# Patient Record
Sex: FEMALE | Race: BLACK OR AFRICAN AMERICAN | Employment: OTHER | URBAN - METROPOLITAN AREA
[De-identification: names, ages, dates, MRNs, and addresses within clinical notes are randomized per-mention and may not be internally consistent; named-entity substitution may affect disease eponyms.]

---

## 2017-01-18 ENCOUNTER — TELEPHONE (OUTPATIENT)
Dept: RHEUMATOLOGY | Age: 67
End: 2017-01-18

## 2017-01-18 ENCOUNTER — OFFICE VISIT (OUTPATIENT)
Dept: FAMILY MEDICINE CLINIC | Age: 67
End: 2017-01-18

## 2017-01-18 VITALS
HEIGHT: 64 IN | WEIGHT: 225 LBS | HEART RATE: 76 BPM | DIASTOLIC BLOOD PRESSURE: 86 MMHG | BODY MASS INDEX: 38.41 KG/M2 | OXYGEN SATURATION: 96 % | SYSTOLIC BLOOD PRESSURE: 134 MMHG | RESPIRATION RATE: 16 BRPM | TEMPERATURE: 97.6 F

## 2017-01-18 DIAGNOSIS — Z23 ENCOUNTER FOR IMMUNIZATION: ICD-10-CM

## 2017-01-18 DIAGNOSIS — I10 ESSENTIAL HYPERTENSION WITH GOAL BLOOD PRESSURE LESS THAN 130/80: ICD-10-CM

## 2017-01-18 DIAGNOSIS — E11.9 TYPE 2 DIABETES MELLITUS WITHOUT COMPLICATION, UNSPECIFIED LONG TERM INSULIN USE STATUS: Primary | ICD-10-CM

## 2017-01-18 DIAGNOSIS — M16.0 PRIMARY OSTEOARTHRITIS OF BOTH HIPS: ICD-10-CM

## 2017-01-18 DIAGNOSIS — J42 CHRONIC BRONCHITIS, UNSPECIFIED CHRONIC BRONCHITIS TYPE (HCC): ICD-10-CM

## 2017-01-18 RX ORDER — PREDNISONE 5 MG/1
5 TABLET ORAL DAILY
Qty: 135 TAB | Refills: 0 | Status: CANCELLED | OUTPATIENT
Start: 2017-01-18

## 2017-01-18 RX ORDER — LOSARTAN POTASSIUM AND HYDROCHLOROTHIAZIDE 12.5; 1 MG/1; MG/1
TABLET ORAL
Qty: 90 TAB | Refills: 3 | Status: SHIPPED | OUTPATIENT
Start: 2017-01-18 | End: 2017-11-09 | Stop reason: SDUPTHER

## 2017-01-18 RX ORDER — GLIPIZIDE 10 MG/1
TABLET ORAL
Qty: 180 TAB | Refills: 3 | Status: SHIPPED | OUTPATIENT
Start: 2017-01-18 | End: 2017-11-09 | Stop reason: ALTCHOICE

## 2017-01-18 RX ORDER — PREDNISONE 5 MG/1
TABLET ORAL
Qty: 30 TAB | Refills: 3 | Status: SHIPPED | OUTPATIENT
Start: 2017-01-18 | End: 2017-01-20 | Stop reason: SDUPTHER

## 2017-01-18 RX ORDER — DILTIAZEM HYDROCHLORIDE 420 MG/1
CAPSULE, EXTENDED RELEASE ORAL
Qty: 90 CAP | Refills: 3 | Status: SHIPPED | OUTPATIENT
Start: 2017-01-18 | End: 2017-08-17 | Stop reason: SDUPTHER

## 2017-01-18 RX ORDER — HYDROCODONE BITARTRATE AND ACETAMINOPHEN 10; 325 MG/1; MG/1
TABLET ORAL
Qty: 90 TAB | Refills: 0 | Status: SHIPPED | OUTPATIENT
Start: 2017-01-18 | End: 2017-02-22 | Stop reason: SDUPTHER

## 2017-01-18 NOTE — TELEPHONE ENCOUNTER
Patient stopped in to the office today and she is needing Prednisone 5mg to hold her over to her next appointment. She uses [address] [place] [address]. Please call her at [phone].

## 2017-01-18 NOTE — MR AVS SNAPSHOT
Visit Information Date & Time Provider Department Dept. Phone Encounter #  
 1/18/2017 11:20 AM Clay Cordova [facility] [phone] [id number] Follow-up Instructions Return in about 1 month (around 2/18/2017) for pain rx, welcome to medicare (2 slots) . Your Appointments 2/1/2017  4:00 PM  
ESTABLISHED PATIENT with Kourtney Quintana MD  
Arthritis and [address] [facility] [address] [place] [address] Road) Appt Note: fu 3 mo; r/s insurance confirmed [address] [facility] [address]  
[phone]  
  
   
 [address] [phone] Upcoming Health Maintenance Date Due  
 EYE EXAM RETINAL OR DILATED Q1 8/7/2015 FOBT Q 1 YEAR AGE 50-75 4/14/2016 GLAUCOMA SCREENING Q2Y 7/4/2016 Pneumococcal 65+ Low/Medium Risk (1 of 2 - PCV13) 7/4/2016 MEDICARE YEARLY EXAM 7/4/2016 HEMOGLOBIN A1C Q6M 3/21/2017 FOOT EXAM Q1 7/27/2017 MICROALBUMIN Q1 9/21/2017 LIPID PANEL Q1 9/21/2017 BREAST CANCER SCRN MAMMOGRAM 10/5/2018 DTaP/Tdap/Td series (2 - Td) 5/13/2023 Allergies as of 1/18/2017  Review Complete On: 1/18/2017 By: Salvador Jay LPN Severity Noted Reaction Type Reactions Bactrim [Sulfamethoxazole-trimethoprim]  04/02/2010    Nausea Only Biaxin [Clarithromycin]  04/02/2010    Nausea Only Demerol [Meperidine]  04/02/2010    Itching Erythromycin  04/02/2010    Nausea Only Pcn [Penicillins]  04/02/2010    Hives Percocet [Oxycodone-acetaminophen]  04/02/2010    Itching Tetracycline  04/02/2010    Nausea Only Voltaren [Diclofenac Sodium]  04/02/2010    Hives Current Immunizations  Reviewed on 9/30/2016 Name Date Influenza High Dose Vaccine PF 1/29/2016 Influenza Vaccine 12/5/2012 Influenza Vaccine PF 11/17/2014 PPD 1/25/1999 Pneumococcal Polysaccharide (PPSV-23) 5/13/2013 TD Vaccine 5/12/1999 Tdap 5/13/2013 Not reviewed this visit You Were Diagnosed With   
  
 Codes Comments Type 2 diabetes mellitus without complication, unspecified long term insulin use status (HCC)    -  Primary ICD-10-CM: E11.9 ICD-9-CM: 250.00 Essential hypertension with goal blood pressure less than 130/80     ICD-10-CM: I10 
ICD-9-CM: 401.9 Chronic bronchitis, unspecified chronic bronchitis type ([facility]     ICD-10-CM: K98 ICD-9-CM: 491.9 Primary osteoarthritis of both hips     ICD-10-CM: M16.0 ICD-9-CM: 715.15 Vitals BP Pulse Temp Resp Height(growth percentile) Weight(growth percentile) 134/86 76 97.6 °F (36.4 °C) (Oral) 16 5' 4\" (1.626 m) 225 lb (102.1 kg) SpO2 BMI OB Status Smoking Status 96% 38.62 kg/m2 Hysterectomy Current Every Day Smoker Vitals History BMI and BSA Data Body Mass Index Body Surface Area  
 38.62 kg/m 2 2.15 m 2 [facility] [address]  [address] [phone] Your Updated Medication List  
  
   
This list is accurate as of: 1/18/17 12:36 PM.  Always use your most recent med list.  
  
  
  
  
 albuterol 2.5 mg /3 mL (0.083 %) nebulizer solution Commonly known as:  PROVENTIL VENTOLIN  
3 mL by Nebulization route every six (6) hours as needed for Wheezing. aspirin 81 mg chewable tablet Take 81 mg by mouth daily. atorvastatin 20 mg tablet Commonly known as:  LIPITOR  
TAKE 1 TABLET NIGHTLY * Blood-Glucose Meter monitoring kit One touch meter * Blood-Glucose Meter monitoring kit Commonly known as:  FREESTYLE LITE METER Tid use due to uncontrolled dm and hyperglycemia. 250.02. 790.29 CENTRUM PO Take  by mouth daily. diltiazem 420 mg SR capsule Commonly known as:  [facility] TAKE 1 CAPSULE DAILY  
  
 glipiZIDE 10 mg tablet Commonly known as:  GLUCOTROL  
1/2-1 tab po bid with meals * glucose blood VI test strips strip Commonly known as:  ONETOUCH ULTRA TEST Test once daily * glucose blood VI test strips strip Commonly known as:  ASCENSIA AUTODISC VI, ONE TOUCH ULTRA TEST VI Tid use due to uncontrolled dm and hyperglycemia. 250 790.29  
  
 * glucose blood VI test strips strip Commonly known as:  FREESTYLE LITE STRIPS Tid use due to uncontrolled dm and hyperglycemia. 250. 790.29 HYDROcodone-acetaminophen  mg tablet Commonly known as:  NORCO  
1 tab po every 8 hours as needed for pain. Lancets Misc Commonly known as:  FREESTYLE LANCETS Tid use due to uncontrolled dm and hyperglycemia.  790.29  
  
 losartan-hydroCHLOROthiazide 100-12.5 mg per tablet Commonly known as:  HYZAAR  
TAKE 1 TABLET DAILY  
  
 methylPREDNISolone 4 mg tablet Commonly known as:  Arabella Ernst As directed. Indications: ANGIOEDEMA pneumococcal 13 jamie conj dip 0.5 mL Syrg injection Commonly known as:  PREVNAR-13  
0.5 mL by IntraMUSCular route once for 1 dose. predniSONE 5 mg tablet Commonly known as:  Mamadou Carmonaor Take 5 mg by mouth daily. sAXagliptin-metFORMIN 2.5-1,000 mg Tm24 Commonly known as:  KOMBIGLYZE XR Take 1 Tab by mouth two (2) times a day. Dc the metformin xr and ongliza separate rxs ZyrTEC 10 mg Cap Generic drug:  Cetirizine Take  by mouth. * Notice: This list has 5 medication(s) that are the same as other medications prescribed for you. Read the directions carefully, and ask your doctor or other care provider to review them with you. Prescriptions Printed Refills HYDROcodone-acetaminophen (NORCO)  mg tablet 0 Si tab po every 8 hours as needed for pain. Class: Print Prescriptions Sent to Pharmacy Refills  
 glipiZIDE (GLUCOTROL) 10 mg tablet 3 Si/2-1 tab po bid with meals  Class: Normal  
 Pharmacy: [facility] - [address] Ph #: [phone] sAXagliptin-metFORMIN (KOMBIGLYZE XR) 2.5-1,000 mg TM24 3 Sig: Take 1 Tab by mouth two (2) times a day. Dc the metformin xr and ongliza separate rxs Class: Normal  
 Pharmacy: [address] [place] [address], [address] [place] [address] Ph #: [phone] Route: Oral  
 losartan-hydroCHLOROthiazide (HYZAAR) 100-12.5 mg per tablet 3 Sig: TAKE 1 TABLET DAILY Class: Normal  
 Pharmacy: [address] [place] [address], [address] [place] [address] Ph #: [phone]  
 diltiazem [facility]) 420 mg SR capsule 3 Sig: TAKE 1 CAPSULE DAILY Class: Normal  
 Pharmacy: [address] [place] [address], [address] [place] [address] Ph #: [phone]  
 pneumococcal 13 jamie conj dip (PREVNAR-13) 0.5 mL syrg injection 0 Si.5 mL by IntraMUSCular route once for 1 dose. Class: Normal  
 Pharmacy: [address] [place], [address] [place] [address] Arnav Hidalgo Ph #: [phone] Route: IntraMUSCular We Performed the Following HEMOGLOBIN A1C WITH EAG [29130 CPT(R)] LIPID PANEL [26146 CPT(R)] METABOLIC PANEL, COMPREHENSIVE [15229 CPT(R)] Follow-up Instructions Return in about 1 month (around 2017) for pain rx, welcome to medicare (2 slots) . Introducing [facility]! Kassi Urrutia introduces [employer] patient portal. Now you can access parts of your medical record, email your doctor's office, and request medication refills online. 1. In your internet browser, go to [email or web address] 2. Click on the First Time User? Click Here link in the Sign In box. You will see the New Member Sign Up page. 3. Enter your Framebench Access Code exactly as it appears below. You will not need to use this code after youve completed the sign-up process. If you do not sign up before the expiration date, you must request a new code. · [employer] Access Code: [email or web address] Expires: 4/18/2017 12:36 PM 
 
4. Enter the last four digits of your Social Security Number (xxxx) and Date of Birth (mm/dd/yyyy) as indicated and click Submit. You will be taken to the next sign-up page. 5. Create a [employer] ID. This will be your [employer]a login ID and cannot be changed, so think of one that is secure and easy to remember. 6. Create a Bazinga password. You can change your password at any time. 7. Enter your Password Reset Question and Answer. This can be used at a later time if you forget your password. 8. Enter your e-mail address. You will receive e-mail notification when new information is available in [address] 9. Click Sign Up. You can now view and download portions of your medical record. 10. Click the Download Summary menu link to download a portable copy of your medical information. If you have questions, please visit the Frequently Asked Questions section of the [employer] website. Remember, [employer]azinga is NOT to be used for urgent needs. For medical emergencies, dial 911. Now available from your iPhone and Android! Please provide this summary of care documentation to your next provider. Your primary care clinician is listed as Britton Richards. If you have any questions after today's visit, please call [phone].

## 2017-01-18 NOTE — PROGRESS NOTES
HISTORY OF PRESENT ILLNESS  Serafin Abdi is a 73 y.o. female. HPI  Chief Complaint   Patient presents with    Diabetes     follow up     Serafin Abdi is a 73 y.o. female here for diabetic, routine follow up. (originially the appt was scheduled for welcome to medicare, however, due to her numerous needs today will reschedule this for one month). Fu on DM. A1c was over an 8 at her last  Check up however that was prior to her starting the glipizide back. She feels like things are under tighter control at this time. Reports she is monitoring both fbs and rbs regularly. bg in am as low as 69, at bedtime is around 120 in evenings. Will only use 1/2 of the glipizde if on lower side  Conts to take the kombigize in evening as well. 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 She is going through treatment at this time. Dr Esteban Duarte gave her tramadol tx with her surgery (vocal cord) but she didn't take those. Needs refills of her norco for her chronic joint pain (knees and hips) . Pain has been worse here lately due to weather so needing tid medication. The last injection she got made her ill. Can see dr Isabella Hodge on shots but she was told there were not any injection option for her. She has been tolerating her medication well and has not had any adverse effects. She denies sob and has continued to abstain from smoking!!!    Current Outpatient Prescriptions   Medication Sig Dispense Refill    glipiZIDE (GLUCOTROL) 10 mg tablet 1/2-1 tab po bid with meals 180 Tab 3    sAXagliptin-metFORMIN (KOMBIGLYZE XR) 2.5-1,000 mg TM24 Take 1 Tab by mouth two (2) times a day.  Dc the metformin xr and ongliza separate rxs 180 Tab 3    losartan-hydroCHLOROthiazide (HYZAAR) 100-12.5 mg per tablet TAKE 1 TABLET DAILY 90 Tab 3    diltiazem (TIAZAC) 420 mg SR capsule TAKE 1 CAPSULE DAILY 90 Cap 3    HYDROcodone-acetaminophen (NORCO)  mg tablet 1 tab po every 8 hours as needed for pain. 90 Tab 0    methylPREDNISolone (MEDROL DOSEPACK) 4 mg tablet As directed. Indications: ANGIOEDEMA 1 Dose Pack 0    atorvastatin (LIPITOR) 20 mg tablet TAKE 1 TABLET NIGHTLY 90 Tab 3    albuterol (PROVENTIL VENTOLIN) 2.5 mg /3 mL (0.083 %) nebulizer solution 3 mL by Nebulization route every six (6) hours as needed for Wheezing. 60 Each 2    Blood-Glucose Meter (FREESTYLE LITE METER) monitoring kit Tid use due to uncontrolled dm and hyperglycemia. 250.02. 790.29 1 Kit 0    glucose blood VI test strips (FREESTYLE LITE STRIPS) strip Tid use due to uncontrolled dm and hyperglycemia. 250.02. 790.29 1 Each 11    Lancets (FREESTYLE LANCETS) misc Tid use due to uncontrolled dm and hyperglycemia. 250.02. 790.29 1 Each 11    glucose blood VI test strips (ASCENSIA AUTODISC VI, ONE TOUCH ULTRA TEST VI) strip Tid use due to uncontrolled dm and hyperglycemia. 250.02. 790.29 100 Each 11    Blood-Glucose Meter monitoring kit One touch meter 1 Kit 1    glucose blood VI test strips (ONE TOUCH ULTRA TEST) strip Test once daily 1 Package 11    Cetirizine (ZYRTEC) 10 mg Cap Take  by mouth.  aspirin 81 mg chewable tablet Take 81 mg by mouth daily.  MULTIVITS W-FE,OTHER MIN (CENTRUM PO) Take  by mouth daily.       predniSONE (DELTASONE) 5 mg tablet 5 mg once daily or as directed 30 Tab 3     Allergies   Allergen Reactions    Bactrim [Sulfamethoxazole-Trimethoprim] Nausea Only    Biaxin [Clarithromycin] Nausea Only    Demerol [Meperidine] Itching    Erythromycin Nausea Only    Pcn [Penicillins] Hives    Percocet [Oxycodone-Acetaminophen] Itching    Tetracycline Nausea Only    Voltaren [Diclofenac Sodium] Hives     Past Medical History   Diagnosis Date    Abnormal pulmonary function test 9/28/2016    AR (allergic rhinitis)     Asthma     Atrial thrombus 1994     left    Chronic bronchitis ([facility] 9/28/2016    Chronic pain     DDD (degenerative disc disease), lumbar     Diabetes ([address]     DJD (degenerative joint disease) of hip      right    Empty sella ([address] 2000     by MRI    Former smoker 10/24/2016    Hoarseness of voice     Hypercholesterolemia     Hypertension     Lesion of vocal cord     PMR (polymyalgia rheumatica) ([facility] 5/18/2016    Smoker 12/5/2012     Past Surgical History   Procedure Laterality Date    Hx breast reduction      Hx hemorrhoidectomy      Hx knee replacement  11/03, 10/05     left then right    Hx hysterectomy       ovaries spared    Hx appendectomy  1970's    Hx tonsillectomy  age 29     tonsils    Endoscopy, colon, diagnostic  10/03     polypectomy    Endoscopy, colon, diagnostic  2/05     Dr. Breann Myers Endoscopy, colon, diagnostic  2010, reported     due 2015     Family History   Problem Relation Age of Onset    Arthritis-osteo Mother     Asthma Mother     Diabetes Mother     Hypertension Mother     Stroke Mother     Arthritis-osteo Father     Cancer Father     Elevated Lipids Father     Hypertension Father     Arthritis-osteo Sister     Asthma Sister     Diabetes Sister     Elevated Lipids Sister     Hypertension Sister     Heart Attack Sister     Hypertension Daughter     Hypertension Daughter     Hypertension Daughter     Anesth Problems Neg Hx      Social History   Substance Use Topics    Smoking status: Current Every Day Smoker     Packs/day: 0.50     Years: 40.00     Types: Cigarettes    Smokeless tobacco: Never Used    Alcohol use No       Review of Systems   Constitutional: Negative. Negative for chills, diaphoresis, fever, malaise/fatigue and weight loss. HENT: Negative. Negative for congestion, ear discharge, ear pain, hearing loss, nosebleeds, sore throat and tinnitus. Eyes: Negative. Negative for blurred vision, double vision, photophobia, pain, discharge and redness. Respiratory: Negative.   Negative for cough, hemoptysis, sputum production, shortness of breath, wheezing and stridor. Cardiovascular: Negative. Negative for chest pain, palpitations, orthopnea, claudication, leg swelling and PND. Gastrointestinal: Negative. Negative for abdominal pain, blood in stool, constipation, diarrhea, heartburn, melena, nausea and vomiting. Genitourinary: Negative. Negative for dysuria, flank pain, frequency, hematuria and urgency. Musculoskeletal: Positive for back pain and joint pain. Negative for falls, myalgias and neck pain. Skin: Negative. Negative for itching and rash. Neurological: Negative. Negative for dizziness, tingling, tremors, sensory change, speech change, focal weakness, seizures, loss of consciousness, weakness and headaches. Endo/Heme/Allergies: Negative. Negative for environmental allergies and polydipsia. Does not bruise/bleed easily. Psychiatric/Behavioral: Negative. Negative for depression, hallucinations, memory loss, substance abuse and suicidal ideas. The patient is not nervous/anxious and does not have insomnia. All other systems reviewed and are negative. Physical Exam   Constitutional: She is oriented to person, place, and time. She appears well-developed and well-nourished. No distress. Obese, weight stable. HENT:   Head: Normocephalic and atraumatic. Right Ear: External ear normal. Tympanic membrane is not bulging. No middle ear effusion. Left Ear: External ear normal. Tympanic membrane is not bulging. No middle ear effusion. Nose: Nose normal. Right sinus exhibits no maxillary sinus tenderness and no frontal sinus tenderness. Left sinus exhibits no maxillary sinus tenderness and no frontal sinus tenderness. Mouth/Throat: Oropharynx is clear and moist. No oropharyngeal exudate. Eyes: Conjunctivae and EOM are normal. Pupils are equal, round, and reactive to light. Right eye exhibits no discharge. Left eye exhibits no discharge. No scleral icterus.    Neck: Normal range of motion. Neck supple. No thyromegaly present. Cardiovascular: Normal rate, regular rhythm, normal heart sounds and intact distal pulses. Pulmonary/Chest: Effort normal and breath sounds normal. No stridor. No respiratory distress. She has no wheezes. She has no rales. She exhibits no tenderness. Abdominal: Bowel sounds are normal. She exhibits no distension and no mass. There is no tenderness. There is no rebound and no guarding. Musculoskeletal: She exhibits tenderness (she has chronic arthritic changes to yisel knees. she ambulates slow and steady. ). Lymphadenopathy:     She has no cervical adenopathy. Neurological: She is alert and oriented to person, place, and time. Skin: Skin is warm and dry. She is not diaphoretic. Psychiatric: She has a normal mood and affect. Her behavior is normal. Judgment and thought content normal.   Nursing note and vitals reviewed. ASSESSMENT and PLAN    ICD-10-CM ICD-9-CM    1. Type 2 diabetes mellitus without complication, unspecified long term insulin use status (HCC) E11.9 250.00 glipiZIDE (GLUCOTROL) 10 mg tablet      sAXagliptin-metFORMIN (KOMBIGLYZE XR) 2.5-1,000 mg KA58      METABOLIC PANEL, COMPREHENSIVE      LIPID PANEL      HEMOGLOBIN A1C WITH EAG   2. Essential hypertension with goal blood pressure less than 130/80 I10 401.9 losartan-hydroCHLOROthiazide (HYZAAR) 100-12.5 mg per tablet      diltiazem (TIAZAC) 420 mg SR capsule   3. Chronic bronchitis, unspecified chronic [facility]itis type ([facility] J42 491.9    4. Primary osteoarthritis of both hips M16.0 715.15 HYDROcodone-acetaminophen (NORCO)  mg tablet   5. Encounter for immunization Z23 V03.89 pneumococcal 13 jamie conj dip (PREVNAR-13) 0.5 mL syrg injection     Win Bertram was seen today for diabetes.     Diagnoses and all orders for this visit:    Type 2 diabetes mellitus without complication, unspecified long term insulin use status (HCC)  -     glipiZIDE (GLUCOTROL) 10 mg tablet; 1/2-1 tab po bid with meals  -     sAXagliptin-metFORMIN (KOMBIGLYZE XR) 2.5-1,000 mg TM24; Take 1 Tab by mouth two (2) times a day. Dc the metformin xr and ongliza separate rxs  -     METABOLIC PANEL, COMPREHENSIVE  -     LIPID PANEL  -     HEMOGLOBIN A1C WITH EAG  Improving control. Deviate plan per lab results     Essential hypertension with goal blood pressure less than 130/80  -     losartan-hydroCHLOROthiazide (HYZAAR) 100-12.5 mg per tablet; TAKE 1 TABLET DAILY  -     diltiazem (TIAZAC) 420 mg SR capsule; TAKE 1 CAPSULE DAILY  bp is stable. Chronic bronchitis, unspecified chronic bronchitis type ([address]  Stable, she is due to fu with pulm for routine check up. Continues to abstain from smoking. Primary osteoarthritis of both hips and Knees   - cont     HYDROcodone-acetaminophen (NORCO)  mg tablet; 1 tab po every 8 hours as needed for pain. Reviewed patient's prescription monitoring report at time of visit and there are no discrepancies. Updated controlled substance agreement was reviewed, signed and is on file. Will do urine screening at her 1 month fu visit. molly I will need to see her monthly on this rx. She has exhausted injection options due to severe adverse reaction with injection in the past. She does do her exercises at home. i do feel the benefits outweight risks for her continuing at this time as the medication helps improve her quality of life by increasing her ability to perform adls and be active member of society. Patient verbalizes understanding of plan of care. Encounter for immunization  -     pneumococcal 13 jamie conj dip (PREVNAR-13) 0.5 mL syrg injection; 0.5 mL by IntraMUSCular route once for 1 dose. Follow-up Disposition:  Return in about 1 month (around 2/18/2017) for pain rx, welcome to medicare (2 slots) . Updated controlled substance agreement was reviewed, signed and is on file.    Reviewed patient's prescription monitoring report at time of visit and there are no discrepancies.

## 2017-01-19 LAB
ALBUMIN SERPL-MCNC: 4.4 G/DL (ref 3.6–4.8)
ALBUMIN/GLOB SERPL: 1.5 {RATIO} (ref 1.1–2.5)
ALP SERPL-CCNC: 81 IU/L (ref 39–117)
ALT SERPL-CCNC: 15 IU/L (ref 0–32)
AST SERPL-CCNC: 12 IU/L (ref 0–40)
BILIRUB SERPL-MCNC: 0.3 MG/DL (ref 0–1.2)
BUN SERPL-MCNC: 19 MG/DL (ref 8–27)
BUN/CREAT SERPL: 24 (ref 11–26)
CALCIUM SERPL-MCNC: 9.7 MG/DL (ref 8.7–10.3)
CHLORIDE SERPL-SCNC: 97 MMOL/L (ref 96–106)
CHOLEST SERPL-MCNC: 240 MG/DL (ref 100–199)
CO2 SERPL-SCNC: 21 MMOL/L (ref 18–29)
CREAT SERPL-MCNC: 0.8 MG/DL (ref 0.57–1)
EST. AVERAGE GLUCOSE BLD GHB EST-MCNC: 189 MG/DL
GLOBULIN SER CALC-MCNC: 2.9 G/DL (ref 1.5–4.5)
GLUCOSE SERPL-MCNC: 155 MG/DL (ref 65–99)
HBA1C MFR BLD: 8.2 % (ref 4.8–5.6)
HDLC SERPL-MCNC: 46 MG/DL
INTERPRETATION, 910389: NORMAL
LDLC SERPL CALC-MCNC: 151 MG/DL (ref 0–99)
POTASSIUM SERPL-SCNC: 4.1 MMOL/L (ref 3.5–5.2)
PROT SERPL-MCNC: 7.3 G/DL (ref 6–8.5)
SODIUM SERPL-SCNC: 138 MMOL/L (ref 134–144)
TRIGL SERPL-MCNC: 215 MG/DL (ref 0–149)
VLDLC SERPL CALC-MCNC: 43 MG/DL (ref 5–40)

## 2017-01-20 RX ORDER — PREDNISONE 5 MG/1
TABLET ORAL
Qty: 30 TAB | Refills: 0 | Status: SHIPPED | OUTPATIENT
Start: 2017-01-20 | End: 2017-02-22 | Stop reason: DRUGHIGH

## 2017-01-20 NOTE — PROGRESS NOTES
Notify pt her a1c has not improved, still an 8.2. Have her continue what she is doing however as she seems to have good balance going on right now and continue to limit carbs/ sugar in diet. Try to get some light exercise daily. Will recheck in 3 months. Also cholesterol is up, has she been taking the atorvastatin?

## 2017-01-21 RX ORDER — PRAVASTATIN SODIUM 20 MG/1
20 TABLET ORAL
Qty: 30 TAB | Refills: 5 | Status: SHIPPED | OUTPATIENT
Start: 2017-01-21 | End: 2017-08-20 | Stop reason: DRUGHIGH

## 2017-01-21 NOTE — PROGRESS NOTES
Lets have her try changing to pravastatin to see if she can tolerate this better.  Sent to jose will discuss how she is doing at her fu

## 2017-01-21 NOTE — PROGRESS NOTES
Pt made aware of results/recommendations below. Pt states she hasn't been taking her cholesterol medication because it causes her legs to \"hurt\" however pt states seh will try to start back on it.

## 2017-02-08 ENCOUNTER — TELEPHONE (OUTPATIENT)
Dept: FAMILY MEDICINE CLINIC | Age: 67
End: 2017-02-08

## 2017-02-08 NOTE — TELEPHONE ENCOUNTER
Referral Request Telephone Call      Insurance Name:     Brian Marcelo ID:  [id number]   Specialist Name: Monisha Polanco   Type of Specialty:  Pulmonary    Address of Specialist:  [address] [place] [address]   Phone/Fax Number of Specialist: Phone # [phone]  Fax # [phone]   Diagnosis: R06.00   Appointment Date: 2/9/17 @ 1p   NPI    Tax ID

## 2017-02-10 ENCOUNTER — TELEPHONE (OUTPATIENT)
Dept: FAMILY MEDICINE CLINIC | Age: 67
End: 2017-02-10

## 2017-02-10 NOTE — TELEPHONE ENCOUNTER
Referral Request Telephone Call      Insurance Name:     [place] [facility] REPLACEMENT/ADVANTAGE - PPO    Insurance ID:  [id number]   Specialist Name: Dr. Maricarmen Sandhu   Type of Specialty:  Rheumatology    Address of Specialist:  [facility] [address]   Phone/Fax Number of Specialist: [phone]   [facility] [phone]   Diagnosis: 719.40   Appointment Date: 2-22-17 @  4:30   NPI    Tax ID

## 2017-02-22 ENCOUNTER — OFFICE VISIT (OUTPATIENT)
Dept: RHEUMATOLOGY | Age: 67
End: 2017-02-22

## 2017-02-22 ENCOUNTER — OFFICE VISIT (OUTPATIENT)
Dept: FAMILY MEDICINE CLINIC | Age: 67
End: 2017-02-22

## 2017-02-22 VITALS
DIASTOLIC BLOOD PRESSURE: 72 MMHG | SYSTOLIC BLOOD PRESSURE: 120 MMHG | TEMPERATURE: 98.3 F | HEART RATE: 86 BPM | RESPIRATION RATE: 16 BRPM | BODY MASS INDEX: 37.73 KG/M2 | HEIGHT: 64 IN | WEIGHT: 221 LBS | OXYGEN SATURATION: 99 %

## 2017-02-22 VITALS
BODY MASS INDEX: 37.69 KG/M2 | SYSTOLIC BLOOD PRESSURE: 158 MMHG | DIASTOLIC BLOOD PRESSURE: 72 MMHG | HEART RATE: 88 BPM | WEIGHT: 220.8 LBS | RESPIRATION RATE: 16 BRPM | TEMPERATURE: 97.6 F | OXYGEN SATURATION: 95 % | HEIGHT: 64 IN

## 2017-02-22 DIAGNOSIS — M35.3 PMR (POLYMYALGIA RHEUMATICA) (HCC): Primary | ICD-10-CM

## 2017-02-22 DIAGNOSIS — M16.9 OSTEOARTHRITIS OF HIP, UNSPECIFIED LATERALITY, UNSPECIFIED OSTEOARTHRITIS TYPE: ICD-10-CM

## 2017-02-22 DIAGNOSIS — M16.0 PRIMARY OSTEOARTHRITIS OF BOTH HIPS: ICD-10-CM

## 2017-02-22 DIAGNOSIS — Z00.00 ROUTINE GENERAL MEDICAL EXAMINATION AT A HEALTH CARE FACILITY: ICD-10-CM

## 2017-02-22 DIAGNOSIS — Z13.39 SCREENING FOR ALCOHOLISM: ICD-10-CM

## 2017-02-22 DIAGNOSIS — E11.42 TYPE 2 DIABETES MELLITUS WITH PERIPHERAL NEUROPATHY (HCC): ICD-10-CM

## 2017-02-22 DIAGNOSIS — Z13.31 SCREENING FOR DEPRESSION: ICD-10-CM

## 2017-02-22 DIAGNOSIS — Z00.00 WELCOME TO MEDICARE PREVENTIVE VISIT: Primary | ICD-10-CM

## 2017-02-22 DIAGNOSIS — Z79.52 LONG TERM (CURRENT) USE OF SYSTEMIC STEROIDS: ICD-10-CM

## 2017-02-22 DIAGNOSIS — M25.512 LEFT SHOULDER PAIN, UNSPECIFIED CHRONICITY: ICD-10-CM

## 2017-02-22 RX ORDER — HYDROCODONE BITARTRATE AND ACETAMINOPHEN 10; 325 MG/1; MG/1
TABLET ORAL
Qty: 90 TAB | Refills: 0 | Status: SHIPPED | OUTPATIENT
Start: 2017-02-22 | End: 2017-04-19 | Stop reason: SDUPTHER

## 2017-02-22 RX ORDER — PREDNISONE 1 MG/1
TABLET ORAL
Qty: 90 TAB | Refills: 1 | Status: SHIPPED | OUTPATIENT
Start: 2017-02-22 | End: 2017-07-05 | Stop reason: SDUPTHER

## 2017-02-22 RX ORDER — NORTRIPTYLINE HYDROCHLORIDE 10 MG/1
10 CAPSULE ORAL
Qty: 30 CAP | Refills: 1 | Status: SHIPPED | OUTPATIENT
Start: 2017-02-22 | End: 2017-05-08 | Stop reason: SDUPTHER

## 2017-02-22 RX ORDER — HYDROCODONE BITARTRATE AND ACETAMINOPHEN 10; 325 MG/1; MG/1
TABLET ORAL
Qty: 90 TAB | Refills: 0 | Status: SHIPPED | OUTPATIENT
Start: 2017-02-22 | End: 2017-02-22 | Stop reason: SDUPTHER

## 2017-02-22 NOTE — PROGRESS NOTES
HISTORY OF PRESENT ILLNESS  Annie Dominique is a 73 y.o. female. HPI  Patient presents for follow up of PMR. She notes intermittent pain in the left shoulder and left hip. She notes some significant pain perhaps every other day. She has joined the gym and is walking 2 1/2 miles each day. She is stretching daily. No joint swelling is noted. She has AM stiffness of 60-90 minutes. She has no tongue or jaw claudication or temporal headaches. She has not had recent sudden visual changes (has had a right eye visual floater since September: this has been evaluated). She stopped taking prednisone about 10 days ago (had taken 5 mg daily, with 2.5 mg daily over about 5 days before stopping). She has not noticed a significant change off prednisone. She will take hydrocodone/APAP 1-2 times daily with relief. She had surgery for a benign vocal cord polyp in October. She stopped smoking on October 7th. Current Outpatient Prescriptions   Medication Sig Dispense Refill    nortriptyline (PAMELOR) 10 mg capsule Take 1 Cap by mouth nightly. 30 Cap 1    HYDROcodone-acetaminophen (NORCO)  mg tablet 1 tab po every 8 hours as needed for pain. Fill 3/20/17 90 Tab 0    glipiZIDE (GLUCOTROL) 10 mg tablet 1/2-1 tab po bid with meals 180 Tab 3    sAXagliptin-metFORMIN (KOMBIGLYZE XR) 2.5-1,000 mg TM24 Take 1 Tab by mouth two (2) times a day. Dc the metformin xr and ongliza separate rxs 180 Tab 3    losartan-hydroCHLOROthiazide (HYZAAR) 100-12.5 mg per tablet TAKE 1 TABLET DAILY 90 Tab 3    diltiazem (TIAZAC) 420 mg SR capsule TAKE 1 CAPSULE DAILY 90 Cap 3    albuterol (PROVENTIL VENTOLIN) 2.5 mg /3 mL (0.083 %) nebulizer solution 3 mL by Nebulization route every six (6) hours as needed for Wheezing.  60 Each 2    Blood-Glucose Meter (FREESTYLE LITE METER) monitoring kit Tid use due to uncontrolled dm and hyperglycemia. 250.02. 790.29 1 Kit 0    glucose blood VI test strips (FREESTYLE LITE STRIPS) strip Tid use due to uncontrolled dm and hyperglycemia. 250.02. 790.29 1 Each 11    Lancets (FREESTYLE LANCETS) misc Tid use due to uncontrolled dm and hyperglycemia. 250.02. 790.29 1 Each 11    glucose blood VI test strips (ASCENSIA AUTODISC VI, ONE TOUCH ULTRA TEST VI) strip Tid use due to uncontrolled dm and hyperglycemia. 250.02. 790.29 100 Each 11    Blood-Glucose Meter monitoring kit One touch meter 1 Kit 1    glucose blood VI test strips (ONE TOUCH ULTRA TEST) strip Test once daily 1 Package 11    Cetirizine (ZYRTEC) 10 mg Cap Take  by mouth.  aspirin 81 mg chewable tablet Take 81 mg by mouth daily.  MULTIVITS W-FE,OTHER MIN (CENTRUM PO) Take  by mouth daily.  pravastatin (PRAVACHOL) 20 mg tablet Take 1 Tab by mouth nightly. In place of lipitor 30 Tab 5     Allergies   Allergen Reactions    Atorvastatin Myalgia    Bactrim [Sulfamethoxazole-Trimethoprim] Nausea Only    Biaxin [Clarithromycin] Nausea Only    Demerol [Meperidine] Itching    Erythromycin Nausea Only    Pcn [Penicillins] Hives    Percocet [Oxycodone-Acetaminophen] Itching    Tetracycline Nausea Only    Voltaren [Diclofenac Sodium] Hives       Review of Systems   Constitutional: Negative for fever and weight loss. Respiratory: Negative for cough. Cardiovascular: Negative for leg swelling. Gastrointestinal: Negative for abdominal pain and nausea. Physical Exam   Vitals reviewed. Constitutional: She is oriented to person, place, and time. She appears well-developed and well-nourished. No distress. O/P clear  Neck: Normal range of motion. Neck supple. Cardiovascular: Regular rhythm.     No edema  -temporal arteries non-tender with normal pulsations   Pulmonary/Chest: Effort normal and breath sounds normal. No respiratory distress. Abdominal: Soft. She exhibits no distension. There is no tenderness.    Musculoskeletal:   -left shoulder painful abduction to 110 degrees and flexion to 100 degrees  -each knee flexion 90 degrees. -Each knee s/p TKA. -no peripheral synovitis   Lymphadenopathy:     She has no cervical adenopathy. Neurological: She is alert and oriented to person, place, and time. She exhibits normal muscle tone. Skin: Skin is warm and dry. Psychiatric: She has a normal mood and affect. Judgment normal.     Lab Results   Component Value Date/Time    WBC 10.0 10/05/2016 08:59 AM    WBC 8.9 05/09/2012 12:23 PM    HGB 13.7 10/05/2016 08:59 AM    HCT 42.3 10/05/2016 08:59 AM    PLATELET 702 91/62/1149 08:59 AM    MCV 91.4 10/05/2016 08:59 AM     Lab Results   Component Value Date/Time    Sed rate (ESR) 24 06/07/2016 12:00 PM     Lab Results   Component Value Date/Time    Sodium 138 01/18/2017 12:55 PM    Potassium 4.1 01/18/2017 12:55 PM    Chloride 97 01/18/2017 12:55 PM    CO2 21 01/18/2017 12:55 PM    Anion gap 9 10/05/2016 08:59 AM    Glucose 155 01/18/2017 12:55 PM    BUN 19 01/18/2017 12:55 PM    Creatinine 0.80 01/18/2017 12:55 PM    BUN/Creatinine ratio 24 01/18/2017 12:55 PM    GFR est AA 89 01/18/2017 12:55 PM    GFR est non-AA 78 01/18/2017 12:55 PM    Calcium 9.7 01/18/2017 12:55 PM    Bilirubin, total 0.3 01/18/2017 12:55 PM    AST (SGOT) 12 01/18/2017 12:55 PM    Alk. phosphatase 81 01/18/2017 12:55 PM    Protein, total 7.3 01/18/2017 12:55 PM    Albumin 4.4 01/18/2017 12:55 PM    Globulin 3.9 10/16/2010 01:00 PM    A-G Ratio 1.5 01/18/2017 12:55 PM    ALT (SGPT) 15 01/18/2017 12:55 PM     MHAQ 0.25    ASSESSMENT and PLAN    ICD-10-CM ICD-9-CM    1. PMR (polymyalgia rheumatica) ([facility] [address]): She had taken about 5 mg daily since the last visit. Overall symptoms are improved. She lowered to 2.5 mg daily for 5 days and then stopped, about 10 days ago. M35.3 725 SED RATE (ESR)  -given length of prednisone therapy, I would taper a bit slower.  She is to take 3 mg daily, tapering by 1 mg every 2-4 weeks if tolerated (call if flare of symptoms)  -comfortable, safe, low impact exercise encouraged  -weight loss efforts encouraged      C REACTIVE PROTEIN, QT   2. Long term (current) use of systemic steroids: normal DXA in 2015. Has taken under up to 10 mg daily prednisone over past 1 1/2 years or so. She had remained at 5 mg daily dose for many months. Z79.52 V58.65 -monitor blood sugars with Ms. Caba Level  -slow, low dose prednisone taper as noted  -calcium 1200 mg daily total  -vitamin D3 9360-3326 units daily  -I would obtain a DXA in a few months   3. Left shoulder pain, unspecified chronicity: chronic and intermittent. Mildly limited ROM. M25.512 719.41 -PT defers PT at present in favor of home exercises  -I have taught appropriate exercises to assist in improving in range of motion (beginning with passive range of motion and progressing through active range of motion). We discussed proper lifting techniques to avoid exacerbating the shoulder pain.

## 2017-02-22 NOTE — PROGRESS NOTES
HISTORY OF PRESENT ILLNESS  Jaylin Hanson is a 73 y.o. female. HPI ROS  See below  Physical Exam  See below   This is a \"Welcome to [employer]"  Initial Preventive Physical Examination (IPPE) providing Personalized Prevention Plan Services (Performed in the first 12 months of enrollment)    I have reviewed the patient's medical history in detail and updated the computerized patient record. Pt presents to office today for a follow up on Chronic pain follow up. She uses chronic norco 10/325mg 1tab every 8 hours for her chronic yisel hip pain rt oa and back pain related to DDD. As previously documented this helps her perform her adls and reach quality of life goals. She has exhausted injection and PT options and has contraindications to high dose nsaids. also Pt states that her feet have been burning for about 3 weeks which may be related to higher than normal bg with her being on prednisone. Worse right foot but is yisel. .    Has appt today with specialist Dr. Reuben Little, her rheumatologist-to fu on her prednisone. States she weaned off 5 days ago due to not wanting to continue on this. bg this am was 127. Yesterday evening 157. She is doing the [employer]. 2 smoothies and a meal with a snack (2 apples and peanut butter).  2 eggs per day    Had bone density last year with her mammo and was told it was normal.   History     Past Medical History:   Diagnosis Date    Abnormal pulmonary function test 9/28/2016    AR (allergic rhinitis)     Asthma     Atrial thrombus 1994    left    Chronic bronchitis (HCC) 9/28/2016    Chronic pain     DDD (degenerative disc disease), lumbar     Diabetes ([address]     DJD (degenerative joint disease) of hip     right    Empty sella ([address] 2000    by MRI    Former smoker 10/24/2016    Hoarseness of voice     Hypercholesterolemia     Hypertension     Lesion of vocal cord     PMR (polymyalgia rheumatica) ([address] 5/18/2016    Smoker 12/5/2012      Past Surgical History: Procedure Laterality Date    ENDOSCOPY, COLON, DIAGNOSTIC  10/03    polypectomy    ENDOSCOPY, COLON, DIAGNOSTIC  2/05    Dr. Dora Ibanez, COLON, DIAGNOSTIC  2010, reported    due 2015    HX APPENDECTOMY  1970's    HX BREAST REDUCTION      HX COLONOSCOPY  2015    dr Eagle Garcia. 2 polyps removed -repeat in 2018     HX HEMORRHOIDECTOMY      HX HYSTERECTOMY      ovaries spared    HX KNEE REPLACEMENT  11/03, 10/05    left then right    HX TONSILLECTOMY  age 29    tonsils     Current Outpatient Prescriptions   Medication Sig Dispense Refill    nortriptyline (PAMELOR) 10 mg capsule Take 1 Cap by mouth nightly. 30 Cap 1    HYDROcodone-acetaminophen (NORCO)  mg tablet 1 tab po every 8 hours as needed for pain. Fill 3/20/17 90 Tab 0    pravastatin (PRAVACHOL) 20 mg tablet Take 1 Tab by mouth nightly. In place of lipitor 30 Tab 5    predniSONE (DELTASONE) 5 mg tablet 5 mg once daily or as directed 30 Tab 0    glipiZIDE (GLUCOTROL) 10 mg tablet 1/2-1 tab po bid with meals 180 Tab 3    sAXagliptin-metFORMIN (KOMBIGLYZE XR) 2.5-1,000 mg TM24 Take 1 Tab by mouth two (2) times a day. Dc the metformin xr and ongliza separate rxs 180 Tab 3    losartan-hydroCHLOROthiazide (HYZAAR) 100-12.5 mg per tablet TAKE 1 TABLET DAILY 90 Tab 3    diltiazem (TIAZAC) 420 mg SR capsule TAKE 1 CAPSULE DAILY 90 Cap 3    methylPREDNISolone (MEDROL DOSEPACK) 4 mg tablet As directed. Indications: ANGIOEDEMA 1 Dose Pack 0    albuterol (PROVENTIL VENTOLIN) 2.5 mg /3 mL (0.083 %) nebulizer solution 3 mL by Nebulization route every six (6) hours as needed for Wheezing.  60 Each 2    Blood-Glucose Meter (FREESTYLE LITE METER) monitoring kit Tid use due to uncontrolled dm and hyperglycemia. 250.02. 790.29 1 Kit 0    glucose blood VI test strips (FREESTYLE LITE STRIPS) strip Tid use due to uncontrolled dm and hyperglycemia. 250.02. 790.29 1 Each 11    Lancets (FREESTYLE LANCETS) misc Tid use due to uncontrolled dm and hyperglycemia. 250.02. 790.29 1 Each 11    glucose blood VI test strips (ASCENSIA AUTODISC VI, ONE TOUCH ULTRA TEST VI) strip Tid use due to uncontrolled dm and hyperglycemia. 250.02. 790.29 100 Each 11    Blood-Glucose Meter monitoring kit One touch meter 1 Kit 1    glucose blood VI test strips (ONE TOUCH ULTRA TEST) strip Test once daily 1 Package 11    Cetirizine (ZYRTEC) 10 mg Cap Take  by mouth.  aspirin 81 mg chewable tablet Take 81 mg by mouth daily.  MULTIVITS W-FE,OTHER MIN (CENTRUM PO) Take  by mouth daily.        Allergies   Allergen Reactions    Atorvastatin Myalgia    Bactrim [Sulfamethoxazole-Trimethoprim] Nausea Only    Biaxin [Clarithromycin] Nausea Only    Demerol [Meperidine] Itching    Erythromycin Nausea Only    Pcn [Penicillins] Hives    Percocet [Oxycodone-Acetaminophen] Itching    Tetracycline Nausea Only    Voltaren [Diclofenac Sodium] Hives     Family History   Problem Relation Age of Onset    Arthritis-osteo Mother     Asthma Mother     Diabetes Mother     Hypertension Mother     Stroke Mother     Arthritis-osteo Father     Cancer Father     Elevated Lipids Father     Hypertension Father     Arthritis-osteo Sister     Asthma Sister     Diabetes Sister     Elevated Lipids Sister     Hypertension Sister     Heart Attack Sister     Hypertension Daughter     Hypertension Daughter     Hypertension Daughter     Anesth Problems Neg Hx      Social History   Substance Use Topics    Smoking status: Current Every Day Smoker     Packs/day: 0.50     Years: 40.00     Types: Cigarettes    Smokeless tobacco: Never Used    Alcohol use No     Diet, Lifestyle: generally follows a low fat low cholesterol diet, alcohol intake none    Exercise level: moderately active    Depression Risk Screen     PHQ 2 / 9, over the last two weeks 2/22/2017   Little interest or pleasure in doing things Not at all   Feeling down, depressed or hopeless Not at all   Total Score PHQ 2 0 Alcohol Risk Screen   On any occasion during the past 3 months, have you had more than 3 drinks containing alcohol?  no    Do you average more than 7 drinks per week? No  PHQ 2 / 9, over the last two weeks 2/22/2017   Little interest or pleasure in doing things Not at all   Feeling down, depressed or hopeless Not at all   Total Score PHQ 2 0       Functional Ability and Level of Safety     Hearing Loss   none    Activities of Daily Living   Self-care     Fall Risk Screen     Fall Risk Assessment, last 12 mths 2/22/2017   Able to walk? Yes   Fall in past 12 months? No     Abuse Screen   None  Patient is not abused    Review of Systems   A comprehensive review of systems was negative except for that written in the HPI. Physical Examination     No exam data present     Visit Vitals    /72    Pulse 86    Temp 98.3 °F (36.8 °C) (Oral)    Resp 16    Ht 5' 4\" (1.626 m)    Wt 221 lb (100.2 kg)    SpO2 99%    BMI 37.93 kg/m2     General:  Alert, cooperative, no distress, appears stated age. Obese    Head:  Normocephalic, without obvious abnormality, atraumatic. Eyes:  Conjunctivae/corneas clear. PERRL, EOMs intact. Fundi benign. Ears:  Normal TMs and external ear canals both ears. Nose: Nares normal. Septum midline. Mucosa normal. No drainage or sinus tenderness. Throat: Lips, mucosa, and tongue normal. Teeth and gums normal.   Neck: Supple, symmetrical, trachea midline, no adenopathy, thyroid: no enlargement/tenderness/nodules, no carotid bruit and no JVD. Back:   Symmetric, no curvature. ROM normal. No CVA tenderness. Lungs:   Clear to auscultation bilaterally. Chest wall:  No tenderness or deformity. Heart:  Regular rate and rhythm, S1, S2 normal, no murmur, click, rub or gallop. Breast Exam:  Deferred to gyn    Abdomen:   Soft, non-tender. Bowel sounds normal. No masses,  No organomegaly.    Genitalia:  Deferred to gyn   Rectal:     Extremities: Extremities normal, atraumatic, no cyanosis or edema. Pulses: 2+ and symmetric all extremities. Skin: Skin color, texture, turgor normal. No rashes or lesions. Lymph nodes: Cervical, supraclavicular, and axillary nodes normal.   Neurologic: CNII-XII intact. Normal strength, sensation and reflexes throughout. Diabetic foot exam:     Left: Reflexes na     Vibratory sensation na    Proprioception normal   Sharp/dull discrimination normal    Filament test normal sensation with micro filament   Pulse DP: 2+ (normal)   Pulse PT: 2+ (normal)   Deformities: None  Right: Reflexes na   Vibratory sensation na   Proprioception normal   Sharp/dull discrimination normal   Filament test normal sensation with micro filament   Pulse DP: 2+ (normal)   Pulse PT: 2+ (normal)   Deformities: None      EKG Screening: pt refused ekg today, denies any change in s/s since her last ekg in sept. Patient Care Team:  Keshawn King NP as PCP - General (Nurse Practitioner)     End-of-life planning  Advanced Directive discussed and documented: YES    Assessment/Plan   Education and counseling provided:  Are appropriate based on today's review and evaluation    ICD-10-CM ICD-9-CM    1. Welcome to Medicare preventive visit Z00.00 V70.0 Dameon Alcala   2. Type 2 diabetes mellitus with peripheral neuropathy (HCC) E11.42 250.60      357.2    3. Osteoarthritis of hip, unspecified laterality, unspecified osteoarthritis type M16.9 715.95    4. Primary osteoarthritis of both hips M16.0 715.15 HYDROcodone-acetaminophen (NORCO)  mg tablet      DISCONTINUED: HYDROcodone-acetaminophen (NORCO)  mg tablet   5. Routine general medical examination at a health care facility Z00.00 V70.0    6. Screening for alcoholism Z13.89 V79.1    7. Screening for depression Z13.89 V79.0      Philomena Peck was seen today for hip pain.     Diagnoses and all orders for this visit:    Welcome to Medicare preventive visit  -     Depression Screen Annual  Schedule of Personalized Health Plan  (Provide Copy to Patient)  The best way to stay healthy is to live a healthy lifestyle. A healthy lifestyle includes regular exercise, eating a well-balanced diet, keeping a healthy weight and not smoking. Regular physical exams and screening tests are another important way to take care of yourself. Preventive exams provided by health care providers can find health problems early when treatment works best and can keep you from getting certain diseases or illnesses. Preventive services include exams, lab tests, screenings, shots, monitoring and information to help you take care of your own health. All people over 65 should have a pneumonia shot. Pneumonia shots are usually only needed once in a lifetime unless your doctor decides differently. All people over 65 should have a yearly flu shot. People over 65 are at medium to high risk for Hepatitis B. Three shots are needed for complete protection. In addition to your physical exam, some screening tests are recommended:    Bone mass measurement (dexa scan) is recommended every two years  Diabetes Mellitus screening is recommended every year. Glaucoma is an eye disease caused by high pressure in the eye. An eye exam is recommended every year. Cardiovascular screening tests that check your cholesterol and other blood fat (lipid) levels are recommended every five years. Colorectal Cancer screening tests help to find pre-cancerous polyps (growths in the colon) so they can be removed before they turn into cancer. Tests ordered for screening depend on your personal and family history risk factors.     Screening for Breast Cancer is recommended yearly with a mammogram.    Screening for Cervical Cancer is recommended every two years (annually for certain risk factors, such as previous history of STD or abnormal PAP in past 7 years), with a Pelvic Exam with PAP    Here is a list of your current Health Maintenance items with a due date:  Health Maintenance   Topic Date Due    EYE EXAM RETINAL OR DILATED Q1  08/07/2015-needs to schedule    FOBT Q 1 YEAR AGE 50-75  04/14/2016-UTD on colonoscopy    GLAUCOMA SCREENING Q2Y  07/04/2016-needs to schedule    Pneumococcal 65+ Low/Medium Risk (1 of 2 - PCV13) 07/04/2016    MEDICARE YEARLY EXAM  07/04/2016    HEMOGLOBIN A1C Q6M  07/18/2017    FOOT EXAM Q1  07/27/2017    MICROALBUMIN Q1  09/21/2017    LIPID PANEL Q1  01/18/2018    BREAST CANCER SCRN MAMMOGRAM  10/05/2018    DTaP/Tdap/Td series (2 - Td) 05/13/2023    Hepatitis C Screening  Addressed    OSTEOPOROSIS SCREENING (DEXA)  Completed    ZOSTER VACCINE AGE 60>  Addressed    INFLUENZA AGE 9 TO ADULT  Addressed       Type 2 diabetes mellitus with peripheral neuropathy ([facility] [address]  She is now off the prednisone and will see if this continues to help her sugars. She is doing the [employer]. 2 smoothies and a meal with a snack (2 apples and peanut butter). 2 eggs per day  -add     nortriptyline (PAMELOR) 10 mg capsule; Take 1 Cap by mouth nightly. Reviewed with patient and educated regarding proper use of medication, side effects, potential adverse effects and when to follow up. Call in 1-2 weeks if pain is not improving for dose titration. She may also be able to come off if her pain improves after stopping the prednisone and having better bg control. Chronic back pain  Primary osteoarthritis of both hips  -     Benefits outweigh risks for her to continue treatment as previously documented it is needed to perform ADLs and quality of life. Also she has exhausted injection and PT options. HYDROcodone-acetaminophen (NORCO)  mg tablet; 1 tab po every 8 hours as needed for pain.  -     HYDROcodone-acetaminophen (NORCO)  mg tablet; 1 tab po every 8 hours as needed for pain. Fill 3/20/17  Recheck in 2-3 months at next check up. Patient verbalizes understanding of plan of care.      Screening for alcoholism  neg  Screening for depression  neg  Other orders      Follow-up Disposition:  Return for 2-3 months recheck on dm and pain . Jose A Means

## 2017-02-22 NOTE — MR AVS SNAPSHOT
Visit Information Date & Time Provider Department Dept. Phone Encounter #  
 2/22/2017  4:30 PM Kourtney Quintana MD [facility] [id number] Follow-up Instructions Return in about 4 months (around 6/22/2017). Upcoming Health Maintenance Date Due  
 EYE EXAM RETINAL OR DILATED Q1 8/7/2015 FOBT Q 1 YEAR AGE 50-75 4/14/2016 GLAUCOMA SCREENING Q2Y 7/4/2016 Pneumococcal 65+ Low/Medium Risk (1 of 2 - PCV13) 7/4/2016 MEDICARE YEARLY EXAM 7/4/2016 HEMOGLOBIN A1C Q6M 7/18/2017 FOOT EXAM Q1 7/27/2017 MICROALBUMIN Q1 9/21/2017 LIPID PANEL Q1 1/18/2018 BREAST CANCER SCRN MAMMOGRAM 10/5/2018 DTaP/Tdap/Td series (2 - Td) 5/13/2023 Allergies as of 2/22/2017  Review Complete On: 2/22/2017 By: Kourtney Quintana MD  
  
 Severity Noted Reaction Type Reactions Atorvastatin  01/21/2017    Myalgia Bactrim [Sulfamethoxazole-trimethoprim]  04/02/2010    Nausea Only Biaxin [Clarithromycin]  04/02/2010    Nausea Only Demerol [Meperidine]  04/02/2010    Itching Erythromycin  04/02/2010    Nausea Only Pcn [Penicillins]  04/02/2010    Hives Percocet [Oxycodone-acetaminophen]  04/02/2010    Itching Tetracycline  04/02/2010    Nausea Only Voltaren [Diclofenac Sodium]  04/02/2010    Hives Current Immunizations  Reviewed on 2/22/2017 Name Date Influenza High Dose Vaccine PF 1/29/2016 Influenza Vaccine 12/5/2012 Influenza Vaccine PF 11/17/2014 PPD 1/25/1999 Pneumococcal Polysaccharide (PPSV-23) 5/13/2013 TD Vaccine 5/12/1999 Tdap 5/13/2013 Reviewed by Deno Barnett LPN on 5/53/0890 at  4:27 PM  
You Were Diagnosed With   
  
 Codes Comments PMR (polymyalgia rheumatica) (HCC)    -  Primary ICD-10-CM: M35.3 ICD-9-CM: 036 Long term (current) use of systemic steroids     ICD-10-CM: Z79.52 
ICD-9-CM: V58.65  Left shoulder pain, unspecified chronicity     ICD-10-CM: M25.512 
 ICD-9-CM: 719.41 Vitals BP  
  
  
  
  
  
 158/72 (BP 1 Location: Right arm, BP Patient Position: Sitting) Vitals History BMI and BSA Data Body Mass Index Body Surface Area  
 37.9 kg/m 2 2.13 m 2 Preferred Pharmacy Pharmacy Name Phone [facility] [address] Aleksandr Dean [phone] Your Updated Medication List  
  
   
This list is accurate as of: 2/22/17  4:52 PM.  Always use your most recent med list.  
  
  
  
  
 albuterol 2.5 mg /3 mL (0.083 %) nebulizer solution Commonly known as:  PROVENTIL VENTOLIN  
3 mL by Nebulization route every six (6) hours as needed for Wheezing. aspirin 81 mg chewable tablet Take 81 mg by mouth daily. * Blood-Glucose Meter monitoring kit One touch meter * Blood-Glucose Meter monitoring kit Commonly known as:  FREESTYLE LITE METER Tid use due to uncontrolled dm and hyperglycemia. 250.02. 790.29 CENTRUM PO Take  by mouth daily. diltiazem 420 mg SR capsule Commonly known as:  [facility] TAKE 1 CAPSULE DAILY  
  
 glipiZIDE 10 mg tablet Commonly known as:  GLUCOTROL  
1/2-1 tab po bid with meals * glucose blood VI test strips strip Commonly known as:  ONETOUCH ULTRA TEST Test once daily * glucose blood VI test strips strip Commonly known as:  ASCENSIA AUTODISC VI, ONE TOUCH ULTRA TEST VI Tid use due to uncontrolled dm and hyperglycemia. 250.02. 790.29  
  
 * glucose blood VI test strips strip Commonly known as:  FREESTYLE LITE STRIPS Tid use due to uncontrolled dm and hyperglycemia. 250.02. 790.29 HYDROcodone-acetaminophen  mg tablet Commonly known as:  NORCO  
1 tab po every 8 hours as needed for pain. Fill 3/20/17 Lancets Misc Commonly known as:  FREESTYLE LANCETS Tid use due to uncontrolled dm and hyperglycemia. 250.02. 790.29  
  
 losartan-hydroCHLOROthiazide 100-12.5 mg per tablet Commonly known as:  HYZAAR  
TAKE 1 TABLET DAILY  
  
 nortriptyline 10 mg capsule Commonly known as:  PAMELOR Take 1 Cap by mouth nightly. pravastatin 20 mg tablet Commonly known as:  PRAVACHOL Take 1 Tab by mouth nightly. In place of lipitor  
  
 predniSONE 1 mg tablet Commonly known as:  Too Dally Instead of 5 mg tablet. Take 3 mg (3 tabs) once daily. Lower by 1 mg every 2-4 weeks if tolerated sAXagliptin-metFORMIN 2.5-1,000 mg Tm24 Commonly known as:  KOMBIGLYZE XR Take 1 Tab by mouth two (2) times a day. Dc the metformin xr and ongliza separate rxs ZyrTEC 10 mg Cap Generic drug:  Cetirizine Take  by mouth. * Notice: This list has 5 medication(s) that are the same as other medications prescribed for you. Read the directions carefully, and ask your doctor or other care provider to review them with you. Prescriptions Sent to Pharmacy Refills  
 predniSONE (DELTASONE) 1 mg tablet 1 Sig: Instead of 5 mg tablet. Take 3 mg (3 tabs) once daily. Lower by 1 mg every 2-4 weeks if tolerated Class: Normal  
 Pharmacy: [facility] [address]  [address] Ph #: [phone] We Performed the Following C REACTIVE PROTEIN, QT [63102 CPT(R)] SED RATE (ESR) [id number] CPT(R)] Follow-up Instructions Return in about 4 months (around 6/22/2017). Patient Instructions Labs today Prednisone Instead of 5 mg tablet. Take 3 mg (3 tabs) once daily. Lower by 1 mg every 2-4 weeks if tolerated Comfortable shoulder exercises twice daily (start with one exercise at a time. Begin with wall walking exercises) Pendulum swing Note: If you have pain in your back, do not do this exercise. 1. Hold on to a table or the back of a chair with your good arm. Then bend forward a little and let your sore arm hang straight down.  This exercise does not use the arm muscles. Rather, use your legs and your hips to create movement that makes your arm swing freely. 2. Use the movement from your hips and legs to guide the slightly swinging arm back and forth like a pendulum (or elephant trunk). Then guide it in circles that start small (about the size of a dinner plate). Make the circles a bit larger each day, as your pain allows. 3. Do this exercise for 5 minutes, 5 to 7 times each day. 4. As you have less pain, try bending over a little farther to do this exercise. This will increase the amount of movement at your shoulder. Posterior stretching exercise 1. Hold the elbow of your injured arm with your other hand. 2. Use your hand to pull your injured arm gently up and across your body. You will feel a gentle stretch across the back of your injured shoulder. 3. Hold for at least 15 to 30 seconds. Then slowly lower your arm. 4. Repeat 2 to 4 times. Overhead stretch 1. Standing about an arm's length away, grasp onto a solid surface. You could use a countertop, a doorknob, or the back of a sturdy chair. 2. With your knees slightly bent, bend forward with your arms straight. Lower your upper body, and let your shoulders stretch. 3. As your shoulders are able to stretch farther, you may need to take a step or two backward. 4. Hold for at least 15 to 30 seconds. Then stand up and relax. If you had stepped back during your stretch, step forward so you can keep your hands on the solid surface. 5. Repeat 2 to 4 times. 1.  
Wall climbing (to the side) Note: Avoid any movement that is straight to your side, and be careful not to arch your back. Your arm should stay about 30 degrees to the front of your side. 1. Stand with your side to a wall so that your fingers can just touch it at an angle about 30 degrees toward the front of your body.  
2. Walk the fingers of your injured arm up the wall as high as pain permits. Try not to shrug your shoulder up toward your ear as you move your arm up. 3. Hold that position for a count of at least 15 to 20. 
4. Walk your fingers back down to the starting position. 5. Repeat at least 2 to 4 times. Try to reach higher each time. Wall climbing (to the front) Note: During this stretching exercise, be careful not to arch your back. 1. Face a wall, and stand so your fingers can just touch it. 2. Keeping your shoulder down, walk the fingers of your injured arm up the wall as high as pain permits. (Don't shrug your shoulder up toward your ear.) 3. Hold your arm in that position for at least 15 to 30 seconds. 4. Slowly walk your fingers back down to where you started. 5. Repeat at least 2 to 4 times. Try to reach higher each time. Shoulder blade squeeze 1. Stand with your arms at your sides, and squeeze your shoulder blades together. Do not raise your shoulders up as you squeeze. 2. Hold 6 seconds. 3. Repeat 8 to 12 times. Introducing [facility] & [facility]! [facility] introduces ITS KOOL patient portal. Now you can access parts of your medical record, email your doctor's office, and request medication refills online. 1. In your internet browser, go to [email or web address] 2. Click on the First Time User? Click Here link in the Sign In box. You will see the New Member Sign Up page. 3. Enter your ITS KOOL Access Code exactly as it appears below. You will not need to use this code after youve completed the sign-up process. If you do not sign up before the expiration date, you must request a new code. · ITS KOOL Access Code: [email or web address] Expires: 4/18/2017 12:36 PM 
 
4. Enter the last four digits of your Social Security Number (xxxx) and Date of Birth (mm/dd/yyyy) as indicated and click Submit. You will be taken to the next sign-up page. 5. Create a ITS KOOL ID.  This will be your ITS KOOL login ID and cannot be changed, so think of one that is secure and easy to remember. 6. Create a VideoGenie password. You can change your password at any time. 7. Enter your Password Reset Question and Answer. This can be used at a later time if you forget your password. 8. Enter your e-mail address. You will receive e-mail notification when new information is available in [address] 9. Click Sign Up. You can now view and download portions of your medical record. 10. Click the Download Summary menu link to download a portable copy of your medical information. If you have questions, please visit the Frequently Asked Questions section of the VideoGenie website. Remember, VideoGenie is NOT to be used for urgent needs. For medical emergencies, dial 911. Now available from your iPhone and Android! Please provide this summary of care documentation to your next provider. Your primary care clinician is listed as Gilda Whitten. If you have any questions after today's visit, please call [phone].

## 2017-02-22 NOTE — PROGRESS NOTES
Pt presents to office today for a follow up on Chronic pain follow up. Pt states that her feet have been burning for about 3 weeks. Chief Complaint   Patient presents with    Hip Pain     Follow up. 1. Have you been to the ER, urgent care clinic since your last visit? Hospitalized since your last visit? No    2. Have you seen or consulted any other health care providers outside of the [address] [place] [address] since your last visit? Include any pap smears or colon screening.  No

## 2017-02-22 NOTE — PATIENT INSTRUCTIONS
Labs today    Prednisone Instead of 5 mg tablet. Take 3 mg (3 tabs) once daily. Lower by 1 mg every 2-4 weeks if tolerated    Comfortable shoulder exercises twice daily (start with one exercise at a time. Begin with wall walking exercises)    Pendulum swing     Note: If you have pain in your back, do not do this exercise. 1. Hold on to a table or the back of a chair with your good arm. Then bend forward a little and let your sore arm hang straight down. This exercise does not use the arm muscles. Rather, use your legs and your hips to create movement that makes your arm swing freely. 2. Use the movement from your hips and legs to guide the slightly swinging arm back and forth like a pendulum (or elephant trunk). Then guide it in circles that start small (about the size of a dinner plate). Make the circles a bit larger each day, as your pain allows. 3. Do this exercise for 5 minutes, 5 to 7 times each day. 4. As you have less pain, try bending over a little farther to do this exercise. This will increase the amount of movement at your shoulder. Posterior stretching exercise     1. Hold the elbow of your injured arm with your other hand. 2. Use your hand to pull your injured arm gently up and across your body. You will feel a gentle stretch across the back of your injured shoulder. 3. Hold for at least 15 to 30 seconds. Then slowly lower your arm. 4. Repeat 2 to 4 times. Overhead stretch     1. Standing about an arm's length away, grasp onto a solid surface. You could use a countertop, a doorknob, or the back of a sturdy chair. 2. With your knees slightly bent, bend forward with your arms straight. Lower your upper body, and let your shoulders stretch. 3. As your shoulders are able to stretch farther, you may need to take a step or two backward. 4. Hold for at least 15 to 30 seconds. Then stand up and relax.  If you had stepped back during your stretch, step forward so you can keep your hands on the solid surface. 5. Repeat 2 to 4 times. 1.   Wall climbing (to the side)     Note: Avoid any movement that is straight to your side, and be careful not to arch your back. Your arm should stay about 30 degrees to the front of your side. 1. Stand with your side to a wall so that your fingers can just touch it at an angle about 30 degrees toward the front of your body. 2. Walk the fingers of your injured arm up the wall as high as pain permits. Try not to shrug your shoulder up toward your ear as you move your arm up. 3. Hold that position for a count of at least 15 to 20.  4. Walk your fingers back down to the starting position. 5. Repeat at least 2 to 4 times. Try to reach higher each time. Wall climbing (to the front)     Note: During this stretching exercise, be careful not to arch your back. 1. Face a wall, and stand so your fingers can just touch it. 2. Keeping your shoulder down, walk the fingers of your injured arm up the wall as high as pain permits. (Don't shrug your shoulder up toward your ear.)  3. Hold your arm in that position for at least 15 to 30 seconds. 4. Slowly walk your fingers back down to where you started. 5. Repeat at least 2 to 4 times. Try to reach higher each time. Shoulder blade squeeze     1. Stand with your arms at your sides, and squeeze your shoulder blades together. Do not raise your shoulders up as you squeeze. 2. Hold 6 seconds. 3. Repeat 8 to 12 times.

## 2017-02-22 NOTE — MR AVS SNAPSHOT
Visit Information Date & Time Provider Department Dept. Phone Encounter #  
 2/22/2017  2:40 PM Britton Richards, [address] the [facility] [address] [phone] [id number] Follow-up Instructions Return for 2-3 months recheck on dm and pain . Your Appointments 2/22/2017  4:30 PM  
ESTABLISHED PATIENT with Jr Bains MD  
Arthritis and [address] [facility]) Appt Note: fu with tinoco; . .. [address] [facility] [address]  
[phone]  
  
   
 [address] [facility] [id number] Upcoming Health Maintenance Date Due  
 EYE EXAM RETINAL OR DILATED Q1 8/7/2015 FOBT Q 1 YEAR AGE 50-75 4/14/2016 GLAUCOMA SCREENING Q2Y 7/4/2016 Pneumococcal 65+ Low/Medium Risk (1 of 2 - PCV13) 7/4/2016 MEDICARE YEARLY EXAM 7/4/2016 HEMOGLOBIN A1C Q6M 7/18/2017 FOOT EXAM Q1 7/27/2017 MICROALBUMIN Q1 9/21/2017 LIPID PANEL Q1 1/18/2018 BREAST CANCER SCRN MAMMOGRAM 10/5/2018 DTaP/Tdap/Td series (2 - Td) 5/13/2023 Allergies as of 2/22/2017  Review Complete On: 2/22/2017 By: Britton Richards NP Severity Noted Reaction Type Reactions Atorvastatin  01/21/2017    Myalgia Bactrim [Sulfamethoxazole-trimethoprim]  04/02/2010    Nausea Only Biaxin [Clarithromycin]  04/02/2010    Nausea Only Demerol [Meperidine]  04/02/2010    Itching Erythromycin  04/02/2010    Nausea Only Pcn [Penicillins]  04/02/2010    Hives Percocet [Oxycodone-acetaminophen]  04/02/2010    Itching Tetracycline  04/02/2010    Nausea Only Voltaren [Diclofenac Sodium]  04/02/2010    Hives Current Immunizations  Reviewed on 9/30/2016 Name Date Influenza High Dose Vaccine PF 1/29/2016 Influenza Vaccine 12/5/2012 Influenza Vaccine PF 11/17/2014 PPD 1/25/1999 Pneumococcal Polysaccharide (PPSV-23) 5/13/2013 TD Vaccine 5/12/1999 Tdap 5/13/2013 Not reviewed this visit You Were Diagnosed With   
 Codes Comments Welcome to Medicare preventive visit    -  Primary ICD-10-CM: Z00.00 ICD-9-CM: V70.0 Type 2 diabetes mellitus with peripheral neuropathy (HCC)     ICD-10-CM: E11.42 
ICD-9-CM: 250.60, 357.2 Osteoarthritis of hip, unspecified laterality, unspecified osteoarthritis type     ICD-10-CM: M16.9 ICD-9-CM: 715.95 Primary osteoarthritis of both hips     ICD-10-CM: M16.0 ICD-9-CM: 715.15 Routine general medical examination at a health care facility     ICD-10-CM: Z00.00 ICD-9-CM: V70.0 Screening for alcoholism     ICD-10-CM: Z13.89 ICD-9-CM: V79.1 Screening for depression     ICD-10-CM: Z13.89 ICD-9-CM: V79.0 Vitals BP  
  
  
  
  
  
 120/72 Vitals History BMI and BSA Data Body Mass Index Body Surface Area  
 37.93 kg/m 2 2.13 m 2 Preferred Pharmacy Pharmacy Name Phone [facility] [address] [phone] Your Updated Medication List  
  
   
This list is accurate as of: 2/22/17  3:29 PM.  Always use your most recent med list.  
  
  
  
  
 albuterol 2.5 mg /3 mL (0.083 %) nebulizer solution Commonly known as:  PROVENTIL VENTOLIN  
3 mL by Nebulization route every six (6) hours as needed for Wheezing. aspirin 81 mg chewable tablet Take 81 mg by mouth daily. * Blood-Glucose Meter monitoring kit One touch meter * Blood-Glucose Meter monitoring kit Commonly known as:  FREESTYLE LITE METER Tid use due to uncontrolled dm and hyperglycemia. 250.02. 790.29 CENTRUM PO Take  by mouth daily. diltiazem 420 mg SR capsule Commonly known as:  [facility] TAKE 1 CAPSULE DAILY  
  
 glipiZIDE 10 mg tablet Commonly known as:  GLUCOTROL  
1/2-1 tab po bid with meals * glucose blood VI test strips strip Commonly known as:  ONETOUCH ULTRA TEST Test once daily * glucose blood VI test strips strip Commonly known as:  ASCENSIA AUTODISC VI, ONE TOUCH ULTRA TEST VI Tid use due to uncontrolled dm and hyperglycemia. 250. 790.29  
  
 * glucose blood VI test strips strip Commonly known as:  FREESTYLE LITE STRIPS Tid use due to uncontrolled dm and hyperglycemia. 250. 790. HYDROcodone-acetaminophen  mg tablet Commonly known as:  NORCO  
1 tab po every 8 hours as needed for pain. Fill 3/20/17 Lancets Misc Commonly known as:  FREESTYLE LANCETS Tid use due to uncontrolled dm and hyperglycemia. 250. 790.  
  
 losartan-hydroCHLOROthiazide 100-12.5 mg per tablet Commonly known as:  HYZAAR  
TAKE 1 TABLET DAILY  
  
 methylPREDNISolone 4 mg tablet Commonly known as:  Ellen Allis As directed. Indications: ANGIOEDEMA  
  
 nortriptyline 10 mg capsule Commonly known as:  PAMELOR Take 1 Cap by mouth nightly. pravastatin 20 mg tablet Commonly known as:  PRAVACHOL Take 1 Tab by mouth nightly. In place of lipitor  
  
 predniSONE 5 mg tablet Commonly known as:  DELTASONE  
5 mg once daily or as directed sAXagliptin-metFORMIN 2.5-1,000 mg Tm24 Commonly known as:  KOMBIGLYZE XR Take 1 Tab by mouth two (2) times a day. Dc the metformin xr and ongliza separate rxs ZyrTEC 10 mg Cap Generic drug:  Cetirizine Take  by mouth. * Notice: This list has 5 medication(s) that are the same as other medications prescribed for you. Read the directions carefully, and ask your doctor or other care provider to review them with you. Prescriptions Printed Refills HYDROcodone-acetaminophen (NORCO)  mg tablet 0 Si tab po every 8 hours as needed for pain. Fill 3/20/17 Class: Print Prescriptions Sent to Pharmacy Refills  
 nortriptyline (PAMELOR) 10 mg capsule 1 Sig: Take 1 Cap by mouth nightly.   
 Class: Normal  
 Pharmacy: [facility] [address] [place] [address]  [address]  #: [phone] Route: Oral  
  
We Performed the Following Dameon Alcala [[id number] [facility]] Follow-up Instructions Return for 2-3 months recheck on dm and pain . Introducing [facility] & HEALTH SERVICES! [facility] introduces Bookmycab patient portal. Now you can access parts of your medical record, email your doctor's office, and request medication refills online. 1. In your internet browser, go to [email or web address] 2. Click on the First Time User? Click Here link in the Sign In box. You will see the New Member Sign Up page. 3. Enter your Bookmycab Access Code exactly as it appears below. You will not need to use this code after youve completed the sign-up process. If you do not sign up before the expiration date, you must request a new code. · Bookmycab Access Code: [email or web address] Expires: 4/18/2017 12:36 PM 
 
4. Enter the last four digits of your Social Security Number (xxxx) and Date of Birth (mm/dd/yyyy) as indicated and click Submit. You will be taken to the next sign-up page. 5. Create a Bookmycab ID. This will be your Bookmycab login ID and cannot be changed, so think of one that is secure and easy to remember. 6. Create a Bookmycab password. You can change your password at any time. 7. Enter your Password Reset Question and Answer. This can be used at a later time if you forget your password. 8. Enter your e-mail address. You will receive e-mail notification when new information is available in [address] 9. Click Sign Up. You can now view and download portions of your medical record. 10. Click the Download Summary menu link to download a portable copy of your medical information. If you have questions, please visit the Frequently Asked Questions section of the Bookmycab website. Remember, Bookmycab is NOT to be used for urgent needs. For medical emergencies, dial 911. Now available from your iPhone and Android! Please provide this summary of care documentation to your next provider. Your primary care clinician is listed as Xochitl Medic. If you have any questions after today's visit, please call [phone].

## 2017-02-23 LAB
CRP SERPL-MCNC: 24.7 MG/L (ref 0–4.9)
ERYTHROCYTE [SEDIMENTATION RATE] IN BLOOD BY WESTERGREN METHOD: 8 MM/HR (ref 0–40)

## 2017-02-27 NOTE — PROGRESS NOTES
Called and spoke with Patient. Informed patient of results/instructions. Patient voiced understanding and agreed to follow Dr. Gregg Braga instructions.

## 2017-03-07 NOTE — TELEPHONE ENCOUNTER
[facility]  [phone]    Ms. Lester Gonzalez is requesting a return call. She has questions about getting less expensive medications from her mail order pharmacy.

## 2017-03-08 NOTE — TELEPHONE ENCOUNTER
Outbound call to pt, she states her Kombiglyze 2.5-1000mg cost $600 and now requires a Prior Authorization. Pt states the pharmacist faxed a request to our office yesterday. Will check with Gene Keenan to find out if she received form.

## 2017-03-08 NOTE — TELEPHONE ENCOUNTER
----- Message from Bebo Roper sent at 3/8/2017  9:29 AM EST -----  Regarding: Dr Stiven Espinal  Pt is returning doctors or nurse call from yesterday, please call pt at [phone].

## 2017-03-09 NOTE — TELEPHONE ENCOUNTER
Spoke with Jatin Sen who informed patient have not met her deductible that's why she has a $600 dollar co pay. Once her deductible is met, there will be a co pay $281after co pay. Can request a tier exception so medication can be brought down to a tier 3 and then it will have a co pay of $131 for 3 months supply if it is approved.

## 2017-03-13 NOTE — TELEPHONE ENCOUNTER
Spoke with insurance carrier who provide the following information: Harpreet Montgomery a tier 3 with a  co pay of $131 for 90 days via mail order. Metformin 500 and 1000 mg are a tier 1, zero dollar co pay. Spoke with patient wants to know what is your recommendation.

## 2017-03-13 NOTE — TELEPHONE ENCOUNTER
Is there is different medication in the class that is preferred? Such as janumet?   Ok to do the override if this is what patient prefers

## 2017-04-19 DIAGNOSIS — M16.0 PRIMARY OSTEOARTHRITIS OF BOTH HIPS: ICD-10-CM

## 2017-04-19 NOTE — TELEPHONE ENCOUNTER
Contact # is [phone]    Patient is requesting a refill on medication:  Requested Prescriptions     Pending Prescriptions Disp Refills    HYDROcodone-acetaminophen (NORCO)  mg tablet 90 Tab 0     Si tab po every 8 hours as needed for pain.  Fill 3/20/17

## 2017-04-21 NOTE — TELEPHONE ENCOUNTER
Rabia Morrissey  [phone]    Patient states that she is going to be out of hydrocodone tomorrow. She put the request in on the 19th. She would like to know if it will be available for  today.

## 2017-04-24 RX ORDER — HYDROCODONE BITARTRATE AND ACETAMINOPHEN 10; 325 MG/1; MG/1
TABLET ORAL
Qty: 90 TAB | Refills: 0 | Status: SHIPPED | OUTPATIENT
Start: 2017-04-24 | End: 2017-05-08 | Stop reason: SDUPTHER

## 2017-05-08 ENCOUNTER — PATIENT OUTREACH (OUTPATIENT)
Dept: FAMILY MEDICINE CLINIC | Age: 67
End: 2017-05-08

## 2017-05-08 ENCOUNTER — OFFICE VISIT (OUTPATIENT)
Dept: FAMILY MEDICINE CLINIC | Age: 67
End: 2017-05-08

## 2017-05-08 VITALS
HEIGHT: 64 IN | RESPIRATION RATE: 16 BRPM | HEART RATE: 82 BPM | OXYGEN SATURATION: 97 % | TEMPERATURE: 97.7 F | WEIGHT: 230.2 LBS | SYSTOLIC BLOOD PRESSURE: 132 MMHG | DIASTOLIC BLOOD PRESSURE: 72 MMHG | BODY MASS INDEX: 39.3 KG/M2

## 2017-05-08 DIAGNOSIS — Z51.81 MEDICATION MONITORING ENCOUNTER: ICD-10-CM

## 2017-05-08 DIAGNOSIS — M16.0 PRIMARY OSTEOARTHRITIS OF BOTH HIPS: ICD-10-CM

## 2017-05-08 DIAGNOSIS — I10 ESSENTIAL HYPERTENSION: ICD-10-CM

## 2017-05-08 DIAGNOSIS — M16.9 OSTEOARTHRITIS OF HIP, UNSPECIFIED LATERALITY, UNSPECIFIED OSTEOARTHRITIS TYPE: ICD-10-CM

## 2017-05-08 DIAGNOSIS — G89.29 OTHER CHRONIC PAIN: ICD-10-CM

## 2017-05-08 DIAGNOSIS — J45.909 UNCOMPLICATED ASTHMA, UNSPECIFIED ASTHMA SEVERITY: ICD-10-CM

## 2017-05-08 DIAGNOSIS — E78.00 HYPERCHOLESTEROLEMIA: ICD-10-CM

## 2017-05-08 DIAGNOSIS — M35.3 PMR (POLYMYALGIA RHEUMATICA) (HCC): ICD-10-CM

## 2017-05-08 DIAGNOSIS — E11.9 TYPE 2 DIABETES MELLITUS WITHOUT COMPLICATION, UNSPECIFIED LONG TERM INSULIN USE STATUS: Primary | ICD-10-CM

## 2017-05-08 RX ORDER — ALBUTEROL SULFATE 90 UG/1
2 AEROSOL, METERED RESPIRATORY (INHALATION)
Qty: 1 INHALER | Refills: 1 | Status: ON HOLD | OUTPATIENT
Start: 2017-05-08 | End: 2020-10-10 | Stop reason: SDUPTHER

## 2017-05-08 RX ORDER — HYDROCODONE BITARTRATE AND ACETAMINOPHEN 10; 325 MG/1; MG/1
TABLET ORAL
Qty: 90 TAB | Refills: 0 | Status: SHIPPED | OUTPATIENT
Start: 2017-05-08 | End: 2017-05-08 | Stop reason: SDUPTHER

## 2017-05-08 RX ORDER — NORTRIPTYLINE HYDROCHLORIDE 10 MG/1
10 CAPSULE ORAL
Qty: 90 CAP | Refills: 3 | Status: SHIPPED | OUTPATIENT
Start: 2017-05-08 | End: 2018-06-27

## 2017-05-08 RX ORDER — METFORMIN HYDROCHLORIDE 500 MG/1
1000 TABLET, EXTENDED RELEASE ORAL
Qty: 180 TAB | Refills: 3 | Status: SHIPPED | OUTPATIENT
Start: 2017-05-08 | End: 2017-08-17 | Stop reason: SDUPTHER

## 2017-05-08 RX ORDER — NORTRIPTYLINE HYDROCHLORIDE 10 MG/1
10 CAPSULE ORAL
Qty: 30 CAP | Refills: 5 | Status: SHIPPED | OUTPATIENT
Start: 2017-05-08 | End: 2017-05-08 | Stop reason: SDUPTHER

## 2017-05-08 RX ORDER — HYDROCODONE BITARTRATE AND ACETAMINOPHEN 10; 325 MG/1; MG/1
TABLET ORAL
Qty: 90 TAB | Refills: 0 | Status: SHIPPED | OUTPATIENT
Start: 2017-05-08 | End: 2017-07-17 | Stop reason: SDUPTHER

## 2017-05-08 RX ORDER — ALBUTEROL SULFATE 0.83 MG/ML
2.5 SOLUTION RESPIRATORY (INHALATION)
Qty: 60 EACH | Refills: 2 | Status: ON HOLD | OUTPATIENT
Start: 2017-05-08 | End: 2020-10-10 | Stop reason: SDUPTHER

## 2017-05-08 NOTE — PROGRESS NOTES
Pt presents to office today for a follow up on Diabetes and back pain. Pt states that she has been taking [email or web address] because her insurance will not cover her [facility] Complaint   Patient presents with    Back Pain     Follow up.  Diabetes     Follow up     1. Have you been to the ER, urgent care clinic since your last visit? Hospitalized since your last visit? No    2. Have you seen or consulted any other health care providers outside of the [address] [place] [address] since your last visit? Include any pap smears or colon screening.  Yes to Dr. Wilfrido Roberto for a Sleep Study and will be getting a C-Pap machine this week

## 2017-05-08 NOTE — MR AVS SNAPSHOT
Visit Information Date & Time Provider Department Dept. Phone Encounter #  
 5/8/2017  9:40 AM Stacey Christianson NP [facility] [phone] [id number] Follow-up Instructions Return in about 2 months (around 7/8/2017) for recheck on diabetes and pain . Upcoming Health Maintenance Date Due  
 EYE EXAM RETINAL OR DILATED Q1 8/7/2015 FOBT Q 1 YEAR AGE 50-75 4/14/2016 GLAUCOMA SCREENING Q2Y 7/4/2016 Pneumococcal 65+ Low/Medium Risk (1 of 2 - PCV13) 7/4/2016 HEMOGLOBIN A1C Q6M 7/18/2017 FOOT EXAM Q1 7/27/2017 INFLUENZA AGE 9 TO ADULT 8/1/2017 MICROALBUMIN Q1 9/21/2017 LIPID PANEL Q1 1/18/2018 MEDICARE YEARLY EXAM 2/23/2018 BREAST CANCER SCRN MAMMOGRAM 10/5/2018 DTaP/Tdap/Td series (2 - Td) 5/13/2023 Allergies as of 5/8/2017  Review Complete On: 5/8/2017 By: Stacey Christianson NP Severity Noted Reaction Type Reactions Atorvastatin  01/21/2017    Myalgia Bactrim [Sulfamethoxazole-trimethoprim]  04/02/2010    Nausea Only Biaxin [Clarithromycin]  04/02/2010    Nausea Only Demerol [Meperidine]  04/02/2010    Itching Erythromycin  04/02/2010    Nausea Only Pcn [Penicillins]  04/02/2010    Hives Percocet [Oxycodone-acetaminophen]  04/02/2010    Itching Tetracycline  04/02/2010    Nausea Only Voltaren [Diclofenac Sodium]  04/02/2010    Hives Current Immunizations  Reviewed on 2/22/2017 Name Date Influenza High Dose Vaccine PF 1/29/2016 Influenza Vaccine 12/5/2012 Influenza Vaccine PF 11/17/2014 PPD 1/25/1999 Pneumococcal Polysaccharide (PPSV-23) 5/13/2013 TD Vaccine 5/12/1999 Tdap 5/13/2013 Not reviewed this visit You Were Diagnosed With   
  
 Codes Comments Type 2 diabetes mellitus without complication, unspecified long term insulin use status    -  Primary ICD-10-CM: E11.9 ICD-9-CM: 250.00 Essential hypertension     ICD-10-CM: I10 
ICD-9-CM: 401.9 Hypercholesterolemia     ICD-10-CM: E78.00 ICD-9-CM: 272.0 Osteoarthritis of hip, unspecified laterality, unspecified osteoarthritis type     ICD-10-CM: M16.9 ICD-9-CM: 715.95 Other chronic pain     ICD-10-CM: G89.29 ICD-9-CM: 338.29 back and hips Medication monitoring encounter     ICD-10-CM: Z51.81 
ICD-9-CM: V58.83 Primary osteoarthritis of both hips     ICD-10-CM: M16.0 ICD-9-CM: 715.15 Vitals BP Pulse Temp Resp Height(growth percentile) Weight(growth percentile) 132/72 82 97.7 °F (36.5 °C) (Oral) 16 5' 4\" (1.626 m) 230 lb 3.2 oz (104.4 kg) SpO2 BMI OB Status Smoking Status 97% 39.51 kg/m2 Hysterectomy Former Smoker Vitals History BMI and BSA Data Body Mass Index Body Surface Area  
 39.51 kg/m 2 2.17 m 2 Preferred Pharmacy Pharmacy Name Phone [place] [facility] [id number] [phone] Your Updated Medication List  
  
   
This list is accurate as of: 5/8/17 10:40 AM.  Always use your most recent med list.  
  
  
  
  
 * albuterol 2.5 mg /3 mL (0.083 %) nebulizer solution Commonly known as:  PROVENTIL VENTOLIN  
3 mL by Nebulization route every six (6) hours as needed for Wheezing. * albuterol 90 mcg/actuation inhaler Commonly known as:  PROVENTIL HFA, VENTOLIN HFA, PROAIR HFA Take 2 Puffs by inhalation every six (6) hours as needed for Wheezing. aspirin 81 mg chewable tablet Take 81 mg by mouth daily. * Blood-Glucose Meter monitoring kit One touch meter * Blood-Glucose Meter monitoring kit Commonly known as:  FREESTYLE LITE METER Tid use due to uncontrolled dm and hyperglycemia. 250.02. 790.29 CENTRUM PO Take  by mouth daily. diltiazem 420 mg SR capsule Commonly known as:  [facility] TAKE 1 CAPSULE DAILY  
  
 glipiZIDE 10 mg tablet Commonly known as:  GLUCOTROL  
1/2-1 tab po bid with meals * glucose blood VI test strips strip Commonly known as:  ONETOUCH ULTRA TEST Test once daily * glucose blood VI test strips strip Commonly known as:  ASCENSIA AUTODISC VI, ONE TOUCH ULTRA TEST VI Tid use due to uncontrolled dm and hyperglycemia. 250.. 790.29  
  
 * glucose blood VI test strips strip Commonly known as:  FREESTYLE LITE STRIPS Tid use due to uncontrolled dm and hyperglycemia. 250.. 790.29 HYDROcodone-acetaminophen  mg tablet Commonly known as:  NORCO  
1 tab po every 8 hours as needed for pain. 17 Lancets Misc Commonly known as:  FREESTYLE LANCETS Tid use due to uncontrolled dm and hyperglycemia. 250.. 790.29  
  
 losartan-hydroCHLOROthiazide 100-12.5 mg per tablet Commonly known as:  HYZAAR  
TAKE 1 TABLET DAILY  
  
 metFORMIN  mg tablet Commonly known as:  GLUCOPHAGE XR Take 2 Tabs by mouth daily (with dinner). nortriptyline 10 mg capsule Commonly known as:  PAMELOR Take 1 Cap by mouth nightly. pravastatin 20 mg tablet Commonly known as:  PRAVACHOL Take 1 Tab by mouth nightly. In place of lipitor  
  
 predniSONE 1 mg tablet Commonly known as:  Rolene Ply Instead of 5 mg tablet. Take 3 mg (3 tabs) once daily. Lower by 1 mg every 2-4 weeks if tolerated sAXagliptin-metFORMIN 2.5-1,000 mg Tm24 Commonly known as:  KOMBIGLYZE XR Take 1 Tab by mouth two (2) times a day. Dc the metformin xr and ongliza separate rxs ZyrTEC 10 mg Cap Generic drug:  Cetirizine Take  by mouth. * Notice: This list has 7 medication(s) that are the same as other medications prescribed for you. Read the directions carefully, and ask your doctor or other care provider to review them with you. Prescriptions Printed Refills HYDROcodone-acetaminophen (NORCO)  mg tablet 0 Si tab po every 8 hours as needed for pain. 17 Class: Print Prescriptions Sent to Pharmacy Refills albuterol (PROVENTIL VENTOLIN) 2.5 mg /3 mL (0.083 %) nebulizer solution 2 Sig: 3 mL by Nebulization route every six (6) hours as needed for Wheezing. Class: Normal  
 Pharmacy: [address] [place], [address]  [address] Ph #: [phone] Route: Nebulization  
 albuterol (PROVENTIL HFA, VENTOLIN HFA, PROAIR HFA) 90 mcg/actuation inhaler 1 Sig: Take 2 Puffs by inhalation every six (6) hours as needed for Wheezing. Class: Normal  
 Pharmacy: [address] [place], [address] [facility] [address] [place] [address]  [address] Ph #: [phone] Route: Inhalation  
 nortriptyline (PAMELOR) 10 mg capsule 5 Sig: Take 1 Cap by mouth nightly. Class: Normal  
 Pharmacy: [address] [place], [address]  [address] Ph #: [phone] Route: Oral  
 metFORMIN ER (GLUCOPHAGE XR) 500 mg tablet 3 Sig: Take 2 Tabs by mouth daily (with dinner). Class: Normal  
 Pharmacy: [address] [place] [address], [address] [facility] [address] Ph #: [phone] Route: Oral  
  
We Performed the Following [address] MONITORING [[id number] Custom] HEMOGLOBIN A1C WITH EAG [16966 CPT(R)] LIPID PANEL [59950 CPT(R)] METABOLIC PANEL, COMPREHENSIVE [90039 CPT(R)] MICROALBUMIN, UR, RAND W/ MICROALBUMIN/CREA RATIO [id number] CPT(R)] Follow-up Instructions Return in about 2 months (around 7/8/2017) for recheck on diabetes and pain . Introducing [facility]! Tanis Epley introduces [employer] patient portal. Now you can access parts of your medical record, email your doctor's office, and request medication refills online. 1. In your internet browser, go to [email or web address] 2. Click on the First Time User? Click Here link in the Sign In box. You will see the New Member Sign Up page. 3. Enter your MyChart Access Code exactly as it appears below.  You will not need to use this code after youve completed the sign-up process. If you do not sign up before the expiration date, you must request a new code. · [employer] Access Code: [email or web address] Expires: 8/6/2017 10:40 AM 
 
4. Enter the last four digits of your Social Security Number (xxxx) and Date of Birth (mm/dd/yyyy) as indicated and click Submit. You will be taken to the next sign-up page. 5. Create a [employer] ID. This will be your [employer] login ID and cannot be changed, so think of one that is secure and easy to remember. 6. Create a [employer] password. You can change your password at any time. 7. Enter your Password Reset Question and Answer. This can be used at a later time if you forget your password. 8. Enter your e-mail address. You will receive e-mail notification when new information is available in [address] 9. Click Sign Up. You can now view and download portions of your medical record. 10. Click the Download Summary menu link to download a portable copy of your medical information. If you have questions, please visit the Frequently Asked Questions section of the [employer] website. Remember, [employer] is NOT to be used for urgent needs. For medical emergencies, dial 911. Now available from your iPhone and Android! Please provide this summary of care documentation to your next provider. Your primary care clinician is listed as Tommy Valenzuela. If you have any questions after today's visit, please call [phone].

## 2017-05-08 NOTE — PROGRESS NOTES
NN Health Promotion and Risk Preventions Note For:  Carmencita Lai, 73 y.o., 1950    Patient referred to NN following PCP visit today for assistance with affording her Kombiglyze XR. Patient verified by , name and address. Patient into see NN stating, \"I have been taking this medication for about two years and because I am no longer with [facility], I am told I will have to pay $600 plus for the medication. This is what my PCP wants me to stay on because other medications did not work. \" NN was able to call [employer], spoke to representative that said patient do not qualify for the [employer] card due to having prescription coverage with [employer]. [employer] was called and NN spoke to representative in pharmacy benefits. NN was told that a Prior-Auth would need to be completed. NN completed an online Prior-Auth application, then patient remembered she had been on several other medications that did not stanley her to goal. Another application downloaded and completed, to be signed by provider and faxed to Lindsay Zuñiga for review. Call placed to [employer] Nigel for assistance with providing medication until patient's case lis reviewed. NN instructed patient on her numbers, and her goal HA1C. Patient verbalized understanding of all numbers, how to manage her diabetes, meal planning and skills needed to manage chronic condition. NN gave patient a diabetic log-book to document glucose numbers and instructed her to bring in at each visit to her PCP. Lorenso Frankel Goals Addressed      Identification of barriers to adherence to a plan of care such as inability to afford medications, lack of insurance, lack of transportation, etc. (pt-stated)                  17- Patient verbalized inability to afford medication ordered by her PCP since changing her Insurance. Patient feels medication she is presently is working for her.  PK       Initiate and reinforce education of the patient/family about management of disease and lifestyle changes. 5/8/17- NN educated patient on importance of following her plan of care, seeking assistance with any barriers when and if they occur. PK       To decrease or maintain patient's biometrics:  HgA1c ?7 mg/dL, /80 or less. LDL of less than 100 and/or less than 70 in a patient with CAD.                  5/8/17- Patient educated on goals for managering chronic condition, and when and how to contact her PCP for help. PK          Case management Plan:  NN will continue to attempt contacts with patient by telephone or during office visit within next 7-10 days. Will continue to follow as necessary . NN will reassess for case management needs prior to discharge from Health Promotion and Risk Prevention episode on  service on or about 6/08/17.

## 2017-05-08 NOTE — PROGRESS NOTES
HISTORY OF PRESENT ILLNESS  Narciso Gee is a 73 y.o. female. HPI  Chief Complaint   Patient presents with    Back Pain     Follow up.  Diabetes     Follow up     Narciso Gee is a 73 y.o. female   Pt presents to office today for a follow up on Diabetes and back pain. Pt states that she has been taking [place] because her insurance will not cover her Kombiglyze  Pt reports the kombiglize over $600 and ongliza is $2000. Her deductible for the drugs is ??? Even with deductible the kombiglize will still be $237. Her last A1c was 8.2 but had to get back on the prednisone from dr Wander Velasquez. Now is down to 1mg of the prednisone, will be on this for 2 weeks. Feels like her breathing is worse due to being outside with the pollen. Has been having runny eyes. Taking zyrtec. She does not qualify for pt assistance. The pamelor 10mg did help her foot pain, needs a refill    bg this am was elevated, at 200. Sometimes they are low in the 60s. Current Outpatient Prescriptions   Medication Sig Dispense Refill    albuterol (PROVENTIL VENTOLIN) 2.5 mg /3 mL (0.083 %) nebulizer solution 3 mL by Nebulization route every six (6) hours as needed for Wheezing. 60 Each 2    albuterol (PROVENTIL HFA, VENTOLIN HFA, PROAIR HFA) 90 mcg/actuation inhaler Take 2 Puffs by inhalation every six (6) hours as needed for Wheezing. 1 Inhaler 1    metFORMIN ER (GLUCOPHAGE XR) 500 mg tablet Take 2 Tabs by mouth daily (with dinner). 180 Tab 3    HYDROcodone-acetaminophen (NORCO)  mg tablet 1 tab po every 8 hours as needed for pain. 6/1/17 90 Tab 0    nortriptyline (PAMELOR) 10 mg capsule Take 1 Cap by mouth nightly. 90 Cap 3    predniSONE (DELTASONE) 1 mg tablet Instead of 5 mg tablet. Take 3 mg (3 tabs) once daily. Lower by 1 mg every 2-4 weeks if tolerated (Patient taking differently: 2 mg. Instead of 5 mg tablet. Take 3 mg (3 tabs) once daily.  Lower by 1 mg every 2-4 weeks if tolerated) 90 Tab 1    pravastatin (PRAVACHOL) 20 mg tablet Take 1 Tab by mouth nightly. In place of lipitor 30 Tab 5    glipiZIDE (GLUCOTROL) 10 mg tablet 1/2-1 tab po bid with meals 180 Tab 3    losartan-hydroCHLOROthiazide (HYZAAR) 100-12.5 mg per tablet TAKE 1 TABLET DAILY 90 Tab 3    diltiazem (TIAZAC) 420 mg SR capsule TAKE 1 CAPSULE DAILY 90 Cap 3    Blood-Glucose Meter (FREESTYLE LITE METER) monitoring kit Tid use due to uncontrolled dm and hyperglycemia. 250.02. 790.29 1 Kit 0    glucose blood VI test strips (FREESTYLE LITE STRIPS) strip Tid use due to uncontrolled dm and hyperglycemia. 250.02. 790.29 1 Each 11    Lancets (FREESTYLE LANCETS) misc Tid use due to uncontrolled dm and hyperglycemia. 250.02. 790.29 1 Each 11    glucose blood VI test strips (ASCENSIA AUTODISC VI, ONE TOUCH ULTRA TEST VI) strip Tid use due to uncontrolled dm and hyperglycemia. 250.02. 790.29 100 Each 11    Blood-Glucose Meter monitoring kit One touch meter 1 Kit 1    glucose blood VI test strips (ONE TOUCH ULTRA TEST) strip Test once daily 1 Package 11    Cetirizine (ZYRTEC) 10 mg Cap Take  by mouth.  aspirin 81 mg chewable tablet Take 81 mg by mouth daily.  MULTIVITS W-FE,OTHER MIN (CENTRUM PO) Take  by mouth daily.  sAXagliptin-metFORMIN (KOMBIGLYZE XR) 2.5-1,000 mg TM24 Take 1 Tab by mouth two (2) times a day.  Dc the metformin xr and ongliza separate rxs 180 Tab 3     Allergies   Allergen Reactions    Atorvastatin Myalgia    Bactrim [Sulfamethoxazole-Trimethoprim] Nausea Only    Biaxin [Clarithromycin] Nausea Only    Demerol [Meperidine] Itching    Erythromycin Nausea Only    Pcn [Penicillins] Hives    Percocet [Oxycodone-Acetaminophen] Itching    Tetracycline Nausea Only    Voltaren [Diclofenac Sodium] Hives     Past Medical History:   Diagnosis Date    Abnormal pulmonary function test 9/28/2016    AR (allergic rhinitis)     Asthma     Atrial thrombus 1994    left    Chronic bronchitis (HCC) 9/28/2016    Chronic pain  DDD (degenerative disc disease), lumbar     Diabetes ([address]     DJD (degenerative joint disease) of hip     right    Empty sella ([address] 2000    by MRI    Former smoker 10/24/2016    Hoarseness of voice     Hypercholesterolemia     Hypertension     Lesion of vocal cord     PM[facility]algia [facility]a) [facility] 5/18/2016    Smoker 12/5/2012     Past Surgical History:   Procedure Laterality Date    ENDOSCOPY, COLON, DIAGNOSTIC  10/03    polypectomy    ENDOSCOPY, COLON, DIAGNOSTIC  2/05    Dr. Josh Velazquez, DIAGNOSTIC  2010, reported    due 2015    HX APPENDECTOMY  1970's    HX BREAST REDUCTION      HX COLONOSCOPY  2015    dr Remington Taylor. 2 polyps removed -repeat in 2018     HX HEMORRHOIDECTOMY      HX HYSTERECTOMY      ovaries spared    HX KNEE REPLACEMENT  11/03, 10/05    left then right    HX TONSILLECTOMY  age 29    tonsils     Family History   Problem Relation Age of Onset    Arthritis-osteo Mother     Asthma Mother     Diabetes Mother     Hypertension Mother     Stroke Mother     Arthritis-osteo Father     Cancer Father     Elevated Lipids Father     Hypertension Father     Arthritis-osteo Sister     Asthma Sister     Diabetes Sister     Elevated Lipids Sister     Hypertension Sister     Heart Attack Sister     Hypertension Daughter     Hypertension Daughter     Hypertension Daughter     Anesth Problems Neg Hx      Social History   Substance Use Topics    Smoking status: Former Smoker     Packs/day: 0.50     Years: 40.00     Types: Cigarettes     Quit date: 10/7/2016    Smokeless tobacco: Never Used    Alcohol use No       Review of Systems   Constitutional: Negative. Negative for chills, diaphoresis, fever, malaise/fatigue and weight loss. HENT: Positive for congestion. Negative for ear discharge, ear pain, hearing loss, nosebleeds, sore throat and tinnitus. Eyes: Negative.   Negative for blurred vision, double vision, photophobia, pain, discharge and redness. Itchy eyes related to allergies    Respiratory: Negative. Negative for cough, hemoptysis, sputum production, shortness of breath, wheezing and stridor. Cardiovascular: Negative. Negative for chest pain, palpitations, orthopnea, claudication, leg swelling and PND. Gastrointestinal: Negative. Negative for abdominal pain, blood in stool, constipation, diarrhea, heartburn, melena, nausea and vomiting. Genitourinary: Negative. Negative for dysuria, flank pain, frequency, hematuria and urgency. Musculoskeletal: Positive for back pain and joint pain. Negative for falls, myalgias and neck pain. Skin: Negative. Negative for itching and rash. Neurological: Negative. Negative for dizziness, tingling, tremors, sensory change, speech change, focal weakness, seizures, loss of consciousness, weakness and headaches. Endo/Heme/Allergies: Negative. Negative for environmental allergies and polydipsia. Does not bruise/bleed easily. Psychiatric/Behavioral: Negative. Negative for depression, hallucinations, memory loss, substance abuse and suicidal ideas. The patient is not nervous/anxious and does not have insomnia. All other systems reviewed and are negative. Physical Exam   Constitutional: She is oriented to person, place, and time. She appears well-developed and well-nourished. No distress. HENT:   Head: Normocephalic and atraumatic. Right Ear: Hearing, external ear and ear canal normal. No drainage or swelling. Tympanic membrane is not injected and not bulging. No middle ear effusion. No decreased hearing is noted. Left Ear: External ear and ear canal normal. No drainage, swelling or tenderness. Tympanic membrane is not injected and not bulging. No middle ear effusion. No decreased hearing is noted. Nose: Nose normal. Right sinus exhibits no maxillary sinus tenderness and no frontal sinus tenderness.  Left sinus exhibits no maxillary sinus tenderness and no frontal sinus tenderness. Mouth/Throat: Uvula is midline and oropharynx is clear and moist. Mucous membranes are pale. No oral lesions. No uvula swelling. No oropharyngeal exudate, posterior oropharyngeal edema or posterior oropharyngeal erythema. Eyes: Conjunctivae and EOM are normal. Pupils are equal, round, and reactive to light. Right eye exhibits no discharge. Left eye exhibits no discharge. No scleral icterus. Neck: Normal range of motion. Neck supple. No thyromegaly present. Cardiovascular: Normal rate, regular rhythm, normal heart sounds and intact distal pulses. Pulmonary/Chest: Effort normal and breath sounds normal. No stridor. No respiratory distress. She has no wheezes. She has no rales. She exhibits no tenderness. Abdominal: Bowel sounds are normal. She exhibits no distension and no mass. There is no tenderness. There is no rebound and no guarding. Musculoskeletal: She exhibits tenderness (has chronic muscle tension in thoracic and lumbar spine. ). oa changes to yisel knees    Lymphadenopathy:     She has no cervical adenopathy. Neurological: She is alert and oriented to person, place, and time. Skin: Skin is warm and dry. No rash noted. She is not diaphoretic. Psychiatric: She has a normal mood and affect. Her behavior is normal. Judgment and thought content normal.   Nursing note and vitals reviewed. ASSESSMENT and PLAN    ICD-10-CM ICD-9-CM    1. Type 2 diabetes mellitus without complication, unspecified long term insulin use status E11.9 250.00 MICROALBUMIN, UR, RAND W/ MICROALBUMIN/CREA RATIO      METABOLIC PANEL, COMPREHENSIVE      LIPID PANEL      HEMOGLOBIN A1C WITH EAG      metFORMIN ER (GLUCOPHAGE XR) 500 mg tablet   2. Essential hypertension I10 401.9    3. Hypercholesterolemia E78.00 272.0 LIPID PANEL   4. Osteoarthritis of hip, unspecified laterality, unspecified osteoarthritis type M16.9 715.95    5. Other chronic pain G89.29 338.29     back and hips    6.  Medication monitoring encounter Z51.81 V58.83 COMPLIANCE DRUG SCREEN/PRESCRIPTION MONITORING   7. Primary osteoarthritis of both hips M16.0 715.15 HYDROcodone-acetaminophen (NORCO)  mg tablet      DISCONTINUED: HYDROcodone-acetaminophen (NORCO)  mg tablet   8. PMR (polymyalgia rheumatica) ([facility]) M35.3 725 nortriptyline (PAMELOR) 10 mg capsule   9. Uncomplicated asthma, unspecified asthma severity J45.909 493.90 albuterol (PROVENTIL VENTOLIN) 2.5 mg /3 mL (0.083 %) nebulizer solution      albuterol (PROVENTIL HFA, VENTOLIN HFA, PROAIR HFA) 90 mcg/actuation inhaler     Jj Zeenat was seen today for back pain and diabetes. Diagnoses and all orders for this visit:    Type 2 diabetes mellitus without complication, unspecified long term insulin use status  -     MICROALBUMIN, UR, RAND W/ MICROALBUMIN/CREA RATIO  -     METABOLIC PANEL, COMPREHENSIVE  -     LIPID PANEL  -     HEMOGLOBIN A1C WITH EAG  -     metFORMIN ER (GLUCOPHAGE XR) 500 mg tablet; Take 2 Tabs by mouth daily (with dinner). Will see if we can come up with a solution on the cost of her medications-ideally she is on the kombiglize 2.5-1000mg bid as well as she uses the glipizide 1/2-1 tab po bid as needed as well. For now she is going to get the metformin separately to use with the left over [facility] and the [facility] she has until she can afford the kombigize again. Deviate plan per lab results   (nurse navigator is going to see if we can figure out something with the cost of her meds)  Essential hypertension  bp stable. Hypercholesterolemia  -     LIPID PANEL  Deviate plan per lab results     Osteoarthritis of hip, unspecified laterality, unspecified osteoarthritis type  Other chronic pain  Comments:  back and hips   Primary osteoarthritis of both hips  -      Benefits continue to outweight risks of treatment in helping her get by with her ADL's and improving quality of life. She is taking it as prescribed and not having any adverse effects. 2 months rx given. HYDROcodone-acetaminophen (NORCO)  mg tablet; 1 tab po every 8 hours as needed for pain.  -     HYDROcodone-acetaminophen (NORCO)  mg tablet; 1 tab po every 8 hours as needed for pain. 6/1/17  Medication monitoring encounter  -     COMPLIANCE DRUG SCREEN/PRESCRIPTION MONITORING        PMR (polymyalgia rheumatica) ([facility])  -     nortriptyline (PAMELOR) 10 mg capsule; Take 1 Cap by mouth nightly. Helping the neuropathic pain in her feet, cont with prednisone taper with dr Darylene Page   Uncomplicated asthma, unspecified asthma severity  -     albuterol (PROVENTIL VENTOLIN) 2.5 mg /3 mL (0.083 %) nebulizer solution; 3 mL by Nebulization route every six (6) hours as needed for Wheezing.  -     albuterol (PROVENTIL HFA, VENTOLIN HFA, PROAIR HFA) 90 mcg/actuation inhaler; Take 2 Puffs by inhalation every six (6) hours as needed for Wheezing. Follow-up Disposition:  Return in about 2 months (around 7/8/2017) for recheck on diabetes and pain .

## 2017-05-09 LAB
ALBUMIN SERPL-MCNC: 4.3 G/DL (ref 3.6–4.8)
ALBUMIN/GLOB SERPL: 1.7 {RATIO} (ref 1.2–2.2)
ALP SERPL-CCNC: 113 IU/L (ref 39–117)
ALT SERPL-CCNC: 18 IU/L (ref 0–32)
AST SERPL-CCNC: 12 IU/L (ref 0–40)
BILIRUB SERPL-MCNC: 0.2 MG/DL (ref 0–1.2)
BUN SERPL-MCNC: 16 MG/DL (ref 8–27)
BUN/CREAT SERPL: 18 (ref 12–28)
CALCIUM SERPL-MCNC: 9.6 MG/DL (ref 8.7–10.3)
CHLORIDE SERPL-SCNC: 97 MMOL/L (ref 96–106)
CHOLEST SERPL-MCNC: 165 MG/DL (ref 100–199)
CO2 SERPL-SCNC: 24 MMOL/L (ref 18–29)
CREAT SERPL-MCNC: 0.87 MG/DL (ref 0.57–1)
EST. AVERAGE GLUCOSE BLD GHB EST-MCNC: 232 MG/DL
GLOBULIN SER CALC-MCNC: 2.6 G/DL (ref 1.5–4.5)
GLUCOSE SERPL-MCNC: 233 MG/DL (ref 65–99)
HBA1C MFR BLD: 9.7 % (ref 4.8–5.6)
HDLC SERPL-MCNC: 45 MG/DL
INTERPRETATION, 910389: NORMAL
LDLC SERPL CALC-MCNC: 71 MG/DL (ref 0–99)
POTASSIUM SERPL-SCNC: 3.8 MMOL/L (ref 3.5–5.2)
PROT SERPL-MCNC: 6.9 G/DL (ref 6–8.5)
SODIUM SERPL-SCNC: 141 MMOL/L (ref 134–144)
TRIGL SERPL-MCNC: 243 MG/DL (ref 0–149)
VLDLC SERPL CALC-MCNC: 49 MG/DL (ref 5–40)

## 2017-05-13 LAB
ALBUMIN/CREAT UR: 4.3 MG/G CREAT (ref 0–30)
CREAT UR-MCNC: 217.6 MG/DL
DRUGS UR: NORMAL
MICROALBUMIN UR-MCNC: 9.4 UG/ML

## 2017-05-18 NOTE — PROGRESS NOTES
Notify pt her a1c is up to 9.7-likely partially related to the prednisone she has been on. Were we able to figure out something for the cost of her kombiglize? (Dorian Lo was working on this).  If not I will need to start her on insulin so please let me know

## 2017-07-06 RX ORDER — PREDNISONE 1 MG/1
TABLET ORAL
Qty: 30 TAB | Refills: 0 | Status: SHIPPED | OUTPATIENT
Start: 2017-07-06 | End: 2017-07-31 | Stop reason: ALTCHOICE

## 2017-07-17 DIAGNOSIS — M16.0 PRIMARY OSTEOARTHRITIS OF BOTH HIPS: ICD-10-CM

## 2017-07-20 RX ORDER — HYDROCODONE BITARTRATE AND ACETAMINOPHEN 10; 325 MG/1; MG/1
TABLET ORAL
Qty: 90 TAB | Refills: 0 | Status: SHIPPED | OUTPATIENT
Start: 2017-07-20 | End: 2017-07-31 | Stop reason: SDUPTHER

## 2017-07-20 NOTE — TELEPHONE ENCOUNTER
Patient is calling, patient states that she is completely out of her medication and would like for the medication requested to be filled as quickly as possible.

## 2017-07-31 ENCOUNTER — OFFICE VISIT (OUTPATIENT)
Dept: RHEUMATOLOGY | Age: 67
End: 2017-07-31

## 2017-07-31 ENCOUNTER — OFFICE VISIT (OUTPATIENT)
Dept: FAMILY MEDICINE CLINIC | Age: 67
End: 2017-07-31

## 2017-07-31 VITALS
OXYGEN SATURATION: 97 % | BODY MASS INDEX: 37.22 KG/M2 | DIASTOLIC BLOOD PRESSURE: 78 MMHG | RESPIRATION RATE: 18 BRPM | HEIGHT: 64 IN | TEMPERATURE: 98.5 F | WEIGHT: 218 LBS | HEART RATE: 82 BPM | SYSTOLIC BLOOD PRESSURE: 136 MMHG

## 2017-07-31 VITALS
HEIGHT: 64 IN | DIASTOLIC BLOOD PRESSURE: 78 MMHG | WEIGHT: 220.4 LBS | RESPIRATION RATE: 18 BRPM | SYSTOLIC BLOOD PRESSURE: 162 MMHG | OXYGEN SATURATION: 98 % | HEART RATE: 81 BPM | BODY MASS INDEX: 37.63 KG/M2 | TEMPERATURE: 98.1 F

## 2017-07-31 DIAGNOSIS — E78.00 HYPERCHOLESTEROLEMIA: ICD-10-CM

## 2017-07-31 DIAGNOSIS — G89.29 OTHER CHRONIC PAIN: ICD-10-CM

## 2017-07-31 DIAGNOSIS — Z79.52 LONG TERM (CURRENT) USE OF SYSTEMIC STEROIDS: ICD-10-CM

## 2017-07-31 DIAGNOSIS — M25.512 CHRONIC LEFT SHOULDER PAIN: ICD-10-CM

## 2017-07-31 DIAGNOSIS — M35.3 PMR (POLYMYALGIA RHEUMATICA) (HCC): Primary | ICD-10-CM

## 2017-07-31 DIAGNOSIS — G89.29 CHRONIC LEFT SHOULDER PAIN: ICD-10-CM

## 2017-07-31 DIAGNOSIS — M16.0 PRIMARY OSTEOARTHRITIS OF BOTH HIPS: ICD-10-CM

## 2017-07-31 DIAGNOSIS — I10 ESSENTIAL HYPERTENSION: ICD-10-CM

## 2017-07-31 DIAGNOSIS — E11.9 TYPE 2 DIABETES MELLITUS WITHOUT COMPLICATION, UNSPECIFIED LONG TERM INSULIN USE STATUS: Primary | ICD-10-CM

## 2017-07-31 RX ORDER — HYDROCODONE BITARTRATE AND ACETAMINOPHEN 10; 325 MG/1; MG/1
TABLET ORAL
Qty: 90 TAB | Refills: 0 | Status: SHIPPED | OUTPATIENT
Start: 2017-07-31 | End: 2017-11-09 | Stop reason: SDUPTHER

## 2017-07-31 RX ORDER — HYDROCODONE BITARTRATE AND ACETAMINOPHEN 10; 325 MG/1; MG/1
TABLET ORAL
Qty: 90 TAB | Refills: 0 | Status: SHIPPED | OUTPATIENT
Start: 2017-07-31 | End: 2017-07-31 | Stop reason: SDUPTHER

## 2017-07-31 RX ORDER — PREDNISONE 1 MG/1
TABLET ORAL
Qty: 120 TAB | Refills: 1 | Status: SHIPPED | OUTPATIENT
Start: 2017-07-31 | End: 2017-12-12

## 2017-07-31 NOTE — PROGRESS NOTES
Chief Complaint   Patient presents with    Diabetes     follow up    Pain (Chronic)     follow up      1. Have you been to the ER, urgent care clinic since your last visit? Hospitalized since your last visit? No    2. Have you seen or consulted any other health care providers outside of the [address] [place] [address] since your last visit? Include any pap smears or colon screening.  No

## 2017-07-31 NOTE — PATIENT INSTRUCTIONS
Prednisone 4 mg daily (using 1 mg tabs). If improved, lower by 1 mg every 2 weeks until course completed. If increasing pain with lowering dose, return to lowest effective dose and call    Comfortable shoulder exercises twice daily. Wall walking exercises first.    Labs and xrays    Call if shoulder not improving    Pendulum swing     Note: If you have pain in your back, do not do this exercise. 1. Hold on to a table or the back of a chair with your good arm. Then bend forward a little and let your sore arm hang straight down. This exercise does not use the arm muscles. Rather, use your legs and your hips to create movement that makes your arm swing freely. 2. Use the movement from your hips and legs to guide the slightly swinging arm back and forth like a pendulum (or elephant trunk). Then guide it in circles that start small (about the size of a dinner plate). Make the circles a bit larger each day, as your pain allows. 3. Do this exercise for 5 minutes, 5 to 7 times each day. 4. As you have less pain, try bending over a little farther to do this exercise. This will increase the amount of movement at your shoulder. Posterior stretching exercise     1. Hold the elbow of your injured arm with your other hand. 2. Use your hand to pull your injured arm gently up and across your body. You will feel a gentle stretch across the back of your injured shoulder. 3. Hold for at least 15 to 30 seconds. Then slowly lower your arm. 4. Repeat 2 to 4 times. Overhead stretch     1. Standing about an arm's length away, grasp onto a solid surface. You could use a countertop, a doorknob, or the back of a sturdy chair. 2. With your knees slightly bent, bend forward with your arms straight. Lower your upper body, and let your shoulders stretch. 3. As your shoulders are able to stretch farther, you may need to take a step or two backward. 4. Hold for at least 15 to 30 seconds. Then stand up and relax.  If you had stepped back during your stretch, step forward so you can keep your hands on the solid surface. 5. Repeat 2 to 4 times. 1.   Wall climbing (to the side)     Note: Avoid any movement that is straight to your side, and be careful not to arch your back. Your arm should stay about 30 degrees to the front of your side. 1. Stand with your side to a wall so that your fingers can just touch it at an angle about 30 degrees toward the front of your body. 2. Walk the fingers of your injured arm up the wall as high as pain permits. Try not to shrug your shoulder up toward your ear as you move your arm up. 3. Hold that position for a count of at least 15 to 20.  4. Walk your fingers back down to the starting position. 5. Repeat at least 2 to 4 times. Try to reach higher each time. Wall climbing (to the front)     Note: During this stretching exercise, be careful not to arch your back. 1. Face a wall, and stand so your fingers can just touch it. 2. Keeping your shoulder down, walk the fingers of your injured arm up the wall as high as pain permits. (Don't shrug your shoulder up toward your ear.)  3. Hold your arm in that position for at least 15 to 30 seconds. 4. Slowly walk your fingers back down to where you started. 5. Repeat at least 2 to 4 times. Try to reach higher each time. Shoulder blade squeeze     1. Stand with your arms at your sides, and squeeze your shoulder blades together. Do not raise your shoulders up as you squeeze. 2. Hold 6 seconds. 3. Repeat 8 to 12 times.

## 2017-07-31 NOTE — MR AVS SNAPSHOT
Visit Information Date & Time Provider Department Dept. Phone Encounter #  
 7/31/2017 11:00 AM Bridgette Henry MD [address] [facility] [phone] Upcoming Health Maintenance Date Due  
 EYE EXAM RETINAL OR DILATED Q1 8/7/2015 GLAUCOMA SCREENING Q2Y 7/4/2016 Pneumococcal 65+ Low/Medium Risk (1 of 2 - PCV13) 7/4/2016 FOOT EXAM Q1 7/27/2017 INFLUENZA AGE 9 TO ADULT 8/1/2017 HEMOGLOBIN A1C Q6M 11/8/2017 MEDICARE YEARLY EXAM 2/23/2018 MICROALBUMIN Q1 5/8/2018 LIPID PANEL Q1 5/8/2018 BREAST CANCER SCRN MAMMOGRAM 10/5/2018 COLONOSCOPY 6/1/2020 DTaP/Tdap/Td series (2 - Td) 5/13/2023 Allergies as of 7/31/2017  Review Complete On: 7/31/2017 By: Bridgette Henry MD  
  
 Severity Noted Reaction Type Reactions Atorvastatin  01/21/2017    Myalgia Bactrim [Sulfamethoxazole-trimethoprim]  04/02/2010    Nausea Only Biaxin [Clarithromycin]  04/02/2010    Nausea Only Demerol [Meperidine]  04/02/2010    Itching Erythromycin  04/02/2010    Nausea Only Pcn [Penicillins]  04/02/2010    Hives Percocet [Oxycodone-acetaminophen]  04/02/2010    Itching Tetracycline  04/02/2010    Nausea Only Voltaren [Diclofenac Sodium]  04/02/2010    Hives Current Immunizations  Reviewed on 2/22/2017 Name Date Influenza High Dose Vaccine PF 1/29/2016 Influenza Vaccine 12/5/2012 Influenza Vaccine PF 11/17/2014 PPD 1/25/1999 Pneumococcal Polysaccharide (PPSV-23) 5/13/2013 TD Vaccine 5/12/1999 Tdap 5/13/2013 Not reviewed this visit You Were Diagnosed With   
  
 Codes Comments PMR (polymyalgia rheumatica) (HCC)    -  Primary ICD-10-CM: M35.3 ICD-9-CM: 253 Long term (current) use of systemic steroids     ICD-10-CM: Z79.52 
ICD-9-CM: V58.65 Chronic left shoulder pain     ICD-10-CM: M25.512, G89.29 ICD-9-CM: 719.41, 338.29 Vitals BP Pulse Temp Resp Height(growth percentile) Weight(growth percentile) 136/78 (BP 1 Location: Left arm, BP Patient Position: Sitting) 82 98.5 °F (36.9 °C) (Oral) 18 5' 4\" (1.626 m) 218 lb (98.9 kg) SpO2 BMI OB Status Smoking Status 97% 37.42 kg/m2 Hysterectomy Former Smoker Vitals History BMI and BSA Data Body Mass Index Body Surface Area  
 37.42 kg/m 2 2.11 m 2 Preferred Pharmacy Pharmacy Name Phone [address] [place], [address] [facility] Simran Davis [phone] Your Updated Medication List  
  
   
This list is accurate as of: 7/31/17 11:43 AM.  Always use your most recent med list.  
  
  
  
  
 * albuterol 2.5 mg /3 mL (0.083 %) nebulizer solution Commonly known as:  PROVENTIL VENTOLIN  
3 mL by Nebulization route every six (6) hours as needed for Wheezing. * albuterol 90 mcg/actuation inhaler Commonly known as:  PROVENTIL HFA, VENTOLIN HFA, PROAIR HFA Take 2 Puffs by inhalation every six (6) hours as needed for Wheezing. aspirin 81 mg chewable tablet Take 81 mg by mouth daily. * Blood-Glucose Meter monitoring kit One touch meter * Blood-Glucose Meter monitoring kit Commonly known as:  FREESTYLE LITE METER Tid use due to uncontrolled dm and hyperglycemia. 250.02. 790.29 CENTRUM PO Take  by mouth daily. diltiazem 420 mg SR capsule Commonly known as:  [facility] TAKE 1 CAPSULE DAILY  
  
 glipiZIDE 10 mg tablet Commonly known as:  GLUCOTROL  
1/2-1 tab po bid with meals * glucose blood VI test strips strip Commonly known as:  ONETOUCH ULTRA TEST Test once daily * glucose blood VI test strips strip Commonly known as:  ASCENSIA AUTODISC VI, ONE TOUCH ULTRA TEST VI Tid use due to uncontrolled dm and hyperglycemia. 250.02. 790.29  
  
 * glucose blood VI test strips strip Commonly known as:  FREESTYLE LITE STRIPS  
 Tid use due to uncontrolled dm and hyperglycemia. 250.02. 790.29 HYDROcodone-acetaminophen  mg tablet Commonly known as:  NORCO  
1 tab po every 8 hours as needed for pain. Fill 9/28/17 Lancets Misc Commonly known as:  FREESTYLE LANCETS Tid use due to uncontrolled dm and hyperglycemia. 250.02. 790.29  
  
 losartan-hydroCHLOROthiazide 100-12.5 mg per tablet Commonly known as:  HYZAAR  
TAKE 1 TABLET DAILY  
  
 metFORMIN  mg tablet Commonly known as:  GLUCOPHAGE XR Take 2 Tabs by mouth daily (with dinner). nortriptyline 10 mg capsule Commonly known as:  PAMELOR Take 1 Cap by mouth nightly. pravastatin 20 mg tablet Commonly known as:  PRAVACHOL Take 1 Tab by mouth nightly. In place of lipitor  
  
 predniSONE 1 mg tablet Commonly known as:  Valeria Loth Instead of 5 mg tab. Take 4 mg once daily. Lower by 1 mg every 2 weeks if tolerated. sAXagliptin-metFORMIN 2.5-1,000 mg Tm24 Commonly known as:  KOMBIGLYZE XR Take 1 Tab by mouth two (2) times a day. Dc the metformin xr and ongliza separate rxs. Has previously failed janumet ZyrTEC 10 mg Cap Generic drug:  Cetirizine Take  by mouth. * Notice: This list has 7 medication(s) that are the same as other medications prescribed for you. Read the directions carefully, and ask your doctor or other care provider to review them with you. Prescriptions Sent to Pharmacy Refills  
 predniSONE (DELTASONE) 1 mg tablet 1 Sig: Instead of 5 mg tab. Take 4 mg once daily. Lower by 1 mg every 2 weeks if tolerated. Class: Normal  
 Pharmacy: [address] [place] [address]  [phone] Ph #: [phone] We Performed the Following C REACTIVE PROTEIN, QT [98014 CPT(R)] SED RATE (ESR) [id number] CPT(R)] To-Do List   
 07/31/2017 Imaging:  XR SHOULDER LT AP/LAT MIN 2 V Patient Instructions Prednisone 4 mg daily (using 1 mg tabs). If improved, lower by 1 mg every 2 weeks until course completed. If increasing pain with lowering dose, return to lowest effective dose and call Comfortable shoulder exercises twice daily. Wall walking exercises first. 
 
Labs and xrays Call if shoulder not improving Pendulum swing Note: If you have pain in your back, do not do this exercise. 1. Hold on to a table or the back of a chair with your good arm. Then bend forward a little and let your sore arm hang straight down. This exercise does not use the arm muscles. Rather, use your legs and your hips to create movement that makes your arm swing freely. 2. Use the movement from your hips and legs to guide the slightly swinging arm back and forth like a pendulum (or elephant trunk). Then guide it in circles that start small (about the size of a dinner plate). Make the circles a bit larger each day, as your pain allows. 3. Do this exercise for 5 minutes, 5 to 7 times each day. 4. As you have less pain, try bending over a little farther to do this exercise. This will increase the amount of movement at your shoulder. Posterior stretching exercise 1. Hold the elbow of your injured arm with your other hand. 2. Use your hand to pull your injured arm gently up and across your body. You will feel a gentle stretch across the back of your injured shoulder. 3. Hold for at least 15 to 30 seconds. Then slowly lower your arm. 4. Repeat 2 to 4 times. Overhead stretch 1. Standing about an arm's length away, grasp onto a solid surface. You could use a countertop, a doorknob, or the back of a sturdy chair. 2. With your knees slightly bent, bend forward with your arms straight. Lower your upper body, and let your shoulders stretch. 3. As your shoulders are able to stretch farther, you may need to take a step or two backward. 4. Hold for at least 15 to 30 seconds. Then stand up and relax.  If you had stepped back during your stretch, step forward so you can keep your hands on the solid surface. 5. Repeat 2 to 4 times. 1.  
Wall climbing (to the side) Note: Avoid any movement that is straight to your side, and be careful not to arch your back. Your arm should stay about 30 degrees to the front of your side. 1. Stand with your side to a wall so that your fingers can just touch it at an angle about 30 degrees toward the front of your body. 2. Walk the fingers of your injured arm up the wall as high as pain permits. Try not to shrug your shoulder up toward your ear as you move your arm up. 3. Hold that position for a count of at least 15 to 20. 
4. Walk your fingers back down to the starting position. 5. Repeat at least 2 to 4 times. Try to reach higher each time. Wall climbing (to the front) Note: During this stretching exercise, be careful not to arch your back. 1. Face a wall, and stand so your fingers can just touch it. 2. Keeping your shoulder down, walk the fingers of your injured arm up the wall as high as pain permits. (Don't shrug your shoulder up toward your ear.) 3. Hold your arm in that position for at least 15 to 30 seconds. 4. Slowly walk your fingers back down to where you started. 5. Repeat at least 2 to 4 times. Try to reach higher each time. Shoulder blade squeeze 1. Stand with your arms at your sides, and squeeze your shoulder blades together. Do not raise your shoulders up as you squeeze. 2. Hold 6 seconds. 3. Repeat 8 to 12 times. Introducing [facility] & HEALTH SERVICES! [employer] introduces Instant [employer] patient portal. Now you can access parts of your medical record, email your doctor's office, and request medication refills online. 1. In your internet browser, go to [email or web address] 2. Click on the First Time User? Click Here link in the Sign In box. You will see the New Member Sign Up page. 3. Enter your  Access Code exactly as it appears below. You will not need to use this code after youve completed the sign-up process. If you do not sign up before the expiration date, you must request a new code. ·  Access Code: [email or web address] Expires: 8/6/2017 10:40 AM 
 
4. Enter the last four digits of your Social Security Number (xxxx) and Date of Birth (mm/dd/yyyy) as indicated and click Submit. You will be taken to the next sign-up page. 5. Create a  ID. This will be your  login ID and cannot be changed, so think of one that is secure and easy to remember. 6. Create a  password. You can change your password at any time. 7. Enter your Password Reset Question and Answer. This can be used at a later time if you forget your password. 8. Enter your e-mail address. You will receive e-mail notification when new information is available in [address] 9. Click Sign Up. You can now view and download portions of your medical record. 10. Click the Download Summary menu link to download a portable copy of your medical information. If you have questions, please visit the Frequently Asked Questions section of the  website. Remember,  is NOT to be used for urgent needs. For medical emergencies, dial 911. Now available from your iPhone and Android! Please provide this summary of care documentation to your next provider. Your primary care clinician is listed as Bree Gregory. If you have any questions after today's visit, please call [phone].

## 2017-07-31 NOTE — MR AVS SNAPSHOT
Visit Information Date & Time Provider Department Dept. Phone Encounter #  
 7/31/2017 10:20 AM Cynthia Avila, [address] [phone] [id number] Follow-up Instructions Return for 1-3 months pending labs . Routing History Your Appointments 7/31/2017 11:00 AM  
ESTABLISHED PATIENT with Ally Gerardo MD  
[address] Corey Macias ([facility]) Appt Note: having pain; . .... [address] [facility] [facility] [address]  
[phone]  
  
   
 [address] [facility] [address] [facility] [address] [phone] Upcoming Health Maintenance Date Due  
 EYE EXAM RETINAL OR DILATED Q1 8/7/2015 GLAUCOMA SCREENING Q2Y 7/4/2016 Pneumococcal 65+ Low/Medium Risk (1 of 2 - PCV13) 7/4/2016 FOOT EXAM Q1 7/27/2017 INFLUENZA AGE 9 TO ADULT 8/1/2017 HEMOGLOBIN A1C Q6M 11/8/2017 MEDICARE YEARLY EXAM 2/23/2018 MICROALBUMIN Q1 5/8/2018 LIPID PANEL Q1 5/8/2018 BREAST CANCER SCRN MAMMOGRAM 10/5/2018 COLONOSCOPY 6/1/2020 DTaP/Tdap/Td series (2 - Td) 5/13/2023 Allergies as of 7/31/2017  Review Complete On: 7/31/2017 By: Cynthia Avila NP Severity Noted Reaction Type Reactions Atorvastatin  01/21/2017    Myalgia Bactrim [Sulfamethoxazole-trimethoprim]  04/02/2010    Nausea Only Biaxin [Clarithromycin]  04/02/2010    Nausea Only Demerol [Meperidine]  04/02/2010    Itching Erythromycin  04/02/2010    Nausea Only Pcn [Penicillins]  04/02/2010    Hives Percocet [Oxycodone-acetaminophen]  04/02/2010    Itching Tetracycline  04/02/2010    Nausea Only Voltaren [Diclofenac Sodium]  04/02/2010    Hives Current Immunizations  Reviewed on 2/22/2017 Name Date Influenza High Dose Vaccine PF 1/29/2016 Influenza Vaccine 12/5/2012 Influenza Vaccine PF 11/17/2014 PPD 1/25/1999 Pneumococcal Polysaccharide (PPSV-23) 5/13/2013 TD Vaccine 5/12/1999 Tdap 5/13/2013 Not reviewed this visit You Were Diagnosed With   
  
 Codes Comments Type 2 diabetes mellitus without complication, unspecified long term insulin use status (HCC)    -  Primary ICD-10-CM: E11.9 ICD-9-CM: 250.00 Primary osteoarthritis of both hips     ICD-10-CM: M16.0 ICD-9-CM: 715.15 Essential hypertension     ICD-10-CM: I10 
ICD-9-CM: 401.9 Hypercholesterolemia     ICD-10-CM: E78.00 ICD-9-CM: 272.0 Vitals BP Pulse Temp Resp Height(growth percentile) Weight(growth percentile) 162/78 (BP 1 Location: Left arm, BP Patient Position: Sitting) 81 98.1 °F (36.7 °C) (Oral) 18 5' 4\" (1.626 m) 220 lb 6.4 oz (100 kg) SpO2 BMI OB Status Smoking Status 98% 37.83 kg/m2 Hysterectomy Former Smoker BMI and BSA Data Body Mass Index Body Surface Area  
 37.83 kg/m 2 2.12 m 2 Preferred Pharmacy Pharmacy Name Phone [facility] Erendira Roman [phone] Your Updated Medication List  
  
   
This list is accurate as of: 7/31/17 10:55 AM.  Always use your most recent med list.  
  
  
  
  
 * albuterol 2.5 mg /3 mL (0.083 %) nebulizer solution Commonly known as:  PROVENTIL VENTOLIN  
3 mL by Nebulization route every six (6) hours as needed for Wheezing. * albuterol 90 mcg/actuation inhaler Commonly known as:  PROVENTIL HFA, VENTOLIN HFA, PROAIR HFA Take 2 Puffs by inhalation every six (6) hours as needed for Wheezing. aspirin 81 mg chewable tablet Take 81 mg by mouth daily. * Blood-Glucose Meter monitoring kit One touch meter * Blood-Glucose Meter monitoring kit Commonly known as:  FREESTYLE LITE METER Tid use due to uncontrolled dm and hyperglycemia. 250.02. 790.29 CENTRUM PO Take  by mouth daily. diltiazem 420 mg SR capsule Commonly known as:  [facility] TAKE 1 CAPSULE DAILY  
  
 glipiZIDE 10 mg tablet Commonly known as:  Pb Natarajan 1/2-1 tab po bid with meals * glucose blood VI test strips strip Commonly known as:  ONETOUCH ULTRA TEST Test once daily * glucose blood VI test strips strip Commonly known as:  ASCENSIA AUTODISC VI, ONE TOUCH ULTRA TEST VI Tid use due to uncontrolled dm and hyperglycemia. 250.02. 790.29  
  
 * glucose blood VI test strips strip Commonly known as:  FREESTYLE LITE STRIPS Tid use due to uncontrolled dm and hyperglycemia. 250.02. 790.29 HYDROcodone-acetaminophen  mg tablet Commonly known as:  NORCO  
1 tab po every 8 hours as needed for pain. Fill 17 Lancets Misc Commonly known as:  FREESTYLE LANCETS Tid use due to uncontrolled dm and hyperglycemia. 250.. 790.29  
  
 losartan-hydroCHLOROthiazide 100-12.5 mg per tablet Commonly known as:  HYZAAR  
TAKE 1 TABLET DAILY  
  
 metFORMIN  mg tablet Commonly known as:  GLUCOPHAGE XR Take 2 Tabs by mouth daily (with dinner). nortriptyline 10 mg capsule Commonly known as:  PAMELOR Take 1 Cap by mouth nightly. pravastatin 20 mg tablet Commonly known as:  PRAVACHOL Take 1 Tab by mouth nightly. In place of lipitor sAXagliptin-metFORMIN 2.5-1,000 mg Tm24 Commonly known as:  KOMBIGLYZE XR Take 1 Tab by mouth two (2) times a day. Dc the metformin xr and ongliza separate rxs ZyrTEC 10 mg Cap Generic drug:  Cetirizine Take  by mouth. * Notice: This list has 7 medication(s) that are the same as other medications prescribed for you. Read the directions carefully, and ask your doctor or other care provider to review them with you. Prescriptions Printed Refills HYDROcodone-acetaminophen (NORCO)  mg tablet 0 Si tab po every 8 hours as needed for pain. Fill 17 Class: Print We Performed the Following HEMOGLOBIN A1C WITH EAG [32627 CPT(R)]  DIABETES FOOT EXAM [7 Custom] LIPID PANEL [78986 CPT(R)] METABOLIC PANEL, COMPREHENSIVE [46047 CPT(R)] Follow-up Instructions Return for 1-3 months pending labs . Introducing [facility] & HEALTH SERVICES! [facility] introduces [employer] patient portal. Now you can access parts of your medical record, email your doctor's office, and request medication refills online. 1. In your internet browser, go to [email or web address] 2. Click on the First Time User? Click Here link in the Sign In box. You will see the New Member Sign Up page. 3. Enter your [employer] Access Code exactly as it appears below. You will not need to use this code after youve completed the sign-up process. If you do not sign up before the expiration date, you must request a new code. · [employer] Access Code: [email or web address] Expires: 8/6/2017 10:40 AM 
 
4. Enter the last four digits of your Social Security Number (xxxx) and Date of Birth (mm/dd/yyyy) as indicated and click Submit. You will be taken to the next sign-up page. 5. Create a [employer] ID. This will be your [employer] login ID and cannot be changed, so think of one that is secure and easy to remember. 6. Create a [employer] password. You can change your password at any time. 7. Enter your Password Reset Question and Answer. This can be used at a later time if you forget your password. 8. Enter your e-mail address. You will receive e-mail notification when new information is available in [address] 9. Click Sign Up. You can now view and download portions of your medical record. 10. Click the Download Summary menu link to download a portable copy of your medical information. If you have questions, please visit the Frequently Asked Questions section of the [employer] website. Remember, [employer] is NOT to be used for urgent needs. For medical emergencies, dial 911. Now available from your iPhone and Android! Please provide this summary of care documentation to your next provider. Your primary care clinician is listed as Rosa Harkins. If you have any questions after today's visit, please call [phone].

## 2017-07-31 NOTE — LETTER
8/21/2017 3:34 PM 
 
Ms. Shauna Baker [address] [facility] [address] Dear Shauna Baker: Please find your most recent results below. Resulted Orders LIPID PANEL Result Value Ref Range Cholesterol, total 240 (H) 100 - 199 mg/dL Triglyceride 275 (H) 0 - 149 mg/dL HDL Cholesterol 47 >39 mg/dL VLDL, calculated 55 (H) 5 - 40 mg/dL LDL, calculated 138 (H) 0 - 99 mg/dL Narrative Performed at:  [address]  [id number] : Berkley Donnelly MD, Phone:  [phone] METABOLIC PANEL, COMPREHENSIVE Result Value Ref Range Glucose 190 (H) 65 - 99 mg/dL BUN 18 8 - 27 mg/dL Creatinine 0.78 0.57 - 1.00 mg/dL GFR est non-AA 79 >59 mL/min/1.73 GFR est AA 92 >59 mL/min/1.73  
 BUN/Creatinine ratio 23 12 - 28 Sodium 137 134 - 144 mmol/L Potassium 4.0 3.5 - 5.2 mmol/L Chloride 93 (L) 96 - 106 mmol/L  
 CO2 22 18 - 29 mmol/L Calcium 9.6 8.7 - 10.3 mg/dL Protein, total 7.6 6.0 - 8.5 g/dL Albumin 4.3 3.6 - 4.8 g/dL GLOBULIN, TOTAL 3.3 1.5 - 4.5 g/dL A-G Ratio 1.3 1.2 - 2.2 Bilirubin, total <0.2 0.0 - 1.2 mg/dL Alk. phosphatase 136 (H) 39 - 117 IU/L  
 AST (SGOT) 17 0 - 40 IU/L  
 ALT (SGPT) 18 0 - 32 IU/L Narrative Performed at:  [address]  [id number] : Berkley Donnelly MD, Phone:  [phone] HEMOGLOBIN A1C WITH EAG Result Value Ref Range Hemoglobin A1c 9.1 (H) 4.8 - 5.6 % Comment:  
            Pre-diabetes: 5.7 - 6.4 Diabetes: >6.4 Glycemic control for adults with diabetes: <7.0 Estimated average glucose 214 mg/dL Narrative Performed at:  [facility] [address] [place] [address]  [id number] : Berkley Donnelly MD, Phone:  [phone] CVD REPORT Result Value Ref Range INTERPRETATION Note Comment: Supplement report is available. Narrative Performed at:  [address] [place] [address]  [id number] : Rochelle Roberto PhD, Phone:  [phone] RECOMMENDATIONS: 
Your A1c is still 9.1. If her insurance wont cover the kombiglize I will need to change her med regimen. Also your  lipids are above goal on the current dose of pravastatin, increase to 40mg at hs. Med sent to pharm. Please call me if you have any questions: [phone] Sincerely, Lynda Magallanes NP

## 2017-07-31 NOTE — PROGRESS NOTES
HISTORY OF PRESENT ILLNESS  Alma Ramirez is a 78 y.o. female. HPI Patient presents for follow up of PMR. After last visit, she had restarted prednisone at a low dose (perhaps 3 mg daily). She had been off medication for about 3 weeks and noted a flare of pain in the left hip. She had taken a brief prednisone taper (5 mg daily with taper and off over the past week). She has had flares of left shoulder pain, worse over the past month (no relief with prednisone). No joint swelling is noted. She has AM stiffness of 60 minutes. She has no tongue or jaw claudication or temporal headaches. She has not had recent sudden visual changes. She will take hydrocodone/APAP 2-3 times daily with relief. Current Outpatient Prescriptions   Medication Sig Dispense Refill    HYDROcodone-acetaminophen (NORCO)  mg tablet 1 tab po every 8 hours as needed for pain. Fill 9/28/17 90 Tab 0    sAXagliptin-metFORMIN (KOMBIGLYZE XR) 2.5-1,000 mg TM24 Take 1 Tab by mouth two (2) times a day. Dc the metformin xr and ongliza separate rxs. Has previously failed janumet 60 Tab 3    albuterol (PROVENTIL VENTOLIN) 2.5 mg /3 mL (0.083 %) nebulizer solution 3 mL by Nebulization route every six (6) hours as needed for Wheezing. 60 Each 2    albuterol (PROVENTIL HFA, VENTOLIN HFA, PROAIR HFA) 90 mcg/actuation inhaler Take 2 Puffs by inhalation every six (6) hours as needed for Wheezing. 1 Inhaler 1    metFORMIN ER (GLUCOPHAGE XR) 500 mg tablet Take 2 Tabs by mouth daily (with dinner). 180 Tab 3    nortriptyline (PAMELOR) 10 mg capsule Take 1 Cap by mouth nightly. 90 Cap 3    pravastatin (PRAVACHOL) 20 mg tablet Take 1 Tab by mouth nightly.  In place of lipitor 30 Tab 5    glipiZIDE (GLUCOTROL) 10 mg tablet 1/2-1 tab po bid with meals 180 Tab 3    losartan-hydroCHLOROthiazide (HYZAAR) 100-12.5 mg per tablet TAKE 1 TABLET DAILY 90 Tab 3    diltiazem (TIAZAC) 420 mg SR capsule TAKE 1 CAPSULE DAILY 90 Cap 3    Blood-Glucose Meter (FREESTYLE LITE METER) monitoring kit Tid use due to uncontrolled dm and hyperglycemia. 250.02. 790.29 1 Kit 0    glucose blood VI test strips (FREESTYLE LITE STRIPS) strip Tid use due to uncontrolled dm and hyperglycemia. 250.02. 790.29 1 Each 11    Lancets (FREESTYLE LANCETS) misc Tid use due to uncontrolled dm and hyperglycemia. 250.02. 790.29 1 Each 11    glucose blood VI test strips (ASCENSIA AUTODISC VI, ONE TOUCH ULTRA TEST VI) strip Tid use due to uncontrolled dm and hyperglycemia. 250.02. 790.29 100 Each 11    Blood-Glucose Meter monitoring kit One touch meter 1 Kit 1    glucose blood VI test strips (ONE TOUCH ULTRA TEST) strip Test once daily 1 Package 11    Cetirizine (ZYRTEC) 10 mg Cap Take  by mouth.  aspirin 81 mg chewable tablet Take 81 mg by mouth daily.  MULTIVITS W-FE,OTHER MIN (CENTRUM PO) Take  by mouth daily. Allergies   Allergen Reactions    Atorvastatin Myalgia    Bactrim [Sulfamethoxazole-Trimethoprim] Nausea Only    Biaxin [Clarithromycin] Nausea Only    Demerol [Meperidine] Itching    Erythromycin Nausea Only    Pcn [Penicillins] Hives    Percocet [Oxycodone-Acetaminophen] Itching    Tetracycline Nausea Only    Voltaren [Diclofenac Sodium] Hives       Review of Systems   Constitutional: Negative for fever. Eyes: Negative for blurred vision. Cardiovascular: Positive for leg swelling. Gastrointestinal: Negative for abdominal pain. Musculoskeletal: Positive for falls. Skin: Negative for rash. Physical Exam   Vitals reviewed. Constitutional: She is oriented to person, place, and time. She appears well-developed and well-nourished. No distress. O/P clear  Neck: Mildly reduced lateral rotation in each direction. Neck supple. Cardiovascular: Regular rhythm.     +1 pedal edema  -temporal arteries non-tender with normal pulsations   Pulmonary/Chest: Effort normal and breath sounds normal. No respiratory distress. Abdominal: Soft.  She exhibits no distension. There is no tenderness. Musculoskeletal:   -left shoulder painful abduction to 100 degrees and flexion to 100 degrees  -each knee flexion 90 degrees. -Each knee s/p TKA. -no peripheral synovitis; tenderness left shoulder   Lymphadenopathy:     She has no cervical adenopathy. Neurological: She is alert and oriented to person, place, and time. She exhibits normal muscle tone. Skin: Skin is warm and dry. Psychiatric: She has a normal mood and affect. Judgment normal.     Lab Results   Component Value Date/Time    WBC 10.0 10/05/2016 08:59 AM    WBC 8.9 05/09/2012 12:23 PM    HGB 13.7 10/05/2016 08:59 AM    HCT 42.3 10/05/2016 08:59 AM    PLATELET 652 76/56/0465 08:59 AM    MCV 91.4 10/05/2016 08:59 AM     Lab Results   Component Value Date/Time    Sodium 141 05/08/2017 10:59 AM    Potassium 3.8 05/08/2017 10:59 AM    Chloride 97 05/08/2017 10:59 AM    CO2 24 05/08/2017 10:59 AM    Anion gap 9 10/05/2016 08:59 AM    Glucose 233 05/08/2017 10:59 AM    BUN 16 05/08/2017 10:59 AM    Creatinine 0.87 05/08/2017 10:59 AM    BUN/Creatinine ratio 18 05/08/2017 10:59 AM    GFR est AA 81 05/08/2017 10:59 AM    GFR est non-AA 70 05/08/2017 10:59 AM    Calcium 9.6 05/08/2017 10:59 AM    Bilirubin, total 0.2 05/08/2017 10:59 AM    AST (SGOT) 12 05/08/2017 10:59 AM    Alk. phosphatase 113 05/08/2017 10:59 AM    Protein, total 6.9 05/08/2017 10:59 AM    Albumin 4.3 05/08/2017 10:59 AM    Globulin 3.9 10/16/2010 01:00 PM    A-G Ratio 1.7 05/08/2017 10:59 AM    ALT (SGPT) 18 05/08/2017 10:59 AM     Lab Results   Component Value Date/Time    Hemoglobin A1c 9.7 05/08/2017 10:59 AM    Hemoglobin A1c (POC) 8.6 07/27/2016 11:32 AM     Lab Results   Component Value Date/Time    TSH 1.190 07/15/2015 04:34 PM     Lab Results   Component Value Date/Time    Sed rate (ESR) 8 02/22/2017 05:00 PM     CRP 2.47 mg/dL    MHAQ 0.500    ASSESSMENT and PLAN    ICD-10-CM ICD-9-CM    1.  PMR (polymyalgia rheumatica) ([facility] [address]): she had been on lower dose prednisone with taper (perhaps 3 mg daily and lower) since last visit with recent dosing of perhaps 5 mg daily with taper earlier in the month (exact dosing unclear). She is off therapy. Prednisone seems to at least help hip pain. Current shoulder pain may not be related. M35.3 725 SED RATE (ESR)  Try a low dose taper off prednisone completely if feasible over next couple of months: Instead of 5 mg tab. Take 4 mg once daily. Lower by 1 mg every 2 weeks if tolerated. Stay at lowest effective dose and call if flare  -comfortable, low impact exercise encouraged      C REACTIVE PROTEIN, QT   2. Long term (current) use of systemic steroids: normal DXA in 2015. Prednisone 10 mg daily and less over about 2 years (low dose with periods off prednisone recently as noted above) Z79.52 V58.65 -calcium 1200 mg daily total  -DXA by year's end  -vitamin D3 0453-7906 units daily  -monitor diabetes and treat per Ms. Luis Felipe Holder   3. Chronic left shoulder pain: intermittent, noted at last visit, with recent flare. Likely some degree of rotator cuff tendonopathy. M25.512 719.41 XR SHOULDER LT AP/LAT MIN 2 V  I have taught appropriate exercises to assist in improving in range of motion (beginning with passive range of motion and progressing through active range of motion).  We discussed proper lifting techniques to avoid exacerbating the shoulder pain.  -she is unable to go to PT at present  -she would like to consider a local injection if not improving with conservative care     G89.29 338.29

## 2017-07-31 NOTE — PROGRESS NOTES
HISTORY OF PRESENT ILLNESS  Kenney Hodges is a 78 y.o. female. HPI  Chief Complaint   Patient presents with    Diabetes     follow up    Pain (Chronic)     follow up      Kenney Hodges is a 78 y.o. female   Still having issues with her kombiglize never heard back from the authorization that was done in may. She is taking ongliza 5mg daily which she had left over from previously. Will still taking 1/2-1 tab of glipizide bid with meals pending her carb/sugar intake. She is also taking the metformin separate at this time, metformin 500xr 2 at dinner. fbs 142 this am, sometimes in 130s up to 170s. Not checking in evening or after meals. States she is due to see her foot md and plans to make this appt for toenail care. Has a mild ingrown toenail right great toe. Denies smoking, previously quit   She has now been off prednisone x 1 month. Left shoulder has been bothering her more, seeing dr Gillian Ragland after todays appt. Still having her hip pain and considering the hip replacement. She is helping her sister with the cancer right now so wants to hold off when things settle down with all of that. Current Outpatient Prescriptions   Medication Sig Dispense Refill    HYDROcodone-acetaminophen (NORCO)  mg tablet 1 tab po every 8 hours as needed for pain. Fill 9/28/17 90 Tab 0    sAXagliptin-metFORMIN (KOMBIGLYZE XR) 2.5-1,000 mg TM24 Take 1 Tab by mouth two (2) times a day. Dc the metformin xr and ongliza separate rxs. Has previously failed janumet 60 Tab 3    albuterol (PROVENTIL VENTOLIN) 2.5 mg /3 mL (0.083 %) nebulizer solution 3 mL by Nebulization route every six (6) hours as needed for Wheezing. 60 Each 2    albuterol (PROVENTIL HFA, VENTOLIN HFA, PROAIR HFA) 90 mcg/actuation inhaler Take 2 Puffs by inhalation every six (6) hours as needed for Wheezing. 1 Inhaler 1    metFORMIN ER (GLUCOPHAGE XR) 500 mg tablet Take 2 Tabs by mouth daily (with dinner).  180 Tab 3    nortriptyline (PAMELOR) 10 mg capsule Take 1 Cap by mouth nightly. 90 Cap 3    glipiZIDE (GLUCOTROL) 10 mg tablet 1/2-1 tab po bid with meals 180 Tab 3    losartan-hydroCHLOROthiazide (HYZAAR) 100-12.5 mg per tablet TAKE 1 TABLET DAILY 90 Tab 3    diltiazem (TIAZAC) 420 mg SR capsule TAKE 1 CAPSULE DAILY 90 Cap 3    Blood-Glucose Meter (FREESTYLE LITE METER) monitoring kit Tid use due to uncontrolled dm and hyperglycemia. 250.02. 790.29 1 Kit 0    glucose blood VI test strips (FREESTYLE LITE STRIPS) strip Tid use due to uncontrolled dm and hyperglycemia. 250.02. 790.29 1 Each 11    Lancets (FREESTYLE LANCETS) misc Tid use due to uncontrolled dm and hyperglycemia. 250.02. 790.29 1 Each 11    glucose blood VI test strips (ASCENSIA AUTODISC VI, ONE TOUCH ULTRA TEST VI) strip Tid use due to uncontrolled dm and hyperglycemia. 250.02. 790.29 100 Each 11    Blood-Glucose Meter monitoring kit One touch meter 1 Kit 1    glucose blood VI test strips (ONE TOUCH ULTRA TEST) strip Test once daily 1 Package 11    Cetirizine (ZYRTEC) 10 mg Cap Take  by mouth.  aspirin 81 mg chewable tablet Take 81 mg by mouth daily.  MULTIVITS W-FE,OTHER MIN (CENTRUM PO) Take  by mouth daily.  pravastatin (PRAVACHOL) 20 mg tablet Take 1 Tab by mouth nightly.  In place of lipitor 30 Tab 5     Allergies   Allergen Reactions    Atorvastatin Myalgia    Bactrim [Sulfamethoxazole-Trimethoprim] Nausea Only    Biaxin [Clarithromycin] Nausea Only    Demerol [Meperidine] Itching    Erythromycin Nausea Only    Pcn [Penicillins] Hives    Percocet [Oxycodone-Acetaminophen] Itching    Tetracycline Nausea Only    Voltaren [Diclofenac Sodium] Hives     Past Medical History:   Diagnosis Date    Abnormal pulmonary function test 9/28/2016    AR (allergic rhinitis)     Asthma     Atrial thrombus 1994    left    Chronic bronchitis (HCC) 9/28/2016    Chronic pain     DDD (degenerative disc disease), lumbar     Diabetes ([facility] [address]     DJD (degenerative joint disease) of hip     right    Empty sella ([facility] [address] 2000    by MRI    Former smoker 10/24/2016    Hoarseness of voice     Hypercholesterolemia     Hypertension     Lesion of vocal cord     PMR (polymyalgia rheumatica) ([facility] [address] 5/18/2016    Smoker 12/5/2012     Past Surgical History:   Procedure Laterality Date    ENDOSCOPY, COLON, DIAGNOSTIC  10/03    polypectomy    ENDOSCOPY, COLON, DIAGNOSTIC  2/05    Dr. Yonis Demarco, DIAGNOSTIC  2010, reported    due 2015    HX APPENDECTOMY  1970's    HX BREAST REDUCTION      HX COLONOSCOPY  2015    dr Gena Choudhury. 2 polyps removed -repeat in 2018     HX HEMORRHOIDECTOMY      HX HYSTERECTOMY      ovaries spared    HX KNEE REPLACEMENT  11/03, 10/05    left then right    HX TONSILLECTOMY  age 29    tonsils     Family History   Problem Relation Age of Onset    Arthritis-osteo Mother     Asthma Mother     Diabetes Mother     Hypertension Mother     Stroke Mother     Arthritis-osteo Father     Cancer Father     Elevated Lipids Father     Hypertension Father     Arthritis-osteo Sister     Asthma Sister     Diabetes Sister     Elevated Lipids Sister     Hypertension Sister     Heart Attack Sister     Hypertension Daughter     Hypertension Daughter     Hypertension Daughter     Anesth Problems Neg Hx      Social History   Substance Use Topics    Smoking status: Former Smoker     Packs/day: 0.50     Years: 40.00     Types: Cigarettes     Quit date: 10/7/2016    Smokeless tobacco: Never Used    Alcohol use No       Review of Systems   Constitutional: Negative. Negative for chills, diaphoresis, fever, malaise/fatigue and weight loss. HENT: Negative for congestion, ear discharge, ear pain, hearing loss, nosebleeds, sore throat and tinnitus. Eyes: Negative. Negative for blurred vision, double vision, photophobia, pain, discharge and redness. Itchy eyes related to allergies    Respiratory: Negative.   Negative for cough, hemoptysis, sputum production, shortness of breath, wheezing and stridor. Cardiovascular: Negative. Negative for chest pain, palpitations, orthopnea, claudication, leg swelling and PND. Gastrointestinal: Negative. Negative for abdominal pain, blood in stool, constipation, diarrhea, heartburn, melena, nausea and vomiting. Genitourinary: Negative. Negative for dysuria, flank pain, frequency, hematuria and urgency. Musculoskeletal: Positive for back pain and joint pain. Negative for falls, myalgias and neck pain. Left shoulder pain  Bilateral hip pain  Chronic back pain   Skin: Negative. Negative for itching and rash. Neurological: Negative. Negative for dizziness, tingling, tremors, sensory change, speech change, focal weakness, seizures, loss of consciousness, weakness and headaches. Endo/Heme/Allergies: Negative. Negative for environmental allergies and polydipsia. Does not bruise/bleed easily. Psychiatric/Behavioral: Negative. Negative for depression, hallucinations, memory loss, substance abuse and suicidal ideas. The patient is not nervous/anxious and does not have insomnia. All other systems reviewed and are negative. Physical Exam   Constitutional: She is oriented to person, place, and time. She appears well-developed and well-nourished. HENT:   Head: Normocephalic and atraumatic. Cardiovascular: Normal rate, regular rhythm, S1 normal, S2 normal, normal heart sounds and intact distal pulses. Exam reveals no gallop and no friction rub. No murmur heard. Pulmonary/Chest: Effort normal and breath sounds normal.   Musculoskeletal:   Bilateral hip ttp and pain with rom   Left shoulder ac ttp    Neurological: She is alert and oriented to person, place, and time. She has normal reflexes.    Skin:   Diabetic foot exam:     Left: Reflexes normal   Vibratory sensation na    Proprioception normal   Sharp/dull discrimination normal    Filament test normal sensation with micro filament   Pulse DP: 2+ (normal)   Pulse PT: 2+ (normal)   Deformities: None  Right: Reflexes normal   Vibratory sensation na   Proprioception normal   Sharp/dull discrimination normal   Filament test normal sensation with micro filament   Pulse DP: 2+ (normal)   Pulse PT: 2+ (normal)   Deformities: she has chronically thickened toenails bilaterally. Mild ingrown toenail right great toe, lateral.      Psychiatric: She has a normal mood and affect. Her behavior is normal. Judgment and thought content normal.   Nursing note and vitals reviewed. Results for orders placed or performed in visit on 05/08/17   COMPLIANCE DRUG SCREEN/PRESCRIPTION MONITORING   Result Value Ref Range    Summary FINAL    MICROALBUMIN, UR, RAND W/ MICROALBUMIN/CREA RATIO   Result Value Ref Range    Creatinine, urine 217.6 Not Estab. mg/dL    Microalbumin, urine 9.4 Not Estab. ug/mL    Microalb/Creat ratio (ug/mg creat.) 4.3 0.0 - 30.0 mg/g creat   METABOLIC PANEL, COMPREHENSIVE   Result Value Ref Range    Glucose 233 (H) 65 - 99 mg/dL    BUN 16 8 - 27 mg/dL    Creatinine 0.87 0.57 - 1.00 mg/dL    GFR est non-AA 70 >59 mL/min/1.73    GFR est AA 81 >59 mL/min/1.73    BUN/Creatinine ratio 18 12 - 28    Sodium 141 134 - 144 mmol/L    Potassium 3.8 3.5 - 5.2 mmol/L    Chloride 97 96 - 106 mmol/L    CO2 24 18 - 29 mmol/L    Calcium 9.6 8.7 - 10.3 mg/dL    Protein, total 6.9 6.0 - 8.5 g/dL    Albumin 4.3 3.6 - 4.8 g/dL    GLOBULIN, TOTAL 2.6 1.5 - 4.5 g/dL    A-G Ratio 1.7 1.2 - 2.2    Bilirubin, total 0.2 0.0 - 1.2 mg/dL    Alk.  phosphatase 113 39 - 117 IU/L    AST (SGOT) 12 0 - 40 IU/L    ALT (SGPT) 18 0 - 32 IU/L   LIPID PANEL   Result Value Ref Range    Cholesterol, total 165 100 - 199 mg/dL    Triglyceride 243 (H) 0 - 149 mg/dL    HDL Cholesterol 45 >39 mg/dL    VLDL, calculated 49 (H) 5 - 40 mg/dL    LDL, calculated 71 0 - 99 mg/dL   HEMOGLOBIN A1C WITH EAG   Result Value Ref Range    Hemoglobin A1c 9.7 (H) 4.8 - 5.6 %    Estimated average glucose 232 mg/dL   CVD REPORT   Result Value Ref Range    INTERPRETATION Note        ASSESSMENT and PLAN    ICD-10-CM ICD-9-CM    1. Type 2 diabetes mellitus without complication, unspecified long term insulin use status (HCC) E11.9 250.00  DIABETES FOOT EXAM      LIPID PANEL      METABOLIC PANEL, COMPREHENSIVE      HEMOGLOBIN A1C WITH EAG      sAXagliptin-metFORMIN (KOMBIGLYZE XR) 2.5-1,000 mg TM24   2. Primary osteoarthritis of both hips M16.0 715.15 HYDROcodone-acetaminophen (NORCO)  mg tablet      DISCONTINUED: HYDROcodone-acetaminophen (NORCO)  mg tablet      DISCONTINUED: HYDROcodone-acetaminophen (NORCO)  mg tablet   3. Essential hypertension I10 401.9    4. Hypercholesterolemia E78.00 272.0 LIPID PANEL   5. Other chronic pain G89.29 338.29     bilateral hip arthritis, chronic low back pain     Diagnoses and all orders for this visit:    1. Type 2 diabetes mellitus without complication, unspecified long term insulin use status ([facility])  -      DIABETES FOOT EXAM  -     LIPID PANEL  -     METABOLIC PANEL, COMPREHENSIVE  -     HEMOGLOBIN A1C WITH EAG  -  Never heard back from her insurance after the previous PA on kombiglize was sent, will reattempt to send med to pharm to see if re-do on PA is needed. If her insurance wont cover may need to start a daily basal insulin pending her labs. Patient verbalizes understanding of plan of care. sAXagliptin-metFORMIN (KOMBIGLYZE XR) 2.5-1,000 mg TM24; Take 1 Tab by mouth two (2) times a day. Dc the metformin xr and ongliza separate rxs. Has previously failed janumet  She is also still using the glipizide 1/2-1 tab bid with meals pending her carb intake. She has been on and off prednisone over the past few months which has likely influenced her A1c. Deviate plan per lab results     2. Primary osteoarthritis of both hips  .  Other chronic pain  Comments:  bilateral hip arthritis, chronic low back pain          Benefits continue to outweight risks of treatment in helping her get by with her ADL's and improving quality of life. She is taking it as prescribed and not having any adverse effects. Reviewed patient's prescription monitoring report at time of visit and there are no discrepancies. She has controlled substance agreement on file and has had urine drug screening in the past 3 months. -     HYDROcodone-acetaminophen (NORCO)  mg tablet; 1 tab po every 8 hours as needed for pain. Fill 9/28/17  3 month rx given   3. Essential hypertension  bp slightly up today but has been stable at home, reported 130/60-70s and her previous office visits reflect good control. pateint thinks it is up due to being stressed about about rushing to visit, she has another appt to get to after this visit. She will continue to monitor bp at home and notify pcp if elevated. Patient verbalizes understanding of plan of care. 4. Hypercholesterolemia  -     LIPID PANEL      Follow-up Disposition:  Return for 1-3 months pending labs .      med formulary [employer]

## 2017-08-01 LAB
ALBUMIN SERPL-MCNC: 4.3 G/DL (ref 3.6–4.8)
ALBUMIN/GLOB SERPL: 1.3 {RATIO} (ref 1.2–2.2)
ALP SERPL-CCNC: 136 IU/L (ref 39–117)
ALT SERPL-CCNC: 18 IU/L (ref 0–32)
AST SERPL-CCNC: 17 IU/L (ref 0–40)
BILIRUB SERPL-MCNC: <0.2 MG/DL (ref 0–1.2)
BUN SERPL-MCNC: 18 MG/DL (ref 8–27)
BUN/CREAT SERPL: 23 (ref 12–28)
CALCIUM SERPL-MCNC: 9.6 MG/DL (ref 8.7–10.3)
CHLORIDE SERPL-SCNC: 93 MMOL/L (ref 96–106)
CHOLEST SERPL-MCNC: 240 MG/DL (ref 100–199)
CO2 SERPL-SCNC: 22 MMOL/L (ref 18–29)
CREAT SERPL-MCNC: 0.78 MG/DL (ref 0.57–1)
CRP SERPL-MCNC: 38.8 MG/L (ref 0–4.9)
ERYTHROCYTE [SEDIMENTATION RATE] IN BLOOD BY WESTERGREN METHOD: 44 MM/HR (ref 0–40)
EST. AVERAGE GLUCOSE BLD GHB EST-MCNC: 214 MG/DL
GLOBULIN SER CALC-MCNC: 3.3 G/DL (ref 1.5–4.5)
GLUCOSE SERPL-MCNC: 190 MG/DL (ref 65–99)
HBA1C MFR BLD: 9.1 % (ref 4.8–5.6)
HDLC SERPL-MCNC: 47 MG/DL
INTERPRETATION, 910389: NORMAL
LDLC SERPL CALC-MCNC: 138 MG/DL (ref 0–99)
POTASSIUM SERPL-SCNC: 4 MMOL/L (ref 3.5–5.2)
PROT SERPL-MCNC: 7.6 G/DL (ref 6–8.5)
SODIUM SERPL-SCNC: 137 MMOL/L (ref 134–144)
TRIGL SERPL-MCNC: 275 MG/DL (ref 0–149)
VLDLC SERPL CALC-MCNC: 55 MG/DL (ref 5–40)

## 2017-08-02 NOTE — PROGRESS NOTES
Inflammation markers increased. Please see if she is feeling better on prednisone (if not, we will adjust dose and obtain ESR in 2-4 weeks).

## 2017-08-02 NOTE — PROGRESS NOTES
Spoke with pt using 2 identifiers informing her Dr. Mireles Exon inflammation markers increased. Pt states she is a little better but states it hurts more at night. Pt states she will find out from PCP if sugars are controlled and will call office back to scheduled OV for injection.     Williemae Gottron, RN

## 2017-08-17 DIAGNOSIS — E11.9 TYPE 2 DIABETES MELLITUS WITHOUT COMPLICATION, UNSPECIFIED LONG TERM INSULIN USE STATUS: ICD-10-CM

## 2017-08-17 DIAGNOSIS — I10 ESSENTIAL HYPERTENSION WITH GOAL BLOOD PRESSURE LESS THAN 130/80: ICD-10-CM

## 2017-08-17 RX ORDER — METFORMIN HYDROCHLORIDE 500 MG/1
1000 TABLET, EXTENDED RELEASE ORAL
Qty: 30 TAB | Refills: 0 | Status: SHIPPED | OUTPATIENT
Start: 2017-08-17 | End: 2017-11-09 | Stop reason: SDUPTHER

## 2017-08-17 RX ORDER — DILTIAZEM HYDROCHLORIDE 420 MG/1
CAPSULE, EXTENDED RELEASE ORAL
Qty: 15 CAP | Refills: 0 | Status: SHIPPED | OUTPATIENT
Start: 2017-08-17 | End: 2018-02-20 | Stop reason: SDUPTHER

## 2017-08-17 NOTE — TELEPHONE ENCOUNTER
Pharmacy on file verified    *patient states that she normally uses mail order but has not received her medication yet, patient states that she is completely out of her medication and would like to request a gap prescription be sent to the pharmacy on file.

## 2017-08-20 RX ORDER — PRAVASTATIN SODIUM 40 MG/1
40 TABLET ORAL
Qty: 90 TAB | Refills: 3 | Status: SHIPPED | OUTPATIENT
Start: 2017-08-20 | End: 2018-03-26

## 2017-08-21 NOTE — PROGRESS NOTES
Patient returned call to office, confirmed that she has order for the [facility], no prior authorization needed. Informed about prescription for Pravastatin 40 mg tablet sent to pharmacy, patient informed me that she has 20 mg currently will take two tablets at bedtime. Informed that when needed the Pravastatin 40 mg is at pharmacy. Patient verbalized understanding , letter sent.

## 2017-08-21 NOTE — PROGRESS NOTES
Patient was called, informed  to ask patient to give our office a call back in reference to lab results.

## 2017-08-21 NOTE — PROGRESS NOTES
Please call patient and find out if she was able to get the kombiglize? If authorization is needed please have [address] do this asap. Otherwise notify her the A1c is still 9.1. If her insurance wont cover the kombiglize I will need to change her med regimen. Also her lipids are above goal on the current dose of pravastatin, increase to 40mg at hs. Med sent to pharm.

## 2017-11-09 ENCOUNTER — OFFICE VISIT (OUTPATIENT)
Dept: FAMILY MEDICINE CLINIC | Age: 67
End: 2017-11-09

## 2017-11-09 VITALS
WEIGHT: 212.2 LBS | RESPIRATION RATE: 16 BRPM | OXYGEN SATURATION: 97 % | TEMPERATURE: 97.5 F | BODY MASS INDEX: 36.23 KG/M2 | SYSTOLIC BLOOD PRESSURE: 124 MMHG | HEART RATE: 84 BPM | DIASTOLIC BLOOD PRESSURE: 64 MMHG | HEIGHT: 64 IN

## 2017-11-09 DIAGNOSIS — E11.9 TYPE 2 DIABETES MELLITUS WITHOUT COMPLICATION, UNSPECIFIED LONG TERM INSULIN USE STATUS: Primary | ICD-10-CM

## 2017-11-09 DIAGNOSIS — G89.29 CHRONIC LEFT SHOULDER PAIN: ICD-10-CM

## 2017-11-09 DIAGNOSIS — I10 ESSENTIAL HYPERTENSION WITH GOAL BLOOD PRESSURE LESS THAN 130/80: ICD-10-CM

## 2017-11-09 DIAGNOSIS — M25.512 CHRONIC LEFT SHOULDER PAIN: ICD-10-CM

## 2017-11-09 DIAGNOSIS — Z51.81 MEDICATION MONITORING ENCOUNTER: ICD-10-CM

## 2017-11-09 DIAGNOSIS — B37.31 VAGINAL YEAST INFECTION: ICD-10-CM

## 2017-11-09 DIAGNOSIS — Z23 ENCOUNTER FOR IMMUNIZATION: ICD-10-CM

## 2017-11-09 DIAGNOSIS — M16.0 PRIMARY OSTEOARTHRITIS OF BOTH HIPS: ICD-10-CM

## 2017-11-09 LAB — GLUCOSE POC: 335 MG/DL

## 2017-11-09 RX ORDER — HYDROCODONE BITARTRATE AND ACETAMINOPHEN 10; 325 MG/1; MG/1
TABLET ORAL
Qty: 90 TAB | Refills: 0 | Status: SHIPPED | OUTPATIENT
Start: 2017-11-09 | End: 2017-12-14 | Stop reason: SDUPTHER

## 2017-11-09 RX ORDER — FLUCONAZOLE 150 MG/1
150 TABLET ORAL DAILY
Qty: 1 TAB | Refills: 1 | Status: SHIPPED | OUTPATIENT
Start: 2017-11-09 | End: 2017-11-10

## 2017-11-09 RX ORDER — LOSARTAN POTASSIUM AND HYDROCHLOROTHIAZIDE 12.5; 1 MG/1; MG/1
TABLET ORAL
Qty: 90 TAB | Refills: 3 | Status: SHIPPED | OUTPATIENT
Start: 2017-11-09 | End: 2017-11-09 | Stop reason: SDUPTHER

## 2017-11-09 RX ORDER — METFORMIN HYDROCHLORIDE 500 MG/1
2000 TABLET, EXTENDED RELEASE ORAL
Qty: 360 TAB | Refills: 5 | Status: SHIPPED | OUTPATIENT
Start: 2017-11-09 | End: 2019-02-21 | Stop reason: SDUPTHER

## 2017-11-09 RX ORDER — METFORMIN HYDROCHLORIDE 500 MG/1
2000 TABLET, EXTENDED RELEASE ORAL
Qty: 120 TAB | Refills: 5 | Status: SHIPPED | OUTPATIENT
Start: 2017-11-09 | End: 2017-11-09 | Stop reason: SDUPTHER

## 2017-11-09 RX ORDER — LOSARTAN POTASSIUM AND HYDROCHLOROTHIAZIDE 12.5; 1 MG/1; MG/1
TABLET ORAL
Qty: 90 TAB | Refills: 3 | Status: SHIPPED | OUTPATIENT
Start: 2017-11-09 | End: 2019-06-10 | Stop reason: SDUPTHER

## 2017-11-09 RX ORDER — GLIPIZIDE 10 MG/1
5 TABLET, FILM COATED, EXTENDED RELEASE ORAL DAILY
Qty: 90 TAB | Refills: 3 | Status: SHIPPED | OUTPATIENT
Start: 2017-11-09 | End: 2018-10-18 | Stop reason: SDUPTHER

## 2017-11-09 NOTE — MR AVS SNAPSHOT
Visit Information Date & Time Provider Department Dept. Phone Encounter #  
 11/9/2017  8:20 AM Sunita Hopkins  [facility] [phone] [id number] Follow-up Instructions Return for 2-3 months recheck on diabetes. Upcoming Health Maintenance Date Due  
 EYE EXAM RETINAL OR DILATED Q1 8/7/2015 GLAUCOMA SCREENING Q2Y 7/4/2016 Pneumococcal 65+ Low/Medium Risk (1 of 2 - PCV13) 7/4/2016 HEMOGLOBIN A1C Q6M 1/31/2018 MEDICARE YEARLY EXAM 2/23/2018 MICROALBUMIN Q1 5/8/2018 FOOT EXAM Q1 7/31/2018 LIPID PANEL Q1 7/31/2018 BREAST CANCER SCRN MAMMOGRAM 10/5/2018 COLONOSCOPY 6/1/2020 DTaP/Tdap/Td series (2 - Td) 5/13/2023 Allergies as of 11/9/2017  Review Complete On: 11/9/2017 By: Sunita Hopkins NP Severity Noted Reaction Type Reactions Atorvastatin  01/21/2017    Myalgia Bactrim [Sulfamethoxazole-trimethoprim]  04/02/2010    Nausea Only Biaxin [Clarithromycin]  04/02/2010    Nausea Only Demerol [Meperidine]  04/02/2010    Itching Erythromycin  04/02/2010    Nausea Only Pcn [Penicillins]  04/02/2010    Hives Percocet [Oxycodone-acetaminophen]  04/02/2010    Itching Tetracycline  04/02/2010    Nausea Only Voltaren [Diclofenac Sodium]  04/02/2010    Hives Current Immunizations  Reviewed on 11/9/2017 Name Date Influenza High Dose Vaccine PF 11/9/2017, 1/29/2016 Influenza Vaccine 12/5/2012 Influenza Vaccine PF 11/17/2014 PPD 1/25/1999 Pneumococcal Polysaccharide (PPSV-23) 5/13/2013 TD Vaccine 5/12/1999 Tdap 5/13/2013 Reviewed by Lauro Bowman LPN on 25/1/0126 at  9:25 AM  
You Were Diagnosed With   
  
 Codes Comments Type 2 diabetes mellitus without complication, unspecified long term insulin use status (HCC)    -  Primary ICD-10-CM: E11.9 ICD-9-CM: 250.00  Essential hypertension with goal blood pressure less than 130/80     ICD-10-CM: I10 
 ICD-9-CM: 401.9 Encounter for immunization     ICD-10-CM: P83 ICD-9-CM: V03.89 Primary osteoarthritis of both hips     ICD-10-CM: M16.0 ICD-9-CM: 715.15 Chronic left shoulder pain     ICD-10-CM: M25.512, G89.29 ICD-9-CM: 719.41, 338.29 Vitals BP Pulse Temp Resp Height(growth percentile) Weight(growth percentile) 124/64 84 97.5 °F (36.4 °C) (Oral) 16 5' 4\" (1.626 m) 212 lb 3.2 oz (96.3 kg) SpO2 BMI OB Status Smoking Status 97% 36.42 kg/m2 Hysterectomy Former Smoker Vitals History BMI and BSA Data Body Mass Index Body Surface Area  
 36.42 kg/m 2 2.09 m 2 Preferred Pharmacy Pharmacy Name Phone [facility] [address], [address] [place] [address] Arnav Hidalgo [phone] Your Updated Medication List  
  
   
This list is accurate as of: 11/9/17  9:39 AM.  Always use your most recent med list.  
  
  
  
  
 * albuterol 2.5 mg /3 mL (0.083 %) nebulizer solution Commonly known as:  PROVENTIL VENTOLIN  
3 mL by Nebulization route every six (6) hours as needed for Wheezing. * albuterol 90 mcg/actuation inhaler Commonly known as:  PROVENTIL HFA, VENTOLIN HFA, PROAIR HFA Take 2 Puffs by inhalation every six (6) hours as needed for Wheezing. aspirin 81 mg chewable tablet Take 81 mg by mouth daily. * Blood-Glucose Meter monitoring kit One touch meter * Blood-Glucose Meter monitoring kit Commonly known as:  FREESTYLE LITE METER Tid use due to uncontrolled dm and hyperglycemia. 250.02. 790.29 CENTRUM PO Take  by mouth daily. diltiazem 420 mg SR capsule Commonly known as:  Formerly [facility] TAKE 1 CAPSULE DAILY  
  
 fluconazole 150 mg tablet Commonly known as:  DIFLUCAN Take 1 Tab by mouth daily for 1 day. FDA advises cautious prescribing of oral fluconazole in pregnancy. glipiZIDE SR 10 mg CR tablet Commonly known as:  GLUCOTROL XL Take 1 Tab by mouth daily. * glucose blood VI test strips strip Commonly known as:  ONETOUCH ULTRA TEST Test once daily * glucose blood VI test strips strip Commonly known as:  ASCENSIA AUTODISC VI, ONE TOUCH ULTRA TEST VI Tid use due to uncontrolled dm and hyperglycemia. 250.02. 790.29  
  
 * glucose blood VI test strips strip Commonly known as:  FREESTYLE LITE STRIPS Tid use due to uncontrolled dm and hyperglycemia. 250.02. 790.29 HYDROcodone-acetaminophen  mg tablet Commonly known as:  NORCO  
1 tab po every 8 hours as needed for pain. Fill 11/17/17 Lancets Misc Commonly known as:  FREESTYLE LANCETS Tid use due to uncontrolled dm and hyperglycemia. 250.02. 790.29  
  
 losartan-hydroCHLOROthiazide 100-12.5 mg per tablet Commonly known as:  HYZAAR  
TAKE 1 TABLET DAILY  
  
 metFORMIN  mg tablet Commonly known as:  GLUCOPHAGE XR Take 4 Tabs by mouth daily (with dinner). Dose increase  
  
 nortriptyline 10 mg capsule Commonly known as:  PAMELOR Take 1 Cap by mouth nightly. pneumococcal 13 jamie conj dip 0.5 mL Syrg injection Commonly known as:  PREVNAR 13 (PF)  
0.5 mL by IntraMUSCular route PRIOR TO DISCHARGE for 1 dose. pravastatin 40 mg tablet Commonly known as:  PRAVACHOL Take 1 Tab by mouth nightly. predniSONE 1 mg tablet Commonly known as:  Maren Dionicio Instead of 5 mg tab. Take 4 mg once daily. Lower by 1 mg every 2 weeks if tolerated. varicella zoster vacine live 19,400 unit/0.65 mL Susr injection Commonly known as:  varicella-zoster vacine live 1 Vial by SubCUTAneous route once for 1 dose. ZyrTEC 10 mg Cap Generic drug:  Cetirizine Take  by mouth. * Notice: This list has 7 medication(s) that are the same as other medications prescribed for you. Read the directions carefully, and ask your doctor or other care provider to review them with you. Prescriptions Printed Refills HYDROcodone-acetaminophen (NORCO)  mg tablet 0 Si tab po every 8 hours as needed for pain. Fill 17 Class: Print Prescriptions Sent to Pharmacy Refills  
 glipiZIDE SR (GLUCOTROL XL) 10 mg CR tablet 3 Sig: Take 1 Tab by mouth daily. Class: Normal  
 Pharmacy: [address] [place], [address] [facility] [address] [place] [address]  [address] Ph #: [phone] Route: Oral  
 fluconazole (DIFLUCAN) 150 mg tablet 1 Sig: Take 1 Tab by mouth daily for 1 day. FDA advises cautious prescribing of oral fluconazole in pregnancy. Class: Normal  
 Pharmacy: [address] [place], [address]  [address] Ph #: [phone] Route: Oral  
 losartan-hydroCHLOROthiazide (HYZAAR) 100-12.5 mg per tablet 3 Sig: TAKE 1 TABLET DAILY Class: Normal  
 Pharmacy: [address] [place] [address], [address] [place] [address] Ph #: [phone]  
 metFORMIN ER (GLUCOPHAGE XR) 500 mg tablet 5 Sig: Take 4 Tabs by mouth daily (with dinner). Dose increase Class: Normal  
 Pharmacy: [address] [place] [address], [address] [place] [address] Ph #: [phone] Route: Oral  
 varicella zoster vacine live (VARICELLA-ZOSTER VACINE LIVE) 19,400 unit/0.65 mL susr injection 0 Si Vial by SubCUTAneous route once for 1 dose. Class: Normal  
 Pharmacy: [address] [place], [address]  [address] Ph #: [phone] Route: SubCUTAneous  
 pneumococcal 13 jamie conj dip (PREVNAR 13, PF,) 0.5 mL syrg injection 0 Si.5 mL by IntraMUSCular route PRIOR TO DISCHARGE for 1 dose. Class: Normal  
 Pharmacy: [address] [place], [address] [facility] [address] [place] [address]  [address] Ph #: [phone] Route: IntraMUSCular We Performed the Following AMB POC GLUCOSE BLOOD, BY GLUCOSE MONITORING DEVICE [46395 CPT(R)] HEMOGLOBIN A1C WITH EAG [68922 CPT(R)] INFLUENZA VIRUS VACCINE, HIGH DOSE SEASONAL, PRESERVATIVE FREE [46087 CPT(R)] LIPID PANEL [82304 CPT(R)] METABOLIC PANEL, COMPREHENSIVE [59481 CPT(R)] MICROALBUMIN, UR, RAND [id number] CPT(R)] REFERRAL TO ORTHOPEDICS [XUA341 Custom] Comments:  
 Left shoulder pain related to arthritis. May need mri Follow-up Instructions Return for 2-3 months recheck on diabetes. Referral Information Referral ID Referred By Referred To  
  
 [id number] Saw MONSALVE AT [facility]   
   [address] [address] [place], [address], [address] Visits Status Start Date End Date 1 New Request 11/9/17 11/9/18 If your referral has a status of pending review or denied, additional information will be sent to support the outcome of this decision. Patient Instructions Cut out juice Take glipizide at dinner Introducing [facility]! Usman Dixon introduces [employer] patient portal. Now you can access parts of your medical record, email your doctor's office, and request medication refills online. 1. In your internet browser, go to [email or web address]. [email or web address] Engage 2. Click on the First Time User? Click Here link in the Sign In box. You will see the New Member Sign Up page. 3. Enter your [employer] Access Code exactly as it appears below. You will not need to use this code after youve completed the sign-up process. If you do not sign up before the expiration date, you must request a new code. · [employer] Access Code: [email or web address] Expires: 2/7/2018  9:39 AM 
 
4. Enter the last four digits of your Social Security Number (xxxx) and Date of Birth (mm/dd/yyyy) as indicated and click Submit. You will be taken to the next sign-up page. 5. Create a [employer] ID. This will be your [employer] login ID and cannot be changed, so think of one that is secure and easy to remember. 6. Create a [employer] password. You can change your password at any time. 7. Enter your Password Reset Question and Answer. This can be used at a later time if you forget your password. 8. Enter your e-mail address. You will receive e-mail notification when new information is available in [address] 9. Click Sign Up. You can now view and download portions of your medical record. 10. Click the Download Summary menu link to download a portable copy of your medical information. If you have questions, please visit the Frequently Asked Questions section of the [employer] website. Remember, [employer] is NOT to be used for urgent needs. For medical emergencies, dial 911. Now available from your iPhone and Android! Please provide this summary of care documentation to your next provider. Your primary care clinician is listed as Dima Lewis. If you have any questions after today's visit, please call [phone].

## 2017-11-09 NOTE — PROGRESS NOTES
HISTORY OF PRESENT ILLNESS  Domo Morley is a 78 y.o. female. HPI   Chief Complaint   Patient presents with    Diabetes     Follow up.  Cholesterol Problem     Follow up.  Labs     Fasting    Shoulder Pain     Left side off and on for about 4 months. Domo Morley is a 78 y.o. female   Pt presents to office today for a follow up on Diabetes, HTN and cholesterol Problem and fasting labs. Pt report that her left shoulder has been hurting off and on for about 3-4 months. Pt reports that she is planning to schedule an appt with her Orthopaedic soon. She has not been able to get the kombiglize due to the cost through her insurance. She is going to look into changing her prescription plan through [employer] or other supplemental medicare insurance to have better med coverage. fbs around 170-190, if she checks it randomly it is over 200. States typical breakfast is egg, pérez, milk 12 oz, OJ 12OJ,, drinks grape juice throughout the day. Typical lunch is she skips it. She does eat a good size dinner. Left shoulder pain intermittently over the past 4 months. She is following up with dr Majo Davenport on this as she likely will need rotator cuff surgery. She is using advil prn for this pain as well as her chronic back and hip pain. Uses norco in am to get moving and be able to perform her adls. States without the hydrocodone she is unable to perform her adls \"I couldn't make it without it\", avoids the norco at night due to feeling like it keeps her up at night. Last filled norco 10/17/17, due 11/17/17. Has been off the prednisone x 1 week and will stay off until she sees dr Trent Penaloza. Current Outpatient Prescriptions   Medication Sig Dispense Refill    glipiZIDE SR (GLUCOTROL XL) 10 mg CR tablet Take 1 Tab by mouth daily. 90 Tab 3    fluconazole (DIFLUCAN) 150 mg tablet Take 1 Tab by mouth daily for 1 day. FDA advises cautious prescribing of oral fluconazole in pregnancy.  1 Tab 1    losartan-hydroCHLOROthiazide (HYZAAR) 100-12.5 mg per tablet TAKE 1 TABLET DAILY 90 Tab 3    metFORMIN ER (GLUCOPHAGE XR) 500 mg tablet Take 4 Tabs by mouth daily (with dinner). Dose increase 360 Tab 5    varicella zoster vacine live (VARICELLA-ZOSTER VACINE LIVE) 19,400 unit/0.65 mL susr injection 1 Vial by SubCUTAneous route once for 1 dose. 0.65 mL 0    pneumococcal 13 jamie conj dip (PREVNAR 13, PF,) 0.5 mL syrg injection 0.5 mL by IntraMUSCular route PRIOR TO DISCHARGE for 1 dose. 0.5 mL 0    HYDROcodone-acetaminophen (NORCO)  mg tablet 1 tab po every 8 hours as needed for pain. Fill 11/17/17 90 Tab 0    pravastatin (PRAVACHOL) 40 mg tablet Take 1 Tab by mouth nightly. 90 Tab 3    diltiazem (TIAZAC) 420 mg SR capsule TAKE 1 CAPSULE DAILY 15 Cap 0    predniSONE (DELTASONE) 1 mg tablet Instead of 5 mg tab. Take 4 mg once daily. Lower by 1 mg every 2 weeks if tolerated. 120 Tab 1    albuterol (PROVENTIL VENTOLIN) 2.5 mg /3 mL (0.083 %) nebulizer solution 3 mL by Nebulization route every six (6) hours as needed for Wheezing. 60 Each 2    albuterol (PROVENTIL HFA, VENTOLIN HFA, PROAIR HFA) 90 mcg/actuation inhaler Take 2 Puffs by inhalation every six (6) hours as needed for Wheezing. 1 Inhaler 1    nortriptyline (PAMELOR) 10 mg capsule Take 1 Cap by mouth nightly.  90 Cap 3    Blood-Glucose Meter (FREESTYLE LITE METER) monitoring kit Tid use due to uncontrolled dm and hyperglycemia. 250.02. 790.29 1 Kit 0    glucose blood VI test strips (FREESTYLE LITE STRIPS) strip Tid use due to uncontrolled dm and hyperglycemia. 250.02. 790.29 1 Each 11    Lancets (FREESTYLE LANCETS) misc Tid use due to uncontrolled dm and hyperglycemia. 250.02. 790.29 1 Each 11    glucose blood VI test strips (ASCENSIA AUTODISC VI, ONE TOUCH ULTRA TEST VI) strip Tid use due to uncontrolled dm and hyperglycemia. 250.02. 790.29 100 Each 11    Blood-Glucose Meter monitoring kit One touch meter 1 Kit 1    glucose blood VI test strips (ONE TOUCH ULTRA TEST) strip Test once daily 1 Package 11    Cetirizine (ZYRTEC) 10 mg Cap Take  by mouth.  aspirin 81 mg chewable tablet Take 81 mg by mouth daily.  MULTIVITS W-FE,OTHER MIN (CENTRUM PO) Take  by mouth daily. Allergies   Allergen Reactions    Atorvastatin Myalgia    Bactrim [Sulfamethoxazole-Trimethoprim] Nausea Only    Biaxin [Clarithromycin] Nausea Only    Demerol [Meperidine] Itching    Erythromycin Nausea Only    Pcn [Penicillins] Hives    Percocet [Oxycodone-Acetaminophen] Itching    Tetracycline Nausea Only    Voltaren [Diclofenac Sodium] Hives     Past Medical History:   Diagnosis Date    Abnormal pulmonary function test 9/28/2016    AR (allergic rhinitis)     Asthma     Atrial thrombus 1994    left    Chronic bronchitis (HCC) 9/28/2016    Chronic pain     DDD (degenerative disc disease), lumbar     Diabetes ([address]     DJD (degenerative joint disease) of hip     right    Empty sella ([address] 2000    by MRI    Former smoker 10/24/2016    Hoarseness of voice     Hypercholesterolemia     Hypertension     Lesion of vocal cord     PMR (polymyalgia rheumatica) ([address] 5/18/2016    Smoker 12/5/2012     Past Surgical History:   Procedure Laterality Date    ENDOSCOPY, COLON, DIAGNOSTIC  10/03    polypectomy    ENDOSCOPY, COLON, DIAGNOSTIC  2/05    Dr. Yovana Garner, COLON, DIAGNOSTIC  2010, reported    due 2015    HX APPENDECTOMY  1970's    HX BREAST REDUCTION      HX COLONOSCOPY  2015    dr Elias Feldman.  2 polyps removed -repeat in 2018     HX HEMORRHOIDECTOMY      HX HYSTERECTOMY      ovaries spared    HX KNEE REPLACEMENT  11/03, 10/05    left then right    HX TONSILLECTOMY  age 29    tonsils     Family History   Problem Relation Age of Onset    Arthritis-osteo Mother     Asthma Mother     Diabetes Mother     Hypertension Mother     Stroke Mother     Arthritis-osteo Father     Cancer Father     Elevated Lipids Father     Hypertension Father     Arthritis-osteo Sister     Asthma Sister     Diabetes Sister     Elevated Lipids Sister     Hypertension Sister     Heart Attack Sister     Hypertension Daughter     Hypertension Daughter     Hypertension Daughter     Anesth Problems Neg Hx      Social History   Substance Use Topics    Smoking status: Former Smoker     Packs/day: 0.50     Years: 40.00     Types: Cigarettes     Quit date: 10/7/2016    Smokeless tobacco: Never Used    Alcohol use No         Review of Systems   Constitutional: Negative. Negative for chills, diaphoresis, fever, malaise/fatigue and weight loss. HENT: Negative for congestion, ear discharge, ear pain, hearing loss, nosebleeds, sore throat and tinnitus. Eyes: Negative. Negative for blurred vision, double vision, photophobia, pain, discharge and redness. Respiratory: Negative. Negative for cough, hemoptysis, sputum production, shortness of breath, wheezing and stridor. Cardiovascular: Negative. Negative for chest pain, palpitations, orthopnea, claudication, leg swelling and PND. Gastrointestinal: Negative. Negative for abdominal pain, blood in stool, constipation, diarrhea, heartburn, melena, nausea and vomiting. Genitourinary: Negative. Negative for dysuria, flank pain, frequency, hematuria and urgency. Musculoskeletal: Positive for back pain and joint pain. Negative for falls, myalgias and neck pain. Left shoulder pain  Bilateral hip pain  Chronic back pain   Skin: Negative. Negative for itching and rash. Neurological: Negative. Negative for dizziness, tingling, tremors, sensory change, speech change, focal weakness, seizures, loss of consciousness, weakness and headaches. Endo/Heme/Allergies: Negative. Negative for environmental allergies and polydipsia. Does not bruise/bleed easily. Psychiatric/Behavioral: Negative. Negative for depression, hallucinations, memory loss, substance abuse and suicidal ideas.  The patient is not nervous/anxious and does not have insomnia. All other systems reviewed and are negative. Physical Exam   Constitutional: She is oriented to person, place, and time. She appears well-developed and well-nourished. No distress. HENT:   Head: Normocephalic and atraumatic. Right Ear: External ear normal. Tympanic membrane is not bulging. No middle ear effusion. Left Ear: External ear normal. Tympanic membrane is not bulging. No middle ear effusion. Nose: Nose normal. Right sinus exhibits no maxillary sinus tenderness and no frontal sinus tenderness. Left sinus exhibits no maxillary sinus tenderness and no frontal sinus tenderness. Mouth/Throat: Oropharynx is clear and moist. No oropharyngeal exudate. Eyes: Conjunctivae and EOM are normal. Pupils are equal, round, and reactive to light. Right eye exhibits no discharge. Left eye exhibits no discharge. No scleral icterus. Neck: Normal range of motion. Neck supple. No thyromegaly present. Cardiovascular: Normal rate, regular rhythm, S1 normal, S2 normal, normal heart sounds and intact distal pulses. Exam reveals no gallop and no friction rub. No murmur heard. Pulmonary/Chest: Effort normal and breath sounds normal. No stridor. No respiratory distress. She has no wheezes. She has no rales. She exhibits no tenderness. Abdominal: Bowel sounds are normal. She exhibits no distension and no mass. There is no tenderness. There is no rebound and no guarding. Musculoskeletal: She exhibits tenderness (left ac ttp, has previous xray showing degnerative chagnes). Bilateral hip ttp and pain with rom   Left shoulder ac ttp    Lymphadenopathy:     She has no cervical adenopathy. Neurological: She is alert and oriented to person, place, and time. She has normal reflexes. Skin: Skin is warm and dry. She is not diaphoretic. Psychiatric: She has a normal mood and affect.  Her behavior is normal. Judgment and thought content normal. Nursing note and vitals reviewed. ASSESSMENT and PLAN    ICD-10-CM ICD-9-CM    1. Type 2 diabetes mellitus without complication, unspecified long term insulin use status (HCC) E11.9 250.00 glipiZIDE SR (GLUCOTROL XL) 10 mg CR tablet      metFORMIN ER (GLUCOPHAGE XR) 500 mg tablet      AMB POC GLUCOSE BLOOD, BY GLUCOSE MONITORING DEVICE      MICROALBUMIN, UR, RAND      LIPID PANEL      HEMOGLOBIN A1C WITH EAG      METABOLIC PANEL, COMPREHENSIVE      DISCONTINUED: metFORMIN ER (GLUCOPHAGE XR) 500 mg tablet   2. Essential hypertension with goal blood pressure less than 130/80 I10 401.9 losartan-hydroCHLOROthiazide (HYZAAR) 100-12.5 mg per tablet      DISCONTINUED: losartan-hydroCHLOROthiazide (HYZAAR) 100-12.5 mg per tablet   3. Encounter for immunization Z23 V03.89 INFLUENZA VIRUS VACCINE, HIGH DOSE SEASONAL, PRESERVATIVE FREE      varicella zoster vacine live (VARICELLA-ZOSTER VACINE LIVE) 19,400 unit/0.65 mL susr injection      pneumococcal 13 jamie conj dip (PREVNAR 13, PF,) 0.5 mL syrg injection   4. Primary osteoarthritis of both hips M16.0 715.15 HYDROcodone-acetaminophen (NORCO)  mg tablet   5. Chronic left shoulder pain M25.512 719.41 REFERRAL TO ORTHOPEDICS    G89.29 338.29    6. Vaginal yeast infection B37.3 112.1 fluconazole (DIFLUCAN) 150 mg tablet   7. Medication monitoring encounter Z51.81 V58.83 TOXASSURE SELECT 13 (MW)     Diagnoses and all orders for this visit:    1. Type 2 diabetes mellitus without complication, unspecified long term insulin use status (HCC)  -    Change to glipiZIDE SR (GLUCOTROL XL) 10 mg CR tablet; Take 1 Tab by mouth daily.  -    Increase to metFORMIN ER (GLUCOPHAGE XR) 500 mg tablet; Take 4 Tabs by mouth daily (with dinner).  Dose increase  -     AMB POC GLUCOSE BLOOD, BY GLUCOSE MONITORING DEVICE  -     MICROALBUMIN, UR, RAND  -     LIPID PANEL  -     HEMOGLOBIN A1C WITH EAG  -     METABOLIC PANEL, COMPREHENSIVE  At her next fu visit in 2-3 months She is going to bring in her bg log with tapered testing as we discussed in the office, this will be to determine if basal insulin is needed. She Is in meantime going to cut out juice which will be signficiant as well as maximize the metformin and glucatrol due to her insurance formulary. She has upcoming eye appt. Patient verbalizes understanding of plan of care. 2. Essential hypertension with goal blood pressure less than 130/80  -   bp stable   losartan-hydroCHLOROthiazide (HYZAAR) 100-12.5 mg per tablet; TAKE 1 TABLET DAILY    3. Encounter for immunization  -     Influenza virus vaccine (FLUZONE HIGH-DOSE) 65 years and older  -     varicella zoster vacine live (VARICELLA-ZOSTER VACINE LIVE) 19,400 unit/0.65 mL susr injection; 1 Vial by SubCUTAneous route once for 1 dose. -     pneumococcal 13 jamie conj dip (PREVNAR 13, PF,) 0.5 mL syrg injection; 0.5 mL by IntraMUSCular route PRIOR TO DISCHARGE for 1 dose. 4. Primary osteoarthritis of both hips  -chronic pain, tox screen today,     Refilled chronic HYDROcodone-acetaminophen (NORCO)  mg tablet; 1 tab po every 8 hours as needed for pain. Fill 11/17/17    5. Chronic left shoulder pain  -     [facility]-referred to dr Mckinley Snow for evaluation    6. Vaginal yeast infection  -     fluconazole (DIFLUCAN) 150 mg tablet; Take 1 Tab by mouth daily for 1 day. FDA advises cautious prescribing of oral fluconazole in pregnancy. Follow-up Disposition:  Return for 2-3 months recheck on diabetes.

## 2017-11-09 NOTE — PROGRESS NOTES
Pt presents to office today for a follow up on Diabetes, HTN and cholesterol Problem and fasting labs. Pt report that her left shoulder has been hurting off and on for about 3-4 months. Pt reports that she is planning to schedule an appt with her Orthopaedic soon. Chief Complaint   Patient presents with    Diabetes     Follow up.  Cholesterol Problem     Follow up.  Labs     Fasting    Shoulder Pain     Left side off and on for about 4 months. 1. Have you been to the ER, urgent care clinic since your last visit? Hospitalized since your last visit? No    2. Have you seen or consulted any other health care providers outside of the [address] [place] [address] since your last visit? Include any pap smears or colon screening.  No

## 2017-11-09 NOTE — LETTER
11/14/2017 11:14 AM 
 
Ms. Radha Pitt [address] [facility] [id number] Dear Radha Pitt: Please find your most recent results below. Resulted Orders AMB POC GLUCOSE BLOOD, BY GLUCOSE MONITORING DEVICE Result Value Ref Range Glucose  mg/dL MICROALBUMIN, UR, RAND Result Value Ref Range Microalbumin, urine 23.8 Not Estab. ug/mL Narrative Performed at:  [address]  [id number] : Abdoulaye Walter MD, Phone:  [phone] LIPID PANEL Result Value Ref Range Cholesterol, total 140 100 - 199 mg/dL Triglyceride 178 (H) 0 - 149 mg/dL HDL Cholesterol 39 (L) >39 mg/dL VLDL, calculated 36 5 - 40 mg/dL LDL, calculated 65 0 - 99 mg/dL Narrative Performed at:  [address]  [id number] : Abdoulaye Walter MD, Phone:  [phone] HEMOGLOBIN A1C WITH EAG Result Value Ref Range Hemoglobin A1c 11.7 (H) 4.8 - 5.6 % Comment:  
            Pre-diabetes: 5.7 - 6.4 Diabetes: >6.4 Glycemic control for adults with diabetes: <7.0 Estimated average glucose 289 mg/dL Narrative Performed at:  [address]  [id number] : Abdoulaye Walter MD, Phone:  [phone] METABOLIC PANEL, COMPREHENSIVE Result Value Ref Range Glucose 320 (H) 65 - 99 mg/dL BUN 20 8 - 27 mg/dL Creatinine 1.01 (H) 0.57 - 1.00 mg/dL GFR est non-AA 58 (L) >59 mL/min/1.73 GFR est AA 67 >59 mL/min/1.73  
 BUN/Creatinine ratio 20 12 - 28 Sodium 138 134 - 144 mmol/L Potassium 4.3 3.5 - 5.2 mmol/L Chloride 96 96 - 106 mmol/L  
 CO2 18 18 - 29 mmol/L Calcium 9.9 8.7 - 10.3 mg/dL Protein, total 7.5 6.0 - 8.5 g/dL Albumin 4.5 3.6 - 4.8 g/dL GLOBULIN, TOTAL 3.0 1.5 - 4.5 g/dL A-G Ratio 1.5 1.2 - 2.2 Bilirubin, total 0.2 0.0 - 1.2 mg/dL Alk. phosphatase 160 (H) 39 - 117 IU/L  
 AST (SGOT) 13 0 - 40 IU/L  
 ALT (SGPT) 16 0 - 32 IU/L Narrative Performed at:  [address]  [id number] : Kaitlyn Sheets MD, Phone:  [phone] CVD REPORT Result Value Ref Range INTERPRETATION Note Comment:  
   Supplemental report is available. PDF IMAGE Not applicable Narrative Performed at:  [address] [place] [address]  [id number] : Mo Godinez PhD, Phone:  [phone] CKD REPORT Result Value Ref Range Interpretation Note Comment:  
   Supplemental report is available. Narrative Performed at:  [address] [place] [address]  [id number] : Mo Godinez PhD, Phone:  [phone] RECOMMENDATIONS: 
Notify pt her A1c is up to 11.7 which is the highest its been in over a year. How is she tolerating the 4 metformin per day as well as the glucatrol 10 xl? I would like to see her in 3 weeks and have her bring her bg log with testing the way we discussed at her visit. This will be to determine if we need to get insulin on board. Otherwise cholesterol is at goal. She has more protein in the urine which is a sign of strain on the kidneys from the elevated sugars. This will hopefully improve as her sugar numbers improve -will continue to monitor Please call me if you have any questions: [phone] Sincerely, Yoly Gann NP

## 2017-11-10 LAB
ALBUMIN SERPL-MCNC: 4.5 G/DL (ref 3.6–4.8)
ALBUMIN/GLOB SERPL: 1.5 {RATIO} (ref 1.2–2.2)
ALP SERPL-CCNC: 160 IU/L (ref 39–117)
ALT SERPL-CCNC: 16 IU/L (ref 0–32)
AST SERPL-CCNC: 13 IU/L (ref 0–40)
BILIRUB SERPL-MCNC: 0.2 MG/DL (ref 0–1.2)
BUN SERPL-MCNC: 20 MG/DL (ref 8–27)
BUN/CREAT SERPL: 20 (ref 12–28)
CALCIUM SERPL-MCNC: 9.9 MG/DL (ref 8.7–10.3)
CHLORIDE SERPL-SCNC: 96 MMOL/L (ref 96–106)
CHOLEST SERPL-MCNC: 140 MG/DL (ref 100–199)
CO2 SERPL-SCNC: 18 MMOL/L (ref 18–29)
CREAT SERPL-MCNC: 1.01 MG/DL (ref 0.57–1)
EST. AVERAGE GLUCOSE BLD GHB EST-MCNC: 289 MG/DL
GFR SERPLBLD CREATININE-BSD FMLA CKD-EPI: 58 ML/MIN/1.73
GFR SERPLBLD CREATININE-BSD FMLA CKD-EPI: 67 ML/MIN/1.73
GLOBULIN SER CALC-MCNC: 3 G/DL (ref 1.5–4.5)
GLUCOSE SERPL-MCNC: 320 MG/DL (ref 65–99)
HBA1C MFR BLD: 11.7 % (ref 4.8–5.6)
HDLC SERPL-MCNC: 39 MG/DL
INTERPRETATION, 910389: NORMAL
INTERPRETATION: NORMAL
LDLC SERPL CALC-MCNC: 65 MG/DL (ref 0–99)
MICROALBUMIN UR-MCNC: 23.8 UG/ML
PDF IMAGE, 910387: NORMAL
POTASSIUM SERPL-SCNC: 4.3 MMOL/L (ref 3.5–5.2)
PROT SERPL-MCNC: 7.5 G/DL (ref 6–8.5)
SODIUM SERPL-SCNC: 138 MMOL/L (ref 134–144)
TRIGL SERPL-MCNC: 178 MG/DL (ref 0–149)
VLDLC SERPL CALC-MCNC: 36 MG/DL (ref 5–40)

## 2017-11-14 NOTE — PROGRESS NOTES
Outgoing call to patient.  verifired. Notified patient of labs. She stated that she is tolerating the metformin with glucatrol well. No adverse reactions noted. Appt schedled for 3 weeks out. Patient advised to continue to check blood sugars and bring log in at follow up visit. She verbalized understanding.

## 2017-11-14 NOTE — PROGRESS NOTES
Notify pt her A1c is up to 11.7 which is the highest its been in over a year. How is she tolerating the 4 metformin per day as well as the glucatrol 10 xl? I would like to see her in 3 weeks and have her bring her bg log with testing the way we discussed at her visit. This will be to determine if we need to get insulin on board. Otherwise cholesterol is at goal. She has more protein in the urine which is a sign of strain on the kidneys from the elevated sugars.  This will hopefully improve as her sugar numbers improve -will continue to monitor

## 2017-11-17 LAB — DRUGS UR: NORMAL

## 2017-11-20 NOTE — PROGRESS NOTES
Notify pt her drug screen showed she had an unexpected drug in her system-tramadol. Can she please explain where this came from?

## 2017-11-20 NOTE — PROGRESS NOTES
Notify pt in the future to avoid taking any previous pain medications concurrently with the norco due to possible interaction and potential for overdose

## 2017-12-12 ENCOUNTER — OFFICE VISIT (OUTPATIENT)
Dept: FAMILY MEDICINE CLINIC | Age: 67
End: 2017-12-12

## 2017-12-12 VITALS
BODY MASS INDEX: 36.37 KG/M2 | OXYGEN SATURATION: 99 % | RESPIRATION RATE: 18 BRPM | HEART RATE: 85 BPM | WEIGHT: 213 LBS | TEMPERATURE: 97.8 F | HEIGHT: 64 IN | SYSTOLIC BLOOD PRESSURE: 139 MMHG | DIASTOLIC BLOOD PRESSURE: 69 MMHG

## 2017-12-12 DIAGNOSIS — E11.65 UNCONTROLLED TYPE 2 DIABETES MELLITUS WITH HYPERGLYCEMIA, WITHOUT LONG-TERM CURRENT USE OF INSULIN (HCC): Primary | ICD-10-CM

## 2017-12-12 DIAGNOSIS — E66.9 OBESITY (BMI 30-39.9): ICD-10-CM

## 2017-12-12 RX ORDER — INSULIN GLARGINE 100 [IU]/ML
19 INJECTION, SOLUTION SUBCUTANEOUS
Qty: 3 PEN | Refills: 3 | Status: SHIPPED | OUTPATIENT
Start: 2017-12-12 | End: 2017-12-21

## 2017-12-12 RX ORDER — INSULIN GLARGINE 100 [IU]/ML
19 INJECTION, SOLUTION SUBCUTANEOUS
Qty: 3 PEN | Refills: 3 | Status: SHIPPED | OUTPATIENT
Start: 2017-12-12 | End: 2017-12-12 | Stop reason: ALTCHOICE

## 2017-12-12 NOTE — Clinical Note
She asked for refill of her pain medication at the end of the visit. I did not feel comfortable with this and will defer to you. She was concerned about running out this weekend and did not want to wait until our one week follow up.

## 2017-12-12 NOTE — PATIENT INSTRUCTIONS
Counting Carbohydrates When You Take Insulin: Care Instructions  Your Care Instructions    You don't have to eat special foods when you take insulin. You just have to be careful to eat healthy foods. And you have to spread throughout the day the carbohydrates you eat. Carbohydrates raise blood sugar higher and more quickly than any other nutrient. It is found in desserts, breads and cereals, and fruit. It's also found in starchy vegetables such as potatoes and corn, grains such as rice and pasta, and milk and yogurt. The more carbohydrates, or carbs, you eat at one time, the higher your blood sugar will rise. Spreading carbs throughout the day helps keep your blood sugar levels within your target range. Counting carbs is one of the best ways to keep your blood sugar under control when you use insulin. It helps you match the right amount of insulin to the number of grams of carbohydrates in a meal. You need to test your blood sugar several times a day to learn how carbs affect you. Then you can change your diet and insulin dose as needed. A registered dietitian or certified diabetes educator can help you plan meals and snacks. Follow-up care is a key part of your treatment and safety. Be sure to make and go to all appointments, and call your doctor if you are having problems. It's also a good idea to know your test results and keep a list of the medicines you take. How can you care for yourself at home? Know your daily amount of carbohydrates  Your daily amount depends on several things, including your weight, how active you are, which diabetes medicines you take, and what your goals are for your blood sugar levels. A registered dietitian or certified diabetes educator can help you plan how many carbohydrates to include in each meal and snack. For most adults, a guideline for the daily amount of carbohydrates is:  · 45 to 60 grams at each meal. That's about the same as 3 to 4 carbohydrate servings.   · 15 to 20 grams at each snack. That's about the same as 1 carbohydrate serving. Count carbs  If you take insulin, you need to know how many grams of carbohydrates are in a meal. This lets you know how much rapid-acting insulin to take before you eat. If you use an insulin pump, you get a constant rate of insulin during the day. So the pump must be programmed at meals to give you extra insulin to cover the rise in blood sugar after meals. When you know how many carbohydrates you will eat, you can take the right amount of insulin. Or, if you always use the same amount of insulin, you need to make sure that you eat the same amount of carbs at meals. · Learn your own insulin-to-carbohydrates ratio. You and your diabetes health professional will figure out the ratio. You can do this by testing your blood sugar after meals. For example, you may need a certain amount of insulin for every 15 grams of carbohydrates. · Add up the carbohydrate grams in a meal. Then you can figure out how many units of insulin to take based on your insulin-to-carbohydrates ratio. · Look at labels on packaged foods. This can tell you how many carbohydrates are in a serving. You can also use guides from the American Diabetes Association. · Be aware of portions, or serving sizes. If a package has two servings and you eat the whole package, you need to double the number of grams of carbohydrates listed for one serving. · Protein, fat, and fiber do not raise blood sugar as much as carbs do. If you eat a lot of these nutrients in a meal, your blood sugar will rise more slowly than it would otherwise. · Exercise lowers blood sugar. You can use less insulin than you would if you were not doing exercise. Keep in mind that timing matters.  If you exercise within 1 hour after a meal, your body may need less insulin for that meal than it would if you exercised 3 hours after the meal. Test your blood sugar to find out how exercise affects your need for insulin. Eat from all food groups  · Eat at least three meals a day. · Plan meals to include food from all the food groups. ¨ Grains: 1 slice of bread (1 ounce), ½ cup of cooked cereal, and 1/3 cup of cooked pasta or rice. These have about 15 grams of carbohydrates in a serving. Choose whole grains. These include whole wheat bread or crackers, oatmeal, and brown rice. Have them more often than refined grains. ¨ Fruit: 1 small fresh fruit, such as an apple or orange; ½ of a banana; ½ cup of chopped, cooked, or canned fruit; ½ cup of fruit juice; 1 cup of melon or raspberries; and 2 tablespoons of dried fruit. These have about 15 grams of carbohydrates in a serving. ¨ Dairy: 1 cup of nonfat or low-fat milk and 2/3 cup of plain yogurt. These have about 15 grams of carbohydrates in a serving. ¨ Protein foods: Beef, chicken, turkey, fish, eggs, tofu, cheese, cottage cheese, and peanut butter. A serving size of meat is 3 ounces. This is about the size of a deck of cards. Examples of meat substitute serving sizes (equal to 1 ounce of meat) are 1/4 cup of cottage cheese, 1 egg, 1 tablespoon of peanut butter, and ½ cup of tofu. These have very little or no carbohydrates in a serving. ¨ Vegetables: Starchy vegetables such as ½ cup of cooked beans, peas, potatoes, or corn have about 15 grams of carbohydrates. Nonstarchy vegetables have very little carbohydrates. These include 1 cup of raw leafy vegetables (such as spinach), ½ cup of other vegetables (cooked or chopped), and 3/4 cup of vegetable juice. · Talk to your dietitian or diabetes educator about ways to add limited amounts of sweets into your meal plan. · If you drink alcohol:  ¨ Limit it to no more than 1 drink a day for women and 2 drinks a day for men. (One drink is 12 fl oz of beer, 5 fl oz of wine, or 1.5 fl oz liquor.)  ¨ Make sure to count drink mixers that have sugar in your total carbohydrate count.  These include cola, tonic water, [employer], and fruit juice. ¨ Eat a carbohydrate food along with your alcoholic drink. ¨ Check your blood sugar more often. This is because alcohol can lower your blood sugar too much. This may happen even hours later while you sleep. You may want to eat and adjust your insulin dose when you drink alcohol to prevent severe low blood sugar. ¨ Talk to your doctor. Alcohol may not be recommended when you are taking certain diabetes medicines. Where can you learn more? Go to http://perry-liborio.info/. Enter V427 in the search box to learn more about \"Counting Carbohydrates When You Take Insulin: Care Instructions. \"  Current as of: March 13, 2017  Content Version: 11.4  © 7374-6986 [employer]. Care instructions adapted under license by [employer] (which disclaims liability or warranty for this information). If you have questions about a medical condition or this instruction, always ask your healthcare professional. [place] [address] any warranty or liability for your use of this information.

## 2017-12-12 NOTE — PROGRESS NOTES
Chief Complaint   Patient presents with    Arthritis     medication refill     1. Have you been to the ER, urgent care clinic since your last visit? Hospitalized since your last visit? No    2. Have you seen or consulted any other health care providers outside of the [address] [place] [address] since your last visit? Include any pap smears or colon screening.  No

## 2017-12-12 NOTE — MR AVS SNAPSHOT
Visit Information Date & Time Provider Department Dept. Phone Encounter #  
 12/12/2017  3:15 PM Marty Murphy  [facility] [phone] [id number] Upcoming Health Maintenance Date Due  
 EYE EXAM RETINAL OR DILATED Q1 8/7/2015 GLAUCOMA SCREENING Q2Y 7/4/2016 Pneumococcal 65+ Low/Medium Risk (1 of 2 - PCV13) 7/4/2016 MEDICARE YEARLY EXAM 2/23/2018 HEMOGLOBIN A1C Q6M 5/9/2018 FOOT EXAM Q1 7/31/2018 MICROALBUMIN Q1 11/9/2018 LIPID PANEL Q1 11/9/2018 COLONOSCOPY 6/1/2020 DTaP/Tdap/Td series (2 - Td) 5/13/2023 Allergies as of 12/12/2017  Review Complete On: 12/12/2017 By: Nataliya Sellers Severity Noted Reaction Type Reactions Atorvastatin  01/21/2017    Myalgia Bactrim [Sulfamethoxazole-trimethoprim]  04/02/2010    Nausea Only Biaxin [Clarithromycin]  04/02/2010    Nausea Only Demerol [Meperidine]  04/02/2010    Itching Erythromycin  04/02/2010    Nausea Only Pcn [Penicillins]  04/02/2010    Hives Percocet [Oxycodone-acetaminophen]  04/02/2010    Itching Tetracycline  04/02/2010    Nausea Only Voltaren [Diclofenac Sodium]  04/02/2010    Hives Current Immunizations  Reviewed on 11/9/2017 Name Date Influenza High Dose Vaccine PF 11/9/2017, 1/29/2016 Influenza Vaccine 12/5/2012 Influenza Vaccine PF 11/17/2014 PPD 1/25/1999 Pneumococcal Polysaccharide (PPSV-23) 5/13/2013 TD Vaccine 5/12/1999 Tdap 5/13/2013 Not reviewed this visit You Were Diagnosed With   
  
 Codes Comments Uncontrolled type 2 diabetes mellitus with hyperglycemia, without long-term current use of insulin ([facility]    -  Primary ICD-10-CM: E11.65 ICD-9-CM: 250.02 Obesity (BMI 30-39. 9)     ICD-10-CM: E66.9 ICD-9-CM: 278.00 Vitals BP Pulse Temp Resp Height(growth percentile) Weight(growth percentile)  139/69 (BP 1 Location: Left arm, BP Patient Position: Sitting) 85 97.8 °F (36.6 °C) (Oral) 18 5' 4\" (1.626 m) 213 lb (96.6 kg) SpO2 BMI OB Status Smoking Status 99% 36.56 kg/m2 Hysterectomy Former Smoker Vitals History BMI and BSA Data Body Mass Index Body Surface Area  
 36.56 kg/m 2 2.09 m 2 Preferred Pharmacy Pharmacy Name Phone [facility] [address] [place] [address] Dr Quinone[address], [address] [facility] Erasto Hernandez [phone] Your Updated Medication List  
  
   
This list is accurate as of: 12/12/17  4:02 PM.  Always use your most recent med list.  
  
  
  
  
 * albuterol 2.5 mg /3 mL (0.083 %) nebulizer solution Commonly known as:  PROVENTIL VENTOLIN  
3 mL by Nebulization route every six (6) hours as needed for Wheezing. * albuterol 90 mcg/actuation inhaler Commonly known as:  PROVENTIL HFA, VENTOLIN HFA, PROAIR HFA Take 2 Puffs by inhalation every six (6) hours as needed for Wheezing. aspirin 81 mg chewable tablet Take 81 mg by mouth daily. * Blood-Glucose Meter monitoring kit One touch meter * Blood-Glucose Meter monitoring kit Commonly known as:  FREESTYLE LITE METER Tid use due to uncontrolled dm and hyperglycemia. 250.02. 790.29 CENTRUM PO Take  by mouth daily. diltiazem 420 mg SR capsule Commonly known as:  [facility] TAKE 1 CAPSULE DAILY  
  
 glipiZIDE SR 10 mg CR tablet Commonly known as:  GLUCOTROL XL Take 1 Tab by mouth daily. * glucose blood VI test strips strip Commonly known as:  ONETOUCH ULTRA TEST Test once daily * glucose blood VI test strips strip Commonly known as:  ASCENSIA AUTODISC VI, ONE TOUCH ULTRA TEST VI Tid use due to uncontrolled dm and hyperglycemia. 250.02. 790.29  
  
 * glucose blood VI test strips strip Commonly known as:  FREESTYLE LITE STRIPS Tid use due to uncontrolled dm and hyperglycemia. 250.02. 790.29 HYDROcodone-acetaminophen  mg tablet Commonly known as:  Deb Moran  
 1 tab po every 8 hours as needed for pain. Fill 17  
  
 insulin glargine 100 unit/mL (3 mL) Inpn Commonly known as:  LANTUS,BASAGLAR  
19 Units by SubCUTAneous route nightly. Insulin Needles (Disposable) 30 gauge x 1/3\" Dx: E11.9 [facility] QHS. Lancets Misc Commonly known as:  FREESTYLE LANCETS Tid use due to uncontrolled dm and hyperglycemia. 250.02. 790.29  
  
 losartan-hydroCHLOROthiazide 100-12.5 mg per tablet Commonly known as:  HYZAAR  
TAKE 1 TABLET DAILY  
  
 metFORMIN  mg tablet Commonly known as:  GLUCOPHAGE XR Take 4 Tabs by mouth daily (with dinner). Dose increase  
  
 nortriptyline 10 mg capsule Commonly known as:  PAMELOR Take 1 Cap by mouth nightly. pravastatin 40 mg tablet Commonly known as:  PRAVACHOL Take 1 Tab by mouth nightly. ZyrTEC 10 mg Cap Generic drug:  Cetirizine Take  by mouth. * Notice: This list has 7 medication(s) that are the same as other medications prescribed for you. Read the directions carefully, and ask your doctor or other care provider to review them with you. Prescriptions Sent to Pharmacy Refills  
 insulin glargine (LANTUS,BASAGLAR) 100 unit/mL (3 mL) inpn 3 Si Units by SubCUTAneous route nightly. Class: Normal  
 [facility] [address], [address] Delphina Boxer Ph #: [phone] Route: SubCUTAneous Insulin Needles, Disposable, 30 gauge x 1/3\" 11 Sig: Dx: E11.9 [facility]. Class: Normal  
 Pharmacy: [facility] [address], [address] Delphina Boxer Ph #: [phone] Patient Instructions Counting Carbohydrates When You Take Insulin: Care Instructions Your Care Instructions You don't have to eat special foods when you take insulin. You just have to be careful to eat healthy foods. And you have to spread throughout the day the carbohydrates you eat.  Carbohydrates raise blood sugar higher and during the day. So the pump must be programmed at meals to give you extra insulin to cover the rise in blood sugar after meals. When you know how many carbohydrates you will eat, you can take the right amount of insulin. Or, if you always use the same amount of insulin, you need to make sure that you eat the same amount of carbs at meals. · Learn your own insulin-to-carbohydrates ratio. You and your diabetes health professional will figure out the ratio. You can do this by testing your blood sugar after meals. For example, you may need a certain amount of insulin for every 15 grams of carbohydrates. · Add up the carbohydrate grams in a meal. Then you can figure out how many units of insulin to take based on your insulin-to-carbohydrates ratio. · Look at labels on packaged foods. This can tell you how many carbohydrates are in a serving. You can also use guides from the American Diabetes Association. · Be aware of portions, or serving sizes. If a package has two servings and you eat the whole package, you need to double the number of grams of carbohydrates listed for one serving. · Protein, fat, and fiber do not raise blood sugar as much as carbs do. If you eat a lot of these nutrients in a meal, your blood sugar will rise more slowly than it would otherwise. · Exercise lowers blood sugar. You can use less insulin than you would if you were not doing exercise. Keep in mind that timing matters. If you exercise within 1 hour after a meal, your body may need less insulin for that meal than it would if you exercised 3 hours after the meal. Test your blood sugar to find out how exercise affects your need for insulin. Eat from all food groups · Eat at least three meals a day. · Plan meals to include food from all the food groups. ¨ Grains: 1 slice of bread (1 ounce), ½ cup of cooked cereal, and 1/3 cup of cooked pasta or rice.  These have about 15 grams of carbohydrates in a serving. Choose whole grains. These include whole wheat bread or crackers, oatmeal, and brown rice. Have them more often than refined grains. ¨ Fruit: 1 small fresh fruit, such as an apple or orange; ½ of a banana; ½ cup of chopped, cooked, or canned fruit; ½ cup of fruit juice; 1 cup of melon or raspberries; and 2 tablespoons of dried fruit. These have about 15 grams of carbohydrates in a serving. ¨ Dairy: 1 cup of nonfat or low-fat milk and 2/3 cup of plain yogurt. These have about 15 grams of carbohydrates in a serving. ¨ Protein foods: Beef, chicken, turkey, fish, eggs, tofu, cheese, cottage cheese, and peanut butter. A serving size of meat is 3 ounces. This is about the size of a deck of cards. Examples of meat substitute serving sizes (equal to 1 ounce of meat) are 1/4 cup of cottage cheese, 1 egg, 1 tablespoon of peanut butter, and ½ cup of tofu. These have very little or no carbohydrates in a serving. ¨ Vegetables: Starchy vegetables such as ½ cup of cooked beans, peas, potatoes, or corn have about 15 grams of carbohydrates. Nonstarchy vegetables have very little carbohydrates. These include 1 cup of raw leafy vegetables (such as spinach), ½ cup of other vegetables (cooked or chopped), and 3/4 cup of vegetable juice. · Talk to your dietitian or diabetes educator about ways to add limited amounts of sweets into your meal plan. · If you drink alcohol: ¨ Limit it to no more than 1 drink a day for women and 2 drinks a day for men. (One drink is 12 fl oz of beer, 5 fl oz of wine, or 1.5 fl oz liquor.) ¨ Make sure to count drink mixers that have sugar in your total carbohydrate count. These include cola, tonic water, jean pierre mix, and fruit juice. ¨ Eat a carbohydrate food along with your alcoholic drink. ¨ Check your blood sugar more often. This is because alcohol can lower your blood sugar too much.  This may happen even hours later while you sleep. You may want to eat and adjust your insulin dose when you drink alcohol to prevent severe low blood sugar. ¨ Talk to your doctor. Alcohol may not be recommended when you are taking certain diabetes medicines. Where can you learn more? Go to http://perry-liborio.info/. Enter S193 in the search box to learn more about \"Counting Carbohydrates When You Take Insulin: Care Instructions. \" Current as of: March 13, 2017 Content Version: 11.4 © 6251-9361 [employer]. Care instructions adapted under license by [employer] (which disclaims liability or warranty for this information). If you have questions about a medical condition or this instruction, always ask your healthcare professional. [address] any warranty or liability for your use of this information. Introducing [facility]! Bernarda Porter introduces [employer] patient portal. Now you can access parts of your medical record, email your doctor's office, and request medication refills online. 1. In your internet browser, go to [email or web address] 2. Click on the First Time User? Click Here link in the Sign In box. You will see the New Member Sign Up page. 3. Enter your [email or web address] Access Code exactly as it appears below. You will not need to use this code after youve completed the sign-up process. If you do not sign up before the expiration date, you must request a new code. · [email or web address] Access Code: [email or web address] Expires: 2/7/2018  9:39 AM 
 
4. Enter the last four digits of your Social Security Number (xxxx) and Date of Birth (mm/dd/yyyy) as indicated and click Submit. You will be taken to the next sign-up page. 5. Create a [email or web address] ID. This will be your [email or web address] login ID and cannot be changed, so think of one that is secure and easy to remember. 6. Create a [email or web address] password. You can change your password at any time. 7. Enter your Password Reset Question and Answer. This can be used at a later time if you forget your password. 8. Enter your e-mail address. You will receive e-mail notification when new information is available in [address] 9. Click Sign Up. You can now view and download portions of your medical record. 10. Click the Download Summary menu link to download a portable copy of your medical information. If you have questions, please visit the Frequently Asked Questions section of the [employer] website. Remember, [employer] is NOT to be used for urgent needs. For medical emergencies, dial 911. Now available from your iPhone and Android! Please provide this summary of care documentation to your next provider. Your primary care clinician is listed as Lilly Avelar. If you have any questions after today's visit, please call [phone].

## 2017-12-13 ENCOUNTER — TELEPHONE (OUTPATIENT)
Dept: FAMILY MEDICINE CLINIC | Age: 67
End: 2017-12-13

## 2017-12-13 DIAGNOSIS — M16.0 PRIMARY OSTEOARTHRITIS OF BOTH HIPS: ICD-10-CM

## 2017-12-13 RX ORDER — HYDROCODONE BITARTRATE AND ACETAMINOPHEN 10; 325 MG/1; MG/1
TABLET ORAL
Qty: 30 TAB | Refills: 0 | Status: CANCELLED | OUTPATIENT
Start: 2017-12-13

## 2017-12-13 NOTE — TELEPHONE ENCOUNTER
Outgoing call to patient.  verified. Notified patient of possible reason for results. Notified that with NP leaving practice she agrees with recommendation for pain management follow up. Patient verbalized understanding.

## 2017-12-13 NOTE — TELEPHONE ENCOUNTER
Thank you for the update on this, I do believe the alcohol found in her urine screen is due to her uncontrolled diabetes. Glucose fermentation can cause this and I have seen similar results in other diabetic patients. With that said, she did have the tramadol in her screening which was unexpected. I do understand that another provider here who does not have a relationship with her may not feel comfortable prescribing her opioids due to that. I do support [place]'s recommendation for her to follow up with a pain management specialist on her pain moving forward as I am leaving this practice after tomorrow.

## 2017-12-13 NOTE — PROGRESS NOTES
Assessment/Plan:     Diagnoses and all orders for this visit:    1. Uncontrolled type 2 diabetes mellitus with hyperglycemia, without long-term current use of insulin (HCC)  -     insulin glargine (LANTUS,BASAGLAR) 100 unit/mL (3 mL) inpn; 19 Units by SubCUTAneous route nightly. -     Insulin Needles, Disposable, 30 gauge x 1/3\"; Dx: E11.9 UAD QHS. -     REFERRAL TO OPHTHALMOLOGY  Very poor control. Start Lantus QHS as directed. She is not comfortable with self taper so she will hold at this dose until her follow up in one week. Her symptomatic lows are normal values and we discussed this should improve with better control of her diabetes however eating regular meals with likely improve these episodes. Consider discontinuation of Glipizide if it continues. Continue Metformin. She will monitor QID and bring food and sugar log to her next OV. Referral to endocrinology in the near future can also likely be anticipated. Very apprehensive to move forward with treatment but agreeable. Discussed potential for hypoglycemia with insulin and she states understanding and will notify me in this event and seek appropriate glucose replacement. She feels comfortable with administration of insulin and she was previously taking. 2. Obesity (BMI 30-39. 9)  She understands the importance of weight loss for improvement from a chronic pain standpoint and also her diabetes. She appears pre-contemplative at this time. I have declined refill of her chronic opioid therapy and will defer to her PCP at this time. If she transfers care to me, will bridge to referral to pain management. Follow-up Disposition:  Return in about 1 week (around 12/19/2017) for Follow Up.     Discussed expected course/resolution/complications of diagnosis in detail with patient.    Medication risks/benefits/costs/interactions/alternatives discussed with patient.    Pt was given after visit summary which includes diagnoses, current medications & vitals. Pt expressed understanding with the diagnosis and plan          Subjective:      Chacorta Kaur is a 78 y.o. female who presents for had concerns including Arthritis and Diabetes. Patient is a 78 y.o. female that comes today for follow up of Diabetes Mellitus Type 2. Patient does not regularly monitor  their FSBG at home. The fasting plasma glucose (fpg) usually runs around 160s mg/L. not attempting to follow a low fat, low cholesterol diet, sedentary  Patients activity level: sedentery  there was no change in patient's weight. .  The patient has experienced recent hypoglycemia (symptomatic at 97, cannot recall the approximate date.)   reports that she quit smoking about 14 months ago. Her smoking use included Cigarettes. She has a 20.00 pack-year smoking history. She has never used smokeless tobacco.   Previously on insulin and cannot recall why this was discontinued many years ago. Reports diabetes was \"well-controlled\" on Onglyza which was cost-prohibitive recently. Not currently monitoring glucose and cannot recall last routine monitoring. Reports on the way over today she was feeling hypoglycemic and stopped for candy. Admits fluids are typically juice and water, occasional soda but she has cut back on this. Is overdue for eye exam and does not want to return to [facility]. Would like a refill of her pain medication which is followed by her PCP, [place]. Lab Results   Component Value Date/Time    Hemoglobin A1c 11.7 11/09/2017 10:03 AM    Hemoglobin A1c 9.1 07/31/2017 12:13 PM    Hemoglobin A1c 9.7 05/08/2017 10:59 AM         Current Outpatient Prescriptions   Medication Sig Dispense Refill    insulin glargine (LANTUS,BASAGLAR) 100 unit/mL (3 mL) inpn 19 Units by SubCUTAneous route nightly. 3 Pen 3    Insulin Needles, Disposable, 30 gauge x 1/3\" Dx: E11.9 UAD QHS. 1 Package 11    glipiZIDE SR (GLUCOTROL XL) 10 mg CR tablet Take 1 Tab by mouth daily.  90 Tab 3    losartan-hydroCHLOROthiazide (HYZAAR) 100-12.5 mg per tablet TAKE 1 TABLET DAILY 90 Tab 3    metFORMIN ER (GLUCOPHAGE XR) 500 mg tablet Take 4 Tabs by mouth daily (with dinner). Dose increase 360 Tab 5    HYDROcodone-acetaminophen (NORCO)  mg tablet 1 tab po every 8 hours as needed for pain. Fill 11/17/17 90 Tab 0    pravastatin (PRAVACHOL) 40 mg tablet Take 1 Tab by mouth nightly. 90 Tab 3    diltiazem (TIAZAC) 420 mg SR capsule TAKE 1 CAPSULE DAILY 15 Cap 0    albuterol (PROVENTIL VENTOLIN) 2.5 mg /3 mL (0.083 %) nebulizer solution 3 mL by Nebulization route every six (6) hours as needed for Wheezing. 60 Each 2    albuterol (PROVENTIL HFA, VENTOLIN HFA, PROAIR HFA) 90 mcg/actuation inhaler Take 2 Puffs by inhalation every six (6) hours as needed for Wheezing. 1 Inhaler 1    nortriptyline (PAMELOR) 10 mg capsule Take 1 Cap by mouth nightly. 90 Cap 3    Blood-Glucose Meter (FREESTYLE LITE METER) monitoring kit Tid use due to uncontrolled dm and hyperglycemia. 250.02. 790.29 1 Kit 0    glucose blood VI test strips (FREESTYLE LITE STRIPS) strip Tid use due to uncontrolled dm and hyperglycemia. 250.02. 790.29 1 Each 11    Lancets (FREESTYLE LANCETS) misc Tid use due to uncontrolled dm and hyperglycemia. 250.02. 790.29 1 Each 11    glucose blood VI test strips (ASCENSIA AUTODISC VI, ONE TOUCH ULTRA TEST VI) strip Tid use due to uncontrolled dm and hyperglycemia. 250.02. 790.29 100 Each 11    Blood-Glucose Meter monitoring kit One touch meter 1 Kit 1    glucose blood VI test strips (ONE TOUCH ULTRA TEST) strip Test once daily 1 Package 11    Cetirizine (ZYRTEC) 10 mg Cap Take  by mouth.  aspirin 81 mg chewable tablet Take 81 mg by mouth daily.  MULTIVITS W-FE,OTHER MIN (CENTRUM PO) Take  by mouth daily.          Allergies   Allergen Reactions    Atorvastatin Myalgia    Bactrim [Sulfamethoxazole-Trimethoprim] Nausea Only    Biaxin [Clarithromycin] Nausea Only    Demerol [Meperidine] Itching    Erythromycin Nausea Only    Pcn [Penicillins] Hives    Percocet [Oxycodone-Acetaminophen] Itching    Tetracycline Nausea Only    Voltaren [Diclofenac Sodium] Hives       ROS:   Complete review of systems was reviewed with pertinent information listed in HPI. Objective:     Visit Vitals    /69 (BP 1 Location: Left arm, BP Patient Position: Sitting)    Pulse 85    Temp 97.8 °F (36.6 °C) (Oral)    Resp 18    Ht 5' 4\" (1.626 m)    Wt 213 lb (96.6 kg)    SpO2 99%    BMI 36.56 kg/m2       Vitals and Nurse Documentation reviewed. Physical Exam   Constitutional: No distress. Cardiovascular: S1 normal and S2 normal.  Exam reveals no gallop and no friction rub. No murmur heard. Pulmonary/Chest: Breath sounds normal. No respiratory distress.    Psychiatric: Mood normal.

## 2017-12-13 NOTE — TELEPHONE ENCOUNTER
Fadi Bass    [phone]        Requesting to speak with [facility] regarding her medication before she leaves the practice-

## 2017-12-13 NOTE — TELEPHONE ENCOUNTER
Outgoing call to patient. She is upset she came for appt yesterday with another provider who would not refill her medication due to abnormal results on her recent compliance drug screen. PT very addiment that she does not abuse drugs and she had alcohol in her system and she does not drink. Patient stated that she wanted this taken off her permanent record. Advised that it is her labs and I am unable to remove the record. Patient also stating that she needs her medications and wants them filled. Advised patient that she should not be mixing medications due to possible interaction/overdose. She stated that she only takes what is prescribed. Advised that she had medication on her screen that are not on her current list. She stated \"i dont take them at the same time\" I know better.

## 2017-12-14 ENCOUNTER — TELEPHONE (OUTPATIENT)
Dept: FAMILY MEDICINE CLINIC | Age: 67
End: 2017-12-14

## 2017-12-14 DIAGNOSIS — M16.0 PRIMARY OSTEOARTHRITIS OF BOTH HIPS: ICD-10-CM

## 2017-12-14 RX ORDER — HYDROCODONE BITARTRATE AND ACETAMINOPHEN 10; 325 MG/1; MG/1
TABLET ORAL
Qty: 30 TAB | Refills: 0 | Status: SHIPPED | OUTPATIENT
Start: 2017-12-14 | End: 2017-12-21 | Stop reason: SDUPTHER

## 2017-12-14 NOTE — TELEPHONE ENCOUNTER
Thank you ron, excellent work and communication on this. I will approve 10 day supply, #30 of the norco to hold her over until her appt with you in 1 week.

## 2017-12-14 NOTE — TELEPHONE ENCOUNTER
Spoke with patient to clarify the plan. Zohra Botello will provide a short prescription for Norco until we can establish care with me properly. She was advised she cannot take any other pain medications other than Norco which is prescribed by our office. She states understanding that taking other medications is a violation of her pain contract. She has not started insulin and gives me recent BS readings as below:   Dinner 130   Fastin   Pre Lunch: 146  PreDinner: 96  Bedtime: 137   : Fasting 106    We will continue her current regimen and hold on the addition of Insulin at this time. She held her night glipizide last night. I have  Instructed her to remain consistent with her medications until her follow up. Herbert Rodriguez you call her pharmacy and cancel the Insulin prescription?

## 2017-12-21 ENCOUNTER — OFFICE VISIT (OUTPATIENT)
Dept: FAMILY MEDICINE CLINIC | Age: 67
End: 2017-12-21

## 2017-12-21 VITALS
OXYGEN SATURATION: 98 % | WEIGHT: 216.6 LBS | HEART RATE: 83 BPM | SYSTOLIC BLOOD PRESSURE: 128 MMHG | TEMPERATURE: 98 F | BODY MASS INDEX: 36.98 KG/M2 | DIASTOLIC BLOOD PRESSURE: 60 MMHG | RESPIRATION RATE: 18 BRPM | HEIGHT: 64 IN

## 2017-12-21 DIAGNOSIS — Z51.81 MEDICATION MONITORING ENCOUNTER: ICD-10-CM

## 2017-12-21 DIAGNOSIS — M19.012 PRIMARY OSTEOARTHRITIS OF LEFT SHOULDER: ICD-10-CM

## 2017-12-21 DIAGNOSIS — M16.0 PRIMARY OSTEOARTHRITIS OF BOTH HIPS: ICD-10-CM

## 2017-12-21 DIAGNOSIS — E11.65 UNCONTROLLED TYPE 2 DIABETES MELLITUS WITH HYPERGLYCEMIA, WITHOUT LONG-TERM CURRENT USE OF INSULIN (HCC): Primary | ICD-10-CM

## 2017-12-21 DIAGNOSIS — M16.9 OSTEOARTHRITIS OF HIP, UNSPECIFIED LATERALITY, UNSPECIFIED OSTEOARTHRITIS TYPE: ICD-10-CM

## 2017-12-21 PROBLEM — E11.21 TYPE 2 DIABETES MELLITUS WITH NEPHROPATHY (HCC): Status: ACTIVE | Noted: 2017-12-21

## 2017-12-21 RX ORDER — HYDROCODONE BITARTRATE AND ACETAMINOPHEN 10; 325 MG/1; MG/1
1 TABLET ORAL
Qty: 90 TAB | Refills: 0 | Status: SHIPPED | OUTPATIENT
Start: 2018-01-26 | End: 2018-03-26 | Stop reason: SDUPTHER

## 2017-12-21 RX ORDER — HYDROCODONE BITARTRATE AND ACETAMINOPHEN 10; 325 MG/1; MG/1
1 TABLET ORAL
Qty: 90 TAB | Refills: 0 | Status: SHIPPED | OUTPATIENT
Start: 2018-02-26 | End: 2018-03-26

## 2017-12-21 RX ORDER — HYDROCODONE BITARTRATE AND ACETAMINOPHEN 10; 325 MG/1; MG/1
1 TABLET ORAL
Qty: 90 TAB | Refills: 0 | Status: SHIPPED | OUTPATIENT
Start: 2017-12-26 | End: 2017-12-21 | Stop reason: SDUPTHER

## 2017-12-21 NOTE — PROGRESS NOTES
1. Have you been to the ER, urgent care clinic since your last visit? Hospitalized since your last visit? No    2. Have you seen or consulted any other health care providers outside of the [address] [place] [address] since your last visit? Include any pap smears or colon screening. No       Chief Complaint   Patient presents with    Diabetes     1 week follow up     Learning assessment complete  Abuse Screening Questionnaire 7/31/2017   Do you ever feel afraid of your partner? N   Are you in a relationship with someone who physically or mentally threatens you? N   Is it safe for you to go home? Y     Fall Risk Assessment, last 12 mths 12/21/2017   Able to walk? Yes   Fall in past 12 months?  No   Fall with injury? -   Number of falls in past 12 months -   Fall Risk Score -

## 2017-12-21 NOTE — PATIENT INSTRUCTIONS
Chronic Pain: Care Instructions  Your Care Instructions    Chronic pain is pain that lasts a long time (months or even years) and may or may not have a clear cause. It is different from acute pain, which usually does have a clear cause-like an injury or illness-and gets better over time. Chronic pain:  · Lasts over time but may vary from day to day. · Does not go away despite efforts to end it. · May disrupt your sleep and lead to fatigue. · May cause depression or anxiety. · May make your muscles tense, causing more pain. · Can disrupt your work, hobbies, home life, and relationships with friends and family. Chronic pain is a very real condition. It is not just in your head. Treatment can help and usually includes several methods used together, such as medicines, physical therapy, exercise, and other treatments. Learning how to relax and changing negative thought patterns can also help you cope. Chronic pain is complex. Taking an active role in your treatment will help you better manage your pain. Tell your doctor if you have trouble dealing with your pain. You may have to try several things before you find what works best for you. Follow-up care is a key part of your treatment and safety. Be sure to make and go to all appointments, and call your doctor if you are having problems. It's also a good idea to know your test results and keep a list of the medicines you take. How can you care for yourself at home? · Pace yourself. Break up large jobs into smaller tasks. Save harder tasks for days when you have less pain, or go back and forth between hard tasks and easier ones. Take rest breaks. · Relax, and reduce stress. Relaxation techniques such as deep breathing or meditation can help. · Keep moving. Gentle, daily exercise can help reduce pain over the long run. Try low- or no-impact exercises such as walking, swimming, and stationary biking. Do stretches to stay flexible.   · Try heat, cold packs, and massage. · Get enough sleep. Chronic pain can make you tired and drain your energy. Talk with your doctor if you have trouble sleeping because of pain. · Think positive. Your thoughts can affect your pain level. Do things that you enjoy to distract yourself when you have pain instead of focusing on the pain. See a movie, read a book, listen to music, or spend time with a friend. · If you think you are depressed, talk to your doctor about treatment. · Keep a daily pain diary. Record how your moods, thoughts, sleep patterns, activities, and medicine affect your pain. You may find that your pain is worse during or after certain activities or when you are feeling a certain emotion. Having a record of your pain can help you and your doctor find the best ways to treat your pain. · Take pain medicines exactly as directed. ¨ If the doctor gave you a prescription medicine for pain, take it as prescribed. ¨ If you are not taking a prescription pain medicine, ask your doctor if you can take an over-the-counter medicine. Reducing constipation caused by pain medicine  · Include fruits, vegetables, beans, and whole grains in your diet each day. These foods are high in fiber. · Drink plenty of fluids, enough so that your urine is light yellow or clear like water. If you have kidney, heart, or liver disease and have to limit fluids, talk with your doctor before you increase the amount of fluids you drink. · If your doctor recommends it, get more exercise. Walking is a good choice. Bit by bit, increase the amount you walk every day. Try for at least 30 minutes on most days of the week. · Schedule time each day for a bowel movement. A daily routine may help. Take your time and do not strain when having a bowel movement. When should you call for help? Call your doctor now or seek immediate medical care if:  ? · Your pain gets worse or is out of control.    ? · You feel down or blue, or you do not enjoy things like you once did. You may be depressed, which is common in people with chronic pain. Depression can be treated. ? · You have vomiting or cramps for more than 2 hours. ? Watch closely for changes in your health, and be sure to contact your doctor if:  ? · You cannot sleep because of pain. ? · You are very worried or anxious about your pain. ? · You have trouble taking your pain medicine. ? · You have any concerns about your pain medicine. ? · You have trouble with bowel movements, such as:  ¨ No bowel movement in 3 days. ¨ Blood in the anal area, in your stool, or on the toilet paper. ¨ Diarrhea for more than 24 hours. Where can you learn more? Go to http://perry-liborio.info/. Enter N004 in the search box to learn more about \"Chronic Pain: Care Instructions. \"  Current as of: October 14, 2016  Content Version: 11.4  © 0988-2227 [employer]. Care instructions adapted under license by [employer] (which disclaims liability or warranty for this information). If you have questions about a medical condition or this instruction, always ask your healthcare professional. [place] [address] any warranty or liability for your use of this information.

## 2017-12-21 NOTE — MR AVS SNAPSHOT
Visit Information Date & Time Provider Department Dept. Phone Encounter #  
 12/21/2017  3:15 PM Jimy Alvarado  [facility] [phone] [id number] Follow-up Instructions Return in about 3 months (around 3/21/2018) for Follow Up. Upcoming Health Maintenance Date Due  
 EYE EXAM RETINAL OR DILATED Q1 8/7/2015 GLAUCOMA SCREENING Q2Y 7/4/2016 Pneumococcal 65+ Low/Medium Risk (1 of 2 - PCV13) 7/4/2016 MEDICARE YEARLY EXAM 2/23/2018 HEMOGLOBIN A1C Q6M 5/9/2018 FOOT EXAM Q1 7/31/2018 MICROALBUMIN Q1 11/9/2018 LIPID PANEL Q1 11/9/2018 COLONOSCOPY 6/1/2020 DTaP/Tdap/Td series (2 - Td) 5/13/2023 Allergies as of 12/21/2017  Review Complete On: 67/42/1142 By: Cameron Gutierrez LPN Severity Noted Reaction Type Reactions Atorvastatin  01/21/2017    Myalgia Bactrim [Sulfamethoxazole-trimethoprim]  04/02/2010    Nausea Only Biaxin [Clarithromycin]  04/02/2010    Nausea Only Demerol [Meperidine]  04/02/2010    Itching Erythromycin  04/02/2010    Nausea Only Pcn [Penicillins]  04/02/2010    Hives Percocet [Oxycodone-acetaminophen]  04/02/2010    Itching Tetracycline  04/02/2010    Nausea Only Voltaren [Diclofenac Sodium]  04/02/2010    Hives Current Immunizations  Reviewed on 11/9/2017 Name Date Influenza High Dose Vaccine PF 11/9/2017, 1/29/2016 Influenza Vaccine 12/5/2012 Influenza Vaccine PF 11/17/2014 PPD 1/25/1999 Pneumococcal Polysaccharide (PPSV-23) 5/13/2013 TD Vaccine 5/12/1999 Tdap 5/13/2013 Not reviewed this visit You Were Diagnosed With   
  
 Codes Comments Uncontrolled type 2 diabetes mellitus with hyperglycemia, without long-term current use of insulin [facility]    -  Primary ICD-10-CM: E11.65 ICD-9-CM: 250.02 Medication monitoring encounter     ICD-10-CM: Z51.81 
ICD-9-CM: V58.83  Osteoarthritis of hip, unspecified laterality, unspecified osteoarthritis type     ICD-10-CM: M16.9 ICD-9-CM: 715.95 Primary osteoarthritis of left shoulder     ICD-10-CM: M19.012 
ICD-9-CM: 715.11 Primary osteoarthritis of both hips     ICD-10-CM: M16.0 ICD-9-CM: 715.15 Vitals BP Pulse Temp Resp Height(growth percentile) Weight(growth percentile) 128/60 (BP 1 Location: Left arm, BP Patient Position: Sitting) 83 98 °F (36.7 °C) (Oral) 18 5' 4\" (1.626 m) 216 lb 9.6 oz (98.2 kg) SpO2 BMI OB Status Smoking Status 98% 37.18 kg/m2 Hysterectomy Former Smoker Vitals History BMI and BSA Data Body Mass Index Body Surface Area  
 37.18 kg/m 2 2.11 m 2 Preferred Pharmacy Pharmacy Name Phone [address] [place], [address] [place] [address] Yani Marlene [phone] Your Updated Medication List  
  
   
This list is accurate as of: 12/21/17  4:00 PM.  Always use your most recent med list.  
  
  
  
  
 * albuterol 2.5 mg /3 mL (0.083 %) nebulizer solution Commonly known as:  PROVENTIL VENTOLIN  
3 mL by Nebulization route every six (6) hours as needed for Wheezing. * albuterol 90 mcg/actuation inhaler Commonly known as:  PROVENTIL HFA, VENTOLIN HFA, PROAIR HFA Take 2 Puffs by inhalation every six (6) hours as needed for Wheezing. aspirin 81 mg chewable tablet Take 81 mg by mouth daily. * Blood-Glucose Meter monitoring kit One touch meter * Blood-Glucose Meter monitoring kit Commonly known as:  FREESTYLE LITE METER Tid use due to uncontrolled dm and hyperglycemia. 250.02. 790.29 CENTRUM PO Take  by mouth daily. diltiazem 420 mg SR capsule Commonly known as:  [facility] TAKE 1 CAPSULE DAILY  
  
 glipiZIDE SR 10 mg CR tablet Commonly known as:  GLUCOTROL XL Take 1 Tab by mouth daily. * glucose blood VI test strips strip Commonly known as:  ONETOUCH ULTRA TEST Test once daily * glucose blood VI test strips strip Commonly known as:  ASCENSIA AUTODISC VI, ONE TOUCH ULTRA TEST VI Tid use due to uncontrolled dm and hyperglycemia. 250.02. 790.29  
  
 * glucose blood VI test strips strip Commonly known as:  FREESTYLE LITE STRIPS Tid use due to uncontrolled dm and hyperglycemia. 250.02. 790.29  
  
 * HYDROcodone-acetaminophen  mg tablet Commonly known as:  Annamary Monet Take 1 Tab by mouth every eight (8) hours as needed for Pain. Max Daily Amount: 3 Tabs. Indications: Pain Start taking on:  1/26/2018  
  
 * HYDROcodone-acetaminophen  mg tablet Commonly known as:  Annamary Monet Take 1 Tab by mouth every eight (8) hours as needed for Pain. Max Daily Amount: 3 Tabs. Indications: Pain Start taking on:  2/26/2018 Insulin Needles (Disposable) 30 gauge x 1/3\" Dx: E11.9 UAD QHS. Lancets Misc Commonly known as:  FREESTYLE LANCETS Tid use due to uncontrolled dm and hyperglycemia. 250.02. 790.29  
  
 losartan-hydroCHLOROthiazide 100-12.5 mg per tablet Commonly known as:  HYZAAR  
TAKE 1 TABLET DAILY  
  
 metFORMIN  mg tablet Commonly known as:  GLUCOPHAGE XR Take 4 Tabs by mouth daily (with dinner). Dose increase  
  
 nortriptyline 10 mg capsule Commonly known as:  PAMELOR Take 1 Cap by mouth nightly. pravastatin 40 mg tablet Commonly known as:  PRAVACHOL Take 1 Tab by mouth nightly. ZyrTEC 10 mg Cap Generic drug:  Cetirizine Take  by mouth. * Notice: This list has 9 medication(s) that are the same as other medications prescribed for you. Read the directions carefully, and ask your doctor or other care provider to review them with you. Prescriptions Printed Refills HYDROcodone-acetaminophen (NORCO)  mg tablet 0 Starting on: 1/26/2018 Sig: Take 1 Tab by mouth every eight (8) hours as needed for Pain. Max Daily Amount: 3 Tabs. Indications: Pain Class: Print  Route: Oral  
 HYDROcodone-acetaminophen (NORCO)  mg tablet 0 Starting on: 2/26/2018 Sig: Take 1 Tab by mouth every eight (8) hours as needed for Pain. Max Daily Amount: 3 Tabs. Indications: Pain Class: Print Route: Oral  
  
Follow-up Instructions Return in about 3 months (around 3/21/2018) for Follow Up. Patient Instructions Chronic Pain: Care Instructions Your Care Instructions Chronic pain is pain that lasts a long time (months or even years) and may or may not have a clear cause. It is different from acute pain, which usually does have a clear cause-like an injury or illness-and gets better over time. Chronic pain: 
· Lasts over time but may vary from day to day. · Does not go away despite efforts to end it. · May disrupt your sleep and lead to fatigue. · May cause depression or anxiety. · May make your muscles tense, causing more pain. · Can disrupt your work, hobbies, home life, and relationships with friends and family. Chronic pain is a very real condition. It is not just in your head. Treatment can help and usually includes several methods used together, such as medicines, physical therapy, exercise, and other treatments. Learning how to relax and changing negative thought patterns can also help you cope. Chronic pain is complex. Taking an active role in your treatment will help you better manage your pain. Tell your doctor if you have trouble dealing with your pain. You may have to try several things before you find what works best for you. Follow-up care is a key part of your treatment and safety. Be sure to make and go to all appointments, and call your doctor if you are having problems. It's also a good idea to know your test results and keep a list of the medicines you take. How can you care for yourself at home? · Pace yourself. Break up large jobs into smaller tasks.  Save harder tasks for days when you have less pain, or go back and forth between hard tasks and easier ones. Take rest breaks. · Relax, and reduce stress. Relaxation techniques such as deep breathing or meditation can help. · Keep moving. Gentle, daily exercise can help reduce pain over the long run. Try low- or no-impact exercises such as walking, swimming, and stationary biking. Do stretches to stay flexible. · Try heat, cold packs, and massage. · Get enough sleep. Chronic pain can make you tired and drain your energy. Talk with your doctor if you have trouble sleeping because of pain. · Think positive. Your thoughts can affect your pain level. Do things that you enjoy to distract yourself when you have pain instead of focusing on the pain. See a movie, read a book, listen to music, or spend time with a friend. · If you think you are depressed, talk to your doctor about treatment. · Keep a daily pain diary. Record how your moods, thoughts, sleep patterns, activities, and medicine affect your pain. You may find that your pain is worse during or after certain activities or when you are feeling a certain emotion. Having a record of your pain can help you and your doctor find the best ways to treat your pain. · Take pain medicines exactly as directed. ¨ If the doctor gave you a prescription medicine for pain, take it as prescribed. ¨ If you are not taking a prescription pain medicine, ask your doctor if you can take an over-the-counter medicine. Reducing constipation caused by pain medicine · Include fruits, vegetables, beans, and whole grains in your diet each day. These foods are high in fiber. · Drink plenty of fluids, enough so that your urine is light yellow or clear like water. If you have kidney, heart, or liver disease and have to limit fluids, talk with your doctor before you increase the amount of fluids you drink. · If your doctor recommends it, get more exercise. Walking is a good choice. Bit by bit, increase the amount you walk every day.  Try for at least 30 minutes on most days of the week. · Schedule time each day for a bowel movement. A daily routine may help. Take your time and do not strain when having a bowel movement. When should you call for help? Call your doctor now or seek immediate medical care if: 
? · Your pain gets worse or is out of control. ? · You feel down or blue, or you do not enjoy things like you once did. You may be depressed, which is common in people with chronic pain. Depression can be treated. ? · You have vomiting or cramps for more than 2 hours. ? Watch closely for changes in your health, and be sure to contact your doctor if: 
? · You cannot sleep because of pain. ? · You are very worried or anxious about your pain. ? · You have trouble taking your pain medicine. ? · You have any concerns about your pain medicine. ? · You have trouble with bowel movements, such as: 
¨ No bowel movement in 3 days. ¨ Blood in the anal area, in your stool, or on the toilet paper. ¨ Diarrhea for more than 24 hours. Where can you learn more? Go to http://perry-liborio.info/. Enter N004 in the search box to learn more about \"Chronic Pain: Care Instructions. \" Current as of: October 14, 2016 Content Version: 11.4 © 8552-9398 [employer]. Care instructions adapted under license by [employer] (which disclaims liability or warranty for this information). If you have questions about a medical condition or this instruction, always ask your healthcare professional. [place] [address] any warranty or liability for your use of this information. Introducing [facility]! Jimmy Castaneda introduces Meograph patient portal. Now you can access parts of your medical record, email your doctor's office, and request medication refills online. 1. In your internet browser, go to [email or web address] 2. Click on the First Time User? Click Here link in the Sign In box. You will see the New Member Sign Up page. 3. Enter your Divitel Access Code exactly as it appears below. You will not need to use this code after youve completed the sign-up process. If you do not sign up before the expiration date, you must request a new code. · Divitel Access Code: [email or web address] Expires: 2/7/2018  9:39 AM 
 
4. Enter the last four digits of your Social Security Number (xxxx) and Date of Birth (mm/dd/yyyy) as indicated and click Submit. You will be taken to the next sign-up page. 5. Create a Divitel ID. This will be your Divitel login ID and cannot be changed, so think of one that is secure and easy to remember. 6. Create a Divitel password. You can change your password at any time. 7. Enter your Password Reset Question and Answer. This can be used at a later time if you forget your password. 8. Enter your e-mail address. You will receive e-mail notification when new information is available in [address] 9. Click Sign Up. You can now view and download portions of your medical record. 10. Click the Download Summary menu link to download a portable copy of your medical information. If you have questions, please visit the Frequently Asked Questions section of the Divitel website. Remember, Divitel is NOT to be used for urgent needs. For medical emergencies, dial 911. Now available from your iPhone and Android! Please provide this summary of care documentation to your next provider. Your primary care clinician is listed as Adriana Jensen. If you have any questions after today's visit, please call [phone].

## 2017-12-21 NOTE — PROGRESS NOTES
Assessment/Plan:     Diagnoses and all orders for this visit:    1. Uncontrolled type 2 diabetes mellitus with hyperglycemia, without long-term current use of insulin (HCC)  Sugars look excellent. She understands taking diabetic medications and skipping meals causes hypoglycemia. Discussed a snack at bedtime to mitigate risk. Continue current therapy. 2. Medication monitoring encounter    3. Osteoarthritis of hip, unspecified laterality, unspecified osteoarthritis type  -     HYDROcodone-acetaminophen (NORCO)  mg tablet; Take 1 Tab by mouth every eight (8) hours as needed for Pain. Max Daily Amount: 3 Tabs. Indications: Pain  -     HYDROcodone-acetaminophen (NORCO)  mg tablet; Take 1 Tab by mouth every eight (8) hours as needed for Pain. Max Daily Amount: 3 Tabs. Indications: Pain  Stable at this time. She is establishing with ortho and rheumatology.  reviewed and appropriate. Continue current therapy. Given three months of prescriptions. UDS and Contract are up to date. Narcan not warranted at this time. Follow up in three months. 4. Primary osteoarthritis of left shoulder   Treatment as above. Follow-up Disposition:  Return in about 3 months (around 3/21/2018) for Follow Up. Discussed expected course/resolution/complications of diagnosis in detail with patient.    Medication risks/benefits/costs/interactions/alternatives discussed with patient.    Pt was given after visit summary which includes diagnoses, current medications & vitals. Pt expressed understanding with the diagnosis and plan          Subjective:      Merlinda Och is a 78 y.o. female who presents for had concerns including Diabetes. Patient is a 78 y.o. female that comes today for follow up of Diabetes Mellitus Type 2. Patient does regularly monitor  their FSBG at home.  The fasting plasma glucose (fpg) usually runs around  mg/L. generally follows a low fat low cholesterol diet  Patients activity level: sedentary  gained, 3 lbs. .  The patient has experienced recent hypoglycemia at 66 in the am last week after she skipped dinner. Has cut out sodas completely. Pre-dinner sugars are at goal.    reports that she quit smoking about 14 months ago. Her smoking use included Cigarettes. She has a 20.00 pack-year smoking history. She has never used smokeless tobacco.      Andrea Rosado [facility] today to follow up on chronic pain diagnosis. We discussed her osteoarthritis that is affecting her left hip, left shoulder and lumbar back. Significant changes since last visit: none. She is  able to do her normal daily activities. She reports the following adverse side effects: none. Previous treatments include heat, cold, Lidocaine patches otc. Least pain over the last week has been 2/10. Worst pain over the last week has been 10/10. Opioid Risk Tool Reviewed: YES    Aberrant behaviors: None. Urine Drug Screen: Reviewed and she was positive for Tramadol. This was discussed at the last office visit and she has gotten rid of her prescription and states understanding she can only take what is prescribed by our office. .    Controlled substance agreement on file: YES. Concomitant use of a benzodiazepine: no    Narcan not warranted at this time. She is scheduled for ortho evaluation and rheumatology evaluation after the new year. Reports pain is well controlled on current regimen and has been stable for many years. Is not increasing use over time. Lab Results   Component Value Date/Time    Hemoglobin A1c 11.7 11/09/2017 10:03 AM    Hemoglobin A1c 9.1 07/31/2017 12:13 PM    Hemoglobin A1c 9.7 05/08/2017 10:59 AM         Current Outpatient Prescriptions   Medication Sig Dispense Refill    [START ON 1/26/2018] HYDROcodone-acetaminophen (NORCO)  mg tablet Take 1 Tab by mouth every eight (8) hours as needed for Pain. Max Daily Amount: 3 Tabs.  Indications: Pain 90 Tab 0    [START ON 2/26/2018] HYDROcodone-acetaminophen (NORCO)  mg tablet Take 1 Tab by mouth every eight (8) hours as needed for Pain. Max Daily Amount: 3 Tabs. Indications: Pain 90 Tab 0    Insulin Needles, Disposable, 30 gauge x 1/3\" Dx: E11.9 UAD QHS. 1 Package 11    glipiZIDE SR (GLUCOTROL XL) 10 mg CR tablet Take 1 Tab by mouth daily. 90 Tab 3    losartan-hydroCHLOROthiazide (HYZAAR) 100-12.5 mg per tablet TAKE 1 TABLET DAILY 90 Tab 3    metFORMIN ER (GLUCOPHAGE XR) 500 mg tablet Take 4 Tabs by mouth daily (with dinner). Dose increase 360 Tab 5    pravastatin (PRAVACHOL) 40 mg tablet Take 1 Tab by mouth nightly. 90 Tab 3    diltiazem (TIAZAC) 420 mg SR capsule TAKE 1 CAPSULE DAILY 15 Cap 0    albuterol (PROVENTIL VENTOLIN) 2.5 mg /3 mL (0.083 %) nebulizer solution 3 mL by Nebulization route every six (6) hours as needed for Wheezing. 60 Each 2    albuterol (PROVENTIL HFA, VENTOLIN HFA, PROAIR HFA) 90 mcg/actuation inhaler Take 2 Puffs by inhalation every six (6) hours as needed for Wheezing. 1 Inhaler 1    nortriptyline (PAMELOR) 10 mg capsule Take 1 Cap by mouth nightly. 90 Cap 3    Blood-Glucose Meter (FREESTYLE LITE METER) monitoring kit Tid use due to uncontrolled dm and hyperglycemia. 250.02. 790.29 1 Kit 0    glucose blood VI test strips (FREESTYLE LITE STRIPS) strip Tid use due to uncontrolled dm and hyperglycemia. 250.02. 790.29 1 Each 11    Lancets (FREESTYLE LANCETS) misc Tid use due to uncontrolled dm and hyperglycemia. 250.02. 790.29 1 Each 11    glucose blood VI test strips (ASCENSIA AUTODISC VI, ONE TOUCH ULTRA TEST VI) strip Tid use due to uncontrolled dm and hyperglycemia. 250.02. 790.29 100 Each 11    Blood-Glucose Meter monitoring kit One touch meter 1 Kit 1    glucose blood VI test strips (ONE TOUCH ULTRA TEST) strip Test once daily 1 Package 11    Cetirizine (ZYRTEC) 10 mg Cap Take  by mouth.  aspirin 81 mg chewable tablet Take 81 mg by mouth daily.       MULTIVITS W-FE,OTHER MIN (CENTRUM PO) Take  by mouth daily. Allergies   Allergen Reactions    Atorvastatin Myalgia    Bactrim [Sulfamethoxazole-Trimethoprim] Nausea Only    Biaxin [Clarithromycin] Nausea Only    Demerol [Meperidine] Itching    Erythromycin Nausea Only    Pcn [Penicillins] Hives    Percocet [Oxycodone-Acetaminophen] Itching    Tetracycline Nausea Only    Voltaren [Diclofenac Sodium] Hives       ROS:   Complete review of systems was reviewed with pertinent information listed in HPI. Objective:     Visit Vitals    /60 (BP 1 Location: Left arm, BP Patient Position: Sitting)    Pulse 83    Temp 98 °F (36.7 °C) (Oral)    Resp 18    Ht 5' 4\" (1.626 m)    Wt 216 lb 9.6 oz (98.2 kg)    SpO2 98%    BMI 37.18 kg/m2       Vitals and Nurse Documentation reviewed. Physical Exam   Constitutional: No distress. Cardiovascular: S1 normal and S2 normal.  Exam reveals no gallop and no friction rub. No murmur heard. Pulmonary/Chest: Breath sounds normal. No respiratory distress.    Psychiatric: Mood normal.

## 2018-02-20 DIAGNOSIS — I10 ESSENTIAL HYPERTENSION WITH GOAL BLOOD PRESSURE LESS THAN 130/80: ICD-10-CM

## 2018-02-21 RX ORDER — DILTIAZEM HYDROCHLORIDE 420 MG/1
CAPSULE, EXTENDED RELEASE ORAL
Qty: 90 CAP | Refills: 3 | Status: SHIPPED | OUTPATIENT
Start: 2018-02-21 | End: 2019-03-12 | Stop reason: SDUPTHER

## 2018-03-26 ENCOUNTER — OFFICE VISIT (OUTPATIENT)
Dept: FAMILY MEDICINE CLINIC | Age: 68
End: 2018-03-26

## 2018-03-26 VITALS
TEMPERATURE: 98.3 F | DIASTOLIC BLOOD PRESSURE: 92 MMHG | SYSTOLIC BLOOD PRESSURE: 172 MMHG | BODY MASS INDEX: 36.19 KG/M2 | OXYGEN SATURATION: 97 % | HEART RATE: 85 BPM | RESPIRATION RATE: 18 BRPM | WEIGHT: 212 LBS | HEIGHT: 64 IN

## 2018-03-26 DIAGNOSIS — M19.012 PRIMARY OSTEOARTHRITIS OF LEFT SHOULDER: ICD-10-CM

## 2018-03-26 DIAGNOSIS — Z00.00 MEDICARE ANNUAL WELLNESS VISIT, SUBSEQUENT: Primary | ICD-10-CM

## 2018-03-26 DIAGNOSIS — M16.9 OSTEOARTHRITIS OF HIP, UNSPECIFIED LATERALITY, UNSPECIFIED OSTEOARTHRITIS TYPE: ICD-10-CM

## 2018-03-26 DIAGNOSIS — E11.21 TYPE 2 DIABETES MELLITUS WITH NEPHROPATHY (HCC): ICD-10-CM

## 2018-03-26 PROBLEM — E66.01 SEVERE OBESITY (BMI 35.0-39.9) WITH COMORBIDITY (HCC): Status: ACTIVE | Noted: 2018-03-26

## 2018-03-26 LAB — HBA1C MFR BLD HPLC: 8.2 %

## 2018-03-26 RX ORDER — HYDROCODONE BITARTRATE AND ACETAMINOPHEN 10; 325 MG/1; MG/1
1 TABLET ORAL
Qty: 90 TAB | Refills: 0 | Status: SHIPPED | OUTPATIENT
Start: 2018-05-26 | End: 2018-04-17 | Stop reason: SDUPTHER

## 2018-03-26 RX ORDER — ATORVASTATIN CALCIUM 20 MG/1
TABLET, FILM COATED ORAL DAILY
COMMUNITY
End: 2018-04-17 | Stop reason: SDUPTHER

## 2018-03-26 RX ORDER — HYDROCODONE BITARTRATE AND ACETAMINOPHEN 10; 325 MG/1; MG/1
1 TABLET ORAL
Qty: 90 TAB | Refills: 0 | Status: SHIPPED | OUTPATIENT
Start: 2018-03-26 | End: 2018-06-27 | Stop reason: SDUPTHER

## 2018-03-26 RX ORDER — HYDROCODONE BITARTRATE AND ACETAMINOPHEN 10; 325 MG/1; MG/1
1 TABLET ORAL
Qty: 90 TAB | Refills: 0 | Status: SHIPPED | OUTPATIENT
Start: 2018-04-26 | End: 2018-05-07 | Stop reason: SDUPTHER

## 2018-03-26 NOTE — PATIENT INSTRUCTIONS

## 2018-03-26 NOTE — PROGRESS NOTES
Assessment/Plan   Education and counseling provided:  Are appropriate based on today's review and evaluation    Diagnoses and all orders for this visit:    1. Medicare annual wellness visit, subsequent    2. Type 2 diabetes mellitus with nephropathy (HCC)  -     AMB POC HEMOGLOBIN A1C  -     REFERRAL TO OPHTHALMOLOGY  -     SITagliptin (JANUVIA) 100 mg tablet; Take 1 Tab by mouth daily. Still uncontrolled. She will update eye exam.  Add Januvia. She declines injectable treatment at this time. She will call back with meter information and begin BID checks. She will call in two weeks with these readings. 3. Osteoarthritis of hip, unspecified laterality, unspecified osteoarthritis type  -     HYDROcodone-acetaminophen (NORCO)  mg tablet; Take 1 Tab by mouth every eight (8) hours as needed for Pain. Max Daily Amount: 3 Tabs. Indications: Pain  -     HYDROcodone-acetaminophen (NORCO)  mg tablet; Take 1 Tab by mouth every eight (8) hours as needed for Pain. Max Daily Amount: 3 Tabs. Indications: Pain  -     HYDROcodone-acetaminophen (NORCO)  mg tablet; Take 1 Tab by mouth every eight (8) hours as needed for Pain. Max Daily Amount: 3 Tabs. Indications: Pain  Refilled for 3 months.  reviewed and appropriate. Stable on current therapy. Pain control could be improved, so consideration for referral to pain management should be considered. She continues with breakthrough pain multiple times a week. Strongly urge weight loss. She will re-establish with rheumatology next door. 4. Primary osteoarthritis of left shoulder  -     HYDROcodone-acetaminophen (NORCO)  mg tablet; Take 1 Tab by mouth every eight (8) hours as needed for Pain. Max Daily Amount: 3 Tabs. Indications: Pain  -     HYDROcodone-acetaminophen (NORCO)  mg tablet; Take 1 Tab by mouth every eight (8) hours as needed for Pain. Max Daily Amount: 3 Tabs.  Indications: Pain  -     HYDROcodone-acetaminophen (NORCO)  mg tablet; Take 1 Tab by mouth every eight (8) hours as needed for Pain. Max Daily Amount: 3 Tabs. Indications: Pain        Health Maintenance Due   Topic Date Due    EYE EXAM RETINAL OR DILATED Q1  08/07/2015    GLAUCOMA SCREENING Q2Y  07/04/2016           SUBSEQUENT MEDICARE WELLNESS  This is the Subsequent Medicare Annual Wellness Exam, performed 12 months or more after the Initial AWV or the last Subsequent AWV    I have reviewed the patient's medical history in detail and updated the computerized patient record. Ty Persons RTC today to follow up on chronic pain diagnosis. We discussed her osteoarthritis that is affecting her left hip, left shoulder and lumbar back. Significant changes since last visit: none. She is  able to do her normal daily activities. She reports the following adverse side effects: none. Previous treatments include heat, cold, Lidocaine patches otc. Least pain over the last week has been 4/10. Worst pain over the last week has been 10/10. Opioid Risk Tool Reviewed: YES    Aberrant behaviors: None. Urine Drug Screen: Reviewed and she was positive for Tramadol. This was discussed at the last office visit and she has gotten rid of her prescription and states understanding she can only take what is prescribed by our office. .    Controlled substance agreement on file: YES. Concomitant use of a benzodiazepine: no    Narcan not warranted at this time. She has established with rheumatology and is not satisfied with the provider. She is planning to return to Dr. Cynthia Burns. She is awaiting a shoulder repair but is hesitant to complete this. Reports pain is well controlled on current regimen and has been stable for many years. Is not increasing use over time. Patient is a 78 y.o. female that comes today for follow up of Diabetes Mellitus Type 2. Patient does not regularly monitor  their FSBG at home. She ran out out of strips for her meter.   Meter type is unknown. does not rigorously follow a diabetic diet  Patients activity level: sedentery  there was no change in patient's weight. .  The patient does not experienced recent hypoglycemia. reports that she quit smoking about 17 months ago. Her smoking use included Cigarettes. She has a 20.00 pack-year smoking history. She has never used smokeless tobacco.    Lab Results   Component Value Date/Time    Hemoglobin A1c 11.7 (H) 11/09/2017 10:03 AM    Hemoglobin A1c 9.1 (H) 07/31/2017 12:13 PM    Hemoglobin A1c 9.7 (H) 05/08/2017 10:59 AM         History     Past Medical History:   Diagnosis Date    Abnormal pulmonary function test 9/28/2016    AR (allergic rhinitis)     Asthma     Atrial thrombus 1994    left    Chronic bronchitis (HCC) 9/28/2016    Chronic pain     DDD (degenerative disc disease), lumbar     Diabetes ([address]     DJD (degenerative joint disease) of hip     right    Empty sella ([address] 2000    by MRI    Former smoker 10/24/2016    Hoarseness of voice     Hypercholesterolemia     Hypertension     Lesion of vocal cord     PMR (polymyalgia rheumatica) ([address] 5/18/2016    Smoker 12/5/2012      Past Surgical History:   Procedure Laterality Date    ENDOSCOPY, COLON, DIAGNOSTIC  10/03    polypectomy    ENDOSCOPY, COLON, DIAGNOSTIC  2/05    Dr. Silverio Marlow, DIAGNOSTIC  2010, reported    due 2015    HX APPENDECTOMY  1970's    HX BREAST REDUCTION      HX COLONOSCOPY  2015    dr Melina Diaz. 2 polyps removed -repeat in 2018     HX HEMORRHOIDECTOMY      HX HYSTERECTOMY      ovaries spared    HX KNEE REPLACEMENT  11/03, 10/05    left then right    HX TONSILLECTOMY  age 29    tonsils     Current Outpatient Prescriptions   Medication Sig Dispense Refill    atorvastatin (LIPITOR) 20 mg tablet Take  by mouth daily.  SITagliptin (JANUVIA) 100 mg tablet Take 1 Tab by mouth daily.  30 Tab 3    HYDROcodone-acetaminophen (NORCO)  mg tablet Take 1 Tab by mouth every eight (8) hours as needed for Pain. Max Daily Amount: 3 Tabs. Indications: Pain 90 Tab 0    [START ON 4/26/2018] HYDROcodone-acetaminophen (NORCO)  mg tablet Take 1 Tab by mouth every eight (8) hours as needed for Pain. Max Daily Amount: 3 Tabs. Indications: Pain 90 Tab 0    [START ON 5/26/2018] HYDROcodone-acetaminophen (NORCO)  mg tablet Take 1 Tab by mouth every eight (8) hours as needed for Pain. Max Daily Amount: 3 Tabs. Indications: Pain 90 Tab 0    diltiazem (TIAZAC) 420 mg SR capsule TAKE 1 CAPSULE EVERY DAY 90 Cap 3    Insulin Needles, Disposable, 30 gauge x 1/3\" Dx: E11.9 UAD QHS. 1 Package 11    glipiZIDE SR (GLUCOTROL XL) 10 mg CR tablet Take 1 Tab by mouth daily. 90 Tab 3    losartan-hydroCHLOROthiazide (HYZAAR) 100-12.5 mg per tablet TAKE 1 TABLET DAILY 90 Tab 3    metFORMIN ER (GLUCOPHAGE XR) 500 mg tablet Take 4 Tabs by mouth daily (with dinner). Dose increase 360 Tab 5    albuterol (PROVENTIL VENTOLIN) 2.5 mg /3 mL (0.083 %) nebulizer solution 3 mL by Nebulization route every six (6) hours as needed for Wheezing. 60 Each 2    albuterol (PROVENTIL HFA, VENTOLIN HFA, PROAIR HFA) 90 mcg/actuation inhaler Take 2 Puffs by inhalation every six (6) hours as needed for Wheezing. 1 Inhaler 1    nortriptyline (PAMELOR) 10 mg capsule Take 1 Cap by mouth nightly. 90 Cap 3    Blood-Glucose Meter (FREESTYLE LITE METER) monitoring kit Tid use due to uncontrolled dm and hyperglycemia. 250.02. 790.29 1 Kit 0    Lancets (FREESTYLE LANCETS) misc Tid use due to uncontrolled dm and hyperglycemia. 250.02. 790.29 1 Each 11    glucose blood VI test strips (ASCENSIA AUTODISC VI, ONE TOUCH ULTRA TEST VI) strip Tid use due to uncontrolled dm and hyperglycemia. 250.02. 790.29 100 Each 11    Blood-Glucose Meter monitoring kit One touch meter 1 Kit 1    Cetirizine (ZYRTEC) 10 mg Cap Take  by mouth.  aspirin 81 mg chewable tablet Take 81 mg by mouth daily.       MULTIVITS W-FE,OTHER MIN (CENTRUM PO) Take  by mouth daily. Allergies   Allergen Reactions    Bactrim [Sulfamethoxazole-Trimethoprim] Nausea Only    Biaxin [Clarithromycin] Nausea Only    Demerol [Meperidine] Itching    Erythromycin Nausea Only    Pcn [Penicillins] Hives    Percocet [Oxycodone-Acetaminophen] Itching    Pravastatin Nausea Only    Tetracycline Nausea Only    Voltaren [Diclofenac Sodium] Hives     Family History   Problem Relation Age of Onset    Arthritis-osteo Mother     Asthma Mother     Diabetes Mother     Hypertension Mother     Stroke Mother     Arthritis-osteo Father     Cancer Father     Elevated Lipids Father     Hypertension Father     Arthritis-osteo Sister     Asthma Sister     Diabetes Sister     Elevated Lipids Sister     Hypertension Sister     Heart Attack Sister     Hypertension Daughter     Hypertension Daughter     Hypertension Daughter     Anesth Problems Neg Hx      Social History   Substance Use Topics    Smoking status: Former Smoker     Packs/day: 0.50     Years: 40.00     Types: Cigarettes     Quit date: 10/7/2016    Smokeless tobacco: Never Used    Alcohol use No     Patient Active Problem List   Diagnosis Code    Hypertension I10    Hypercholesterolemia E78.00    Type II or unspecified type diabetes mellitus without mention of complication, not stated as uncontrolled E11.9    Other \"heavy-for-dates\" infants P08.1    Type 2 diabetes mellitus without complication ([facility] I04.5    Osteoarthritis of hip M16.9    PMR (polymyalgia rheumatica) ([facility]) M35.3    Chronic bronchitis ([facility]) J42    Abnormal pulmonary function test R94.2    Former smoker Z87.891    Chronic pain G89.29    Long term (current) use of systemic steroids Z79.52    Obesity (BMI 30-39. 9) E66.9    Type 2 diabetes mellitus with nephropathy ([facility]) E11.21    Severe obesity (BMI 35.0-39. 9) with comorbidity ([facility] E66.01       Depression Risk Factor Screening:     PHQ over the last two weeks 12/21/2017 Little interest or pleasure in doing things Not at all   Feeling down, depressed or hopeless Not at all   Total Score PHQ 2 0     Alcohol Risk Factor Screening: You do not drink alcohol or very rarely. Functional Ability and Level of Safety:   Hearing Loss  Hearing is good. Activities of Daily Living  The home contains: no safety equipment. Patient does total self care    Fall Risk  Fall Risk Assessment, last 12 mths 12/21/2017   Able to walk? Yes   Fall in past 12 months?  No   Fall with injury? -   Number of falls in past 12 months -   Fall Risk Score -       Abuse Screen  Patient is not abused    Cognitive Screening   Evaluation of Cognitive Function:  Has your family/caregiver stated any concerns about your memory: no  Normal    Patient Care Team   Patient Care Team:  Kai Trimble NP as PCP - General (Nurse Practitioner)  Mari Sargent RN as Ambulatory Care Navigator  Nena Carrizales MD as Physician (Orthopedic Surgery)

## 2018-03-26 NOTE — PROGRESS NOTES
Chief Complaint   Patient presents with    Arthritis     3 month follow up      1. Have you been to the ER, urgent care clinic since your last visit? Hospitalized since your last visit? No    2. Have you seen or consulted any other health care providers outside of the [address] [place] [address] since your last visit? Include any pap smears or colon screening.  No

## 2018-03-26 NOTE — MR AVS SNAPSHOT
[address] 
[phone] Patient: Merari Kumari MRN: [id number] KXC:[id number] Visit Information Date & Time Provider Department Dept. Phone Encounter #  
 3/26/2018  2:00 PM Slime Leon NP [facility] [phone] [id number] Follow-up Instructions Return in about 3 months (around 6/26/2018) for Follow Up. Upcoming Health Maintenance Date Due  
 EYE EXAM RETINAL OR DILATED Q1 8/7/2015 GLAUCOMA SCREENING Q2Y 7/4/2016 MEDICARE YEARLY EXAM 3/14/2018 HEMOGLOBIN A1C Q6M 5/9/2018 FOOT EXAM Q1 7/31/2018 BREAST CANCER SCRN MAMMOGRAM 10/5/2018 MICROALBUMIN Q1 11/9/2018 LIPID PANEL Q1 11/9/2018 Pneumococcal 65+ Low/Medium Risk (2 of 2 - PPSV23) 12/21/2018 COLONOSCOPY 6/1/2020 DTaP/Tdap/Td series (2 - Td) 5/13/2023 Allergies as of 3/26/2018  Review Complete On: 3/26/2018 By: Slime Leon NP Severity Noted Reaction Type Reactions Bactrim [Sulfamethoxazole-trimethoprim]  04/02/2010    Nausea Only Biaxin [Clarithromycin]  04/02/2010    Nausea Only Demerol [Meperidine]  04/02/2010    Itching Erythromycin  04/02/2010    Nausea Only Pcn [Penicillins]  04/02/2010    Hives Percocet [Oxycodone-acetaminophen]  04/02/2010    Itching Pravastatin  03/26/2018    Nausea Only Tetracycline  04/02/2010    Nausea Only Voltaren [Diclofenac Sodium]  04/02/2010    Hives Current Immunizations  Reviewed on 11/9/2017 Name Date Influenza High Dose Vaccine PF 11/9/2017, 1/29/2016 Influenza Vaccine 12/5/2012 Influenza Vaccine PF 11/17/2014 PPD 1/25/1999 Pneumococcal Polysaccharide (PPSV-23) 5/13/2013 TD Vaccine 5/12/1999 Tdap 5/13/2013 Not reviewed this visit You Were Diagnosed With   
  
 Codes Comments  Type 2 diabetes mellitus with nephropathy [facility]    -  Primary ICD-10-CM: E11.21 
 ICD-9-CM: 250.40, 583.81 Medicare annual wellness visit, subsequent     ICD-10-CM: Z00.00 ICD-9-CM: V70.0 Osteoarthritis of hip, unspecified laterality, unspecified osteoarthritis type     ICD-10-CM: M16.9 ICD-9-CM: 715.95 Primary osteoarthritis of left shoulder     ICD-10-CM: M19.012 
ICD-9-CM: 715.11 Vitals BP Pulse Temp Resp Height(growth percentile) Weight(growth percentile) 153/76 (BP 1 Location: Left arm, BP Patient Position: Sitting) 85 98.3 °F (36.8 °C) (Oral) 18 5' 4\" (1.626 m) 212 lb (96.2 kg) SpO2 BMI OB Status Smoking Status 97% 36.39 kg/m2 Hysterectomy Former Smoker Vitals History BMI and BSA Data Body Mass Index Body Surface Area  
 36.39 kg/m 2 2.08 m 2 Preferred Pharmacy Pharmacy Name Phone [address] [phone] Your Updated Medication List  
  
   
This list is accurate as of 3/26/18  3:11 PM.  Always use your most recent med list.  
  
  
  
  
 * albuterol 2.5 mg /3 mL (0.083 %) nebulizer solution Commonly known as:  PROVENTIL VENTOLIN  
3 mL by Nebulization route every six (6) hours as needed for Wheezing. * albuterol 90 mcg/actuation inhaler Commonly known as:  PROVENTIL HFA, VENTOLIN HFA, PROAIR HFA Take 2 Puffs by inhalation every six (6) hours as needed for Wheezing. aspirin 81 mg chewable tablet Take 81 mg by mouth daily. * Blood-Glucose Meter monitoring kit One touch meter * Blood-Glucose Meter monitoring kit Commonly known as:  FREESTYLE LITE METER Tid use due to uncontrolled dm and hyperglycemia. 250.02. 790.29 CENTRUM PO Take  by mouth daily. diltiazem 420 mg SR capsule Commonly known as:  [facility] TAKE 1 CAPSULE EVERY DAY  
  
 glipiZIDE SR 10 mg CR tablet Commonly known as:  GLUCOTROL XL Take 1 Tab by mouth daily. glucose blood VI test strips strip Commonly known as:  ASCENSIA AUTODISC VI, ONE TOUCH ULTRA TEST VI Tid use due to uncontrolled dm and hyperglycemia. 250.02. 790.29  
  
 * HYDROcodone-acetaminophen  mg tablet Commonly known as:  Clemetine Alexandru Take 1 Tab by mouth every eight (8) hours as needed for Pain. Max Daily Amount: 3 Tabs. Indications: Pain  
  
 * HYDROcodone-acetaminophen  mg tablet Commonly known as:  Clemetine Irving Take 1 Tab by mouth every eight (8) hours as needed for Pain. Max Daily Amount: 3 Tabs. Indications: Pain Start taking on:  4/26/2018  
  
 * HYDROcodone-acetaminophen  mg tablet Commonly known as:  Clemetine Alexandru Take 1 Tab by mouth every eight (8) hours as needed for Pain. Max Daily Amount: 3 Tabs. Indications: Pain Start taking on:  5/26/2018 Insulin Needles (Disposable) 30 gauge x 1/3\" Dx: E11.9 UAD QHS. Lancets Misc Commonly known as:  FREESTYLE LANCETS Tid use due to uncontrolled dm and hyperglycemia. 250.02. 790.29 LIPITOR 20 mg tablet Generic drug:  atorvastatin Take  by mouth daily. losartan-hydroCHLOROthiazide 100-12.5 mg per tablet Commonly known as:  HYZAAR  
TAKE 1 TABLET DAILY  
  
 metFORMIN  mg tablet Commonly known as:  GLUCOPHAGE XR Take 4 Tabs by mouth daily (with dinner). Dose increase  
  
 nortriptyline 10 mg capsule Commonly known as:  PAMELOR Take 1 Cap by mouth nightly. SITagliptin 100 mg tablet Commonly known as:  Ana Arminto Take 1 Tab by mouth daily. ZyrTEC 10 mg Cap Generic drug:  Cetirizine Take  by mouth. * Notice: This list has 7 medication(s) that are the same as other medications prescribed for you. Read the directions carefully, and ask your doctor or other care provider to review them with you. Prescriptions Printed Refills HYDROcodone-acetaminophen (NORCO)  mg tablet 0 Sig: Take 1 Tab by mouth every eight (8) hours as needed for Pain. Max Daily Amount: 3 Tabs. Indications: Pain Class: Print Route: Oral  
 HYDROcodone-acetaminophen (NORCO)  mg tablet 0 Starting on: 4/26/2018 Sig: Take 1 Tab by mouth every eight (8) hours as needed for Pain. Max Daily Amount: 3 Tabs. Indications: Pain Class: Print Route: Oral  
 HYDROcodone-acetaminophen (NORCO)  mg tablet 0 Starting on: 5/26/2018 Sig: Take 1 Tab by mouth every eight (8) hours as needed for Pain. Max Daily Amount: 3 Tabs. Indications: Pain Class: Print Route: Oral  
  
Prescriptions Sent to Pharmacy Refills SITagliptin (JANUVIA) 100 mg tablet 3 Sig: Take 1 Tab by mouth daily. Class: Normal  
 Pharmacy: [address] [place] [address], [facility] #: [phone] Route: Oral  
  
We Performed the Following AMB POC HEMOGLOBIN A1C [85209 CPT(R)] REFERRAL TO OPHTHALMOLOGY [REF57 Custom] Follow-up Instructions Return in about 3 months (around 6/26/2018) for Follow Up. Referral Information Referral ID Referred By Referred To  
  
 [id number] Levi Moore [facility] [address], [address] [facility] Status Start Date End Date 1 New Request 3/26/18 3/26/19 If your referral has a status of pending review or denied, additional information will be sent to support the outcome of this decision. Patient Instructions Learning About Meal Planning for Diabetes Why plan your meals? Meal planning can be a key part of managing diabetes. Planning meals and snacks with the right balance of carbohydrate, protein, and fat can help you keep your blood sugar at the target level you set with your doctor. You don't have to eat special foods. You can eat what your family eats, including sweets once in a while. But you do have to pay attention to how often you eat and how much you eat of certain foods. You may want to work with a dietitian or a certified diabetes educator.  He or she can give you tips and meal ideas and can answer your questions about meal planning. This health professional can also help you reach a healthy weight if that is one of your goals. What plan is right for you? Your dietitian or diabetes educator may suggest that you start with the plate format or carbohydrate counting. The plate format The plate format is a simple way to help you manage how you eat. You plan meals by learning how much space each food should take on a plate. Using the plate format helps you spread carbohydrate throughout the day. It can make it easier to keep your blood sugar level within your target range. It also helps you see if you're eating healthy portion sizes. To use the plate format, you put non-starchy vegetables on half your plate. Add meat or meat substitutes on one-quarter of the plate. Put a grain or starchy vegetable (such as brown rice or a potato) on the final quarter of the plate. You can add a small piece of fruit and some low-fat or fat-free milk or yogurt, depending on your carbohydrate goal for each meal. 
Here are some tips for using the plate format: · Make sure that you are not using an oversized plate. A 9-inch plate is best. Many restaurants use larger plates. · Get used to using the plate format at home. Then you can use it when you eat out. · Write down your questions about using the plate format. Talk to your doctor, a dietitian, or a diabetes educator about your concerns. Carbohydrate counting With carbohydrate counting, you plan meals based on the amount of carbohydrate in each food. Carbohydrate raises blood sugar higher and more quickly than any other nutrient. It is found in desserts, breads and cereals, and fruit. It's also found in starchy vegetables such as potatoes and corn, grains such as rice and pasta, and milk and yogurt. Spreading carbohydrate throughout the day helps keep your blood sugar levels within your target range. Your daily amount depends on several things, including your weight, how active you are, which diabetes medicines you take, and what your goals are for your blood sugar levels. A registered dietitian or diabetes educator can help you plan how much carbohydrate to include in each meal and snack. A guideline for your daily amount of carbohydrate is: · 45 to 60 grams at each meal. That's about the same as 3 to 4 carbohydrate servings. · 15 to 20 grams at each snack. That's about the same as 1 carbohydrate serving. The Nutrition Facts label on packaged foods tells you how much carbohydrate is in a serving of the food. First, look at the serving size on the food label. Is that the amount you eat in a serving? All of the nutrition information on a food label is based on that serving size. So if you eat more or less than that, you'll need to adjust the other numbers. Total carbohydrate is the next thing you need to look for on the label. If you count carbohydrate servings, one serving of carbohydrate is 15 grams. For foods that don't come with labels, such as fresh fruits and vegetables, you'll need a guide that lists carbohydrate in these foods. Ask your doctor, dietitian, or diabetes educator about books or other nutrition guides you can use. If you take insulin, you need to know how many grams of carbohydrate are in a meal. This lets you know how much rapid-acting insulin to take before you eat. If you use an insulin pump, you get a constant rate of insulin during the day. So the pump must be programmed at meals to give you extra insulin to cover the rise in blood sugar after meals. When you know how much carbohydrate you will eat, you can take the right amount of insulin. Or, if you always use the same amount of insulin, you need to make sure that you eat the same amount of carbohydrate at meals.  
If you need more help to understand carbohydrate counting and food labels, ask your doctor, dietitian, or diabetes educator. How do you get started with meal planning? Here are some tips to get started: 
· Plan your meals a week at a time. Don't forget to include snacks too. · Use cookbooks or online recipes to plan several main meals. Plan some quick meals for busy nights. You also can double some recipes that freeze well. Then you can save half for other busy nights when you don't have time to cook. · Make sure you have the ingredients you need for your recipes. If you're running low on basic items, put these items on your shopping list too. · List foods that you use to make breakfasts, lunches, and snacks. List plenty of fruits and vegetables. · Post this list on the refrigerator. Add to it as you think of more things you need. · Take the list to the store to do your weekly shopping. Follow-up care is a key part of your treatment and safety. Be sure to make and go to all appointments, and call your doctor if you are having problems. It's also a good idea to know your test results and keep a list of the medicines you take. Where can you learn more? Go to http://perry-liborio.info/. Bernadine Sheppard in the search box to learn more about \"Learning About Meal Planning for Diabetes. \" Current as of: March 13, 2017 Content Version: 11.4 © 5100-0411 Healthwise, [employer]orporated. Care instructions adapted under license by [employer] (which disclaims liability or warranty for this information). If you have questions about a medical condition or this instruction, always ask your healthcare professional. [address] any warranty or liability for your use of this information. Introducing [facility]! [facility] introduces [employer] patient portal. Now you can access parts of your medical record, email your doctor's office, and request medication refills online.    
 
1. In your internet browser, go to [email or web address] 2. Click on the First Time User? Click Here link in the Sign In box. You will see the New Member Sign Up page. 3. Enter your [employer] Access Code exactly as it appears below. You will not need to use this code after youve completed the sign-up process. If you do not sign up before the expiration date, you must request a new code. · [employer] Access Code: [email or web address] Expires: 6/24/2018  2:07 PM 
 
4. Enter the last four digits of your Social Security Number (xxxx) and Date of Birth (mm/dd/yyyy) as indicated and click Submit. You will be taken to the next sign-up page. 5. Create a Techno[employer] ID. This will be your [employer] login ID and cannot be changed, so think of one that is secure and easy to remember. 6. Create a [employer] password. You can change your password at any time. 7. Enter your Password Reset Question and Answer. This can be used at a later time if you forget your password. 8. Enter your e-mail address. You will receive e-mail notification when new information is available in [address] 9. Click Sign Up. You can now view and download portions of your medical record. 10. Click the Download Summary menu link to download a portable copy of your medical information. If you have questions, please visit the Frequently Asked Questions section of the [employer] website. Remember, [employer] is NOT to be used for urgent needs. For medical emergencies, dial 911. Now available from your iPhone and Android! Please provide this summary of care documentation to your next provider. Your primary care clinician is listed as Claudia Lucio. If you have any questions after today's visit, please call [phone].

## 2018-04-13 ENCOUNTER — TELEPHONE (OUTPATIENT)
Dept: FAMILY MEDICINE CLINIC | Age: 68
End: 2018-04-13

## 2018-04-13 NOTE — TELEPHONE ENCOUNTER
Call to patient.  verified. Patient complains of Productive cough with yellow and green sputum, chest congestion, fever of 103 last night, chills, denies nausea, vomiting, or diarrhea. Has been taking otc mucinex     Advised patient she needs to be evaluated in office. Offered patient Saturday appointment with Dr. Waleska Ferris. Patient scheduled for 130pm tomorrow.

## 2018-04-13 NOTE — TELEPHONE ENCOUNTER
Patient is requesting a call back from nurse , She did not give any details, just wanted to talk to [facility] nurse.     Last seen : March 26, 2018  Patients best call back : [phone]

## 2018-04-14 ENCOUNTER — OFFICE VISIT (OUTPATIENT)
Dept: FAMILY MEDICINE CLINIC | Age: 68
End: 2018-04-14

## 2018-04-14 VITALS
HEART RATE: 91 BPM | BODY MASS INDEX: 35.82 KG/M2 | SYSTOLIC BLOOD PRESSURE: 135 MMHG | DIASTOLIC BLOOD PRESSURE: 63 MMHG | WEIGHT: 209.8 LBS | TEMPERATURE: 99.1 F | RESPIRATION RATE: 18 BRPM | HEIGHT: 64 IN | OXYGEN SATURATION: 97 %

## 2018-04-14 DIAGNOSIS — R11.0 NAUSEA: ICD-10-CM

## 2018-04-14 DIAGNOSIS — M35.3 PMR (POLYMYALGIA RHEUMATICA) (HCC): ICD-10-CM

## 2018-04-14 DIAGNOSIS — R05.9 COUGH: ICD-10-CM

## 2018-04-14 DIAGNOSIS — E66.01 SEVERE OBESITY (BMI 35.0-39.9) WITH COMORBIDITY (HCC): ICD-10-CM

## 2018-04-14 DIAGNOSIS — R50.9 FEVER, UNSPECIFIED FEVER CAUSE: ICD-10-CM

## 2018-04-14 DIAGNOSIS — E11.21 TYPE 2 DIABETES MELLITUS WITH NEPHROPATHY (HCC): ICD-10-CM

## 2018-04-14 DIAGNOSIS — R06.2 WHEEZING: ICD-10-CM

## 2018-04-14 DIAGNOSIS — J06.9 UPPER RESPIRATORY TRACT INFECTION, UNSPECIFIED TYPE: ICD-10-CM

## 2018-04-14 DIAGNOSIS — E11.9 TYPE 2 DIABETES MELLITUS WITHOUT COMPLICATION, WITHOUT LONG-TERM CURRENT USE OF INSULIN (HCC): ICD-10-CM

## 2018-04-14 DIAGNOSIS — J44.1 COPD EXACERBATION (HCC): Primary | ICD-10-CM

## 2018-04-14 DIAGNOSIS — J42 CHRONIC BRONCHITIS, UNSPECIFIED CHRONIC BRONCHITIS TYPE (HCC): ICD-10-CM

## 2018-04-14 RX ORDER — PREDNISONE 20 MG/1
40 TABLET ORAL
Qty: 10 TAB | Refills: 0 | Status: SHIPPED | OUTPATIENT
Start: 2018-04-14 | End: 2018-05-07 | Stop reason: ALTCHOICE

## 2018-04-14 RX ORDER — AZITHROMYCIN 250 MG/1
TABLET, FILM COATED ORAL
Qty: 6 TAB | Refills: 0 | Status: SHIPPED | OUTPATIENT
Start: 2018-04-14 | End: 2018-04-19

## 2018-04-14 RX ORDER — IPRATROPIUM BROMIDE 0.5 MG/2.5ML
0.5 SOLUTION RESPIRATORY (INHALATION)
Qty: 2.5 ML | Refills: 0 | Status: SHIPPED | OUTPATIENT
Start: 2018-04-14 | End: 2018-04-14

## 2018-04-14 RX ORDER — INSULIN ASPART 100 [IU]/ML
INJECTION, SOLUTION INTRAVENOUS; SUBCUTANEOUS
Qty: 1 PEN | Refills: 1 | Status: SHIPPED | OUTPATIENT
Start: 2018-04-14 | End: 2018-05-07

## 2018-04-14 RX ORDER — IPRATROPIUM BROMIDE AND ALBUTEROL SULFATE 2.5; .5 MG/3ML; MG/3ML
3 SOLUTION RESPIRATORY (INHALATION)
Qty: 1 NEBULE | Refills: 0
Start: 2018-04-14 | End: 2018-04-14

## 2018-04-14 NOTE — PROGRESS NOTES
Chief Complaint   Patient presents with    Chest Congestion     x 4 days. Productive cough with yeloow and green mucous. 1. Have you been to the ER, urgent care clinic since your last visit? Hospitalized since your last visit? No    2. Have you seen or consulted any other health care providers outside of the [address] [place] [address] since your last visit? Include any pap smears or colon screening.  No

## 2018-04-14 NOTE — PROGRESS NOTES
Klever Powers [facility]  [address] [place], [address]  [phone]             Date of visit: 4/14/2018   Subjective:      History obtained from:  the patient. Naldo Antunez is a 78 y.o. female who presents today for coughing 4 days, running fever, sweating, chills. Not hungry, weak. Feels nauseated. Admits to sob. Used to be on advair years ago, then was on spiriva for a while. Not on a daily inhaler now. Used albuterol neb this AM and it did help some. Does have some nose/throat congestion. Said she has never felt this bad. Eyes red, swollen, not sure why    Has DM and has declined insulin. Last a1c 8 but one prior to that was 11. Hasn't been on steroids for almost a year (was on them for a while for PMR)  Says her breathing hasn't bothered her at all in the last year      Quit smoking 2 years ago  Had PFTs 2 years ago showing severely decreased DCLO but no obstruction or restriction. However was on advair at that time. Has seen pulmonology before but only for sleep study recently    Has cpap and usually wears it but not with this cold. Patient Active Problem List    Diagnosis Date Noted    Severe obesity (BMI 35.0-39. 9) with comorbidity ([address] 03/26/2018    Type 2 diabetes mellitus with nephropathy ([address] 12/21/2017    Obesity (BMI 30-39.9) 12/12/2017    Chronic pain 07/31/2017    Long term (current) use of systemic steroids 07/31/2017    Former smoker 10/24/2016    Chronic bronchitis ([address] 09/28/2016    Abnormal pulmonary function test 09/28/2016    PMR (polymyalgia rheumatica) ([address] 05/18/2016    Type 2 diabetes mellitus without complication ([address] 22/33/9577    Osteoarthritis of hip     Other \"heavy-for-dates\" infants     Type II or unspecified type diabetes mellitus without mention of complication, not stated as uncontrolled 05/06/2010    Hypertension     Hypercholesterolemia      Current Outpatient Prescriptions   Medication Sig Dispense Refill    albuterol-ipratropium (DUO-NEB) 2.5 mg-0.5 mg/3 ml nebu 3 mL by Nebulization route now for 1 dose. 1 Nebule 0    predniSONE (DELTASONE) 20 mg tablet Take 2 Tabs by mouth daily (with breakfast). 10 Tab 0    azithromycin (ZITHROMAX) 250 mg tablet Take 2 tablets today, then take 1 tablet daily 6 Tab 0    insulin aspart U-100 (NOVOLOG) 100 unit/mL inpn Use every 4 hours per sliding scale (temporary), please dispense with pen needles 1 Pen 1    atorvastatin (LIPITOR) 20 mg tablet Take  by mouth daily.  SITagliptin (JANUVIA) 100 mg tablet Take 1 Tab by mouth daily. 30 Tab 3    HYDROcodone-acetaminophen (NORCO)  mg tablet Take 1 Tab by mouth every eight (8) hours as needed for Pain. Max Daily Amount: 3 Tabs. Indications: Pain 90 Tab 0    [START ON 4/26/2018] HYDROcodone-acetaminophen (NORCO)  mg tablet Take 1 Tab by mouth every eight (8) hours as needed for Pain. Max Daily Amount: 3 Tabs. Indications: Pain 90 Tab 0    [START ON 5/26/2018] HYDROcodone-acetaminophen (NORCO)  mg tablet Take 1 Tab by mouth every eight (8) hours as needed for Pain. Max Daily Amount: 3 Tabs. Indications: Pain 90 Tab 0    diltiazem (TIAZAC) 420 mg SR capsule TAKE 1 CAPSULE EVERY DAY 90 Cap 3    Insulin Needles, Disposable, 30 gauge x 1/3\" Dx: E11.9 UAD QHS. 1 Package 11    glipiZIDE SR (GLUCOTROL XL) 10 mg CR tablet Take 1 Tab by mouth daily. 90 Tab 3    losartan-hydroCHLOROthiazide (HYZAAR) 100-12.5 mg per tablet TAKE 1 TABLET DAILY 90 Tab 3    metFORMIN ER (GLUCOPHAGE XR) 500 mg tablet Take 4 Tabs by mouth daily (with dinner). Dose increase 360 Tab 5    albuterol (PROVENTIL VENTOLIN) 2.5 mg /3 mL (0.083 %) nebulizer solution 3 mL by Nebulization route every six (6) hours as needed for Wheezing. 60 Each 2    albuterol (PROVENTIL HFA, VENTOLIN HFA, PROAIR HFA) 90 mcg/actuation inhaler Take 2 Puffs by inhalation every six (6) hours as needed for Wheezing.  1 Inhaler 1    nortriptyline (PAMELOR) 10 mg capsule Take 1 Cap by mouth nightly. 90 Cap 3    Blood-Glucose Meter (FREESTYLE LITE METER) monitoring kit Tid use due to uncontrolled dm and hyperglycemia. 250.02. 790.29 1 Kit 0    Lancets (FREESTYLE LANCETS) misc Tid use due to uncontrolled dm and hyperglycemia. 250.02. 790.29 1 Each 11    glucose blood VI test strips (ASCENSIA AUTODISC VI, ONE TOUCH ULTRA TEST VI) strip Tid use due to uncontrolled dm and hyperglycemia. 250.02. 790.29 100 Each 11    Blood-Glucose Meter monitoring kit One touch meter 1 Kit 1    Cetirizine (ZYRTEC) 10 mg Cap Take  by mouth.  aspirin 81 mg chewable tablet Take 81 mg by mouth daily.  MULTIVITS W-FE,OTHER MIN (CENTRUM PO) Take  by mouth daily.        Allergies   Allergen Reactions    Bactrim [Sulfamethoxazole-Trimethoprim] Nausea Only    Biaxin [Clarithromycin] Nausea Only    Demerol [Meperidine] Itching    Erythromycin Nausea Only    Pcn [Penicillins] Hives    Percocet [Oxycodone-Acetaminophen] Itching    Pravastatin Nausea Only    Tetracycline Nausea Only    Voltaren [Diclofenac Sodium] Hives     Past Medical History:   Diagnosis Date    Abnormal pulmonary function test 9/28/2016    AR (allergic rhinitis)     Asthma     Atrial thrombus 1994    left    Chronic bronchitis (HCC) 9/28/2016    Chronic pain     DDD (degenerative disc disease), lumbar     Diabetes ([address]     DJD (degenerative joint disease) of hip     right    Empty sella ([address] 2000    by MRI    Former smoker 10/24/2016    Hoarseness of voice     Hypercholesterolemia     Hypertension     Lesion of vocal cord     PMR (polymyalgia rheumatica) ([address] 5/18/2016    Smoker 12/5/2012     Past Surgical History:   Procedure Laterality Date    ENDOSCOPY, COLON, DIAGNOSTIC  10/03    polypectomy    ENDOSCOPY, COLON, DIAGNOSTIC  2/05    Dr. Acosta Bean, COLON, DIAGNOSTIC  2010, reported    due 2015    HX APPENDECTOMY  1970's    HX BREAST REDUCTION      HX COLONOSCOPY  2015     gissel. 2 polyps removed -repeat in 2018     HX HEMORRHOIDECTOMY      HX HYSTERECTOMY      ovaries spared    HX KNEE REPLACEMENT  11/03, 10/05    left then right    HX TONSILLECTOMY  age 29    tonsils     Family History   Problem Relation Age of Onset    Arthritis-osteo Mother     Asthma Mother     Diabetes Mother     Hypertension Mother     Stroke Mother     Arthritis-osteo Father     Cancer Father     Elevated Lipids Father     Hypertension Father     Arthritis-osteo Sister     Asthma Sister     Diabetes Sister     Elevated Lipids Sister     Hypertension Sister     Heart Attack Sister     Hypertension Daughter     Hypertension Daughter     Hypertension Daughter     Anesth Problems Neg Hx      Social History   Substance Use Topics    Smoking status: Former Smoker     Packs/day: 0.50     Years: 40.00     Types: Cigarettes     Quit date: 10/7/2016    Smokeless tobacco: Never Used    Alcohol use No      Social History     Social History Narrative        Review of Systems  ENT admits to runny nose, sore throat,  GI admits to nausea, no vomiting     Objective:     Vitals:    04/14/18 1314 04/14/18 1324   BP: 154/66 135/63   Pulse: 91    Resp: 18    Temp: 99.1 °F (37.3 °C)    TempSrc: Oral    SpO2: 97%    Weight: 209 lb 12.8 oz (95.2 kg)    Height: 5' 4\" (1.626 m)      Body mass index is 36.01 kg/(m^2). General: stated age, obese and in NAD  Eyes: lids and bulbar conjunctiva mildly swollen and injected bilaterally with watery drainage from both eyes  Nose: no drainage  Neck: supple, symmetrical, trachea midline, no adenopathy and thyroid: not enlarged, symmetric, no tenderness/mass/nodules  Ears: TMs clear bilaterally, canals patent without inflammation  Throat: clear, no tonsillar exudate or erthema  Mouth: no lesions noted  Lungs:  Diffuse exp wheezing with moderately decreased breath sounds and mild respiratory distress with use of accessory muscles of respiration.  No rales heard.  Heart: regular rate and rhythm, S1, S2 normal, no murmur, click, rub or gallop  Lymph: no cervical adenopathy appreciated  Ext: no edema  Skin:  No rashes or lesions    Exam after neb: patient had no visible respiratory distress and had improvement in air movement and less wheezing bilaterally. No rales heard    CXR: no acute    Assessment/Plan:       ICD-10-CM ICD-9-CM    1. COPD exacerbation ([facility]) J44.1 491.21 XR CHEST PA LAT      IPRATROPIUM BROMIDE NON-COMP      SC PRESSURIZED/NONPRESSURIZED INHALATION TREATMENT      DISCONTINUED: ipratropium (ATROVENT) 0.02 % soln   2. Cough R05 786.2 XR CHEST PA LAT   3. Wheezing R06.2 786.07    4. Fever, unspecified fever cause R50.9 780.60    5. Nausea R11.0 787.02    6. Severe obesity (BMI 35.0-39. 9) with comorbidity ([facility] E66.01 278.01    7. Type 2 diabetes mellitus without complication, without long-term current use of insulin ([facility]) E11.9 250.00    8. PMR (polymyalgia rheumatica) ([facility]) M35.3 725    9. Chronic bronchitis, unspecified chronic bronchitis type ([facility] J42 491.9    10. Type 2 diabetes mellitus with nephropathy ([facility]) E11.21 250.40      583.81    11.  Upper respiratory tract infection, unspecified type J06.9 465.9         Orders Placed This Encounter    XR CHEST PA LAT    IPRATROPIUM BROMIDE NON-COMP    SC PRESSURIZED/NONPRESSURIZED INHALATION TREATMENT    DISCONTD: ipratropium (ATROVENT) 0.02 % soln    albuterol-ipratropium (DUO-NEB) 2.5 mg-0.5 mg/3 ml nebu    predniSONE (DELTASONE) 20 mg tablet    azithromycin (ZITHROMAX) 250 mg tablet    insulin aspart U-100 (NOVOLOG) 100 unit/mL inpn     Likely viral URI has triggered copd exacerbation  Pneumonia very possible  Duoneb given in office and pt felt a little better, sounded better  CXR done and was clear    See plan below    Patient Instructions       You probably have a cold virus that is affecting your nose, eyes, throat, and lungs  It has exacerbated your COPD (chronic bronchitis)  We will try to treat this at home, but if you are getting worse please don't hesitate to call 911 or have someone take you to the ER    Take the albuterol nebulizer treatments every 4 hours as long as you are awake (for several days or until you feel better)  Take prednisone 40mg daily for 5 days (first dose tonight, second dose Sunday morning)  Take z-pack antibiotic    You may need insulin while on the prednisone. Check your sugar when you do your nebulizer every 4 hours and use the following sliding scale if sugar is over 250:  Sugar less than 250: no insulin  250-300: 8 units  301-350: 10 units  351-400: 12 units  Over 400: 14 units      Discussed the diagnosis and plan and she expressed understanding. Follow-up Disposition:  Return in about 3 days (around 4/17/2018) for Follow up.     Pravin Yañez MD

## 2018-04-14 NOTE — PATIENT INSTRUCTIONS
Chronic Obstructive Pulmonary Disease (COPD) Flare-Ups: Care Instructions  Your Care Instructions    Chronic obstructive pulmonary disease (COPD) is a lung disease that makes it hard to breathe. It is caused by damage to the lungs over many years, usually from smoking. COPD is often a mix of two diseases:  · Chronic bronchitis: The airways that carry air to the lungs (bronchial tubes) get inflamed and make a lot of mucus. This can narrow or block the airways. · Emphysema: In a healthy person, the tiny air sacs in the lungs are like balloons. As you breathe in and out, they get bigger and smaller to move air through your lungs. But with emphysema, these air sacs are damaged and lose their stretch. Less air gets in and out of the lungs. Many people with COPD have attacks called flare-ups or exacerbations. This is when your usual symptoms quickly get worse and stay worse. The doctor has checked you carefully. But problems can develop later. If you notice any problems or new symptoms, get medical treatment right away. Follow-up care is a key part of your treatment and safety. Be sure to make and go to all appointments, and call your doctor if you are having problems. It's also a good idea to know your test results and keep a list of the medicines you take. How can you care for yourself at home? · Be safe with medicines. Take your medicines exactly as prescribed. Call your doctor if you think you are having a problem with your medicine. You may be taking medicines such as:  ¨ Bronchodilators. These help open your airways and make breathing easier. ¨ Corticosteroids. These reduce airway inflammation. They may be given as pills, in a vein, or in an inhaled form. You may go home with pills in addition to an inhaler that you already use. · A spacer may help you get more inhaled medicine to your lungs. Ask your doctor or pharmacist if a spacer is right for you. If it is, ask how to use it properly.   · If your doctor prescribed antibiotics, take them as directed. Do not stop taking them just because you feel better. You need to take the full course of antibiotics. · If your doctor prescribed oxygen, use the flow rate your doctor has recommended. Do not change it without talking to your doctor first.  · Do not smoke. Smoking makes COPD worse. If you need help quitting, talk to your doctor about stop-smoking programs and medicines. These can increase your chances of quitting for good. When should you call for help? Call 911 anytime you think you may need emergency care. For example, call if:  ? · You have severe trouble breathing. ?Call your doctor now or seek immediate medical care if:  ? · You have new or worse trouble breathing. ? · Your coughing or wheezing gets worse. ? · You cough up dark brown or bloody mucus (sputum). ? · You have a new or higher fever. ? Watch closely for changes in your health, and be sure to contact your doctor if:  ? · You notice more mucus or a change in the color of your mucus. ? · You need to use your antibiotic or steroid pills. ? · You do not get better as expected. Where can you learn more? Go to http://perry-liborio.info/. Enter M974 in the search box to learn more about \"Chronic Obstructive Pulmonary Disease (COPD) Flare-Ups: Care Instructions. \"  Current as of: May 12, 2017  Content Version: 11.4  © 5940-6589 [employer]. Care instructions adapted under license by [employer] (which disclaims liability or warranty for this information). If you have questions about a medical condition or this instruction, always ask your healthcare professional. [place] [address] any warranty or liability for your use of this information.     You probably have a cold virus that is affecting your nose, eyes, throat, and lungs  It has exacerbated your COPD (chronic bronchitis)  We will try to treat this at home, but if you are getting worse please don't hesitate to call 911 or have someone take you to the ER    Take the albuterol nebulizer treatments every 4 hours as long as you are awake (for several days or until you feel better)  Take prednisone 40mg daily for 5 days (first dose tonight, second dose Deon morning)  Take z-pack antibiotic    You may need insulin while on the prednisone.   Check your sugar when you do your nebulizer every 4 hours and use the following sliding scale if sugar is over 250:  Sugar less than 250: no insulin  250-300: 8 units  301-350: 10 units  351-400: 12 units  Over 400: 14 units

## 2018-04-14 NOTE — MR AVS SNAPSHOT
[address] [facility] 
 
 
 [address] [place] [address] 
[phone] Patient: Mame Denis MRN: [id number] OHF:[birth year] Visit Information Date & Time Provider Department Dept. Phone Encounter #  
 4/14/2018  1:30 PM Santy Jc, 403 [facility] [phone] [id number] Follow-up Instructions Return in about 3 days (around 4/17/2018) for Follow up. Your Appointments 6/27/2018 11:30 AM  
ROUTINE CARE with Siria Menezes  [facility] Appt Note: 3 month follow up; 3 month follow up 0cp 0pb mbg 3/28/18  
 [address] [facility] [address]  
[phone]  
  
   
 Yudith Watters [address] [address]  
  
    
 6/27/2018  2:40 PM  
ACUTE CARE with Adam Christensen MD  
13[facility] ([facility]) Appt Note: tinoco patient-1st visit-  
 [facility] [address]  
[phone]  
  
   
 [facility] [address] [id number] Upcoming Health Maintenance Date Due  
 EYE EXAM RETINAL OR DILATED Q1 8/7/2015 GLAUCOMA SCREENING Q2Y 7/4/2016 FOOT EXAM Q1 7/31/2018 HEMOGLOBIN A1C Q6M 9/26/2018 BREAST CANCER SCRN MAMMOGRAM 10/5/2018 MICROALBUMIN Q1 11/9/2018 LIPID PANEL Q1 11/9/2018 Pneumococcal 65+ Low/Medium Risk (2 of 2 - PPSV23) 12/21/2018 MEDICARE YEARLY EXAM 3/27/2019 COLONOSCOPY 6/1/2020 DTaP/Tdap/Td series (2 - Td) 5/13/2023 Allergies as of 4/14/2018  Review Complete On: 4/14/2018 By: Santy Jc MD  
  
 Severity Noted Reaction Type Reactions Bactrim [Sulfamethoxazole-trimethoprim]  04/02/2010    Nausea Only Biaxin [Clarithromycin]  04/02/2010    Nausea Only Demerol [Meperidine]  04/02/2010    Itching Erythromycin  04/02/2010    Nausea Only Pcn [Penicillins]  04/02/2010    Hives Percocet [Oxycodone-acetaminophen]  04/02/2010    Itching Pravastatin  03/26/2018    Nausea Only Tetracycline  04/02/2010    Nausea Only Voltaren [Diclofenac Sodium]  04/02/2010    Hives Current Immunizations  Reviewed on 11/9/2017 Name Date Influenza High Dose Vaccine PF 11/9/2017, 1/29/2016 Influenza Vaccine 12/5/2012 Influenza Vaccine PF 11/17/2014 PPD 1/25/1999 Pneumococcal Polysaccharide (PPSV-23) 5/13/2013 TD Vaccine 5/12/1999 Tdap 5/13/2013 Not reviewed this visit You Were Diagnosed With   
  
 Codes Comments COPD exacerbation ([facility] [[email or web address] 75.)    -  Primary ICD-10-CM: J44.1 ICD-9-CM: 491.21 Cough     ICD-10-CM: R05 ICD-9-CM: 786.2 Wheezing     ICD-10-CM: R06.2 ICD-9-CM: 786.07 Fever, unspecified fever cause     ICD-10-CM: R50.9 ICD-9-CM: 780.60 Nausea     ICD-10-CM: R11.0 ICD-9-CM: 787.02 Severe obesity (BMI 35.0-39. 9) with comorbidity ([facility] [[email or web address] 75.)     ICD-10-CM: E66.01 
ICD-9-CM: 278.01 Type 2 diabetes mellitus without complication, without long-term current use of insulin (HCC)     ICD-10-CM: E11.9 ICD-9-CM: 250.00 PMR (polymyalgia rheumatica) ([facility])     ICD-10-CM: M35.3 ICD-9-CM: 283 Chronic bronchitis, unspecified chronic bronchitis type ([facility] [[email or web address] 75.)     ICD-10-CM: V40 ICD-9-CM: 491.9 Type 2 diabetes mellitus with nephropathy (HCC)     ICD-10-CM: E11.21 
ICD-9-CM: 250.40, 583.81 Upper respiratory tract infection, unspecified type     ICD-10-CM: J06.9 ICD-9-CM: 465.9 Vitals BP Pulse Temp Resp Height(growth percentile) Weight(growth percentile) 135/63 (BP 1 Location: Left arm, BP Patient Position: Sitting) 91 99.1 °F (37.3 °C) (Oral) 18 5' 4\" (1.626 m) 209 lb 12.8 oz (95.2 kg) SpO2 BMI OB Status Smoking Status 97% 36.01 kg/m2 Hysterectomy Former Smoker Vitals History BMI and BSA Data Body Mass Index Body Surface Area 36.01 kg/m 2 2.07 m 2 Preferred Pharmacy Pharmacy Name Phone [facility] [address], [address] [facility] [phone] Your Updated Medication List  
  
   
This list is accurate as of 4/14/18  2:33 PM.  Always use your most recent med list.  
  
  
  
  
 * albuterol 2.5 mg /3 mL (0.083 %) nebulizer solution Commonly known as:  PROVENTIL VENTOLIN  
3 mL by Nebulization route every six (6) hours as needed for Wheezing. * albuterol 90 mcg/actuation inhaler Commonly known as:  PROVENTIL HFA, VENTOLIN HFA, PROAIR HFA Take 2 Puffs by inhalation every six (6) hours as needed for Wheezing. albuterol-ipratropium 2.5 mg-0.5 mg/3 ml Nebu Commonly known as:  DUO-NEB  
3 mL by Nebulization route now for 1 dose. aspirin 81 mg chewable tablet Take 81 mg by mouth daily. azithromycin 250 mg tablet Commonly known as:  Thana Los Take 2 tablets today, then take 1 tablet daily * Blood-Glucose Meter monitoring kit One touch meter * Blood-Glucose Meter monitoring kit Commonly known as:  FREESTYLE LITE METER Tid use due to uncontrolled dm and hyperglycemia. 250.02. 790.29 CENTRUM PO Take  by mouth daily. diltiazem 420 mg SR capsule Commonly known as:  [facility] TAKE 1 CAPSULE EVERY DAY  
  
 glipiZIDE SR 10 mg CR tablet Commonly known as:  GLUCOTROL XL Take 1 Tab by mouth daily. glucose blood VI test strips strip Commonly known as:  ASCENSIA AUTODISC VI, ONE TOUCH ULTRA TEST VI Tid use due to uncontrolled dm and hyperglycemia. 250.02. 790.29  
  
 * HYDROcodone-acetaminophen  mg tablet Commonly known as:  Lukas Rogers Take 1 Tab by mouth every eight (8) hours as needed for Pain. Max Daily Amount: 3 Tabs. Indications: Pain  
  
 * HYDROcodone-acetaminophen  mg tablet Commonly known as:  Lukas Rogers Take 1 Tab by mouth every eight (8) hours as needed for Pain. Max Daily Amount: 3 Tabs. Indications: Pain Start taking on:  4/26/2018 * HYDROcodone-acetaminophen  mg tablet Commonly known as:  Lukas Hamm Take 1 Tab by mouth every eight (8) hours as needed for Pain. Max Daily Amount: 3 Tabs. Indications: Pain Start taking on:  5/26/2018  
  
 insulin aspart U-100 100 unit/mL Inpn Commonly known as:  Dianne Lim Use every 4 hours per sliding scale (temporary), please dispense with pen needles Insulin Needles (Disposable) 30 gauge x 1/3\" Dx: E11.9 UAD QHS. Lancets Misc Commonly known as:  FREESTYLE LANCETS Tid use due to uncontrolled dm and hyperglycemia. 250.02. 790.29 LIPITOR 20 mg tablet Generic drug:  atorvastatin Take  by mouth daily. losartan-hydroCHLOROthiazide 100-12.5 mg per tablet Commonly known as:  HYZAAR  
TAKE 1 TABLET DAILY  
  
 metFORMIN  mg tablet Commonly known as:  GLUCOPHAGE XR Take 4 Tabs by mouth daily (with dinner). Dose increase  
  
 nortriptyline 10 mg capsule Commonly known as:  PAMELOR Take 1 Cap by mouth nightly. predniSONE 20 mg tablet Commonly known as:  Lannis Alicia Take 2 Tabs by mouth daily (with breakfast). SITagliptin 100 mg tablet Commonly known as:  Ashley Melena Take 1 Tab by mouth daily. ZyrTEC 10 mg Cap Generic drug:  Cetirizine Take  by mouth. * Notice: This list has 7 medication(s) that are the same as other medications prescribed for you. Read the directions carefully, and ask your doctor or other care provider to review them with you. Prescriptions Sent to Pharmacy Refills  
 predniSONE (DELTASONE) 20 mg tablet 0 Sig: Take 2 Tabs by mouth daily (with breakfast). Class: Normal  
 Pharmacy: [address] [facility] Ph #: [phone] Route: Oral  
 azithromycin (ZITHROMAX) 250 mg tablet 0 Sig: Take 2 tablets today, then take 1 tablet daily  Class: Normal  
 Pharmacy: [address] [place]y [place] Ph #: [phone]  
 insulin aspart U-100 (NOVOLOG) 100 unit/mL inpn 1 Sig: Use every 4 hours per sliding scale (temporary), please dispense with pen needles Class: Normal  
 Pharmacy: [facility], [address] [place] [address] Evy Bold Ph #: [phone] We Performed the Following IPRATROPIUM BROMIDE NON-COMP [ [facility]] HI PRESSURIZED/NONPRESSURIZED INHALATION TREATMENT [id number] CPT(R)] Follow-up Instructions Return in about 3 days (around 4/17/2018) for Follow up. To-Do List   
 04/14/2018 Imaging:  XR CHEST PA LAT Patient Instructions Chronic Obstructive Pulmonary Disease (COPD) Flare-Ups: Care Instructions Your Care Instructions Chronic obstructive pulmonary disease (COPD) is a lung disease that makes it hard to breathe. It is caused by damage to the lungs over many years, usually from smoking. COPD is often a mix of two diseases: · Chronic bronchitis: The airways that carry air to the lungs (bronchial tubes) get inflamed and make a lot of mucus. This can narrow or block the airways. · Emphysema: In a healthy person, the tiny air sacs in the lungs are like balloons. As you breathe in and out, they get bigger and smaller to move air through your lungs. But with emphysema, these air sacs are damaged and lose their stretch. Less air gets in and out of the lungs. Many people with COPD have attacks called flare-ups or exacerbations. This is when your usual symptoms quickly get worse and stay worse. The doctor has checked you carefully. But problems can develop later. If you notice any problems or new symptoms, get medical treatment right away. Follow-up care is a key part of your treatment and safety. Be sure to make and go to all appointments, and call your doctor if you are having problems. It's also a good idea to know your test results and keep a list of the medicines you take. How can you care for yourself at home? · Be safe with medicines. Take your medicines exactly as prescribed. Call your doctor if you think you are having a problem with your medicine. You may be taking medicines such as: ¨ Bronchodilators. These help open your airways and make breathing easier. ¨ Corticosteroids. These reduce airway inflammation. They may be given as pills, in a vein, or in an inhaled form. You may go home with pills in addition to an inhaler that you already use. · A spacer may help you get more inhaled medicine to your lungs. Ask your doctor or pharmacist if a spacer is right for you. If it is, ask how to use it properly. · If your doctor prescribed antibiotics, take them as directed. Do not stop taking them just because you feel better. You need to take the full course of antibiotics. · If your doctor prescribed oxygen, use the flow rate your doctor has recommended. Do not change it without talking to your doctor first. 
· Do not smoke. Smoking makes COPD worse. If you need help quitting, talk to your doctor about stop-smoking programs and medicines. These can increase your chances of quitting for good. When should you call for help? Call 911 anytime you think you may need emergency care. For example, call if: 
? · You have severe trouble breathing. ?Call your doctor now or seek immediate medical care if: 
? · You have new or worse trouble breathing. ? · Your coughing or wheezing gets worse. ? · You cough up dark brown or bloody mucus (sputum). ? · You have a new or higher fever. ? Watch closely for changes in your health, and be sure to contact your doctor if: 
? · You notice more mucus or a change in the color of your mucus. ? · You need to use your antibiotic or steroid pills. ? · You do not get better as expected. Where can you learn more? Go to http://perry-liborio.info/.  
Enter U264 in the search box to learn more about \"Chronic Obstructive Pulmonary Disease (COPD) Flare-Ups: Care Instructions. \" Current as of: May 12, 2017 Content Version: 11.4 © 0451-4332 [employer]. Care instructions adapted under license by [employer] (which disclaims liability or warranty for this information). If you have questions about a medical condition or this instruction, always ask your healthcare professional. [address] any warranty or liability for your use of this information. You probably have a cold virus that is affecting your nose, eyes, throat, and lungs It has exacerbated your COPD (chronic bronchitis) We will try to treat this at home, but if you are getting worse please don't hesitate to call 911 or have someone take you to the ER Take the albuterol nebulizer treatments every 4 hours as long as you are awake (for several days or until you feel better) Take prednisone 40mg daily for 5 days (first dose tonight, second dose Sunday morning) Take z-pack antibiotic You may need insulin while on the prednisone. Check your sugar when you do your nebulizer every 4 hours and use the following sliding scale if sugar is over 250: 
Sugar less than 250: no insulin 250-300: 8 units 301-350: 10 units 351-400: 12 units Over 400: 14 units Introducing [facility]! [employer] introduces Twoodo patient portal. Now you can access parts of your medical record, email your doctor's office, and request medication refills online. 1. In your internet browser, go to [email or web address] Robert 2. Click on the First Time User? Click Here link in the Sign In box. You will see the New Member Sign Up page. 3. Enter your Twoodo Access Code exactly as it appears below. You will not need to use this code after youve completed the sign-up process. If you do not sign up before the expiration date, you must request a new code.  
 
· Twoodo Access Code: [email or web address] 
 Expires: 6/24/2018  2:07 PM 
 
4. Enter the last four digits of your Social Security Number (xxxx) and Date of Birth (mm/dd/yyyy) as indicated and click Submit. You will be taken to the next sign-up page. 5. Create a Red Mapache ID. This will be your Red Mapache login ID and cannot be changed, so think of one that is secure and easy to remember. 6. Create a Red Mapache password. You can change your password at any time. 7. Enter your Password Reset Question and Answer. This can be used at a later time if you forget your password. 8. Enter your e-mail address. You will receive e-mail notification when new information is available in [address] 9. Click Sign Up. You can now view and download portions of your medical record. 10. Click the Download Summary menu link to download a portable copy of your medical information. If you have questions, please visit the Frequently Asked Questions section of the Red Mapache website. Remember, Red Mapache is NOT to be used for urgent needs. For medical emergencies, dial 911. Now available from your iPhone and Android! Please provide this summary of care documentation to your next provider. Your primary care clinician is listed as [employer]. If you have any questions after today's visit, please call [phone].

## 2018-04-17 ENCOUNTER — OFFICE VISIT (OUTPATIENT)
Dept: FAMILY MEDICINE CLINIC | Age: 68
End: 2018-04-17

## 2018-04-17 VITALS
BODY MASS INDEX: 35.73 KG/M2 | RESPIRATION RATE: 18 BRPM | WEIGHT: 209.3 LBS | OXYGEN SATURATION: 100 % | DIASTOLIC BLOOD PRESSURE: 69 MMHG | HEIGHT: 64 IN | TEMPERATURE: 98 F | HEART RATE: 77 BPM | SYSTOLIC BLOOD PRESSURE: 133 MMHG

## 2018-04-17 DIAGNOSIS — R11.0 NAUSEA: ICD-10-CM

## 2018-04-17 DIAGNOSIS — E66.01 SEVERE OBESITY (BMI 35.0-39.9) WITH COMORBIDITY (HCC): ICD-10-CM

## 2018-04-17 DIAGNOSIS — M35.3 PMR (POLYMYALGIA RHEUMATICA) (HCC): ICD-10-CM

## 2018-04-17 DIAGNOSIS — J42 CHRONIC BRONCHITIS, UNSPECIFIED CHRONIC BRONCHITIS TYPE (HCC): ICD-10-CM

## 2018-04-17 DIAGNOSIS — J44.1 COPD EXACERBATION (HCC): Primary | ICD-10-CM

## 2018-04-17 DIAGNOSIS — E11.21 TYPE 2 DIABETES MELLITUS WITH NEPHROPATHY (HCC): ICD-10-CM

## 2018-04-17 RX ORDER — ONDANSETRON 4 MG/1
4 TABLET, ORALLY DISINTEGRATING ORAL
Qty: 20 TAB | Refills: 1 | Status: SHIPPED | OUTPATIENT
Start: 2018-04-17 | End: 2018-05-07 | Stop reason: ALTCHOICE

## 2018-04-17 RX ORDER — RANITIDINE 150 MG/1
150 TABLET, FILM COATED ORAL
Qty: 60 TAB | Refills: 1 | Status: SHIPPED | OUTPATIENT
Start: 2018-04-17 | End: 2018-05-29 | Stop reason: CLARIF

## 2018-04-17 RX ORDER — ATORVASTATIN CALCIUM 20 MG/1
20 TABLET, FILM COATED ORAL
Qty: 90 TAB | Refills: 3 | Status: SHIPPED | OUTPATIENT
Start: 2018-04-17 | End: 2019-05-10 | Stop reason: SDUPTHER

## 2018-04-17 NOTE — PATIENT INSTRUCTIONS
Finish the prednisone and zpack as planned  Continue the every 4 hours nebulizer treatments for a few more days or until your breathing feels back to normal  Go back on the spiriva inhaler daily to help PREVENT these sorts of attacks. I sent a prescription to [facility] [employer]; let us know if it is not affordable. I will make your pulmonologist aware of this illness. Keep up the good work with the diabetic diet (low carb)    I will send in ranitidine (zantac) and zofran to try for your nausea  I think your nausea will get better quickly after you get off the prednisone, but let me know if that problem continues         Chronic Obstructive Pulmonary Disease (COPD): Care Instructions  Your Care Instructions    Chronic obstructive pulmonary disease (COPD) is a general term for a group of lung diseases, including emphysema and chronic bronchitis. People with COPD have decreased airflow in and out of the lungs, which makes it hard to breathe. The airways also can get clogged with thick mucus. Cigarette smoking is a major cause of COPD. Although there is no cure for COPD, you can slow its progress. Following your treatment plan and taking care of yourself can help you feel better and live longer. Follow-up care is a key part of your treatment and safety. Be sure to make and go to all appointments, and call your doctor if you are having problems. It's also a good idea to know your test results and keep a list of the medicines you take. How can you care for yourself at home? ?Staying healthy  ? · Do not smoke. This is the most important step you can take to prevent more damage to your lungs. If you need help quitting, talk to your doctor about stop-smoking programs and medicines. These can increase your chances of quitting for good. ? · Avoid colds and flu. Get a pneumococcal vaccine shot. If you have had one before, ask your doctor whether you need a second dose. Get the flu vaccine every fall.  If you must be around people with colds or the flu, wash your hands often. ? · Avoid secondhand smoke, air pollution, and high altitudes. Also avoid cold, dry air and hot, humid air. Stay at home with your windows closed when air pollution is bad. ?Medicines and oxygen therapy  ? · Take your medicines exactly as prescribed. Call your doctor if you think you are having a problem with your medicine. ? · You may be taking medicines such as:  ¨ Bronchodilators. These help open your airways and make breathing easier. Bronchodilators are either short-acting (work for 6 to 9 hours) or long-acting (work for 24 hours). You inhale most bronchodilators, so they start to act quickly. Always carry your quick-relief inhaler with you in case you need it while you are away from home. ¨ Corticosteroids (prednisone, budesonide). These reduce airway inflammation. They come in pill or inhaled form. You must take these medicines every day for them to work well. ? · A spacer may help you get more inhaled medicine to your lungs. Ask your doctor or pharmacist if a spacer is right for you. If it is, ask how to use it properly. ? · Do not take any vitamins, over-the-counter medicine, or herbal products without talking to your doctor first.   ? · If your doctor prescribed antibiotics, take them as directed. Do not stop taking them just because you feel better. You need to take the full course of antibiotics. ? · Oxygen therapy boosts the amount of oxygen in your blood and helps you breathe easier. Use the flow rate your doctor has recommended, and do not change it without talking to your doctor first.   Activity  ? · Get regular exercise. Walking is an easy way to get exercise. Start out slowly, and walk a little more each day. ? · Pay attention to your breathing. You are exercising too hard if you cannot talk while you are exercising. ? · Take short rest breaks when doing household chores and other activities.    ? · Learn breathing methods-such as breathing through pursed lips-to help you become less short of breath. ? · If your doctor has not set you up with a pulmonary rehabilitation program, talk to him or her about whether rehab is right for you. Rehab includes exercise programs, education about your disease and how to manage it, help with diet and other changes, and emotional support. Diet  ? · Eat regular, healthy meals. Use bronchodilators about 1 hour before you eat to make it easier to eat. Eat several small meals instead of three large ones. Drink beverages at the end of the meal. Avoid foods that are hard to chew. ? · Eat foods that contain protein so that you do not lose muscle mass. ? · Talk with your doctor if you gain too much weight or if you lose weight without trying. ?Mental health  ? · Talk to your family, friends, or a therapist about your feelings. It is normal to feel frightened, angry, hopeless, helpless, and even guilty. Talking openly about bad feelings can help you cope. If these feelings last, talk to your doctor. When should you call for help? Call 911 anytime you think you may need emergency care. For example, call if:  ? · You have severe trouble breathing. ?Call your doctor now or seek immediate medical care if:  ? · You have new or worse trouble breathing. ? · You cough up blood. ? · You have a fever. ? Watch closely for changes in your health, and be sure to contact your doctor if:  ? · You cough more deeply or more often, especially if you notice more mucus or a change in the color of your mucus. ? · You have new or worse swelling in your legs or belly. ? · You are not getting better as expected. Where can you learn more? Go to http://perry-liborio.info/. Radha Meyer in the search box to learn more about \"Chronic Obstructive Pulmonary Disease (COPD): Care Instructions. \"  Current as of: May 12, 2017  Content Version: 11.4  © 1630-3806 [employer].  Care instructions adapted under license by [employer] (which disclaims liability or warranty for this information). If you have questions about a medical condition or this instruction, always ask your healthcare professional. [address] any warranty or liability for your use of this information.

## 2018-04-17 NOTE — PROGRESS NOTES
Chief Complaint   Patient presents with    COPD     3 day f/u , states still not feeling well, weak, states sob little better       Reviewed Record in preparation for visit and have obtained necessary documentation. Identified pt with two pt identifiers (Name @ )    Health Maintenance Due   Topic    EYE EXAM RETINAL OR DILATED Q1     GLAUCOMA SCREENING Q2Y          1. Have you been to the ER, urgent care clinic since your last visit? Hospitalized since your last visit? No    2. Have you seen or consulted any other health care providers outside of the [address] [place] [address] since your last visit? Include any pap smears or colon screening.  No

## 2018-04-17 NOTE — MR AVS SNAPSHOT
[address] [facility] [address] 
 
 
 [address] [place] [address] 
[phone] Patient: Peña Lizama MRN: [id number] Visit Information Date & Time Provider Department Dept. Phone Encounter #  
 4/17/2018 11:30 AM Estrella Zapata, [address] [facility] [phone] [id number] Follow-up Instructions Return in about 2 months (around 6/17/2018) for Follow up as planned for DM. Your Appointments 6/27/2018 11:30 AM  
ROUTINE CARE with Lennie Jc  [facility] ([address]) Appt Note: 3 month follow up; 3 month follow up 0cp 0pb mbg 3/28/18  
 [address] [place] [address] [address] [facility] [address]  
[phone]  
  
   
 Yudith Watters [address] [phone]  
  
    
 6/27/2018  2:40 PM  
ACUTE CARE with Fabby Laura MD  
[address] [facility] ([address] [facility]) Appt Note: tinoco patient-1st visit-  
 [facility] [place] [address] [facility] [address]  
[phone]  
  
   
 [facility] [address] [phone] Upcoming Health Maintenance Date Due  
 EYE EXAM RETINAL OR DILATED Q1 8/7/2015 GLAUCOMA SCREENING Q2Y 7/4/2016 FOOT EXAM Q1 7/31/2018 HEMOGLOBIN A1C Q6M 9/26/2018 BREAST CANCER SCRN MAMMOGRAM 10/5/2018 MICROALBUMIN Q1 11/9/2018 LIPID PANEL Q1 11/9/2018 Pneumococcal 65+ Low/Medium Risk (2 of 2 - PPSV23) 12/21/2018 MEDICARE YEARLY EXAM 3/27/2019 COLONOSCOPY 6/1/2020 DTaP/Tdap/Td series (2 - Td) 5/13/2023 Allergies as of 4/17/2018  Review Complete On: 4/17/2018 By: Estrella Zapata MD  
  
 Severity Noted Reaction Type Reactions Bactrim [Sulfamethoxazole-trimethoprim]  04/02/2010    Nausea Only Biaxin [Clarithromycin]  04/02/2010    Nausea Only Demerol [Meperidine]  04/02/2010    Itching Erythromycin  04/02/2010    Nausea Only Pcn [Penicillins]  04/02/2010    Hives Percocet [Oxycodone-acetaminophen]  04/02/2010    Itching Pravastatin  03/26/2018    Nausea Only Tetracycline  04/02/2010    Nausea Only Voltaren [Diclofenac Sodium]  04/02/2010    Hives Current Immunizations  Reviewed on 11/9/2017 Name Date Influenza High Dose Vaccine PF 11/9/2017, 1/29/2016 Influenza Vaccine 12/5/2012 Influenza Vaccine PF 11/17/2014 PPD 1/25/1999 Pneumococcal Polysaccharide (PPSV-23) 5/13/2013 TD Vaccine 5/12/1999 Tdap 5/13/2013 Not reviewed this visit You Were Diagnosed With   
  
 Codes Comments COPD exacerbation ([facility]    -  Primary ICD-10-CM: J44.1 ICD-9-CM: 491.21 Type 2 diabetes mellitus with nephropathy (HCC)     ICD-10-CM: E11.21 
ICD-9-CM: 250.40, 583.81 Severe obesity (BMI 35.0-39. 9) with comorbidity ([facility]     ICD-10-CM: E66.01 
ICD-9-CM: 278.01   
 PMR (polymyalgia rheumatica) ([facility])     ICD-10-CM: M35.3 ICD-9-CM: 901 Chronic bronchitis, unspecified chronic bronchitis type ([facility]     ICD-10-CM: K19 ICD-9-CM: 491.9 Vitals BP Pulse Temp Resp Height(growth percentile) Weight(growth percentile) 133/69 (BP 1 Location: Left arm, BP Patient Position: Sitting) 77 98 °F (36.7 °C) (Oral) 18 5' 4\" (1.626 m) 209 lb 4.8 oz (94.9 kg) SpO2 BMI OB Status Smoking Status 100% 35.93 kg/m2 Hysterectomy Former Smoker Vitals History BMI and BSA Data Body Mass Index Body Surface Area  
 35.93 kg/m 2 2.07 m 2 Preferred Pharmacy Pharmacy Name Phone [address] [facility] [address] [phone] Your Updated Medication List  
  
   
This list is accurate as of 4/17/18 12:25 PM.  Always use your most recent med list.  
  
  
  
  
 * albuterol 2.5 mg /3 mL (0.083 %) nebulizer solution Commonly known as:  PROVENTIL VENTOLIN  
3 mL by Nebulization route every six (6) hours as needed for Wheezing. * albuterol 90 mcg/actuation inhaler Commonly known as:  PROVENTIL HFA, VENTOLIN HFA, PROAIR HFA Take 2 Puffs by inhalation every six (6) hours as needed for Wheezing. aspirin 81 mg chewable tablet Take 81 mg by mouth daily. atorvastatin 20 mg tablet Commonly known as:  LIPITOR Take 1 Tab by mouth nightly. azithromycin 250 mg tablet Commonly known as:  Charito Muñiz Take 2 tablets today, then take 1 tablet daily * Blood-Glucose Meter monitoring kit One touch meter * Blood-Glucose Meter monitoring kit Commonly known as:  FREESTYLE LITE METER Tid use due to uncontrolled dm and hyperglycemia. 250.02. 790.29 CENTRUM PO Take  by mouth daily. diltiazem 420 mg SR capsule Commonly known as:  [facility] TAKE 1 CAPSULE EVERY DAY  
  
 glipiZIDE SR 10 mg CR tablet Commonly known as:  GLUCOTROL XL Take 1 Tab by mouth daily. glucose blood VI test strips strip Commonly known as:  ASCENSIA AUTODISC VI, ONE TOUCH ULTRA TEST VI Tid use due to uncontrolled dm and hyperglycemia. 250.02. 790.29  
  
 * HYDROcodone-acetaminophen  mg tablet Commonly known as:  Leslie Dimes Take 1 Tab by mouth every eight (8) hours as needed for Pain. Max Daily Amount: 3 Tabs. Indications: Pain  
  
 * HYDROcodone-acetaminophen  mg tablet Commonly known as:  Leslie Dimes Take 1 Tab by mouth every eight (8) hours as needed for Pain. Max Daily Amount: 3 Tabs. Indications: Pain Start taking on:  4/26/2018  
  
 insulin aspart U-100 100 unit/mL Inpn Commonly known as:  Di Heft Use every 4 hours per sliding scale (temporary), please dispense with pen needles Insulin Needles (Disposable) 30 gauge x 1/3\" Dx: E11.9 UAD QHS. Lancets Misc Commonly known as:  FREESTYLE LANCETS Tid use due to uncontrolled dm and hyperglycemia. 250.02. 790.29  
  
 losartan-hydroCHLOROthiazide 100-12.5 mg per tablet Commonly known as:  HYZAAR  
TAKE 1 TABLET DAILY  
  
 metFORMIN  mg tablet Commonly known as:  GLUCOPHAGE XR Take 4 Tabs by mouth daily (with dinner). Dose increase  
  
 nortriptyline 10 mg capsule Commonly known as:  PAMELOR Take 1 Cap by mouth nightly. predniSONE 20 mg tablet Commonly known as:  Ronit Napoles Take 2 Tabs by mouth daily (with breakfast). SITagliptin 100 mg tablet Commonly known as:  Sharath Scott Take 1 Tab by mouth daily. tiotropium 18 mcg inhalation capsule Commonly known as:  Christella Distel Take 1 Cap by inhalation daily. ZyrTEC 10 mg Cap Generic drug:  Cetirizine Take  by mouth. * Notice: This list has 6 medication(s) that are the same as other medications prescribed for you. Read the directions carefully, and ask your doctor or other care provider to review them with you. Prescriptions Sent to Pharmacy Refills  
 tiotropium (SPIRIVA) 18 mcg inhalation capsule 3 Sig: Take 1 Cap by inhalation daily. Class: Normal  
 Pharmacy: [address] [place] [address] Ph #: [phone] Route: Inhalation  
 atorvastatin (LIPITOR) 20 mg tablet 3 Sig: Take 1 Tab by mouth nightly. Class: Normal  
 Pharmacy: [address] [place] [address] Ph #: [phone] Route: Oral  
  
Follow-up Instructions Return in about 2 months (around 6/17/2018) for Follow up as planned for DM. Patient Instructions Finish the prednisone and zpack as planned Continue the every 4 hours nebulizer treatments for a few more days or until your breathing feels back to normal 
Go back on the spiriva inhaler daily to help PREVENT these sorts of attacks. I sent a prescription to [facility]; let us know if it is not affordable. I will make your pulmonologist aware of this illness. Keep up the good work with the diabetic diet (low carb) I will send in ranitidine (zantac) and zofran to try for your nausea I think your nausea will get better quickly after you get off the prednisone, but let me know if that problem continues Chronic Obstructive Pulmonary Disease (COPD): Care Instructions Your Care Instructions Chronic obstructive pulmonary disease (COPD) is a general term for a group of lung diseases, including emphysema and chronic bronchitis. People with COPD have decreased airflow in and out of the lungs, which makes it hard to breathe. The airways also can get clogged with thick mucus. Cigarette smoking is a major cause of COPD. Although there is no cure for COPD, you can slow its progress. Following your treatment plan and taking care of yourself can help you feel better and live longer. Follow-up care is a key part of your treatment and safety. Be sure to make and go to all appointments, and call your doctor if you are having problems. It's also a good idea to know your test results and keep a list of the medicines you take. How can you care for yourself at home? ?Staying healthy ? · Do not smoke. This is the most important step you can take to prevent more damage to your lungs. If you need help quitting, talk to your doctor about stop-smoking programs and medicines. These can increase your chances of quitting for good. ? · Avoid colds and flu. Get a pneumococcal vaccine shot. If you have had one before, ask your doctor whether you need a second dose. Get the flu vaccine every fall. If you must be around people with colds or the flu, wash your hands often. ? · Avoid secondhand smoke, air pollution, and high altitudes. Also avoid cold, dry air and hot, humid air. Stay at home with your windows closed when air pollution is bad. ?Medicines and oxygen therapy ? · Take your medicines exactly as prescribed. Call your doctor if you think you are having a problem with your medicine. ? · You may be taking medicines such as: ¨ Bronchodilators. These help open your airways and make breathing easier. Bronchodilators are either short-acting (work for 6 to 9 hours) or long-acting (work for 24 hours). You inhale most bronchodilators, so they start to act quickly. Always carry your quick-relief inhaler with you in case you need it while you are away from home. ¨ Corticosteroids (prednisone, budesonide). These reduce airway inflammation. They come in pill or inhaled form. You must take these medicines every day for them to work well. ? · A spacer may help you get more inhaled medicine to your lungs. Ask your doctor or pharmacist if a spacer is right for you. If it is, ask how to use it properly. ? · Do not take any vitamins, over-the-counter medicine, or herbal products without talking to your doctor first.  
? · If your doctor prescribed antibiotics, take them as directed. Do not stop taking them just because you feel better. You need to take the full course of antibiotics. ? · Oxygen therapy boosts the amount of oxygen in your blood and helps you breathe easier. Use the flow rate your doctor has recommended, and do not change it without talking to your doctor first.  
Activity ? · Get regular exercise. Walking is an easy way to get exercise. Start out slowly, and walk a little more each day. ? · Pay attention to your breathing. You are exercising too hard if you cannot talk while you are exercising. ? · Take short rest breaks when doing household chores and other activities. ? · Learn breathing methods-such as breathing through pursed lips-to help you become less short of breath. ? · If your doctor has not set you up with a pulmonary rehabilitation program, talk to him or her about whether rehab is right for you. Rehab includes exercise programs, education about your disease and how to manage it, help with diet and other changes, and emotional support. Diet ? · Eat regular, healthy meals. Use bronchodilators about 1 hour before you eat to make it easier to eat. Eat several small meals instead of three large ones. Drink beverages at the end of the meal. Avoid foods that are hard to chew. ? · Eat foods that contain protein so that you do not lose muscle mass. ? · Talk with your doctor if you gain too much weight or if you lose weight without trying. ?Mental health ? · Talk to your family, friends, or a therapist about your feelings. It is normal to feel frightened, angry, hopeless, helpless, and even guilty. Talking openly about bad feelings can help you cope. If these feelings last, talk to your doctor. When should you call for help? Call 911 anytime you think you may need emergency care. For example, call if: 
? · You have severe trouble breathing. ?Call your doctor now or seek immediate medical care if: 
? · You have new or worse trouble breathing. ? · You cough up blood. ? · You have a fever. ? Watch closely for changes in your health, and be sure to contact your doctor if: 
? · You cough more deeply or more often, especially if you notice more mucus or a change in the color of your mucus. ? · You have new or worse swelling in your legs or belly. ? · You are not getting better as expected. Where can you learn more? Go to http://perry-liborio.info/. Mary Montez in the search box to learn more about \"Chronic Obstructive Pulmonary Disease (COPD): Care Instructions. \" Current as of: May 12, 2017 Content Version: 11.4 © 9469-5077 [employer]. Care instructions adapted under license by [employer] (which disclaims liability or warranty for this information). If you have questions about a medical condition or this instruction, always ask your healthcare professional. [address] any warranty or liability for your use of this information. Introducing [facility]! [employer] introduces Droid system master patient portal. Now you can access parts of your medical record, email your doctor's office, and request medication refills online. 1. In your internet browser, go to [email or web address] 2. Click on the First Time User? Click Here link in the Sign In box. You will see the New Member Sign Up page. 3. Enter your Droid system master Access Code exactly as it appears below. You will not need to use this code after youve completed the sign-up process. If you do not sign up before the expiration date, you must request a new code. · Droid system master Access Code: [email or web address] Expires: 6/24/2018  2:07 PM 
 
4. Enter the last four digits of your Social Security Number (xxxx) and Date of Birth (mm/dd/yyyy) as indicated and click Submit. You will be taken to the next sign-up page. 5. Create a Droid system master ID. This will be your Droid system master login ID and cannot be changed, so think of one that is secure and easy to remember. 6. Create a Droid system master password. You can change your password at any time. 7. Enter your Password Reset Question and Answer. This can be used at a later time if you forget your password. 8. Enter your e-mail address. You will receive e-mail notification when new information is available in [address] 9. Click Sign Up. You can now view and download portions of your medical record. 10. Click the Download Summary menu link to download a portable copy of your medical information. If you have questions, please visit the Frequently Asked Questions section of the Droid system master website. Remember, Droid system master is NOT to be used for urgent needs. For medical emergencies, dial 911. Now available from your iPhone and Android! Please provide this summary of care documentation to your next provider. Your primary care clinician is listed as Siria Menezes. If you have any questions after today's visit, please call [phone].

## 2018-04-17 NOTE — PROGRESS NOTES
Klever Powers [address] [facility]. [place], [address] [facility]  [phone]             Date of visit: 4/17/2018   Subjective:      History obtained from:  the patient. Alma Ramirez is a 78 y.o. female who presents today for f/u copd exacerbation, doing some better  Taking the prednisone and zpack  Using the albuterol neb 4-5x per day    Sugar was in 300s 2x so used SSI those times. Sister in the the hospital with flu and pneumonia but getting better. Needs refill statin, atorvastatin  Doesn't want to try higher dose as she already has some muscle aches on the 20mg       Daughter trying to help her with diabetic diet  Made soup with cauliflower instead of potatoes. They do try to eat well. Continues to gradually lose weight  DM has been under better control since getting off daily prednisone for PMR  Going to follow up with rheumatology in June    Feeling nauseated and not wanting to eat past few days  Has tried aloe vera juice, nothing else; it helped a little    Patient Active Problem List    Diagnosis Date Noted    Severe obesity (BMI 35.0-39. 9) with comorbidity ([facility] 03/26/2018    Type 2 diabetes mellitus with nephropathy ([facility] 12/21/2017    Obesity (BMI 30-39.9) 12/12/2017    Chronic pain 07/31/2017    Long term (current) use of systemic steroids 07/31/2017    Former smoker 10/24/2016    Chronic bronchitis ([address] 09/28/2016    Abnormal pulmonary function test 09/28/2016    PMR (polymyalgia rheumatica) ([facility] 05/18/2016    Type 2 diabetes mellitus without complication ([facility] 26/48/9252    Osteoarthritis of hip     Other \"heavy-for-dates\" infants     Type II or unspecified type diabetes mellitus without mention of complication, not stated as uncontrolled 05/06/2010    Hypertension     Hypercholesterolemia      Current Outpatient Prescriptions   Medication Sig Dispense Refill    tiotropium (SPIRIVA) 18 mcg inhalation capsule Take 1 Cap by inhalation daily.  90 Cap 3    atorvastatin (LIPITOR) 20 mg tablet Take 1 Tab by mouth nightly. 90 Tab 3    ondansetron (ZOFRAN ODT) 4 mg disintegrating tablet Take 1 Tab by mouth every eight (8) hours as needed for Nausea. 20 Tab 1    raNITIdine (ZANTAC) 150 mg tablet Take 1 Tab by mouth two (2) times daily as needed for Indigestion (or nausea). For GERD 60 Tab 1    predniSONE (DELTASONE) 20 mg tablet Take 2 Tabs by mouth daily (with breakfast). 10 Tab 0    azithromycin (ZITHROMAX) 250 mg tablet Take 2 tablets today, then take 1 tablet daily 6 Tab 0    insulin aspart U-100 (NOVOLOG) 100 unit/mL inpn Use every 4 hours per sliding scale (temporary), please dispense with pen needles 1 Pen 1    SITagliptin (JANUVIA) 100 mg tablet Take 1 Tab by mouth daily. 30 Tab 3    HYDROcodone-acetaminophen (NORCO)  mg tablet Take 1 Tab by mouth every eight (8) hours as needed for Pain. Max Daily Amount: 3 Tabs. Indications: Pain 90 Tab 0    [START ON 4/26/2018] HYDROcodone-acetaminophen (NORCO)  mg tablet Take 1 Tab by mouth every eight (8) hours as needed for Pain. Max Daily Amount: 3 Tabs. Indications: Pain 90 Tab 0    diltiazem (TIAZAC) 420 mg SR capsule TAKE 1 CAPSULE EVERY DAY 90 Cap 3    Insulin Needles, Disposable, 30 gauge x 1/3\" Dx: E11.9 UAD QHS. 1 Package 11    glipiZIDE SR (GLUCOTROL XL) 10 mg CR tablet Take 1 Tab by mouth daily. 90 Tab 3    losartan-hydroCHLOROthiazide (HYZAAR) 100-12.5 mg per tablet TAKE 1 TABLET DAILY 90 Tab 3    metFORMIN ER (GLUCOPHAGE XR) 500 mg tablet Take 4 Tabs by mouth daily (with dinner). Dose increase 360 Tab 5    albuterol (PROVENTIL VENTOLIN) 2.5 mg /3 mL (0.083 %) nebulizer solution 3 mL by Nebulization route every six (6) hours as needed for Wheezing. 60 Each 2    albuterol (PROVENTIL HFA, VENTOLIN HFA, PROAIR HFA) 90 mcg/actuation inhaler Take 2 Puffs by inhalation every six (6) hours as needed for Wheezing.  1 Inhaler 1    nortriptyline (PAMELOR) 10 mg capsule Take 1 Cap by mouth nightly. 90 Cap 3    Blood-Glucose Meter (FREESTYLE LITE METER) monitoring kit Tid use due to uncontrolled dm and hyperglycemia. 250.02. 790.29 1 Kit 0    Lancets (FREESTYLE LANCETS) misc Tid use due to uncontrolled dm and hyperglycemia. 250.02. 790.29 1 Each 11    glucose blood VI test strips (ASCENSIA AUTODISC VI, ONE TOUCH ULTRA TEST VI) strip Tid use due to uncontrolled dm and hyperglycemia. 250.02. 790.29 100 Each 11    Blood-Glucose Meter monitoring kit One touch meter 1 Kit 1    Cetirizine (ZYRTEC) 10 mg Cap Take  by mouth.  aspirin 81 mg chewable tablet Take 81 mg by mouth daily.  MULTIVITS W-FE,OTHER MIN (CENTRUM PO) Take  by mouth daily. Allergies   Allergen Reactions    Bactrim [Sulfamethoxazole-Trimethoprim] Nausea Only    Biaxin [Clarithromycin] Nausea Only    Demerol [Meperidine] Itching    Erythromycin Nausea Only    Pcn [Penicillins] Hives    Percocet [Oxycodone-Acetaminophen] Itching    Pravastatin Nausea Only    Tetracycline Nausea Only    Voltaren [Diclofenac Sodium] Hives     Past Medical History:   Diagnosis Date    Abnormal pulmonary function test 9/28/2016    AR (allergic rhinitis)     Asthma     Atrial thrombus 1994    left    Chronic bronchitis (HCC) 9/28/2016    Chronic pain     DDD (degenerative disc disease), lumbar     Diabetes ([address]     DJD (degenerative joint disease) of hip     right    Empty sella ([address] 2000    by MRI    Former smoker 10/24/2016    Hoarseness of voice     Hypercholesterolemia     Hypertension     Lesion of vocal cord     PMR (polymyalgia rheumatica) ([address] 5/18/2016    Smoker 12/5/2012     Past Surgical History:   Procedure Laterality Date    ENDOSCOPY, COLON, DIAGNOSTIC  10/03    polypectomy    ENDOSCOPY, COLON, DIAGNOSTIC  2/05    Dr. Mamadou Zuniga, COLON, DIAGNOSTIC  2010, reported    due 2015    HX APPENDECTOMY  1970's    HX BREAST REDUCTION      HX COLONOSCOPY  2015    dr Maciej Hamm.  2 polyps removed -repeat in 2018     HX HEMORRHOIDECTOMY      HX HYSTERECTOMY      ovaries spared    HX KNEE REPLACEMENT  11/03, 10/05    left then right    HX TONSILLECTOMY  age 29    tonsils     Family History   Problem Relation Age of Onset    Arthritis-osteo Mother     Asthma Mother     Diabetes Mother     Hypertension Mother     Stroke Mother     Arthritis-osteo Father     Cancer Father     Elevated Lipids Father     Hypertension Father     Arthritis-osteo Sister     Asthma Sister     Diabetes Sister     Elevated Lipids Sister     Hypertension Sister     Heart Attack Sister     Hypertension Daughter     Hypertension Daughter     Hypertension Daughter     Anesth Problems Neg Hx      Social History   Substance Use Topics    Smoking status: Former Smoker     Packs/day: 0.50     Years: 40.00     Types: Cigarettes     Quit date: 10/7/2016    Smokeless tobacco: Never Used    Alcohol use No      Social History     Social History Narrative        Review of Systems  Gen: denies fever in past couple of days  GI denies hematochezia         Objective:     Vitals:    04/17/18 1146   BP: 133/69   Pulse: 77   Resp: 18   Temp: 98 °F (36.7 °C)   TempSrc: Oral   SpO2: 100%   Weight: 209 lb 4.8 oz (94.9 kg)   Height: 5' 4\" (1.626 m)     Body mass index is 35.93 kg/(m^2). General: stated age, obese and in NAD, appears well today (much improved)  Lungs:  Loud expiratory wheezing bilatearlly but air movement much improved, w/normal effort and no use of accessory muscles of respiration today  Heart: regular rate and rhythm, S1, S2 normal, no murmur, click, rub or gallop  Ext:  No edema noted. Skin:  Normal. and no rash or abnormalities   Psych: alert and oriented to person, place, time and situation and Speech: appropriate quality, quantity and organization of sentences     Assessment/Plan:       ICD-10-CM ICD-9-CM    1. COPD exacerbation ([facility] J44.1 491.21    2.  Type 2 diabetes mellitus with nephropathy (HCC) E11.21 250.40      583.81    3. Nausea R11.0 787.02    4. Severe obesity (BMI 35.0-39. 9) with comorbidity ([facility] E66.01 278.01    5. PMR (polymyalgia rheumatica) ([facility]) M35.3 725    6. Chronic bronchitis, unspecified chronic bronchitis type ([facility]) J42 491.9         Orders Placed This Encounter    tiotropium (SPIRIVA) 18 mcg inhalation capsule    atorvastatin (LIPITOR) 20 mg tablet    ondansetron (ZOFRAN ODT) 4 mg disintegrating tablet    raNITIdine (ZANTAC) 150 mg tablet       Improving, see plan in pt instructions  Reviewed worrisome signs or symptoms for which to call or go to ER. Patient Instructions     Finish the prednisone and zpack as planned  Continue the every 4 hours nebulizer treatments for a few more days or until your breathing feels back to normal  Go back on the spiriva inhaler daily to help PREVENT these sorts of attacks. [facility]; let us know if it is not affordable. I will make your pulmonologist aware of this illness. Keep up the good work with the diabetic diet (low carb)    I will send in ranitidine (zantac) and zofran to try for your nausea  I think your nausea will get better quickly after you get off the prednisone, but let me know if that problem continues    Discussed the diagnosis and plan and she expressed understanding. Follow-up Disposition:  Return in about 2 months (around 6/17/2018) for Follow up as planned for DM.     Tyrell Rojas MD

## 2018-05-07 ENCOUNTER — OFFICE VISIT (OUTPATIENT)
Dept: FAMILY MEDICINE CLINIC | Age: 68
End: 2018-05-07

## 2018-05-07 VITALS
OXYGEN SATURATION: 95 % | DIASTOLIC BLOOD PRESSURE: 78 MMHG | BODY MASS INDEX: 35.51 KG/M2 | RESPIRATION RATE: 20 BRPM | HEART RATE: 85 BPM | WEIGHT: 208 LBS | HEIGHT: 64 IN | TEMPERATURE: 98.4 F | SYSTOLIC BLOOD PRESSURE: 154 MMHG

## 2018-05-07 DIAGNOSIS — E87.6 HYPOKALEMIA: Primary | ICD-10-CM

## 2018-05-07 DIAGNOSIS — J18.9 PNEUMONIA OF BOTH LOWER LOBES DUE TO INFECTIOUS ORGANISM: ICD-10-CM

## 2018-05-07 DIAGNOSIS — E11.21 TYPE 2 DIABETES MELLITUS WITH NEPHROPATHY (HCC): ICD-10-CM

## 2018-05-07 NOTE — MR AVS SNAPSHOT
[address] [facility] 
 
 
 [address] [facility] [address] 
[phone] Patient: Jaden Lyon MRN: [id number] HKS:[birth year] Visit Information Date & Time Provider Department Dept. Phone Encounter #  
 5/7/2018  2:00 PM Valery Trujillo  [facility] [phone] [id number] Follow-up Instructions Return in about 4 weeks (around 6/4/2018) for Follow Up. Your Appointments 6/27/2018 11:30 AM  
ROUTINE CARE with Valery Trujillo  [facility] ([address]) Appt Note: 3 month follow up; 3 month follow up 0cp 0pb mbg 3/28/18  
 [address] [facility] [address]  
[phone]  
  
   
 [address]ud[address] Watters [address] [address]  
  
    
 6/27/2018  2:40 PM  
ACUTE CARE with Lee Ann Palmer MD  
[address] [facility] ([facility]) Appt Note: tinoco patient-1st visit-  
 [address] [facility] [address]  
[phone]  
  
   
 [address] [facility] [address] [address] Upcoming Health Maintenance Date Due  
 EYE EXAM RETINAL OR DILATED Q1 8/7/2015 GLAUCOMA SCREENING Q2Y 7/4/2016 FOOT EXAM Q1 7/31/2018 Influenza Age 5 to Adult 8/1/2018 HEMOGLOBIN A1C Q6M 9/26/2018 BREAST CANCER SCRN MAMMOGRAM 10/5/2018 MICROALBUMIN Q1 11/9/2018 LIPID PANEL Q1 11/9/2018 Pneumococcal 65+ Low/Medium Risk (2 of 2 - PPSV23) 12/21/2018 MEDICARE YEARLY EXAM 3/27/2019 COLONOSCOPY 6/1/2020 DTaP/Tdap/Td series (2 - Td) 5/13/2023 Allergies as of 5/7/2018  Review Complete On: 5/7/2018 By: Valery Trujillo NP Severity Noted Reaction Type Reactions Bactrim [Sulfamethoxazole-trimethoprim]  04/02/2010    Nausea Only Biaxin [Clarithromycin]  04/02/2010    Nausea Only Demerol [Meperidine]  04/02/2010    Itching Erythromycin  04/02/2010    Nausea Only Pcn [Penicillins]  04/02/2010    Hives Percocet [Oxycodone-acetaminophen]  04/02/2010    Itching Pravastatin  03/26/2018    Nausea Only Tetracycline  04/02/2010    Nausea Only Voltaren [Diclofenac Sodium]  04/02/2010    Hives Current Immunizations  Reviewed on 11/9/2017 Name Date Influenza High Dose Vaccine PF 11/9/2017, 1/29/2016 Influenza Vaccine 12/5/2012 Influenza Vaccine PF 11/17/2014 PPD 1/25/1999 Pneumococcal Polysaccharide (PPSV-23) 5/13/2013 TD Vaccine 5/12/1999 Tdap 5/13/2013 Not reviewed this visit You Were Diagnosed With   
  
 Codes Comments Hypokalemia    -  Primary ICD-10-CM: E87.6 ICD-9-CM: 276.8 Type 2 diabetes mellitus with nephropathy (HCC)     ICD-10-CM: E11.21 
ICD-9-CM: 250.40, 583.81 Pneumonia of both lower lobes due to infectious organism [facility])     ICD-10-CM: J18.1 ICD-9-CM: 483.8 Vitals BP Pulse Temp Resp Height(growth percentile) Weight(growth percentile) 154/78 (BP 1 Location: Left arm, BP Patient Position: Sitting) 85 98.4 °F (36.9 °C) (Oral) 20 5' 4\" (1.626 m) 208 lb (94.3 kg) SpO2 BMI OB Status Smoking Status 97% 35.7 kg/m2 Hysterectomy Former Smoker Vitals History BMI and BSA Data Body Mass Index Body Surface Area 35.7 kg/m 2 2.06 m 2 Preferred Pharmacy Pharmacy Name Phone [facility] [address] [facility] [phone] Your Updated Medication List  
  
   
This list is accurate as of 5/7/18  2:54 PM.  Always use your most recent med list.  
  
  
  
  
 * albuterol 2.5 mg /3 mL (0.083 %) nebulizer solution Commonly known as:  PROVENTIL VENTOLIN  
3 mL by Nebulization route every six (6) hours as needed for Wheezing. * albuterol 90 mcg/actuation inhaler Commonly known as:  PROVENTIL HFA, VENTOLIN HFA, PROAIR HFA Take 2 Puffs by inhalation every six (6) hours as needed for Wheezing. aspirin 81 mg chewable tablet Take 81 mg by mouth daily. atorvastatin 20 mg tablet Commonly known as:  LIPITOR Take 1 Tab by mouth nightly. * Blood-Glucose Meter monitoring kit One touch meter * Blood-Glucose Meter monitoring kit Commonly known as:  FREESTYLE LITE METER Tid use due to uncontrolled dm and hyperglycemia. 250.02. 790.29 CENTRUM PO Take  by mouth daily. diltiazem 420 mg SR capsule Commonly known as:  [facility] TAKE 1 CAPSULE EVERY DAY  
  
 glipiZIDE SR 10 mg CR tablet Commonly known as:  GLUCOTROL XL Take 1 Tab by mouth daily. glucose blood VI test strips strip Commonly known as:  ASCENSIA AUTODISC VI, ONE TOUCH ULTRA TEST VI Tid use due to uncontrolled dm and hyperglycemia. 250.02. 790.29 HYDROcodone-acetaminophen  mg tablet Commonly known as:  Bebo Abad Take 1 Tab by mouth every eight (8) hours as needed for Pain. Max Daily Amount: 3 Tabs. Indications: Pain Lancets Misc Commonly known as:  FREESTYLE LANCETS Tid use due to uncontrolled dm and hyperglycemia. 250.02. 790.29  
  
 losartan-hydroCHLOROthiazide 100-12.5 mg per tablet Commonly known as:  HYZAAR  
TAKE 1 TABLET DAILY  
  
 metFORMIN  mg tablet Commonly known as:  GLUCOPHAGE XR Take 4 Tabs by mouth daily (with dinner). Dose increase  
  
 nortriptyline 10 mg capsule Commonly known as:  PAMELOR Take 1 Cap by mouth nightly. raNITIdine 150 mg tablet Commonly known as:  ZANTAC Take 1 Tab by mouth two (2) times daily as needed for Indigestion (or nausea). For GERD SITagliptin 100 mg tablet Commonly known as:  Val Seat Take 1 Tab by mouth daily. tiotropium 18 mcg inhalation capsule Commonly known as:  Johnny Buckle Take 1 Cap by inhalation daily. ZyrTEC 10 mg Cap Generic drug:  Cetirizine Take  by mouth. * Notice: This list has 4 medication(s) that are the same as other medications prescribed for you.  Read the directions carefully, and ask your doctor or other care provider to review them with you. We Performed the Following METABOLIC PANEL, BASIC [56866 CPT(R)] Follow-up Instructions Return in about 4 weeks (around 6/4/2018) for Follow Up. To-Do List   
 05/07/2018 Imaging:  XR CHEST PA LAT Patient Instructions Learning About Diabetes Food Guidelines Your Care Instructions Meal planning is important to manage diabetes. It helps keep your blood sugar at a target level (which you set with your doctor). You don't have to eat special foods. You can eat what your family eats, including sweets once in a while. But you do have to pay attention to how often you eat and how much you eat of certain foods. You may want to work with a dietitian or a certified diabetes educator (CDE) to help you plan meals and snacks. A dietitian or CDE can also help you lose weight if that is one of your goals. What should you know about eating carbs? Managing the amount of carbohydrate (carbs) you eat is an important part of healthy meals when you have diabetes. Carbohydrate is found in many foods. · Learn which foods have carbs. And learn the amounts of carbs in different foods. ¨ Bread, cereal, pasta, and rice have about 15 grams of carbs in a serving. A serving is 1 slice of bread (1 ounce), ½ cup of cooked cereal, or 1/3 cup of cooked pasta or rice. ¨ Fruits have 15 grams of carbs in a serving. A serving is 1 small fresh fruit, such as an apple or orange; ½ of a banana; ½ cup of cooked or canned fruit; ½ cup of fruit juice; 1 cup of melon or raspberries; or 2 tablespoons of dried fruit. ¨ Milk and no-sugar-added yogurt have 15 grams of carbs in a serving. A serving is 1 cup of milk or 2/3 cup of no-sugar-added yogurt. ¨ Starchy vegetables have 15 grams of carbs in a serving.  A serving is ½ cup of mashed potatoes or sweet potato; 1 cup winter squash; ½ of a small baked potato; ½ cup of cooked beans; or ½ cup cooked corn or green peas. · Learn how much carbs to eat each day and at each meal. A dietitian or CDE can teach you how to keep track of the amount of carbs you eat. This is called carbohydrate counting. · If you are not sure how to count carbohydrate grams, use the Plate Method to plan meals. It is a good, quick way to make sure that you have a balanced meal. It also helps you spread carbs throughout the day. ¨ Divide your plate by types of foods. Put non-starchy vegetables on half the plate, meat or other protein food on one-quarter of the plate, and a grain or starchy vegetable in the final quarter of the plate. To this you can add a small piece of fruit and 1 cup of milk or yogurt, depending on how many carbs you are supposed to eat at a meal. 
· Try to eat about the same amount of carbs at each meal. Do not \"save up\" your daily allowance of carbs to eat at one meal. 
· Proteins have very little or no carbs per serving. Examples of proteins are beef, chicken, turkey, fish, eggs, tofu, cheese, cottage cheese, and peanut butter. A serving size of meat is 3 ounces, which is about the size of a deck of cards. Examples of meat substitute serving sizes (equal to 1 ounce of meat) are 1/4 cup of cottage cheese, 1 egg, 1 tablespoon of peanut butter, and ½ cup of tofu. How can you eat out and still eat healthy? · Learn to estimate the serving sizes of foods that have carbohydrate. If you measure food at home, it will be easier to estimate the amount in a serving of restaurant food. · If the meal you order has too much carbohydrate (such as potatoes, corn, or baked beans), ask to have a low-carbohydrate food instead. Ask for a salad or green vegetables. · If you use insulin, check your blood sugar before and after eating out to help you plan how much to eat in the future.  
· If you eat more carbohydrate at a meal than you had planned, take a walk or do other exercise. This will help lower your blood sugar. What else should you know? · Limit saturated fat, such as the fat from meat and dairy products. This is a healthy choice because people who have diabetes are at higher risk of heart disease. So choose lean cuts of meat and nonfat or low-fat dairy products. Use olive or canola oil instead of butter or shortening when cooking. · Don't skip meals. Your blood sugar may drop too low if you skip meals and take insulin or certain medicines for diabetes. · Check with your doctor before you drink alcohol. Alcohol can cause your blood sugar to drop too low. Alcohol can also cause a bad reaction if you take certain diabetes medicines. Follow-up care is a key part of your treatment and safety. Be sure to make and go to all appointments, and call your doctor if you are having problems. It's also a good idea to know your test results and keep a list of the medicines you take. Where can you learn more? Go to http://perry-liborio.info/. Enter A581 in the search box to learn more about \"Learning About Diabetes Food Guidelines. \" Current as of: March 13, 2017 Content Version: 11.4 © 9605-0726 [employer]. Care instructions adapted under license by [employer] (which disclaims liability or warranty for this information). If you have questions about a medical condition or this instruction, always ask your healthcare professional. [place] [address] any warranty or liability for your use of this information. Introducing [facility]! [facility] introduces FaceAlerta patient portal. Now you can access parts of your medical record, email your doctor's office, and request medication refills online. 1. In your internet browser, go to [email or web address] 2. Click on the First Time User? Click Here link in the Sign In box. You will see the New Member Sign Up page. 3. Enter your NUVETA Access Code exactly as it appears below. You will not need to use this code after youve completed the sign-up process. If you do not sign up before the expiration date, you must request a new code. · NUVETA Access Code: [email or web address] Expires: 6/24/2018  2:07 PM 
 
4. Enter the last four digits of your Social Security Number (xxxx) and Date of Birth (mm/dd/yyyy) as indicated and click Submit. You will be taken to the next sign-up page. 5. Create a NUVETA ID. This will be your NUVETA login ID and cannot be changed, so think of one that is secure and easy to remember. 6. Create a NUVETA password. You can change your password at any time. 7. Enter your Password Reset Question and Answer. This can be used at a later time if you forget your password. 8. Enter your e-mail address. You will receive e-mail notification when new information is available in [address] 9. Click Sign Up. You can now view and download portions of your medical record. 10. Click the Download Summary menu link to download a portable copy of your medical information. If you have questions, please visit the Frequently Asked Questions section of the NUVETA website. Remember, NUVETA is NOT to be used for urgent needs. For medical emergencies, dial 911. Now available from your iPhone and Android! Please provide this summary of care documentation to your next provider. Your primary care clinician is listed as Valery Trujillo. If you have any questions after today's visit, please call [phone].

## 2018-05-07 NOTE — PROGRESS NOTES
Chief Complaint   Patient presents with   [facility] Follow Up     Reji Jeronimo     1. Have you been to the ER, urgent care clinic since your last visit? Hospitalized since your last visit? No    2. Have you seen or consulted any other health care providers outside of the [address] [place] [address] since your last visit? Include any pap smears or colon screening.  No

## 2018-05-07 NOTE — PATIENT INSTRUCTIONS

## 2018-05-07 NOTE — LETTER
5/26/2018 10:15 AM 
 
Ms. Nneka Argueta [address] [facility] [id number] Dear Nneka Argueta: Please find your most recent results below. Resulted Orders METABOLIC PANEL, BASIC Result Value Ref Range Glucose 194 (H) 65 - 99 mg/dL BUN 9 8 - 27 mg/dL Creatinine 0.70 0.57 - 1.00 mg/dL GFR est non-AA 91 >59 mL/min/1.73 GFR est  >59 mL/min/1.73  
 BUN/Creatinine ratio 13 12 - 28 Sodium 142 134 - 144 mmol/L Potassium 3.2 (L) 3.5 - 5.2 mmol/L Chloride 97 96 - 106 mmol/L  
 CO2 31 (H) 18 - 29 mmol/L Calcium 9.2 8.7 - 10.3 mg/dL Narrative Performed at:  [address]  [id number] : Sandra Cai MD, Phone:  [phone] RECOMMENDATIONS: 
We have been unable to reach you by phone. Please call our office at [phone] to discuss your potassium results. Sincerely, Steve Lauren NP

## 2018-05-07 NOTE — PROGRESS NOTES
Assessment/Plan:     Diagnoses and all orders for this visit:    1. Hypokalemia  -     METABOLIC PANEL, BASIC  Recheck labs today. Treatment will be based on results. 2. Type 2 diabetes mellitus with nephropathy ([facility] [address]  Unclear if Januvia has given her improvement in blood sugars. She will monitor twice daily and call Friday with results. 3. Pneumonia of both lower lobes due to infectious organism ([facility] [address]  -     XR CHEST PA LAT; Future  Repeat chest x-ray due to findings on examination today. Restart Spiriva as previously directed. Prescription given for incentive spirometer. I have encouraged the use of albuterol on until lung sounds improved. I have strongly discouraged tobacco use. Follow-up Disposition:  Return in about 4 weeks (around 6/4/2018) for Follow Up. Discussed expected course/resolution/complications of diagnosis in detail with patient.    Medication risks/benefits/costs/interactions/alternatives discussed with patient.    Pt was given after visit summary which includes diagnoses, current medications & vitals. Pt expressed understanding with the diagnosis and plan    Greater than 25 minutes spent in visit face to face today with greater than 50% of this time spent in counseling and coordination of care regarding pneumonia and type 2 diabetes. Subjective:      Alan Floyd is a 78 y.o. female who presents for had concerns including Hospital Follow Up. Hospital Follow Up  Alan Floyd is seen for follow up from recent admission to [facility] on 4/17/2018. We reviewed the the notes. She presented with shortness of breath. She took her antibiotics and albuterol as directed & without any side effects. She reports symptoms are improved. She is no longer using albuterol nebulizer treatments. She ran out of Spiriva. She continues to use tobacco up to 5 cigarettes a day. She is not currently monitoring home blood sugars. She is compliant with diabetic medications.   She is tolerating the addition of Januvia without difficulty. She currently denies chest pain or shortness of breath. She was treated for hypokalemia during her hospitalization. Current Outpatient Prescriptions   Medication Sig Dispense Refill    atorvastatin (LIPITOR) 20 mg tablet Take 1 Tab by mouth nightly. 90 Tab 3    raNITIdine (ZANTAC) 150 mg tablet Take 1 Tab by mouth two (2) times daily as needed for Indigestion (or nausea). For GERD 60 Tab 1    SITagliptin (JANUVIA) 100 mg tablet Take 1 Tab by mouth daily. 30 Tab 3    HYDROcodone-acetaminophen (NORCO)  mg tablet Take 1 Tab by mouth every eight (8) hours as needed for Pain. Max Daily Amount: 3 Tabs. Indications: Pain 90 Tab 0    diltiazem (TIAZAC) 420 mg SR capsule TAKE 1 CAPSULE EVERY DAY 90 Cap 3    glipiZIDE SR (GLUCOTROL XL) 10 mg CR tablet Take 1 Tab by mouth daily. 90 Tab 3    losartan-hydroCHLOROthiazide (HYZAAR) 100-12.5 mg per tablet TAKE 1 TABLET DAILY 90 Tab 3    metFORMIN ER (GLUCOPHAGE XR) 500 mg tablet Take 4 Tabs by mouth daily (with dinner). Dose increase 360 Tab 5    nortriptyline (PAMELOR) 10 mg capsule Take 1 Cap by mouth nightly. 90 Cap 3    Blood-Glucose Meter (FREESTYLE LITE METER) monitoring kit Tid use due to uncontrolled dm and hyperglycemia. 250.02. 790.29 1 Kit 0    Lancets (FREESTYLE LANCETS) misc Tid use due to uncontrolled dm and hyperglycemia. 250.02. 790.29 1 Each 11    glucose blood VI test strips (ASCENSIA AUTODISC VI, ONE TOUCH ULTRA TEST VI) strip Tid use due to uncontrolled dm and hyperglycemia. 250.02. 790.29 100 Each 11    Blood-Glucose Meter monitoring kit One touch meter 1 Kit 1    Cetirizine (ZYRTEC) 10 mg Cap Take  by mouth.  aspirin 81 mg chewable tablet Take 81 mg by mouth daily.  MULTIVITS W-FE,OTHER MIN (CENTRUM PO) Take  by mouth daily.  tiotropium (SPIRIVA) 18 mcg inhalation capsule Take 1 Cap by inhalation daily.  90 Cap 3    albuterol (PROVENTIL VENTOLIN) 2.5 mg /3 mL (0.083 %) nebulizer solution 3 mL by Nebulization route every six (6) hours as needed for Wheezing. 60 Each 2    albuterol (PROVENTIL HFA, VENTOLIN HFA, PROAIR HFA) 90 mcg/actuation inhaler Take 2 Puffs by inhalation every six (6) hours as needed for Wheezing. 1 Inhaler 1       Allergies   Allergen Reactions    Bactrim [Sulfamethoxazole-Trimethoprim] Nausea Only    Biaxin [Clarithromycin] Nausea Only    Demerol [Meperidine] Itching    Erythromycin Nausea Only    Pcn [Penicillins] Hives    Percocet [Oxycodone-Acetaminophen] Itching    Pravastatin Nausea Only    Tetracycline Nausea Only    Voltaren [Diclofenac Sodium] Hives       ROS:   Complete review of systems was reviewed with pertinent information listed in HPI. Objective:     Visit Vitals    /78 (BP 1 Location: Left arm, BP Patient Position: Sitting)    Pulse 85    Temp 98.4 °F (36.9 °C) (Oral)    Resp 20    Ht 5' 4\" (1.626 m)    Wt 208 lb (94.3 kg)    SpO2 95%  Comment: on Excertion    BMI 81.5 kg/m2       Vitals and Nurse Documentation reviewed. Physical Exam   Constitutional: No distress. HENT:   Right Ear: Tympanic membrane is not erythematous and not bulging. No middle ear effusion. Left Ear: Tympanic membrane is not erythematous and not bulging. No middle ear effusion. Nose: No rhinorrhea. Right sinus exhibits no maxillary sinus tenderness and no frontal sinus tenderness. Left sinus exhibits no maxillary sinus tenderness and no frontal sinus tenderness. Mouth/Throat: No oropharyngeal exudate or posterior oropharyngeal erythema. Eyes: EOM and lids are normal.   Cardiovascular: S1 normal and S2 normal.  Exam reveals no gallop and no friction rub. No murmur heard. Pulmonary/Chest: She has no wheezes. She has rales (middle and lower bilateral lobes). Lymphadenopathy:     She has no cervical adenopathy. Skin: Skin is warm and dry.    Psychiatric: Mood and affect normal.

## 2018-05-08 LAB
BUN SERPL-MCNC: 9 MG/DL (ref 8–27)
BUN/CREAT SERPL: 13 (ref 12–28)
CALCIUM SERPL-MCNC: 9.2 MG/DL (ref 8.7–10.3)
CHLORIDE SERPL-SCNC: 97 MMOL/L (ref 96–106)
CO2 SERPL-SCNC: 31 MMOL/L (ref 18–29)
CREAT SERPL-MCNC: 0.7 MG/DL (ref 0.57–1)
GFR SERPLBLD CREATININE-BSD FMLA CKD-EPI: 104 ML/MIN/1.73
GFR SERPLBLD CREATININE-BSD FMLA CKD-EPI: 91 ML/MIN/1.73
GLUCOSE SERPL-MCNC: 194 MG/DL (ref 65–99)
POTASSIUM SERPL-SCNC: 3.2 MMOL/L (ref 3.5–5.2)
SODIUM SERPL-SCNC: 142 MMOL/L (ref 134–144)

## 2018-05-09 NOTE — PROGRESS NOTES
Outbound call to pt, informed her of results below, understanding voiced. Pt states she is not currently not taking Potassium supplements but still has some at home.

## 2018-05-30 ENCOUNTER — HOSPITAL ENCOUNTER (OUTPATIENT)
Age: 68
Setting detail: OUTPATIENT SURGERY
Discharge: HOME | End: 2018-05-30
Attending: INTERNAL MEDICINE | Admitting: INTERNAL MEDICINE
Payer: MEDICARE

## 2018-05-30 ENCOUNTER — ANESTHESIA EVENT (OUTPATIENT)
Dept: ENDOSCOPY | Age: 68
End: 2018-05-30
Payer: MEDICARE

## 2018-05-30 ENCOUNTER — ANESTHESIA (OUTPATIENT)
Dept: ENDOSCOPY | Age: 68
End: 2018-05-30
Payer: MEDICARE

## 2018-05-30 VITALS
RESPIRATION RATE: 15 BRPM | WEIGHT: 199 LBS | HEART RATE: 87 BPM | TEMPERATURE: 98 F | OXYGEN SATURATION: 100 % | BODY MASS INDEX: 33.97 KG/M2 | HEIGHT: 64 IN | DIASTOLIC BLOOD PRESSURE: 75 MMHG | SYSTOLIC BLOOD PRESSURE: 146 MMHG

## 2018-05-30 LAB — COLONOSCOPY, EXTERNAL: NORMAL

## 2018-05-30 PROCEDURE — 74011250636 HC RX REV CODE- 250/636

## 2018-05-30 PROCEDURE — 76060000031 HC ANESTHESIA FIRST 0.5 HR: Performed by: INTERNAL MEDICINE

## 2018-05-30 PROCEDURE — 76040000019: Performed by: INTERNAL MEDICINE

## 2018-05-30 PROCEDURE — 74011250636 HC RX REV CODE- 250/636: Performed by: INTERNAL MEDICINE

## 2018-05-30 RX ORDER — ATROPINE SULFATE 0.1 MG/ML
0.5 INJECTION INTRAVENOUS
Status: DISCONTINUED | OUTPATIENT
Start: 2018-05-30 | End: 2018-05-30 | Stop reason: HOSPADM

## 2018-05-30 RX ORDER — SODIUM CHLORIDE 0.9 % (FLUSH) 0.9 %
5-10 SYRINGE (ML) INJECTION AS NEEDED
Status: DISCONTINUED | OUTPATIENT
Start: 2018-05-30 | End: 2018-05-30 | Stop reason: HOSPADM

## 2018-05-30 RX ORDER — FLUMAZENIL 0.1 MG/ML
0.2 INJECTION INTRAVENOUS
Status: DISCONTINUED | OUTPATIENT
Start: 2018-05-30 | End: 2018-05-30 | Stop reason: HOSPADM

## 2018-05-30 RX ORDER — PROPOFOL 10 MG/ML
INJECTION, EMULSION INTRAVENOUS AS NEEDED
Status: DISCONTINUED | OUTPATIENT
Start: 2018-05-30 | End: 2018-05-30 | Stop reason: HOSPADM

## 2018-05-30 RX ORDER — DEXTROMETHORPHAN/PSEUDOEPHED 2.5-7.5/.8
1.2 DROPS ORAL
Status: DISCONTINUED | OUTPATIENT
Start: 2018-05-30 | End: 2018-05-30 | Stop reason: HOSPADM

## 2018-05-30 RX ORDER — SODIUM CHLORIDE 0.9 % (FLUSH) 0.9 %
5-10 SYRINGE (ML) INJECTION EVERY 8 HOURS
Status: DISCONTINUED | OUTPATIENT
Start: 2018-05-30 | End: 2018-05-30 | Stop reason: HOSPADM

## 2018-05-30 RX ORDER — SODIUM CHLORIDE 9 MG/ML
100 INJECTION, SOLUTION INTRAVENOUS CONTINUOUS
Status: DISCONTINUED | OUTPATIENT
Start: 2018-05-30 | End: 2018-05-30 | Stop reason: HOSPADM

## 2018-05-30 RX ADMIN — PROPOFOL 30 MG: 10 INJECTION, EMULSION INTRAVENOUS at 11:03

## 2018-05-30 RX ADMIN — PROPOFOL 30 MG: 10 INJECTION, EMULSION INTRAVENOUS at 11:00

## 2018-05-30 RX ADMIN — PROPOFOL 30 MG: 10 INJECTION, EMULSION INTRAVENOUS at 10:57

## 2018-05-30 RX ADMIN — PROPOFOL 30 MG: 10 INJECTION, EMULSION INTRAVENOUS at 11:05

## 2018-05-30 RX ADMIN — SODIUM CHLORIDE 100 ML/HR: 900 INJECTION, SOLUTION INTRAVENOUS at 10:49

## 2018-05-30 RX ADMIN — PROPOFOL 80 MG: 10 INJECTION, EMULSION INTRAVENOUS at 10:55

## 2018-05-30 NOTE — ANESTHESIA POSTPROCEDURE EVALUATION
Post-Anesthesia Evaluation and Assessment    Patient: Rain Adjutant MRN: [id number]  SSN: [id number]    YOB: 1950  Age: 78 y.o. Sex: female       Cardiovascular Function/Vital Signs  Visit Vitals    /75    Pulse 87    Temp 36.7 °C (98 °F)    Resp 15    Ht 5' 4\" (1.626 m)    Wt 90.3 kg (199 lb)    SpO2 100%    BMI 34.16 kg/m2       Patient is status post general, total IV anesthesia anesthesia for Procedure(s):  COLONOSCOPY. Nausea/Vomiting: None    Postoperative hydration reviewed and adequate. Pain:  Pain Scale 1: Visual (05/30/18 1131)  Pain Intensity 1: 0 (05/30/18 1131)   Managed    Neurological Status: At baseline    Mental Status and Level of Consciousness: Arousable    Pulmonary Status:   O2 Device: Room air (05/30/18 1131)   Adequate oxygenation and airway patent    Complications related to anesthesia: None    Post-anesthesia assessment completed.  No concerns    Signed By: Bessy Servin MD     May 30, 2018

## 2018-05-30 NOTE — H&P
Jeannie Paul MD  [facility], [address] [place] [address], [address] [facility]  [phone]  [email or web address]    Gastroenterology Outpatient History and Physical    Patient: Shauna Baker    Physician: Bucky Miller MD    Vital Signs: Blood pressure 135/87, pulse 96, temperature 99 °F (37.2 °C), resp. rate 20, height 5' 4\" (1.626 m), weight 90.3 kg (199 lb), SpO2 99 %. Allergies: Allergies   Allergen Reactions    Bactrim [Sulfamethoxazole-Trimethoprim] Nausea Only    Biaxin [Clarithromycin] Nausea Only    Demerol [Meperidine] Itching    Erythromycin Nausea Only    Pcn [Penicillins] Hives    Percocet [Oxycodone-Acetaminophen] Itching    Pravastatin Nausea Only    Tetracycline Nausea Only    Voltaren [Diclofenac Sodium] Hives       Chief Complaint: Hx of polyps    History of Present Illness: Personal history of colonic polyps. Last colonoscopy was April 2015 and showed adenomatous polyps which were removed. Currently has no GI symptoms. No FH of colon cancer or polyps.       History:  Past Medical History:   Diagnosis Date    Abnormal pulmonary function test 9/28/2016    AR (allergic rhinitis)     Asthma     Atrial thrombus 1994    left    Chronic bronchitis (HCC) 9/28/2016    Chronic pain     DDD (degenerative disc disease), lumbar     Diabetes ([address]     DJD (degenerative joint disease) of hip     right    Empty sella ([address] 2000    by MRI    Former smoker 10/24/2016    Hoarseness of voice     Hypercholesterolemia     Hypertension     Lesion of vocal cord     PMR (polymyalgia rheumatica) ([address] 5/18/2016    Smoker 12/5/2012      Past Surgical History:   Procedure Laterality Date    ENDOSCOPY, COLON, DIAGNOSTIC  10/03    polypectomy    ENDOSCOPY, COLON, DIAGNOSTIC  2/05    Dr. Guille Adams, COLON, DIAGNOSTIC  2010, reported    due 2015    HX APPENDECTOMY  1970's    HX BREAST REDUCTION      HX COLONOSCOPY  2015    dr Federico Gordon. 2 polyps removed -repeat in 2018     HX HEMORRHOIDECTOMY      HX HYSTERECTOMY      ovaries spared    HX KNEE REPLACEMENT  11/03, 10/05    left then right    HX TONSILLECTOMY  age 29    tonsils      Social History     Social History    Marital status:      Spouse name: N/A    Number of children: N/A    Years of education: N/A     Social History Main Topics    Smoking status: Former Smoker     Packs/day: 0.50     Years: 40.00     Types: Cigarettes     Quit date: 10/7/2016    Smokeless tobacco: Never Used    Alcohol use No    Drug use: No    Sexual activity: Yes     Partners: Male     Other Topics Concern    None     Social History Narrative      Family History   Problem Relation Age of Onset   [address] [facility] Wily Arthritis-osteo Mother     Asthma Mother     Diabetes Mother     Hypertension Mother     Stroke Mother     Arthritis-osteo Father     Cancer Father     Elevated Lipids Father     Hypertension Father    Eudelia Zachary Sister     Asthma Sister     Diabetes Sister     Elevated Lipids Sister     Hypertension Sister     Heart Attack Sister     Hypertension Daughter     Hypertension Daughter     Hypertension Daughter     Anesth Problems Neg Hx       Patient Active Problem List   Diagnosis Code    Hypertension I10    Hypercholesterolemia E78.00    Type II or unspecified type diabetes mellitus without mention of complication, not stated as uncontrolled E11.9    Other \"heavy-for-dates\" infants P08.1    Type 2 diabetes mellitus without complication ([facility] [address] S50.8    Osteoarthritis of hip M16.9    PMR (polymyalgia rheumatica) ([facility]) M35.3    Chronic bronchitis ([facility]) J42    Abnormal pulmonary function test R94.2    Former smoker Z87.891    Chronic pain G89.29    Long term (current) use of systemic steroids Z79.52    Obesity (BMI 30-39. 9) E66.9    Type 2 diabetes mellitus with nephropathy ([facility]) E11.21    Severe obesity (BMI 35.0-39. 9) with comorbidity ([facility] E66.01       Medications:   Prior to Admission medications    Medication Sig Start Date End Date Taking? Authorizing Provider   atorvastatin (LIPITOR) 20 mg tablet Take 1 Tab by mouth nightly. 4/17/18  Yes Laurel Wilcox MD   SITagliptin (JANUVIA) 100 mg tablet Take 1 Tab by mouth daily. 3/26/18  Yes Virginia Cervantes NP   HYDROcodone-acetaminophen (NORCO)  mg tablet Take 1 Tab by mouth every eight (8) hours as needed for Pain. Max Daily Amount: 3 Tabs. Indications: Pain 3/26/18  Yes Fatimah Cervantes NP   diltiazem ESCOBAR [facility]) 420 mg SR capsule TAKE 1 CAPSULE EVERY DAY 2/21/18  Yes Fatimah Cervantes NP   glipiZIDE SR (GLUCOTROL XL) 10 mg CR tablet Take 1 Tab by mouth daily. 11/9/17  Yes Tatyana Luis NP   losartan-hydroCHLOROthiazide [facility]) 100-12.5 mg per tablet TAKE 1 TABLET DAILY 11/9/17  Yes Tatyana Luis NP   metFORMIN ER (GLUCOPHAGE XR) 500 mg tablet Take 4 Tabs by mouth daily (with dinner). Dose increase 11/9/17  Yes Tatyana Luis NP   nortriptyline (PAMELOR) 10 mg capsule Take 1 Cap by mouth nightly. 5/8/17  Yes Tatyana Luis NP   Blood-Glucose Meter (FREESTYLE LITE METER) monitoring kit Tid use due to uncontrolled dm and hyperglycemia. 250.02. 790.29 5/18/15  Yes Tatyana Luis NP   Lancets (FREESTYLE LANCETS) misc Tid use due to uncontrolled dm and hyperglycemia. 250.02. 790.29 5/18/15  Yes Tatyana Luis NP   glucose blood VI test strips (ASCENSIA AUTODISC VI, ONE TOUCH ULTRA TEST VI) strip Tid use due to uncontrolled dm and hyperglycemia. 250.02. 790.29 5/15/15  Yes Tatyana Luis NP   Blood-Glucose Meter monitoring kit One touch meter 5/15/15  Yes Tatyana Luis NP   Cetirizine (ZYRTEC) 10 mg Cap Take  by mouth. Yes Historical Provider   aspirin 81 mg chewable tablet Take 81 mg by mouth daily. Yes Historical Provider   MULTIVITS W-FE,OTHER MIN (CENTRUM PO) Take  by mouth daily.    Yes Historical Provider   tiotropium (SPIRIVA) 18 mcg inhalation capsule Take 1 Cap by inhalation daily. 4/17/18   Ezekiel Fernandez MD   albuterol (PROVENTIL VENTOLIN) 2.5 mg /3 mL (0.083 %) nebulizer solution 3 mL by Nebulization route every six (6) hours as needed for Wheezing. 5/8/17   Ricco Evans NP   albuterol (PROVENTIL HFA, VENTOLIN HFA, PROAIR HFA) 90 mcg/actuation inhaler Take 2 Puffs by inhalation every six (6) hours as needed for Wheezing.  5/8/17   Ricco Evans NP       Physical Exam:     General: well developed, well nourished   HEENT: unremarkable   Heart: regular rhythm no mumur    Lungs: clear   Abdominal:  benign   Neurological: unremarkable   Extremities: no edema     Findings/Diagnosis: Personal history of colonic polyps  Plan of Care/Planned Procedure: Colonoscopy with monitored anesthesia care sedation    Signed:  Susanne Andujar MD 5/30/2018

## 2018-05-30 NOTE — IP AVS SNAPSHOT
[address] [facility] 
[phone] Patient: Mayte Polanco MRN: [id number] BUN:[birth year] About your hospitalization You were admitted on:  May 30, 2018 You last received care in the:  [facility] ENDOSCOPY You were discharged on:  May 30, 2018 Why you were hospitalized Your primary diagnosis was:  Not on File Follow-up Information Follow up With Details Comments Contact Info Veneta Cabot, NP   [address] 
[phone] Your Scheduled Appointments Wednesday June 27, 2018 11:30 AM EDT ROUTINE CARE with Veneta Cabot,  [facility] ([facility]) [address]  
[phone] Wednesday June 27, 2018  2:40 PM EDT ACUTE CARE with Jeni Hinojosa MD  
[address] ([facility]) [facility] [address]  
[phone] Discharge Orders None A check emre indicates which time of day the medication should be taken. My Medications CONTINUE taking these medications Instructions Each Dose to Equal  
 Morning Noon Evening Bedtime * albuterol 2.5 mg /3 mL (0.083 %) nebulizer solution Commonly known as:  PROVENTIL VENTOLIN Your last dose was: Your next dose is:    
   
   
 3 mL by Nebulization route every six (6) hours as needed for Wheezing. 2.5 mg  
    
   
   
   
  
 * albuterol 90 mcg/actuation inhaler Commonly known as:  PROVENTIL HFA, VENTOLIN HFA, PROAIR HFA Your last dose was: Your next dose is: Take 2 Puffs by inhalation every six (6) hours as needed for Wheezing. 2 Puff  
    
   
   
   
  
 aspirin 81 mg chewable tablet Your last dose was: Your next dose is: Take 81 mg by mouth daily.   
 81 mg  
    
   
   
   
  
 atorvastatin 20 mg tablet Commonly known as:  LIPITOR Your last dose was: Your next dose is: Take 1 Tab by mouth nightly. 20 mg * Blood-Glucose Meter monitoring kit Your last dose was: Your next dose is: One touch meter * Blood-Glucose Meter monitoring kit Commonly known as:  FREESTYLE LITE METER Your last dose was: Your next dose is:    
   
   
 Tid use due to uncontrolled dm and hyperglycemia. 250.02. 790.29 CENTRUM PO Your last dose was: Your next dose is: Take  by mouth daily. diltiazem 420 mg SR capsule Commonly known as:  [facility] Your last dose was: Your next dose is: TAKE 1 CAPSULE EVERY DAY  
     
   
   
   
  
 glipiZIDE SR 10 mg CR tablet Commonly known as:  GLUCOTROL XL Your last dose was: Your next dose is: Take 1 Tab by mouth daily. 5 mg  
    
   
   
   
  
 glucose blood VI test strips strip Commonly known as:  ASCENSIA AUTODISC VI, ONE TOUCH ULTRA TEST VI Your last dose was: Your next dose is:    
   
   
 Tid use due to uncontrolled dm and hyperglycemia. 250.02. 790.29 HYDROcodone-acetaminophen  mg tablet Commonly known as:  Nissa Clinton Your last dose was: Your next dose is: Take 1 Tab by mouth every eight (8) hours as needed for Pain. Max Daily Amount: 3 Tabs. Indications: Pain 1 Tab Lancets Misc Commonly known as:  FREESTYLE LANCETS Your last dose was: Your next dose is:    
   
   
 Tid use due to uncontrolled dm and hyperglycemia. 250.02. 790.29  
     
   
   
   
  
 losartan-hydroCHLOROthiazide 100-12.5 mg per tablet Commonly known as:  HYZAAR Your last dose was: Your next dose is: TAKE 1 TABLET DAILY  
     
   
   
   
  
 metFORMIN  mg tablet Commonly known as:  GLUCOPHAGE XR Your last dose was: Your next dose is: Take 4 Tabs by mouth daily (with dinner). Dose increase  
 2000 mg  
    
   
   
   
  
 nortriptyline 10 mg capsule Commonly known as:  PAMELOR Your last dose was: Your next dose is: Take 1 Cap by mouth nightly. 10 mg SITagliptin 100 mg tablet Commonly known as:  Mardella Early Your last dose was: Your next dose is: Take 1 Tab by mouth daily. 100 mg  
    
   
   
   
  
 tiotropium 18 mcg inhalation capsule Commonly known as:  Margorie Ego Your last dose was: Your next dose is: Take 1 Cap by inhalation daily. 1 Cap ZyrTEC 10 mg Cap Generic drug:  Cetirizine Your last dose was: Your next dose is: Take  by mouth. * Notice: This list has 4 medication(s) that are the same as other medications prescribed for you. Read the directions carefully, and ask your doctor or other care provider to review them with you. Opioid Education Prescription Opioids: What You Need to Know: 
 
 
Padmini Haley [id number] 
1950 COLON DISCHARGE INSTRUCTIONS Discomfort: 
Redness at IV site- apply warm compress to area; if redness or soreness persist- contact your physician There may be a slight amount of blood passed from the rectum Gaseous discomfort- walking, belching will help relieve any discomfort You may not operate a vehicle for 12 hours You may not engage in an occupation involving machinery or appliances for rest of today You may not drink alcoholic beverages for at least 12 hours Avoid making any critical decisions for at least 24 hour DIET: 
 High fiber diet.  however -  remember your colon is empty and a heavy meal will produce gas. Avoid these foods:  vegetables, fried / greasy foods, carbonated drinks for today ACTIVITY: 
You may resume your normal daily activities it is recommended that you spend the remainder of the day resting -  avoid any strenuous activity. CALL M.D. ANY SIGN OF: Increasing pain, nausea, vomiting Abdominal distension (swelling) New increased bleeding (oral or rectal) Fever (chills) COLONOSCOPY FINDINGS: 
Your colonoscopy showed: internal hemorrhoids and mild diverticulosis. Follow-up Instructions: 
 Call Dr. Moraes Doctor if any questions or problems. Telephone # [phone] Should have a repeat colonoscopy in 5 years. Introducing [facility]! Rachel Martinez introduces E-Box - Blogo.it patient portal. Now you can access parts of your medical record, email your doctor's office, and request medication refills online. 1. In your internet browser, go to [email or web address] [email or web address] 2. Click on the First Time User? Click Here link in the Sign In box. You will see the New Member Sign Up page. 3. Enter your E-Box - Blogo.it Access Code exactly as it appears below. You will not need to use this code after youve completed the sign-up process. If you do not sign up before the expiration date, you must request a new code. · E-Box - Blogo.it Access Code: [email or web address] Expires: 6/24/2018  2:07 PM 
 
4. Enter the last four digits of your Social Security Number (xxxx) and Date of Birth (mm/dd/yyyy) as indicated and click Submit.  You will be taken to the next sign-up page. 5. Create a [employer] ID. This will be your Agile login ID and cannot be changed, so think of one that is secure and easy to remember. 6. Create a [employer] password. You can change your password at any time. 7. Enter your Password Reset Question and Answer. This can be used at a later time if you forget your password. 8. Enter your e-mail address. You will receive e-mail notification when new information is available in [address] 9. Click Sign Up. You can now view and download portions of your medical record. 10. Click the Download Summary menu link to download a portable copy of your medical information. If you have questions, please visit the Frequently Asked Questions section of the Agile website. Remember, Agile is NOT to be used for urgent needs. For medical emergencies, dial 911. Now available from your iPhone and Android! Introducing Mor Fonseca As a Lashanda Truongs patient, I wanted to make you aware of our electronic visit tool called Mor Adamralphbrittany. RedDrummer 24/7 allows you to connect within minutes with a medical provider 24 hours a day, seven days a week via a mobile device or tablet or logging into a secure website from your computer. You can access Mor Fonseca from anywhere in the [place]. A virtual visit might be right for you when you have a simple condition and feel like you just dont want to get out of bed, or cant get away from work for an appointment, when your regular [facility] [employer] provider is not available (evenings, weekends or holidays), or when youre out of town and need minor care. Electronic visits cost only $49 and if the RedDrummer 24/7 provider determines a prescription is needed to treat your condition, one can be electronically transmitted to a nearby pharmacy*. Please take a moment to enroll today if you have not already done so.   The enrollment process is free and takes just a few minutes. To enroll, please download the Rent Here 24/7 evelyne to your tablet or phone, or visit [email or web address]."[email or web address]". org to enroll on your computer. And, as an [address] [place] [address] patient with a Mobile Pulse account, the results of your visits will be scanned into your electronic medical record and your primary care provider will be able to view the scanned results. We urge you to continue to see your regular [facility] [employer] [facility] provider for your ongoing medical care. And while your primary care provider may not be the one available when you seek a Consulted virtual visit, the peace of mind you get from getting a real diagnosis real time can be priceless. For more information on Consulted, view our Frequently Asked Questions (FAQs) at www."[employer] org. Sincerely, 
 
Bisi Bernardo MD 
Chief Medical Officer [facility] Mercedes Julien *:  certain medications cannot be prescribed via Consulted Providers Seen During Your Hospitalization Provider Specialty Primary office phone Jose L Menchaca MD Gastroenterology [phone] Your Primary Care Physician (PCP) Primary Care Physician Office Phone Office Fax Ady Jiménez [phone] You are allergic to the following Allergen Reactions Bactrim (Sulfamethoxazole-Trimethoprim) Nausea Only Biaxin (Clarithromycin) Nausea Only Demerol (Meperidine) Itching Erythromycin Nausea Only Pcn (Penicillins) Hives Percocet (Oxycodone-Acetaminophen) Itching Pravastatin Nausea Only Tetracycline Nausea Only Voltaren (Diclofenac Sodium) Hives Recent Documentation Height Weight BMI OB Status Smoking Status 1.626 m 90.3 kg 34.16 kg/m2 Hysterectomy Former Smoker Emergency Contacts Name Discharge Info Relation Home Work Mobile Tres Will DISCHARGE CAREGIVER [3] Spouse [3] [phone] Patient Belongings The following personal items are in your possession at time of discharge: 
  Dental Appliances: None  Visual Aid: None Please provide this summary of care documentation to your next provider. Signatures-by signing, you are acknowledging that this After Visit Summary has been reviewed with you and you have received a copy. Patient Signature:  ____________________________________________________________ Date:  ____________________________________________________________  
  
[facility] Provider Signature:  ____________________________________________________________ Date:  ____________________________________________________________

## 2018-05-30 NOTE — ANESTHESIA PREPROCEDURE EVALUATION
Anesthetic History   No history of anesthetic complications            Review of Systems / Medical History  Patient summary reviewed, nursing notes reviewed and pertinent labs reviewed    Pulmonary    COPD      Smoker  Asthma        Neuro/Psych   Within defined limits           Cardiovascular    Hypertension                   GI/Hepatic/Renal  Within defined limits              Endo/Other    Diabetes    Obesity and arthritis     Other Findings              Physical Exam    Airway  Mallampati: IV  TM Distance: < 4 cm  Neck ROM: normal range of motion   Mouth opening: Normal     Cardiovascular  Regular rate and rhythm,  S1 and S2 normal,  no murmur, click, rub, or gallop             Dental  No notable dental hx       Pulmonary  Breath sounds clear to auscultation               Abdominal  GI exam deferred       Other Findings            Anesthetic Plan    ASA: 3  Anesthesia type: general and total IV anesthesia          Induction: Intravenous  Anesthetic plan and risks discussed with: Patient

## 2018-05-30 NOTE — DISCHARGE INSTRUCTIONS
Remy Bucio MD  [facility], [address] [facility]  [address], [address] [facility]  [phone]  [email or web address]    Mara Barahona  [id number]  1950    COLON DISCHARGE INSTRUCTIONS  Discomfort:  Redness at IV site- apply warm compress to area; if redness or soreness persist- contact your physician  There may be a slight amount of blood passed from the rectum  Gaseous discomfort- walking, belching will help relieve any discomfort  You may not operate a vehicle for 12 hours  You may not engage in an occupation involving machinery or appliances for rest of today  You may not drink alcoholic beverages for at least 12 hours  Avoid making any critical decisions for at least 24 hour  DIET:   High fiber diet. - however -  remember your colon is empty and a heavy meal will produce gas. Avoid these foods:  vegetables, fried / greasy foods, carbonated drinks for today      ACTIVITY:  You may resume your normal daily activities it is recommended that you spend the remainder of the day resting -  avoid any strenuous activity. CALL M.D. ANY SIGN OF:   Increasing pain, nausea, vomiting  Abdominal distension (swelling)  New increased bleeding (oral or rectal)  Fever (chills)     COLONOSCOPY FINDINGS:  Your colonoscopy showed: internal hemorrhoids and mild diverticulosis. Follow-up Instructions:   Call Dr. Remy Bucio if any questions or problems. Telephone # [phone]    Should have a repeat colonoscopy in 5 years.

## 2018-05-30 NOTE — PERIOP NOTES
Naldo Antuenz  1950  [id number]    Situation:  Verbal report received from: IVETT Abraham rn  Procedure: Procedure(s):  COLONOSCOPY    Background:    Preoperative diagnosis: HISTORY OF POLYPS   Postoperative diagnosis: Hemorrhoids, diverticulosis     :  Dr. Vickie Trinh  Assistant(s): Endoscopy RN-1: Марина Abraham    Specimens: * No specimens in log *  H. Pylori  no    Assessment:  Intra-procedure medications   Anesthesia gave intra-procedure sedation and medications, see anesthesia flow sheet yes    Intravenous fluids: NS@ KVO     Vital signs stable     Abdominal assessment: round and soft     Recommendation:  Discharge patient per MD order.   Family or Friend   Permission to share finding with family or friend yes

## 2018-05-30 NOTE — PROCEDURES
[facility]                  Colonoscopy Operative Report    5/30/2018      Natacha Alberto  [id number]  1950    Procedure Type:   Colonoscopy --screening     Indications:    Personal history of colon polyps (screening only)     Pre-operative Diagnosis: see indication above    Post-operative Diagnosis:  See findings below    :  Selam Cedeno MD    Referring Provider: Con Cervantes NP      Sedation:  MAC anesthesia Propofol    Pre-Procedural Exam:      Airway: clear,  No airway problems anticipated  Heart: RRR, without gallops or rubs  Lungs: clear bilaterally without wheezes, crackles, or rhonchi  Abdomen: soft, nontender, nondistended, bowel sounds present  Mental Status: awake, alert and oriented to person, place and time     Procedure Details:  After informed consent was obtained with all risks and benefits of procedure explained and preoperative exam completed, the patient was taken to the endoscopy suite and placed in the left lateral decubitus position. Upon sequential sedation as per above, a digital rectal exam was performed . The Olympus videocolonoscope  was inserted in the rectum and carefully advanced to the cecum, which was identified by the ileocecal valve. The cecum was identified by the ileocecal valve and appendiceal orifice. The quality of preparation was good. The colonoscope was slowly withdrawn with careful evaluation between folds. Retroflexion in the rectum was completed demonstrating internal hemorrhoids. Findings:   Rectum: Grade 1 internal hemorrhoid(s); Sigmoid:     - Diverticulosis  Descending Colon: normal  Transverse Colon: normal  Ascending Colon: normal  Cecum: normal  Terminal Ileum: not intubated      Specimen Removed:  none    Complications: None. EBL:  None.     Impression:    normal colonic mucosa throughout  diverticulosis,  Mild in degree, involving the sigmoid  hemorrhoids internal, Small in size    Recommendations: --Repeat colonoscopy in 5 years. High fiber diet. Resume normal medication(s). Discharge Disposition:  Home in the company of a  when able to ambulate. Marcia Ascencio MD    5/30/2018     ADRIANNE Everett MD  [facility], [address] [place] [address]  [address]  [phone]  [email or web address]

## 2018-06-06 NOTE — PROGRESS NOTES
Incoming call from patient. terri verified. She states she received her letter. She states she no longer has any potassium tablets at home and does not remember the dosage as she states she threw the bottle away. Patient states she takes a multivitamin but cannot see how much potassium is in the pills.   Informed I will let Yanique Burrkeley know of this and see if she wants to prescribe a certain dose of potassium for her

## 2018-06-08 DIAGNOSIS — E87.6 HYPOKALEMIA: Primary | ICD-10-CM

## 2018-06-08 RX ORDER — POTASSIUM CHLORIDE 750 MG/1
10 TABLET, EXTENDED RELEASE ORAL DAILY
Qty: 30 TAB | Refills: 5 | Status: SHIPPED | OUTPATIENT
Start: 2018-06-08 | End: 2019-06-19 | Stop reason: SDUPTHER

## 2018-06-26 NOTE — PROGRESS NOTES
REASON FOR VISIT    This is a Dr. Alessandra Rogel follow-up visit for Ms. Patricia Gale for Rheumatoid Arthritis with secondary Polymyalgia Rheumatica. Inflammatory arthritis phenotype includes:  Anti-CCP positive: pending  Rheumatoid factor positive: no  Erosive disease: N/A  Extra-articular manifestations include: Polymyalgia Rheumatica    Immunosuppression Screening (N/A):  Quantiferon TB: N/A  PPD:  Not performed  Hepatitis B: N/A  Hepatitis C: N/A    Therapy History includes:  Current DMARD therapy include: none  Prior DMARD therapy include: none  Discontinued DMARDs because of inefficacy: None  Discontinued DMARDs because of side effects: None    Immunization History   Administered Date(s) Administered    Influenza High Dose Vaccine PF 01/29/2016, 11/09/2017    Influenza Vaccine 12/05/2012    Influenza Vaccine PF 11/17/2014    PPD 01/25/1999    Pneumococcal Polysaccharide (PPSV-23) 05/13/2013    TD Vaccine 05/12/1999    Tdap 05/13/2013       Patient Active Problem List   Diagnosis Code    Hypertension I10    Hypercholesterolemia E78.00    Type II or unspecified type diabetes mellitus without mention of complication, not stated as uncontrolled E11.9    Other \"heavy-for-dates\" infants P08.1    Type 2 diabetes mellitus without complication ([facility] [address] O10.1    Osteoarthritis of hip M16.9    PMR (polymyalgia rheumatica) ([facility] M35.3    Chronic bronchitis ([facility]) J42    Abnormal pulmonary function test R94.2    Former smoker Z87.891    Chronic pain G89.29    Long term (current) use of systemic steroids Z79.52    Obesity (BMI 30-39. 9) E66.9    Type 2 diabetes mellitus with nephropathy ([facility]) E11.21    Severe obesity (BMI 35.0-39. 9) with comorbidity ([facility] [address] E66.01       HISTORY OF PRESENT ILLNESS    Ms. Patricia Gale returns for a follow-up. I reviewed her medical record, including Dr. Gail Dumont office notes, laboratories and imaging and summarized them in this note.     On her visit with Dr. Alessandra Rogel on 7/31/2017 \"for follow up of PMR. After last visit, she had restarted prednisone at a low dose (perhaps 3 mg daily). She had been off medication for about 3 weeks and noted a flare of pain in the left hip. She had taken a brief prednisone taper (5 mg daily with taper and off over the past week). She has had flares of left shoulder pain, worse over the past month (no relief with prednisone). No joint swelling is noted. She has AM stiffness of 60 minutes. She has no tongue or jaw claudication or temporal headaches. She has not had recent sudden visual changes. She will take hydrocodone/APAP 2-3 times daily with relief. \" Under assessment, he wrote Conner Jasmine had been on lower dose prednisone with taper (perhaps 3 mg daily and lower) since last visit with recent dosing of perhaps 5 mg daily with taper earlier in the month (exact dosing unclear). She is off therapy. Prednisone seems to at least help hip pain. Current shoulder pain may not be related. \"    Today, she complains of pain join in her shoulders, hips, and left ankle and swelling in left ankle with stiffness lasting hours. She also has hand pain at times they lock. They are painful and swelling and stiffness lasting 30 minutes. She denies fever, weight loss, blurred vision, vision loss, oral ulcers, ankle swelling, dry cough, dyspnea, nausea, vomiting, dysphagia, abdominal pain, black or bloody stool, fall since last visit, rash, easy bruising and increased thirst.    Last toxicity monitoring by blood work was done on 7/31/2017 and did not reveal any significant adverse effects. Most recent inflammatory markers from 7/31/2017 revealed a ESR 44 mm/hr (previously 8, 24, 10, 30, 12 mm/hr) and CRP 38.8 mg/L (previously 24.7, 8.4, 9.8, 16.7 mg/L). The patient has not had any interval hospital admission (except for gastroenteritis), infections, or surgeries.     REVIEW OF SYSTEMS    A comprehensive review of systems was performed and pertinent results are documented in the HPI, review of systems is otherwise non-contributory. PAST MEDICAL HISTORY    She has a past medical history of Abnormal pulmonary function test (9/28/2016); AR (allergic rhinitis); Asthma; Atrial thrombus (1994); Chronic bronchitis ([facility] [address] (9/28/2016); Chronic pain; DDD (degenerative disc disease), lumbar; Diabetes ([facility] [address] DJD (degenerative joint disease) of hip; Empty sella ([facility] [address] (2000); Former smoker (10/24/2016); Hoarseness of voice; Hypercholesterolemia; Hypertension; Lesion of vocal cord; PMR (polymyalgia rheumatica) ([facility] [address] (5/18/2016); and Smoker (12/5/2012). FAMILY HISTORY    Her family history includes Arthritis-osteo in her father, mother, and sister; Asthma in her mother and sister; Cancer in her father; Diabetes in her mother and sister; Elevated Lipids in her father and sister; Heart Attack in her sister; Hypertension in her daughter, daughter, daughter, father, mother, and sister; Stroke in her mother. There is no history of Anesth Problems. SOCIAL HISTORY    She reports that she quit smoking about 20 months ago. Her smoking use included Cigarettes. She has a 20.00 pack-year smoking history. She has never used smokeless tobacco. She reports that she does not drink alcohol or use illicit drugs. MEDICATIONS    Current Outpatient Prescriptions   Medication Sig Dispense Refill    nicotine (NICODERM CQ) 21 mg/24 hr       potassium chloride (KLOR-CON) 10 mEq tablet Take 1 Tab by mouth daily. 30 Tab 5    tiotropium (SPIRIVA) 18 mcg inhalation capsule Take 1 Cap by inhalation daily. 90 Cap 3    atorvastatin (LIPITOR) 20 mg tablet Take 1 Tab by mouth nightly. 90 Tab 3    SITagliptin (JANUVIA) 100 mg tablet Take 1 Tab by mouth daily. 30 Tab 3    diltiazem (TIAZAC) 420 mg SR capsule TAKE 1 CAPSULE EVERY DAY 90 Cap 3    glipiZIDE SR (GLUCOTROL XL) 10 mg CR tablet Take 1 Tab by mouth daily.  (Patient taking differently: Take 10 mg by mouth two (2) times a day.) 90 Tab 3    losartan-hydroCHLOROthiazide (HYZAAR) 100-12.5 mg per tablet TAKE 1 TABLET DAILY 90 Tab 3    metFORMIN ER (GLUCOPHAGE XR) 500 mg tablet Take 4 Tabs by mouth daily (with dinner). Dose increase 360 Tab 5    albuterol (PROVENTIL VENTOLIN) 2.5 mg /3 mL (0.083 %) nebulizer solution 3 mL by Nebulization route every six (6) hours as needed for Wheezing. 60 Each 2    albuterol (PROVENTIL HFA, VENTOLIN HFA, PROAIR HFA) 90 mcg/actuation inhaler Take 2 Puffs by inhalation every six (6) hours as needed for Wheezing. 1 Inhaler 1    Blood-Glucose Meter (FREESTYLE LITE METER) monitoring kit Tid use due to uncontrolled dm and hyperglycemia. 250.02. 790.29 1 Kit 0    Lancets (FREESTYLE LANCETS) misc Tid use due to uncontrolled dm and hyperglycemia. 250.02. 790.29 1 Each 11    Blood-Glucose Meter monitoring kit One touch meter 1 Kit 1    Cetirizine (ZYRTEC) 10 mg Cap Take  by mouth.  aspirin 81 mg chewable tablet Take 81 mg by mouth daily.  MULTIVITS W-FE,OTHER MIN (CENTRUM PO) Take  by mouth daily. ALLERGIES    Allergies   Allergen Reactions    Bactrim [Sulfamethoxazole-Trimethoprim] Nausea Only    Biaxin [Clarithromycin] Nausea Only    Demerol [Meperidine] Itching    Erythromycin Nausea Only    Pcn [Penicillins] Hives    Percocet [Oxycodone-Acetaminophen] Itching    Pravastatin Nausea Only    Tetracycline Nausea Only    Voltaren [Diclofenac Sodium] Hives       PHYSICAL EXAMINATION    Visit Vitals    /78    Pulse 92    Temp 98.4 °F (36.9 °C)    Resp 18    Ht 5' 4\" (1.626 m)    Wt 203 lb (92.1 kg)    BMI 34.84 kg/m2     Body mass index is 34.84 kg/(m^2). General: Patient is alert, oriented x 3, not in acute distress    HEENT:   Sclerae are not injected and appear moist.  Oral mucous membranes are moist, there are no ulcers present. There is no alopecia. Neck is supple     Cardiovascular:  Heart is regular rate and rhythm, no murmurs. Chest:  Lungs are clear to auscultation bilaterally.  No rhonchi, wheezes, or crackles. Extremities:  Free of clubbing, cyanosis, edema    Neurological exam:  No focal sensory deficits, muscle strength is full in upper and lower extremities     Skin exam:    Psoriasis:     no  Nail Pitting:     no  Onycholysis:     no  Palmoplantar pustulosis:   no  Acne fulminans:    no  Acne conglobata:    no  Hidradenitis Suppurativa:   no  Dissecting cellulitis of the scalp:  no  Pilonidal sinus:    no  Erythema nodosum:    no  Non-Scarring Alopecia:  no  Discoid Lupus:   no  Subacute Cutaneous Lupus:   no  Heliotrope Rash:   no  Upper Arm Erythema:   no  Shawl Sign:    no  V-sign:     no  Holster sign:    no  Gottron's papules:   no  Gottron's sign:    no  Calcinosis:    no  Raynaud's Phenomenon:  no  's Hands:   no  Periungual erythema:   no  Abnormal Nailfold Capillaries: no  Livedo Reticularis:   no  Scalp Erythema:   no  Rheumatoid Nodules:   no    Musculoskeletal:  A comprehensive musculoskeletal exam was performed for all joints of each upper and lower extremity and assessed for swelling, tenderness and range of motion.       Bilateral knee replacement  Bilateral ankle synovitis  Bilateral MTP synovitis    Z-Deformities:   no  Reeders Neck Deformities:  no  Boutonierre's Deformities:  no  Ulnar Deviation:   no  MCP Subluxation:  no    Joint Count 6/27/2018   Patient pain (0-100) 70   MHAQ 0.5   Left shoulder - Tender 1   Left elbow - Tender 1   Left elbow - Swollen 1   Left wrist- Tender 1   Left wrist- Swollen 1   Left 1st MCP - Tender 1   Left 2nd MCP - Tender 1   Left 2nd MCP - Swollen 1   Left 3rd MCP - Tender 1   Left 3rd MCP - Swollen 1   Left 4th MCP - Tender 1   Left 5th MCP - Tender 1   Left thumb IP - Tender 1   Left 2nd PIP - Tender 1   Left 3rd PIP - Tender 1   Left 4th PIP - Tender 1   Left 5th PIP - Tender 1   Right shoulder - Tender 1   Right wrist- Swollen 1   Right 1st MCP - Tender 1   Right 1st MCP - Swollen 1   Right 2nd MCP - Tender 1   Right 2nd MCP - Swollen 1   Right 3rd PIP - Tender 1   Tender Joint Count (Total) 17   Swollen Joint Count (Total) 7   Physician Assessment (0-10) 5   Patient Assessment (0-10) 8   CDAI Total (calculated) 37       DATA REVIEW    Laboratory     Recent laboratory results were reviewed, summarized, and discussed with the patient. Imaging    Musculoskeletal Ultrasound    None    Radiographs    Chest 5/07/2018: normal heart size. There is no acute process in the lung fields. The osseous structures are unremarkable.     Chest 4/14/2018: Lungs: The lungs are clear of mass, nodule, airspace disease or edema. Pleura: There is no pleural effusion or pneumothorax. Mediastinum: The cardiac and mediastinal contours and pulmonary vascularity are normal. Bones and soft tissues: There are degenerative changes of the spine. Left Shoulder 7/31/2017: no fracture, dislocation or other acute abnormality. DJD AC joint and chronic deformity tuberosity    Left Ankle 9/16/2015: no fracture or disruption of the ankle mortise. There is no other acute osseous or articular abnormality. There is marked soft tissue swelling overlying the medial and lateral sides of the ankle as well as the dorsum of the foot. Subcutaneous edema noted. .  Calcaneal spurs noted. Right Hip 7/15/2015:  no fracture, dislocation or other acute abnormality. Mild osteoarthritis is present. Left Shoulder 11/17/2014:  no fracture, dislocation. A type II acromion is present. There is mild irregularity of the greater tuberosity. CT Imaging    CTA Chest with and without contrast 10/16/2010: There is no evidence for pulmonary embolus. There is no pleural or pericardial effusion. Heart size is normal. No mediastinal or hilar mass or adenopathy is shown. The lungs are clear. Mild to moderate thoracic spine degenerative changes are demonstrated.      MR Imaging    MRA Head without contrast 8/01/2011: The vertebral arteries are codominant.  The basilar artery and its branches are normal. The internal carotid, anterior cerebral, and middle cerebral arteries are patent. There is no flow-limiting intracranial stenosis. There is no aneurysm. There is a patent left posterior communicating artery. .      MRI Brain with and without contrast 8/01/2011: The ventricles are normal in size and are midline. Ventricular size is actually somewhat small for the patient's chronological age. Kane Au is no intracranial hemorrhage or extra-axial fluid collection. There are mild scattered foci of T2 hyperintensity in the cerebral white matter, which are nonspecific. Moderate patchy T2 hyperintensity within the central portion of the sol is noted. The appearance is typical of the changes seen with intracranial microangiopathy. . There is no acute infarction. There is no abnormal parenchymal or meningeal enhancement. The paranasal sinuses and mastoid air cells are clear. There is enlargement of the sella with extension of CSF into the sella and minimal visible pituitary tissue, consistent with an empty sella. DXA    DXA 9/14/2015: (excluded L1, right hip, distal radius) lumbar spine L2-L4 T score 3.6 (BMD 1.475 g/cm2), left femoral neck T score: 4.5 (1.351 g/cm2), left total hip T score: 3.6 (1.379 g/cm2). ASSESSMENT AND PLAN    This is a follow-up visit for Ms. Leanna Schneider. 1) Rheumatoid Arthritis secondary to Polymyalgia Rheumatica. She has a long standing history of Polymyalgia Rheumatica due to Rheumatoid Arthritis but was not treated for Rheumatoid Arthritis. She had been on prednisone for years. Her CDAI was 37 with 17 tender and 7 swollen joints, involving bilateral ankles and MTPs consist net with high disease activity. I ordered labs and radiographs today and will have her follow up to discuss treatment with a DMARD. She is diabetic so declines prednisone. 2) Polymyalgia Rheumatica. See #1. The patient voiced understanding of the aforementioned assessment and plan.  Summary of plan was provided in the After Visit Summary patient instructions.      TODAY'S ORDERS    Orders Placed This Encounter    QUANTIFERON TB GOLD    XR FOOT LT MIN 3 V    XR FOOT RT MIN 3 V    XR HAND LT MIN 3 V    XR HAND RT MIN 3 V    CYCLIC CITRUL PEPTIDE AB, IGG    CHRONIC HEPATITIS PANEL    C REACTIVE PROTEIN, QT    SED RATE (ESR)    PROTEIN ELECTROPHORESIS W/ REFLX GASTON    VITAMIN D, 25 HYDROXY       Future Appointments  Date Time Provider Nikki Linda   7/11/2018 11:00 AM Zeenat Ranjan [facility] [address]   9/11/2018 11:00 AM Kiko Mckenzie NP PAFP MOSHE Kruse MD, [facility]    Adult Rheumatology   Musculoskeletal Ultrasound Certified  [facility] [address], [place], [address] [place] [address] Road   Phone [phone]  Fax [phone]

## 2018-06-27 ENCOUNTER — OFFICE VISIT (OUTPATIENT)
Dept: RHEUMATOLOGY | Age: 68
End: 2018-06-27

## 2018-06-27 ENCOUNTER — OFFICE VISIT (OUTPATIENT)
Dept: FAMILY MEDICINE CLINIC | Age: 68
End: 2018-06-27

## 2018-06-27 VITALS
BODY MASS INDEX: 34.66 KG/M2 | DIASTOLIC BLOOD PRESSURE: 78 MMHG | RESPIRATION RATE: 18 BRPM | HEIGHT: 64 IN | WEIGHT: 203 LBS | SYSTOLIC BLOOD PRESSURE: 152 MMHG | HEART RATE: 92 BPM | TEMPERATURE: 98.4 F

## 2018-06-27 VITALS
OXYGEN SATURATION: 99 % | HEIGHT: 64 IN | SYSTOLIC BLOOD PRESSURE: 137 MMHG | RESPIRATION RATE: 18 BRPM | TEMPERATURE: 96.6 F | BODY MASS INDEX: 34.97 KG/M2 | DIASTOLIC BLOOD PRESSURE: 73 MMHG | WEIGHT: 204.8 LBS | HEART RATE: 82 BPM

## 2018-06-27 DIAGNOSIS — E11.21 TYPE 2 DIABETES MELLITUS WITH NEPHROPATHY (HCC): ICD-10-CM

## 2018-06-27 DIAGNOSIS — M19.012 PRIMARY OSTEOARTHRITIS OF LEFT SHOULDER: ICD-10-CM

## 2018-06-27 DIAGNOSIS — M89.9 DISORDER OF BONE: ICD-10-CM

## 2018-06-27 DIAGNOSIS — M35.3 PMR (POLYMYALGIA RHEUMATICA) (HCC): ICD-10-CM

## 2018-06-27 DIAGNOSIS — M06.9 RHEUMATOID ARTHRITIS WITH UNKNOWN RHEUMATOID FACTOR STATUS (HCC): Primary | ICD-10-CM

## 2018-06-27 DIAGNOSIS — E87.6 HYPOKALEMIA: Primary | ICD-10-CM

## 2018-06-27 DIAGNOSIS — M16.9 OSTEOARTHRITIS OF HIP, UNSPECIFIED LATERALITY, UNSPECIFIED OSTEOARTHRITIS TYPE: ICD-10-CM

## 2018-06-27 LAB — HBA1C MFR BLD HPLC: 7.9 %

## 2018-06-27 RX ORDER — IBUPROFEN 200 MG
1 TABLET ORAL EVERY 24 HOURS
COMMUNITY
Start: 2018-04-26 | End: 2019-11-19

## 2018-06-27 RX ORDER — HYDROCODONE BITARTRATE AND ACETAMINOPHEN 10; 325 MG/1; MG/1
1 TABLET ORAL
Qty: 90 TAB | Refills: 0 | Status: SHIPPED | OUTPATIENT
Start: 2018-06-27 | End: 2018-06-27 | Stop reason: SDUPTHER

## 2018-06-27 RX ORDER — HYDROCODONE BITARTRATE AND ACETAMINOPHEN 10; 325 MG/1; MG/1
1 TABLET ORAL
Qty: 90 TAB | Refills: 0 | Status: SHIPPED | OUTPATIENT
Start: 2018-09-27 | End: 2018-06-27

## 2018-06-27 RX ORDER — HYDROCODONE BITARTRATE AND ACETAMINOPHEN 10; 325 MG/1; MG/1
1 TABLET ORAL
Qty: 90 TAB | Refills: 0 | Status: SHIPPED | OUTPATIENT
Start: 2018-07-27 | End: 2018-06-27 | Stop reason: SDUPTHER

## 2018-06-27 NOTE — PROGRESS NOTES
Chief Complaint   Patient presents with    Follow-up     potassium    Medication Refill       1. Have you been to the ER, urgent care clinic since your last visit? Hospitalized since your last visit? no    2. Have you seen or consulted any other health care providers outside of the [facility] since your last visit? Include any pap smears or colon screening.   No    Visit Vitals    /73 (BP 1 Location: Left arm, BP Patient Position: Sitting)    Pulse 82    Temp 96.6 °F (35.9 °C) (Oral)    Resp 18    Ht 5' 4\" (1.626 m)    Wt 204 lb 12.8 oz (92.9 kg)    SpO2 99%    BMI 35.15 kg/m2

## 2018-06-27 NOTE — MR AVS SNAPSHOT
[address] [facility] [address] 
[phone] Patient: Alan Floyd MRN: [id number] ATN:[birth year] Visit Information Date & Time Provider Department Dept. Phone Encounter #  
 6/27/2018  2:40 PM Esteban Guan, [facility] [id number] Follow-up Instructions Return in about 2 weeks (around 7/11/2018). Your Appointments 9/11/2018 11:00 AM  
ROUTINE CARE with Jordyn Wilde  [facility] ([address] [facility]) Appt Note: 11 week follow up  
 [address] [phone]  
[phone]  
  
   
 [address] [address] Upcoming Health Maintenance Date Due  
 EYE EXAM RETINAL OR DILATED Q1 8/7/2015 GLAUCOMA SCREENING Q2Y 7/4/2016 BREAST CANCER SCRN MAMMOGRAM 10/5/2018 FOOT EXAM Q1 7/31/2018 Influenza Age 5 to Adult 8/1/2018 HEMOGLOBIN A1C Q6M 9/26/2018 MICROALBUMIN Q1 11/9/2018 LIPID PANEL Q1 11/9/2018 Pneumococcal 65+ Low/Medium Risk (2 of 2 - PPSV23) 12/21/2018 MEDICARE YEARLY EXAM 3/27/2019 COLONOSCOPY 5/30/2021 DTaP/Tdap/Td series (2 - Td) 5/13/2023 Allergies as of 6/27/2018  Review Complete On: 6/27/2018 By: Esteban Guan MD  
  
 Severity Noted Reaction Type Reactions Bactrim [Sulfamethoxazole-trimethoprim]  04/02/2010    Nausea Only Biaxin [Clarithromycin]  04/02/2010    Nausea Only Demerol [Meperidine]  04/02/2010    Itching Erythromycin  04/02/2010    Nausea Only Pcn [Penicillins]  04/02/2010    Hives Percocet [Oxycodone-acetaminophen]  04/02/2010    Itching Pravastatin  03/26/2018    Nausea Only Tetracycline  04/02/2010    Nausea Only Voltaren [Diclofenac Sodium]  04/02/2010    Hives Current Immunizations  Reviewed on 11/9/2017 Name Date Influenza High Dose Vaccine PF 11/9/2017, 1/29/2016 Influenza Vaccine 12/5/2012 Influenza Vaccine PF 11/17/2014 PPD 1/25/1999 Pneumococcal Polysaccharide (PPSV-23) 5/13/2013 TD Vaccine 5/12/1999 Tdap 5/13/2013 Not reviewed this visit You Were Diagnosed With   
  
 Codes Comments Rheumatoid arthritis with unknown rheumatoid factor status ([facility] [address]    -  Primary ICD-10-CM: M06.9 ICD-9-CM: 714.0 PMR (polymyalgia rheumatica) ([facility])     ICD-10-CM: M35.3 ICD-9-CM: 825 Disorder of bone     ICD-10-CM: M89.9 ICD-9-CM: 733.90 Vitals BP Pulse Temp Resp Height(growth percentile) Weight(growth percentile) 152/78 92 98.4 °F (36.9 °C) 18 5' 4\" (1.626 m) 203 lb (92.1 kg) BMI OB Status Smoking Status 34.84 kg/m2 Hysterectomy Former Smoker BMI and BSA Data Body Mass Index Body Surface Area 34.84 kg/m 2 2.04 m 2 Preferred Pharmacy Pharmacy Name Phone [facility] [address]  [address] [phone] Your Updated Medication List  
  
   
This list is accurate as of 6/27/18  3:12 PM.  Always use your most recent med list.  
  
  
  
  
 * albuterol 2.5 mg /3 mL (0.083 %) nebulizer solution Commonly known as:  PROVENTIL VENTOLIN  
3 mL by Nebulization route every six (6) hours as needed for Wheezing. * albuterol 90 mcg/actuation inhaler Commonly known as:  PROVENTIL HFA, VENTOLIN HFA, PROAIR HFA Take 2 Puffs by inhalation every six (6) hours as needed for Wheezing. aspirin 81 mg chewable tablet Take 81 mg by mouth daily. atorvastatin 20 mg tablet Commonly known as:  LIPITOR Take 1 Tab by mouth nightly. * Blood-Glucose Meter monitoring kit One touch meter * Blood-Glucose Meter monitoring kit Commonly known as:  FREESTYLE LITE METER Tid use due to uncontrolled dm and hyperglycemia. 250.02. 790.29 CENTRUM PO Take  by mouth daily. diltiazem 420 mg SR capsule Commonly known as:  [facility] TAKE 1 CAPSULE EVERY DAY  
  
 glipiZIDE SR 10 mg CR tablet Commonly known as:  GLUCOTROL XL Take 1 Tab by mouth daily. Lancets Misc Commonly known as:  FREESTYLE LANCETS Tid use due to uncontrolled dm and hyperglycemia. 250.02. 790.29  
  
 losartan-hydroCHLOROthiazide 100-12.5 mg per tablet Commonly known as:  HYZAAR  
TAKE 1 TABLET DAILY  
  
 metFORMIN  mg tablet Commonly known as:  GLUCOPHAGE XR Take 4 Tabs by mouth daily (with dinner). Dose increase  
  
 nicotine 21 mg/24 hr  
Commonly known as:  NICODERM CQ  
  
 potassium chloride 10 mEq tablet Commonly known as:  KLOR-CON Take 1 Tab by mouth daily. SITagliptin 100 mg tablet Commonly known as:  Dain Nael Take 1 Tab by mouth daily. tiotropium 18 mcg inhalation capsule Commonly known as:  Caty Elda Take 1 Cap by inhalation daily. ZyrTEC 10 mg Cap Generic drug:  Cetirizine Take  by mouth. * Notice: This list has 4 medication(s) that are the same as other medications prescribed for you. Read the directions carefully, and ask your doctor or other care provider to review them with you. We Performed the Following C REACTIVE PROTEIN, QT [06515 CPT(R)] CHRONIC HEPATITIS PANEL [[id number] Custom] [address], IGG [id number] CPT(R)] PROTEIN ELECTROPHORESIS W/ REFLX GASTON [[id number] Custom] QUANTIFERON TB GOLD [[id number] Custom] SED RATE (ESR) [id number] CPT(R)] VITAMIN D, 25 HYDROXY [id number] CPT(R)] Follow-up Instructions Return in about 2 weeks (around 7/11/2018). To-Do List   
 06/27/2018 Imaging:  XR FOOT LT MIN 3 V   
  
 06/27/2018 Imaging:  XR FOOT RT MIN 3 V   
  
 06/27/2018 Imaging:  XR HAND LT MIN 3 V   
  
 06/27/2018 Imaging:  XR HAND RT MIN 3 V Introducing hospitals & HEALTH SERVICES! Nick Hamm introduces [employer] patient portal. Now you can access parts of your medical record, email your doctor's office, and request medication refills online. 1. In your internet browser, go to [email or web address] 2. Click on the First Time User? Click Here link in the Sign In box. You will see the New Member Sign Up page. 3. Enter your [employer] Access Code exactly as it appears below. You will not need to use this code after youve completed the sign-up process. If you do not sign up before the expiration date, you must request a new code. · [employer] Access Code: [email or web address] Expires: 9/25/2018  3:11 PM 
 
4. Enter the last four digits of your Social Security Number (xxxx) and Date of Birth (mm/dd/yyyy) as indicated and click Submit. You will be taken to the next sign-up page. 5. Create a [employer]t ID. This will be your [employer] login ID and cannot be changed, so think of one that is secure and easy to remember. 6. Create a [employer] password. You can change your password at any time. 7. Enter your Password Reset Question and Answer. This can be used at a later time if you forget your password. 8. Enter your e-mail address. You will receive e-mail notification when new information is available in [address] 9. Click Sign Up. You can now view and download portions of your medical record. 10. Click the Download Summary menu link to download a portable copy of your medical information. If you have questions, please visit the Frequently Asked Questions section of the [employer] website. Remember, [employer] is NOT to be used for urgent needs. For medical emergencies, dial 911. Now available from your iPhone and Android! Please provide this summary of care documentation to your next provider. Your primary care clinician is listed as Valery Trujillo. If you have any questions after today's visit, please call [phone].

## 2018-06-27 NOTE — PROGRESS NOTES
Assessment/Plan:     Diagnoses and all orders for this visit:    1. Hypokalemia  -     METABOLIC PANEL, COMPREHENSIVE  Recheck labs today. She is currently on supplementation. 2. Type 2 diabetes mellitus with nephropathy (HCC)  -     AMB POC HEMOGLOBIN A1C  -     REFERRAL TO DIETITIAN  Poor control. She will monitor home sugars and call back with results. Strongly encouraged dietary improvement and reduction of high sodium foods. Referred to dietitian. She is agreeable to consider Trulicity since Januvia is too expensive. 3. Osteoarthritis of hip, unspecified laterality, unspecified osteoarthritis type  Pain medications refilled for three months today. Symptoms are stable over time. Discussed weight loss as a large priority to reduce her pain burden. She is hesitant as she viewed weight loss as unhealthy. Working towards a more reasonable BMI and she is agreeable.  reviewed and appropriate. UDS at next OV. Pain contract is up to date. Discussed risks of opioids and she is aware. 4. Primary osteoarthritis of left shoulder  Stable on current therapy. Follow-up Disposition:  Return in about 3 months (around 9/27/2018). Discussed expected course/resolution/complications of diagnosis in detail with patient.    Medication risks/benefits/costs/interactions/alternatives discussed with patient.    Pt was given after visit summary which includes diagnoses, current medications & vitals. Pt expressed understanding with the diagnosis and plan          Subjective:      Alma Ramirez is a 78 y.o. female who presents for had concerns including Follow-up and Medication Refill. Alma Ramirez RTC today to follow up on chronic pain diagnosis. We discussed her osteoarthritis that is affecting her left hip, left shoulder and lumbar back. Significant changes since last visit: none. She is  able to do her normal daily activities. She reports the following adverse side effects: none. Previous treatments include heat, cold, Lidocaine patches otc. Least pain over the last week has been 4/10. Worst pain over the last week has been 10/10 when she ran out of medication. Opioid Risk Tool Reviewed: YES    Aberrant behaviors: None. Urine Drug Screen: Reviewed and she was positive for Tramadol. This was discussed at the last office visit and she has gotten rid of her prescription and states understanding she can only take what is prescribed by our office. She does report taking Tramadol recently when she ran out of her medication two days ago which was appropriate timing secondary to the previous prescription. Controlled substance agreement on file: YES. Concomitant use of a benzodiazepine: no    Narcan not warranted at this time. She is scheduled to see Dr. Naveed Montalvo today. Reports pain is well controlled on current regimen and has been stable for many years. Is not increasing use over time. Patient is a 78 y.o. female that comes today for follow up of Diabetes Mellitus Type 2. Patient does not regularly monitor  their FSBG at home. She ran out out of strips for her meter. Meter type is unknown. does not rigorously follow a diabetic diet  Patients activity level: sedentery  there was no change in patient's weight. .  The patient does not experienced recent hypoglycemia. reports that she quit smoking. Her smoking use included Cigarettes. She has a 20.00 pack-year smoking history. She has never used smokeless tobacco.        Current Outpatient Prescriptions   Medication Sig Dispense Refill    nicotine (NICODERM CQ) 21 mg/24 hr       potassium chloride (KLOR-CON) 10 mEq tablet Take 1 Tab by mouth daily. 30 Tab 5    tiotropium (SPIRIVA) 18 mcg inhalation capsule Take 1 Cap by inhalation daily. 90 Cap 3    atorvastatin (LIPITOR) 20 mg tablet Take 1 Tab by mouth nightly. 90 Tab 3    SITagliptin (JANUVIA) 100 mg tablet Take 1 Tab by mouth daily.  30 Tab 3    diltiazem (TIAZAC) 420 mg SR capsule TAKE 1 CAPSULE EVERY DAY 90 Cap 3    glipiZIDE SR (GLUCOTROL XL) 10 mg CR tablet Take 1 Tab by mouth daily. (Patient taking differently: Take 10 mg by mouth two (2) times a day.) 90 Tab 3    losartan-hydroCHLOROthiazide (HYZAAR) 100-12.5 mg per tablet TAKE 1 TABLET DAILY 90 Tab 3    metFORMIN ER (GLUCOPHAGE XR) 500 mg tablet Take 4 Tabs by mouth daily (with dinner). Dose increase 360 Tab 5    albuterol (PROVENTIL VENTOLIN) 2.5 mg /3 mL (0.083 %) nebulizer solution 3 mL by Nebulization route every six (6) hours as needed for Wheezing. 60 Each 2    albuterol (PROVENTIL HFA, VENTOLIN HFA, PROAIR HFA) 90 mcg/actuation inhaler Take 2 Puffs by inhalation every six (6) hours as needed for Wheezing. 1 Inhaler 1    Blood-Glucose Meter (FREESTYLE LITE METER) monitoring kit Tid use due to uncontrolled dm and hyperglycemia. 250.02. 790.29 1 Kit 0    Lancets (FREESTYLE LANCETS) misc Tid use due to uncontrolled dm and hyperglycemia. 250.02. 790.29 1 Each 11    Blood-Glucose Meter monitoring kit One touch meter 1 Kit 1    Cetirizine (ZYRTEC) 10 mg Cap Take  by mouth.  aspirin 81 mg chewable tablet Take 81 mg by mouth daily.  MULTIVITS W-FE,OTHER MIN (CENTRUM PO) Take  by mouth daily. Allergies   Allergen Reactions    Bactrim [Sulfamethoxazole-Trimethoprim] Nausea Only    Biaxin [Clarithromycin] Nausea Only    Demerol [Meperidine] Itching    Erythromycin Nausea Only    Pcn [Penicillins] Hives    Percocet [Oxycodone-Acetaminophen] Itching    Pravastatin Nausea Only    Tetracycline Nausea Only    Voltaren [Diclofenac Sodium] Hives       ROS:   Complete review of systems was reviewed with pertinent information listed in HPI.     Objective:     Visit Vitals    /73 (BP 1 Location: Left arm, BP Patient Position: Sitting)    Pulse 82    Temp 96.6 °F (35.9 °C) (Oral)    Resp 18    Ht 5' 4\" (1.626 m)    Wt 204 lb 12.8 oz (92.9 kg)    SpO2 99%    BMI 35.15 kg/m2       Vitals and Nurse Documentation reviewed. Physical Exam   Constitutional: No distress. Cardiovascular: S1 normal and S2 normal.  Exam reveals no gallop and no friction rub. No murmur heard. Pulmonary/Chest: Breath sounds normal. No respiratory distress. Skin: Skin is warm and dry.    Psychiatric: Mood and affect normal.

## 2018-06-28 ENCOUNTER — TELEPHONE (OUTPATIENT)
Dept: FAMILY MEDICINE CLINIC | Age: 68
End: 2018-06-28

## 2018-06-28 DIAGNOSIS — G89.4 CHRONIC PAIN DISORDER: Primary | ICD-10-CM

## 2018-07-01 RX ORDER — LANCETS
EACH MISCELLANEOUS
Qty: 1 EACH | Refills: 11 | Status: SHIPPED | OUTPATIENT
Start: 2018-07-01

## 2018-07-01 RX ORDER — BLOOD-GLUCOSE METER
EACH MISCELLANEOUS
Qty: 1 EACH | Refills: 0 | Status: SHIPPED | OUTPATIENT
Start: 2018-07-01 | End: 2021-03-04 | Stop reason: SDUPTHER

## 2018-07-01 RX ORDER — ISOPROPYL ALCOHOL 70 ML/100ML
SWAB TOPICAL
Qty: 100 PAD | Refills: 1 | Status: SHIPPED | OUTPATIENT
Start: 2018-07-01

## 2018-07-02 LAB
ANNOTATION COMMENT IMP: NORMAL
GAMMA INTERFERON BACKGROUND BLD IA-ACNC: 0.05 IU/ML
M TB IFN-G BLD-IMP: NEGATIVE
M TB IFN-G CD4+ BCKGRND COR BLD-ACNC: 0 IU/ML
M TB IFN-G CD4+ T-CELLS BLD-ACNC: 0.05 IU/ML
MITOGEN IGNF BLD-ACNC: 8.2 IU/ML
QUANTIFERON INCUBATION: NORMAL
SERVICE CMNT-IMP: NORMAL

## 2018-07-03 LAB
25(OH)D3+25(OH)D2 SERPL-MCNC: 14.4 NG/ML (ref 30–100)
ALBUMIN SERPL ELPH-MCNC: 3.7 G/DL (ref 2.9–4.4)
ALBUMIN SERPL-MCNC: 4.6 G/DL (ref 3.6–4.8)
ALBUMIN/GLOB SERPL: 1.1 {RATIO} (ref 0.7–1.7)
ALBUMIN/GLOB SERPL: 1.8 {RATIO} (ref 1.2–2.2)
ALP SERPL-CCNC: 94 IU/L (ref 39–117)
ALPHA1 GLOB SERPL ELPH-MCNC: 0.2 G/DL (ref 0–0.4)
ALPHA2 GLOB SERPL ELPH-MCNC: 0.9 G/DL (ref 0.4–1)
ALT SERPL-CCNC: 14 IU/L (ref 0–32)
AST SERPL-CCNC: 15 IU/L (ref 0–40)
B-GLOBULIN SERPL ELPH-MCNC: 1.2 G/DL (ref 0.7–1.3)
BILIRUB SERPL-MCNC: 0.2 MG/DL (ref 0–1.2)
BUN SERPL-MCNC: 19 MG/DL (ref 8–27)
BUN/CREAT SERPL: 21 (ref 12–28)
CALCIUM SERPL-MCNC: 9.9 MG/DL (ref 8.7–10.3)
CCP IGA+IGG SERPL IA-ACNC: 5 UNITS (ref 0–19)
CHLORIDE SERPL-SCNC: 99 MMOL/L (ref 96–106)
CO2 SERPL-SCNC: 20 MMOL/L (ref 20–29)
COMMENT, 144067: NORMAL
CREAT SERPL-MCNC: 0.91 MG/DL (ref 0.57–1)
CRP SERPL-MCNC: 11 MG/L (ref 0–4.9)
ERYTHROCYTE [SEDIMENTATION RATE] IN BLOOD BY WESTERGREN METHOD: 25 MM/HR (ref 0–40)
GAMMA GLOB SERPL ELPH-MCNC: 1.2 G/DL (ref 0.4–1.8)
GLOBULIN SER CALC-MCNC: 2.6 G/DL (ref 1.5–4.5)
GLOBULIN SER CALC-MCNC: 3.5 G/DL (ref 2.2–3.9)
GLUCOSE SERPL-MCNC: 171 MG/DL (ref 65–99)
HBV CORE AB SERPL QL IA: POSITIVE
HBV CORE IGM SERPL QL IA: NEGATIVE
HBV E AB SERPL QL IA: NEGATIVE
HBV E AG SERPL QL IA: NEGATIVE
HBV SURFACE AB SER QL: NON REACTIVE
HBV SURFACE AG SERPL QL IA: NEGATIVE
HCV AB S/CO SERPL IA: <0.1 S/CO RATIO (ref 0–0.9)
IGA SERPL-MCNC: 159 MG/DL (ref 87–352)
IGG SERPL-MCNC: 1021 MG/DL (ref 700–1600)
IGM SERPL-MCNC: 140 MG/DL (ref 26–217)
INTERPRETATION SERPL IEP-IMP: NORMAL
M PROTEIN SERPL ELPH-MCNC: 0.2 G/DL
PLEASE NOTE, 011150: ABNORMAL
POTASSIUM SERPL-SCNC: 4.2 MMOL/L (ref 3.5–5.2)
PROT PATTERN SERPL ELPH-IMP: ABNORMAL
PROT SERPL-MCNC: 7.2 G/DL (ref 6–8.5)
SODIUM SERPL-SCNC: 139 MMOL/L (ref 134–144)

## 2018-07-05 NOTE — PROGRESS NOTES
The results were reviewed and a letter was sent. Positive Hep B Core Ab, total which is likely a false positive (cross-reactivity). Elevated inflammatory marker (CRP). 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 Your vitamin D is low. I will prescribe you weekly vitamin D called ergocalciferol 50,000 on follow up. All remaining labs are normal/negative.

## 2018-07-13 NOTE — TELEPHONE ENCOUNTER
Patient is calling in regards to her medication and equipment not being sent over to [facility] mail order pharmacy, patient was informed the medication and equipment was faxed over to pharmacy on  July 1st 2018. Patient is requesting a call back in regards to this. Suggestion was made to patient that she should call pharmacy to verified there recipe of request. Patient states that she had received letter in the mail.       Best call back [phone]

## 2018-07-13 NOTE — TELEPHONE ENCOUNTER
[facility] on file is calling to get a verbal order on [employer]. [phone]. Or escribe [phone].

## 2018-07-16 RX ORDER — HYDROCODONE BITARTRATE AND ACETAMINOPHEN 10; 325 MG/1; MG/1
1 TABLET ORAL
Qty: 90 TAB | Refills: 0 | Status: SHIPPED | OUTPATIENT
Start: 2018-08-27 | End: 2018-09-11 | Stop reason: SDUPTHER

## 2018-07-16 RX ORDER — HYDROCODONE BITARTRATE AND ACETAMINOPHEN 10; 325 MG/1; MG/1
TABLET ORAL
COMMUNITY
Start: 2018-06-27 | End: 2018-07-16 | Stop reason: SDUPTHER

## 2018-07-16 NOTE — TELEPHONE ENCOUNTER
Call to patient.  verified. Patient states she has all her diabetic supplies but she only needs the control solution. Patient states she has a prescription for her hydrocodone for start date in 2018. She states she needs one for 2018. Advised will send to provider so that she can review as well.  She understands

## 2018-07-16 NOTE — TELEPHONE ENCOUNTER
Call to insurance company they only need control solution sent to pharmacy. Advised will send in.  She informed me patient has all other supplies

## 2018-09-11 ENCOUNTER — OFFICE VISIT (OUTPATIENT)
Dept: FAMILY MEDICINE CLINIC | Age: 68
End: 2018-09-11

## 2018-09-11 VITALS
WEIGHT: 210.4 LBS | OXYGEN SATURATION: 97 % | HEART RATE: 74 BPM | BODY MASS INDEX: 35.92 KG/M2 | SYSTOLIC BLOOD PRESSURE: 142 MMHG | HEIGHT: 64 IN | DIASTOLIC BLOOD PRESSURE: 73 MMHG | RESPIRATION RATE: 18 BRPM | TEMPERATURE: 98 F

## 2018-09-11 DIAGNOSIS — G89.4 CHRONIC PAIN DISORDER: ICD-10-CM

## 2018-09-11 DIAGNOSIS — G62.89 OTHER POLYNEUROPATHY: ICD-10-CM

## 2018-09-11 DIAGNOSIS — E11.9 TYPE 2 DIABETES MELLITUS WITHOUT COMPLICATION, UNSPECIFIED WHETHER LONG TERM INSULIN USE (HCC): Primary | ICD-10-CM

## 2018-09-11 DIAGNOSIS — Z23 ENCOUNTER FOR IMMUNIZATION: ICD-10-CM

## 2018-09-11 PROBLEM — E66.9 OBESITY (BMI 30-39.9): Status: RESOLVED | Noted: 2017-12-12 | Resolved: 2018-09-11

## 2018-09-11 PROBLEM — I10 ESSENTIAL HYPERTENSION: Status: ACTIVE | Noted: 2018-09-11

## 2018-09-11 PROBLEM — E11.42 TYPE 2 DIABETES MELLITUS WITH DIABETIC POLYNEUROPATHY, WITHOUT LONG-TERM CURRENT USE OF INSULIN (HCC): Status: ACTIVE | Noted: 2017-12-21

## 2018-09-11 LAB — HBA1C MFR BLD HPLC: 8 %

## 2018-09-11 RX ORDER — HYDROCODONE BITARTRATE AND ACETAMINOPHEN 10; 325 MG/1; MG/1
1 TABLET ORAL
Qty: 90 TAB | Refills: 0 | Status: SHIPPED | OUTPATIENT
Start: 2018-10-11 | End: 2018-09-11 | Stop reason: SDUPTHER

## 2018-09-11 RX ORDER — HYDROCODONE BITARTRATE AND ACETAMINOPHEN 10; 325 MG/1; MG/1
1 TABLET ORAL
Qty: 90 TAB | Refills: 0 | Status: SHIPPED | OUTPATIENT
Start: 2018-09-11 | End: 2018-09-11 | Stop reason: SDUPTHER

## 2018-09-11 RX ORDER — GABAPENTIN 100 MG/1
100 CAPSULE ORAL
Qty: 30 CAP | Refills: 1 | Status: SHIPPED | OUTPATIENT
Start: 2018-09-11 | End: 2018-10-15

## 2018-09-11 RX ORDER — HYDROCODONE BITARTRATE AND ACETAMINOPHEN 10; 325 MG/1; MG/1
1 TABLET ORAL
Qty: 90 TAB | Refills: 0 | Status: SHIPPED | OUTPATIENT
Start: 2018-11-11 | End: 2018-12-18 | Stop reason: SDUPTHER

## 2018-09-11 NOTE — PROGRESS NOTES
Assessment/Plan:     Diagnoses and all orders for this visit:    1. Type 2 diabetes mellitus without complication, unspecified whether long term insulin use (HCC)  -      DIABETES FOOT EXAM  -     AMB POC HEMOGLOBIN A1C  -     dulaglutide (TRULICITY) 2.14 DE/4.3 mL sub-q pen; 0.5 mL by SubCUTAneous route every seven (7) days. Uncontrolled. She has been off Januvia for one month. Discontinue Januvia and start Trulicity as above. Continue Metformin and Glipizide which are both at max dose. Continue to encourage positive dietary change and reduction in sodium. She will reschedule with the dietitian. 2. Other polyneuropathy  -     gabapentin (NEURONTIN) 100 mg capsule; Take 1 Cap by mouth nightly. Treatment as above. Continue to taper towards effectiveness. 3. Encounter for immunization  -     Influenza virus vaccine (FLUZONE HIGH-DOSE) 65 years and older  -     NC IMMUNIZ ADMIN,1 SINGLE/COMB VAC/TOXOID    4. Chronic pain disorder  -     HYDROcodone-acetaminophen (NORCO)  mg tablet; Take 1 Tab by mouth every eight (8) hours as needed for Pain. Max Daily Amount: 3 Tabs. Stable on current therapy. Long term goals include weight reduction and improved physical activity, then weaning. She continues to appear precontemplative towards lifestyle change.  reviewed and appropriate. UDS at next OV. Controlled Substance Contract up to date on 3/2018. States understanding to risks of chronic opioid use including dependence. Given 3 month supply today. Follow-up Disposition:  Return in about 4 weeks (around 10/9/2018) for Follow Up. Discussed expected course/resolution/complications of diagnosis in detail with patient.    Medication risks/benefits/costs/interactions/alternatives discussed with patient.    Pt was given after visit summary which includes diagnoses, current medications & vitals.    Pt expressed understanding with the diagnosis and plan          Subjective:      Radha Andersen is a 80 y.o. female who presents for had concerns including Diabetes and Foot Pain. Patient is a 80 y.o. female that comes today for follow up of Diabetes Mellitus Type 2. Patient does not regularly monitor  their FSBG at home. She has had two deaths recently from extended family and has not been monitoring her sugars. She reports a poor diet recently due to these losses. She has been out of Januvia for one month. does not rigorously follow a diabetic diet  Patients activity level: sedentery  gained, 7 lbs. .  The patient does not experienced recent hypoglycemia. reports that she quit smoking about 23 months ago. Her smoking use included Cigarettes. She has a 20.00 pack-year smoking history. She has never used smokeless tobacco. She did not follow up with dietitian due to recent losses as above. Reports peripheral neuropathy is worsening over time. Keeping her up at night. Previously attempted topical treatment which did not improve symptoms. Chronic Pain  [facility] RTC today to follow up on chronic pain diagnosis. We discussed her osteoarthritis that is affecting her left hip, left shoulder and lumbar back. Significant changes since last visit: none. She is  able to do her normal daily activities. She reports the following adverse side effects: none. Previous treatments include heat, cold, Lidocaine patches otc, Tramadol. Not a candidate for oral NSAIDS. Least pain over the last week has been 4/10. Worst pain over the last week has been 8/10 with recent travel. Opioid Risk Tool Reviewed: YES  Aberrant behaviors: None. Controlled substance agreement on file: YES. Concomitant use of a benzodiazepine: no    Narcan not warranted at this time. She is followed by Rheumatology for a history of polymyalgia rheumatica. Reports pain is well controlled on current regimen and has been stable for many years. Is not increasing use over time.       Lab Results   Component Value Date/Time    Hemoglobin A1c 11.7 (H) 11/09/2017 10:03 AM    Hemoglobin A1c 9.1 (H) 07/31/2017 12:13 PM    Hemoglobin A1c 9.7 (H) 05/08/2017 10:59 AM         Current Outpatient Prescriptions   Medication Sig Dispense Refill    gabapentin (NEURONTIN) 100 mg capsule Take 1 Cap by mouth nightly. 30 Cap 1    dulaglutide (TRULICITY) 4.98 EJ/6.4 mL sub-q pen 0.5 mL by SubCUTAneous route every seven (7) days. 4 Pen 3    [START ON 11/11/2018] HYDROcodone-acetaminophen (NORCO)  mg tablet Take 1 Tab by mouth every eight (8) hours as needed for Pain. Max Daily Amount: 3 Tabs. 90 Tab 0    Blood Glucose Control High&Low (ACCU-CHEK JENNIE CONTROL SOLN) soln Use as directed for controls as needed. DX: E11.9 1 Bottle 6    Blood-Glucose Meter (ACCU-CHEK JENNIE PLUS METER) misc Test blood sugar twice daily. DX: E11.9. Substitute supply brand accepted by insurance. 1 Each 0    Lancets (ACCU-CHEK SOFTCLIX LANCETS) misc Test blood sugar twice daily. DX: E11.9. Substitute supply brand accepted by insurance. 1 Each 11    glucose blood VI test strips (ACCU-CHEK JENNIE PLUS TEST STRP) strip Test blood sugar twice daily. DX: E11.9. Substitute supply brand accepted by insurance. 100 Strip 11    alcohol swabs (ALCOHOL WIPES) padm Test blood sugar twice daily. DX: E11.9. Substitute supply brand accepted by insurance. 100 Pad 1    nicotine (NICODERM CQ) 21 mg/24 hr       potassium chloride (KLOR-CON) 10 mEq tablet Take 1 Tab by mouth daily. 30 Tab 5    tiotropium (SPIRIVA) 18 mcg inhalation capsule Take 1 Cap by inhalation daily. 90 Cap 3    atorvastatin (LIPITOR) 20 mg tablet Take 1 Tab by mouth nightly. 90 Tab 3    diltiazem (TIAZAC) 420 mg SR capsule TAKE 1 CAPSULE EVERY DAY 90 Cap 3    glipiZIDE SR (GLUCOTROL XL) 10 mg CR tablet Take 1 Tab by mouth daily.  (Patient taking differently: Take 10 mg by mouth two (2) times a day.) 90 Tab 3    losartan-hydroCHLOROthiazide (HYZAAR) 100-12.5 mg per tablet TAKE 1 TABLET DAILY 90 Tab 3  metFORMIN ER (GLUCOPHAGE XR) 500 mg tablet Take 4 Tabs by mouth daily (with dinner). Dose increase 360 Tab 5    albuterol (PROVENTIL VENTOLIN) 2.5 mg /3 mL (0.083 %) nebulizer solution 3 mL by Nebulization route every six (6) hours as needed for Wheezing. 60 Each 2    albuterol (PROVENTIL HFA, VENTOLIN HFA, PROAIR HFA) 90 mcg/actuation inhaler Take 2 Puffs by inhalation every six (6) hours as needed for Wheezing. 1 Inhaler 1    Blood-Glucose Meter (FREESTYLE LITE METER) monitoring kit Tid use due to uncontrolled dm and hyperglycemia. 250.02. 790.29 1 Kit 0    Lancets (FREESTYLE LANCETS) misc Tid use due to uncontrolled dm and hyperglycemia. 250.02. 790.29 1 Each 11    Blood-Glucose Meter monitoring kit One touch meter 1 Kit 1    Cetirizine (ZYRTEC) 10 mg Cap Take  by mouth.  aspirin 81 mg chewable tablet Take 81 mg by mouth daily.  MULTIVITS W-FE,OTHER MIN (CENTRUM PO) Take  by mouth daily. Allergies   Allergen Reactions    Bactrim [Sulfamethoxazole-Trimethoprim] Nausea Only    Biaxin [Clarithromycin] Nausea Only    Demerol [Meperidine] Itching    Erythromycin Nausea Only    Pcn [Penicillins] Hives    Percocet [Oxycodone-Acetaminophen] Itching    Pravastatin Nausea Only    Tetracycline Nausea Only    Voltaren [Diclofenac Sodium] Hives       ROS:   Review of Systems   Constitutional: Negative for malaise/fatigue. Eyes: Negative for blurred vision. Respiratory: Negative for shortness of breath. Cardiovascular: Negative for chest pain. Musculoskeletal: Positive for joint pain. Objective:     Visit Vitals    /73 (BP 1 Location: Left arm, BP Patient Position: Sitting)    Pulse 74    Temp 98 °F (36.7 °C) (Oral)    Resp 18    Ht 5' 4\" (1.626 m)    Wt 210 lb 6.4 oz (95.4 kg)    SpO2 97%    BMI 36.12 kg/m2       Vitals and Nurse Documentation reviewed. Physical Exam   Constitutional: No distress.    Cardiovascular: S1 normal and S2 normal.  Exam reveals no gallop and no friction rub. No murmur heard. Pulmonary/Chest: Breath sounds normal. No respiratory distress. Musculoskeletal:        Right lower leg: She exhibits no tenderness, no swelling and no edema. Left lower leg: She exhibits no tenderness, no swelling and no edema. Skin: Skin is warm and dry. Monofilament intact bilaterally. Pulses R: 2+ and L: 2+. No open wounds. No skin lesions.      Psychiatric: Mood and affect normal.

## 2018-09-11 NOTE — PROGRESS NOTES
Chief Complaint   Patient presents with    Diabetes     3 month follow     Foot Pain     1. Have you been to the ER, urgent care clinic since your last visit? Hospitalized since your last visit? No    2. Have you seen or consulted any other health care providers outside of the [address] [place] [address] since your last visit? Include any pap smears or colon screening.  No

## 2018-09-12 ENCOUNTER — PATIENT OUTREACH (OUTPATIENT)
Dept: FAMILY MEDICINE CLINIC | Age: 68
End: 2018-09-12

## 2018-09-12 NOTE — PROGRESS NOTES
Inbound call from patient who was verified by 3 identifiers. Patient states her co-pay for Trulicity will be $339, which she can not afford. NN sent message to Van Ramirez LPN to review case and submit request for prior-aurth. NN contact information given for questions and other concerns.

## 2018-09-13 ENCOUNTER — PATIENT OUTREACH (OUTPATIENT)
Dept: FAMILY MEDICINE CLINIC | Age: 68
End: 2018-09-13

## 2018-09-13 NOTE — PROGRESS NOTES
Outbound call to patient returning call. Patient verified by 3 identifiers. Patient verbalized that she was in the donut hole according to her [employer] and would not be able to get medication (Trulicity) until her $842 dollar payment is made. Message from On[employer] December stating deductible would need to be met before Insurance will consider a prior-aurth. I tried to download a Prior-A[place]th form for [facility], but it was asking for the patient to complete it on line. NN contact information given to patient for questions and concerns.

## 2018-09-14 ENCOUNTER — PATIENT OUTREACH (OUTPATIENT)
Dept: FAMILY MEDICINE CLINIC | Age: 68
End: 2018-09-14

## 2018-09-14 NOTE — PROGRESS NOTES
Outbound call to patient verified by 3 identifiers  Notified patient that  2-single dose Trulicity (.75 TV/4.6 ml) pens had been left here at her PCP's office for her to  today. NN confirmed order with medication list orders and provider. Patient agree to  medication today. NN contact information given for questions or concerns. Patient in to pick-up sample. Copy of instructions for administration reviewed with patient and given.

## 2018-10-15 ENCOUNTER — OFFICE VISIT (OUTPATIENT)
Dept: FAMILY MEDICINE CLINIC | Age: 68
End: 2018-10-15

## 2018-10-15 ENCOUNTER — OFFICE VISIT (OUTPATIENT)
Dept: RHEUMATOLOGY | Age: 68
End: 2018-10-15

## 2018-10-15 VITALS
WEIGHT: 209 LBS | DIASTOLIC BLOOD PRESSURE: 56 MMHG | HEART RATE: 70 BPM | BODY MASS INDEX: 35.68 KG/M2 | RESPIRATION RATE: 18 BRPM | SYSTOLIC BLOOD PRESSURE: 137 MMHG | TEMPERATURE: 98.2 F | OXYGEN SATURATION: 97 % | HEIGHT: 64 IN

## 2018-10-15 VITALS
WEIGHT: 208 LBS | HEIGHT: 64 IN | HEART RATE: 78 BPM | SYSTOLIC BLOOD PRESSURE: 140 MMHG | RESPIRATION RATE: 18 BRPM | TEMPERATURE: 98.3 F | DIASTOLIC BLOOD PRESSURE: 80 MMHG | BODY MASS INDEX: 35.51 KG/M2

## 2018-10-15 DIAGNOSIS — D47.2 MGUS (MONOCLONAL GAMMOPATHY OF UNKNOWN SIGNIFICANCE): ICD-10-CM

## 2018-10-15 DIAGNOSIS — M06.09 SERONEGATIVE RHEUMATOID ARTHRITIS OF MULTIPLE SITES (HCC): Primary | ICD-10-CM

## 2018-10-15 DIAGNOSIS — M35.3 PMR (POLYMYALGIA RHEUMATICA) (HCC): ICD-10-CM

## 2018-10-15 DIAGNOSIS — E11.42 TYPE 2 DIABETES MELLITUS WITH DIABETIC POLYNEUROPATHY, WITHOUT LONG-TERM CURRENT USE OF INSULIN (HCC): Primary | ICD-10-CM

## 2018-10-15 DIAGNOSIS — E55.9 VITAMIN D DEFICIENCY: ICD-10-CM

## 2018-10-15 RX ORDER — FOLIC ACID 1 MG/1
1 TABLET ORAL DAILY
Qty: 90 TAB | Refills: 0
Start: 2018-10-15 | End: 2021-10-22 | Stop reason: ALTCHOICE

## 2018-10-15 RX ORDER — METHOTREXATE 2.5 MG/1
15 TABLET ORAL
Qty: 72 TAB | Refills: 0 | Status: SHIPPED | OUTPATIENT
Start: 2018-10-20 | End: 2018-11-27 | Stop reason: SDUPTHER

## 2018-10-15 RX ORDER — ERGOCALCIFEROL 1.25 MG/1
50000 CAPSULE ORAL
Qty: 12 CAP | Refills: 3 | Status: SHIPPED | OUTPATIENT
Start: 2018-10-15 | End: 2021-04-22

## 2018-10-15 NOTE — MR AVS SNAPSHOT
[address] 
[phone] Patient: Dannie Vargas MRN: [id number] TIP:6/9/0205 Visit Information Date & Time Provider Department Dept. Phone Encounter #  
 10/15/2018 10:15 AM Angela Darnell  [facility] [phone] [id number] Your Appointments 10/15/2018  2:00 PM  
ESTABLISHED PATIENT with Louie Kirk MD  
[facility]) Appt Note: 10-3 pt called to confirm date & time -kfb;      follow up; R/S  1003/18 10-3 pt called to confirm date & time -kfb; follow up  
 [id number] [facility] [[email or web address]] [id number]  
[address] [place] [id number] Upcoming Health Maintenance Date Due Shingrix Vaccine Age 50> (1 of 2) 7/4/2001 EYE EXAM RETINAL OR DILATED Q1 8/7/2015 GLAUCOMA SCREENING Q2Y 7/4/2016 BREAST CANCER SCRN MAMMOGRAM 10/5/2018 MICROALBUMIN Q1 11/9/2018 LIPID PANEL Q1 11/9/2018 Pneumococcal 65+ Low/Medium Risk (2 of 2 - PPSV23) 12/21/2018 HEMOGLOBIN A1C Q6M 3/11/2019 MEDICARE YEARLY EXAM 3/27/2019 FOOT EXAM Q1 9/11/2019 COLONOSCOPY 5/30/2021 DTaP/Tdap/Td series (2 - Td) 5/13/2023 Allergies as of 10/15/2018  Review Complete On: 10/15/2018 By: Arlene Arshad LPN Severity Noted Reaction Type Reactions Bactrim [Sulfamethoxazole-trimethoprim]  04/02/2010    Nausea Only Biaxin [Clarithromycin]  04/02/2010    Nausea Only Demerol [Meperidine]  04/02/2010    Itching Erythromycin  04/02/2010    Nausea Only  
 Gabapentin  10/15/2018    Nausea and Vomiting Pcn [Penicillins]  04/02/2010    Hives Percocet [Oxycodone-acetaminophen]  04/02/2010    Itching Pravastatin  03/26/2018    Nausea Only Tetracycline  04/02/2010    Nausea Only Voltaren [Diclofenac Sodium]  04/02/2010    Hives Current Immunizations  Reviewed on 11/9/2017 Name Date Influenza High Dose Vaccine PF 9/11/2018, 11/9/2017, 1/29/2016 Influenza Vaccine 12/5/2012 Influenza Vaccine PF 11/17/2014 PPD 1/25/1999 Pneumococcal Polysaccharide (PPSV-23) 5/13/2013 TD Vaccine 5/12/1999 Tdap 5/13/2013 Not reviewed this visit You Were Diagnosed With   
  
 Codes Comments Type 2 diabetes mellitus with diabetic polyneuropathy, without long-term current use of insulin (HCC)    -  Primary ICD-10-CM: E11.42 
ICD-9-CM: 250.60, 357.2 Vitals BP Pulse Temp Resp Height(growth percentile) Weight(growth percentile) 137/56 (BP 1 Location: Left arm, BP Patient Position: Sitting) 70 98.2 °F (36.8 °C) (Oral) 18 5' 4\" (1.626 m) 209 lb (94.8 kg) SpO2 BMI OB Status Smoking Status 97% 35.87 kg/m2 Hysterectomy Former Smoker Vitals History BMI and BSA Data Body Mass Index Body Surface Area  
 35.87 kg/m 2 2.07 m 2 Preferred Pharmacy Pharmacy Name Phone [place] [phone] [facility] [address] [phone] Your Updated Medication List  
  
   
This list is accurate as of 10/15/18 11:47 AM.  Always use your most recent med list.  
  
  
  
  
 * albuterol 2.5 mg /3 mL (0.083 %) nebulizer solution Commonly known as:  PROVENTIL VENTOLIN  
3 mL by Nebulization route every six (6) hours as needed for Wheezing. * albuterol 90 mcg/actuation inhaler Commonly known as:  PROVENTIL HFA, VENTOLIN HFA, PROAIR HFA Take 2 Puffs by inhalation every six (6) hours as needed for Wheezing. alcohol swabs Padm Commonly known as:  ALCOHOL WIPES Test blood sugar twice daily. DX: E11.9. Substitute supply brand accepted by insurance. aspirin 81 mg chewable tablet Take 81 mg by mouth daily. atorvastatin 20 mg tablet Commonly known as:  LIPITOR Take 1 Tab by mouth nightly. Blood Glucose Control High&Low Soln Commonly known as:  ACCU-CHEK JENNIE CONTROL SOLN  
 Use as directed for controls as needed. DX: E11.9 * Blood-Glucose Meter monitoring kit One touch meter * Blood-Glucose Meter monitoring kit Commonly known as:  FREESTYLE LITE METER Tid use due to uncontrolled dm and hyperglycemia. 250.02. 790.29 * Blood-Glucose Meter Misc Commonly known as:  ACCU-CHEK JENNIE PLUS METER Test blood sugar twice daily. DX: E11.9. Substitute supply brand accepted by insurance. CENTRUM PO Take  by mouth daily. diltiazem 420 mg SR capsule Commonly known as:  [facility] TAKE 1 CAPSULE EVERY DAY  
  
 gabapentin 100 mg capsule Commonly known as:  NEURONTIN Take 1 Cap by mouth nightly. glipiZIDE SR 10 mg CR tablet Commonly known as:  GLUCOTROL XL Take 1 Tab by mouth daily. glucose blood VI test strips strip Commonly known as:  ACCU-CHEK JENNIE PLUS TEST STRP Test blood sugar twice daily. DX: E11.9. Substitute supply brand accepted by insurance. HYDROcodone-acetaminophen  mg tablet Commonly known as:  Najma Nilay Take 1 Tab by mouth every eight (8) hours as needed for Pain. Max Daily Amount: 3 Tabs. Start taking on:  11/11/2018 * Lancets Misc Commonly known as:  FREESTYLE LANCETS Tid use due to uncontrolled dm and hyperglycemia. 250.02. 790.29 * Lancets Misc Commonly known as:  ACCU-CHEK SOFTCLIX LANCETS Test blood sugar twice daily. DX: E11.9. Substitute supply brand accepted by insurance. losartan-hydroCHLOROthiazide 100-12.5 mg per tablet Commonly known as:  HYZAAR  
TAKE 1 TABLET DAILY  
  
 metFORMIN  mg tablet Commonly known as:  GLUCOPHAGE XR Take 4 Tabs by mouth daily (with dinner). Dose increase  
  
 nicotine 21 mg/24 hr  
Commonly known as:  NICODERM CQ  
  
 potassium chloride 10 mEq tablet Commonly known as:  KLOR-CON Take 1 Tab by mouth daily. tiotropium 18 mcg inhalation capsule Commonly known as:  Rosiland Red Take 1 Cap by inhalation daily. ZyrTEC 10 mg Cap Generic drug:  Cetirizine Take  by mouth. * Notice: This list has 7 medication(s) that are the same as other medications prescribed for you. Read the directions carefully, and ask your doctor or other care provider to review them with you. Patient Instructions 1)  Drink more water during the day and before meals 2)  Try an limit the regular soda (try coke zero sugar, pepsi zero sugar, Sprite zero) 3)  Rearrange your dinner plate (less starchy vegetables) 4)  Increase the number of days you walk 5)  Check your blood sugars (first thing in the morning then again either before dinner or before bedtime) NEXT appointment: 11/12/18 @ 11:30 with Nessa:  Bring your blood sugars Introducing [facility]! [employer] introduces [employer] patient portal. Now you can access parts of your medical record, email your doctor's office, and request medication refills online. 1. In your internet browser, go to [email or web address] 2. Click on the First Time User? Click Here link in the Sign In box. You will see the New Member Sign Up page. 3. Enter your [employer] Access Code exactly as it appears below. You will not need to use this code after youve completed the sign-up process. If you do not sign up before the expiration date, you must request a new code. · [employer] Access Code: [email or web address] Expires: 1/13/2019 11:47 AM 
 
4. Enter the last four digits of your Social Security Number (xxxx) and Date of Birth (mm/dd/yyyy) as indicated and click Submit. You will be taken to the next sign-up page. 5. Create a [employer] ID. This will be your [employer] login ID and cannot be changed, so think of one that is secure and easy to remember. 6. Create a [employer] password. You can change your password at any time. 7. Enter your Password Reset Question and Answer. This can be used at a later time if you forget your password. 8. Enter your e-mail address. You will receive e-mail notification when new information is available in [address] 9. Click Sign Up. You can now view and download portions of your medical record. 10. Click the Download Summary menu link to download a portable copy of your medical information. If you have questions, please visit the Frequently Asked Questions section of the [facility] [employer] website. Remember, [facility] is NOT to be used for urgent needs. For medical emergencies, dial 911. Now available from your iPhone and Android! Please provide this summary of care documentation to your next provider. Your primary care clinician is listed as Meek Linda. If you have any questions after today's visit, please call [phone].

## 2018-10-15 NOTE — PROGRESS NOTES
REASON FOR VISIT    This is a follow-up visit for Ms. 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 Inflammatory arthritis phenotype includes:  Anti-CCP positive: no  Rheumatoid factor positive: no  Erosive disease: no  Extra-articular manifestations include: Polymyalgia Rheumatica    Immunosuppression Screening (6/27/2018): Quantiferon TB: negative  PPD:  Not performed  Hepatitis B: negative  Hepatitis C: negative    Therapy History includes:  Current DMARD therapy include: none  Prior DMARD therapy include: none  Discontinued DMARDs because of inefficacy: None  Discontinued DMARDs because of side effects: None    Immunization History   Administered Date(s) Administered    Influenza High Dose Vaccine PF 01/29/2016, 11/09/2017, 09/11/2018    Influenza Vaccine 12/05/2012    Influenza Vaccine PF 11/17/2014    PPD 01/25/1999    Pneumococcal Polysaccharide (PPSV-23) 05/13/2013    TD Vaccine 05/12/1999    Tdap 05/13/2013       Patient Active Problem List   Diagnosis Code    Hypercholesterolemia E78.00    Osteoarthritis of hip M16.9    PMR [facility]polymyalgia rheumatica) ([facility]) M35.3    Chronic bronchitis ([facility] J42    Former smoker Z87.891    Chronic pain G89.29    Long term (current) use of systemic steroids Z79.52    Type 2 diabetes mellitus with diabetic polyneuropathy, without long-term current use of insulin ([facility]) E11.42    Severe obesity (BMI 35.0-39. 9) with comorbidity ([facility] E66.01    Essential hypertension I10       HISTORY OF PRESENT ILLNESS    Ms. Monserrat Marrero returns for a follow-up. On her last visit, I ordered labs and radiographs. I reviewed her labs with her which showed low vitamin D and MGUS. Today, she continues to complains of pain join in her shoulders, hips, and left ankle and swelling in left ankle with stiffness lasting hours. She also has hand pain at times they lock. They are painful and swelling and stiffness lasting 30 minutes.     She smokes 5 cigarettes a day. She endorses ankle swelling, easy bruising. She denies fever, weight loss, blurred vision, vision loss, oral ulcers, dry cough, dyspnea, nausea, vomiting, dysphagia, abdominal pain, black or bloody stool, fall since last visit, rash and increased thirst.    Last toxicity monitoring by blood work was done on 6/27/2018 and did not reveal any significant adverse effects. Most recent inflammatory markers from 6/27/2018 revealed a ESR 25 mm/hr (previously 44, 8, 24, 10, 30, 12 mm/hr) and CRP 11.0 mg/L (previously 38.3, 24.7, 8.4, 9.8, 16.7 mg/L). The patient has not had any interval hospital admission (except for gastroenteritis), infections, or surgeries. REVIEW OF SYSTEMS    A comprehensive review of systems was performed and pertinent results are documented in the HPI, review of systems is otherwise non-contributory. PAST MEDICAL HISTORY    She has a past medical history of Abnormal pulmonary function test (9/28/2016); AR (allergic rhinitis); Asthma; Atrial thrombus (1994); Chronic bronchitis ([address] (9/28/2016); Chronic pain; DDD (degenerative disc disease), lumbar; Diabetes ([address] DJD (degenerative joint disease) of hip; Empty sella ([address] (2000); Former smoker (10/24/2016); Hoarseness of voice; Hypercholesterolemia; Hypertension; Lesion of vocal cord; PMR (polymyalgia rheumatica) ([address] (5/18/2016); and Smoker (12/5/2012). FAMILY HISTORY    Her family history includes Arthritis-osteo in her father, mother, and sister; Asthma in her mother and sister; Cancer in her father; Diabetes in her mother and sister; Elevated Lipids in her father and sister; Heart Attack in her sister; Hypertension in her daughter, daughter, daughter, father, mother, and sister; Stroke in her mother. There is no history of Anesth Problems. SOCIAL HISTORY    She reports that she quit smoking about 2 years ago. Her smoking use included Cigarettes. She has a 20.00 pack-year smoking history.  She has never used smokeless tobacco. She reports that she does not drink alcohol or use illicit drugs. MEDICATIONS    Current Outpatient Prescriptions   Medication Sig Dispense Refill    ergocalciferol (ERGOCALCIFEROL) 50,000 unit capsule Take 1 Cap by mouth every seven (7) days. 12 Cap 3    [START ON 10/20/2018] methotrexate (RHEUMATREX) 2.5 mg tablet Take 6 Tabs by mouth Every Saturday. 72 Tab 0    folic acid (FOLVITE) 1 mg tablet Take 1 Tab by mouth daily. 90 Tab 0    [START ON 11/11/2018] HYDROcodone-acetaminophen (NORCO)  mg tablet Take 1 Tab by mouth every eight (8) hours as needed for Pain. Max Daily Amount: 3 Tabs. 90 Tab 0    Blood Glucose Control High&Low (ACCU-CHEK JENNIE CONTROL SOLN) soln Use as directed for controls as needed. DX: E11.9 1 Bottle 6    Blood-Glucose Meter (ACCU-CHEK JENNIE PLUS METER) misc Test blood sugar twice daily. DX: E11.9. Substitute supply brand accepted by insurance. 1 Each 0    Lancets (ACCU-CHEK SOFTCLIX LANCETS) misc Test blood sugar twice daily. DX: E11.9. Substitute supply brand accepted by insurance. 1 Each 11    glucose blood VI test strips (ACCU-CHEK JENNIE PLUS TEST STRP) strip Test blood sugar twice daily. DX: E11.9. Substitute supply brand accepted by insurance. 100 Strip 11    alcohol swabs (ALCOHOL WIPES) padm Test blood sugar twice daily. DX: E11.9. Substitute supply brand accepted by insurance. 100 Pad 1    nicotine (NICODERM CQ) 21 mg/24 hr       potassium chloride (KLOR-CON) 10 mEq tablet Take 1 Tab by mouth daily. 30 Tab 5    tiotropium (SPIRIVA) 18 mcg inhalation capsule Take 1 Cap by inhalation daily. 90 Cap 3    atorvastatin (LIPITOR) 20 mg tablet Take 1 Tab by mouth nightly. 90 Tab 3    diltiazem (TIAZAC) 420 mg SR capsule TAKE 1 CAPSULE EVERY DAY 90 Cap 3    glipiZIDE SR (GLUCOTROL XL) 10 mg CR tablet Take 1 Tab by mouth daily.  (Patient taking differently: Take 10 mg by mouth two (2) times a day.) 90 Tab 3    losartan-hydroCHLOROthiazide (HYZAAR) 100-12.5 mg per tablet TAKE 1 TABLET DAILY 90 Tab 3    metFORMIN ER (GLUCOPHAGE XR) 500 mg tablet Take 4 Tabs by mouth daily (with dinner). Dose increase 360 Tab 5    albuterol (PROVENTIL VENTOLIN) 2.5 mg /3 mL (0.083 %) nebulizer solution 3 mL by Nebulization route every six (6) hours as needed for Wheezing. 60 Each 2    albuterol (PROVENTIL HFA, VENTOLIN HFA, PROAIR HFA) 90 mcg/actuation inhaler Take 2 Puffs by inhalation every six (6) hours as needed for Wheezing. 1 Inhaler 1    Cetirizine (ZYRTEC) 10 mg Cap Take  by mouth.  aspirin 81 mg chewable tablet Take 81 mg by mouth daily.  MULTIVITS W-FE,OTHER MIN (CENTRUM PO) Take  by mouth daily. ALLERGIES    Allergies   Allergen Reactions    Bactrim [Sulfamethoxazole-Trimethoprim] Nausea Only    Biaxin [Clarithromycin] Nausea Only    Demerol [Meperidine] Itching    Erythromycin Nausea Only    Gabapentin Nausea and Vomiting    Pcn [Penicillins] Hives    Percocet [Oxycodone-Acetaminophen] Itching    Pravastatin Nausea Only    Tetracycline Nausea Only    Voltaren [Diclofenac Sodium] Hives       PHYSICAL EXAMINATION    Visit Vitals    /80    Pulse 78    Temp 98.3 °F (36.8 °C)    Resp 18    Ht 5' 4\" (1.626 m)    Wt 208 lb (94.3 kg)    BMI 35.7 kg/m2     Body mass index is 35.7 kg/(m^2). General: Patient is alert, oriented x 3, not in acute distress    HEENT:   Sclerae are not injected and appear moist.  There is no alopecia. Neck is supple     Cardiovascular:  Heart is regular rate and rhythm, no murmurs. Chest:  Lungs are clear to auscultation bilaterally. No rhonchi, wheezes, or crackles.     Extremities:  Free of clubbing, cyanosis, edema    Neurological exam:  Muscle strength is full in upper and lower extremities     Skin exam:    Psoriasis:     no  Nail Pitting:     no  Onycholysis:     no  Palmoplantar pustulosis:   no  Acne fulminans:    no  Acne conglobata:    no  Hidradenitis Suppurativa:   no  Dissecting cellulitis of the scalp:  no  Pilonidal sinus:    no  Erythema nodosum:    no  Non-Scarring Alopecia:  no  Discoid Lupus:   no  Subacute Cutaneous Lupus:   no  Heliotrope Rash:   no  Upper Arm Erythema:   no  Shawl Sign:    no  V-sign:     no  Holster sign:    no  Gottron's papules:   no  Gottron's sign:    no  Calcinosis:    no  Raynaud's Phenomenon:  no  's Hands:   no  Periungual erythema:   no  Abnormal Nailfold Capillaries: no  Livedo Reticularis:   no  Scalp Erythema:   no  Rheumatoid Nodules:   no    Musculoskeletal:  A comprehensive musculoskeletal exam was NOT performed for all joints of each upper and lower extremity and assessed for swelling, tenderness and range of motion. Previous Exam    Bilateral knee replacement  Bilateral ankle synovitis  Bilateral MTP synovitis    Z-Deformities:   no  [employer] Neck Deformities:  no  Boutonierre's Deformities:  no  Ulnar Deviation:   no  MCP Subluxation:  no    Joint Count 6/27/2018   Patient pain (0-100) 70   MHAQ 0.5   Left shoulder - Tender 1   Left elbow - Tender 1   Left elbow - Swollen 1   Left wrist- Tender 1   Left wrist- Swollen 1   Left 1st MCP - Tender 1   Left 2nd MCP - Tender 1   Left 2nd MCP - Swollen 1   Left 3rd MCP - Tender 1   Left 3rd MCP - Swollen 1   Left 4th MCP - Tender 1   Left 5th MCP - Tender 1   Left thumb IP - Tender 1   Left 2nd PIP - Tender 1   Left 3rd PIP - Tender 1   Left 4th PIP - Tender 1   Left 5th PIP - Tender 1   Right shoulder - Tender 1   Right wrist- Swollen 1   Right 1st MCP - Tender 1   Right 1st MCP - Swollen 1   Right 2nd MCP - Tender 1   Right 2nd MCP - Swollen 1   Right 3rd PIP - Tender 1   Tender Joint Count (Total) 17   Swollen Joint Count (Total) 7   Physician Assessment (0-10) 5   Patient Assessment (0-10) 8   CDAI Total (calculated) 37       DATA REVIEW    Laboratory     Recent laboratory results were reviewed, summarized, and discussed with the patient.     Imaging    Musculoskeletal Ultrasound    None    Radiographs    Bilateral Hand 6/27/2018: RIGHT: no fracture or other acute osseous or articular abnormality. The soft tissues are within normal limits. There is minimal distal interphalangeal joint space narrowing, soft tissue swelling. Minimal DJD at the base of the first digit no soft tissue calcification. LEFT: no fracture, dislocation or other acute osseous or articular abnormality. The soft tissues are within normal limits. The bone is normal internal contents. There is minimal distal interphalangeal joint space narrowing, there is mild DJD at the base of the first digit. There is no bony erosion or soft tissue calcification.     Bilateral Foot 6/27/2018: RIGHT: no fracture or other acute osseous or articular abnormality. The soft tissues are within normal limits. The bone is normal in mineral content, there is some soft tissue swelling. There is no bony erosions. Small posterior and plantar calcaneal spurs. No soft tissue calcification seen otherwise. LEFT: no fracture or other acute osseous or articular abnormality. The soft tissues are within normal limits. The bone is normal in mineral content. No bony erosions. Periosteal thickening likely chronic. Calcaneal spurs. Chest 5/07/2018: normal heart size. There is no acute process in the lung fields. The osseous structures are unremarkable.     Chest 4/14/2018: Lungs: The lungs are clear of mass, nodule, airspace disease or edema. Pleura: There is no pleural effusion or pneumothorax. Mediastinum: The cardiac and mediastinal contours and pulmonary vascularity are normal. Bones and soft tissues: There are degenerative changes of the spine. Left Shoulder 7/31/2017: no fracture, dislocation or other acute abnormality. DJD AC joint and chronic deformity tuberosity    Left Ankle 9/16/2015: no fracture or disruption of the ankle mortise. There is no other acute osseous or articular abnormality.   There is marked soft tissue swelling overlying the medial and lateral sides of the ankle as well as the dorsum of the foot. Subcutaneous edema noted. .  Calcaneal spurs noted. Right Hip 7/15/2015:  no fracture, dislocation or other acute abnormality. Mild osteoarthritis is present. Left Shoulder 11/17/2014:  no fracture, dislocation. A type II acromion is present. There is mild irregularity of the greater tuberosity. CT Imaging    CTA Chest with and without contrast 10/16/2010: There is no evidence for pulmonary embolus. There is no pleural or pericardial effusion. Heart size is normal. No mediastinal or hilar mass or adenopathy is shown. The lungs are clear. Mild to moderate thoracic spine degenerative changes are demonstrated.      MR Imaging    MRA Head without contrast 8/01/2011: The vertebral arteries are codominant. The basilar artery and its branches are normal. The internal carotid, anterior cerebral, and middle cerebral arteries are patent. There is no flow-limiting intracranial stenosis. There is no aneurysm. There is a patent left posterior communicating artery. .      MRI Brain with and without contrast 8/01/2011: The ventricles are normal in size and are midline. Ventricular size is actually somewhat small for the patient's chronological age. Darl Black is no intracranial hemorrhage or extra-axial fluid collection. There are mild scattered foci of T2 hyperintensity in the cerebral white matter, which are nonspecific. Moderate patchy T2 hyperintensity within the central portion of the sol is noted. The appearance is typical of the changes seen with intracranial microangiopathy. . There is no acute infarction. There is no abnormal parenchymal or meningeal enhancement. The paranasal sinuses and mastoid air cells are clear. There is enlargement of the sella with extension of CSF into the sella and minimal visible pituitary tissue, consistent with an empty sella.     DXA    DXA 9/14/2015: (excluded L1, right hip, distal radius) lumbar spine L2-L4 T score 3.6 (BMD 1.475 g/cm2), left femoral neck T score: 4.5 (1.351 g/cm2), left total hip T score: 3.6 (1.379 g/cm2). ASSESSMENT AND PLAN    This is a follow-up visit for Ms. Vishnu Dorsey. 1) Seronegative Rheumatoid Arthritis secondary to Polymyalgia Rheumatica. She has a long standing history of Polymyalgia Rheumatica due to Rheumatoid Arthritis but was not treated for Rheumatoid Arthritis. She had been on prednisone for years. Her CDAI was 37 with 17 tender and 7 swollen joints, involving bilateral ankles and MTPs consist net with high disease activity. We discussed initiation of DMARD therapy with methotrexate, which is first line therapy in Rheumatoid Arthritis. It is a weekly regimen that is supplemented with daily folic acid to help counteract potential side effects. I discussed with the patient the potential adverse effects, which include: nausea, vomiting, dyspepsia, oral ulcers, hair thinning, infection, liver function abnormalities, blood count abnormalities, and rarely pneumonitis or melanoma. The patient understood these possible adverse effects. I informed the patient of the need for routine CBC and CMP as a measure for long term use of immunosuppressants. I also instructed the patient to avoid ill contacts and the needs for annual influenza vaccines and the pneumonia vaccines. I asked the patient to apply SPF 50 sunscreen when out in the sun. The patient was also instructed to avoid alcohol as it may hasten hepatotoxicity. In childbearing patients, pregnancy is contra-indicated while on methotrexate due to risk for birth defects and early termination. I prescribed methotrexate 15 mg every Saturday with daily folic acid 1 mg. I informed the patient that methotrexate may take 6 to 12 weeks to be effective. Patient was informed to contact me if they develop any adverse reaction or infection. 2) Polymyalgia Rheumatica. See #1. 3) Monoclonal Gammopathy Undetermined Significance.  SPEP showed Immunofixation shows IgG monoclonal protein with lambda light chain specificity with M-spike was 0.2. This is likely from #1. I referred her to hematology. 4) Vitamin D Deficiency. Her vitamin D level was 14.4. I prescribed weekly ergocalciferol 50,000. The patient voiced understanding of the aforementioned assessment and plan. Summary of plan was provided in the After Visit Summary patient instructions.      TODAY'S ORDERS    Orders Placed This Encounter    REFERRAL TO ONCOLOGY    ergocalciferol (ERGOCALCIFEROL) 50,000 unit capsule    methotrexate (RHEUMATREX) 2.5 mg tablet    folic acid (FOLVITE) 1 mg tablet     Future Appointments  Date Time Provider Nikki Mckeon   11/27/2018 3:40 PM MD Eliane Whitt MD, [address] [place] [address]    Adult Rheumatology   Rheumatology Ultrasound Certified  [id number] [facility] [address]  [facility], [address] [place] [address]   Phone [phone]  Fax [phone]

## 2018-10-15 NOTE — PATIENT INSTRUCTIONS
1)  Drink more water during the day and before meals    2)  Try an limit the regular soda (try coke zero sugar, pepsi zero sugar, Sprite zero)    3)  Rearrange your dinner plate (less starchy vegetables)    4)  Increase the number of days you walk    5)  Check your blood sugars (first thing in the morning then again either before dinner or before bedtime)    NEXT appointment: 11/12/18 @ 11:30 with Nessa:  Bring your blood sugars

## 2018-10-15 NOTE — MR AVS SNAPSHOT
[address] [facility] [address] 
[phone] Patient: Lauren Rosales MRN: [id number] VBU:[id number] Visit Information Date & Time Provider Department Dept. Phone Encounter #  
 10/15/2018  2:00 PM Kathrine GiffordArden [phone] [id number] Follow-up Instructions Return in about 5 weeks (around 11/19/2018). Upcoming Health Maintenance Date Due Shingrix Vaccine Age 50> (1 of 2) 7/4/2001 EYE EXAM RETINAL OR DILATED Q1 8/7/2015 GLAUCOMA SCREENING Q2Y 7/4/2016 BREAST CANCER SCRN MAMMOGRAM 10/5/2018 MICROALBUMIN Q1 11/9/2018 LIPID PANEL Q1 11/9/2018 Pneumococcal 65+ Low/Medium Risk (2 of 2 - PPSV23) 12/21/2018 HEMOGLOBIN A1C Q6M 3/11/2019 MEDICARE YEARLY EXAM 3/27/2019 FOOT EXAM Q1 9/11/2019 COLONOSCOPY 5/30/2021 DTaP/Tdap/Td series (2 - Td) 5/13/2023 Allergies as of 10/15/2018  Review Complete On: 10/15/2018 By: Kathrine Gifford MD  
  
 Severity Noted Reaction Type Reactions Bactrim [Sulfamethoxazole-trimethoprim]  04/02/2010    Nausea Only Biaxin [Clarithromycin]  04/02/2010    Nausea Only Demerol [Meperidine]  04/02/2010    Itching Erythromycin  04/02/2010    Nausea Only  
 Gabapentin  10/15/2018    Nausea and Vomiting Pcn [Penicillins]  04/02/2010    Hives Percocet [Oxycodone-acetaminophen]  04/02/2010    Itching Pravastatin  03/26/2018    Nausea Only Tetracycline  04/02/2010    Nausea Only Voltaren [Diclofenac Sodium]  04/02/2010    Hives Current Immunizations  Reviewed on 11/9/2017 Name Date Influenza High Dose Vaccine PF 9/11/2018, 11/9/2017, 1/29/2016 Influenza Vaccine 12/5/2012 Influenza Vaccine PF 11/17/2014 PPD 1/25/1999 Pneumococcal Polysaccharide (PPSV-23) 5/13/2013 TD Vaccine 5/12/1999 Tdap 5/13/2013 Not reviewed this visit You Were Diagnosed With   
  
 Codes Comments Seronegative rheumatoid arthritis of multiple sites [facility])    -  Primary ICD-10-CM: M06.09 
ICD-9-CM: 714.0 Vitamin D deficiency     ICD-10-CM: E55.9 ICD-9-CM: 268.9 MGUS (monoclonal gammopathy of unknown significance)     ICD-10-CM: R46.7 ICD-9-CM: 273.1 Vitals BP Pulse Temp Resp Height(growth percentile) Weight(growth percentile) 140/80 78 98.3 °F (36.8 °C) 18 5' 4\" (1.626 m) 208 lb (94.3 kg) BMI OB Status Smoking Status 35.7 kg/m2 Hysterectomy Former Smoker BMI and BSA Data Body Mass Index Body Surface Area 35.7 kg/m 2 2.06 m 2 Preferred Pharmacy Pharmacy Name Phone [facility] [address] [place], [address] [facility] [place] [phone] Your Updated Medication List  
  
   
This list is accurate as of 10/15/18  2:18 PM.  Always use your most recent med list.  
  
  
  
  
 * albuterol 2.5 mg /3 mL (0.083 %) nebulizer solution Commonly known as:  PROVENTIL VENTOLIN  
3 mL by Nebulization route every six (6) hours as needed for Wheezing. * albuterol 90 mcg/actuation inhaler Commonly known as:  PROVENTIL HFA, VENTOLIN HFA, PROAIR HFA Take 2 Puffs by inhalation every six (6) hours as needed for Wheezing. alcohol swabs Padm Commonly known as:  ALCOHOL WIPES Test blood sugar twice daily. DX: E11.9. Substitute supply brand accepted by insurance. aspirin 81 mg chewable tablet Take 81 mg by mouth daily. atorvastatin 20 mg tablet Commonly known as:  LIPITOR Take 1 Tab by mouth nightly. Blood Glucose Control High&Low Soln Commonly known as:  ACCU-CHEK JENNIE CONTROL SOLN  
Use as directed for controls as needed. DX: E11.9 Blood-Glucose Meter Misc Commonly known as:  ACCU-CHEK JENNIE PLUS METER Test blood sugar twice daily. DX: E11.9. Substitute supply brand accepted by insurance. CENTRUM PO Take  by mouth daily. diltiazem 420 mg SR capsule Commonly known as:  [facility] TAKE 1 CAPSULE EVERY DAY  
  
 ergocalciferol 50,000 unit capsule Commonly known as:  ERGOCALCIFEROL Take 1 Cap by mouth every seven (7) days. folic acid 1 mg tablet Commonly known as:  [employer] Take 1 Tab by mouth daily. glipiZIDE SR 10 mg CR tablet Commonly known as:  GLUCOTROL XL Take 1 Tab by mouth daily. glucose blood VI test strips strip Commonly known as:  ACCU-CHEK JENNIE PLUS TEST STRP Test blood sugar twice daily. DX: E11.9. Substitute supply brand accepted by insurance. HYDROcodone-acetaminophen  mg tablet Commonly known as:  Dc Will Take 1 Tab by mouth every eight (8) hours as needed for Pain. Max Daily Amount: 3 Tabs. Start taking on:  11/11/2018 Lancets Misc Commonly known as:  ACCU-CHEK SOFTCLIX LANCETS Test blood sugar twice daily. DX: E11.9. Substitute supply brand accepted by insurance. losartan-hydroCHLOROthiazide 100-12.5 mg per tablet Commonly known as:  HYZAAR  
TAKE 1 TABLET DAILY  
  
 metFORMIN  mg tablet Commonly known as:  GLUCOPHAGE XR Take 4 Tabs by mouth daily (with dinner). Dose increase  
  
 methotrexate 2.5 mg tablet Commonly known as:  Stevo Minor Take 6 Tabs by mouth Every Saturday. Start taking on:  10/20/2018  
  
 nicotine 21 mg/24 hr  
Commonly known as:  NICODERM CQ  
  
 potassium chloride 10 mEq tablet Commonly known as:  KLOR-CON Take 1 Tab by mouth daily. tiotropium 18 mcg inhalation capsule Commonly known as:  Rosario Simmonds Take 1 Cap by inhalation daily. ZyrTEC 10 mg Cap Generic drug:  Cetirizine Take  by mouth. * Notice: This list has 2 medication(s) that are the same as other medications prescribed for you. Read the directions carefully, and ask your doctor or other care provider to review them with you. Prescriptions Sent to Pharmacy  Refills  
 ergocalciferol (ERGOCALCIFEROL) 50,000 unit capsule 3  
 Sig: Take 1 Cap by mouth every seven (7) days. Class: Normal  
 Pharmacy: [address] [place] [address], [address] Ph #: [phone] Route: Oral  
 methotrexate (RHEUMATREX) 2.5 mg tablet 0 Starting on: 10/20/2018 Sig: Take 6 Tabs by mouth Every Saturday. Class: Normal  
 Pharmacy: [address] [place] [address], [address] Ph #: [phone] Route: Oral  
  
We Performed the Following REFERRAL TO ONCOLOGY [DPW84 Custom] Follow-up Instructions Return in about 5 weeks (around 11/19/2018). Referral Information Referral ID Referred By Referred To  
  
 [id number] Syeda Long MD   
   [address] [place] [address] [address] , [address] Phone: [phone] Fax: [phone] Visits Status Start Date End Date 1 New Request 10/15/18 10/15/19 If your referral has a status of pending review or denied, additional information will be sent to support the outcome of this decision. Patient Instructions METHOTREXATE Methotrexate is a weekly regimen that is supplemented with daily folic acid to help counteract potential side effects. Potential Adverse Affects 
 
- Nausea - Vomiting - Upset stomach 
- Oral ulcers 
- Hair thinning - Infection - Liver function abnormalities - Blood count abnormalities - Rarely pneumonitis Medication Monitoring You will need routine blood counts and kidney and liver testing as a measure of monitoring for long term use of immunosuppressants. Things You Should Do You should avoid ill contacts You should get annual influenza vaccines and the pneumonia vaccines. You should apply SPF 50 sunscreen when out in the sun. You should avoid alcohol as it may hasten hepatotoxicity. You should avoid pregnancy due to risk for birth defects. You should contact me if you are sick. You should hold methotrexate if you are sick and resume after your sickness resolves. If you have any problems or side effects with this medication, please contact me immediately to inform me. Plan I prescribed methotrexate 15 mg (6 tablets) every SATURDAY at bedtime with DAILY folic acid 1 mg. If the folic acid 1 mg is not covered, then you may take two tablets of the over the counter Nature's Made folic acid 129 mcg (total of 800 mcg daily). Methotrexate may take 6 to 12 weeks to be effective. Introducing \[facility]\"" & HEALTH SERVICES! [facility] introduces [employer] patient portal. Now you can access parts of your medical record, email your doctor's office, and request medication refills online. 1. In your internet browser, go to [email or web address] 2. Click on the First Time User? Click Here link in the Sign In box. You will see the New Member Sign Up page. 3. Enter your [employer] Access Code exactly as it appears below. You will not need to use this code after youve completed the sign-up process. If you do not sign up before the expiration date, you must request a new code. · [employer] Access Code: [email or web address] Expires: 1/13/2019 11:47 AM 
 
4. Enter the last four digits of your Social Security Number (xxxx) and Date of Birth (mm/dd/yyyy) as indicated and click Submit. You will be taken to the next sign-up page. 5. Create a [employer] ID. This will be your [facility] login ID and cannot be changed, so think of one that is secure and easy to remember. 6. Create a [facility] password. You can change your password at any time. 7. Enter your Password Reset Question and Answer. This can be used at a later time if you forget your password. 8. Enter your e-mail address. You will receive e-mail notification when new information is available in [address] 9. Click Sign Up. You can now view and download portions of your medical record. 10. Click the Download Summary menu link to download a portable copy of your medical information. If you have questions, please visit the Frequently Asked Questions section of the [employer] website. Remember, [employer] is NOT to be used for urgent needs. For medical emergencies, dial 911. Now available from your iPhone and Android! Please provide this summary of care documentation to your next provider. Your primary care clinician is listed as Romeo Walters. If you have any questions after today's visit, please call [phone].

## 2018-10-15 NOTE — Clinical Note
Victoza around $700. ...there is room for improvement with diet and patient already thinking about increasing her walking.   Will come back 1.5 months to check progress

## 2018-10-15 NOTE — PROGRESS NOTES
Met with patient during PCP appointment to discuss Diabetes medications. At last PCP appointment patient was prescribed Trulicity but couldn't afford it since she was in the donut hole, costing her around $800. Patient was given 2 sample pens which her last dose was Sept, so last dose was 2.5 weeks ago. She states her blood sugars were better off onTtrulicity to the point that she was only taking her glipizide XL once daily. States Victoza was $700. Patient is currently:   Glipizide XL 10 mg twice daily  Metformin XR 4 tablets daily    She denies any adverse effects from the Trulicity, she states it didn't cause big changes in her appetite, but felt she ate less. She also reports drinking more water during this time as well     Diet:  Usually eats 2 meals a day +/- snacks after these meals. Patient states she limits the amount of pasta and rice, and doesn't really like desserts. She does drink 1/2 can regular soda almost daily    (10-11 am) breakfast: boiled egg +/- cheese with 1 piece of toast, water  (5-6 pm) dinner: baked chicken with vegetables (likes: squash, cabbage, collards, salads, beans, corn on cob, potatoes). Last night vegetable lasagna without the noodles or meat with piece of garlic bread. SF bryanna-aid  Snacks: fruit or 3 peanut butter crackers    Exercise:  Walks once weekly abut 30 min, had decreased due to her neuropathy but states this has improved since starting B12    She does check her blood sugars 1-2 times daily, has 2 glucometers but only brought one in.   She reports low blood sugars 1-2 times weekly:    Date Fasting (10-11) Post breakfast (11:50 a)  Post snack   (1-2 h) Bedtime   9/25 137 100 100 (11:50 p) 240 thinks this was after she treated a low sugar   9/26 148      9/27   161 ( 12:31 p)    9/28 135      9/30 134      10/3   146 (12:43 p)    10/6   149 (1:45 p)    10/8   179 (1:25 p)    10/14   166 (2:08 p)    10/15 151        Per self report, patient's sugars did well while on Trulicity (those numbers most likely on other glucometer) and patient was able to decrease glipizide. I think if patient was able to stay on Trulicity more than 2 weeks, numbers would have shown more improvement, but patient was able to start see the effect on appetite. During visit, reviewed with patient foods that can increase blood sugars and plate method (handout given), reviewed appropriate starchy vegetable portions. Until patient can restart, there are some things patient can do from a lifestyle standpoint to help out her blood sugars.     Recommended to patient:  · Drink more water like when she was on Trulicity  · Decrease portion starchy vegetables  · Cut out regular soda  · Increase # days walking, building up slowly (make notation on log)  · Check more times that are not after snacks to get better picture of sugars during day    Patient verbalized understanding and agreeable to trying until out of donut hole    Follow-up: 1.5 months to check progress    Ryan Gtz, PharmD, [facility]

## 2018-10-15 NOTE — PATIENT INSTRUCTIONS
METHOTREXATE    Methotrexate is a weekly regimen that is supplemented with daily folic acid to help counteract potential side effects. Potential Adverse Affects    - Nausea  - Vomiting  - Upset stomach  - Oral ulcers  - Hair thinning  - Infection  - Liver function abnormalities  - Blood count abnormalities  - Rarely pneumonitis      Medication Monitoring    You will need routine blood counts and kidney and liver testing as a measure of monitoring for long term use of immunosuppressants. Things You Should Do    You should avoid ill contacts     You should get annual influenza vaccines and the pneumonia vaccines. You should apply SPF 50 sunscreen when out in the sun. You should avoid alcohol as it may hasten hepatotoxicity. You should avoid pregnancy due to risk for birth defects. You should contact me if you are sick. You should hold methotrexate if you are sick and resume after your sickness resolves. If you have any problems or side effects with this medication, please contact me immediately to inform me. Plan    I prescribed methotrexate 15 mg (6 tablets) every SATURDAY at bedtime with DAILY folic acid 1 mg. If the folic acid 1 mg is not covered, then you may take two tablets of the over the counter Nature's Made folic acid 204 mcg (total of 800 mcg daily). Methotrexate may take 6 to 12 weeks to be effective.

## 2018-10-18 DIAGNOSIS — E11.9 TYPE 2 DIABETES MELLITUS WITHOUT COMPLICATION (HCC): ICD-10-CM

## 2018-10-18 NOTE — TELEPHONE ENCOUNTER
Pt is requesting to have medication called into pharmacy. Requested Prescriptions     Pending Prescriptions Disp Refills    glipiZIDE SR (GLUCOTROL XL) 10 mg CR tablet 90 Tab 3     Sig: Take 1 Tab by mouth daily.      Pharmacy on file verified    Best call back [phone]

## 2018-10-18 NOTE — PROGRESS NOTES
Assessment/Plan:     Diagnoses and all orders for this visit:    Type 2 diabetes mellitus with diabetic polyneuropathy, without long-term current use of insulin ([facility] [address]    Pharmacist to assist with lifestyle modifications. She is on Max dose Glipizide and Metformin. She is unable to afford SGLT2, DPP4, and GLP1. If no improvement, refer to endocrinology. Strongly urged dietary improvement. Continue current medications. Follow-up Disposition:  Return in about 3 months (around 1/15/2019) for Follow Up. Discussed expected course/resolution/complications of diagnosis in detail with patient.    Medication risks/benefits/costs/interactions/alternatives discussed with patient.    Pt was given after visit summary which includes diagnoses, current medications & vitals. Pt expressed understanding with the diagnosis and plan          Subjective:      Alondra Cardenas is a 80 y.o. female who presents for had concerns including Diabetes (1 month follow up). Patient is a 80 y.o. female that comes today for follow up of Diabetes Mellitus Type 2. Patient does not regularly monitor  their FSBG at home. The fasting plasma glucose (fpg) usually runs around 150s mg/L. does not rigorously follow a diabetic diet  Patients activity level: sedentery  there was no change in patient's weight. .  The patient does not experienced recent hypoglycemia. reports that she quit smoking about 2 years ago. Her smoking use included cigarettes. She has a 20.00 pack-year smoking history. she has never used smokeless tobacco.   Previously taking Trulicity which she ended up not being able to afford. Had an episode of hypoglycemic-like symptoms with a blood sugar in the 70s. This resolved with discontinuation of Trulicity. Is unable to afford SGLT 2's.   She is overdue on routine eye exam.    Lab Results   Component Value Date/Time    Hemoglobin A1c 11.7 (H) 11/09/2017 10:03 AM    Hemoglobin A1c 9.1 (H) 07/31/2017 12:13 PM Hemoglobin A1c 9.7 (H) 05/08/2017 10:59 AM         Current Outpatient Medications   Medication Sig Dispense Refill    [START ON 11/11/2018] HYDROcodone-acetaminophen (NORCO)  mg tablet Take 1 Tab by mouth every eight (8) hours as needed for Pain. Max Daily Amount: 3 Tabs. 90 Tab 0    Blood Glucose Control High&Low (ACCU-CHEK JENNIE CONTROL SOLN) soln Use as directed for controls as needed. DX: E11.9 1 Bottle 6    Blood-Glucose Meter (ACCU-CHEK JENNIE PLUS METER) misc Test blood sugar twice daily. DX: E11.9. Substitute supply brand accepted by insurance. 1 Each 0    Lancets (ACCU-CHEK SOFTCLIX LANCETS) misc Test blood sugar twice daily. DX: E11.9. Substitute supply brand accepted by insurance. 1 Each 11    glucose blood VI test strips (ACCU-CHEK JENNIE PLUS TEST STRP) strip Test blood sugar twice daily. DX: E11.9. Substitute supply brand accepted by insurance. 100 Strip 11    alcohol swabs (ALCOHOL WIPES) padm Test blood sugar twice daily. DX: E11.9. Substitute supply brand accepted by insurance. 100 Pad 1    nicotine (NICODERM CQ) 21 mg/24 hr       potassium chloride (KLOR-CON) 10 mEq tablet Take 1 Tab by mouth daily. 30 Tab 5    tiotropium (SPIRIVA) 18 mcg inhalation capsule Take 1 Cap by inhalation daily. 90 Cap 3    atorvastatin (LIPITOR) 20 mg tablet Take 1 Tab by mouth nightly. 90 Tab 3    diltiazem (TIAZAC) 420 mg SR capsule TAKE 1 CAPSULE EVERY DAY 90 Cap 3    glipiZIDE SR (GLUCOTROL XL) 10 mg CR tablet Take 1 Tab by mouth daily. (Patient taking differently: Take 10 mg by mouth two (2) times a day.) 90 Tab 3    losartan-hydroCHLOROthiazide (HYZAAR) 100-12.5 mg per tablet TAKE 1 TABLET DAILY 90 Tab 3    metFORMIN ER (GLUCOPHAGE XR) 500 mg tablet Take 4 Tabs by mouth daily (with dinner). Dose increase 360 Tab 5    albuterol (PROVENTIL HFA, VENTOLIN HFA, PROAIR HFA) 90 mcg/actuation inhaler Take 2 Puffs by inhalation every six (6) hours as needed for Wheezing.  1 Inhaler 1    Cetirizine (ZYRTEC) 10 mg Cap Take  by mouth.  aspirin 81 mg chewable tablet Take 81 mg by mouth daily.  MULTIVITS W-FE,OTHER MIN (CENTRUM PO) Take  by mouth daily.  ergocalciferol (ERGOCALCIFEROL) 50,000 unit capsule Take 1 Cap by mouth every seven (7) days. 12 Cap 3    [START ON 10/20/2018] methotrexate (RHEUMATREX) 2.5 mg tablet Take 6 Tabs by mouth Every Saturday. 72 Tab 0    folic acid (FOLVITE) 1 mg tablet Take 1 Tab by mouth daily. 90 Tab 0    albuterol (PROVENTIL VENTOLIN) 2.5 mg /3 mL (0.083 %) nebulizer solution 3 mL by Nebulization route every six (6) hours as needed for Wheezing. 60 Each 2       Allergies   Allergen Reactions    Bactrim [Sulfamethoxazole-Trimethoprim] Nausea Only    Biaxin [Clarithromycin] Nausea Only    Demerol [Meperidine] Itching    Erythromycin Nausea Only    Gabapentin Nausea and Vomiting    Pcn [Penicillins] Hives    Percocet [Oxycodone-Acetaminophen] Itching    Pravastatin Nausea Only    Tetracycline Nausea Only    Voltaren [Diclofenac Sodium] Hives       ROS:   Review of Systems   Constitutional: Negative for malaise/fatigue. Eyes: Negative for blurred vision. Respiratory: Negative for shortness of breath. Cardiovascular: Negative for chest pain. Musculoskeletal: Positive for joint pain. Objective:     Visit Vitals  /56 (BP 1 Location: Left arm, BP Patient Position: Sitting)   Pulse 70   Temp 98.2 °F (36.8 °C) (Oral)   Resp 18   Ht 5' 4\" (1.626 m)   Wt 209 lb (94.8 kg)   SpO2 97%   BMI 35.87 kg/m²       Vitals and Nurse Documentation reviewed. Physical Exam   Constitutional: No distress. Cardiovascular: S1 normal and S2 normal. Exam reveals no gallop and no friction rub. No murmur heard. Pulmonary/Chest: Breath sounds normal. No respiratory distress. Skin: Skin is warm and dry.    Psychiatric: Mood and affect normal.

## 2018-10-19 RX ORDER — GLIPIZIDE 10 MG/1
10 TABLET, FILM COATED, EXTENDED RELEASE ORAL DAILY
Qty: 90 TAB | Refills: 3 | Status: SHIPPED | OUTPATIENT
Start: 2018-10-19 | End: 2019-02-11 | Stop reason: SDUPTHER

## 2018-10-29 ENCOUNTER — OFFICE VISIT (OUTPATIENT)
Dept: ONCOLOGY | Age: 68
End: 2018-10-29

## 2018-10-29 VITALS
DIASTOLIC BLOOD PRESSURE: 79 MMHG | WEIGHT: 210.8 LBS | BODY MASS INDEX: 35.99 KG/M2 | SYSTOLIC BLOOD PRESSURE: 137 MMHG | HEART RATE: 75 BPM | TEMPERATURE: 98.8 F | RESPIRATION RATE: 18 BRPM | HEIGHT: 64 IN | OXYGEN SATURATION: 92 %

## 2018-10-29 DIAGNOSIS — R77.9 ELEVATED BLOOD PROTEIN: ICD-10-CM

## 2018-10-29 DIAGNOSIS — D47.2 MGUS (MONOCLONAL GAMMOPATHY OF UNKNOWN SIGNIFICANCE): Primary | ICD-10-CM

## 2018-10-29 NOTE — PROGRESS NOTES
Chuck Wong is a 80 y.o. female new patient here today for MGUS spike. Hay 0.2, C-Reactive Protein 11.0. VS stable. Patient denies pain. Former smoker.      Visit Vitals  /79 (BP 1 Location: Right arm, BP Patient Position: Sitting)   Pulse 75   Temp 98.8 °F (37.1 °C) (Oral)   Resp 18   Ht 5' 4\" (1.626 m)   Wt 210 lb 12.8 oz (95.6 kg)   SpO2 92%   BMI 36.18 kg/m²

## 2018-10-31 NOTE — PROGRESS NOTES
Hematology Consultation        Patient: Moustapha Trimble MRN: [id number]  SSN: [id number]    YOB: 1950  Age: 80 y.o. Sex: female        Subjective:      Moustapha Trimble is a 80 y.o. female who I am seeing in consultation for monoclonal gammopathy. She suffers with RA and sees Dr. Ayesha Soni. Laboratory studies show a small monoclonal protein. She denies new bone pains or neuropathic symptoms. Review of Systems:    Constitutional: negative  Eyes: negative  Ears, Nose, Mouth, Throat, and Face: negative  Respiratory: negative  Cardiovascular: negative  Gastrointestinal: negative  Genitourinary:negative  Integument/Breast: negative  Hematologic/Lymphatic: negative  Musculoskeletal:negative  Neurological: negative      Past Medical History:   Diagnosis Date    Abnormal pulmonary function test 9/28/2016    AR (allergic rhinitis)     Asthma     Atrial thrombus 1994    left    Chronic bronchitis (HCC) 9/28/2016    Chronic pain     DDD (degenerative disc disease), lumbar     Diabetes ([address]     DJD (degenerative joint disease) of hip     right    Empty sella ([address] 2000    by MRI    Former smoker 10/24/2016    Hoarseness of voice     Hypercholesterolemia     Hypertension     Lesion of vocal cord     PMR (polymyalgia rheumatica) ([address] 5/18/2016    Smoker 12/5/2012     Past Surgical History:   Procedure Laterality Date    COLORECTAL SCRN; HI RISK IND  5/30/2018         ENDOSCOPY, COLON, DIAGNOSTIC  10/03    polypectomy    ENDOSCOPY, COLON, DIAGNOSTIC  2/05    Dr. Myke Leal, DIAGNOSTIC  2010, reported    due 2015    HX APPENDECTOMY  1970's    HX BREAST REDUCTION      HX COLONOSCOPY  2015    dr Tapan Snyder.  2 polyps removed -repeat in 2018     HX HEMORRHOIDECTOMY      HX HYSTERECTOMY      ovaries spared    HX KNEE REPLACEMENT  11/03, 10/05    left then right    HX TONSILLECTOMY  age 29    tonsils      Family History   Problem Relation Age of Onset    Arthritis-osteo Mother     Asthma Mother     Diabetes Mother     Hypertension Mother     Stroke Mother     Arthritis-osteo Father     Cancer Father     Elevated Lipids Father     Hypertension Father     Arthritis-osteo Sister     Asthma Sister     Diabetes Sister     Elevated Lipids Sister     Hypertension Sister     Heart Attack Sister     Hypertension Daughter     Hypertension Daughter     Hypertension Daughter     Anesth Problems Neg Hx      Social History     Tobacco Use    Smoking status: Former Smoker     Packs/day: 0.50     Years: 40.00     Pack years: 20.00     Types: Cigarettes     Last attempt to quit: 10/7/2016     Years since quittin.0    Smokeless tobacco: Never Used   Substance Use Topics    Alcohol use: No     Alcohol/week: 0.0 oz      Prior to Admission medications    Medication Sig Start Date End Date Taking? Authorizing Provider   glipiZIDE SR (GLUCOTROL XL) 10 mg CR tablet Take 1 Tab by mouth daily. 10/19/18  Yes Neri Cervantes NP   ergocalciferol (ERGOCALCIFEROL) 50,000 unit capsule Take 1 Cap by mouth every seven (7) days. 10/15/18  Yes Chelsie Urrutia MD   methotrexate (RHEUMATREX) 2.5 mg tablet Take 6 Tabs by mouth Every Saturday. 10/20/18  Yes Chelsie Urrutia MD   folic acid (FOLVITE) 1 mg tablet Take 1 Tab by mouth daily. 10/15/18  Yes Chelsie Urrutia MD   HYDROcodone-acetaminophen [facility])  mg tablet Take 1 Tab by mouth every eight (8) hours as needed for Pain. Max Daily Amount: 3 Tabs. 18  Yes Neri Cervantes NP   Blood Glucose Control High&Low (ACCU-CHEK JENNIE CONTROL SOLN) soln Use as directed for controls as needed. DX: E11.9 18  Yes Neri Cervantes NP   Blood-Glucose Meter (ACCU-CHEK JENNIE PLUS METER) misc Test blood sugar twice daily. DX: E11.9. Substitute supply brand accepted by insurance. 18  Yes Neri Cervantes NP   Lancets (ACCU-CHEK SOFTCLIX LANCETS) misc Test blood sugar twice daily. DX: E11.9.  Substitute supply brand accepted by insurance. 7/1/18  Yes Misha Cervantes NP   glucose blood VI test strips (ACCU-CHEK JENNIE PLUS TEST STRP) strip Test blood sugar twice daily. DX: E11.9. Substitute supply brand accepted by insurance. 7/1/18  Yes Misha Cervantes NP   alcohol swabs (ALCOHOL WIPES) padm Test blood sugar twice daily. DX: E11.9. Substitute supply brand accepted by insurance. 7/1/18  Yes Misha Cervantes NP   nicotine (NICODERM CQ) 21 mg/24 hr  4/26/18  Yes Provider, Historical   potassium chloride (KLOR-CON) 10 mEq tablet Take 1 Tab by mouth daily. 6/8/18  Yes Misha Cervantes NP   tiotropium (SPIRIVA) 18 mcg inhalation capsule Take 1 Cap by inhalation daily. 4/17/18  Yes Flakito Chapin MD   atorvastatin (LIPITOR) 20 mg tablet Take 1 Tab by mouth nightly. 4/17/18  Yes Flakito Chapin MD   diltiazem ESCOBAR [facility]) 420 mg SR capsule TAKE 1 CAPSULE EVERY DAY 2/21/18  Yes Misha Cervantes NP   losartan-hydroCHLOROthiazide (HYZAAR) 100-12.5 mg per tablet TAKE 1 TABLET DAILY 11/9/17  Yes Gardenia Arango NP   metFORMIN ER (GLUCOPHAGE XR) 500 mg tablet Take 4 Tabs by mouth daily (with dinner). Dose increase 11/9/17  Yes Gardenia Arango NP   albuterol (PROVENTIL VENTOLIN) 2.5 mg /3 mL (0.083 %) nebulizer solution 3 mL by Nebulization route every six (6) hours as needed for Wheezing. 5/8/17  Yes Gardenia Arango NP   albuterol (PROVENTIL HFA, VENTOLIN HFA, PROAIR HFA) 90 mcg/actuation inhaler Take 2 Puffs by inhalation every six (6) hours as needed for Wheezing. 5/8/17  Yes Gardenia Arango NP   Cetirizine (ZYRTEC) 10 mg Cap Take  by mouth. Yes Provider, Historical   aspirin 81 mg chewable tablet Take 81 mg by mouth daily. Yes Provider, Historical   MULTIVITS W-FE,OTHER MIN (CENTRUM PO) Take  by mouth daily.    Yes Provider, Historical              Allergies   Allergen Reactions    Bactrim [Sulfamethoxazole-Trimethoprim] Nausea Only    Biaxin [Clarithromycin] Nausea Only    Demerol [Meperidine] Itching    Erythromycin Nausea Only    Gabapentin Nausea and Vomiting    Pcn [Penicillins] Hives    Percocet [Oxycodone-Acetaminophen] Itching    Pravastatin Nausea Only    Tetracycline Nausea Only    Voltaren [Diclofenac Sodium] Hives           Objective:     Vitals:    10/29/18 1501   BP: 137/79   Pulse: 75   Resp: 18   Temp: 98.8 °F (37.1 °C)   TempSrc: Oral   SpO2: 92%   Weight: 210 lb 12.8 oz (95.6 kg)   Height: 5' 4\" (1.626 m)            Physical Exam:    GENERAL: alert, cooperative, no distress, appears stated age  EYE: negative  LYMPHATIC: Cervical, supraclavicular, and axillary nodes normal.   THROAT & NECK: normal and no erythema or exudates noted. LUNG: clear to auscultation bilaterally  HEART: regular rate and rhythm  ABDOMEN: soft, non-tender  EXTREMITIES:  no edema  SKIN: Normal.  NEUROLOGIC: negative      Labs reviewed          Assessment:     1. MGUS    IgG lambda  M-protein 0.2     Normal creatinine  I will repeat CBC    MGUS is related to underlying rheumatological illness. I reassured the patient. Plan:       1. CBC, SPEP/immunofix, Serum FLC  2. Return as needed      Signed by: John Olson MD                     October 30, 2018          CCDeann Keller MD

## 2018-11-05 LAB
ALBUMIN SERPL ELPH-MCNC: 3.4 G/DL (ref 2.9–4.4)
ALBUMIN/GLOB SERPL: 1 {RATIO} (ref 0.7–1.7)
ALPHA1 GLOB SERPL ELPH-MCNC: 0.2 G/DL (ref 0–0.4)
ALPHA2 GLOB SERPL ELPH-MCNC: 0.8 G/DL (ref 0.4–1)
B-GLOBULIN SERPL ELPH-MCNC: 1.2 G/DL (ref 0.7–1.3)
BASOPHILS # BLD AUTO: 0 X10E3/UL (ref 0–0.2)
BASOPHILS NFR BLD AUTO: 0 %
EOSINOPHIL # BLD AUTO: 0.1 X10E3/UL (ref 0–0.4)
EOSINOPHIL NFR BLD AUTO: 2 %
ERYTHROCYTE [DISTWIDTH] IN BLOOD BY AUTOMATED COUNT: 15.1 % (ref 12.3–15.4)
GAMMA GLOB SERPL ELPH-MCNC: 1.1 G/DL (ref 0.4–1.8)
GLOBULIN SER CALC-MCNC: 3.4 G/DL (ref 2.2–3.9)
HCT VFR BLD AUTO: 40.9 % (ref 34–46.6)
HGB BLD-MCNC: 13.5 G/DL (ref 11.1–15.9)
IGA SERPL-MCNC: 166 MG/DL (ref 87–352)
IGG SERPL-MCNC: 986 MG/DL (ref 700–1600)
IGM SERPL-MCNC: 191 MG/DL (ref 26–217)
IMM GRANULOCYTES # BLD: 0 X10E3/UL (ref 0–0.1)
IMM GRANULOCYTES NFR BLD: 0 %
KAPPA LC FREE SER-MCNC: 19.2 MG/L (ref 3.3–19.4)
KAPPA LC FREE/LAMBDA FREE SER: 0.84 {RATIO} (ref 0.26–1.65)
LAMBDA LC FREE SERPL-MCNC: 22.9 MG/L (ref 5.7–26.3)
LYMPHOCYTES # BLD AUTO: 2.3 X10E3/UL (ref 0.7–3.1)
LYMPHOCYTES NFR BLD AUTO: 30 %
M PROTEIN SERPL ELPH-MCNC: NORMAL G/DL
MCH RBC QN AUTO: 29.3 PG (ref 26.6–33)
MCHC RBC AUTO-ENTMCNC: 33 G/DL (ref 31.5–35.7)
MCV RBC AUTO: 89 FL (ref 79–97)
MONOCYTES # BLD AUTO: 0.5 X10E3/UL (ref 0.1–0.9)
MONOCYTES NFR BLD AUTO: 6 %
NEUTROPHILS # BLD AUTO: 4.7 X10E3/UL (ref 1.4–7)
NEUTROPHILS NFR BLD AUTO: 62 %
PLATELET # BLD AUTO: 302 X10E3/UL (ref 150–379)
PLEASE NOTE, 011150: NORMAL
PROT PATTERN SERPL IFE-IMP: NORMAL
PROT SERPL-MCNC: 6.8 G/DL (ref 6–8.5)
RBC # BLD AUTO: 4.6 X10E6/UL (ref 3.77–5.28)
WBC # BLD AUTO: 7.7 X10E3/UL (ref 3.4–10.8)

## 2018-11-12 ENCOUNTER — TELEPHONE (OUTPATIENT)
Dept: FAMILY MEDICINE CLINIC | Age: 68
End: 2018-11-12

## 2018-11-12 NOTE — TELEPHONE ENCOUNTER
Placed an outgoing call to patient to reschedule missed appointment today. Left VM on patient's home and cell VM to call me or office back to reschedule appointment.     Hilton Everett, PharmD, Ludmila Erickson

## 2018-11-27 ENCOUNTER — OFFICE VISIT (OUTPATIENT)
Dept: RHEUMATOLOGY | Age: 68
End: 2018-11-27

## 2018-11-27 VITALS
HEIGHT: 64 IN | DIASTOLIC BLOOD PRESSURE: 75 MMHG | TEMPERATURE: 98.2 F | WEIGHT: 207 LBS | SYSTOLIC BLOOD PRESSURE: 163 MMHG | BODY MASS INDEX: 35.34 KG/M2 | RESPIRATION RATE: 18 BRPM | HEART RATE: 75 BPM

## 2018-11-27 DIAGNOSIS — Z79.60 LONG-TERM USE OF IMMUNOSUPPRESSANT MEDICATION: ICD-10-CM

## 2018-11-27 DIAGNOSIS — M06.09 SERONEGATIVE RHEUMATOID ARTHRITIS OF MULTIPLE SITES (HCC): Primary | ICD-10-CM

## 2018-11-27 DIAGNOSIS — E55.9 VITAMIN D DEFICIENCY: ICD-10-CM

## 2018-11-27 DIAGNOSIS — M35.3 PMR (POLYMYALGIA RHEUMATICA) (HCC): ICD-10-CM

## 2018-11-27 DIAGNOSIS — D47.2 MGUS (MONOCLONAL GAMMOPATHY OF UNKNOWN SIGNIFICANCE): ICD-10-CM

## 2018-11-27 RX ORDER — METHOTREXATE 2.5 MG/1
20 TABLET ORAL
Qty: 96 TAB | Refills: 0 | Status: SHIPPED | OUTPATIENT
Start: 2018-12-02 | End: 2019-11-19

## 2018-11-27 NOTE — PROGRESS NOTES
REASON FOR VISIT    This is a follow-up visit for Ms. 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 Inflammatory arthritis phenotype includes:  Anti-CCP positive: no  Rheumatoid factor positive: no  Erosive disease: no  Extra-articular manifestations include: Polymyalgia Rheumatica    Immunosuppression Screening (6/27/2018): Quantiferon TB: negative  PPD:  Not performed  Hepatitis B: negative  Hepatitis C: negative    Therapy History includes:  Current DMARD therapy include: methotrexate 15 mg every Sunday (10/15/2018 to present)  Prior DMARD therapy include: none  Discontinued DMARDs because of inefficacy: None  Discontinued DMARDs because of side effects: None    Immunization History   Administered Date(s) Administered    Influenza High Dose Vaccine PF 01/29/2016, 11/09/2017, 09/11/2018    Influenza Vaccine 12/05/2012    Influenza Vaccine PF 11/17/2014    PPD 01/25/1999    Pneumococcal Polysaccharide (PPSV-23) 05/13/2013    TD Vaccine 05/12/1999    Tdap 05/13/2013       Patient Active Problem List   Diagnosis Code    Hypercholesterolemia E78.00    Osteoarthritis of hip M16.9    PMR (polymyalgia rheumatica) ([facility]) M35.3    Chronic bronchitis ([facility] J42    Former smoker Z87.891    Chronic pain G89.29    Long term (current) use of systemic steroids Z79.52    Type 2 diabetes mellitus with diabetic polyneuropathy, without long-term current use of insulin ([facility]) E11.42    Severe obesity (BMI 35.0-39. 9) with comorbidity ([facility] E66.01    Essential hypertension I10    Seronegative rheumatoid arthritis of multiple sites ([facility] M06.09    MGUS (monoclonal gammopathy of unknown significance) D47.2    Vitamin D deficiency E55.9    Long-term use of immunosuppressant medication Z79.899       HISTORY OF PRESENT ILLNESS    Ms. Myles Caldera returns for a follow-up. On her last visit, I prescribed methotrexate 15 mg every Saturday with daily folic acid 1 mg.  I also referred her to Dr. Carmen Crook for MGUS who agreed with relation to Rheumatoid Arthritis. She has noted some nausea that lasted 1-2 days but most recently, she did not have it. Today, she feels improvement in her hands and hips. She has no pain in her hands today. She was able to cut up a lot of stuff for thanksgiving without difficultly or locking. She denies slweling or stiffness in her hands. She has burning and paresthesia in her feet which is worse when she lays down. She did not tolerate gabapentin due to vomiting. She smokes 5 cigarettes a day. She endorses ankle swelling in the afternoon. She is wearing compression hose. She denies fever, weight loss, blurred vision, vision loss, oral ulcers, dry cough, dyspnea, nausea, vomiting, dysphagia, abdominal pain, black or bloody stool, fall since last visit, rash, easy bruising and increased thirst.    Last toxicity monitoring by blood work was done on 6/27/2018 and did not reveal any significant adverse effects. Most recent inflammatory markers from 6/27/2018 revealed a ESR 25 mm/hr (previously 44, 8, 24, 10, 30, 12 mm/hr) and CRP 11.0 mg/L (previously 38.3, 24.7, 8.4, 9.8, 16.7 mg/L). The patient has not had any interval hospital admission (except for gastroenteritis), infections, or surgeries. REVIEW OF SYSTEMS    A comprehensive review of systems was performed and pertinent results are documented in the HPI, review of systems is otherwise non-contributory. PAST MEDICAL HISTORY    She has a past medical history of Abnormal pulmonary function test, AR (allergic rhinitis), Asthma, Atrial thrombus, Chronic bronchitis ([address] Chronic pain, DDD (degenerative disc disease), lumbar, Diabetes ([address] DJD (degenerative joint disease) of hip, Empty sella ([address] Former smoker, Hoarseness of voice, Hypercholesterolemia, Hypertension, Lesion of vocal cord, PMR (polymyalgia rheumatica) ([address] and Smoker.     FAMILY HISTORY    Her family history includes Arthritis-osteo in her father, mother, and sister; Asthma in her mother and sister; Cancer in her father; Diabetes in her mother and sister; Elevated Lipids in her father and sister; Heart Attack in her sister; Hypertension in her daughter, daughter, daughter, father, mother, and sister; Stroke in her mother. SOCIAL HISTORY    She reports that she quit smoking about 2 years ago. Her smoking use included cigarettes. She has a 20.00 pack-year smoking history. she has never used smokeless tobacco. She reports that she does not drink alcohol or use drugs. MEDICATIONS    Current Outpatient Medications   Medication Sig Dispense Refill    [START ON 12/2/2018] methotrexate (RHEUMATREX) 2.5 mg tablet Take 8 Tabs by mouth every Sunday. 96 Tab 0    glipiZIDE SR (GLUCOTROL XL) 10 mg CR tablet Take 1 Tab by mouth daily. 90 Tab 3    ergocalciferol (ERGOCALCIFEROL) 50,000 unit capsule Take 1 Cap by mouth every seven (7) days. 12 Cap 3    folic acid (FOLVITE) 1 mg tablet Take 1 Tab by mouth daily. 90 Tab 0    HYDROcodone-acetaminophen (NORCO)  mg tablet Take 1 Tab by mouth every eight (8) hours as needed for Pain. Max Daily Amount: 3 Tabs. 90 Tab 0    Blood Glucose Control High&Low (ACCU-CHEK JENNIE CONTROL SOLN) soln Use as directed for controls as needed. DX: E11.9 1 Bottle 6    Blood-Glucose Meter (ACCU-CHEK JENNIE PLUS METER) misc Test blood sugar twice daily. DX: E11.9. Substitute supply brand accepted by insurance. 1 Each 0    Lancets (ACCU-CHEK SOFTCLIX LANCETS) misc Test blood sugar twice daily. DX: E11.9. Substitute supply brand accepted by insurance. 1 Each 11    glucose blood VI test strips (ACCU-CHEK JENNIE PLUS TEST STRP) strip Test blood sugar twice daily. DX: E11.9. Substitute supply brand accepted by insurance. 100 Strip 11    alcohol swabs (ALCOHOL WIPES) padm Test blood sugar twice daily. DX: E11.9. Substitute supply brand accepted by insurance.  100 Pad 1    nicotine (NICODERM CQ) 21 mg/24 hr       potassium chloride (KLOR-CON) 10 mEq tablet Take 1 Tab by mouth daily. 30 Tab 5    tiotropium (SPIRIVA) 18 mcg inhalation capsule Take 1 Cap by inhalation daily. 90 Cap 3    atorvastatin (LIPITOR) 20 mg tablet Take 1 Tab by mouth nightly. 90 Tab 3    diltiazem (TIAZAC) 420 mg SR capsule TAKE 1 CAPSULE EVERY DAY 90 Cap 3    losartan-hydroCHLOROthiazide (HYZAAR) 100-12.5 mg per tablet TAKE 1 TABLET DAILY 90 Tab 3    metFORMIN ER (GLUCOPHAGE XR) 500 mg tablet Take 4 Tabs by mouth daily (with dinner). Dose increase 360 Tab 5    albuterol (PROVENTIL VENTOLIN) 2.5 mg /3 mL (0.083 %) nebulizer solution 3 mL by Nebulization route every six (6) hours as needed for Wheezing. 60 Each 2    albuterol (PROVENTIL HFA, VENTOLIN HFA, PROAIR HFA) 90 mcg/actuation inhaler Take 2 Puffs by inhalation every six (6) hours as needed for Wheezing. 1 Inhaler 1    Cetirizine (ZYRTEC) 10 mg Cap Take  by mouth.  aspirin 81 mg chewable tablet Take 81 mg by mouth daily.  MULTIVITS W-FE,OTHER MIN (CENTRUM PO) Take  by mouth daily. ALLERGIES    Allergies   Allergen Reactions    Bactrim [Sulfamethoxazole-Trimethoprim] Nausea Only    Biaxin [Clarithromycin] Nausea Only    Demerol [Meperidine] Itching    Erythromycin Nausea Only    Gabapentin Nausea and Vomiting    Pcn [Penicillins] Hives    Percocet [Oxycodone-Acetaminophen] Itching    Pravastatin Nausea Only    Tetracycline Nausea Only    Voltaren [Diclofenac Sodium] Hives       PHYSICAL EXAMINATION    Visit Vitals  /75   Pulse 75   Temp 98.2 °F (36.8 °C)   Resp 18   Ht 5' 4\" (1.626 m)   Wt 207 lb (93.9 kg)   BMI 35.53 kg/m²     Body mass index is 35.53 kg/m². General: Patient is alert, oriented x 3, not in acute distress    HEENT:   Sclerae are not injected and appear moist.  There is no alopecia. Neck is supple     Cardiovascular:  Heart is regular rate and rhythm, no murmurs. Chest:  Lungs are clear to auscultation bilaterally.  No rhonchi, wheezes, or crackles. Extremities:  Free of clubbing, cyanosis, edema    Neurological exam:  Muscle strength is full in upper and lower extremities     Skin exam:    Psoriasis:     no  Nail Pitting:     no  Onycholysis:     no  Palmoplantar pustulosis:   no  Acne fulminans:    no  Acne conglobata:    no  Hidradenitis Suppurativa:   no  Dissecting cellulitis of the scalp:  no  Pilonidal sinus:    no  Erythema nodosum:    no  Non-Scarring Alopecia:  no  Discoid Lupus:   no  Subacute Cutaneous Lupus:   no  Heliotrope Rash:   no  Upper Arm Erythema:   no  Shawl Sign:    no  V-sign:     no  Holster sign:    no  Gottron's papules:   no  Gottron's sign:    no  Calcinosis:    no  Raynaud's Phenomenon:  no  's Hands:   no  Periungual erythema:   no  Abnormal Nailfold Capillaries: no  Livedo Reticularis:   no  Scalp Erythema:   no  Rheumatoid Nodules:   no    Musculoskeletal:  A comprehensive musculoskeletal exam was performed for all joints of each upper and lower extremity and assessed for swelling, tenderness and range of motion.       Bilateral knee replacement  Bilateral ankle synovitis (IMPROVING)  Bilateral MTP tenderness (RIGHT more than LEFT)    Z-Deformities:   no  [place]ck Deformities:  no  Boutonierre's Deformities:  no  Ulnar Deviation:   no  MCP Subluxation:  no    Joint Count 11/27/2018 6/27/2018   Patient pain (0-100) 50 70   MHAQ 0.375 0.5   Left shoulder - Tender - 1   Left elbow - Tender - 1   Left elbow - Swollen - 1   Left wrist- Tender - 1   Left wrist- Swollen - 1   Left 1st MCP - Tender 1 1   Left 1st MCP - Swollen 1 -   Left 2nd MCP - Tender 1 1   Left 2nd MCP - Swollen 1 1   Left 3rd MCP - Tender - 1   Left 3rd MCP - Swollen - 1   Left 4th MCP - Tender 1 1   Left 4th MCP - Swollen 0 -   Left 5th MCP - Tender - 1   Left thumb IP - Tender - 1   Left 2nd PIP - Tender - 1   Left 3rd PIP - Tender 1 1   Left 3rd PIP - Swollen 0 -   Left 4th PIP - Tender - 1   Left 5th PIP - Tender - 1   Right shoulder - Tender - 1   Right wrist- Swollen - 1   Right 1st MCP - Tender - 1   Right 1st MCP - Swollen - 1   Right 2nd MCP - Tender 0 1   Right 2nd MCP - Swollen 1 1   Right 3rd MCP - Tender 1 -   Right 3rd MCP - Swollen 1 -   Right 3rd PIP - Tender - 1   Right 4th PIP - Tender 1 -   Right 4th PIP - Swollen 0 -   Tender Joint Count (Total) 6 17   Swollen Joint Count (Total) 4 7   Physician Assessment (0-10) 2 5   Patient Assessment (0-10) 5 8   CDAI Total (calculated) 17 40       DATA REVIEW    Laboratory     Recent laboratory results were reviewed, summarized, and discussed with the patient. Imaging    Musculoskeletal Ultrasound    None    Radiographs    Bilateral Hand 6/27/2018: RIGHT: no fracture or other acute osseous or articular abnormality. The soft tissues are within normal limits. There is minimal distal interphalangeal joint space narrowing, soft tissue swelling. Minimal DJD at the base of the first digit no soft tissue calcification. LEFT: no fracture, dislocation or other acute osseous or articular abnormality. The soft tissues are within normal limits. The bone is normal internal contents. There is minimal distal interphalangeal joint space narrowing, there is mild DJD at the base of the first digit. There is no bony erosion or soft tissue calcification.     Bilateral Foot 6/27/2018: RIGHT: no fracture or other acute osseous or articular abnormality. The soft tissues are within normal limits. The bone is normal in mineral content, there is some soft tissue swelling. There is no bony erosions. Small posterior and plantar calcaneal spurs. No soft tissue calcification seen otherwise. LEFT: no fracture or other acute osseous or articular abnormality. The soft tissues are within normal limits. The bone is normal in mineral content. No bony erosions. Periosteal thickening likely chronic. Calcaneal spurs. Chest 5/07/2018: normal heart size. There is no acute process in the lung fields.  The osseous structures are unremarkable.     Chest 4/14/2018: Lungs: The lungs are clear of mass, nodule, airspace disease or edema. Pleura: There is no pleural effusion or pneumothorax. Mediastinum: The cardiac and mediastinal contours and pulmonary vascularity are normal. Bones and soft tissues: There are degenerative changes of the spine. Left Shoulder 7/31/2017: no fracture, dislocation or other acute abnormality. DJD AC joint and chronic deformity tuberosity    Left Ankle 9/16/2015: no fracture or disruption of the ankle mortise. There is no other acute osseous or articular abnormality. There is marked soft tissue swelling overlying the medial and lateral sides of the ankle as well as the dorsum of the foot. Subcutaneous edema noted. .  Calcaneal spurs noted. Right Hip 7/15/2015:  no fracture, dislocation or other acute abnormality. Mild osteoarthritis is present. Left Shoulder 11/17/2014:  no fracture, dislocation. A type II acromion is present. There is mild irregularity of the greater tuberosity. CT Imaging    CTA Chest with and without contrast 10/16/2010: There is no evidence for pulmonary embolus. There is no pleural or pericardial effusion. Heart size is normal. No mediastinal or hilar mass or adenopathy is shown. The lungs are clear. Mild to moderate thoracic spine degenerative changes are demonstrated.      MR Imaging    MRA Head without contrast 8/01/2011: The vertebral arteries are codominant. The basilar artery and its branches are normal. The internal carotid, anterior cerebral, and middle cerebral arteries are patent. There is no flow-limiting intracranial stenosis. There is no aneurysm. There is a patent left posterior communicating artery. .      MRI Brain with and without contrast 8/01/2011: The ventricles are normal in size and are midline. Ventricular size is actually somewhat small for the patient's chronological age. Dane Juan Carlos is no intracranial hemorrhage or extra-axial fluid collection.  There are mild scattered foci of T2 hyperintensity in the cerebral white matter, which are nonspecific. Moderate patchy T2 hyperintensity within the central portion of the sol is noted. The appearance is typical of the changes seen with intracranial microangiopathy. . There is no acute infarction. There is no abnormal parenchymal or meningeal enhancement. The paranasal sinuses and mastoid air cells are clear. There is enlargement of the sella with extension of CSF into the sella and minimal visible pituitary tissue, consistent with an empty sella. DXA    DXA 9/14/2015: (excluded L1, right hip, distal radius) lumbar spine L2-L4 T score 3.6 (BMD 1.475 g/cm2), left femoral neck T score: 4.5 (1.351 g/cm2), left total hip T score: 3.6 (1.379 g/cm2). ASSESSMENT AND PLAN    This is a follow-up visit for Ms. Emery Lima. 1) Seronegative Rheumatoid Arthritis secondary to Polymyalgia Rheumatica. She has a long standing history of Polymyalgia Rheumatica due to Rheumatoid Arthritis but was not treated for Rheumatoid Arthritis. She had been on prednisone for years. She has been on methotrexate 15 mg every Sunday for about 4 weeks with mild nausea that has improved. She feels better. Her CDAI was 17 (previously 37) with 6 tender and 4 swollen joints, involving bilateral ankles and MTPs consistent with moderate disease activity. Her Polymyalgia Rheumatica symptoms have resolved. I asked her to increase her methotrexate incrementally over the next two doses to 20 mg weekly. Labs today. 2) Polymyalgia Rheumatica. See #1. 3) Long Term Use of Immunosuppressants. The patient remains on immunomodulatory medications (methotrexate) and requires frequent toxicity monitoring by blood work. Respective labs were ordered (CBC and CMP). 4) Monoclonal Gammopathy Undetermined Significance. SPEP showed Immunofixation shows IgG monoclonal protein with lambda light chain specificity with M-spike was 0.2. This is likely from #1.  Hematology evaluation was not concerning for a pre-malignant state. 5) Vitamin D Deficiency. Her vitamin D level was 14.4. She is on weekly ergocalciferol 50,000. I will check her level today. The patient voiced understanding of the aforementioned assessment and plan. Summary of plan was provided in the After Visit Summary patient instructions.      TODAY'S ORDERS    Orders Placed This Encounter    CBC WITH AUTOMATED DIFF    METABOLIC PANEL, COMPREHENSIVE    SED RATE (ESR)    VITAMIN D, 25 HYDROXY    methotrexate (RHEUMATREX) 2.5 mg tablet     Future Appointments   Date Time Provider Nikki Mckeon   1/29/2019  1:40 PM MD Eliane Morse MD, [address]    Adult Rheumatology   Rheumatology Ultrasound Certified  [facility]  A Part of [facility], [address] [place] [address]   Phone [phone]  Fax [phone]

## 2018-11-27 NOTE — PATIENT INSTRUCTIONS
PLEASE INCREASE YOUR METHOTREXATE    1) THIS SUNDAY TO 7 tablets (17.5 mg)  2) AND THEN NEXT SUNDAY 8 tablets (20 mg)   3) AND THEN CONTINUE 8 TABLETS EVERY SUNDAY    CONTINUE with daily Folic Acid 1 mg    CONTACT ME IF YOU HAVE ANY SIDE EFFECTS OR HAVE AN INFECTION    LABS TODAY    FOLLOW UP IN 2 MONTHS

## 2018-11-28 LAB
25(OH)D3+25(OH)D2 SERPL-MCNC: 27.8 NG/ML (ref 30–100)
ALBUMIN SERPL-MCNC: 4.1 G/DL (ref 3.6–4.8)
ALBUMIN/GLOB SERPL: 1.6 {RATIO} (ref 1.2–2.2)
ALP SERPL-CCNC: 89 IU/L (ref 39–117)
ALT SERPL-CCNC: 13 IU/L (ref 0–32)
AST SERPL-CCNC: 15 IU/L (ref 0–40)
BASOPHILS # BLD AUTO: 0 X10E3/UL (ref 0–0.2)
BASOPHILS NFR BLD AUTO: 1 %
BILIRUB SERPL-MCNC: <0.2 MG/DL (ref 0–1.2)
BUN SERPL-MCNC: 20 MG/DL (ref 8–27)
BUN/CREAT SERPL: 23 (ref 12–28)
CALCIUM SERPL-MCNC: 9.3 MG/DL (ref 8.7–10.3)
CHLORIDE SERPL-SCNC: 102 MMOL/L (ref 96–106)
CO2 SERPL-SCNC: 28 MMOL/L (ref 20–29)
CREAT SERPL-MCNC: 0.87 MG/DL (ref 0.57–1)
EOSINOPHIL # BLD AUTO: 0.2 X10E3/UL (ref 0–0.4)
EOSINOPHIL NFR BLD AUTO: 2 %
ERYTHROCYTE [DISTWIDTH] IN BLOOD BY AUTOMATED COUNT: 15.8 % (ref 12.3–15.4)
ERYTHROCYTE [SEDIMENTATION RATE] IN BLOOD BY WESTERGREN METHOD: 13 MM/HR (ref 0–40)
GLOBULIN SER CALC-MCNC: 2.5 G/DL (ref 1.5–4.5)
GLUCOSE SERPL-MCNC: 130 MG/DL (ref 65–99)
HCT VFR BLD AUTO: 37.9 % (ref 34–46.6)
HGB BLD-MCNC: 12.9 G/DL (ref 11.1–15.9)
IMM GRANULOCYTES # BLD: 0 X10E3/UL (ref 0–0.1)
IMM GRANULOCYTES NFR BLD: 0 %
LYMPHOCYTES # BLD AUTO: 2.2 X10E3/UL (ref 0.7–3.1)
LYMPHOCYTES NFR BLD AUTO: 29 %
MCH RBC QN AUTO: 30.3 PG (ref 26.6–33)
MCHC RBC AUTO-ENTMCNC: 34 G/DL (ref 31.5–35.7)
MCV RBC AUTO: 89 FL (ref 79–97)
MONOCYTES # BLD AUTO: 0.5 X10E3/UL (ref 0.1–0.9)
MONOCYTES NFR BLD AUTO: 6 %
NEUTROPHILS # BLD AUTO: 4.9 X10E3/UL (ref 1.4–7)
NEUTROPHILS NFR BLD AUTO: 62 %
PLATELET # BLD AUTO: 243 X10E3/UL (ref 150–379)
POTASSIUM SERPL-SCNC: 3.6 MMOL/L (ref 3.5–5.2)
PROT SERPL-MCNC: 6.6 G/DL (ref 6–8.5)
RBC # BLD AUTO: 4.26 X10E6/UL (ref 3.77–5.28)
SODIUM SERPL-SCNC: 140 MMOL/L (ref 134–144)
WBC # BLD AUTO: 7.8 X10E3/UL (ref 3.4–10.8)

## 2018-11-28 NOTE — PROGRESS NOTES
The results were reviewed and a letter was sent. Low vitamin D, but improved from last time. Remaining labs are good.

## 2018-12-18 ENCOUNTER — OFFICE VISIT (OUTPATIENT)
Dept: FAMILY MEDICINE CLINIC | Age: 68
End: 2018-12-18

## 2018-12-18 VITALS
DIASTOLIC BLOOD PRESSURE: 60 MMHG | OXYGEN SATURATION: 96 % | HEART RATE: 76 BPM | BODY MASS INDEX: 35.34 KG/M2 | SYSTOLIC BLOOD PRESSURE: 148 MMHG | HEIGHT: 64 IN | WEIGHT: 207 LBS | TEMPERATURE: 98 F | RESPIRATION RATE: 18 BRPM

## 2018-12-18 DIAGNOSIS — E11.42 TYPE 2 DIABETES MELLITUS WITH DIABETIC POLYNEUROPATHY, WITHOUT LONG-TERM CURRENT USE OF INSULIN (HCC): Primary | ICD-10-CM

## 2018-12-18 DIAGNOSIS — G89.4 CHRONIC PAIN DISORDER: ICD-10-CM

## 2018-12-18 DIAGNOSIS — Z23 ENCOUNTER FOR IMMUNIZATION: ICD-10-CM

## 2018-12-18 LAB — HBA1C MFR BLD HPLC: 7.5 %

## 2018-12-18 RX ORDER — HYDROCODONE BITARTRATE AND ACETAMINOPHEN 10; 325 MG/1; MG/1
1 TABLET ORAL
Qty: 90 TAB | Refills: 0 | Status: SHIPPED | OUTPATIENT
Start: 2018-12-18 | End: 2018-12-18 | Stop reason: SDUPTHER

## 2018-12-18 RX ORDER — PREGABALIN 150 MG/1
150 CAPSULE ORAL
Qty: 30 CAP | Refills: 3 | Status: SHIPPED | OUTPATIENT
Start: 2018-12-18 | End: 2019-02-11 | Stop reason: SDUPTHER

## 2018-12-18 RX ORDER — HYDROCODONE BITARTRATE AND ACETAMINOPHEN 10; 325 MG/1; MG/1
1 TABLET ORAL
Qty: 90 TAB | Refills: 0 | Status: SHIPPED | OUTPATIENT
Start: 2019-01-18 | End: 2018-12-18 | Stop reason: SDUPTHER

## 2018-12-18 RX ORDER — HYDROCODONE BITARTRATE AND ACETAMINOPHEN 10; 325 MG/1; MG/1
1 TABLET ORAL
Qty: 90 TAB | Refills: 0 | Status: SHIPPED | OUTPATIENT
Start: 2019-02-18 | End: 2019-03-13 | Stop reason: SDUPTHER

## 2018-12-18 NOTE — PROGRESS NOTES
Chief Complaint   Patient presents with    Diabetes     3 month follow up     1. Have you been to the ER, urgent care clinic since your last visit? Hospitalized since your last visit? No    2. Have you seen or consulted any other health care providers outside of the [address] [place] [address] since your last visit? Include any pap smears or colon screening.  No

## 2018-12-18 NOTE — PROGRESS NOTES
Assessment/Plan:     Diagnoses and all orders for this visit:    1. Type 2 diabetes mellitus with diabetic polyneuropathy, without long-term current use of insulin ([facility])  -     AMB POC HEMOGLOBIN A1C  Improving with increasing her movement through work and dietary improvement. Continue to discuss dietary changes today including addition of more lean protein to her meals. She will be eligible for Trulicity at the beginning of the year. Long term goals include reducing her Glipizide. She will  her prescription when she is eligible. Addition of Lyrica today. If she fails Lyrica we may need to consider Cymbalta. She does not take Norco after 4 PM.  We have discussed potential for increased sedation due to her concomitant use of opioids and she is understanding of this. Other side effects discussed. 2. Encounter for immunization  -     pneumococcal 13 jamie conj dip (PREVNAR-13) 0.5 mL syrg injection; 0.5 mL by IntraMUSCular route once for 1 dose.  -     varicella-zoster recombinant, PF, (SHINGRIX, PF,) 50 mcg/0.5 mL susr injection; 0.5 mL by IntraMUSCular route once for 1 dose. 3. Chronic pain disorder  -     HYDROcodone-acetaminophen (NORCO)  mg tablet; Take 1 Tab by mouth every eight (8) hours as needed for Pain. Max Daily Amount: 3 Tabs. -     10-PANEL URINE DRUG SCREEN  Continue current therapy. Urine drug screen pending.  reviewed and up-to-date. Provided 3 months of medication refills today. She is having breakthrough pain however we will not increase her regimen at this time. Follow-up Disposition:  Return in about 3 months (around 3/18/2019) for Follow Up. Discussed expected course/resolution/complications of diagnosis in detail with patient.    Medication risks/benefits/costs/interactions/alternatives discussed with patient.    Pt was given after visit summary which includes diagnoses, current medications & vitals.    Pt expressed understanding with the diagnosis and plan          Subjective:      Monisha Winkler is a 80 y.o. female who presents for had concerns including Diabetes (3 month follow up). Patient is a 80 y.o. female that comes today for follow up of Diabetes Mellitus Type 2. Patient does regularly monitor  their FSBG at home. The fasting plasma glucose (fpg) usually runs around 90s mg/L. does not rigorously follow a diabetic diet  Patients activity level: sedentery  there was no change in patient's weight. .  The patient has not experienced recent hypoglycemia. reports that she quit smoking about 2 years ago. Her smoking use included cigarettes. She has a 20.00 pack-year smoking history. she has never used smokeless tobacco.  Continues with peripheral neuropathy in both feet interfering with sleep. Has previously failed gabapentin due to nausea and vomiting. Chronic Pain  Monisha Winkler RTC today to follow up on chronic pain diagnosis. We discussed her osteoarthritis that is affecting her left hip, left shoulder and lumbar back. Significant changes since last visit: none. She is  able to do her normal daily activities. She reports the following adverse side effects: none. Previous treatments include heat, cold, Lidocaine patches otc, Tramadol. Not a candidate for oral NSAIDS. Lab Results   Component Value Date/Time    Hemoglobin A1c 11.7 (H) 11/09/2017 10:03 AM    Hemoglobin A1c 9.1 (H) 07/31/2017 12:13 PM    Hemoglobin A1c 9.7 (H) 05/08/2017 10:59 AM         Current Outpatient Medications   Medication Sig Dispense Refill    pneumococcal 13 jamie conj dip (PREVNAR-13) 0.5 mL syrg injection 0.5 mL by IntraMUSCular route once for 1 dose. 0.5 mL 0    varicella-zoster recombinant, PF, (SHINGRIX, PF,) 50 mcg/0.5 mL susr injection 0.5 mL by IntraMUSCular route once for 1 dose. 0.5 mL 0    pregabalin (LYRICA) 150 mg capsule Take 1 Cap by mouth nightly.  Max Daily Amount: 150 mg. 30 Cap 3    [START ON 2/18/2019] HYDROcodone-acetaminophen (NORCO)  mg tablet Take 1 Tab by mouth every eight (8) hours as needed for Pain. Max Daily Amount: 3 Tabs. 90 Tab 0    methotrexate (RHEUMATREX) 2.5 mg tablet Take 8 Tabs by mouth every Sunday. 96 Tab 0    glipiZIDE SR (GLUCOTROL XL) 10 mg CR tablet Take 1 Tab by mouth daily. 90 Tab 3    ergocalciferol (ERGOCALCIFEROL) 50,000 unit capsule Take 1 Cap by mouth every seven (7) days. 12 Cap 3    folic acid (FOLVITE) 1 mg tablet Take 1 Tab by mouth daily. 90 Tab 0    Blood Glucose Control High&Low (ACCU-CHEK JENNIE CONTROL SOLN) soln Use as directed for controls as needed. DX: E11.9 1 Bottle 6    Blood-Glucose Meter (ACCU-CHEK JENNIE PLUS METER) misc Test blood sugar twice daily. DX: E11.9. Substitute supply brand accepted by insurance. 1 Each 0    Lancets (ACCU-CHEK SOFTCLIX LANCETS) misc Test blood sugar twice daily. DX: E11.9. Substitute supply brand accepted by insurance. 1 Each 11    glucose blood VI test strips (ACCU-CHEK JENNIE PLUS TEST STRP) strip Test blood sugar twice daily. DX: E11.9. Substitute supply brand accepted by insurance. 100 Strip 11    alcohol swabs (ALCOHOL WIPES) padm Test blood sugar twice daily. DX: E11.9. Substitute supply brand accepted by insurance. 100 Pad 1    potassium chloride (KLOR-CON) 10 mEq tablet Take 1 Tab by mouth daily. 30 Tab 5    tiotropium (SPIRIVA) 18 mcg inhalation capsule Take 1 Cap by inhalation daily. 90 Cap 3    atorvastatin (LIPITOR) 20 mg tablet Take 1 Tab by mouth nightly. 90 Tab 3    diltiazem (TIAZAC) 420 mg SR capsule TAKE 1 CAPSULE EVERY DAY 90 Cap 3    losartan-hydroCHLOROthiazide (HYZAAR) 100-12.5 mg per tablet TAKE 1 TABLET DAILY 90 Tab 3    metFORMIN ER (GLUCOPHAGE XR) 500 mg tablet Take 4 Tabs by mouth daily (with dinner). Dose increase 360 Tab 5    albuterol (PROVENTIL VENTOLIN) 2.5 mg /3 mL (0.083 %) nebulizer solution 3 mL by Nebulization route every six (6) hours as needed for Wheezing.  60 Each 2    albuterol (PROVENTIL HFA, VENTOLIN HFA, PROAIR HFA) 90 mcg/actuation inhaler Take 2 Puffs by inhalation every six (6) hours as needed for Wheezing. 1 Inhaler 1    Cetirizine (ZYRTEC) 10 mg Cap Take  by mouth.  aspirin 81 mg chewable tablet Take 81 mg by mouth daily.  MULTIVITS W-FE,OTHER MIN (CENTRUM PO) Take  by mouth daily.  nicotine (NICODERM CQ) 21 mg/24 hr          Allergies   Allergen Reactions    Bactrim [Sulfamethoxazole-Trimethoprim] Nausea Only    Biaxin [Clarithromycin] Nausea Only    Demerol [Meperidine] Itching    Erythromycin Nausea Only    Gabapentin Nausea and Vomiting    Pcn [Penicillins] Hives    Percocet [Oxycodone-Acetaminophen] Itching    Pravastatin Nausea Only    Tetracycline Nausea Only    Voltaren [Diclofenac Sodium] Hives       ROS:   Review of Systems   Constitutional: Negative for malaise/fatigue. Eyes: Negative for blurred vision. Respiratory: Negative for shortness of breath. Cardiovascular: Negative for chest pain. Objective:     Visit Vitals  /60 (BP 1 Location: Left arm, BP Patient Position: Sitting)   Pulse 76   Temp 98 °F (36.7 °C) (Oral)   Resp 18   Ht 5' 4\" (1.626 m)   Wt 207 lb (93.9 kg)   SpO2 96%   BMI 35.53 kg/m²       Vitals and Nurse Documentation reviewed. Physical Exam   Constitutional: No distress. Cardiovascular: S1 normal and S2 normal. Exam reveals no gallop and no friction rub. No murmur heard. Pulmonary/Chest: Breath sounds normal. No respiratory distress. Skin: Skin is warm and dry.    Psychiatric: Mood and affect normal.

## 2018-12-31 LAB
ALBUMIN/CREAT UR: 5.5 MG/G CREAT (ref 0–30)
AMPHETAMINES UR QL SCN: NEGATIVE NG/ML
BARBITURATES UR QL SCN: NEGATIVE NG/ML
BENZODIAZ UR QL: NEGATIVE NG/ML
BZE UR QL: NEGATIVE NG/ML
CANNABINOIDS UR QL SCN: NEGATIVE NG/ML
CREAT UR-MCNC: 245.3 MG/DL
MDMA UR QL SCN: NEGATIVE NG/ML
METHADONE UR QL SCN: NEGATIVE NG/ML
METHAQUALONE UR QL: NEGATIVE NG/ML
MICROALBUMIN UR-MCNC: 13.5 UG/ML
OPIATES UR QL: NEGATIVE
PCP UR QL: NEGATIVE NG/ML
PROPOXYPH UR QL SCN: NEGATIVE NG/ML

## 2018-12-31 NOTE — PROGRESS NOTES
Urine drug screen does not have opioids. She should have been on her medication at the time of the visit. Needs to return for pill count today or Wednesday.

## 2018-12-31 NOTE — PROGRESS NOTES
Incoming call from patient informed patient she needs to bring her norco bottle for a pill count.  She states she will try to be in before 3pm today

## 2019-01-02 NOTE — PROGRESS NOTES
Pill count done on 12/31/18. Patient had 58 pills which meant she is within normal count range. patient states she was out for a couple days before new prescription was written and labs done. Informed patient it is best to inform provider of that information at time of visit.  Patient understands

## 2019-01-15 ENCOUNTER — TELEPHONE (OUTPATIENT)
Dept: FAMILY MEDICINE CLINIC | Age: 69
End: 2019-01-15

## 2019-01-15 NOTE — TELEPHONE ENCOUNTER
----- Message from Susana Eduardo sent at 1/15/2019  1:49 PM EST -----  Regarding: Helen/telephone  Pt is requesting to know if the Lyrica could be stronger and can she take it twice a day. Pts number is [phone] and [place].

## 2019-01-15 NOTE — TELEPHONE ENCOUNTER
Outbound call to patient.  verified patient. Patient states that current Lyrica dose does not seem to be working. Patient states that she is still experiencing pain and hands and feet. Patient is requesting to increase Lyrica from once a day to twice a day. Advised that will send to provider for recommendation.

## 2019-01-15 NOTE — TELEPHONE ENCOUNTER
Outbound call to patient.  verified. Patient made aware to increase lyrica to 3 times a day per NP Helen. Patient voiced understading.

## 2019-01-29 ENCOUNTER — TELEPHONE (OUTPATIENT)
Dept: RHEUMATOLOGY | Age: 69
End: 2019-01-29

## 2019-01-29 NOTE — TELEPHONE ENCOUNTER
Please call patient canceled appointment today as she was sick but states that the methotrexate is making her have diarrhea.

## 2019-01-29 NOTE — TELEPHONE ENCOUNTER
Spoke with pt who stated that she has been having nausea,vomitting and diarrhea for 3 weeks now and she stated that it got worse when she increased the dose. Dr. Roro Sullivan asked her to stop the methotrexate until she is seen next. She had an appt today that she cancelled because she was sick. She made an appt for 2/28/19 so I asked her to call back to see if we have any cancellations in the meantime to get her in sooner. She stated an understanding.

## 2019-02-11 ENCOUNTER — TELEPHONE (OUTPATIENT)
Dept: FAMILY MEDICINE CLINIC | Age: 69
End: 2019-02-11

## 2019-02-11 DIAGNOSIS — E11.9 TYPE 2 DIABETES MELLITUS WITHOUT COMPLICATION (HCC): ICD-10-CM

## 2019-02-11 DIAGNOSIS — E11.42 TYPE 2 DIABETES MELLITUS WITH DIABETIC POLYNEUROPATHY, WITHOUT LONG-TERM CURRENT USE OF INSULIN (HCC): ICD-10-CM

## 2019-02-11 RX ORDER — GLIPIZIDE 10 MG/1
5 TABLET, FILM COATED, EXTENDED RELEASE ORAL DAILY
Qty: 90 TAB | Refills: 3
Start: 2019-02-11 | End: 2019-03-13 | Stop reason: ALTCHOICE

## 2019-02-11 RX ORDER — PREGABALIN 150 MG/1
150 CAPSULE ORAL 3 TIMES DAILY
Qty: 90 CAP | Refills: 3 | Status: SHIPPED | OUTPATIENT
Start: 2019-02-11 | End: 2019-08-13 | Stop reason: SDUPTHER

## 2019-02-11 NOTE — TELEPHONE ENCOUNTER
Call to patient.  verified. Patient states she has been taking Trulicity for 2 weeks.      19: 93 before breakfast  19: 120before dinner    19: 88 before breakfast  18: 121 before dinner    2/10/19: 78 before breakfast  2/10/19: 118 before dinner    19 68 before breakfast    Patient also states she was supposed to have a new script for lyrica for 1 tablet by mouth 3 times a day

## 2019-02-11 NOTE — TELEPHONE ENCOUNTER
Pt is calling to talk about her new sugar Rx:  Trulicity and he number is still low and would like to know if she should stop taking her other medication.

## 2019-02-12 NOTE — TELEPHONE ENCOUNTER
Outbound call to patient. Patient notified to cut glipizide in half. Patient verbalized understanding. Lorenzo called in to patients pharmacy.

## 2019-02-13 ENCOUNTER — TELEPHONE (OUTPATIENT)
Dept: FAMILY MEDICINE CLINIC | Age: 69
End: 2019-02-13

## 2019-02-13 NOTE — TELEPHONE ENCOUNTER
----- Message from Martell Lee sent at 2/13/2019 11:31 AM EST -----  Regarding: Helen THIBODEAUX/Refill  Pt is requesting a refill for Rx Lyrica 150mg at [facility] on file). Best contact is [phone].

## 2019-02-21 DIAGNOSIS — E11.9 TYPE 2 DIABETES MELLITUS WITHOUT COMPLICATION (HCC): ICD-10-CM

## 2019-02-22 RX ORDER — METFORMIN HYDROCHLORIDE 500 MG/1
2000 TABLET, EXTENDED RELEASE ORAL
Qty: 360 TAB | Refills: 5 | Status: SHIPPED | OUTPATIENT
Start: 2019-02-22 | End: 2020-06-12 | Stop reason: ALTCHOICE

## 2019-02-26 ENCOUNTER — OFFICE VISIT (OUTPATIENT)
Dept: RHEUMATOLOGY | Age: 69
End: 2019-02-26

## 2019-02-26 VITALS
SYSTOLIC BLOOD PRESSURE: 151 MMHG | HEIGHT: 64 IN | BODY MASS INDEX: 36.88 KG/M2 | DIASTOLIC BLOOD PRESSURE: 81 MMHG | HEART RATE: 72 BPM | TEMPERATURE: 98.2 F | RESPIRATION RATE: 18 BRPM | WEIGHT: 216 LBS

## 2019-02-26 DIAGNOSIS — Z79.60 LONG-TERM USE OF IMMUNOSUPPRESSANT MEDICATION: ICD-10-CM

## 2019-02-26 DIAGNOSIS — E55.9 VITAMIN D DEFICIENCY: ICD-10-CM

## 2019-02-26 DIAGNOSIS — M06.09 SERONEGATIVE RHEUMATOID ARTHRITIS OF MULTIPLE SITES (HCC): Primary | ICD-10-CM

## 2019-02-26 DIAGNOSIS — D47.2 MGUS (MONOCLONAL GAMMOPATHY OF UNKNOWN SIGNIFICANCE): ICD-10-CM

## 2019-02-26 DIAGNOSIS — M35.3 PMR (POLYMYALGIA RHEUMATICA) (HCC): ICD-10-CM

## 2019-02-26 NOTE — PROGRESS NOTES
REASON FOR VISIT    This is a follow-up visit for Ms. 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 Inflammatory arthritis phenotype includes:  Anti-CCP positive: no  Rheumatoid factor positive: no  Erosive disease: no  Extra-articular manifestations include: Polymyalgia Rheumatica    Immunosuppression Screening (6/27/2018): Quantiferon TB: negative  PPD:  Not performed  Hepatitis B: negative  Hepatitis C: negative    Therapy History includes:  Current DMARD therapy include: none  Prior DMARD therapy include: methotrexate 15-20 mg every Sunday (10/15/2018 to 1/29/2019)  Discontinued DMARDs because of inefficacy: None  Discontinued DMARDs because of side effects: methotrexate 20 mg (nausea, vomiting, diarrhea)    Immunization History   Administered Date(s) Administered    Influenza High Dose Vaccine PF 01/29/2016, 11/09/2017, 09/11/2018    Influenza Vaccine 12/05/2012    Influenza Vaccine PF 11/17/2014    PPD 01/25/1999    Pneumococcal Polysaccharide (PPSV-23) 05/13/2013    TD Vaccine 05/12/1999    Tdap 05/13/2013       Patient Active Problem List   Diagnosis Code    Hypercholesterolemia E78.00    Osteoarthritis of hip M16.9    PMR (polymyalgia rheumatica) ([facility]) M35.3    Chronic bronchitis ([address] J42    Former smoker Z87.891    Chronic pain G89.29    Long term (current) use of systemic steroids Z79.52    Type 2 diabetes mellitus with diabetic polyneuropathy, without long-term current use of insulin ([facility]) E11.42    Severe obesity (BMI 35.0-39. 9) with comorbidity ([address] E66.01    Essential hypertension I10    Seronegative rheumatoid arthritis of multiple sites ([address] M06.09    MGUS (monoclonal gammopathy of unknown significance) D47.2    Vitamin D deficiency E55.9    Long-term use of immunosuppressant medication Z79.899       HISTORY OF PRESENT ILLNESS    Ms. Taylor Coker returns for a follow-up.     On her last visit, I increased her methotrexate to 20 mg every Saturday but she developed nausea, vomiting and diarrhea, so we asked her to discontinue it and her symptoms abated. She had felt her hands. Today, she has aching pain in her fingers. She has not had swelling for the past 2 weeks. she has morning stiffness lasting 30 minutes. Soaking her hands in warm water helps. She quit smoking but is around second hand smoke from her . She endorses easy bruising. She denies fever, weight loss, blurred vision, vision loss, oral ulcers, ankle swelling, dry cough, dyspnea, nausea, vomiting, dysphagia, abdominal pain, black or bloody stool, fall since last visit, rash and increased thirst.    Last toxicity monitoring by blood work was done on 11/27/2018 and did not reveal any significant adverse effects. Most recent inflammatory markers from 11/27/2018 revealed a ESR 13 mm/hr (previously 25, 44, 8, 24, 10, 30, 12 mm/hr). The patient has not had any interval hospital admission, infections, or surgeries. REVIEW OF SYSTEMS    A comprehensive review of systems was performed and pertinent results are documented in the HPI, review of systems is otherwise non-contributory. PAST MEDICAL HISTORY    She has a past medical history of Abnormal pulmonary function test (9/28/2016), AR (allergic rhinitis), Asthma, Atrial thrombus (1994), Chronic bronchitis ([address] (9/28/2016), Chronic pain, DDD (degenerative disc disease), lumbar, Diabetes ([address] DJD (degenerative joint disease) of hip, Empty sella ([address] (2000), Former smoker (10/24/2016), Hoarseness of voice, Hypercholesterolemia, Hypertension, Lesion of vocal cord, PMR (polymyalgia rheumatica) ([address] (5/18/2016), and Smoker (12/5/2012). FAMILY HISTORY    Her family history includes Arthritis-osteo in her father, mother, and sister; Asthma in her mother and sister; Cancer in her father; Diabetes in her mother and sister; Elevated Lipids in her father and sister; Heart Attack in her sister;  Hypertension in her daughter, daughter, daughter, father, mother, and sister; Stroke in her mother. SOCIAL HISTORY    She reports that she quit smoking about 2 years ago. Her smoking use included cigarettes. She has a 20.00 pack-year smoking history. she has never used smokeless tobacco. She reports that she does not drink alcohol or use drugs. MEDICATIONS    Current Outpatient Medications   Medication Sig Dispense Refill    metFORMIN ER (GLUCOPHAGE XR) 500 mg tablet Take 4 Tabs by mouth daily (with dinner). Dose increase 360 Tab 5    glipiZIDE SR (GLUCOTROL XL) 10 mg CR tablet Take 1 Tab by mouth daily. 90 Tab 3    pregabalin (LYRICA) 150 mg capsule Take 1 Cap by mouth three (3) times daily. Max Daily Amount: 450 mg. 90 Cap 3    HYDROcodone-acetaminophen (NORCO)  mg tablet Take 1 Tab by mouth every eight (8) hours as needed for Pain. Max Daily Amount: 3 Tabs. 90 Tab 0    ergocalciferol (ERGOCALCIFEROL) 50,000 unit capsule Take 1 Cap by mouth every seven (7) days. 12 Cap 3    folic acid (FOLVITE) 1 mg tablet Take 1 Tab by mouth daily. 90 Tab 0    Blood Glucose Control High&Low (ACCU-CHEK JENNIE CONTROL SOLN) soln Use as directed for controls as needed. DX: E11.9 1 Bottle 6    Blood-Glucose Meter (ACCU-CHEK JENNIE PLUS METER) misc Test blood sugar twice daily. DX: E11.9. Substitute supply brand accepted by insurance. 1 Each 0    Lancets (ACCU-CHEK SOFTCLIX LANCETS) misc Test blood sugar twice daily. DX: E11.9. Substitute supply brand accepted by insurance. 1 Each 11    glucose blood VI test strips (ACCU-CHEK JENNIE PLUS TEST STRP) strip Test blood sugar twice daily. DX: E11.9. Substitute supply brand accepted by insurance. 100 Strip 11    alcohol swabs (ALCOHOL WIPES) padm Test blood sugar twice daily. DX: E11.9. Substitute supply brand accepted by insurance. 100 Pad 1    nicotine (NICODERM CQ) 21 mg/24 hr       potassium chloride (KLOR-CON) 10 mEq tablet Take 1 Tab by mouth daily.  30 Tab 5    tiotropium (SPIRIVA) 18 mcg inhalation capsule Take 1 Cap by inhalation daily. 90 Cap 3    atorvastatin (LIPITOR) 20 mg tablet Take 1 Tab by mouth nightly. 90 Tab 3    diltiazem (TIAZAC) 420 mg SR capsule TAKE 1 CAPSULE EVERY DAY 90 Cap 3    losartan-hydroCHLOROthiazide (HYZAAR) 100-12.5 mg per tablet TAKE 1 TABLET DAILY 90 Tab 3    albuterol (PROVENTIL VENTOLIN) 2.5 mg /3 mL (0.083 %) nebulizer solution 3 mL by Nebulization route every six (6) hours as needed for Wheezing. 60 Each 2    albuterol (PROVENTIL HFA, VENTOLIN HFA, PROAIR HFA) 90 mcg/actuation inhaler Take 2 Puffs by inhalation every six (6) hours as needed for Wheezing. 1 Inhaler 1    Cetirizine (ZYRTEC) 10 mg Cap Take  by mouth.  aspirin 81 mg chewable tablet Take 81 mg by mouth daily.  MULTIVITS W-FE,OTHER MIN (CENTRUM PO) Take  by mouth daily.  methotrexate (RHEUMATREX) 2.5 mg tablet Take 8 Tabs by mouth every Sunday. 96 Tab 0        ALLERGIES    Allergies   Allergen Reactions    Bactrim [Sulfamethoxazole-Trimethoprim] Nausea Only    Biaxin [Clarithromycin] Nausea Only    Demerol [Meperidine] Itching    Erythromycin Nausea Only    Gabapentin Nausea and Vomiting    Pcn [Penicillins] Hives    Percocet [Oxycodone-Acetaminophen] Itching    Pravastatin Nausea Only    Tetracycline Nausea Only    Voltaren [Diclofenac Sodium] Hives       PHYSICAL EXAMINATION    Visit Vitals  /81   Pulse 72   Temp 98.2 °F (36.8 °C)   Resp 18   Ht 5' 4\" (1.626 m)   Wt 216 lb (98 kg)   BMI 37.08 kg/m²     Body mass index is 37.08 kg/m². General: Patient is alert, oriented x 3, not in acute distress    HEENT:   Sclerae are not injected and appear moist.  There is no alopecia. Neck is supple     Cardiovascular:  Heart is regular rate and rhythm, no murmurs. Chest:  Lungs are clear to auscultation bilaterally. No rhonchi, wheezes, or crackles.     Extremities:  Free of clubbing, cyanosis, edema    Neurological exam:  Muscle strength is full in upper and lower extremities     Skin exam:    Psoriasis:     no  Nail Pitting:     no  Onycholysis:     no  Palmoplantar pustulosis:   no  Acne fulminans:    no  Acne conglobata:    no  Hidradenitis Suppurativa:   no  Dissecting cellulitis of the scalp:  no  Pilonidal sinus:    no  Erythema nodosum:    no  Non-Scarring Alopecia:  no  Discoid Lupus:   no  Subacute Cutaneous Lupus:   no  Heliotrope Rash:   no  Upper Arm Erythema:   no  Shawl Sign:    no  V-sign:     no  Holster sign:    no  Gottron's papules:   no  Gottron's sign:    no  Calcinosis:    no  Raynaud's Phenomenon:  no  's Hands:   no  Periungual erythema:   no  Abnormal Nailfold Capillaries: no  Livedo Reticularis:   no  Scalp Erythema:   no  Rheumatoid Nodules:   no    Musculoskeletal:  A comprehensive musculoskeletal exam was performed for all joints of each upper and lower extremity and assessed for swelling, tenderness and range of motion.       Bilateral knee replacement  Bilateral ankle synovitis (IMPROVING)  Bilateral MTP tenderness (RIGHT more than LEFT)    Z-Deformities:   no  Aylett Neck Deformities:  no  Boutonierre's Deformities:  no  Ulnar Deviation:   no  MCP Subluxation:  no    Joint Count 2/26/2019 11/27/2018 6/27/2018   Patient pain (0-100) 60 50 70   MHAQ 0.125 0.375 0.5   Left shoulder - Tender - - 1   Left elbow - Tender - - 1   Left elbow - Swollen - - 1   Left wrist- Tender 1 - 1   Left wrist- Swollen 1 - 1   Left 1st MCP - Tender 1 1 1   Left 1st MCP - Swollen 1 1 -   Left 2nd MCP - Tender 0 1 1   Left 2nd MCP - Swollen 1 1 1   Left 3rd MCP - Tender 1 - 1   Left 3rd MCP - Swollen 1 - 1   Left 4th MCP - Tender 1 1 1   Left 4th MCP - Swollen 0 0 -   Left 5th MCP - Tender 1 - 1   Left 5th MCP - Swollen 0 - -   Left thumb IP - Tender 1 - 1   Left thumb IP - Swollen 0 - -   Left 2nd PIP - Tender 0 - 1   Left 2nd PIP - Swollen 1 - -   Left 3rd PIP - Tender 1 1 1   Left 3rd PIP - Swollen 1 0 -   Left 4th PIP - Tender 1 - 1   Left 4th PIP - Swollen 0 - -   Left 5th PIP - Tender - - 1   Right shoulder - Tender - - 1   Right wrist- Tender 1 - -   Right wrist- Swollen 1 - 1   Right 1st MCP - Tender - - 1   Right 1st MCP - Swollen - - 1   Right 2nd MCP - Tender 1 0 1   Right 2nd MCP - Swollen 1 1 1   Right 3rd MCP - Tender 1 1 -   Right 3rd MCP - Swollen 1 1 -   Right 3rd PIP - Tender - - 1   Right 4th PIP - Tender 1 1 -   Right 4th PIP - Swollen 0 0 -   Tender Joint Count (Total) 12 6 17   Swollen Joint Count (Total) 9 4 7   Physician Assessment (0-10) 3 2 5   Patient Assessment (0-10) 7 5 8   CDAI Total (calculated) 31 17 40       DATA REVIEW    Laboratory     Recent laboratory results were reviewed, summarized, and discussed with the patient. Imaging    Musculoskeletal Ultrasound    None    Radiographs    Bilateral Hand 6/27/2018: RIGHT: no fracture or other acute osseous or articular abnormality. The soft tissues are within normal limits. There is minimal distal interphalangeal joint space narrowing, soft tissue swelling. Minimal DJD at the base of the first digit no soft tissue calcification. LEFT: no fracture, dislocation or other acute osseous or articular abnormality. The soft tissues are within normal limits. The bone is normal internal contents. There is minimal distal interphalangeal joint space narrowing, there is mild DJD at the base of the first digit. There is no bony erosion or soft tissue calcification.     Bilateral Foot 6/27/2018: RIGHT: no fracture or other acute osseous or articular abnormality. The soft tissues are within normal limits. The bone is normal in mineral content, there is some soft tissue swelling. There is no bony erosions. Small posterior and plantar calcaneal spurs. No soft tissue calcification seen otherwise. LEFT: no fracture or other acute osseous or articular abnormality. The soft tissues are within normal limits. The bone is normal in mineral content. No bony erosions. Periosteal thickening likely chronic.  Calcaneal spurs.    Chest 5/07/2018: normal heart size. There is no acute process in the lung fields. The osseous structures are unremarkable.     Chest 4/14/2018: Lungs: The lungs are clear of mass, nodule, airspace disease or edema. Pleura: There is no pleural effusion or pneumothorax. Mediastinum: The cardiac and mediastinal contours and pulmonary vascularity are normal. Bones and soft tissues: There are degenerative changes of the spine. Left Shoulder 7/31/2017: no fracture, dislocation or other acute abnormality. DJD AC joint and chronic deformity tuberosity    Left Ankle 9/16/2015: no fracture or disruption of the ankle mortise. There is no other acute osseous or articular abnormality. There is marked soft tissue swelling overlying the medial and lateral sides of the ankle as well as the dorsum of the foot. Subcutaneous edema noted. .  Calcaneal spurs noted. Right Hip 7/15/2015:  no fracture, dislocation or other acute abnormality. Mild osteoarthritis is present. Left Shoulder 11/17/2014:  no fracture, dislocation. A type II acromion is present. There is mild irregularity of the greater tuberosity. CT Imaging    CTA Chest with and without contrast 10/16/2010: There is no evidence for pulmonary embolus. There is no pleural or pericardial effusion. Heart size is normal. No mediastinal or hilar mass or adenopathy is shown. The lungs are clear. Mild to moderate thoracic spine degenerative changes are demonstrated.      MR Imaging    MRA Head without contrast 8/01/2011: The vertebral arteries are codominant. The basilar artery and its branches are normal. The internal carotid, anterior cerebral, and middle cerebral arteries are patent. There is no flow-limiting intracranial stenosis. There is no aneurysm. There is a patent left posterior communicating artery.      MRI Brain with and without contrast 8/01/2011: The ventricles are normal in size and are midline.  Ventricular size is actually somewhat small for the patient's chronological age. [place]ill [place] is no intracranial hemorrhage or extra-axial fluid collection. There are mild scattered foci of T2 hyperintensity in the cerebral white matter, which are nonspecific. Moderate patchy T2 hyperintensity within the central portion of the sol is noted. The appearance is typical of the changes seen with intracranial microangiopathy. . There is no acute infarction. There is no abnormal parenchymal or meningeal enhancement. The paranasal sinuses and mastoid air cells are clear. There is enlargement of the sella with extension of CSF into the sella and minimal visible pituitary tissue, consistent with an empty sella. DXA    DXA 9/14/2015: (excluded L1, right hip, distal radius) lumbar spine L2-L4 T score 3.6 (BMD 1.475 g/cm2), left femoral neck T score: 4.5 (1.351 g/cm2), left total hip T score: 3.6 (1.379 g/cm2). ASSESSMENT AND PLAN    This is a follow-up visit for Ms. Al Boateng. 1) Seronegative Rheumatoid Arthritis secondary to Polymyalgia Rheumatica. She has a long standing history of Polymyalgia Rheumatica due to Rheumatoid Arthritis but was not treated for Rheumatoid Arthritis. She had been on prednisone for years. She was doing well on methotrexate 15 mg every Sunday but had nausea, vomiting, and diarrhea on 20 mg so we discontinued it. Her CDAI was 31 (previously 17, 37) with 12 tender and 9 swollen joints, consistent with high disease activity. I asked her to resume methotrexate 15 mg weekly. 2) Polymyalgia Rheumatica. See #1. 3) Long Term Use of Immunosuppressants. The patient remains on immunomodulatory medications (methotrexate) and requires frequent toxicity monitoring by blood work. Respective labs were ordered (CBC and CMP). 4) Monoclonal Gammopathy Undetermined Significance. SPEP showed Immunofixation shows IgG monoclonal protein with lambda light chain specificity with M-spike was 0.2. This is likely from #1.  Hematology evaluation was not concerning for a pre-malignant state. 5) Vitamin D Deficiency. Her vitamin D level was 27.8 (previously 14.4). She is on weekly ergocalciferol 50,000. I will check her level today. The patient voiced understanding of the aforementioned assessment and plan. Summary of plan was provided in the After Visit Summary patient instructions.      TODAY'S ORDERS    Orders Placed This Encounter    CBC WITH AUTOMATED DIFF    METABOLIC PANEL, COMPREHENSIVE    SED RATE (ESR)    VITAMIN D, 25 HYDROXY     Future Appointments   Date Time Provider Nikki Mckeon   3/18/2019 11:45 AM Katrina Cervantes NP [facility]   6/3/2019  3:00 PM MD Eliane Ronquillo MD, [address] [facility]    Adult Rheumatology   Rheumatology Ultrasound Certified  [id number] [facility] [address]  [facility], [address] [facility]   Phone [phone]  Fax [phone]

## 2019-02-26 NOTE — PROGRESS NOTES
Chief Complaint   Patient presents with    Arthritis     1. Have you been to the ER, urgent care clinic since your last visit? Hospitalized since your last visit? No    2. Have you seen or consulted any other health care providers outside of the [address] [place] [address] since your last visit? Include any pap smears or colon screening.  No

## 2019-02-27 LAB
25(OH)D3+25(OH)D2 SERPL-MCNC: 28.9 NG/ML (ref 30–100)
ALBUMIN SERPL-MCNC: 4.2 G/DL (ref 3.6–4.8)
ALBUMIN/GLOB SERPL: 1.6 {RATIO} (ref 1.2–2.2)
ALP SERPL-CCNC: 86 IU/L (ref 39–117)
ALT SERPL-CCNC: 9 IU/L (ref 0–32)
AST SERPL-CCNC: 12 IU/L (ref 0–40)
BASOPHILS # BLD AUTO: 0 X10E3/UL (ref 0–0.2)
BASOPHILS NFR BLD AUTO: 0 %
BILIRUB SERPL-MCNC: <0.2 MG/DL (ref 0–1.2)
BUN SERPL-MCNC: 20 MG/DL (ref 8–27)
BUN/CREAT SERPL: 22 (ref 12–28)
CALCIUM SERPL-MCNC: 9 MG/DL (ref 8.7–10.3)
CHLORIDE SERPL-SCNC: 103 MMOL/L (ref 96–106)
CO2 SERPL-SCNC: 23 MMOL/L (ref 20–29)
CREAT SERPL-MCNC: 0.92 MG/DL (ref 0.57–1)
EOSINOPHIL # BLD AUTO: 0.2 X10E3/UL (ref 0–0.4)
EOSINOPHIL NFR BLD AUTO: 3 %
ERYTHROCYTE [DISTWIDTH] IN BLOOD BY AUTOMATED COUNT: 15.3 % (ref 12.3–15.4)
ERYTHROCYTE [SEDIMENTATION RATE] IN BLOOD BY WESTERGREN METHOD: 41 MM/HR (ref 0–40)
GLOBULIN SER CALC-MCNC: 2.7 G/DL (ref 1.5–4.5)
GLUCOSE SERPL-MCNC: 157 MG/DL (ref 65–99)
HCT VFR BLD AUTO: 37 % (ref 34–46.6)
HGB BLD-MCNC: 12.3 G/DL (ref 11.1–15.9)
IMM GRANULOCYTES # BLD AUTO: 0 X10E3/UL (ref 0–0.1)
IMM GRANULOCYTES NFR BLD AUTO: 0 %
LYMPHOCYTES # BLD AUTO: 2.4 X10E3/UL (ref 0.7–3.1)
LYMPHOCYTES NFR BLD AUTO: 29 %
MCH RBC QN AUTO: 30.2 PG (ref 26.6–33)
MCHC RBC AUTO-ENTMCNC: 33.2 G/DL (ref 31.5–35.7)
MCV RBC AUTO: 91 FL (ref 79–97)
MONOCYTES # BLD AUTO: 0.7 X10E3/UL (ref 0.1–0.9)
MONOCYTES NFR BLD AUTO: 9 %
NEUTROPHILS # BLD AUTO: 4.8 X10E3/UL (ref 1.4–7)
NEUTROPHILS NFR BLD AUTO: 59 %
PLATELET # BLD AUTO: 219 X10E3/UL (ref 150–379)
POTASSIUM SERPL-SCNC: 3.8 MMOL/L (ref 3.5–5.2)
PROT SERPL-MCNC: 6.9 G/DL (ref 6–8.5)
RBC # BLD AUTO: 4.07 X10E6/UL (ref 3.77–5.28)
SODIUM SERPL-SCNC: 143 MMOL/L (ref 134–144)
WBC # BLD AUTO: 8.1 X10E3/UL (ref 3.4–10.8)

## 2019-02-27 NOTE — PROGRESS NOTES
The results were reviewed and a letter was sent. Elevated inflammatory marker (ESR). Low vitamin D 28.9. Remaining labs are good.

## 2019-03-12 DIAGNOSIS — I10 ESSENTIAL HYPERTENSION WITH GOAL BLOOD PRESSURE LESS THAN 130/80: ICD-10-CM

## 2019-03-13 ENCOUNTER — OFFICE VISIT (OUTPATIENT)
Dept: FAMILY MEDICINE CLINIC | Age: 69
End: 2019-03-13

## 2019-03-13 VITALS
WEIGHT: 229.2 LBS | BODY MASS INDEX: 39.13 KG/M2 | OXYGEN SATURATION: 97 % | RESPIRATION RATE: 18 BRPM | SYSTOLIC BLOOD PRESSURE: 142 MMHG | TEMPERATURE: 98.6 F | HEIGHT: 64 IN | DIASTOLIC BLOOD PRESSURE: 76 MMHG | HEART RATE: 69 BPM

## 2019-03-13 DIAGNOSIS — F17.211 CIGARETTE NICOTINE DEPENDENCE IN REMISSION: ICD-10-CM

## 2019-03-13 DIAGNOSIS — G89.4 CHRONIC PAIN DISORDER: ICD-10-CM

## 2019-03-13 DIAGNOSIS — J42 CHRONIC BRONCHITIS, UNSPECIFIED CHRONIC BRONCHITIS TYPE (HCC): ICD-10-CM

## 2019-03-13 DIAGNOSIS — Z12.2 ENCOUNTER FOR SCREENING FOR LUNG CANCER: ICD-10-CM

## 2019-03-13 DIAGNOSIS — E11.42 TYPE 2 DIABETES MELLITUS WITH DIABETIC POLYNEUROPATHY, WITHOUT LONG-TERM CURRENT USE OF INSULIN (HCC): Primary | ICD-10-CM

## 2019-03-13 RX ORDER — HYDROCODONE BITARTRATE AND ACETAMINOPHEN 10; 325 MG/1; MG/1
1 TABLET ORAL
Qty: 90 TAB | Refills: 0 | Status: SHIPPED | OUTPATIENT
Start: 2019-03-13 | End: 2019-03-13 | Stop reason: SDUPTHER

## 2019-03-13 RX ORDER — HYDROCODONE BITARTRATE AND ACETAMINOPHEN 10; 325 MG/1; MG/1
1 TABLET ORAL
Qty: 90 TAB | Refills: 0 | Status: SHIPPED | OUTPATIENT
Start: 2019-05-13 | End: 2019-06-19 | Stop reason: SDUPTHER

## 2019-03-13 RX ORDER — HYDROCODONE BITARTRATE AND ACETAMINOPHEN 10; 325 MG/1; MG/1
1 TABLET ORAL
Qty: 90 TAB | Refills: 0 | Status: SHIPPED | OUTPATIENT
Start: 2019-04-13 | End: 2019-03-13 | Stop reason: SDUPTHER

## 2019-03-13 NOTE — PROGRESS NOTES
Assessment/Plan:     Diagnoses and all orders for this visit:    1. Type 2 diabetes mellitus with diabetic polyneuropathy, without long-term current use of insulin (HCC)       dulaglutide (TRULICITY) 1.5 ES/4.2 mL sub-q pen; 0.5 mL by SubCUTAneous route every seven (7) days. She is symptomatic of blood sugars less than 100. Will discontinue Glipizide and increase Trulicity to 7.1SS. Continue metformin as directed at max dose. Pharmacist to reach out in one month for blood sugar readings. 2. Chronic pain disorder  -     HYDROcodone-acetaminophen (NORCO)  mg tablet; Take 1 Tab by mouth every eight (8) hours as needed for Pain for up to 30 days. Max Daily Amount: 3 Tabs. Controlled on current regimen. She is back at work with well controlled pain. She works with ortho and rheumatology at this time for pain management as well through other modalities. Given three months today. UDS up to date. Contract renewed. 3. Chronic bronchitis, unspecified chronic bronchitis type ([facility]  -     umeclidinium-vilanterol (ANORO ELLIPTA) 62.5-25 mcg/actuation inhaler; Take 1 Puff by inhalation daily. Start Anoro as directed. Follow up in three months or sooner as needed. 4. Encounter for screening for lung cancer  -     CT LOW DOSE LUNG CANCER SCREENING; Future    5. Cigarette nicotine dependence in remission   -     CT LOW DOSE LUNG CANCER SCREENING; Future    Other orders  -  -     pneumococcal 13 jamie conj dip (PREVNAR-13) 0.5 mL syrg injection; 0.5 mL by IntraMUSCular route once for 1 dose. Follow-up Disposition:  Return in about 3 months (around 6/13/2019) for Medicare Wellness. Discussed expected course/resolution/complications of diagnosis in detail with patient.    Medication risks/benefits/costs/interactions/alternatives discussed with patient.    Pt was given after visit summary which includes diagnoses, current medications & vitals.    Pt expressed understanding with the diagnosis and plan          Subjective:      Jaden Lyon is a 80 y.o. female who presents for had concerns including Follow-up (3 month  follow up). Chronic Pain  Jaden Lyon RTC today to follow up on chronic pain diagnosis. We discussed her osteoarthritis that is affecting her bilateral hips, left shoulder and lumbar back. We discussed her seronegative rheumatoid arthritis affecting her bilateral hands. Significant changes since last visit: none. She is  able to do her normal daily activities. She reports the following adverse side effects: none. Previous treatments include heat, cold, Lidocaine patches otc, Tramadol, methotrexate, cortisone injection which all provided minimal relief. Not a candidate for oral NSAIDS. She is followed by rheumatology. Study Result     EXAM:  XR SHOULDER LT AP/LAT MIN 2 V     INDICATION:   PMR; left shoulder pain and decreased ROM.     COMPARISON: None.     FINDINGS: Three views of the left shoulder demonstrate no fracture, dislocation  or other acute abnormality. DJD AC joint and chronic deformity tuberosity     IMPRESSION  IMPRESSION:  No acute abnormality. Bilateral hand xrays show osteoarthritis. Patient is a 80 y.o. female that comes today for follow up of Diabetes Mellitus Type 2. Patient does regularly monitor  their FSBG at home. The fasting plasma glucose (fpg) usually runs around 80-100s mg/L. does not rigorously follow a diabetic diet  Patients activity level: sedentery  there was no change in patient's weight. .  The patient has not experienced recent hypoglycemia however occasional feels \"low\". reports that she quit smoking about 2 years ago. Her smoking use included cigarettes. She has a 20.00 pack-year smoking history. she has never used smokeless tobacco.  Continues with peripheral neuropathy in both feet interfering with sleep. Has previously failed gabapentin due to nausea and vomiting.      Current Outpatient Medications   Medication Sig Dispense Refill    [START ON 5/13/2019] HYDROcodone-acetaminophen (NORCO)  mg tablet Take 1 Tab by mouth every eight (8) hours as needed for Pain for up to 30 days. Max Daily Amount: 3 Tabs. 90 Tab 0    dulaglutide (TRULICITY) 1.5 PH/1.3 mL sub-q pen 0.5 mL by SubCUTAneous route every seven (7) days. 12 Syringe 3    umeclidinium-vilanterol (ANORO ELLIPTA) 62.5-25 mcg/actuation inhaler Take 1 Puff by inhalation daily. 3 Inhaler 1    metFORMIN ER (GLUCOPHAGE XR) 500 mg tablet Take 4 Tabs by mouth daily (with dinner). Dose increase 360 Tab 5    pregabalin (LYRICA) 150 mg capsule Take 1 Cap by mouth three (3) times daily. Max Daily Amount: 450 mg. 90 Cap 3    methotrexate (RHEUMATREX) 2.5 mg tablet Take 8 Tabs by mouth every Sunday. 96 Tab 0    ergocalciferol (ERGOCALCIFEROL) 50,000 unit capsule Take 1 Cap by mouth every seven (7) days. 12 Cap 3    folic acid (FOLVITE) 1 mg tablet Take 1 Tab by mouth daily. 90 Tab 0    Blood Glucose Control High&Low (ACCU-CHEK JENNIE CONTROL SOLN) soln Use as directed for controls as needed. DX: E11.9 1 Bottle 6    Blood-Glucose Meter (ACCU-CHEK JENNIE PLUS METER) misc Test blood sugar twice daily. DX: E11.9. Substitute supply brand accepted by insurance. 1 Each 0    Lancets (ACCU-CHEK SOFTCLIX LANCETS) misc Test blood sugar twice daily. DX: E11.9. Substitute supply brand accepted by insurance. 1 Each 11    glucose blood VI test strips (ACCU-CHEK JENNIE PLUS TEST STRP) strip Test blood sugar twice daily. DX: E11.9. Substitute supply brand accepted by insurance. 100 Strip 11    alcohol swabs (ALCOHOL WIPES) padm Test blood sugar twice daily. DX: E11.9. Substitute supply brand accepted by insurance. 100 Pad 1    nicotine (NICODERM CQ) 21 mg/24 hr       potassium chloride (KLOR-CON) 10 mEq tablet Take 1 Tab by mouth daily. 30 Tab 5    atorvastatin (LIPITOR) 20 mg tablet Take 1 Tab by mouth nightly.  90 Tab 3    losartan-hydroCHLOROthiazide (HYZAAR) 100-12.5 mg per tablet TAKE 1 TABLET DAILY 90 Tab 3    albuterol (PROVENTIL VENTOLIN) 2.5 mg /3 mL (0.083 %) nebulizer solution 3 mL by Nebulization route every six (6) hours as needed for Wheezing. 60 Each 2    albuterol (PROVENTIL HFA, VENTOLIN HFA, PROAIR HFA) 90 mcg/actuation inhaler Take 2 Puffs by inhalation every six (6) hours as needed for Wheezing. 1 Inhaler 1    Cetirizine (ZYRTEC) 10 mg Cap Take  by mouth.  aspirin 81 mg chewable tablet Take 81 mg by mouth daily.  MULTIVITS W-FE,OTHER MIN (CENTRUM PO) Take  by mouth daily.  diltiazem (TIAZAC) 420 mg SR capsule TAKE 1 CAPSULE EVERY DAY 90 Cap 3       Allergies   Allergen Reactions    Bactrim [Sulfamethoxazole-Trimethoprim] Nausea Only    Biaxin [Clarithromycin] Nausea Only    Demerol [Meperidine] Itching    Erythromycin Nausea Only    Gabapentin Nausea and Vomiting    Pcn [Penicillins] Hives    Percocet [Oxycodone-Acetaminophen] Itching    Pravastatin Nausea Only    Tetracycline Nausea Only    Voltaren [Diclofenac Sodium] Hives       ROS:   Review of Systems   Constitutional: Positive for malaise/fatigue. Eyes: Negative for blurred vision. Respiratory: Negative for shortness of breath. Cardiovascular: Negative for chest pain. Musculoskeletal: Positive for back pain, joint pain and myalgias. Objective:     Visit Vitals  /76 (BP 1 Location: Right arm, BP Patient Position: Sitting)   Pulse 69   Temp 98.6 °F (37 °C) (Oral)   Resp 18   Ht 5' 4\" (1.626 m)   Wt 229 lb 3.2 oz (104 kg)   SpO2 97%   BMI 39.34 kg/m²       Vitals and Nurse Documentation reviewed. Physical Exam   Constitutional: She appears unhealthy. No distress. Cardiovascular: S1 normal and S2 normal. Exam reveals no gallop and no friction rub. No murmur heard. Pulmonary/Chest: No respiratory distress. She has wheezes. She has rhonchi. Skin: Skin is warm and dry.    Psychiatric: Mood and affect normal.

## 2019-03-13 NOTE — PATIENT INSTRUCTIONS
Chronic Obstructive Pulmonary Disease (COPD): Care Instructions  Your Care Instructions    Chronic obstructive pulmonary disease (COPD) is a general term for a group of lung diseases, including emphysema and chronic bronchitis. People with COPD have decreased airflow in and out of the lungs, which makes it hard to breathe. The airways also can get clogged with thick mucus. Cigarette smoking is a major cause of COPD. Although there is no cure for COPD, you can slow its progress. Following your treatment plan and taking care of yourself can help you feel better and live longer. Follow-up care is a key part of your treatment and safety. Be sure to make and go to all appointments, and call your doctor if you are having problems. It's also a good idea to know your test results and keep a list of the medicines you take. How can you care for yourself at home?   Staying healthy    · Do not smoke. This is the most important step you can take to prevent more damage to your lungs. If you need help quitting, talk to your doctor about stop-smoking programs and medicines. These can increase your chances of quitting for good.     · Avoid colds and flu. Get a pneumococcal vaccine shot. If you have had one before, ask your doctor whether you need a second dose. Get the flu vaccine every fall. If you must be around people with colds or the flu, wash your hands often.     · Avoid secondhand smoke, air pollution, and high altitudes. Also avoid cold, dry air and hot, humid air. Stay at home with your windows closed when air pollution is bad.    Medicines and oxygen therapy    · Take your medicines exactly as prescribed. Call your doctor if you think you are having a problem with your medicine.     · You may be taking medicines such as:  ? Bronchodilators. These help open your airways and make breathing easier. Bronchodilators are either short-acting (work for 6 to 9 hours) or long-acting (work for 24 hours).  You inhale most bronchodilators, so they start to act quickly. Always carry your quick-relief inhaler with you in case you need it while you are away from home. ? Corticosteroids (prednisone, budesonide). These reduce airway inflammation. They come in pill or inhaled form. You must take these medicines every day for them to work well.     · A spacer may help you get more inhaled medicine to your lungs. Ask your doctor or pharmacist if a spacer is right for you. If it is, ask how to use it properly.     · Do not take any vitamins, over-the-counter medicine, or herbal products without talking to your doctor first.     · If your doctor prescribed antibiotics, take them as directed. Do not stop taking them just because you feel better. You need to take the full course of antibiotics.     · Oxygen therapy boosts the amount of oxygen in your blood and helps you breathe easier. Use the flow rate your doctor has recommended, and do not change it without talking to your doctor first.   Activity    · Get regular exercise. Walking is an easy way to get exercise. Start out slowly, and walk a little more each day.     · Pay attention to your breathing. You are exercising too hard if you cannot talk while you are exercising.     · Take short rest breaks when doing household chores and other activities.     · Learn breathing methods--such as breathing through pursed lips--to help you become less short of breath.     · If your doctor has not set you up with a pulmonary rehabilitation program, talk to him or her about whether rehab is right for you. Rehab includes exercise programs, education about your disease and how to manage it, help with diet and other changes, and emotional support. Diet    · Eat regular, healthy meals. Use bronchodilators about 1 hour before you eat to make it easier to eat. Eat several small meals instead of three large ones.  Drink beverages at the end of the meal. Avoid foods that are hard to chew.     · Eat foods that contain protein so that you do not lose muscle mass.     · Talk with your doctor if you gain too much weight or if you lose weight without trying.    Mental health    · Talk to your family, friends, or a therapist about your feelings. It is normal to feel frightened, angry, hopeless, helpless, and even guilty. Talking openly about bad feelings can help you cope. If these feelings last, talk to your doctor. When should you call for help? Call 911 anytime you think you may need emergency care. For example, call if:    · You have severe trouble breathing.    Call your doctor now or seek immediate medical care if:    · You have new or worse trouble breathing.     · You cough up blood.     · You have a fever.    Watch closely for changes in your health, and be sure to contact your doctor if:    · You cough more deeply or more often, especially if you notice more mucus or a change in the color of your mucus.     · You have new or worse swelling in your legs or belly.     · You are not getting better as expected. Where can you learn more? Go to http://perry-liborio.info/. Colt Ramirez in the search box to learn more about \"Chronic Obstructive Pulmonary Disease (COPD): Care Instructions. \"  Current as of: September 5, 2018  Content Version: 11.9  © 0611-6502 [employer]. Care instructions adapted under license by [employer] (which disclaims liability or warranty for this information). If you have questions about a medical condition or this instruction, always ask your healthcare professional. [place] [address] any warranty or liability for your use of this information.

## 2019-03-13 NOTE — PROGRESS NOTES
Chief Complaint   Patient presents with    Follow-up     3 month  follow up     1. Have you been to the ER, urgent care clinic since your last visit? Hospitalized since your last visit? No    2. Have you seen or consulted any other health care providers outside of the [address] [place] [address] since your last visit? Include any pap smears or colon screening.  No

## 2019-03-14 RX ORDER — DILTIAZEM HYDROCHLORIDE 420 MG/1
CAPSULE, EXTENDED RELEASE ORAL
Qty: 90 CAP | Refills: 3 | Status: SHIPPED | OUTPATIENT
Start: 2019-03-14 | End: 2019-10-23 | Stop reason: SDUPTHER

## 2019-03-27 ENCOUNTER — HOSPITAL ENCOUNTER (OUTPATIENT)
Dept: CT IMAGING | Age: 69
Discharge: HOME | End: 2019-03-27
Attending: NURSE PRACTITIONER
Payer: MEDICARE

## 2019-03-27 DIAGNOSIS — F17.211 CIGARETTE NICOTINE DEPENDENCE IN REMISSION: ICD-10-CM

## 2019-03-27 DIAGNOSIS — Z12.2 ENCOUNTER FOR SCREENING FOR LUNG CANCER: ICD-10-CM

## 2019-03-27 PROCEDURE — G0297 LDCT FOR LUNG CA SCREEN: HCPCS

## 2019-03-29 ENCOUNTER — TELEPHONE (OUTPATIENT)
Dept: FAMILY MEDICINE CLINIC | Age: 69
End: 2019-03-29

## 2019-04-15 ENCOUNTER — TELEPHONE (OUTPATIENT)
Dept: FAMILY MEDICINE CLINIC | Age: 69
End: 2019-04-15

## 2019-04-15 NOTE — TELEPHONE ENCOUNTER
Pharmacy Note:  Blood Sugar Follow-up    S/O: Placed an outgoing call to patient (2 identifiers used) to follow-up on SMBG readings after patient's glipizide was stopped and Trulicity was increased last month. Patient states that she was not able to start the higher dose of Trulicity due to cost, said it was going to be $636, but plans on getting it this week despite the cost.      Patient is currently taking the following for diabetes: Trulicity 3.76 mg weekly and metformin  mg 4 tablets daily. Patient reports that her hypoglycemic episodes have stopped. She continues to check fasting and sometimes afternoon sugars. She attributes her lower values due to working on her diet. Fastin- nothing over 120, today was 108  Afternoons: usually not over 120 but did have 1 random of 145. Medication costs continue to be an issue for patient, in addition to new dose of trulicity, patient has not started the [employer]. Lab Results   Component Value Date/Time    Hemoglobin A1c 11.7 (H) 2017 10:03 AM    Hemoglobin A1c (POC) 7.5 2018 11:05 AM       A/P:    Diabetes: Self-reported sugars are within goal and patient is no longer having hypoglycemia. Will confirm with [facility] that patient should increase her Trulicity and will call patient back. Also discussed with patient that she should order her OTC meds through [facility] as she has a $75 allowance for OTC meds every 3 months that she can use which would allow her to have more money to spend on her meds. Aleksandar Ferris, PharmD, BCACP    Reviewed with patient signs/sx/treatment of hypoglycemia. Patient verbalized understanding.     Will follow up with patient by phone next week

## 2019-05-07 ENCOUNTER — OFFICE VISIT (OUTPATIENT)
Dept: FAMILY MEDICINE CLINIC | Age: 69
End: 2019-05-07

## 2019-05-07 VITALS
DIASTOLIC BLOOD PRESSURE: 69 MMHG | OXYGEN SATURATION: 97 % | BODY MASS INDEX: 3.76 KG/M2 | HEIGHT: 64 IN | TEMPERATURE: 98 F | SYSTOLIC BLOOD PRESSURE: 143 MMHG | RESPIRATION RATE: 18 BRPM | HEART RATE: 76 BPM | WEIGHT: 22 LBS

## 2019-05-07 DIAGNOSIS — R91.1 LUNG NODULE SEEN ON IMAGING STUDY: ICD-10-CM

## 2019-05-07 DIAGNOSIS — I25.10 CORONARY ARTERY DISEASE INVOLVING NATIVE CORONARY ARTERY OF NATIVE HEART WITHOUT ANGINA PECTORIS: ICD-10-CM

## 2019-05-07 DIAGNOSIS — L72.3 SEBACEOUS CYST: Primary | ICD-10-CM

## 2019-05-07 NOTE — PATIENT INSTRUCTIONS
[facility]    Dr. Gordo Solis  [address] [place] [address]

## 2019-05-07 NOTE — PROGRESS NOTES
Assessment/Plan:     Diagnoses and all orders for this visit:    1. Sebaceous cyst  -     REFERRAL TO DERMATOLOGY  Due to size we are unable to remove in office today. Follow-up with dermatology for excision. 2. Lung nodule seen on imaging study  We will plan to repeat test in 12 months. 3. Coronary artery disease involving native coronary artery of native heart without angina pectoris    Follow-up with cardiology to discuss recent results of lung screening which showed severe coronary artery disease. Follow-up and Dispositions    · Return if symptoms worsen or fail to improve. Discussed expected course/resolution/complications of diagnosis in detail with patient.    Medication risks/benefits/costs/interactions/alternatives discussed with patient.    Pt was given after visit summary which includes diagnoses, current medications & vitals. Pt expressed understanding with the diagnosis and plan          Subjective:      Anna Arreguin is a 80 y.o. female who presents for had concerns including Skin Problem (on back). Presents for an abnormal mass on the back which she would like evaluated today. Symptoms have been present for several weeks worsening over time. There is some mild associated pain. She reports a similar episode 2 years ago which was resolved with incision and drainage. She is not currently followed by dermatology. She has not attempted OTC treatment. This is her first evaluation. Current Outpatient Medications   Medication Sig Dispense Refill    diltiazem (TIAZAC) 420 mg SR capsule TAKE 1 CAPSULE EVERY DAY 90 Cap 3    [START ON 5/13/2019] HYDROcodone-acetaminophen (NORCO)  mg tablet Take 1 Tab by mouth every eight (8) hours as needed for Pain for up to 30 days. Max Daily Amount: 3 Tabs. 90 Tab 0    dulaglutide (TRULICITY) 1.5 XQ/3.0 mL sub-q pen 0.5 mL by SubCUTAneous route every seven (7) days.  (Patient taking differently: 0.75 mg by SubCUTAneous route every seven (7) days.) 12 Syringe 3    umeclidinium-vilanterol (ANORO ELLIPTA) 62.5-25 mcg/actuation inhaler Take 1 Puff by inhalation daily. 3 Inhaler 1    metFORMIN ER (GLUCOPHAGE XR) 500 mg tablet Take 4 Tabs by mouth daily (with dinner). Dose increase 360 Tab 5    pregabalin (LYRICA) 150 mg capsule Take 1 Cap by mouth three (3) times daily. Max Daily Amount: 450 mg. 90 Cap 3    methotrexate (RHEUMATREX) 2.5 mg tablet Take 8 Tabs by mouth every Sunday. 96 Tab 0    ergocalciferol (ERGOCALCIFEROL) 50,000 unit capsule Take 1 Cap by mouth every seven (7) days. 12 Cap 3    folic acid (FOLVITE) 1 mg tablet Take 1 Tab by mouth daily. 90 Tab 0    Blood Glucose Control High&Low (ACCU-CHEK JENNIE CONTROL SOLN) soln Use as directed for controls as needed. DX: E11.9 1 Bottle 6    Blood-Glucose Meter (ACCU-CHEK JENNIE PLUS METER) misc Test blood sugar twice daily. DX: E11.9. Substitute supply brand accepted by insurance. 1 Each 0    Lancets (ACCU-CHEK SOFTCLIX LANCETS) misc Test blood sugar twice daily. DX: E11.9. Substitute supply brand accepted by insurance. 1 Each 11    glucose blood VI test strips (ACCU-CHEK JENNIE PLUS TEST STRP) strip Test blood sugar twice daily. DX: E11.9. Substitute supply brand accepted by insurance. 100 Strip 11    alcohol swabs (ALCOHOL WIPES) padm Test blood sugar twice daily. DX: E11.9. Substitute supply brand accepted by insurance. 100 Pad 1    nicotine (NICODERM CQ) 21 mg/24 hr 1 Patch by TransDERmal route every twenty-four (24) hours.  potassium chloride (KLOR-CON) 10 mEq tablet Take 1 Tab by mouth daily. 30 Tab 5    atorvastatin (LIPITOR) 20 mg tablet Take 1 Tab by mouth nightly. 90 Tab 3    losartan-hydroCHLOROthiazide (HYZAAR) 100-12.5 mg per tablet TAKE 1 TABLET DAILY 90 Tab 3    albuterol (PROVENTIL VENTOLIN) 2.5 mg /3 mL (0.083 %) nebulizer solution 3 mL by Nebulization route every six (6) hours as needed for Wheezing.  60 Each 2    albuterol (PROVENTIL HFA, VENTOLIN HFA, PROAIR HFA) 90 mcg/actuation inhaler Take 2 Puffs by inhalation every six (6) hours as needed for Wheezing. 1 Inhaler 1    Cetirizine (ZYRTEC) 10 mg Cap Take 10 mg by mouth daily.  aspirin 81 mg chewable tablet Take 81 mg by mouth daily.  MULTIVITS W-FE,OTHER MIN (CENTRUM PO) Take 1 Tab by mouth daily. Allergies   Allergen Reactions    Bactrim [Sulfamethoxazole-Trimethoprim] Nausea Only    Biaxin [Clarithromycin] Nausea Only    Demerol [Meperidine] Itching    Erythromycin Nausea Only    Gabapentin Nausea and Vomiting    Pcn [Penicillins] Hives    Percocet [Oxycodone-Acetaminophen] Itching    Pravastatin Nausea Only    Tetracycline Nausea Only    Voltaren [Diclofenac Sodium] Hives       ROS:   Review of Systems   Constitutional: Negative for malaise/fatigue. Eyes: Negative for blurred vision. Respiratory: Negative for shortness of breath. Cardiovascular: Negative for chest pain. Objective:     Visit Vitals  /69   Pulse 76   Temp 98 °F (36.7 °C) (Oral)   Resp 18   Ht 5' 4\" (1.626 m)   Wt 22 lb (9.979 kg)   SpO2 97%   BMI 3.78 kg/m²       Vitals and Nurse Documentation reviewed. Physical Exam   Constitutional: She is well-developed, well-nourished, and in no distress. Skin: Skin is warm and dry.         Psychiatric: Mood and affect normal.

## 2019-05-07 NOTE — PROGRESS NOTES
Chief Complaint   Patient presents with    Skin Problem     on back     1. Have you been to the ER, urgent care clinic since your last visit? Hospitalized since your last visit? No    2. Have you seen or consulted any other health care providers outside of the [address] [place] [address] since your last visit? Include any pap smears or colon screening.  No

## 2019-06-10 DIAGNOSIS — G89.4 CHRONIC PAIN DISORDER: ICD-10-CM

## 2019-06-10 DIAGNOSIS — I10 ESSENTIAL HYPERTENSION WITH GOAL BLOOD PRESSURE LESS THAN 130/80: ICD-10-CM

## 2019-06-10 NOTE — TELEPHONE ENCOUNTER
Patient is requesting refill  . Requested Prescriptions     Pending Prescriptions Disp Refills    losartan-hydroCHLOROthiazide (HYZAAR) 100-12.5 mg per tablet 90 Tab 3     Sig: TAKE 1 TABLET DAILY    HYDROcodone-acetaminophen (NORCO)  mg tablet 90 Tab 0     Sig: Take 1 Tab by mouth every eight (8) hours as needed for Pain for up to 30 days. Max Daily Amount: 3 Tabs.      Requesting call back when Hydrocodone is ready for   LOV :  May 07, 2019 11:45 AM   Pharmacy [facility] verified

## 2019-06-11 RX ORDER — LOSARTAN POTASSIUM AND HYDROCHLOROTHIAZIDE 12.5; 1 MG/1; MG/1
TABLET ORAL
Qty: 90 TAB | Refills: 3 | Status: SHIPPED | OUTPATIENT
Start: 2019-06-11 | End: 2019-09-11 | Stop reason: ALTCHOICE

## 2019-06-11 RX ORDER — HYDROCODONE BITARTRATE AND ACETAMINOPHEN 10; 325 MG/1; MG/1
1 TABLET ORAL
Qty: 90 TAB | Refills: 0 | OUTPATIENT
Start: 2019-06-11 | End: 2019-07-11

## 2019-06-12 DIAGNOSIS — G89.4 CHRONIC PAIN DISORDER: ICD-10-CM

## 2019-06-12 RX ORDER — HYDROCODONE BITARTRATE AND ACETAMINOPHEN 10; 325 MG/1; MG/1
1 TABLET ORAL
Qty: 90 TAB | Refills: 0 | OUTPATIENT
Start: 2019-06-12 | End: 2019-07-12

## 2019-06-12 NOTE — TELEPHONE ENCOUNTER
Incoming call from patient. Patient states that she is almost out of her medication HYDROcodone-acetaminophen (NORCO)  mg tablet . Patient request refill of the medication so that script can be picked up on 6/14/2019. Patient states that she will be out on the 06/14/2019.

## 2019-06-14 NOTE — TELEPHONE ENCOUNTER
----- Message from Dawood Larios sent at 6/14/2019 11:48 AM EDT -----  Regarding: NP H[place]inger/ Telephone   Pt would like an update on if her medication refill for HYDROcodone-acetaminophen (NORCO)  mg tablet prescription is ready at the office. Best contact number is [phone].

## 2019-06-19 ENCOUNTER — OFFICE VISIT (OUTPATIENT)
Dept: FAMILY MEDICINE CLINIC | Age: 69
End: 2019-06-19

## 2019-06-19 ENCOUNTER — HOSPITAL ENCOUNTER (OUTPATIENT)
Dept: GENERAL RADIOLOGY | Age: 69
Discharge: HOME | End: 2019-06-19
Payer: MEDICARE

## 2019-06-19 ENCOUNTER — TELEPHONE (OUTPATIENT)
Dept: FAMILY MEDICINE CLINIC | Age: 69
End: 2019-06-19

## 2019-06-19 VITALS
OXYGEN SATURATION: 96 % | WEIGHT: 224 LBS | HEIGHT: 64 IN | SYSTOLIC BLOOD PRESSURE: 168 MMHG | DIASTOLIC BLOOD PRESSURE: 80 MMHG | HEART RATE: 75 BPM | TEMPERATURE: 98 F | BODY MASS INDEX: 38.24 KG/M2 | RESPIRATION RATE: 18 BRPM

## 2019-06-19 DIAGNOSIS — M54.6 CHRONIC MIDLINE THORACIC BACK PAIN: ICD-10-CM

## 2019-06-19 DIAGNOSIS — G89.29 CHRONIC MIDLINE THORACIC BACK PAIN: Primary | ICD-10-CM

## 2019-06-19 DIAGNOSIS — G89.4 CHRONIC PAIN DISORDER: ICD-10-CM

## 2019-06-19 DIAGNOSIS — M54.6 CHRONIC MIDLINE THORACIC BACK PAIN: Primary | ICD-10-CM

## 2019-06-19 DIAGNOSIS — G89.29 CHRONIC MIDLINE THORACIC BACK PAIN: ICD-10-CM

## 2019-06-19 DIAGNOSIS — E87.6 HYPOKALEMIA: ICD-10-CM

## 2019-06-19 DIAGNOSIS — J42 CHRONIC BRONCHITIS, UNSPECIFIED CHRONIC BRONCHITIS TYPE (HCC): ICD-10-CM

## 2019-06-19 PROCEDURE — 72072 X-RAY EXAM THORAC SPINE 3VWS: CPT

## 2019-06-19 RX ORDER — HYDROCODONE BITARTRATE AND ACETAMINOPHEN 10; 325 MG/1; MG/1
1 TABLET ORAL
Qty: 90 TAB | Refills: 0 | Status: SHIPPED | OUTPATIENT
Start: 2019-07-19 | End: 2019-06-19 | Stop reason: SDUPTHER

## 2019-06-19 RX ORDER — POTASSIUM CHLORIDE 750 MG/1
10 TABLET, EXTENDED RELEASE ORAL DAILY
Qty: 30 TAB | Refills: 5 | Status: SHIPPED | OUTPATIENT
Start: 2019-06-19 | End: 2020-01-06

## 2019-06-19 RX ORDER — HYDROCODONE BITARTRATE AND ACETAMINOPHEN 10; 325 MG/1; MG/1
1 TABLET ORAL
Qty: 90 TAB | Refills: 0 | Status: SHIPPED | OUTPATIENT
Start: 2019-08-19 | End: 2019-09-11 | Stop reason: SDUPTHER

## 2019-06-19 RX ORDER — HYDROCODONE BITARTRATE AND ACETAMINOPHEN 10; 325 MG/1; MG/1
1 TABLET ORAL
Qty: 90 TAB | Refills: 0 | Status: SHIPPED | OUTPATIENT
Start: 2019-06-19 | End: 2019-06-19 | Stop reason: SDUPTHER

## 2019-06-19 NOTE — PROGRESS NOTES
Chief Complaint   Patient presents with    Hypertension     follow up    Back Pain     1. Have you been to the ER, urgent care clinic since your last visit? Hospitalized since your last visit? No    2. Have you seen or consulted any other health care providers outside of the [address] [place] [address] since your last visit? Include any pap smears or colon screening.  No

## 2019-06-19 NOTE — TELEPHONE ENCOUNTER
Patient is calling stating that she returning a call back to the office.       Best callback:[phone]      LOV:  Tuesday, May 07, 2019

## 2019-06-19 NOTE — TELEPHONE ENCOUNTER
Spoke to patient informed her X-Ray was not clear she needs to come in and have it redone. Patient states she will be in later today.

## 2019-06-19 NOTE — PROGRESS NOTES
Assessment/Plan:     Diagnoses and all orders for this visit:    1. Chronic midline thoracic back pain  -     XR SPINE THORAC 3 V; Future  -     XR SPINE THORAC 3 V; Future    2. Hypokalemia  -     potassium chloride (KLOR-CON) 10 mEq tablet; Take 1 Tab by mouth daily. Stable. Refilled today. Continue current therapy. 3. Chronic pain disorder  -     HYDROcodone-acetaminophen (NORCO)  mg tablet; Take 1 Tab by mouth every eight (8) hours as needed for Pain for up to 30 days. Max Daily Amount: 3 Tabs. -     XR SPINE THORAC 3 V; Future  Xray pending. Treatment continued as previously prescribed. She continues with exacerbation. Consider PT if xray unremarkable. Continue with symptomatic care including heat and stretching. Encouraged some routine aerobic exercise.  reviewed and appropriate. 3 month supply given today. 4. Chronic bronchitis, unspecified chronic bronchitis type [facility])    She will follow up with pulmonology for overdue evaluation. She will follow up with cardiologist as well. Follow-up and Dispositions    · Return in about 3 months (around 9/19/2019) for Follow Up. Discussed expected course/resolution/complications of diagnosis in detail with patient.    Medication risks/benefits/costs/interactions/alternatives discussed with patient.    Pt was given after visit summary which includes diagnoses, current medications & vitals. Pt expressed understanding with the diagnosis and plan          Subjective:      Mara Demarco is a 80 y.o. female who presents for had concerns including Hypertension (follow up) and Back Pain. Chronic Pain  Mara Demarco RTC today to follow up on chronic pain diagnosis. We discussed her osteoarthritis that is affecting her bilateral hips, left shoulder and lumbar back. We discussed her seronegative rheumatoid arthritis affecting her bilateral hands. Significant changes since last visit: none.   She is  able to do her normal daily activities. She reports the following adverse side effects: none. Previous treatments include heat, cold, Lidocaine patches otc, Tramadol, methotrexate, cortisone injection which all provided minimal relief. Not a candidate for oral NSAIDS. She is followed by rheumatology. Study Result     EXAM:  XR SHOULDER LT AP/LAT MIN 2 V     INDICATION:   PMR; left shoulder pain and decreased ROM.     COMPARISON: None.     FINDINGS: Three views of the left shoulder demonstrate no fracture, dislocation  or other acute abnormality. DJD AC joint and chronic deformity tuberosity     IMPRESSION  IMPRESSION:  No acute abnormality. Bilateral hand xrays show osteoarthritis. Current Outpatient Medications   Medication Sig Dispense Refill    potassium chloride (KLOR-CON) 10 mEq tablet Take 1 Tab by mouth daily. 30 Tab 5    [START ON 8/19/2019] HYDROcodone-acetaminophen (NORCO)  mg tablet Take 1 Tab by mouth every eight (8) hours as needed for Pain for up to 30 days. Max Daily Amount: 3 Tabs. 90 Tab 0    losartan-hydroCHLOROthiazide (HYZAAR) 100-12.5 mg per tablet TAKE 1 TABLET DAILY 90 Tab 3    atorvastatin (LIPITOR) 20 mg tablet Take 1 Tab by mouth daily. NEEDS APPT ASAP 90 Tab 0    diltiazem (TIAZAC) 420 mg SR capsule TAKE 1 CAPSULE EVERY DAY 90 Cap 3    dulaglutide (TRULICITY) 1.5 GK/1.9 mL sub-q pen 0.5 mL by SubCUTAneous route every seven (7) days. (Patient taking differently: 0.75 mg by SubCUTAneous route every seven (7) days.) 12 Syringe 3    umeclidinium-vilanterol (ANORO ELLIPTA) 62.5-25 mcg/actuation inhaler Take 1 Puff by inhalation daily. 3 Inhaler 1    metFORMIN ER (GLUCOPHAGE XR) 500 mg tablet Take 4 Tabs by mouth daily (with dinner). Dose increase 360 Tab 5    pregabalin (LYRICA) 150 mg capsule Take 1 Cap by mouth three (3) times daily. Max Daily Amount: 450 mg. 90 Cap 3    methotrexate (RHEUMATREX) 2.5 mg tablet Take 8 Tabs by mouth every Sunday.  96 Tab 0    ergocalciferol (ERGOCALCIFEROL) 50,000 unit capsule Take 1 Cap by mouth every seven (7) days. 12 Cap 3    folic acid (FOLVITE) 1 mg tablet Take 1 Tab by mouth daily. 90 Tab 0    Blood Glucose Control High&Low (ACCU-CHEK JENNIE CONTROL SOLN) soln Use as directed for controls as needed. DX: E11.9 1 Bottle 6    Blood-Glucose Meter (ACCU-CHEK JENNIE PLUS METER) misc Test blood sugar twice daily. DX: E11.9. Substitute supply brand accepted by insurance. 1 Each 0    Lancets (ACCU-CHEK SOFTCLIX LANCETS) misc Test blood sugar twice daily. DX: E11.9. Substitute supply brand accepted by insurance. 1 Each 11    glucose blood VI test strips (ACCU-CHEK JENNIE PLUS TEST STRP) strip Test blood sugar twice daily. DX: E11.9. Substitute supply brand accepted by insurance. 100 Strip 11    alcohol swabs (ALCOHOL WIPES) padm Test blood sugar twice daily. DX: E11.9. Substitute supply brand accepted by insurance. 100 Pad 1    nicotine (NICODERM CQ) 21 mg/24 hr 1 Patch by TransDERmal route every twenty-four (24) hours.  albuterol (PROVENTIL VENTOLIN) 2.5 mg /3 mL (0.083 %) nebulizer solution 3 mL by Nebulization route every six (6) hours as needed for Wheezing. 60 Each 2    albuterol (PROVENTIL HFA, VENTOLIN HFA, PROAIR HFA) 90 mcg/actuation inhaler Take 2 Puffs by inhalation every six (6) hours as needed for Wheezing. 1 Inhaler 1    Cetirizine (ZYRTEC) 10 mg Cap Take 10 mg by mouth daily.  aspirin 81 mg chewable tablet Take 81 mg by mouth daily.  MULTIVITS W-FE,OTHER MIN (CENTRUM PO) Take 1 Tab by mouth daily.          Allergies   Allergen Reactions    Bactrim [Sulfamethoxazole-Trimethoprim] Nausea Only    Biaxin [Clarithromycin] Nausea Only    Demerol [Meperidine] Itching    Erythromycin Nausea Only    Gabapentin Nausea and Vomiting    Pcn [Penicillins] Hives    Percocet [Oxycodone-Acetaminophen] Itching    Pravastatin Nausea Only    Tetracycline Nausea Only    Voltaren [Diclofenac Sodium] Hives       ROS:   Review of Systems   Constitutional: Negative for malaise/fatigue. Eyes: Negative for blurred vision. Respiratory: Negative for shortness of breath. Cardiovascular: Negative for chest pain. Musculoskeletal: Positive for back pain. Objective:     Visit Vitals  /80 (BP 1 Location: Right arm, BP Patient Position: Sitting) Comment: manaul   Pulse 75   Temp 98 °F (36.7 °C) (Oral)   Resp 18   Ht 5' 4\" (1.626 m)   Wt 224 lb (101.6 kg)   SpO2 96%   BMI 38.45 kg/m²       Vitals and Nurse Documentation reviewed. Physical Exam   Constitutional: No distress. HENT:   Right Ear: Tympanic membrane is not erythematous and not bulging. No middle ear effusion. Left Ear: Tympanic membrane is not erythematous and not bulging. No middle ear effusion. Nose: No rhinorrhea. Right sinus exhibits no maxillary sinus tenderness and no frontal sinus tenderness. Left sinus exhibits no maxillary sinus tenderness and no frontal sinus tenderness. Mouth/Throat: No oropharyngeal exudate or posterior oropharyngeal erythema. Eyes: EOM and lids are normal.   Cardiovascular: S1 normal and S2 normal. Exam reveals no gallop and no friction rub. No murmur heard. Pulmonary/Chest: She has wheezes. Musculoskeletal:        Thoracic back: She exhibits decreased range of motion, pain and spasm. She exhibits no deformity. Lymphadenopathy:     She has no cervical adenopathy. Skin: Skin is warm and dry.    Psychiatric: Mood and affect normal.

## 2019-06-21 DIAGNOSIS — M54.6 CHRONIC MIDLINE THORACIC BACK PAIN: Primary | ICD-10-CM

## 2019-06-21 DIAGNOSIS — G89.29 CHRONIC MIDLINE THORACIC BACK PAIN: Primary | ICD-10-CM

## 2019-06-21 NOTE — PROGRESS NOTES
Incoming call from patient. Date of birth verified. Patient made aware of x ray results. Patient verbalized understanding. Patient is agreeable to physical therapy. Patient would like to go to [address] [place] [address].

## 2019-07-01 ENCOUNTER — TELEPHONE (OUTPATIENT)
Dept: FAMILY MEDICINE CLINIC | Age: 69
End: 2019-07-01

## 2019-07-01 RX ORDER — GLIPIZIDE 10 MG/1
10 TABLET, FILM COATED, EXTENDED RELEASE ORAL DAILY
COMMUNITY
End: 2019-11-20 | Stop reason: SDUPTHER

## 2019-07-01 NOTE — TELEPHONE ENCOUNTER
Placed an outgoing call to patient to discuss costs of medications. Patient states that currently the medications that are most expensive for her are:  Trulicity:  Patient has been out of for the last month  Anoro Ellipta:  Patient has not gotten filled  Lyrica: patient is using    She has not started using [address] to order her OTC medications. Due to the costs of the above medications, she has restarted using her glipizide XL 10 mg daily and spiriva. She has not checked he blood sugar recently, but states they have been running the 140-150's. Recommended the following:  · Use [facility] for OTC meds as she has a $75 allowance every quarter and all of her OTC medications are available  · Apply for patient assistance for the Trulicity and Anoro Ellipta since depending on patient's drug spend for the year she may qualify for assistance    I have printed off and mailed these documents for patient and highlighted what patient needs to fill out and return to office ([facility] applications, [address] [place] [address] patient assistance program, [facility] OTC product catalog/order form). Patient verbalized understanding of the above.     Mary Malcolm, PharmD, BCACP    Patient verbalizes understanding

## 2019-08-28 ENCOUNTER — TELEPHONE (OUTPATIENT)
Dept: FAMILY MEDICINE CLINIC | Age: 69
End: 2019-08-28

## 2019-08-28 NOTE — TELEPHONE ENCOUNTER
Called patient to inform that forms for medication assistance program have been signed and will be faxed to [phone].

## 2019-09-11 ENCOUNTER — OFFICE VISIT (OUTPATIENT)
Dept: FAMILY MEDICINE CLINIC | Age: 69
End: 2019-09-11

## 2019-09-11 VITALS
DIASTOLIC BLOOD PRESSURE: 72 MMHG | SYSTOLIC BLOOD PRESSURE: 141 MMHG | WEIGHT: 223.4 LBS | RESPIRATION RATE: 18 BRPM | TEMPERATURE: 98.3 F | HEIGHT: 64 IN | BODY MASS INDEX: 38.14 KG/M2 | HEART RATE: 73 BPM | OXYGEN SATURATION: 97 %

## 2019-09-11 DIAGNOSIS — M54.6 CHRONIC MIDLINE THORACIC BACK PAIN: ICD-10-CM

## 2019-09-11 DIAGNOSIS — G62.9 POLYNEUROPATHY: ICD-10-CM

## 2019-09-11 DIAGNOSIS — Z23 ENCOUNTER FOR IMMUNIZATION: ICD-10-CM

## 2019-09-11 DIAGNOSIS — Z51.81 MEDICATION MONITORING ENCOUNTER: ICD-10-CM

## 2019-09-11 DIAGNOSIS — Z00.00 MEDICARE ANNUAL WELLNESS VISIT, SUBSEQUENT: Primary | ICD-10-CM

## 2019-09-11 DIAGNOSIS — G89.4 CHRONIC PAIN DISORDER: ICD-10-CM

## 2019-09-11 DIAGNOSIS — I10 ESSENTIAL HYPERTENSION WITH GOAL BLOOD PRESSURE LESS THAN 130/80: ICD-10-CM

## 2019-09-11 DIAGNOSIS — E11.42 TYPE 2 DIABETES MELLITUS WITH DIABETIC POLYNEUROPATHY, WITHOUT LONG-TERM CURRENT USE OF INSULIN (HCC): ICD-10-CM

## 2019-09-11 DIAGNOSIS — E66.01 SEVERE OBESITY (BMI 35.0-39.9) WITH COMORBIDITY (HCC): ICD-10-CM

## 2019-09-11 DIAGNOSIS — J42 CHRONIC BRONCHITIS, UNSPECIFIED CHRONIC BRONCHITIS TYPE (HCC): ICD-10-CM

## 2019-09-11 DIAGNOSIS — G89.29 CHRONIC MIDLINE THORACIC BACK PAIN: ICD-10-CM

## 2019-09-11 RX ORDER — HYDROCODONE BITARTRATE AND ACETAMINOPHEN 10; 325 MG/1; MG/1
1 TABLET ORAL
Qty: 90 TAB | Refills: 0 | Status: SHIPPED | OUTPATIENT
Start: 2019-10-11 | End: 2019-09-11 | Stop reason: SDUPTHER

## 2019-09-11 RX ORDER — HYDROCODONE BITARTRATE AND ACETAMINOPHEN 10; 325 MG/1; MG/1
1 TABLET ORAL
Qty: 90 TAB | Refills: 0 | Status: SHIPPED | OUTPATIENT
Start: 2019-09-11 | End: 2019-09-11 | Stop reason: SDUPTHER

## 2019-09-11 RX ORDER — HYDROCODONE BITARTRATE AND ACETAMINOPHEN 10; 325 MG/1; MG/1
1 TABLET ORAL
Qty: 90 TAB | Refills: 0 | Status: SHIPPED | OUTPATIENT
Start: 2019-11-11 | End: 2019-12-13 | Stop reason: SDUPTHER

## 2019-09-11 RX ORDER — PREGABALIN 200 MG/1
200 CAPSULE ORAL 3 TIMES DAILY
Qty: 270 CAP | Refills: 1 | Status: SHIPPED | OUTPATIENT
Start: 2019-09-11 | End: 2020-04-06

## 2019-09-11 RX ORDER — LOSARTAN POTASSIUM AND HYDROCHLOROTHIAZIDE 25; 100 MG/1; MG/1
1 TABLET ORAL DAILY
Qty: 90 TAB | Refills: 3 | Status: SHIPPED | OUTPATIENT
Start: 2019-09-11 | End: 2020-12-01

## 2019-09-11 NOTE — PROGRESS NOTES
Chief Complaint   Patient presents with    Diabetes     3 mth f/u     1. Have you been to the ER, urgent care clinic since your last visit? Hospitalized since your last visit? No    2. Have you seen or consulted any other health care providers outside of the [address] [place] [address] since your last visit? Include any pap smears or colon screening.  Yes Where: Dr. Naima Balderrama SX: to [facility] on back 8/2019

## 2019-09-11 NOTE — PROGRESS NOTES
Assessment/Plan   Education and counseling provided:  Are appropriate based on today's review and evaluation    Diagnoses and all orders for this visit:    1. Medicare annual wellness visit, subsequent    2. Type 2 diabetes mellitus with diabetic polyneuropathy, without long-term current use of insulin (HCC)  -     METABOLIC PANEL, COMPREHENSIVE  -      DIABETES FOOT EXAM  -     HEMOGLOBIN A1C WITH EAG  -     LIPID PANEL  Unknown blood sugars recently. Labs pending. Continue current therapy. Trulicity pending due to cost issues. 3. Chronic midline thoracic back pain  -     PAIN MGMT PANEL W/REFL, UR    4. Chronic pain disorder  -     PAIN MGMT PANEL W/REFL, UR  -     HYDROcodone-acetaminophen (NORCO)  mg tablet; Take 1 Tab by mouth every eight (8) hours as needed for Pain for up to 30 days. Max Daily Amount: 3 Tabs. Stable on current therapy. Continue at this time. Continue with breakthrough pain from neuropathy. Refilled for three months. UDS and contract updated today.  reviewed and appropriate. 5. Medication monitoring encounter  -     PAIN MGMT PANEL W/REFL, UR    6. Severe obesity (BMI 35.0-39. 9) with comorbidity ([facility] [address]  I have reviewed/discussed the above normal BMI with the patient. I have recommended the following interventions: dietary management education, guidance, and counseling, encourage exercise, monitor weight and prescribed dietary intake. Given written instructions as well. 7. Polyneuropathy  -     pregabalin (LYRICA) 200 mg capsule; Take 1 Cap by mouth three (3) times daily. Max Daily Amount: 600 mg. Uncontrolled. Increase as above. 8. Essential hypertension with goal blood pressure less than 130/80  -     losartan-hydroCHLOROthiazide (HYZAAR) 100-25 mg per tablet; Take 1 Tab by mouth daily. Uncontrolled. Increase as above. Follow up in three months.      9. Encounter for immunization  -     ADMIN INFLUENZA VIRUS VAC  -     INFLUENZA VIRUS VACCINE, HIGH DOSE SEASONAL, PRESERVATIVE FREE  -     PNEUMOCOCCAL CONJ VACCINE 13 VALENT IM    10. Chronic bronchitis, unspecified chronic bronchitis type ([facility] [address]  Will work on getting medications approved to restart inhalers. Health Maintenance Due   Topic Date Due    Pneumococcal 65+ years (1 of 2 - PCV13) 07/04/2016    LIPID PANEL Q1  11/09/2018    MEDICARE YEARLY EXAM  03/27/2019    HEMOGLOBIN A1C Q6M  06/18/2019    Influenza Age 9 to Adult  08/01/2019    FOOT EXAM Q1  09/11/2019    BREAST CANCER SCRN MAMMOGRAM  10/06/2019           SUBSEQUENT MEDICARE WELLNESS  This is the Subsequent Medicare Annual Wellness Exam, performed 12 months or more after the Initial AWV or the last Subsequent AWV    I have reviewed the patient's medical history in detail and updated the computerized patient record. Chronic Pain  Manuel Palermo RTC today to follow up on chronic pain diagnosis. We discussed her osteoarthritis that is affecting her bilateral hips, left shoulder and lumbar back. We discussed her seronegative rheumatoid arthritis affecting her bilateral hands. Significant changes since last visit: none. She is  able to do her normal daily activities. She reports the following adverse side effects: none. Previous treatments include heat, cold, Lidocaine patches otc, Tramadol, methotrexate, cortisone injection which all provided minimal relief. Not a candidate for oral NSAIDS. She is followed by rheumatology.      Study Result      EXAM:  XR SHOULDER LT AP/LAT MIN 2 V     INDICATION:   PMR; left shoulder pain and decreased ROM.     COMPARISON: None.     FINDINGS: Three views of the left shoulder demonstrate no fracture, dislocation  or other acute abnormality. DJD AC joint and chronic deformity tuberosity     IMPRESSION  IMPRESSION:  No acute abnormality.         Bilateral hand xrays show osteoarthritis. Diabetes  Patient is a 80 y.o. female that comes today for follow up of Diabetes Mellitus Type 2. Patient does not regularly monitor  their FSBG at home. The fasting plasma glucose (fpg) is unknown. does not rigorously follow a diabetic diet  Patients activity level: sedentery  there was 10 pounds lost.  The patient has not experienced recent hypoglycemia. reports that she quit smoking about 2 years ago. Her smoking use included cigarettes. She has a 20.00 pack-year smoking history. she has never used smokeless tobacco.  Continues with peripheral neuropathy in both feet interfering with sleep. Has previously failed gabapentin due to nausea and vomiting. Has had improvement with Lyrica although still having breakthrough symptoms. COPD  Patient complains of wheezing. Symptoms began 3 years ago. Symptoms chronic dyspnea: severity = moderate: course of sx: stable does worsen with exertion. Sputum is clear in small amounts. Patient uses 2 pillows at night. Patient can walk 100  feet before resting. Patient currently is not on home oxygen therapy. Yaron Haro Respiratory history: occasional episodes of bronchitis.   Is not currently on Anoro due to cost.     History     Past Medical History:   Diagnosis Date    Abnormal pulmonary function test 9/28/2016    AR (allergic rhinitis)     Asthma     Atrial thrombus 1994    left    Chronic bronchitis (HCC) 9/28/2016    Chronic pain     DDD (degenerative disc disease), lumbar     Diabetes ([address]     DJD (degenerative joint disease) of hip     right    Empty sella ([address] 2000    by MRI    Former smoker 10/24/2016    Hoarseness of voice     Hypercholesterolemia     Hypertension     Lesion of vocal cord     PMR (polymyalgia rheumatica) ([address] 5/18/2016    Smoker 12/5/2012      Past Surgical History:   Procedure Laterality Date    COLONOSCOPY N/A 5/30/2018    COLONOSCOPY performed by Jony Mancilla., MD at [facility]; HI RISK IND  5/30/2018         ENDOSCOPY, COLON, DIAGNOSTIC  10/03    polypectomy    ENDOSCOPY, COLON, DIAGNOSTIC 2/05    Dr. Andreas Krishnan, COLON, DIAGNOSTIC  2010, reported    due 2015    HX APPENDECTOMY  1970's    HX BREAST REDUCTION      HX COLONOSCOPY  2015    dr Sandrita Peres. 2 polyps removed -repeat in 2018     HX HEMORRHOIDECTOMY      HX HYSTERECTOMY      ovaries spared    HX KNEE REPLACEMENT  11/03, 10/05    left then right    HX TONSILLECTOMY  age 29    tonsils     Current Outpatient Medications   Medication Sig Dispense Refill    pregabalin (LYRICA) 200 mg capsule Take 1 Cap by mouth three (3) times daily. Max Daily Amount: 600 mg. 270 Cap 1    losartan-hydroCHLOROthiazide (HYZAAR) 100-25 mg per tablet Take 1 Tab by mouth daily. 90 Tab 3    [START ON 11/11/2019] HYDROcodone-acetaminophen (NORCO)  mg tablet Take 1 Tab by mouth every eight (8) hours as needed for Pain for up to 30 days. Max Daily Amount: 3 Tabs. 90 Tab 0    glipiZIDE SR (GLUCOTROL XL) 10 mg CR tablet Take 10 mg by mouth daily.  tiotropium (SPIRIVA WITH HANDIHALER) 18 mcg inhalation capsule Take 1 Cap by inhalation daily.  potassium chloride (KLOR-CON) 10 mEq tablet Take 1 Tab by mouth daily. 30 Tab 5    atorvastatin (LIPITOR) 20 mg tablet Take 1 Tab by mouth daily. NEEDS APPT ASAP 90 Tab 0    diltiazem (TIAZAC) 420 mg SR capsule TAKE 1 CAPSULE EVERY DAY 90 Cap 3    metFORMIN ER (GLUCOPHAGE XR) 500 mg tablet Take 4 Tabs by mouth daily (with dinner). Dose increase 360 Tab 5    methotrexate (RHEUMATREX) 2.5 mg tablet Take 8 Tabs by mouth every Sunday. 96 Tab 0    ergocalciferol (ERGOCALCIFEROL) 50,000 unit capsule Take 1 Cap by mouth every seven (7) days. 12 Cap 3    folic acid (FOLVITE) 1 mg tablet Take 1 Tab by mouth daily. 90 Tab 0    Blood Glucose Control High&Low (ACCU-CHEK JENNIE CONTROL SOLN) soln Use as directed for controls as needed. DX: E11.9 1 Bottle 6    Blood-Glucose Meter (ACCU-CHEK JENNIE PLUS METER) misc Test blood sugar twice daily. DX: E11.9. Substitute supply brand accepted by insurance.  1 Each 0  Lancets (ACCU-CHEK SOFTCLIX LANCETS) misc Test blood sugar twice daily. DX: E11.9. Substitute supply brand accepted by insurance. 1 Each 11    glucose blood VI test strips (ACCU-CHEK JENNIE PLUS TEST STRP) strip Test blood sugar twice daily. DX: E11.9. Substitute supply brand accepted by insurance. 100 Strip 11    alcohol swabs (ALCOHOL WIPES) padm Test blood sugar twice daily. DX: E11.9. Substitute supply brand accepted by insurance. 100 Pad 1    nicotine (NICODERM CQ) 21 mg/24 hr 1 Patch by TransDERmal route every twenty-four (24) hours.  albuterol (PROVENTIL VENTOLIN) 2.5 mg /3 mL (0.083 %) nebulizer solution 3 mL by Nebulization route every six (6) hours as needed for Wheezing. 60 Each 2    albuterol (PROVENTIL HFA, VENTOLIN HFA, PROAIR HFA) 90 mcg/actuation inhaler Take 2 Puffs by inhalation every six (6) hours as needed for Wheezing. 1 Inhaler 1    Cetirizine (ZYRTEC) 10 mg Cap Take 10 mg by mouth daily.  aspirin 81 mg chewable tablet Take 81 mg by mouth daily.  MULTIVITS W-FE,OTHER MIN (CENTRUM PO) Take 1 Tab by mouth daily.  dulaglutide (TRULICITY) 1.5 FT/6.3 mL sub-q pen 0.5 mL by SubCUTAneous route every seven (7) days. (Patient taking differently: 0.75 mg by SubCUTAneous route every seven (7) days.) 12 Syringe 3    umeclidinium-vilanterol (ANORO ELLIPTA) 62.5-25 mcg/actuation inhaler Take 1 Puff by inhalation daily.  3 Inhaler 1     Allergies   Allergen Reactions    Bactrim [Sulfamethoxazole-Trimethoprim] Nausea Only    Biaxin [Clarithromycin] Nausea Only    Demerol [Meperidine] Itching    Erythromycin Nausea Only    Gabapentin Nausea and Vomiting    Pcn [Penicillins] Hives    Percocet [Oxycodone-Acetaminophen] Itching    Pravastatin Nausea Only    Tetracycline Nausea Only    Voltaren [Diclofenac Sodium] Hives     Family History   Problem Relation Age of Onset    Arthritis-osteo Mother     Asthma Mother     Diabetes Mother     Hypertension Mother     Stroke Mother     Arthritis-osteo Father     Cancer Father     Elevated Lipids Father     Hypertension Father     Arthritis-osteo Sister     Asthma Sister     Diabetes Sister     Elevated Lipids Sister     Hypertension Sister     Heart Attack Sister     Hypertension Daughter     Hypertension Daughter     Hypertension Daughter     Anesth Problems Neg Hx      Social History     Tobacco Use    Smoking status: Former Smoker     Packs/day: 0.50     Years: 40.00     Pack years: 20.00     Types: Cigarettes     Last attempt to quit: 10/7/2016     Years since quittin.9    Smokeless tobacco: Never Used   Substance Use Topics    Alcohol use: No     Alcohol/week: 0.0 standard drinks     Patient Active Problem List   Diagnosis Code    Hypercholesterolemia E78.00    Osteoarthritis of hip M16.9    PMR [facility]polymyalgia rheumatica) ([facility]) M35.3    Chronic bronchitis ([facility] [address] J42    Former smoker Z87.891    Chronic pain G89.29    Long term (current) use of systemic steroids Z79.52    Type 2 diabetes mellitus with diabetic polyneuropathy, without long-term current use of insulin ([facility]) E11.42    Severe obesity (BMI 35.0-39. 9) with comorbidity ([facility] [address] E66.01    Essential hypertension I10    Seronegative rheumatoid arthritis of multiple sites ([facility] M06.09    MGUS (monoclonal gammopathy of unknown significance) D47.2    Vitamin D deficiency E55.9    Long-term use of immunosuppressant medication Z79.899    CAD (coronary artery disease) I25.10    Lung nodule seen on imaging study R91.1       Depression Risk Factor Screening:     3 most recent PHQ Screens 2019   Little interest or pleasure in doing things Not at all   Feeling down, depressed, irritable, or hopeless Not at all   Total Score PHQ 2 0     Alcohol Risk Factor Screening: You do not drink alcohol or very rarely. Functional Ability and Level of Safety:   Hearing Loss  Hearing is good.     Activities of Daily Living  The home contains: no safety equipment. Patient does total self care    Fall Risk  Fall Risk Assessment, last 12 mths 9/11/2019   Able to walk? Yes   Fall in past 12 months? No   Fall with injury? -   Number of falls in past 12 months -   Fall Risk Score -       Abuse Screen  Patient is not abused    Cognitive Screening   Evaluation of Cognitive Function:  Has your family/caregiver stated any concerns about your memory: no  Normal    Patient Care Team   Patient Care Team:  Rasheed Cervantes NP as PCP - General (Nurse Practitioner)  Sisi Mcgregor MD as Physician (Orthopedic Surgery)  Elena Humphrey MD (Pulmonary Disease)    Physical Exam   Constitutional: No distress. Obese. HENT:   Right Ear: Tympanic membrane is not erythematous and not bulging. No middle ear effusion. Left Ear: Tympanic membrane is not erythematous and not bulging. No middle ear effusion. Nose: No rhinorrhea. Right sinus exhibits no maxillary sinus tenderness and no frontal sinus tenderness. Left sinus exhibits no maxillary sinus tenderness and no frontal sinus tenderness. Mouth/Throat: No oropharyngeal exudate or posterior oropharyngeal erythema. Eyes: EOM and lids are normal.   Cardiovascular: S1 normal and S2 normal. Exam reveals no gallop and no friction rub. No murmur heard. Pulmonary/Chest: She has wheezes. Lymphadenopathy:     She has no cervical adenopathy. Skin: Skin is warm and dry.    Psychiatric: Mood and affect normal.

## 2019-09-11 NOTE — ACP (ADVANCE CARE PLANNING)
Advance Care Planning (ACP) Provider Conversation Snapshot    Date of ACP Conversation: 09/11/19  Persons included in Conversation:  patient  Length of ACP Conversation in minutes:  <16 minutes (Non-Billable)    Authorized Decision Maker (if patient is incapable of making informed decisions): This person is:   Healthcare Agent/Medical Power of  under Advance Directive            For Patients with Decision Making Capacity:   Values/Goals: Exploration of values, goals, and preferences if recovery is not expected, even with continued medical treatment in the event of:  Imminent death  Severe, permanent brain injury  \"In these circumstances, what matters most to you? \"  Care focused more on comfort or quality of life.     Conversation Outcomes / Follow-Up Plan:   Recommended communicating the plan and making copies for the healthcare agent, personal physician, and others as appropriate (e.g., health system)  Recommended review of completed ACP document annually or upon change in health status

## 2019-09-11 NOTE — LETTER
Li Lewis A:[birth year] MR #:[id number] [place] 17 Page 1 of 5 CONTROLLED SUBSTANCE AGREEMENT I may be prescribed medications that are controlled substances as part  of my treatment plan for management of my medical condition(s). The goal of my treatment plan is to maintain and/or improve my health and wellbeing. Because controlled substances have an increased risk of abuse or harm, continual re-evaluation is needed determine if the goals of my treatment plan are being met for my safety and the safety of others. Luis Antonio Campo  am entering into this Controlled Substance Agreement with my provider, Forrest Frankel NP   at [place] [address] . I understand that successful treatment requires mutual trust and honesty between me and my provider. I understand that there are state and federal laws and regulations which apply to the medications that my provider may prescribe that must be followed. I understand there are risks and benefits ts of taking the medicines that my provider may prescribe. I understand and agree that following this Agreement is necessary in continuing my provider-patient relationship and success of my treatment plan. As a part of my treatment plan, I agree to the following: COMMUNICATION: 
 
1. I will communicate fully with my provider about my medical condition(s), including the effect on my daily life and how well my medications are helping. I will tell my provider all of the medications that I take for any reason, including medications I receive from another health care provider, and will notify my provider about all issues, problems or concerns, including any side effects, which may be related to my medications. I understand that this information allows my provider to adjust my treatment plan to help manage my medical condition.  I understand that this information will become part of my permanent medical record. 2. I will notify my provider if I have a history of alcohol/drug misuse/addiction or if I have had treatment for alcohol/drug addiction in the past, or if I have a new problem with or concern about alcohol/drug use/addiction, because this increases the likelihood of high risk behaviors and may lead to serious medical conditions. 3. Females Only: I will notify my provider if I am or become pregnant, or if I intend to become pregnant, or if I intend to breastfeed. I understand that communication of these issues with my provider is important, due to possible effects my medication could have on an unborn fetus or breastfeeding child. Initials_____ Natacha Christopher OIX:0[birth year] MR #:[id number] [place] [address] Page 2 of 5 MISUSE OF MEDICATIONS / DRUGS: 
 
1. I agree to take all controlled substances as prescribed, and will not misuse or abuse any controlled substances prescribed by my provider. For my safety, I will not increase the amount of medicine I take without first talking with and getting permission from my provider. 2. If I have a medical emergency, another health care provider may prescribe me medication. If I seek emergency treatment, I will notify my provider within seventy-two (72) hours. 3. I understand that my provider may discuss my use and/or possible misuse/abuse of controlled substances and alcohol, as appropriate, with any health care provider involved in my care, pharmacist or legal authority. ILLEGAL DRUGS: 
 
1. I will not use illegal drugs of any kind, including but not limited to marijuana, heroin, cocaine, or any prescription drug which is not prescribed to me. DRUG DIVERSION / PRESCRIPTION FRAUD: 
 
1. I will not share, sell, trade, give away, or otherwise misuse my prescriptions or medications. 2. I will not alter any prescriptions provided to me by my provider. SINGLE PROVIDER: 
 
1. I agree that all controlled substances that I take will be prescribed only by my provider (or his/her covering provider) under this Agreement. This agreement does not prevent me from seeking emergency medical treatment or receiving pain management related to a surgery. PROTECTING MEDICATIONS: 
 
1. I am responsible for keeping my prescriptions and medications in a safe and secure place including safeguarding them from loss or theft. I understand that lost, stolen or damaged/destroyed prescriptions or medications will not be replaced. Initials____ Graciela Sousa RXJ:[birth year] MR #:[id number] [place]  Page 3 of 5 PRESCRIPTION RENEWALS/REFILLS: 
 
1. I will follow my controlled substance medication schedule as prescribed by my provider. 2. I understand and agree that I will make any requests for renewals or refills of my prescriptions only at the time of an office visit or during my providers regular office hours subject to the prescription refill requirements of the individual practice. 3. I understand that my provider may not call in prescriptions for controlled substances to my pharmacy. 4. I understand that my provider may adjust or discontinue these medications as deemed appropriate for my medical treatment plan. This Agreement does not guarantee the prescription of controlled medications. 5. I agree that if my medications are adjusted or discontinued, I will properly dispose of any remaining medications. I understand that I will be required to dispose of any remaining controlled medications prior to being provided with any prescriptions for other controlled medications. 6. I understand that the renewal of my prescription depends on my medical condition, my consistent participation, and my adherence with my treatment plan and this Agreement. 7. I understand that if I do not keep an appointment with my provider, I may not receive a renewal or refill for my controlled substance medication. PRESCRIPTION MONITORING / DRUG TESTIN. I understand that my provider may require me to provide urine, saliva or blood for testing at any time. I understand that this testing will be used to monitor for safety and adherence with my treatment plan and this Agreement. 2. I understand that my provider may ask me to provide an observed urine specimen, which means that a nurse or other health care provider may watch me provide urine, and I agree to cooperate if I am asked to provide an observed specimen. 3. I understand that if I do not provide urine, saliva or blood samples within two (2) hours of my providers request, or other timeframe decided by my provider, my treatment plan could be changed, or my prescriptions and medications may be changed or ended. 4. I understand that urine, saliva and blood test results will be a part of my permanent medical record. Initials_____ Genaro Printers UWW:3[birth year]30 MR #:[id number] [place] 17 Page 4 of 5 
 
5. I understand that my provider is required to obtain a copy of my State Prescription Monitoring Program () Report at any time in order to safely prescribe medications. 6. I will bring all of my prescribed controlled substance medications in their original bottles to all of my scheduled appointments. 7. I understand that my provider may ask me to come to the practice with all of my prescribed medications for a random pill count at any time. I agree to cooperate if I am asked to come in for a random pill count. I understand that if I do not arrive in the timeframe decided by my provider, my treatment plan could be changed, or my prescriptions and medications may be changed or ended.  
 
COOPERATION WITH INVESTIGATIONS: 
 
 1. I authorize my provider and my pharmacy to cooperate fully with any local, state, or federal law enforcement agency in the investigation of any possible misuse, sale, or other diversion of my controlled substance prescriptions or medications. RISKS: 
 
1. I understand that my level of consciousness may be affected from the use of controlled substances, and I understand that there are risks, benefits, effects and potential alternatives (including no treatment) to the medications that my provider has prescribed. 2. I understand that I may become drowsy, tired, dizzy, constipated, and sick to my stomach, or have changes in my mood or in my sleep while taking my medications. I have talked with my provider about these possible side effects, risks, benefits, and alternative treatments, and my provider has answered all of my questions. 3. I understand that I should not suddenly stop taking my medications without first speaking with my provider. I understand that if I suddenly stop taking my medications, I may experience nausea, vomiting, sweating,anxiety, sleeplessness, itching or other uncomfortable feelings. 4. I will not take my medications with alcohol of any kind, including beer, wine or liquor. I understand that drinking alcohol with my medications increases the chances of side effects, including breathing problems or even death. 5. I understand that if I have a history of alcoholism or other drug addiction I may be at increased risk of addiction to my medications. Signs of addiction might include craving, compulsive use, and continued use despite harm. Since addiction is a disease, I understand my provider may decide to change my medications and refer me to appropriate treatment services. I understand that this information would become part of my permanent medical record. Initials_____ Li Lewis IQS:0/0/7221 MR #:[id number] [place]   
 Page 5 of 5  
 
 
6. Females only: Children born to women who regularly take controlled substances are likely to have physical problems and suffer withdrawal symptoms at birth. If I am of child-bearing age, I understand that I should use safe and effective birth control while taking any controlled substances to avoid the impact of medications on an unborn fetus or  child. I agree to notify my provider immediately if I should become pregnant so that my treatment plan can be adjusted. 7. Males only: I understand that chronic use of controlled substances has been associated with low testosterone levels in males which may affect my mood, stamina, sexual desire, and general health. I understand that my provider may order the appropriate laboratory test to determine my testosterone level,and I agree to this testing. ADHERENCE: 
 
1. I understand that if I do not adhere to this Agreement in any way, my provider may change my prescriptions, stop prescribing controlled substances or end our provider-patient relationship. 2. If my provider decides to stop prescribing medication, or decides to end our provider-patient relationship,my provider may require that I taper my medications slowly. If necessary, my provider may also provide a prescription for other medications to treat my withdrawal symptoms. UNDERSTANDING THIS AGREEMENT: 
 
I understand that my provider may adjust or stop my prescriptions for controlled substances based on my medical condition and my treatment plan. I understand that this Agreement does not guarantee that I will be prescribed medications or controlled substances. I understand that controlled substances may be just one part 
of my treatment plan. My initial on each page and my signature below shows that I have read each page of this Agreement, I have had an opportunity to ask questions, and all of my questions have been answered to my satisfaction by my provider. By signing below, I agree to comply with this Agreement, and I understand that if I do not follow the Agreements listed above, my provider may stop 
 
_________________________________________  Date/Time 9/11/2019 1:01 PM   
             (Patient Signature) 
 
________________________________________    Date/Time 9/11/2019 1:01 PM 
 (Parent or Guardian Signature if <18 yrs) 
 
_________________________________________ Date/Time 9/11/2019 1:01 PM 
 (Provider Signature)

## 2019-09-16 LAB
ALBUMIN SERPL-MCNC: 4.3 G/DL (ref 3.6–4.8)
ALBUMIN/GLOB SERPL: 1.7 {RATIO} (ref 1.2–2.2)
ALP SERPL-CCNC: 85 IU/L (ref 39–117)
ALT SERPL-CCNC: 17 IU/L (ref 0–32)
AMPHETAMINES UR QL SCN: NEGATIVE NG/ML
AST SERPL-CCNC: 14 IU/L (ref 0–40)
BARBITURATES UR QL SCN: NEGATIVE NG/ML
BENZODIAZ UR QL SCN: NEGATIVE NG/ML
BILIRUB SERPL-MCNC: 0.2 MG/DL (ref 0–1.2)
BUN SERPL-MCNC: 22 MG/DL (ref 8–27)
BUN/CREAT SERPL: 26 (ref 12–28)
BZE UR QL SCN: NEGATIVE NG/ML
CALCIUM SERPL-MCNC: 9.9 MG/DL (ref 8.7–10.3)
CANNABINOIDS UR QL SCN: NEGATIVE NG/ML
CHLORIDE SERPL-SCNC: 102 MMOL/L (ref 96–106)
CHOLEST SERPL-MCNC: 205 MG/DL (ref 100–199)
CO2 SERPL-SCNC: 27 MMOL/L (ref 20–29)
CREAT SERPL-MCNC: 0.84 MG/DL (ref 0.57–1)
CREAT UR-MCNC: 170 MG/DL (ref 20–300)
EST. AVERAGE GLUCOSE BLD GHB EST-MCNC: 171 MG/DL
FENTANYL+NORFENTANYL UR QL SCN: NEGATIVE PG/ML
GLOBULIN SER CALC-MCNC: 2.6 G/DL (ref 1.5–4.5)
GLUCOSE SERPL-MCNC: 128 MG/DL (ref 65–99)
HBA1C MFR BLD: 7.6 % (ref 4.8–5.6)
HDLC SERPL-MCNC: 42 MG/DL
INTERPRETATION, 910389: NORMAL
LDLC SERPL CALC-MCNC: 114 MG/DL (ref 0–99)
MEPERIDINE UR QL: NEGATIVE NG/ML
METHADONE UR QL SCN: NEGATIVE NG/ML
OPIATES UR QL SCN: POSITIVE NG/ML
OXYCODONE+OXYMORPHONE UR QL SCN: NEGATIVE NG/ML
PCP UR QL: NEGATIVE NG/ML
PH UR: 5.3 [PH] (ref 4.5–8.9)
PLEASE NOTE:, 733157: ABNORMAL
POTASSIUM SERPL-SCNC: 3.9 MMOL/L (ref 3.5–5.2)
PROPOXYPH UR QL SCN: NEGATIVE NG/ML
PROT SERPL-MCNC: 6.9 G/DL (ref 6–8.5)
SODIUM SERPL-SCNC: 143 MMOL/L (ref 134–144)
SP GR UR: 1.02
TRAMADOL UR QL SCN: NEGATIVE NG/ML
TRIGL SERPL-MCNC: 243 MG/DL (ref 0–149)
VLDLC SERPL CALC-MCNC: 49 MG/DL (ref 5–40)

## 2019-09-17 NOTE — PROGRESS NOTES
Incoming call to patient. Date of birth verified. Patient made aware of lab results and recommendations. Patient verbalized understanding. Patient states that she has not been taking cholesterol medication routinely. She misses about 3 doses a week. Patient declined to increasing medication and would like to try to take cholesterol medication on a daily basis.

## 2019-10-17 DIAGNOSIS — I10 ESSENTIAL HYPERTENSION WITH GOAL BLOOD PRESSURE LESS THAN 130/80: ICD-10-CM

## 2019-10-18 RX ORDER — ATORVASTATIN CALCIUM 20 MG/1
20 TABLET, FILM COATED ORAL DAILY
Qty: 90 TAB | Refills: 3 | Status: SHIPPED | OUTPATIENT
Start: 2019-10-18 | End: 2020-12-07

## 2019-10-23 NOTE — TELEPHONE ENCOUNTER
Best # [phone]  LOV  09/11/19    Pt stated that she called it in to [facility] and did not received it and she is running out on meds just need some to be sent in to the pharmacy until it get mailed to her. Pt is calling to request a refill for the following Rx: . Joselatonya Blake Requested Prescriptions     Pending Prescriptions Disp Refills    diltiazem (TIAZAC) 420 mg SR capsule 90 Cap 3     Signed Prescriptions Disp Refills    atorvastatin (LIPITOR) 20 mg tablet 90 Tab 3     Sig: Take 1 Tab by mouth daily.      Authorizing Provider: Sari Chin [address], [address] [facility]

## 2019-10-28 RX ORDER — DILTIAZEM HYDROCHLORIDE 420 MG/1
CAPSULE, EXTENDED RELEASE ORAL
Qty: 90 CAP | Refills: 3 | Status: SHIPPED | OUTPATIENT
Start: 2019-10-28 | End: 2020-03-24

## 2019-11-19 ENCOUNTER — OFFICE VISIT (OUTPATIENT)
Dept: RHEUMATOLOGY | Age: 69
End: 2019-11-19

## 2019-11-19 VITALS
BODY MASS INDEX: 39.09 KG/M2 | WEIGHT: 229 LBS | SYSTOLIC BLOOD PRESSURE: 183 MMHG | TEMPERATURE: 98.1 F | HEIGHT: 64 IN | DIASTOLIC BLOOD PRESSURE: 80 MMHG | HEART RATE: 79 BPM | RESPIRATION RATE: 18 BRPM

## 2019-11-19 DIAGNOSIS — D47.2 MGUS (MONOCLONAL GAMMOPATHY OF UNKNOWN SIGNIFICANCE): ICD-10-CM

## 2019-11-19 DIAGNOSIS — E55.9 VITAMIN D DEFICIENCY: ICD-10-CM

## 2019-11-19 DIAGNOSIS — Z79.60 LONG-TERM USE OF IMMUNOSUPPRESSANT MEDICATION: ICD-10-CM

## 2019-11-19 DIAGNOSIS — M1A.09X1 IDIOPATHIC CHRONIC GOUT, MULTIPLE SITES, WITH TOPHUS (TOPHI): ICD-10-CM

## 2019-11-19 DIAGNOSIS — M06.09 SERONEGATIVE RHEUMATOID ARTHRITIS OF MULTIPLE SITES (HCC): Primary | ICD-10-CM

## 2019-11-19 DIAGNOSIS — M35.3 PMR (POLYMYALGIA RHEUMATICA) (HCC): ICD-10-CM

## 2019-11-19 RX ORDER — LEFLUNOMIDE 20 MG/1
20 TABLET ORAL DAILY
Qty: 90 TAB | Refills: 0 | Status: SHIPPED | OUTPATIENT
Start: 2019-11-19 | End: 2019-11-19

## 2019-11-19 RX ORDER — ALLOPURINOL 300 MG/1
300 TABLET ORAL DAILY
Qty: 90 TAB | Refills: 0 | Status: SHIPPED | OUTPATIENT
Start: 2019-11-19 | End: 2019-11-19

## 2019-11-19 RX ORDER — ALLOPURINOL 300 MG/1
300 TABLET ORAL DAILY
Qty: 90 TAB | Refills: 0 | Status: SHIPPED | OUTPATIENT
Start: 2019-11-19 | End: 2021-04-22

## 2019-11-19 RX ORDER — LEFLUNOMIDE 20 MG/1
20 TABLET ORAL DAILY
Qty: 90 TAB | Refills: 0 | Status: SHIPPED | OUTPATIENT
Start: 2019-11-19 | End: 2019-12-13

## 2019-11-19 NOTE — PROGRESS NOTES
Chief Complaint   Patient presents with    Joint Pain     1. Have you been to the ER, urgent care clinic since your last visit? Hospitalized since your last visit? No    2. Have you seen or consulted any other health care providers outside of the [address] [place] [address] since your last visit? Include any pap smears or colon screening.  No

## 2019-11-19 NOTE — PATIENT INSTRUCTIONS
ARAVA (leflunomide)    I prescribed you Arava (leflunomide) 20 mg tablets. Please start with half a tablet (10 mg) daily for 7 days and if you have no side effects, such as diarrhea or upset stomach, please increase to one full tablet daily (20 mg). Potential Adverse Affects    - Nausea  - Vomiting  - Upset stomach  - Diarrhea  - Oral ulcers  - Hair thinning  - Infection  - Liver function abnormalities  - Blood count abnormalities  - Numbness and tingling  - Burning sensation  - Itchiness  - Rash    Medication Monitoring    You will need routine blood counts and kidney and liver testing as a measure of monitoring for long term use of immunosuppressants. Things You Should Do    You should avoid ill contacts     You should get annual influenza vaccines and the pneumonia vaccines. You should apply SPF 50 sunscreen when out in the sun. You should avoid alcohol as it may hasten hepatotoxicity. You should avoid pregnancy due to risk for birth defects. You should contact me if you are sick. You should hold leflunomide if you are sick and resume after your sickness resolves. If you have any problems or side effects with this medication, please contact me immediately to inform me. Leflunomide may take 4 to 12 weeks to be effective. After 14 days of leflunomide, start allopurinol 300 mg daily.

## 2019-11-19 NOTE — PROGRESS NOTES
REASON FOR VISIT    This is a follow-up visit for Ms. [address] [facility] for     ICD-10-CM   1. Seronegative rheumatoid arthritis of multiple sites ([facility] M06.09   2. PMR (polymyalgia rheumatica) ([facility]) M35.3   3. Idiopathic chronic gout, multiple sites, with tophus (tophi) M1A.09X1     Inflammatory arthritis phenotype includes:  Anti-CCP positive: no  Rheumatoid factor positive: no  Erosive disease: no  Extra-articular manifestations include: Polymyalgia Rheumatica, MGUS    Immunosuppression Screening (6/27/2018): Quantiferon TB: negative  PPD:  Not performed  Hepatitis B: negative  Hepatitis C: negative    Therapy History includes:  Current DMARD therapy include: none  Prior DMARD therapy include: methotrexate 15-20 mg every Sunday (10/15/2018 to 1/29/2019; 2/26/2019 to 3/2019)  Discontinued DMARDs because of inefficacy: None  Discontinued DMARDs because of side effects: methotrexate 15-20 mg (nausea, vomiting, diarrhea)    Immunization History   Administered Date(s) Administered    Influenza High Dose Vaccine PF 01/29/2016, 11/09/2017, 09/11/2018, 09/11/2019    Influenza Vaccine 12/05/2012    Influenza Vaccine (Quad) PF 10/05/2016    Influenza Vaccine PF 11/17/2014    PPD 01/25/1999    Pneumococcal Conjugate (PCV-13) 09/11/2019    Pneumococcal Polysaccharide (PPSV-23) 05/13/2013    TD Vaccine 05/12/1999    Tdap 05/13/2013       Patient Active Problem List   Diagnosis Code    Hypercholesterolemia E78.00    Osteoarthritis of hip M16.9    PMR (polymyalgia rheumatica) ([facility]) M35.3    Chronic bronchitis ([facility] J42    Former smoker Z87.891    Chronic pain G89.29    Long term (current) use of systemic steroids Z79.52    Type 2 diabetes mellitus with diabetic polyneuropathy, without long-term current use of insulin ([facility]) E11.42    Severe obesity (BMI 35.0-39. 9) with comorbidity ([facility] E66.01    Essential hypertension I10    Seronegative rheumatoid arthritis of multiple sites ([facility] M06.09    MGUS (monoclonal gammopathy of unknown significance) D47.2    Vitamin D deficiency E55.9    Long-term use of immunosuppressant medication Z79.899    CAD (coronary artery disease) I25.10    Lung nodule seen on imaging study R91.1    Idiopathic chronic gout, multiple sites, with tophus (tophi) M1A.09X1     HISTORY OF PRESENT ILLNESS    Ms. Tianna Grijalva returns for a follow-up. On her last visit on 2/26/2019, I asked her to resume methotrexate 15 mg weekly. She developed nausea, vomiting and diarrhea on methotrexate 20 mg weekly. She did not tolerate 15 mg due to same issues. Today, she had gout in her left ankle two weeks that (pain, swelling, redness, could not bear weight) that lasted 2 weeks. She had a flare in her right ankle two months prior. She has pins and needles. She reports morning stiffness in her hands up to an hour. At times, she has swelling in her hands (left more than right)    She quit smoking but is around second hand smoke from her . She endorses left ankle swelling. She denies fever, weight loss, blurred vision, vision loss, oral ulcers, dry cough, dyspnea, nausea, vomiting, dysphagia, abdominal pain, black or bloody stool, fall since last visit, rash, easy bruising and increased thirst.    Last toxicity monitoring by blood work was done on 9/11/2019 and did not reveal any significant adverse effects. Most recent inflammatory markers from 2/26/2019 revealed a ESR 41 mm/hr. The patient has not had any interval hospital admission, infections, or surgeries. REVIEW OF SYSTEMS    A comprehensive review of systems was performed and pertinent results are documented in the HPI, review of systems is otherwise non-contributory.     PAST MEDICAL HISTORY    She has a past medical history of Abnormal pulmonary function test (9/28/2016), AR (allergic rhinitis), Asthma, Atrial thrombus (1994), Chronic bronchitis ([address] (9/28/2016), Chronic pain, DDD (degenerative disc disease), lumbar, Diabetes ([address] DJD (degenerative joint disease) of hip, Empty sella ([facility] (2000), Former smoker (10/24/2016), Hoarseness of voice, Hypercholesterolemia, Hypertension, Lesion of vocal cord, PMR (polymyalgia rheumatica) ([facility] (5/18/2016), and Smoker (12/5/2012). FAMILY HISTORY    Her family history includes Arthritis-osteo in her father, mother, and sister; Asthma in her mother and sister; Cancer in her father; Diabetes in her mother and sister; Elevated Lipids in her father and sister; Heart Attack in her sister; Hypertension in her daughter, daughter, daughter, father, mother, and sister; Stroke in her mother. SOCIAL HISTORY    She reports that she quit smoking about 3 years ago. Her smoking use included cigarettes. She has a 20.00 pack-year smoking history. She has never used smokeless tobacco. She reports that she does not drink alcohol or use drugs. MEDICATIONS    Current Outpatient Medications   Medication Sig Dispense Refill    allopurinol (ZYLOPRIM) 300 mg tablet Take 1 Tab by mouth daily. 90 Tab 0    leflunomide (ARAVA) 20 mg tablet Take 1 Tab by mouth daily. Take half tab daily for 7 days and then one tab daily if tolerated 90 Tab 0    diltiazem (TIAZAC) 420 mg SR capsule TAKE 1 CAPSULE EVERY DAY 90 Cap 3    atorvastatin (LIPITOR) 20 mg tablet Take 1 Tab by mouth daily. 90 Tab 3    pregabalin (LYRICA) 200 mg capsule Take 1 Cap by mouth three (3) times daily. Max Daily Amount: 600 mg. 270 Cap 1    losartan-hydroCHLOROthiazide (HYZAAR) 100-25 mg per tablet Take 1 Tab by mouth daily. 90 Tab 3    HYDROcodone-acetaminophen (NORCO)  mg tablet Take 1 Tab by mouth every eight (8) hours as needed for Pain for up to 30 days. Max Daily Amount: 3 Tabs. 90 Tab 0    glipiZIDE SR (GLUCOTROL XL) 10 mg CR tablet Take 10 mg by mouth daily.  tiotropium (SPIRIVA WITH HANDIHALER) 18 mcg inhalation capsule Take 1 Cap by inhalation daily.  potassium chloride (KLOR-CON) 10 mEq tablet Take 1 Tab by mouth daily.  27 Tab 5    dulaglutide (TRULICITY) 1.5 XZ/5.2 mL sub-q pen 0.5 mL by SubCUTAneous route every seven (7) days. (Patient taking differently: 0.75 mg by SubCUTAneous route every seven (7) days.) 12 Syringe 3    metFORMIN ER (GLUCOPHAGE XR) 500 mg tablet Take 4 Tabs by mouth daily (with dinner). Dose increase 360 Tab 5    ergocalciferol (ERGOCALCIFEROL) 50,000 unit capsule Take 1 Cap by mouth every seven (7) days. 12 Cap 3    folic acid (FOLVITE) 1 mg tablet Take 1 Tab by mouth daily. 90 Tab 0    Blood Glucose Control High&Low (ACCU-CHEK JENNIE CONTROL SOLN) soln Use as directed for controls as needed. DX: E11.9 1 Bottle 6    Blood-Glucose Meter (ACCU-CHEK JENNIE PLUS METER) misc Test blood sugar twice daily. DX: E11.9. Substitute supply brand accepted by insurance. 1 Each 0    Lancets (ACCU-CHEK SOFTCLIX LANCETS) misc Test blood sugar twice daily. DX: E11.9. Substitute supply brand accepted by insurance. 1 Each 11    glucose blood VI test strips (ACCU-CHEK JENNIE PLUS TEST STRP) strip Test blood sugar twice daily. DX: E11.9. Substitute supply brand accepted by insurance. 100 Strip 11    alcohol swabs (ALCOHOL WIPES) padm Test blood sugar twice daily. DX: E11.9. Substitute supply brand accepted by insurance. 100 Pad 1    albuterol (PROVENTIL VENTOLIN) 2.5 mg /3 mL (0.083 %) nebulizer solution 3 mL by Nebulization route every six (6) hours as needed for Wheezing. 60 Each 2    albuterol (PROVENTIL HFA, VENTOLIN HFA, PROAIR HFA) 90 mcg/actuation inhaler Take 2 Puffs by inhalation every six (6) hours as needed for Wheezing. 1 Inhaler 1    Cetirizine (ZYRTEC) 10 mg Cap Take 10 mg by mouth daily.  aspirin 81 mg chewable tablet Take 81 mg by mouth daily.  MULTIVITS W-FE,OTHER MIN (CENTRUM PO) Take 1 Tab by mouth daily.  umeclidinium-vilanterol (ANORO ELLIPTA) 62.5-25 mcg/actuation inhaler Take 1 Puff by inhalation daily.  3 Inhaler 1        ALLERGIES    Allergies   Allergen Reactions    Bactrim [Sulfamethoxazole-Trimethoprim] Nausea Only    Biaxin [Clarithromycin] Nausea Only    Demerol [Meperidine] Itching    Erythromycin Nausea Only    Gabapentin Nausea and Vomiting    Pcn [Penicillins] Hives    Percocet [Oxycodone-Acetaminophen] Itching    Pravastatin Nausea Only    Tetracycline Nausea Only    Voltaren [Diclofenac Sodium] Hives       PHYSICAL EXAMINATION    Visit Vitals  /80   Pulse 79   Temp 98.1 °F (36.7 °C)   Resp 18   Ht 5' 4\" (1.626 m)   Wt 229 lb (103.9 kg)   BMI 39.31 kg/m²     Body mass index is 39.31 kg/m². General: Patient is alert, oriented x 3, not in acute distress    HEENT:   Sclerae are not injected and appear moist.  There is no alopecia. Neck is supple     Cardiovascular:  Heart is regular rate and rhythm, no murmurs. Chest:  Lungs are clear to auscultation bilaterally. No rhonchi, wheezes, or crackles. Extremities:  Free of clubbing, cyanosis, edema    Neurological exam:  Muscle strength is full in upper and lower extremities     Skin exam:    Psoriasis:     no  Nail Pitting:     no  Onycholysis:     no  Palmoplantar pustulosis:   no  Acne fulminans:    no  Acne conglobata:    no  Hidradenitis Suppurativa:   no  Dissecting cellulitis of the scalp:  no  Pilonidal sinus:    no  Erythema nodosum:    no  Non-Scarring Alopecia:  no  Discoid Lupus:   no  Subacute Cutaneous Lupus:   no  Heliotrope Rash:   no  Upper Arm Erythema:   no  Shawl Sign:    no  V-sign:     no  Holster sign:    no  Gottron's papules:   no  Gottron's sign:    no  Calcinosis:    no  Raynaud's Phenomenon:  no  's Hands:   no  Periungual erythema:   no  Abnormal Nailfold Capillaries: no  Livedo Reticularis:   no  Scalp Erythema:   no  Rheumatoid Nodules:   no    Musculoskeletal:  A comprehensive musculoskeletal exam was performed for all joints of each upper and lower extremity and assessed for swelling, tenderness and range of motion.       Bilateral knee replacement  Left ankle tenderness and swelling from recent gout    Z-Deformities:   no  [place] Deformities:  no  Boutonierre's Deformities:  no  Ulnar Deviation:   no  MCP Subluxation:  no    Joint Count 11/19/2019 2/26/2019 11/27/2018 6/27/2018   Patient pain (0-100) 75 60 50 70   MHAQ 0.375 0.125 0.375 0.5   Left shoulder - Tender - - - 1   Left elbow - Tender - - - 1   Left elbow - Swollen - - - 1   Left wrist- Tender 1 1 - 1   Left wrist- Swollen 0 1 - 1   Left 1st MCP - Tender 1 1 1 1   Left 1st MCP - Swollen 0 1 1 -   Left 2nd MCP - Tender 1 0 1 1   Left 2nd MCP - Swollen 0 1 1 1   Left 3rd MCP - Tender - 1 - 1   Left 3rd MCP - Swollen - 1 - 1   Left 4th MCP - Tender 1 1 1 1   Left 4th MCP - Swollen 0 0 0 -   Left 5th MCP - Tender - 1 - 1   Left 5th MCP - Swollen - 0 - -   Left thumb IP - Tender - 1 - 1   Left thumb IP - Swollen - 0 - -   Left 2nd PIP - Tender 1 0 - 1   Left 2nd PIP - Swollen 0 1 - -   Left 3rd PIP - Tender - 1 1 1   Left 3rd PIP - Swollen - 1 0 -   Left 4th PIP - Tender 1 1 - 1   Left 4th PIP - Swollen 0 0 - -   Left 5th PIP - Tender - - - 1   Right shoulder - Tender - - - 1   Right wrist- Tender - 1 - -   Right wrist- Swollen - 1 - 1   Right 1st MCP - Tender - - - 1   Right 1st MCP - Swollen - - - 1   Right 2nd MCP - Tender - 1 0 1   Right 2nd MCP - Swollen - 1 1 1   Right 3rd MCP - Tender - 1 1 -   Right 3rd MCP - Swollen - 1 1 -   Right 5th MCP - Tender 1 - - -   Right 5th MCP - Swollen 0 - - -   Right 2nd PIP - Tender 1 - - -   Right 2nd PIP - Swollen 0 - - -   Right 3rd PIP - Tender - - - 1   Right 4th PIP - Tender 1 1 1 -   Right 4th PIP - Swollen 0 0 0 -   Tender Joint Count (Total) 9 12 6 17   Swollen Joint Count (Total) 0 9 4 7   Physician Assessment (0-10) 2 3 2 5   Patient Assessment (0-10) 7 7 5 8   CDAI Total (calculated) 18 31 17 40       DATA REVIEW    Laboratory     Recent laboratory results were reviewed, summarized, and discussed with the patient.     Imaging    Musculoskeletal Ultrasound    None    Radiographs    Bilateral Hand 6/27/2018: RIGHT: no fracture or other acute osseous or articular abnormality. The soft tissues are within normal limits. There is minimal distal interphalangeal joint space narrowing, soft tissue swelling. Minimal DJD at the base of the first digit no soft tissue calcification. LEFT: no fracture, dislocation or other acute osseous or articular abnormality. The soft tissues are within normal limits. The bone is normal internal contents. There is minimal distal interphalangeal joint space narrowing, there is mild DJD at the base of the first digit. There is no bony erosion or soft tissue calcification.     Bilateral Foot 6/27/2018: RIGHT: no fracture or other acute osseous or articular abnormality. The soft tissues are within normal limits. The bone is normal in mineral content, there is some soft tissue swelling. There is no bony erosions. Small posterior and plantar calcaneal spurs. No soft tissue calcification seen otherwise. LEFT: no fracture or other acute osseous or articular abnormality. The soft tissues are within normal limits. The bone is normal in mineral content. No bony erosions. Periosteal thickening likely chronic. Calcaneal spurs. Chest 5/07/2018: normal heart size. There is no acute process in the lung fields. The osseous structures are unremarkable.     Chest 4/14/2018: Lungs: The lungs are clear of mass, nodule, airspace disease or edema. Pleura: There is no pleural effusion or pneumothorax. Mediastinum: The cardiac and mediastinal contours and pulmonary vascularity are normal. Bones and soft tissues: There are degenerative changes of the spine. Left Shoulder 7/31/2017: no fracture, dislocation or other acute abnormality. DJD AC joint and chronic deformity tuberosity    Left Ankle 9/16/2015: no fracture or disruption of the ankle mortise. There is no other acute osseous or articular abnormality.   There is marked soft tissue swelling overlying the medial and lateral sides of the ankle as well as the dorsum of the foot. Subcutaneous edema noted. .  Calcaneal spurs noted. Right Hip 7/15/2015:  no fracture, dislocation or other acute abnormality. Mild osteoarthritis is present. Left Shoulder 11/17/2014:  no fracture, dislocation. A type II acromion is present. There is mild irregularity of the greater tuberosity. CT Imaging    CTA Chest with and without contrast 10/16/2010: There is no evidence for pulmonary embolus. There is no pleural or pericardial effusion. Heart size is normal. No mediastinal or hilar mass or adenopathy is shown. The lungs are clear. Mild to moderate thoracic spine degenerative changes are demonstrated.      MR Imaging    MRA Head without contrast 8/01/2011: The vertebral arteries are codominant. The basilar artery and its branches are normal. The internal carotid, anterior cerebral, and middle cerebral arteries are patent. There is no flow-limiting intracranial stenosis. There is no aneurysm. There is a patent left posterior communicating artery.      MRI Brain with and without contrast 8/01/2011: The ventricles are normal in size and are midline. Ventricular size is actually somewhat small for the patient's chronological age. Nicholaus Furl is no intracranial hemorrhage or extra-axial fluid collection. There are mild scattered foci of T2 hyperintensity in the cerebral white matter, which are nonspecific. Moderate patchy T2 hyperintensity within the central portion of the sol is noted. The appearance is typical of the changes seen with intracranial microangiopathy. . There is no acute infarction. There is no abnormal parenchymal or meningeal enhancement. The paranasal sinuses and mastoid air cells are clear. There is enlargement of the sella with extension of CSF into the sella and minimal visible pituitary tissue, consistent with an empty sella.     DXA    DXA 9/14/2015: (excluded L1, right hip, distal radius) lumbar spine L2-L4 T score 3.6 (BMD 1.475 g/cm2), left femoral neck T score: 4.5 (1.351 g/cm2), left total hip T score: 3.6 (1.379 g/cm2). ASSESSMENT AND PLAN    This is a follow-up visit for Ms. Hakeem Keys. 1) Seronegative Rheumatoid Arthritis secondary to Polymyalgia Rheumatica. She did not tolerate methotrexate 15 mg or 20 mg due to GI upset. Her CDAI was 18 (previously 31, 17, 37) with 9 tender and 0 swollen joints, consistent with moderate disease activity. I discussed initiation with the DMARD Arava (leflunomide). I informed the patient the potential adverse effects, which may include: nausea, vomiting, dyspepsia, diarrhea, oral ulcers, infection, liver function abnormalities, blood count abnormalities, paresthesia, rash, and rarely melanoma and non-melanoma skin cancer. The patient understood these possible adverse effects. I informed the patient of the need for routine CBC and CMP as a measure for long term use of immunosuppressants. I also instructed the patient to avoid ill contacts and the needs for annual influenza vaccines and the pneumonia vaccines. In childbearing patients, pregnancy is contra-indicated on leflunomide due to risk for birth defects. I informed the patient that leflunomide may take 4 to 12 weeks to be effective. I prescribed the patient Arava (leflunomide) 20 mg, and instructed to start with half a tablet (10 mg) daily for 7 days and then increase to a full tablet (20 mg) if she has no side effects, such as diarrhea or upset stomach. The patient will follow up in 4 weeks for laboratory monitoring. 2) Polymyalgia Rheumatica. See #1. 3) Long Term Use of Immunosuppressants. The patient remains on immunomodulatory medications (methotrexate) and requires frequent toxicity monitoring by blood work. Respective labs were ordered (CBC and CMP). 4) Monoclonal Gammopathy Undetermined Significance.  SPEP showed Immunofixation shows IgG monoclonal protein with lambda light chain specificity with M-spike was 0.2. This is likely from #1. Hematology evaluation was not concerning for a pre-malignant state. 5) Vitamin D Deficiency. Her vitamin D level was 28.9 (previously 27.8, 14.4). She is on weekly ergocalciferol 50,000. I will check her level today. 6) Chronic Idiopathic Gout of Ankles. She has had bilateral ankle flares over the past 4 months. I will check her uric acid level and start allopurinol 300 mg daily. I was going to start colchicine but she is on diltiazem. I asked her to start allopurinol 2 weeks after starting leflunomide due to determine if any side effects arise with leflunomide first.    The patient voiced understanding of the aforementioned assessment and plan. Summary of plan was provided in the After Visit Summary patient instructions.      TODAY'S ORDERS    Orders Placed This Encounter    URIC ACID    CBC WITH AUTOMATED DIFF    METABOLIC PANEL, COMPREHENSIVE    C REACTIVE PROTEIN, QT    SED RATE (ESR)    VITAMIN D, 25 HYDROXY    allopurinol (ZYLOPRIM) 300 mg tablet    leflunomide (ARAVA) 20 mg tablet     Future Appointments   Date Time Provider Nikki Mckeon   12/11/2019 12:00 PM Ania Cervantes NP [facility] SCHED   2/19/2020  1:20 PM MD Eliane Wang MD, [address] [facility]    Adult Rheumatology   Rheumatology Ultrasound Certified  [facility]  A Part of [address]   Phone [phone]  Fax [phone]

## 2019-11-19 NOTE — LETTER
11/19/19 Patient: Ishan Greene YOB: 1950 Date of Visit: 11/19/2019 Diego Gil NP 
[address] [id number] VIA In Basket Dear Diego Gil NP, Thank you for referring Ms. Stan Plunkett to [facility] for evaluation. My notes for this consultation are attached. If you have questions, please do not hesitate to call me. I look forward to following your patient along with you.  
 
 
Sincerely, 
 
Thomas Rhodes MD

## 2019-11-20 LAB
25(OH)D3+25(OH)D2 SERPL-MCNC: 27.1 NG/ML (ref 30–100)
ALBUMIN SERPL-MCNC: 4.1 G/DL (ref 3.6–4.8)
ALBUMIN/GLOB SERPL: 1.4 {RATIO} (ref 1.2–2.2)
ALP SERPL-CCNC: 109 IU/L (ref 39–117)
ALT SERPL-CCNC: 14 IU/L (ref 0–32)
AST SERPL-CCNC: 11 IU/L (ref 0–40)
BASOPHILS # BLD AUTO: 0.1 X10E3/UL (ref 0–0.2)
BASOPHILS NFR BLD AUTO: 1 %
BILIRUB SERPL-MCNC: 0.3 MG/DL (ref 0–1.2)
BUN SERPL-MCNC: 16 MG/DL (ref 8–27)
BUN/CREAT SERPL: 20 (ref 12–28)
CALCIUM SERPL-MCNC: 10.1 MG/DL (ref 8.7–10.3)
CHLORIDE SERPL-SCNC: 102 MMOL/L (ref 96–106)
CO2 SERPL-SCNC: 25 MMOL/L (ref 20–29)
CREAT SERPL-MCNC: 0.8 MG/DL (ref 0.57–1)
CRP SERPL-MCNC: 15 MG/L (ref 0–10)
EOSINOPHIL # BLD AUTO: 0.2 X10E3/UL (ref 0–0.4)
EOSINOPHIL NFR BLD AUTO: 3 %
ERYTHROCYTE [DISTWIDTH] IN BLOOD BY AUTOMATED COUNT: 13.9 % (ref 12.3–15.4)
ERYTHROCYTE [SEDIMENTATION RATE] IN BLOOD BY WESTERGREN METHOD: 47 MM/HR (ref 0–40)
GLOBULIN SER CALC-MCNC: 3 G/DL (ref 1.5–4.5)
GLUCOSE SERPL-MCNC: 189 MG/DL (ref 65–99)
HCT VFR BLD AUTO: 41.6 % (ref 34–46.6)
HGB BLD-MCNC: 13.8 G/DL (ref 11.1–15.9)
IMM GRANULOCYTES # BLD AUTO: 0 X10E3/UL (ref 0–0.1)
IMM GRANULOCYTES NFR BLD AUTO: 0 %
LYMPHOCYTES # BLD AUTO: 2 X10E3/UL (ref 0.7–3.1)
LYMPHOCYTES NFR BLD AUTO: 24 %
MCH RBC QN AUTO: 28.8 PG (ref 26.6–33)
MCHC RBC AUTO-ENTMCNC: 33.2 G/DL (ref 31.5–35.7)
MCV RBC AUTO: 87 FL (ref 79–97)
MONOCYTES # BLD AUTO: 0.7 X10E3/UL (ref 0.1–0.9)
MONOCYTES NFR BLD AUTO: 9 %
NEUTROPHILS # BLD AUTO: 5.1 X10E3/UL (ref 1.4–7)
NEUTROPHILS NFR BLD AUTO: 63 %
PLATELET # BLD AUTO: 212 X10E3/UL (ref 150–450)
POTASSIUM SERPL-SCNC: 4.1 MMOL/L (ref 3.5–5.2)
PROT SERPL-MCNC: 7.1 G/DL (ref 6–8.5)
RBC # BLD AUTO: 4.79 X10E6/UL (ref 3.77–5.28)
SODIUM SERPL-SCNC: 143 MMOL/L (ref 134–144)
URATE SERPL-MCNC: 5.8 MG/DL (ref 2.5–7.1)
WBC # BLD AUTO: 8.1 X10E3/UL (ref 3.4–10.8)

## 2019-11-20 NOTE — PROGRESS NOTES
The results were reviewed. Uric acid 5.8 mg/dL --> started allopurinol 300 mg daily (target is less than 5), elevated inflammatory marker (CRP, ESR). Low vitamin D 27. 1. remaining labs are normal.

## 2019-11-25 LAB — AMB DEXA, EXTERNAL: NORMAL

## 2019-12-13 ENCOUNTER — OFFICE VISIT (OUTPATIENT)
Dept: FAMILY MEDICINE CLINIC | Age: 69
End: 2019-12-13

## 2019-12-13 VITALS
WEIGHT: 226 LBS | OXYGEN SATURATION: 96 % | RESPIRATION RATE: 18 BRPM | BODY MASS INDEX: 38.58 KG/M2 | SYSTOLIC BLOOD PRESSURE: 151 MMHG | DIASTOLIC BLOOD PRESSURE: 68 MMHG | HEIGHT: 64 IN | TEMPERATURE: 98.9 F | HEART RATE: 82 BPM

## 2019-12-13 DIAGNOSIS — E11.42 TYPE 2 DIABETES MELLITUS WITH DIABETIC POLYNEUROPATHY, WITHOUT LONG-TERM CURRENT USE OF INSULIN (HCC): ICD-10-CM

## 2019-12-13 DIAGNOSIS — J42 CHRONIC BRONCHITIS, UNSPECIFIED CHRONIC BRONCHITIS TYPE (HCC): ICD-10-CM

## 2019-12-13 DIAGNOSIS — G89.4 CHRONIC PAIN DISORDER: ICD-10-CM

## 2019-12-13 DIAGNOSIS — I10 ESSENTIAL HYPERTENSION: Primary | ICD-10-CM

## 2019-12-13 RX ORDER — ATORVASTATIN CALCIUM 20 MG/1
TABLET, FILM COATED ORAL
COMMUNITY
End: 2019-12-13 | Stop reason: SDUPTHER

## 2019-12-13 RX ORDER — LOSARTAN POTASSIUM AND HYDROCHLOROTHIAZIDE 12.5; 1 MG/1; MG/1
TABLET ORAL
COMMUNITY
Start: 2019-10-29 | End: 2019-12-13 | Stop reason: SDUPTHER

## 2019-12-13 RX ORDER — ALLOPURINOL 300 MG/1
TABLET ORAL
COMMUNITY
End: 2019-12-13 | Stop reason: SDUPTHER

## 2019-12-13 RX ORDER — METHOTREXATE 2.5 MG/1
TABLET ORAL
COMMUNITY
End: 2020-11-06

## 2019-12-13 RX ORDER — HYDROCODONE BITARTRATE AND ACETAMINOPHEN 10; 325 MG/1; MG/1
1 TABLET ORAL
Qty: 90 TAB | Refills: 0 | Status: SHIPPED | OUTPATIENT
Start: 2020-01-13 | End: 2019-12-13 | Stop reason: SDUPTHER

## 2019-12-13 RX ORDER — HYDROCODONE BITARTRATE AND ACETAMINOPHEN 10; 325 MG/1; MG/1
1 TABLET ORAL
Qty: 90 TAB | Refills: 0 | Status: SHIPPED | OUTPATIENT
Start: 2020-02-13 | End: 2020-03-12 | Stop reason: SDUPTHER

## 2019-12-13 RX ORDER — PREGABALIN 200 MG/1
CAPSULE ORAL
COMMUNITY
End: 2019-12-13 | Stop reason: SDUPTHER

## 2019-12-13 RX ORDER — FLUTICASONE PROPIONATE AND SALMETEROL 250; 50 UG/1; UG/1
1 POWDER RESPIRATORY (INHALATION) EVERY 12 HOURS
Qty: 1 INHALER | Refills: 3 | Status: SHIPPED | OUTPATIENT
Start: 2019-12-13 | End: 2019-12-13 | Stop reason: SDUPTHER

## 2019-12-13 RX ORDER — ATENOLOL 25 MG/1
25 TABLET ORAL DAILY
Qty: 30 TAB | Refills: 3 | Status: SHIPPED | OUTPATIENT
Start: 2019-12-13 | End: 2020-05-06

## 2019-12-13 RX ORDER — PREGABALIN 150 MG/1
CAPSULE ORAL
COMMUNITY
End: 2019-12-13 | Stop reason: SDUPTHER

## 2019-12-13 RX ORDER — RANITIDINE 150 MG/1
TABLET, FILM COATED ORAL
COMMUNITY
End: 2020-11-06

## 2019-12-13 RX ORDER — ERGOCALCIFEROL 1.25 MG/1
CAPSULE ORAL
COMMUNITY
End: 2019-12-13 | Stop reason: SDUPTHER

## 2019-12-13 RX ORDER — HYDROCODONE BITARTRATE AND ACETAMINOPHEN 10; 325 MG/1; MG/1
TABLET ORAL
COMMUNITY
End: 2020-07-31

## 2019-12-13 RX ORDER — GLIPIZIDE 10 MG/1
TABLET, FILM COATED, EXTENDED RELEASE ORAL
COMMUNITY
End: 2019-12-13 | Stop reason: SDUPTHER

## 2019-12-13 RX ORDER — ASPIRIN 81 MG/1
81 TABLET ORAL DAILY
COMMUNITY
Start: 2008-12-30

## 2019-12-13 RX ORDER — LEFLUNOMIDE 20 MG/1
TABLET ORAL
COMMUNITY
End: 2019-12-13

## 2019-12-13 RX ORDER — ALBUTEROL SULFATE 0.83 MG/ML
SOLUTION RESPIRATORY (INHALATION)
COMMUNITY
Start: 2016-10-05 | End: 2019-12-13 | Stop reason: SDUPTHER

## 2019-12-13 RX ORDER — FLUTICASONE PROPIONATE AND SALMETEROL 250; 50 UG/1; UG/1
1 POWDER RESPIRATORY (INHALATION) EVERY 12 HOURS
Qty: 3 INHALER | Refills: 3 | Status: SHIPPED | OUTPATIENT
Start: 2019-12-13 | End: 2021-10-22 | Stop reason: ALTCHOICE

## 2019-12-13 RX ORDER — LOSARTAN POTASSIUM AND HYDROCHLOROTHIAZIDE 25; 100 MG/1; MG/1
TABLET ORAL
COMMUNITY
End: 2019-12-13 | Stop reason: SDUPTHER

## 2019-12-13 RX ORDER — AMOXICILLIN AND CLAVULANATE POTASSIUM 875; 125 MG/1; MG/1
TABLET, FILM COATED ORAL
COMMUNITY
Start: 2019-12-09 | End: 2019-12-13 | Stop reason: ALTCHOICE

## 2019-12-13 RX ORDER — DILTIAZEM HYDROCHLORIDE 420 MG/1
CAPSULE, EXTENDED RELEASE ORAL
COMMUNITY
End: 2019-12-13 | Stop reason: SDUPTHER

## 2019-12-13 RX ORDER — POTASSIUM CHLORIDE 750 MG/1
TABLET, FILM COATED, EXTENDED RELEASE ORAL
COMMUNITY
End: 2019-12-13 | Stop reason: SDUPTHER

## 2019-12-13 RX ORDER — LOSARTAN POTASSIUM AND HYDROCHLOROTHIAZIDE 12.5; 1 MG/1; MG/1
TABLET ORAL
COMMUNITY
End: 2019-12-13 | Stop reason: SDUPTHER

## 2019-12-13 RX ORDER — HYDROCODONE BITARTRATE AND ACETAMINOPHEN 10; 325 MG/1; MG/1
1 TABLET ORAL
Qty: 90 TAB | Refills: 0 | Status: SHIPPED | OUTPATIENT
Start: 2019-12-13 | End: 2019-12-13 | Stop reason: SDUPTHER

## 2019-12-13 NOTE — PROGRESS NOTES
Chief Complaint   Patient presents with    Cholesterol Problem     3 month follow up.  Diabetes    Hypertension     1. Have you been to the ER, urgent care clinic since your last visit? Hospitalized since your last visit? No    2. Have you seen or consulted any other health care providers outside of the [address] [place] [address] since your last visit? Include any pap smears or colon screening.  No

## 2019-12-13 NOTE — PROGRESS NOTES
Met with patient during PCP appointment to discuss medications. Patient states that her Trulicity is costing her around $500 but she would like to stay on it,  Confirmed that patient still has [employer]. Will review her medications to see if there are anyway to lower her costs. She will also come in with all of her medications prior to next PCP appointment to review them. Patient verbalized understanding.     Liat Thomas, MileyD, Tata Bejarano

## 2019-12-14 LAB
CHOLEST SERPL-MCNC: 178 MG/DL (ref 100–199)
EST. AVERAGE GLUCOSE BLD GHB EST-MCNC: 186 MG/DL
HBA1C MFR BLD: 8.1 % (ref 4.8–5.6)
HDLC SERPL-MCNC: 36 MG/DL
INTERPRETATION, 910389: NORMAL
LDLC SERPL CALC-MCNC: ABNORMAL MG/DL (ref 0–99)
TRIGL SERPL-MCNC: 533 MG/DL (ref 0–149)
VLDLC SERPL CALC-MCNC: ABNORMAL MG/DL (ref 5–40)

## 2019-12-14 NOTE — PROGRESS NOTES
Triglycerides are very high. Need to add fenofibrate to her statin daily. A1C not to goal.  Start back on Glipizide. Make sure she has some at home.

## 2019-12-14 NOTE — PROGRESS NOTES
Assessment/Plan:     Diagnoses and all orders for this visit:    1. Essential hypertension  -     atenolol (TENORMIN) 25 mg tablet; Take 1 Tab by mouth daily. Uncontrolled. Add Treatment as above. Follow up in three months or sooner as needed. 2. Chronic bronchitis, unspecified chronic bronchitis type (HCC)  -     fluticasone propion-salmeterol (ADVAIR/WIXELA) 250-50 mcg/dose diskus inhaler; Take 1 Puff by inhalation every twelve (12) hours. Uncontrolled. Add treatment as above. Unable to afford Anoro. Continue Spiriva and Albuterol. 3. Chronic pain disorder  -     HYDROcodone-acetaminophen (NORCO)  mg tablet; Take 1 Tab by mouth every eight (8) hours as needed for Pain for up to 30 days. Max Daily Amount: 3 Tabs. Stable on current therapy. No changes made today. Able to complete ADLS with minimal breakthrough pain. Refilled for 3 months.  reviewed and up to date. Return in three months or sooner as needed. 4. Type 2 diabetes mellitus with diabetic polyneuropathy, without long-term current use of insulin (HCC)  -     LIPID PANEL  -     HEMOGLOBIN A1C WITH EAG  Labs pending. She is non-compliant with statin and Trulicity treatment. I have encouraged compliance with medications. Reports some difficulty with trulicity cost however would like to remain on treatment. Previously had hypoglycemia with Glipizide which was discontinued. Continue Metformin which is max dose at this time. Other orders  -     CVD REPORT            Discussed expected course/resolution/complications of diagnosis in detail with patient. Medication risks/benefits/costs/interactions/alternatives discussed with patient. Pt was given after visit summary which includes diagnoses, current medications & vitals.    Pt expressed understanding with the diagnosis and plan          Subjective:      Ashvin Mcneal is a 77 y.o. female who presents for had concerns including Cholesterol Problem (3 month follow up.); Diabetes; and Hypertension. COPD  Patient complains of wheezing. Symptoms began 4 years ago. Symptoms chronic dyspnea: severity = moderate: course of sx: stable does worsen with exertion. Sputum is clear in small amounts. Patient uses 2 pillows at night. Patient can walk 100  feet before resting. Patient currently is not on home oxygen therapy. [facility] Respiratory history: occasional episodes of bronchitis. Is not currently on Anoro due to cost. Recently treated for Bronchitis in ER. On Spiriva and Albuterol with no improvement. Is a current tobacco user with no plans for cessation. History of lung nodule on CT Chest due for repeat on 3/2020. Hypertension  Patient is here for follow-up of hypertension. She indicates that she is feeling well and denies any symptoms referable to her hypertension, including chest pain, palpitations, dyspnea, orthopnea, or peripheral edema. Patient has these side effects of medication: none. She is not exercising and is not adherent to low salt diet. Blood pressure is not well controlled at home. Use of agents associated with hypertension: none. History of renal disease: yes. Cardiovascular risk factors: smoking/ tobacco exposure, dyslipidemia, diabetes mellitus, obesity, sedentary life style, hypertension, stress, post-menopausal, history of CAD. Chronic Pain  Renny Moors RTC today to follow up on chronic pain diagnosis. We discussed her osteoarthritis that is affecting her bilateral hips, left shoulder and lumbar back. We discussed her seronegative rheumatoid arthritis affecting her bilateral hands. Significant changes since last visit: none. She is  able to do her normal daily activities. She reports the following adverse side effects: none. Previous treatments include heat, cold, Lidocaine patches otc, Tramadol, methotrexate, cortisone injection which all provided minimal relief. Not a candidate for oral NSAIDS. She is followed by rheumatology.    Study Result      EXAM:  XR SHOULDER LT AP/LAT MIN 2 V     INDICATION:   PMR; left shoulder pain and decreased ROM.     COMPARISON: None.     FINDINGS: Three views of the left shoulder demonstrate no fracture, dislocation  or other acute abnormality. DJD AC joint and chronic deformity tuberosity     IMPRESSION  IMPRESSION:  No acute abnormality.         Bilateral hand xrays show osteoarthritis. Patient Active Problem List   Diagnosis Code    Hypercholesterolemia E78.00    Osteoarthritis of hip M16.9    PMR (polymyalgia rheumatica) ([facility]) M35.3    Chronic bronchitis ([facility] J42    Former smoker Z87.891    Chronic pain G89.29    Long term (current) use of systemic steroids Z79.52    Type 2 diabetes mellitus with diabetic polyneuropathy, without long-term current use of insulin ([facility]) E11.42    Severe obesity (BMI 35.0-39. 9) with comorbidity ([facility] E66.01    Essential hypertension I10    Seronegative [facility] of multiple sites [facility] M06.09    MGUS (monoclonal gammopathy of unknown significance) D47.2    Vitamin D deficiency E55.9    Long-term use of immunosuppressant medication Z79.899    CAD (coronary artery disease) I25.10    Lung nodule seen on imaging study R91.1    Idiopathic chronic gout, multiple sites, with tophus (tophi) M1A.09X1       Current Outpatient Medications   Medication Sig Dispense Refill    multivit-min-FA-lycopen-lutein (CENTRUM SILVER) 0.4-300-250 mg-mcg-mcg tab Centrum Silver tablet   Prescribed by non [facility] MD      methotrexate (RHEUMATREX) 2.5 mg tablet methotrexate sodium 2.5 mg tablet      raNITIdine (ZANTAC) 150 mg tablet ranitidine 150 mg tablet      aspirin delayed-release 81 mg tablet aspirin 81 mg tablet,delayed release   Prescribed by non [facility] MD      HYDROcodone-acetaminophen (NORCO)  mg tablet hydrocodone 10 mg-acetaminophen 325 mg tablet      atenolol (TENORMIN) 25 mg tablet Take 1 Tab by mouth daily.  30 Tab 3    fluticasone propion-salmeterol (ADVAIR/WIXELA) 250-50 mcg/dose diskus inhaler Take 1 Puff by inhalation every twelve (12) hours. 3 Inhaler 3    [START ON 2/13/2020] HYDROcodone-acetaminophen (NORCO)  mg tablet Take 1 Tab by mouth every eight (8) hours as needed for Pain for up to 30 days. Max Daily Amount: 3 Tabs. 90 Tab 0    allopurinol (ZYLOPRIM) 300 mg tablet Take 1 Tab by mouth daily. 90 Tab 0    diltiazem (TIAZAC) 420 mg SR capsule TAKE 1 CAPSULE EVERY DAY 90 Cap 3    atorvastatin (LIPITOR) 20 mg tablet Take 1 Tab by mouth daily. 90 Tab 3    pregabalin (LYRICA) 200 mg capsule Take 1 Cap by mouth three (3) times daily. Max Daily Amount: 600 mg. 270 Cap 1    losartan-hydroCHLOROthiazide (HYZAAR) 100-25 mg per tablet Take 1 Tab by mouth daily. 90 Tab 3    tiotropium (SPIRIVA WITH HANDIHALER) 18 mcg inhalation capsule Take 1 Cap by inhalation daily.  potassium chloride (KLOR-CON) 10 mEq tablet Take 1 Tab by mouth daily. 30 Tab 5    dulaglutide (TRULICITY) 1.5 KE/0.0 mL sub-q pen 0.5 mL by SubCUTAneous route every seven (7) days. (Patient taking differently: 0.75 mg by SubCUTAneous route every seven (7) days.) 12 Syringe 3    metFORMIN ER (GLUCOPHAGE XR) 500 mg tablet Take 4 Tabs by mouth daily (with dinner). Dose increase 360 Tab 5    ergocalciferol (ERGOCALCIFEROL) 50,000 unit capsule Take 1 Cap by mouth every seven (7) days. 12 Cap 3    folic acid (FOLVITE) 1 mg tablet Take 1 Tab by mouth daily. 90 Tab 0    Blood Glucose Control High&Low (ACCU-CHEK JENNIE CONTROL SOLN) soln Use as directed for controls as needed. DX: E11.9 1 Bottle 6    Blood-Glucose Meter (ACCU-CHEK JENNIE PLUS METER) misc Test blood sugar twice daily. DX: E11.9. Substitute supply brand accepted by insurance. 1 Each 0    Lancets (ACCU-CHEK SOFTCLIX LANCETS) misc Test blood sugar twice daily. DX: E11.9. Substitute supply brand accepted by insurance.  1 Each 11    glucose blood VI test strips (ACCU-CHEK JENNIE PLUS TEST STRP) strip Test blood sugar twice daily. DX: E11.9. Substitute supply brand accepted by insurance. 100 Strip 11    alcohol swabs (ALCOHOL WIPES) padm Test blood sugar twice daily. DX: E11.9. Substitute supply brand accepted by insurance. 100 Pad 1    albuterol (PROVENTIL VENTOLIN) 2.5 mg /3 mL (0.083 %) nebulizer solution 3 mL by Nebulization route every six (6) hours as needed for Wheezing. 60 Each 2    albuterol (PROVENTIL HFA, VENTOLIN HFA, PROAIR HFA) 90 mcg/actuation inhaler Take 2 Puffs by inhalation every six (6) hours as needed for Wheezing. 1 Inhaler 1    Cetirizine (ZYRTEC) 10 mg Cap Take 10 mg by mouth daily.  aspirin 81 mg chewable tablet Take 81 mg by mouth daily.  glucose blood VI test strips (ACCU-CHEK JENNIE PLUS TEST STRP) strip Accu-Chek Jennie Plus test strips         Allergies   Allergen Reactions    Augmentin [Amoxicillin-Pot Clavulanate] Diarrhea    Bactrim [Sulfamethoxazole-Trimethoprim] Nausea Only    Biaxin [Clarithromycin] Nausea Only    Demerol [Meperidine] Itching    Erythromycin Nausea Only    Gabapentin Nausea and Vomiting    Pcn [Penicillins] Hives    Percocet [Oxycodone-Acetaminophen] Itching    Pravastatin Nausea Only    Tetracycline Nausea Only    Voltaren [Diclofenac Sodium] Hives       ROS:   Review of Systems   Constitutional: Negative for chills, fever and malaise/fatigue. Eyes: Negative for blurred vision. Respiratory: Positive for cough and wheezing. Negative for shortness of breath. Cardiovascular: Negative for chest pain. Musculoskeletal: Positive for back pain and joint pain. Psychiatric/Behavioral: Negative for depression. The patient is not nervous/anxious. Objective:     Visit Vitals  /68 (BP 1 Location: Left arm, BP Patient Position: Sitting)   Pulse 82   Temp 98.9 °F (37.2 °C) (Oral)   Resp 18   Ht 5' 4\" (1.626 m)   Wt 226 lb (102.5 kg)   SpO2 96%   BMI 38.79 kg/m²       Vitals and Nurse Documentation reviewed.      Physical Exam  Constitutional:       General: She is not in acute distress. Appearance: She is obese. HENT:      Right Ear: No middle ear effusion. Tympanic membrane is not erythematous or bulging. Left Ear:  No middle ear effusion. Tympanic membrane is not erythematous or bulging. Nose: No rhinorrhea. Right Sinus: No maxillary sinus tenderness or frontal sinus tenderness. Left Sinus: No maxillary sinus tenderness or frontal sinus tenderness. Mouth/Throat:      Pharynx: No oropharyngeal exudate or posterior oropharyngeal erythema. Eyes:      General: Lids are normal.   Cardiovascular:      Heart sounds: S1 normal and S2 normal. No murmur. No friction rub. No gallop. Pulmonary:      Breath sounds: Decreased air movement present. Wheezing and rhonchi present. Lymphadenopathy:      Cervical: No cervical adenopathy. Skin:     General: Skin is warm and dry.    Psychiatric:         Mood and Affect: Mood and affect normal.

## 2019-12-17 DIAGNOSIS — E78.1 HYPERTRIGLYCERIDEMIA: Primary | ICD-10-CM

## 2019-12-17 RX ORDER — FENOFIBRATE 48 MG/1
48 TABLET, COATED ORAL DAILY
Qty: 30 TAB | Refills: 3 | Status: SHIPPED | OUTPATIENT
Start: 2019-12-17 | End: 2021-04-22

## 2019-12-17 NOTE — PROGRESS NOTES
Called patient, BYRON verified. Nurse informed patient of lab results and recommendations. Patient verbalized understanding and informed nurse that she has a supply of Glipizide at home 10 mg tables. Patient is agreeable to taking fenofibrate to help lower triglyceride levels. Needs a prescription to go to the pharmacy.

## 2019-12-31 ENCOUNTER — TELEPHONE (OUTPATIENT)
Dept: RHEUMATOLOGY | Age: 69
End: 2019-12-31

## 2019-12-31 NOTE — TELEPHONE ENCOUNTER
Left message for Evangelina MENDEZ at [facility] [employer] [facility] to return my call.

## 2019-12-31 NOTE — TELEPHONE ENCOUNTER
----- Message from [address] sent at 12/30/2019  1:39 PM EST -----  Regarding: Dr. Jf Abbasi first and last name: Rodrigo Navarro CNA from [facility]      Reason for call: requesting pts treatment plan      Callback required yes/no and why: Yes      Best contact number(s): [phone]      Details to clarify the request:      [address]

## 2020-03-12 ENCOUNTER — TELEPHONE (OUTPATIENT)
Dept: FAMILY MEDICINE CLINIC | Age: 70
End: 2020-03-12

## 2020-03-12 DIAGNOSIS — G89.4 CHRONIC PAIN DISORDER: ICD-10-CM

## 2020-03-12 NOTE — TELEPHONE ENCOUNTER
----- Message from Juan Amaral sent at 3/12/2020  8:35 AM EDT -----  Regarding: NP [facility]/Telephone      Patient returned call from nurse.  Best contact number is [phone]

## 2020-03-13 RX ORDER — HYDROCODONE BITARTRATE AND ACETAMINOPHEN 10; 325 MG/1; MG/1
1 TABLET ORAL
Qty: 90 TAB | Refills: 0 | Status: SHIPPED | OUTPATIENT
Start: 2020-03-13 | End: 2020-04-14 | Stop reason: SDUPTHER

## 2020-04-14 DIAGNOSIS — G89.4 CHRONIC PAIN DISORDER: ICD-10-CM

## 2020-04-14 NOTE — TELEPHONE ENCOUNTER
----- Message from Eleanor Pitts sent at 4/14/2020  2:07 PM EDT -----  Regarding: CARLOS EDUARDO Cervantes/Refill  Medication Refill    Caller (if not patient):      Relationship of caller (if not patient):      Best contact number(s):  [phone]    Name of medication and dosage if known:  Hydrocodone 10mg    Is patient out of this medication (yes/no):   No     Pharmacy name:[facility]    Pharmacy listed in chart? (yes/no): Yes   Pharmacy phone [phone]      Details to clarify the request:      Eleanor Pitts

## 2020-04-15 RX ORDER — HYDROCODONE BITARTRATE AND ACETAMINOPHEN 10; 325 MG/1; MG/1
1 TABLET ORAL
Qty: 90 TAB | Refills: 0 | Status: SHIPPED | OUTPATIENT
Start: 2020-04-15 | End: 2020-05-11 | Stop reason: SDUPTHER

## 2020-05-11 DIAGNOSIS — G89.4 CHRONIC PAIN DISORDER: ICD-10-CM

## 2020-05-11 NOTE — TELEPHONE ENCOUNTER
.Patient is requesting a 30 day supply medication  . Requested Prescriptions     Pending Prescriptions Disp Refills    HYDROcodone-acetaminophen (NORCO)  mg tablet 90 Tab 0     Sig: Take 1 Tab by mouth every eight (8) hours as needed for Pain for up to 30 days. Max Daily Amount: 3 Tabs.      Last refill: 4/15/2020

## 2020-05-13 RX ORDER — HYDROCODONE BITARTRATE AND ACETAMINOPHEN 10; 325 MG/1; MG/1
1 TABLET ORAL
Qty: 90 TAB | Refills: 0 | Status: SHIPPED | OUTPATIENT
Start: 2020-05-13 | End: 2020-06-10 | Stop reason: SDUPTHER

## 2020-05-28 ENCOUNTER — TELEPHONE (OUTPATIENT)
Dept: FAMILY MEDICINE CLINIC | Age: 70
End: 2020-05-28

## 2020-05-28 NOTE — TELEPHONE ENCOUNTER
Outbound call to patient no answer left a message for a return call. Virtual visit on 6/1 with CARLOS EDUARDO Cervantes needs to be reschedule for any available provider or NP Helen after 7/21.

## 2020-06-10 DIAGNOSIS — G89.4 CHRONIC PAIN DISORDER: ICD-10-CM

## 2020-06-10 RX ORDER — HYDROCODONE BITARTRATE AND ACETAMINOPHEN 10; 325 MG/1; MG/1
1 TABLET ORAL
Qty: 90 TAB | Refills: 0 | Status: SHIPPED | OUTPATIENT
Start: 2020-06-10 | End: 2020-07-09 | Stop reason: SDUPTHER

## 2020-06-10 NOTE — TELEPHONE ENCOUNTER
.Patient is requesting a 90 day supply medication  . Requested Prescriptions     Pending Prescriptions Disp Refills    HYDROcodone-acetaminophen (NORCO)  mg tablet 90 Tab 0     Sig: Take 1 Tab by mouth every eight (8) hours as needed for Pain for up to 30 days. Max Daily Amount: 3 Tabs.      Last refill :5/13/2020  Pharmacy verified

## 2020-06-10 NOTE — TELEPHONE ENCOUNTER
Outbound call to patient no answer left a message for her to call office and schedule a med check appointment with CARLOS EDUARDO Cervantes anytime after 7/21.

## 2020-07-09 DIAGNOSIS — G89.4 CHRONIC PAIN DISORDER: ICD-10-CM

## 2020-07-09 NOTE — TELEPHONE ENCOUNTER
No access to        Last visit 12/13/2019 CARLOS EDUARDO Cervantes   Next appointment 07/31/2020 CARLOS EDUARDO Cervantes   Previous refill encounter(s) 06/10/2020 Norco #[id number]     Requested Prescriptions     Pending Prescriptions Disp Refills    HYDROcodone-acetaminophen (NORCO)  mg tablet 90 Tab 0     Sig: Take 1 Tab by mouth every eight (8) hours as needed for Pain for up to 30 days. Max Daily Amount: 3 Tabs.

## 2020-07-11 RX ORDER — HYDROCODONE BITARTRATE AND ACETAMINOPHEN 10; 325 MG/1; MG/1
1 TABLET ORAL
Qty: 90 TAB | Refills: 0 | Status: SHIPPED | OUTPATIENT
Start: 2020-07-11 | End: 2020-07-31 | Stop reason: SDUPTHER

## 2020-07-31 ENCOUNTER — OFFICE VISIT (OUTPATIENT)
Dept: FAMILY MEDICINE CLINIC | Age: 70
End: 2020-07-31

## 2020-07-31 VITALS
SYSTOLIC BLOOD PRESSURE: 142 MMHG | TEMPERATURE: 98.6 F | DIASTOLIC BLOOD PRESSURE: 76 MMHG | BODY MASS INDEX: 38.41 KG/M2 | RESPIRATION RATE: 18 BRPM | HEART RATE: 60 BPM | HEIGHT: 64 IN | OXYGEN SATURATION: 96 % | WEIGHT: 225 LBS

## 2020-07-31 DIAGNOSIS — E11.9 TYPE 2 DIABETES MELLITUS WITHOUT COMPLICATION, WITHOUT LONG-TERM CURRENT USE OF INSULIN (HCC): Primary | ICD-10-CM

## 2020-07-31 DIAGNOSIS — G89.4 CHRONIC PAIN DISORDER: ICD-10-CM

## 2020-07-31 LAB — HBA1C MFR BLD HPLC: 7.7 %

## 2020-07-31 RX ORDER — HYDROCODONE BITARTRATE AND ACETAMINOPHEN 10; 325 MG/1; MG/1
1 TABLET ORAL
Qty: 90 TAB | Refills: 0 | Status: SHIPPED | OUTPATIENT
Start: 2020-09-11 | End: 2020-08-13 | Stop reason: SDUPTHER

## 2020-07-31 RX ORDER — HYDROCODONE BITARTRATE AND ACETAMINOPHEN 10; 325 MG/1; MG/1
1 TABLET ORAL
Qty: 90 TAB | Refills: 0 | Status: SHIPPED | OUTPATIENT
Start: 2020-08-11 | End: 2020-07-31 | Stop reason: SDUPTHER

## 2020-07-31 NOTE — PROGRESS NOTES
Assessment/Plan:     Diagnoses and all orders for this visit:    1. Type 2 diabetes mellitus without complication, without long-term current use of insulin (HCC)  -     AMB POC HEMOGLOBIN A1C  Stable on current therapy. 2. Chronic pain disorder  -     HYDROcodone-acetaminophen (NORCO)  mg tablet; Take 1 Tab by mouth every eight (8) hours as needed for Pain for up to 30 days. Max Daily Amount: 3 Tabs. Stable on current therapy which was refilled today. 3 months supply given. Return in three months or sooner as needed. UDS up to date. Other orders  -     tiotropium (Spiriva with HandiHaler) 18 mcg inhalation capsule; Take 1 Cap by inhalation daily. Refilled. Follow up with pulmonology. Follow-up and Dispositions    · Return in about 3 months (around 10/31/2020) for Follow Up. Discussed expected course/resolution/complications of diagnosis in detail with patient. Medication risks/benefits/costs/interactions/alternatives discussed with patient. Pt was given after visit summary which includes diagnoses, current medications & vitals. Pt expressed understanding with the diagnosis and plan          Subjective:      Sarah Quan is a 72 y.o. female who presents for had concerns including Diabetes and Arm Pain (right ). Patient is a 72 y.o. female that comes today for follow up of Diabetes Mellitus Type 2. Patient does regularly monitor  their FSBG at home. The fasting plasma glucose (fpg) usually runs around 150s mg/L. not attempting to follow a low fat, low cholesterol diet  Patients activity level: sedentery  lost,  5 lbs. .  The patient has not experienced recent hypoglycemia. reports that she has been smoking cigarettes. She has a 20.00 pack-year smoking history.  She has never used smokeless tobacco.    Lab Results   Component Value Date/Time    Hemoglobin A1c 8.1 (H) 12/13/2019 03:32 PM    Hemoglobin A1c 7.6 (H) 09/11/2019 01:26 PM    Hemoglobin A1c 11.7 (H) 11/09/2017 10:03 AM     Chronic Pain  David Bowers RTC today to follow up on chronic pain diagnosis.  We discussed her osteoarthritis that is affecting her bilateral hips, left shoulder and lumbar back.  We discussed her seronegative rheumatoid arthritis affecting her bilateral hands.  Significant changes since last visit: none.  She is  able to do her normal daily activities.  She reports the following adverse side effects: none. Previous treatments include heat, cold, Lidocaine patches otc, Tramadol, methotrexate, cortisone injection which all provided minimal relief.  Not a candidate for oral NSAIDS. She is followed by rheumatology. She is overdue for evaluation with pulmonology. She continues to use tobacco daily. Patient Active Problem List   Diagnosis Code    Hypercholesterolemia E78.00    Osteoarthritis of hip M16.9    PMR (polymyalgia rheumatica) ([facility]) M35.3    Chronic bronchitis ([facility] J42    Former smoker Z87.891    Chronic pain G89.29    Long term (current) use of systemic steroids Z79.52    Type 2 diabetes mellitus with diabetic polyneuropathy, without long-term current use of insulin ([facility]) E11.42    Severe obesity (BMI 35.0-39. 9) with comorbidity ([facility] E66.01    Essential hypertension I10    Seronegative rheumatoid arthritis of multiple sites ([facility] M06.09    MGUS (monoclonal gammopathy of unknown significance) D47.2    Vitamin D deficiency E55.9    Long-term use of immunosuppressant medication Z79.899    CAD (coronary artery disease) I25.10    Lung nodule seen on imaging study R91.1    Idiopathic chronic gout, multiple sites, with tophus (tophi) M1A.09X1    Hypertriglyceridemia E78.1       Current Outpatient Medications   Medication Sig Dispense Refill    tiotropium (Spiriva with HandiHaler) 18 mcg inhalation capsule Take 1 Cap by inhalation daily.  90 Cap 3    [START ON 9/11/2020] HYDROcodone-acetaminophen (NORCO)  mg tablet Take 1 Tab by mouth every eight (8) hours as needed for Pain for up to 30 days. Max Daily Amount: 3 Tabs. 90 Tab 0    metFORMIN (GLUCOPHAGE) 500 mg tablet Take 2 Tabs by mouth two (2) times daily (with meals). 360 Tab 1    atenoloL (TENORMIN) 25 mg tablet TAKE ONE TABLET BY MOUTH DAILY 90 Tab 3    pregabalin (LYRICA) 200 mg capsule TAKE ONE CAPSULE BY MOUTH THREE TIMES A DAY **MAX 3 PER DAY 90 Cap 2    dulaglutide (Trulicity) 1.5 MK/2.1 mL sub-q pen 0.5 mL by SubCUTAneous route every seven (7) days. 12 Syringe 3    diltiazem (TIAZAC) 420 mg SR capsule TAKE 1 CAPSULE EVERY DAY 90 Cap 3    potassium chloride (KLOR-CON) 10 mEq tablet TAKE 1 TABLET EVERY DAY 90 Tab 3    fenofibrate nanocrystallized (TRICOR) 48 mg tablet Take 1 Tab by mouth daily. 30 Tab 3    glucose blood VI test strips (ACCU-CHEK JENNIE PLUS TEST STRP) strip Accu-Chek Jennie Plus test strips      multivit-min-FA-lycopen-lutein (CENTRUM SILVER) 0.4-300-250 mg-mcg-mcg tab Centrum Silver tablet   Prescribed by non [facility] MD      methotrexate (RHEUMATREX) 2.5 mg tablet methotrexate sodium 2.5 mg tablet      raNITIdine (ZANTAC) 150 mg tablet ranitidine 150 mg tablet      aspirin delayed-release 81 mg tablet aspirin 81 mg tablet,delayed release   Prescribed by non [facility] MD      fluticasone propion-salmeterol (ADVAIR/WIXELA) 250-50 mcg/dose diskus inhaler Take 1 Puff by inhalation every twelve (12) hours. 3 Inhaler 3    allopurinol (ZYLOPRIM) 300 mg tablet Take 1 Tab by mouth daily. 90 Tab 0    atorvastatin (LIPITOR) 20 mg tablet Take 1 Tab by mouth daily. 90 Tab 3    losartan-hydroCHLOROthiazide (HYZAAR) 100-25 mg per tablet Take 1 Tab by mouth daily. 90 Tab 3    folic acid (FOLVITE) 1 mg tablet Take 1 Tab by mouth daily. 90 Tab 0    Blood Glucose Control High&Low (ACCU-CHEK JENNIE CONTROL SOLN) soln Use as directed for controls as needed. DX: E11.9 1 Bottle 6    Blood-Glucose Meter (ACCU-CHEK JENNIE PLUS METER) misc Test blood sugar twice daily. DX: E11.9.  Substitute supply brand accepted by insurance. 1 Each 0    Lancets (ACCU-CHEK SOFTCLIX LANCETS) misc Test blood sugar twice daily. DX: E11.9. Substitute supply brand accepted by insurance. 1 Each 11    glucose blood VI test strips (ACCU-CHEK JENNIE PLUS TEST STRP) strip Test blood sugar twice daily. DX: E11.9. Substitute supply brand accepted by insurance. 100 Strip 11    alcohol swabs (ALCOHOL WIPES) padm Test blood sugar twice daily. DX: E11.9. Substitute supply brand accepted by insurance. 100 Pad 1    albuterol (PROVENTIL VENTOLIN) 2.5 mg /3 mL (0.083 %) nebulizer solution 3 mL by Nebulization route every six (6) hours as needed for Wheezing. 60 Each 2    albuterol (PROVENTIL HFA, VENTOLIN HFA, PROAIR HFA) 90 mcg/actuation inhaler Take 2 Puffs by inhalation every six (6) hours as needed for Wheezing. 1 Inhaler 1    Cetirizine (ZYRTEC) 10 mg Cap Take 10 mg by mouth daily.  ergocalciferol (ERGOCALCIFEROL) 50,000 unit capsule Take 1 Cap by mouth every seven (7) days. 12 Cap 3       Allergies   Allergen Reactions    Augmentin [Amoxicillin-Pot Clavulanate] Diarrhea    Bactrim [Sulfamethoxazole-Trimethoprim] Nausea Only    Biaxin [Clarithromycin] Nausea Only    Demerol [Meperidine] Itching    Erythromycin Nausea Only    Gabapentin Nausea and Vomiting    Pcn [Penicillins] Hives    Percocet [Oxycodone-Acetaminophen] Itching    Pravastatin Nausea Only    Tetracycline Nausea Only    Voltaren [Diclofenac Sodium] Hives       ROS:   Review of Systems   Constitutional: Positive for malaise/fatigue. Eyes: Negative for blurred vision. Respiratory: Positive for wheezing. Negative for shortness of breath. Cardiovascular: Negative for chest pain. Musculoskeletal: Positive for back pain. Objective:     Visit Vitals  /76   Pulse 60   Temp 98.6 °F (37 °C) (Oral)   Resp 18   Ht 5' 4\" (1.626 m)   Wt 225 lb (102.1 kg)   SpO2 96%   BMI 38.62 kg/m²       Vitals and Nurse Documentation reviewed.      Physical Exam  Constitutional: General: She is not in acute distress. Cardiovascular:      Heart sounds: S1 normal and S2 normal. No murmur. No friction rub. No gallop. Pulmonary:      Effort: No respiratory distress. Breath sounds: Wheezing present. Skin:     General: Skin is warm and dry.    Psychiatric:         Mood and Affect: Mood and affect normal.

## 2020-07-31 NOTE — PROGRESS NOTES
Chief Complaint   Patient presents with    Diabetes    Arm Pain     right        1. Have you been to the ER, urgent care clinic since your last visit? Hospitalized since your last visit? No    2. Have you seen or consulted any other health care providers outside of the [address] [place] [address] since your last visit? Include any pap smears or colon screening.  No

## 2020-08-10 ENCOUNTER — TELEPHONE (OUTPATIENT)
Dept: FAMILY MEDICINE CLINIC | Age: 70
End: 2020-08-10

## 2020-08-10 DIAGNOSIS — G89.4 CHRONIC PAIN DISORDER: ICD-10-CM

## 2020-08-13 RX ORDER — HYDROCODONE BITARTRATE AND ACETAMINOPHEN 10; 325 MG/1; MG/1
1 TABLET ORAL
Qty: 21 TAB | Refills: 0 | Status: SHIPPED | OUTPATIENT
Start: 2020-09-11 | End: 2020-08-14 | Stop reason: SDUPTHER

## 2020-08-13 NOTE — TELEPHONE ENCOUNTER
----- Message from Laila Tirado sent at 8/11/2020  5:37 PM EDT -----  Regarding: CARLOS EDUARDO Cervantes/Telephone  Medication Refill:          Caller (if not patient): Pt    Relationship of caller (if not patient): n/a    Best contact number(s): [phone]    Name of medication and dosage if known: \"Hydrocodone\" 10-325mg    Is patient out of this medication (yes/no): yes    Pharmacy name: [address] [place] [address] listed in chart? (yes/no): yes    Pharmacy phone number: n/a    Date of last visit:7-31-20      Pt should have received this med, since it was sent to [address] [place] [address] on 07/31/2020. Outbound call to pt, no answer  Details to clarify the request - Need 5 to 7 days' worth of meds, until the medication arrives from [address] [place] [address].         Laila Tirado
[phone]- spoke with pt and informed her that a 7 day prescription was sent to the pharmacy until she can get her prescription through [employer] [facility] [employer] [facility].  Pt understands
Can these pharmacies just transfer the prescription?
Khloe Simeon called again inq about pt's med for hydrocodone and whether a temporary rx was being sent to the pt. Pt has called several times stating that she has not received her rx yet.         BCB#[phone]
Prescription for 7 days sent to pharmacy.
Pt called stating her rx for hydrocodone should have gone to  [facility] -- [address] pt states that this med always goes to [facility]
Pt following up again for her refills. Please advice.     BCB: [phone]
Pt was seen 07/31/2020 and as per the pt, Rosa Maria Martinez was supposed to call in her HYDROcodone-acetaminophen (NORCO)  mg tablet . Please check and let pt know when it is ready.     BCB: [phone]
18

## 2020-08-14 ENCOUNTER — TELEPHONE (OUTPATIENT)
Dept: FAMILY MEDICINE CLINIC | Age: 70
End: 2020-08-14

## 2020-08-14 DIAGNOSIS — G89.4 CHRONIC PAIN DISORDER: ICD-10-CM

## 2020-08-14 RX ORDER — HYDROCODONE BITARTRATE AND ACETAMINOPHEN 10; 325 MG/1; MG/1
1 TABLET ORAL
Qty: 21 TAB | Refills: 0 | Status: SHIPPED | OUTPATIENT
Start: 2020-08-14 | End: 2020-09-30 | Stop reason: SDUPTHER

## 2020-08-14 NOTE — TELEPHONE ENCOUNTER
----- Message from Maciej Briceno sent at 8/13/2020  6:02 PM EDT -----  Regarding: NP [place]/telephone  General Message/Vendor Calls    Caller's first and last name: Sidra Ni from [employer]      Reason for call:medication that was sent in today had a effective date for 9/11/2020 the pt was expecting the medication today, checking to see if the rx for  hydrocodone will be resubmitted with a current effective date so pt can  her rx      Callback required yes/no and why:yes for reason given above       Best contact number(s):  [phone]    Details to clarify the request:      Maciej Briceno

## 2020-09-19 ENCOUNTER — TELEPHONE (OUTPATIENT)
Dept: FAMILY MEDICINE CLINIC | Age: 70
End: 2020-09-19

## 2020-09-19 DIAGNOSIS — G89.4 CHRONIC PAIN DISORDER: ICD-10-CM

## 2020-09-19 NOTE — TELEPHONE ENCOUNTER
----- Message from Mariam Thundersoft sent at 9/14/2020 12:33 PM EDT -----  Regarding: CARLOS EDUARDO Cervantes/ telephone  Caller's first and last name: Pt  Reason for call: Needs new RX called in for her Hydrocodone  10/325 mg  Callback required yes/no and why: yes  Best contact number(s): [phone]  Details to clarify the request: N/A

## 2020-09-19 NOTE — TELEPHONE ENCOUNTER
----- Message from Ronald Ho sent at 9/16/2020 11:29 AM EDT -----  Regarding: CARLOS EDUARDO Cervantes/ telephone  Caller's first and last name: pt.   Reason for call: pt. states her Hydrocodone was not called into her pharmacy [facility] [phone]).    Callback required yes/no and why: yes   Best contact number(s):  [phone]   Details to clarify the request: Pt. states she called in a couple of days ago in regards to this request.

## 2020-09-21 RX ORDER — HYDROCODONE BITARTRATE AND ACETAMINOPHEN 10; 325 MG/1; MG/1
1 TABLET ORAL
Qty: 21 TAB | Refills: 0 | OUTPATIENT
Start: 2020-09-21 | End: 2020-09-28

## 2020-09-28 ENCOUNTER — TELEPHONE (OUTPATIENT)
Dept: FAMILY MEDICINE CLINIC | Age: 70
End: 2020-09-28

## 2020-09-28 DIAGNOSIS — G89.4 CHRONIC PAIN DISORDER: ICD-10-CM

## 2020-09-28 NOTE — TELEPHONE ENCOUNTER
----- Message from Viky Gonzalez sent at 9/23/2020 12:49 PM EDT -----  Regarding: THA Cervantes/ telephone  Caller's first and last name: pt.   Reason for call: pt. states provider called in a 30 day supply of the Hydrocodone medication, However, [facility] cancelled it and they only gave her a seven day supply.  Pt. states it needs to be a 30 day supply and would like for it to be called into [address] [place] [address] (on file #[phone])  Callback required yes/no and why: yes   Best contact number(s): [phone]   Details to clarify the request: n.a

## 2020-09-30 RX ORDER — HYDROCODONE BITARTRATE AND ACETAMINOPHEN 10; 325 MG/1; MG/1
1 TABLET ORAL
Qty: 90 TAB | Refills: 0 | Status: SHIPPED | OUTPATIENT
Start: 2020-09-30 | End: 2020-10-30 | Stop reason: SDUPTHER

## 2020-10-06 ENCOUNTER — HOSPITAL ENCOUNTER (OUTPATIENT)
Age: 70
Setting detail: OBSERVATION
Discharge: HOME | End: 2020-10-10
Attending: EMERGENCY MEDICINE | Admitting: FAMILY MEDICINE
Payer: MEDICARE

## 2020-10-06 ENCOUNTER — VIRTUAL VISIT (OUTPATIENT)
Dept: FAMILY MEDICINE CLINIC | Age: 70
End: 2020-10-06
Payer: MEDICARE

## 2020-10-06 ENCOUNTER — APPOINTMENT (OUTPATIENT)
Dept: GENERAL RADIOLOGY | Age: 70
End: 2020-10-06
Attending: EMERGENCY MEDICINE
Payer: MEDICARE

## 2020-10-06 ENCOUNTER — APPOINTMENT (OUTPATIENT)
Dept: CT IMAGING | Age: 70
End: 2020-10-06
Attending: EMERGENCY MEDICINE
Payer: MEDICARE

## 2020-10-06 DIAGNOSIS — R11.2 NAUSEA AND VOMITING, INTRACTABILITY OF VOMITING NOT SPECIFIED, UNSPECIFIED VOMITING TYPE: ICD-10-CM

## 2020-10-06 DIAGNOSIS — J45.40 MODERATE PERSISTENT ASTHMA, UNSPECIFIED WHETHER COMPLICATED: ICD-10-CM

## 2020-10-06 DIAGNOSIS — R74.8 ELEVATED ALKALINE PHOSPHATASE LEVEL: ICD-10-CM

## 2020-10-06 DIAGNOSIS — R41.0 DELIRIUM: Primary | ICD-10-CM

## 2020-10-06 DIAGNOSIS — R11.10 NON-INTRACTABLE VOMITING, PRESENCE OF NAUSEA NOT SPECIFIED, UNSPECIFIED VOMITING TYPE: ICD-10-CM

## 2020-10-06 DIAGNOSIS — R94.31 ABNORMAL EKG: ICD-10-CM

## 2020-10-06 DIAGNOSIS — E11.42 TYPE 2 DIABETES MELLITUS WITH DIABETIC POLYNEUROPATHY, WITHOUT LONG-TERM CURRENT USE OF INSULIN (HCC): ICD-10-CM

## 2020-10-06 DIAGNOSIS — J45.909 UNCOMPLICATED ASTHMA: ICD-10-CM

## 2020-10-06 DIAGNOSIS — R41.0 CONFUSION: Primary | ICD-10-CM

## 2020-10-06 DIAGNOSIS — R19.7 DIARRHEA, UNSPECIFIED TYPE: ICD-10-CM

## 2020-10-06 LAB
ALBUMIN SERPL-MCNC: 3.9 G/DL (ref 3.5–5)
ALBUMIN/GLOB SERPL: 0.8 {RATIO} (ref 1.1–2.2)
ALP SERPL-CCNC: 348 U/L (ref 45–117)
ALT SERPL-CCNC: 36 U/L (ref 12–78)
ANION GAP SERPL CALC-SCNC: 8 MMOL/L (ref 5–15)
AST SERPL-CCNC: 18 U/L (ref 15–37)
BASOPHILS # BLD: 0.1 K/UL (ref 0–0.1)
BASOPHILS NFR BLD: 0 % (ref 0–1)
BILIRUB SERPL-MCNC: 0.4 MG/DL (ref 0.2–1)
BUN SERPL-MCNC: 19 MG/DL (ref 6–20)
BUN/CREAT SERPL: 14 (ref 12–20)
CALCIUM SERPL-MCNC: 9.8 MG/DL (ref 8.5–10.1)
CHLORIDE SERPL-SCNC: 102 MMOL/L (ref 97–108)
CO2 SERPL-SCNC: 27 MMOL/L (ref 21–32)
COMMENT, HOLDF: NORMAL
CORTIS SERPL-MCNC: 38.3 UG/DL
CREAT SERPL-MCNC: 1.32 MG/DL (ref 0.55–1.02)
DIFFERENTIAL METHOD BLD: ABNORMAL
EOSINOPHIL # BLD: 0 K/UL (ref 0–0.4)
EOSINOPHIL NFR BLD: 0 % (ref 0–7)
ERYTHROCYTE [DISTWIDTH] IN BLOOD BY AUTOMATED COUNT: 14.8 % (ref 11.5–14.5)
GLOBULIN SER CALC-MCNC: 4.7 G/DL (ref 2–4)
GLUCOSE BLD STRIP.AUTO-MCNC: 300 MG/DL (ref 65–100)
GLUCOSE SERPL-MCNC: 292 MG/DL (ref 65–100)
HCT VFR BLD AUTO: 46.5 % (ref 35–47)
HGB BLD-MCNC: 15.3 G/DL (ref 11.5–16)
IMM GRANULOCYTES # BLD AUTO: 0.1 K/UL (ref 0–0.04)
IMM GRANULOCYTES NFR BLD AUTO: 1 % (ref 0–0.5)
LIPASE SERPL-CCNC: 102 U/L (ref 73–393)
LYMPHOCYTES # BLD: 1.2 K/UL (ref 0.8–3.5)
LYMPHOCYTES NFR BLD: 9 % (ref 12–49)
MCH RBC QN AUTO: 29.1 PG (ref 26–34)
MCHC RBC AUTO-ENTMCNC: 32.9 G/DL (ref 30–36.5)
MCV RBC AUTO: 88.6 FL (ref 80–99)
MONOCYTES # BLD: 0.9 K/UL (ref 0–1)
MONOCYTES NFR BLD: 8 % (ref 5–13)
NEUTS SEG # BLD: 10.2 K/UL (ref 1.8–8)
NEUTS SEG NFR BLD: 82 % (ref 32–75)
NRBC # BLD: 0 K/UL (ref 0–0.01)
NRBC BLD-RTO: 0 PER 100 WBC
PLATELET # BLD AUTO: 227 K/UL (ref 150–400)
PMV BLD AUTO: 12.6 FL (ref 8.9–12.9)
POTASSIUM SERPL-SCNC: 3.6 MMOL/L (ref 3.5–5.1)
PROT SERPL-MCNC: 8.6 G/DL (ref 6.4–8.2)
RBC # BLD AUTO: 5.25 M/UL (ref 3.8–5.2)
SAMPLES BEING HELD,HOLD: NORMAL
SERVICE CMNT-IMP: ABNORMAL
SODIUM SERPL-SCNC: 137 MMOL/L (ref 136–145)
TSH SERPL DL<=0.05 MIU/L-ACNC: 1.15 UIU/ML (ref 0.36–3.74)
WBC # BLD AUTO: 12.4 K/UL (ref 3.6–11)

## 2020-10-06 PROCEDURE — 80053 COMPREHEN METABOLIC PANEL: CPT

## 2020-10-06 PROCEDURE — 36415 COLL VENOUS BLD VENIPUNCTURE: CPT

## 2020-10-06 PROCEDURE — G8756 NO BP MEASURE DOC: HCPCS | Performed by: FAMILY MEDICINE

## 2020-10-06 PROCEDURE — 3017F COLORECTAL CA SCREEN DOC REV: CPT | Performed by: FAMILY MEDICINE

## 2020-10-06 PROCEDURE — 65270000032 HC RM SEMIPRIVATE

## 2020-10-06 PROCEDURE — 99213 OFFICE O/P EST LOW 20 MIN: CPT | Performed by: FAMILY MEDICINE

## 2020-10-06 PROCEDURE — 1090F PRES/ABSN URINE INCON ASSESS: CPT | Performed by: FAMILY MEDICINE

## 2020-10-06 PROCEDURE — 84443 ASSAY THYROID STIM HORMONE: CPT

## 2020-10-06 PROCEDURE — G8428 CUR MEDS NOT DOCUMENT: HCPCS | Performed by: FAMILY MEDICINE

## 2020-10-06 PROCEDURE — 70450 CT HEAD/BRAIN W/O DYE: CPT

## 2020-10-06 PROCEDURE — 82533 TOTAL CORTISOL: CPT

## 2020-10-06 PROCEDURE — 1101F PT FALLS ASSESS-DOCD LE1/YR: CPT | Performed by: FAMILY MEDICINE

## 2020-10-06 PROCEDURE — 74011250636 HC RX REV CODE- 250/636: Performed by: EMERGENCY MEDICINE

## 2020-10-06 PROCEDURE — 82962 GLUCOSE BLOOD TEST: CPT

## 2020-10-06 PROCEDURE — 51701 INSERT BLADDER CATHETER: CPT

## 2020-10-06 PROCEDURE — 74011250636 HC RX REV CODE- 250/636: Performed by: HOSPITALIST

## 2020-10-06 PROCEDURE — G8432 DEP SCR NOT DOC, RNG: HCPCS | Performed by: FAMILY MEDICINE

## 2020-10-06 PROCEDURE — 65390000012 HC CONDITION CODE 44 OBSERVATION

## 2020-10-06 PROCEDURE — 65270000029 HC RM PRIVATE

## 2020-10-06 PROCEDURE — 71046 X-RAY EXAM CHEST 2 VIEWS: CPT

## 2020-10-06 PROCEDURE — 96374 THER/PROPH/DIAG INJ IV PUSH: CPT

## 2020-10-06 PROCEDURE — G8399 PT W/DXA RESULTS DOCUMENT: HCPCS | Performed by: FAMILY MEDICINE

## 2020-10-06 PROCEDURE — 83690 ASSAY OF LIPASE: CPT

## 2020-10-06 PROCEDURE — 99283 EMERGENCY DEPT VISIT LOW MDM: CPT

## 2020-10-06 PROCEDURE — 85025 COMPLETE CBC W/AUTO DIFF WBC: CPT

## 2020-10-06 RX ORDER — IBUPROFEN 200 MG
1 TABLET ORAL EVERY 24 HOURS
Status: DISCONTINUED | OUTPATIENT
Start: 2020-10-06 | End: 2020-10-10 | Stop reason: HOSPADM

## 2020-10-06 RX ORDER — MAGNESIUM SULFATE 100 %
4 CRYSTALS MISCELLANEOUS AS NEEDED
Status: DISCONTINUED | OUTPATIENT
Start: 2020-10-06 | End: 2020-10-10 | Stop reason: HOSPADM

## 2020-10-06 RX ORDER — SODIUM CHLORIDE 0.9 % (FLUSH) 0.9 %
5-40 SYRINGE (ML) INJECTION EVERY 8 HOURS
Status: DISCONTINUED | OUTPATIENT
Start: 2020-10-06 | End: 2020-10-10 | Stop reason: HOSPADM

## 2020-10-06 RX ORDER — SODIUM CHLORIDE 0.9 % (FLUSH) 0.9 %
5-40 SYRINGE (ML) INJECTION AS NEEDED
Status: DISCONTINUED | OUTPATIENT
Start: 2020-10-06 | End: 2020-10-10 | Stop reason: HOSPADM

## 2020-10-06 RX ORDER — IBUPROFEN 200 MG
1 TABLET ORAL DAILY
Status: DISCONTINUED | OUTPATIENT
Start: 2020-10-07 | End: 2020-10-06

## 2020-10-06 RX ORDER — POLYETHYLENE GLYCOL 3350 17 G/17G
17 POWDER, FOR SOLUTION ORAL DAILY PRN
Status: DISCONTINUED | OUTPATIENT
Start: 2020-10-06 | End: 2020-10-10 | Stop reason: HOSPADM

## 2020-10-06 RX ORDER — PROMETHAZINE HYDROCHLORIDE 25 MG/1
12.5 TABLET ORAL
Status: DISCONTINUED | OUTPATIENT
Start: 2020-10-06 | End: 2020-10-07

## 2020-10-06 RX ORDER — ALLOPURINOL 300 MG/1
300 TABLET ORAL DAILY
Status: DISCONTINUED | OUTPATIENT
Start: 2020-10-07 | End: 2020-10-10 | Stop reason: HOSPADM

## 2020-10-06 RX ORDER — ASPIRIN 81 MG/1
81 TABLET ORAL DAILY
Status: DISCONTINUED | OUTPATIENT
Start: 2020-10-07 | End: 2020-10-10 | Stop reason: HOSPADM

## 2020-10-06 RX ORDER — ONDANSETRON 2 MG/ML
4 INJECTION INTRAMUSCULAR; INTRAVENOUS
Status: DISCONTINUED | OUTPATIENT
Start: 2020-10-06 | End: 2020-10-07

## 2020-10-06 RX ORDER — HYDRALAZINE HYDROCHLORIDE 20 MG/ML
20 INJECTION INTRAMUSCULAR; INTRAVENOUS
Status: DISCONTINUED | OUTPATIENT
Start: 2020-10-06 | End: 2020-10-07

## 2020-10-06 RX ORDER — ENOXAPARIN SODIUM 100 MG/ML
40 INJECTION SUBCUTANEOUS DAILY
Status: DISCONTINUED | OUTPATIENT
Start: 2020-10-07 | End: 2020-10-10 | Stop reason: HOSPADM

## 2020-10-06 RX ORDER — PREGABALIN 100 MG/1
200 CAPSULE ORAL 3 TIMES DAILY
Status: DISCONTINUED | OUTPATIENT
Start: 2020-10-06 | End: 2020-10-10 | Stop reason: HOSPADM

## 2020-10-06 RX ORDER — ACETAMINOPHEN 650 MG/1
650 SUPPOSITORY RECTAL
Status: DISCONTINUED | OUTPATIENT
Start: 2020-10-06 | End: 2020-10-10 | Stop reason: HOSPADM

## 2020-10-06 RX ORDER — INSULIN LISPRO 100 [IU]/ML
INJECTION, SOLUTION INTRAVENOUS; SUBCUTANEOUS EVERY 6 HOURS
Status: DISCONTINUED | OUTPATIENT
Start: 2020-10-07 | End: 2020-10-09

## 2020-10-06 RX ORDER — ACETAMINOPHEN 325 MG/1
650 TABLET ORAL
Status: DISCONTINUED | OUTPATIENT
Start: 2020-10-06 | End: 2020-10-10 | Stop reason: HOSPADM

## 2020-10-06 RX ORDER — SODIUM CHLORIDE, SODIUM LACTATE, POTASSIUM CHLORIDE, CALCIUM CHLORIDE 600; 310; 30; 20 MG/100ML; MG/100ML; MG/100ML; MG/100ML
100 INJECTION, SOLUTION INTRAVENOUS CONTINUOUS
Status: DISCONTINUED | OUTPATIENT
Start: 2020-10-06 | End: 2020-10-09

## 2020-10-06 RX ORDER — FOLIC ACID 1 MG/1
1 TABLET ORAL DAILY
Status: DISCONTINUED | OUTPATIENT
Start: 2020-10-07 | End: 2020-10-10 | Stop reason: HOSPADM

## 2020-10-06 RX ORDER — DEXTROSE MONOHYDRATE 100 MG/ML
0-250 INJECTION, SOLUTION INTRAVENOUS AS NEEDED
Status: DISCONTINUED | OUTPATIENT
Start: 2020-10-06 | End: 2020-10-10 | Stop reason: HOSPADM

## 2020-10-06 RX ORDER — ALBUTEROL SULFATE 90 UG/1
2 AEROSOL, METERED RESPIRATORY (INHALATION)
Status: DISCONTINUED | OUTPATIENT
Start: 2020-10-06 | End: 2020-10-07 | Stop reason: CLARIF

## 2020-10-06 RX ADMIN — ONDANSETRON HYDROCHLORIDE 4 MG: 2 INJECTION, SOLUTION INTRAMUSCULAR; INTRAVENOUS at 23:56

## 2020-10-06 RX ADMIN — SODIUM CHLORIDE 1000 ML: 900 INJECTION, SOLUTION INTRAVENOUS at 22:01

## 2020-10-06 NOTE — ED PROVIDER NOTES
HPI .  Patient has a history of hyperlipidemia, hypertension, diabetes, polymyalgia rheumatica, asthma, empty sella syndrome, and atrial thrombus. Patient works 3 days a week with [employer]. She reportedly fixed dinner for her family 4 days ago. 3 days ago patient developed nausea vomiting and diarrhea. She has developed weakness. She can still walk although slowly. P.o. intake has been minimal of both liquids and solids. Daughter reports that her medications were askew and she may have not been taking them properly the last day or 2. She seems quite confused compared to usual.  She was not recognizing landmarks near her home. She has become quite disoriented. Patient had a similar more severe episode and was admitted at [facility].  She became very debilitated and was unable to bathe herself or take care of herself for a number of weeks at that time but then recovered.     Past Medical History:   Diagnosis Date    Abnormal pulmonary function test 9/28/2016    AR (allergic rhinitis)     Asthma     Atrial thrombus 1994    left    Chronic bronchitis (HCC) 9/28/2016    Chronic pain     DDD (degenerative disc disease), lumbar     Diabetes ([address]     DJD (degenerative joint disease) of hip     right    Empty sella ([address] 2000    by MRI    Former smoker 10/24/2016    Hoarseness of voice     Hypercholesterolemia     Hypertension     Lesion of vocal cord     PMR (polymyalgia rheumatica) ([address] 5/18/2016    Smoker 12/5/2012       Past Surgical History:   Procedure Laterality Date    COLONOSCOPY N/A 5/30/2018    COLONOSCOPY performed by Carlos Gray MD at [facility]; HI RISK IND  5/30/2018         ENDOSCOPY, COLON, DIAGNOSTIC  10/03    polypectomy    ENDOSCOPY, COLON, DIAGNOSTIC  2/05    Dr. Washington Grey, COLON, DIAGNOSTIC  2010, reported    due 2015    HX APPENDECTOMY  1970's    HX BREAST REDUCTION      HX COLONOSCOPY  2015    dr Azam Ibrahim.  2 polyps removed -repeat in 2018     HX HEMORRHOIDECTOMY      HX HYSTERECTOMY      ovaries spared    HX KNEE REPLACEMENT  11/03, 10/05    left then right    HX TONSILLECTOMY  age 29    tonsils         Family History:   Problem Relation Age of Onset    Arthritis-osteo Mother     Asthma Mother     Diabetes Mother     Hypertension Mother     Stroke Mother     Arthritis-osteo Father     Cancer Father     Elevated Lipids Father     Hypertension Father     Arthritis-osteo Sister     Asthma Sister     Diabetes Sister     Elevated Lipids Sister     Hypertension Sister     Heart Attack Sister     Hypertension Daughter     Hypertension Daughter     Hypertension Daughter     Anesth Problems Neg Hx        Social History     Socioeconomic History    Marital status:      Spouse name: Not on file    Number of children: Not on file    Years of education: Not on file    Highest education level: Not on file   Occupational History    Not on file   Social Needs    Financial resource strain: Not on file    Food insecurity     Worry: Not on file     Inability: Not on file    Transportation needs     Medical: Not on file     Non-medical: Not on file   Tobacco Use    Smoking status: Current Every Day Smoker     Packs/day: 0.50     Years: 40.00     Pack years: 20.00     Types: Cigarettes    Smokeless tobacco: Never Used   Substance and Sexual Activity    Alcohol use: No     Alcohol/week: 0.0 standard drinks    Drug use: No    Sexual activity: Yes     Partners: Male   Lifestyle    Physical activity     Days per week: Not on file     Minutes per session: Not on file    Stress: Not on file   Relationships    Social connections     Talks on phone: Not on file     Gets together: Not on file     Attends Islam service: Not on file     Active member of club or organization: Not on file     Attends meetings of clubs or organizations: Not on file     Relationship status: Not on file    Intimate partner violence     Fear of current or ex partner: Not on file     Emotionally abused: Not on file     Physically abused: Not on file     Forced sexual activity: Not on file   Other Topics Concern    Not on file   Social History Narrative    Not on file         ALLERGIES: Augmentin [amoxicillin-pot clavulanate]; Bactrim [sulfamethoxazole-trimethoprim]; Biaxin [clarithromycin]; Demerol [meperidine]; Erythromycin; Gabapentin; Pcn [penicillins]; Percocet [oxycodone-acetaminophen]; Pravastatin; Tetracycline; and Voltaren [diclofenac sodium]    Review of Systems   Reason unable to perform ROS: Patient tires quickly and refuses to answer review of system questions. Vitals:    10/06/20 1727   BP: (!) 165/72   Pulse: 86   Resp: 18   Temp: 98.2 °F (36.8 °C)   SpO2: 95%            Physical Exam  Vitals signs and nursing note reviewed. Constitutional:       Appearance: She is well-developed. She is obese. HENT:      Head: Normocephalic and atraumatic. Eyes:      Pupils: Pupils are equal, round, and reactive to light. Neck:      Musculoskeletal: Normal range of motion and neck supple. Cardiovascular:      Rate and Rhythm: Normal rate and regular rhythm. Heart sounds: Normal heart sounds. No murmur. No friction rub. No gallop. Pulmonary:      Effort: Pulmonary effort is normal. No respiratory distress. Breath sounds: No wheezing or rales. Abdominal:      Palpations: Abdomen is soft. Tenderness: There is no abdominal tenderness. There is no rebound. Musculoskeletal: Normal range of motion. General: No tenderness. Skin:     Findings: No erythema. Neurological:      Mental Status: She is alert. Cranial Nerves: No cranial nerve deficit.       Comments: Motor; symmetric; not oriented to president year or month; she does know she is at the hospital   Psychiatric:         Behavior: Behavior normal.          MDM       Procedures        Perfect Serve Consult for Admission  9:34 PM    ED Room Number: ER13/13  Patient Name and age:  Lisbeth Figueroa 72 y.o.  female  Working Diagnosis:   1. Confusion    2. Non-intractable vomiting, presence of nausea not specified, unspecified vomiting type    3.  Diarrhea, unspecified type        COVID-19 Suspicion:  no  Sepsis present:  no  Reassessment needed: yes  Code Status:  Full Code  Readmission: no  Isolation Requirements:  no  Recommended Level of Care:  telemetry  Department:[facility] Adult ED - 21   Other: Works 3 days a week; cooked dinner 4 nights ago for family; now oriented only to name and place

## 2020-10-07 ENCOUNTER — TELEPHONE (OUTPATIENT)
Dept: FAMILY MEDICINE CLINIC | Age: 70
End: 2020-10-07

## 2020-10-07 ENCOUNTER — APPOINTMENT (OUTPATIENT)
Dept: CT IMAGING | Age: 70
End: 2020-10-07
Attending: NURSE PRACTITIONER
Payer: MEDICARE

## 2020-10-07 ENCOUNTER — APPOINTMENT (OUTPATIENT)
Dept: NON INVASIVE DIAGNOSTICS | Age: 70
End: 2020-10-07
Attending: NURSE PRACTITIONER
Payer: MEDICARE

## 2020-10-07 PROBLEM — R10.9 ABDOMINAL PAIN: Status: ACTIVE | Noted: 2020-10-07

## 2020-10-07 LAB
APPEARANCE UR: ABNORMAL
ATRIAL RATE: 81 BPM
BACTERIA URNS QL MICRO: ABNORMAL /HPF
BILIRUB UR QL: NEGATIVE
CALCULATED P AXIS, ECG09: 72 DEGREES
CALCULATED R AXIS, ECG10: -20 DEGREES
CALCULATED T AXIS, ECG11: -27 DEGREES
COLOR UR: ABNORMAL
DIAGNOSIS, 93000: NORMAL
ECHO AO ROOT DIAM: 2.88 CM
ECHO AV AREA PEAK VELOCITY: 2.23 CM2
ECHO AV AREA PEAK VELOCITY: 2.43 CM2
ECHO AV AREA PEAK VELOCITY: 2.48 CM2
ECHO AV PEAK GRADIENT: 7.18 MMHG
ECHO AV PEAK VELOCITY: 133.95 CM/S
ECHO LA MAJOR AXIS: 2.58 CM
ECHO LA MINOR AXIS: 1.29 CM
ECHO LV E' LATERAL VELOCITY: 5.4 CM/S
ECHO LV E' SEPTAL VELOCITY: 4.59 CM/S
ECHO LV INTERNAL DIMENSION DIASTOLIC: 3.66 CM (ref 3.9–5.3)
ECHO LV INTERNAL DIMENSION SYSTOLIC: 2.12 CM
ECHO LV IVSD: 0.9 CM (ref 0.6–0.9)
ECHO LV MASS 2D: 94.5 G (ref 67–162)
ECHO LV MASS INDEX 2D: 47.2 G/M2 (ref 43–95)
ECHO LV POSTERIOR WALL DIASTOLIC: 0.89 CM (ref 0.6–0.9)
ECHO LVOT DIAM: 1.98 CM
ECHO LVOT PEAK GRADIENT: 4.51 MMHG
ECHO LVOT PEAK VELOCITY: 106.17 CM/S
ECHO MV A VELOCITY: 78.2 CM/S
ECHO MV AREA PHT: 3.05 CM2
ECHO MV E DECELERATION TIME (DT): 0.25 S
ECHO MV E VELOCITY: 50.09 CM/S
ECHO MV E/A RATIO: 0.64
ECHO MV E/E' LATERAL: 9.28
ECHO MV E/E' RATIO (AVERAGED): 10.09
ECHO MV E/E' SEPTAL: 10.91
ECHO MV PRESSURE HALF TIME (PHT): 0.07 S
ECHO PV MAX VELOCITY: 84.66 CM/S
ECHO PV PEAK INSTANTANEOUS GRADIENT SYSTOLIC: 2.87 MMHG
ECHO RV TAPSE: 1.91 CM (ref 1.5–2)
EPITH CASTS URNS QL MICRO: ABNORMAL /LPF
GLUCOSE BLD STRIP.AUTO-MCNC: 160 MG/DL (ref 65–100)
GLUCOSE BLD STRIP.AUTO-MCNC: 172 MG/DL (ref 65–100)
GLUCOSE BLD STRIP.AUTO-MCNC: 200 MG/DL (ref 65–100)
GLUCOSE BLD STRIP.AUTO-MCNC: 242 MG/DL (ref 65–100)
GLUCOSE BLD STRIP.AUTO-MCNC: 304 MG/DL (ref 65–100)
GLUCOSE BLD STRIP.AUTO-MCNC: 322 MG/DL (ref 65–100)
GLUCOSE UR STRIP.AUTO-MCNC: >1000 MG/DL
HGB UR QL STRIP: ABNORMAL
KETONES UR QL STRIP.AUTO: 15 MG/DL
LEUKOCYTE ESTERASE UR QL STRIP.AUTO: NEGATIVE
MUCOUS THREADS URNS QL MICRO: ABNORMAL /LPF
NITRITE UR QL STRIP.AUTO: NEGATIVE
P-R INTERVAL, ECG05: 124 MS
PH UR STRIP: 5 [PH] (ref 5–8)
PROT UR STRIP-MCNC: 300 MG/DL
Q-T INTERVAL, ECG07: 372 MS
QRS DURATION, ECG06: 72 MS
QTC CALCULATION (BEZET), ECG08: 432 MS
RBC #/AREA URNS HPF: ABNORMAL /HPF (ref 0–5)
SERVICE CMNT-IMP: ABNORMAL
SP GR UR REFRACTOMETRY: 1.03
UR CULT HOLD, URHOLD: NORMAL
UROBILINOGEN UR QL STRIP.AUTO: 1 EU/DL (ref 0.2–1)
VENTRICULAR RATE, ECG03: 81 BPM
WBC URNS QL MICRO: ABNORMAL /HPF (ref 0–4)
YEAST BUDDING URNS QL: PRESENT

## 2020-10-07 PROCEDURE — 93005 ELECTROCARDIOGRAM TRACING: CPT

## 2020-10-07 PROCEDURE — 74011000250 HC RX REV CODE- 250: Performed by: NURSE PRACTITIONER

## 2020-10-07 PROCEDURE — 83605 ASSAY OF LACTIC ACID: CPT

## 2020-10-07 PROCEDURE — 74011250636 HC RX REV CODE- 250/636: Performed by: NURSE PRACTITIONER

## 2020-10-07 PROCEDURE — 74011000250 HC RX REV CODE- 250: Performed by: HOSPITALIST

## 2020-10-07 PROCEDURE — 96375 TX/PRO/DX INJ NEW DRUG ADDON: CPT

## 2020-10-07 PROCEDURE — C8929 TTE W OR WO FOL WCON,DOPPLER: HCPCS

## 2020-10-07 PROCEDURE — 74011250637 HC RX REV CODE- 250/637: Performed by: HOSPITALIST

## 2020-10-07 PROCEDURE — 80048 BASIC METABOLIC PNL TOTAL CA: CPT

## 2020-10-07 PROCEDURE — 82140 ASSAY OF AMMONIA: CPT

## 2020-10-07 PROCEDURE — 84484 ASSAY OF TROPONIN QUANT: CPT

## 2020-10-07 PROCEDURE — 82962 GLUCOSE BLOOD TEST: CPT

## 2020-10-07 PROCEDURE — 87086 URINE CULTURE/COLONY COUNT: CPT

## 2020-10-07 PROCEDURE — 36415 COLL VENOUS BLD VENIPUNCTURE: CPT

## 2020-10-07 PROCEDURE — 77010033678 HC OXYGEN DAILY

## 2020-10-07 PROCEDURE — 87040 BLOOD CULTURE FOR BACTERIA: CPT

## 2020-10-07 PROCEDURE — 96376 TX/PRO/DX INJ SAME DRUG ADON: CPT

## 2020-10-07 PROCEDURE — 96372 THER/PROPH/DIAG INJ SC/IM: CPT

## 2020-10-07 PROCEDURE — 74011250637 HC RX REV CODE- 250/637: Performed by: NURSE PRACTITIONER

## 2020-10-07 PROCEDURE — 74011000250 HC RX REV CODE- 250: Performed by: FAMILY MEDICINE

## 2020-10-07 PROCEDURE — 74011636637 HC RX REV CODE- 636/637: Performed by: HOSPITALIST

## 2020-10-07 PROCEDURE — 99218 HC RM OBSERVATION: CPT

## 2020-10-07 PROCEDURE — 65390000012 HC CONDITION CODE 44 OBSERVATION

## 2020-10-07 PROCEDURE — 36573 INSJ PICC RS&I 5 YR+: CPT | Performed by: NURSE PRACTITIONER

## 2020-10-07 PROCEDURE — 81001 URINALYSIS AUTO W/SCOPE: CPT

## 2020-10-07 PROCEDURE — 2709999900 HC NON-CHARGEABLE SUPPLY

## 2020-10-07 PROCEDURE — C9113 INJ PANTOPRAZOLE SODIUM, VIA: HCPCS | Performed by: NURSE PRACTITIONER

## 2020-10-07 PROCEDURE — 80074 ACUTE HEPATITIS PANEL: CPT

## 2020-10-07 PROCEDURE — 74177 CT ABD & PELVIS W/CONTRAST: CPT

## 2020-10-07 PROCEDURE — 74011000636 HC RX REV CODE- 636: Performed by: RADIOLOGY

## 2020-10-07 PROCEDURE — 76937 US GUIDE VASCULAR ACCESS: CPT

## 2020-10-07 PROCEDURE — 94640 AIRWAY INHALATION TREATMENT: CPT

## 2020-10-07 PROCEDURE — 74011250636 HC RX REV CODE- 250/636: Performed by: FAMILY MEDICINE

## 2020-10-07 PROCEDURE — C1751 CATH, INF, PER/CENT/MIDLINE: HCPCS

## 2020-10-07 PROCEDURE — 99233 SBSQ HOSP IP/OBS HIGH 50: CPT | Performed by: CLINICAL NURSE SPECIALIST

## 2020-10-07 PROCEDURE — 74011000258 HC RX REV CODE- 258: Performed by: NURSE PRACTITIONER

## 2020-10-07 PROCEDURE — 77030020365 HC SOL INJ SOD CL 0.9% 50ML

## 2020-10-07 PROCEDURE — 74011250636 HC RX REV CODE- 250/636: Performed by: HOSPITALIST

## 2020-10-07 PROCEDURE — 74011000258 HC RX REV CODE- 258: Performed by: RADIOLOGY

## 2020-10-07 PROCEDURE — 94664 DEMO&/EVAL PT USE INHALER: CPT

## 2020-10-07 PROCEDURE — 99205 OFFICE O/P NEW HI 60 MIN: CPT | Performed by: SPECIALIST

## 2020-10-07 RX ORDER — IPRATROPIUM BROMIDE 0.5 MG/2.5ML
0.5 SOLUTION RESPIRATORY (INHALATION)
Status: DISCONTINUED | OUTPATIENT
Start: 2020-10-07 | End: 2020-10-10 | Stop reason: HOSPADM

## 2020-10-07 RX ORDER — ONDANSETRON 2 MG/ML
4 INJECTION INTRAMUSCULAR; INTRAVENOUS EVERY 6 HOURS
Status: DISPENSED | OUTPATIENT
Start: 2020-10-07 | End: 2020-10-08

## 2020-10-07 RX ORDER — MORPHINE SULFATE 2 MG/ML
1 INJECTION, SOLUTION INTRAMUSCULAR; INTRAVENOUS
Status: DISCONTINUED | OUTPATIENT
Start: 2020-10-07 | End: 2020-10-08

## 2020-10-07 RX ORDER — ONDANSETRON 2 MG/ML
4 INJECTION INTRAMUSCULAR; INTRAVENOUS EVERY 6 HOURS
Status: DISCONTINUED | OUTPATIENT
Start: 2020-10-07 | End: 2020-10-07

## 2020-10-07 RX ORDER — VANCOMYCIN 2 GRAM/500 ML IN 0.9 % SODIUM CHLORIDE INTRAVENOUS
2000 ONCE
Status: COMPLETED | OUTPATIENT
Start: 2020-10-07 | End: 2020-10-07

## 2020-10-07 RX ORDER — VANCOMYCIN HYDROCHLORIDE
1250 EVERY 24 HOURS
Status: DISCONTINUED | OUTPATIENT
Start: 2020-10-08 | End: 2020-10-07

## 2020-10-07 RX ORDER — ATENOLOL 25 MG/1
25 TABLET ORAL DAILY
Status: DISCONTINUED | OUTPATIENT
Start: 2020-10-07 | End: 2020-10-09

## 2020-10-07 RX ORDER — HYDRALAZINE HYDROCHLORIDE 20 MG/ML
10 INJECTION INTRAMUSCULAR; INTRAVENOUS
Status: DISCONTINUED | OUTPATIENT
Start: 2020-10-07 | End: 2020-10-10 | Stop reason: HOSPADM

## 2020-10-07 RX ORDER — DILTIAZEM HYDROCHLORIDE 180 MG/1
360 CAPSULE, COATED, EXTENDED RELEASE ORAL DAILY
Status: DISCONTINUED | OUTPATIENT
Start: 2020-10-07 | End: 2020-10-10 | Stop reason: HOSPADM

## 2020-10-07 RX ORDER — PROMETHAZINE HYDROCHLORIDE 25 MG/1
12.5 TABLET ORAL EVERY 6 HOURS
Status: DISCONTINUED | OUTPATIENT
Start: 2020-10-07 | End: 2020-10-07

## 2020-10-07 RX ORDER — SODIUM CHLORIDE 0.9 % (FLUSH) 0.9 %
10 SYRINGE (ML) INJECTION
Status: COMPLETED | OUTPATIENT
Start: 2020-10-07 | End: 2020-10-07

## 2020-10-07 RX ORDER — FLUCONAZOLE 100 MG/1
200 TABLET ORAL DAILY
Status: DISCONTINUED | OUTPATIENT
Start: 2020-10-07 | End: 2020-10-08

## 2020-10-07 RX ORDER — ALBUTEROL SULFATE 0.83 MG/ML
2.5 SOLUTION RESPIRATORY (INHALATION)
Status: DISCONTINUED | OUTPATIENT
Start: 2020-10-07 | End: 2020-10-10 | Stop reason: HOSPADM

## 2020-10-07 RX ORDER — INSULIN GLARGINE 100 [IU]/ML
30 INJECTION, SOLUTION SUBCUTANEOUS
Status: DISCONTINUED | OUTPATIENT
Start: 2020-10-07 | End: 2020-10-10 | Stop reason: HOSPADM

## 2020-10-07 RX ORDER — IPRATROPIUM BROMIDE AND ALBUTEROL SULFATE 2.5; .5 MG/3ML; MG/3ML
3 SOLUTION RESPIRATORY (INHALATION)
Status: DISCONTINUED | OUTPATIENT
Start: 2020-10-07 | End: 2020-10-10 | Stop reason: HOSPADM

## 2020-10-07 RX ORDER — HYDRALAZINE HYDROCHLORIDE 25 MG/1
25 TABLET, FILM COATED ORAL
Status: DISCONTINUED | OUTPATIENT
Start: 2020-10-07 | End: 2020-10-07

## 2020-10-07 RX ADMIN — PREGABALIN 200 MG: 100 CAPSULE ORAL at 16:20

## 2020-10-07 RX ADMIN — IPRATROPIUM BROMIDE 0.5 MG: 0.5 SOLUTION RESPIRATORY (INHALATION) at 21:22

## 2020-10-07 RX ADMIN — SODIUM CHLORIDE 40 MG: 9 INJECTION, SOLUTION INTRAMUSCULAR; INTRAVENOUS; SUBCUTANEOUS at 10:30

## 2020-10-07 RX ADMIN — SODIUM CHLORIDE 1.5 ML: 9 INJECTION INTRAMUSCULAR; INTRAVENOUS; SUBCUTANEOUS at 15:00

## 2020-10-07 RX ADMIN — IOPAMIDOL 100 ML: 755 INJECTION, SOLUTION INTRAVENOUS at 08:51

## 2020-10-07 RX ADMIN — FLUCONAZOLE 200 MG: 100 TABLET ORAL at 10:28

## 2020-10-07 RX ADMIN — SODIUM CHLORIDE, SODIUM LACTATE, POTASSIUM CHLORIDE, AND CALCIUM CHLORIDE 125 ML/HR: 600; 310; 30; 20 INJECTION, SOLUTION INTRAVENOUS at 22:27

## 2020-10-07 RX ADMIN — DILTIAZEM HYDROCHLORIDE 360 MG: 180 CAPSULE, COATED, EXTENDED RELEASE ORAL at 14:01

## 2020-10-07 RX ADMIN — PREGABALIN 200 MG: 100 CAPSULE ORAL at 08:22

## 2020-10-07 RX ADMIN — Medication 10 ML: at 08:51

## 2020-10-07 RX ADMIN — INSULIN LISPRO 3 UNITS: 100 INJECTION, SOLUTION INTRAVENOUS; SUBCUTANEOUS at 12:57

## 2020-10-07 RX ADMIN — ASPIRIN 81 MG: 81 TABLET, COATED ORAL at 08:22

## 2020-10-07 RX ADMIN — INSULIN LISPRO 4 UNITS: 100 INJECTION, SOLUTION INTRAVENOUS; SUBCUTANEOUS at 00:48

## 2020-10-07 RX ADMIN — VANCOMYCIN HYDROCHLORIDE 2000 MG: 10 INJECTION, POWDER, LYOPHILIZED, FOR SOLUTION INTRAVENOUS at 13:09

## 2020-10-07 RX ADMIN — ALLOPURINOL 300 MG: 300 TABLET ORAL at 08:22

## 2020-10-07 RX ADMIN — INSULIN LISPRO 3 UNITS: 100 INJECTION, SOLUTION INTRAVENOUS; SUBCUTANEOUS at 18:59

## 2020-10-07 RX ADMIN — SODIUM CHLORIDE 40 MG: 9 INJECTION, SOLUTION INTRAMUSCULAR; INTRAVENOUS; SUBCUTANEOUS at 22:11

## 2020-10-07 RX ADMIN — SODIUM CHLORIDE, SODIUM LACTATE, POTASSIUM CHLORIDE, AND CALCIUM CHLORIDE 125 ML/HR: 600; 310; 30; 20 INJECTION, SOLUTION INTRAVENOUS at 00:28

## 2020-10-07 RX ADMIN — MEROPENEM 500 MG: 500 INJECTION, POWDER, FOR SOLUTION INTRAVENOUS at 18:22

## 2020-10-07 RX ADMIN — ONDANSETRON 4 MG: 2 INJECTION INTRAMUSCULAR; INTRAVENOUS at 18:13

## 2020-10-07 RX ADMIN — ATENOLOL 25 MG: 25 TABLET ORAL at 10:28

## 2020-10-07 RX ADMIN — Medication 20 ML: at 22:18

## 2020-10-07 RX ADMIN — HYDRALAZINE HYDROCHLORIDE 10 MG: 20 INJECTION INTRAMUSCULAR; INTRAVENOUS at 16:20

## 2020-10-07 RX ADMIN — Medication 10 ML: at 06:50

## 2020-10-07 RX ADMIN — PROCHLORPERAZINE EDISYLATE 5 MG: 5 INJECTION INTRAMUSCULAR; INTRAVENOUS at 12:01

## 2020-10-07 RX ADMIN — INSULIN LISPRO 7 UNITS: 100 INJECTION, SOLUTION INTRAVENOUS; SUBCUTANEOUS at 07:12

## 2020-10-07 RX ADMIN — HYDRALAZINE HYDROCHLORIDE 20 MG: 20 INJECTION INTRAMUSCULAR; INTRAVENOUS at 00:26

## 2020-10-07 RX ADMIN — FOLIC ACID 1 MG: 1 TABLET ORAL at 08:22

## 2020-10-07 RX ADMIN — Medication 10 ML: at 22:19

## 2020-10-07 RX ADMIN — SODIUM CHLORIDE 100 ML: 900 INJECTION, SOLUTION INTRAVENOUS at 08:50

## 2020-10-07 RX ADMIN — Medication 20 ML: at 18:28

## 2020-10-07 RX ADMIN — MEROPENEM 500 MG: 500 INJECTION, POWDER, FOR SOLUTION INTRAVENOUS at 10:38

## 2020-10-07 RX ADMIN — HYDRALAZINE HYDROCHLORIDE 25 MG: 25 TABLET, FILM COATED ORAL at 08:27

## 2020-10-07 RX ADMIN — ENOXAPARIN SODIUM 40 MG: 40 INJECTION SUBCUTANEOUS at 08:22

## 2020-10-07 RX ADMIN — HYDRALAZINE HYDROCHLORIDE 25 MG: 25 TABLET, FILM COATED ORAL at 03:29

## 2020-10-07 NOTE — TELEPHONE ENCOUNTER
Pt, daughter [place] called stating pt got admitted into the hospital last night and her bp is high and pt is confused about her medication so Jazlyn has some questions reg pt medication      [phone]

## 2020-10-07 NOTE — PROGRESS NOTES
Pharmacist Note - Vancomycin Dosing    Consult provided for this 72 y.o. female for indication of leukocytosis of unknown origin, possibly gastroenteritis. Antibiotic regimen(s): vancomycin + meropenem  Patient on vancomycin PTA? NO     Recent Labs     10/06/20  1743   WBC 12.4*   CREA 1.32*   BUN 19     Frequency of BMP: daily  Height: 157 cm  Weight: 101.2 kg  Est CrCl: 44.8 ml/min  Temp (24hrs), Av.7 °F (37.1 °C), Min:98.2 °F (36.8 °C), Max:99.3 °F (37.4 °C)    Cultures:  10/7 urine, pending  10/7 blood, ordered - but not drawn    Goal trough = ~ 15 mcg/ml until source of infection identified     Therapy will be initiated with a loading dose of 2000 mg IV x 1 to be followed by a maintenance dose of 1250 mg IV every 24 hours. Pharmacy to follow patient daily and order levels / make dose adjustments as appropriate.

## 2020-10-07 NOTE — ROUTINE PROCESS
TRANSFER - OUT REPORT:    Verbal report given to Dale (name) on Cara Wakefield  being transferred to 208 (unit) for routine progression of care       Report consisted of patients Situation, Background, Assessment and   Recommendations(SBAR). Information from the following report(s) SBAR, ED Summary and Recent Results was reviewed with the receiving nurse. Lines:   Peripheral IV 10/06/20 Left Antecubital (Active)   Site Assessment Clean, dry, & intact 10/06/20 1744   Phlebitis Assessment 0 10/06/20 1744   Infiltration Assessment 0 10/06/20 1744   Dressing Status Clean, dry, & intact 10/06/20 1744   Dressing Type Transparent 10/06/20 1744   Hub Color/Line Status Pink;Flushed;Positional 10/06/20 1744   Action Taken Blood drawn 10/06/20 1744        Opportunity for questions and clarification was provided.       Patient transported with:  [employer]

## 2020-10-07 NOTE — H&P
History & Physical    Primary Care Provider: Renita Raymond NP  Source of Information: Patient and daughter  Chief complaint: \" I do not know\"    History of Presenting Illness:   Ben Orellana is a 72 y.o. female with past medical history of left atrial thrombus, chronic bronchitis, diabetes mellitus, chronic tobacco user, hypertension, who presents to the ED with complaints of nausea vomiting and diarrhea. Patient is moaning on the bed and she is currently a poor historian. History was obtained from daughter who is by the bedside. 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 The last time she saw her patient should go home and developed nausea with vomiting and associated diarrhea. She has up to 4-5 diarrheal stool every day now currently feeling restless with generalized body weakness. She has not had anything to eat all day. There is no associated fever although patient is currently complaining of feeling cold. No abdominal pain. No shortness of breath chest pain, cough, or dysuria. .    A head CT scan performed in the ED showed no acute process. she was bolused with a liter of normal saline intravenous fluid and the hospitalist service has been consulted for hospital admission and for evaluation    She was last admitted about a year ago for a similar illness after a cruise ship. Review of Systems:  A comprehensive review of systems was negative except for that written in the History of Present Illness.      Past Medical History:   Diagnosis Date    Abnormal pulmonary function test 9/28/2016    AR (allergic rhinitis)     Asthma     Atrial thrombus 1994    left    Chronic bronchitis (HCC) 9/28/2016    Chronic pain     DDD (degenerative disc disease), lumbar     Diabetes ([facility] [address]     DJD (degenerative joint disease) of hip     right    Empty sella ([facility] [address] 2000    by MRI    Former smoker 10/24/2016    Hoarseness of voice     Hypercholesterolemia     Hypertension     Lesion of vocal cord     PMR (polymyalgia rheumatica) ([facility] [address] 5/18/2016    Smoker 12/5/2012      Past Surgical History:   Procedure Laterality Date    COLONOSCOPY N/A 5/30/2018    COLONOSCOPY performed by Joseline Brizuela MD at [facility] [address]; HI RISK IND  5/30/2018         ENDOSCOPY, COLON, DIAGNOSTIC  10/03    polypectomy    ENDOSCOPY, COLON, DIAGNOSTIC  2/05    Dr. Kirk Fontaine, DIAGNOSTIC  2010, reported    due 2015    HX APPENDECTOMY  1970's    HX BREAST REDUCTION      HX COLONOSCOPY  2015    dr Hunter Ruiz. 2 polyps removed -repeat in 2018     HX HEMORRHOIDECTOMY      HX HYSTERECTOMY      ovaries spared    HX KNEE REPLACEMENT  11/03, 10/05    left then right    HX TONSILLECTOMY  age 29    tonsils     Prior to Admission medications    Medication Sig Start Date End Date Taking? Authorizing Provider   HYDROcodone-acetaminophen (NORCO)  mg tablet Take 1 Tab by mouth every eight (8) hours as needed for Pain for up to 30 days. Max Daily Amount: 3 Tabs. 9/30/20 10/30/20  Saumya Cervantes NP   tiotropium (Spiriva with HandiHaler) 18 mcg inhalation capsule Take 1 Cap by inhalation daily. 7/31/20   Saumya Cervantes NP   metFORMIN (GLUCOPHAGE) 500 mg tablet Take 2 Tabs by mouth two (2) times daily (with meals). 6/12/20   Saumya Cervantes NP   atenoloL (TENORMIN) 25 mg tablet TAKE ONE TABLET BY MOUTH DAILY 5/6/20   Saumya Cervantes NP   pregabalin (LYRICA) 200 mg capsule TAKE ONE CAPSULE BY MOUTH THREE TIMES A DAY **MAX 3 PER DAY 4/6/20   Saumya Cervantes NP   dulaglutide (Trulicity) 1.5 KA/5.2 mL sub-q pen 0.5 mL by SubCUTAneous route every seven (7) days.  3/24/20   Saumya Cervantes NP   diltiazem (TIAZAC) 420 mg SR capsule TAKE 1 CAPSULE EVERY DAY 3/24/20 Remy Cervantes NP   potassium chloride (KLOR-CON) 10 mEq tablet TAKE 1 TABLET EVERY DAY 1/6/20   Remy Cervantes NP   fenofibrate nanocrystallized (TRICOR) 48 mg tablet Take 1 Tab by mouth daily. 12/17/19   Remy Cervantes NP   glucose blood VI test strips (ACCU-CHEK BELEN PLUS TEST STRP) strip Accu-Chek Belen Plus test strips    Provider, Historical   multivit-min-FA-lycopen-lutein (CENTRUM SILVER) 0.4-300-250 mg-mcg-mcg tab Centrum Silver tablet   Prescribed by non 606/706 Dinesh Macias MD 12/30/08   Provider, Historical   methotrexate (RHEUMATREX) 2.5 mg tablet methotrexate sodium 2.5 mg tablet    Provider, Historical   raNITIdine (ZANTAC) 150 mg tablet ranitidine 150 mg tablet    Provider, Historical   aspirin delayed-release 81 mg tablet aspirin 81 mg tablet,delayed release   Prescribed by jose raul [id number] Dinesh Macias MD 12/30/08   Provider, Historical   fluticasone propion-salmeterol (ADVAIR/WIXELA) 250-50 mcg/dose diskus inhaler Take 1 Puff by inhalation every twelve (12) hours. 12/13/19   Remy Cervantes NP   allopurinol (ZYLOPRIM) 300 mg tablet Take 1 Tab by mouth daily. 11/19/19   Sho Altman MD   atorvastatin (LIPITOR) 20 mg tablet Take 1 Tab by mouth daily. 10/18/19   Remy Cervantes NP   losartan-hydroCHLOROthiazide (HYZAAR) 100-25 mg per tablet Take 1 Tab by mouth daily. 9/11/19   Remy Cervantes NP   ergocalciferol (ERGOCALCIFEROL) 50,000 unit capsule Take 1 Cap by mouth every seven (7) days. 10/15/18   Sho Altman MD   folic acid (FOLVITE) 1 mg tablet Take 1 Tab by mouth daily. 10/15/18   Sho Altman MD   Blood Glucose Control High&Low (ACCU-CHEK BELEN CONTROL SOLN) soln Use as directed for controls as needed. DX: E11.9 7/16/18   Remy Cervantes NP   Blood-Glucose Meter (ACCU-CHEK BELEN PLUS METER) misc Test blood sugar twice daily. DX: E11.9. Substitute supply brand accepted by insurance. 7/1/18   Remy Cervantes NP   Lancets (ACCU-CHEK SOFTCLIX LANCETS) misc Test blood sugar twice daily. DX: E11.9. Substitute supply brand accepted by insurance. 7/1/18   Gabriel Cervantes NP   glucose blood VI test strips (ACCU-CHEK JENNIE PLUS TEST STRP) strip Test blood sugar twice daily. DX: E11.9. Substitute supply brand accepted by insurance. 7/1/18   Gabriel Cervantes NP   alcohol swabs (ALCOHOL WIPES) padm Test blood sugar twice daily. DX: E11.9. Substitute supply brand accepted by insurance. 7/1/18   Gabriel Cervantes NP   albuterol (PROVENTIL VENTOLIN) 2.5 mg /3 mL (0.083 %) nebulizer solution 3 mL by Nebulization route every six (6) hours as needed for Wheezing. 5/8/17   Radha CARLOS EDUARDO Leija   albuterol (PROVENTIL HFA, VENTOLIN HFA, PROAIR HFA) 90 mcg/actuation inhaler Take 2 Puffs by inhalation every six (6) hours as needed for Wheezing. 5/8/17   Radha Leija NP   Cetirizine (ZYRTEC) 10 mg Cap Take 10 mg by mouth daily. Provider, Historical     Allergies   Allergen Reactions    Augmentin [Amoxicillin-Pot Clavulanate] Diarrhea    Bactrim [Sulfamethoxazole-Trimethoprim] Nausea Only    Biaxin [Clarithromycin] Nausea Only    Demerol [Meperidine] Itching    Erythromycin Nausea Only    Gabapentin Nausea and Vomiting    Pcn [Penicillins] Hives    Percocet [Oxycodone-Acetaminophen] Itching    Pravastatin Nausea Only    Tetracycline Nausea Only    Voltaren [Diclofenac Sodium] Hives      Family History   Problem Relation Age of Onset    Arthritis-osteo Mother     Asthma Mother     Diabetes Mother     Hypertension Mother     Stroke Mother     Arthritis-osteo Father     Cancer Father     Elevated Lipids Father     Hypertension Father     Arthritis-osteo Sister     Asthma Sister     Diabetes Sister     Elevated Lipids Sister     Hypertension Sister     Heart Attack Sister     Hypertension Daughter     Hypertension Daughter     Hypertension Daughter     Anesth Problems Neg Hx         SOCIAL HISTORY:  Patient resides: She is  lives with . She has 3 daughters.   Independently Assisted Living    SNF    With family care       Smoking history: She is smokes 1 pack of cigarettes every day for more than 47 years. None    Former    Chronic  X     Alcohol history:   None  X   Social    Chronic      Ambulates:   Independently  X   w/cane    w/walker    w/wc    CODE STATUS:  DNR    Full  X   Other      Objective:     Physical Exam:     Visit Vitals  BP (!) 165/72 (BP 1 Location: Left arm, BP Patient Position: At rest)   Pulse 86   Temp 98.2 °F (36.8 °C)   Resp 18   SpO2 95%      O2 Device: Room air    General:  Alert, cooperative, but in mild to moderate painful distress. She is moaning   Head:  Normocephalic, without obvious abnormality, atraumatic. Eyes:  Conjunctivae/corneas clear. PERRL, EOMs intact. Nose: Nares normal. Septum midline. Mucosa normal. No drainage or sinus tenderness. Throat: Lips, mucosa, and tongue normal. Teeth and gums normal.   Neck: Supple,   Back:   Symmetric, no curvature. ROM normal. No CVA tenderness. Lungs:   Clear to auscultation bilaterally. Chest wall:  No tenderness or deformity. Heart:  Regular rate and rhythm, S1, S2 normal, no murmur, click, rub or gallop. Abdomen:   Soft, non-tender. Bowel sounds normal. No masses,  No organomegaly. Extremities: Extremities normal, atraumatic, no cyanosis or edema. Pulses: 2+ and symmetric all extremities. Skin: Skin color, texture, turgor normal. No rashes or lesions   Neurologic: CNII-XII intact. Moves all extremities     Data Review:     Recent Days:  Recent Labs     10/06/20  1743   WBC 12.4*   HGB 15.3   HCT 46.5        Recent Labs     10/06/20  1743      K 3.6      CO2 27   *   BUN 19   CREA 1.32*   CA 9.8   ALB 3.9   ALT 36     No results for input(s): PH, PCO2, PO2, HCO3, FIO2 in the last 72 hours.     24 Hour Results:  Recent Results (from the past 24 hour(s))   GLUCOSE, POC    Collection Time: 10/06/20  5:40 PM   Result Value Ref Range    Glucose (POC) 300 (H) 65 - 100 mg/dL    Performed by Apple Armstrong    CBC WITH AUTOMATED DIFF    Collection Time: 10/06/20  5:43 PM   Result Value Ref Range    WBC 12.4 (H) 3.6 - 11.0 K/uL    RBC 5.25 (H) 3.80 - 5.20 M/uL    HGB 15.3 11.5 - 16.0 g/dL    HCT 46.5 35.0 - 47.0 %    MCV 88.6 80.0 - 99.0 FL    MCH 29.1 26.0 - 34.0 PG    MCHC 32.9 30.0 - 36.5 g/dL    RDW 14.8 (H) 11.5 - 14.5 %    PLATELET 398 728 - 273 K/uL    MPV 12.6 8.9 - 12.9 FL    NRBC 0.0 0  WBC    ABSOLUTE NRBC 0.00 0.00 - 0.01 K/uL    NEUTROPHILS 82 (H) 32 - 75 %    LYMPHOCYTES 9 (L) 12 - 49 %    MONOCYTES 8 5 - 13 %    EOSINOPHILS 0 0 - 7 %    BASOPHILS 0 0 - 1 %    IMMATURE GRANULOCYTES 1 (H) 0.0 - 0.5 %    ABS. NEUTROPHILS 10.2 (H) 1.8 - 8.0 K/UL    ABS. LYMPHOCYTES 1.2 0.8 - 3.5 K/UL    ABS. MONOCYTES 0.9 0.0 - 1.0 K/UL    ABS. EOSINOPHILS 0.0 0.0 - 0.4 K/UL    ABS. BASOPHILS 0.1 0.0 - 0.1 K/UL    ABS. IMM. GRANS. 0.1 (H) 0.00 - 0.04 K/UL    DF AUTOMATED     METABOLIC PANEL, COMPREHENSIVE    Collection Time: 10/06/20  5:43 PM   Result Value Ref Range    Sodium 137 136 - 145 mmol/L    Potassium 3.6 3.5 - 5.1 mmol/L    Chloride 102 97 - 108 mmol/L    CO2 27 21 - 32 mmol/L    Anion gap 8 5 - 15 mmol/L    Glucose 292 (H) 65 - 100 mg/dL    BUN 19 6 - 20 MG/DL    Creatinine 1.32 (H) 0.55 - 1.02 MG/DL    BUN/Creatinine ratio 14 12 - 20      GFR est AA 48 (L) >60 ml/min/1.73m2    GFR est non-AA 40 (L) >60 ml/min/1.73m2    Calcium 9.8 8.5 - 10.1 MG/DL    Bilirubin, total 0.4 0.2 - 1.0 MG/DL    ALT (SGPT) 36 12 - 78 U/L    AST (SGOT) 18 15 - 37 U/L    Alk.  phosphatase 348 (H) 45 - 117 U/L    Protein, total 8.6 (H) 6.4 - 8.2 g/dL    Albumin 3.9 3.5 - 5.0 g/dL    Globulin 4.7 (H) 2.0 - 4.0 g/dL    A-G Ratio 0.8 (L) 1.1 - 2.2     SAMPLES BEING HELD    Collection Time: 10/06/20  5:43 PM   Result Value Ref Range    SAMPLES BEING HELD 1RED, 1BLU     COMMENT        Add-on orders for these samples will be processed based on acceptable specimen integrity and analyte stability, which may vary by analyte. LIPASE    Collection Time: 10/06/20  5:43 PM   Result Value Ref Range    Lipase 102 73 - 393 U/L   TSH 3RD GENERATION    Collection Time: 10/06/20  5:43 PM   Result Value Ref Range    TSH 1.15 0.36 - 3.74 uIU/mL   CORTISOL    Collection Time: 10/06/20  5:43 PM   Result Value Ref Range    Cortisol, random 38.3 ug/dL         Imaging:     Assessment:     Principal Problem:    Nausea and vomiting (10/6/2020)    Active Problems:    Diarrhea (10/6/2020)           Plan:     1. Nausea vomiting and diarrhea: Possibly due to presumed viral gastroenteritis versus bacterial etiology. She does have leukocytosis with left shift. We will offer conservative care for now. Start intravenous fluid hydration with Ringer's lactate  Antiemetics with Zofran as needed for nausea and vomiting. Keep n.p.o. Obtain stool studies, C. difficile and enteric bacterial culture    2. Leukocytosis with left shift; unclear of etiology but may be reactionary from nausea vomiting and diarrhea. She is afebrile. We will obtain paired blood cultures and urinalysis with reflex culture. she is currently afebrile but if she spikes a fever overnight we will empirically start on broad-spectrum antibiotics    3. Diabetes mellitus type 2 with hyperglycemia: Initiate insulin sliding scale as per protocol. Hold home metformin and Trulicity. Accu-Cheks    4. Essential hypertension; Uncontrolled. start hydralazine as needed for systolic blood pressure greater than 160 mmHg. Hold home Hyzaar. 5.  ZACHARY: Due to intravascular volume depletion. Serum creatinine of 1.32 baseline 0.80. Collette Ruts Continue IV fluids as mentioned above. Repeat BMP in a.m.    6.  Elevated alkaline phosphatase: As per daughter patient does have history of hepatitis. Check acute hepatitis panel    7. Chronic tobacco abuse: Start empiric nicotine patch 21 mg/day    8. We will resume home medications as appropriate    9.   DVT prophylaxis: Lovenox 40 mg subcu    Disposition: Admit patient to medical floor for routine progression of care    Baseline functional status: Independent   CODE STATUS full  Surrogate decision maker:  Mr. Camila Nissen           Signed By: Edgar Sagastume MD     October 6, 2020

## 2020-10-07 NOTE — CONSULTS
ID Consult Note  NAME:  Joey Puentes   :   1950   MRN:   [id number]   Date/Time:  10/7/2020 7:33 PM  Subjective:   REASON FOR CONSULT:    Leukocytosis  [facility] is a 72 y.o. with a history of left atrial thrombus, chronic bronchitis, diabetes. She is very drowsy and cannot give me a history. The history is thus taken from the medical record. Upon review of the notes of other providers, she has been drowsy since this morning. According to the medical record, she has been having nausea and vomiting as well as diarrhea for about 5 days. She had 4-5 bowel movements a day. There was no associated fever. There was also no associated abdominal pain. Because of her symptoms, she was brought by her daughter here to the emergency room. She was found to have some mild leukocytosis of 12.4. She had a CT scan done of her abdomen which showed mild to moderate diffuse colitis. Stool studies have been ordered by GI and all these are pending. We are being asked to see her in consult.       Past Medical History:   Diagnosis Date    Abnormal pulmonary function test 2016    AR (allergic rhinitis)     Asthma     Atrial thrombus     left    Chronic bronchitis (HCC) 2016    Chronic pain     DDD (degenerative disc disease), lumbar     Diabetes ([address]     DJD (degenerative joint disease) of hip     right    Empty sella ([address] 2000    by MRI    Former smoker 10/24/2016    Hoarseness of voice     Hypercholesterolemia     Hypertension     Lesion of vocal cord     PMR (polymyalgia rheumatica) ([address] 2016    Smoker 2012      Past Surgical History:   Procedure Laterality Date    COLONOSCOPY N/A 2018    COLONOSCOPY performed by Saba Vasquez MD at [facility]; HI RISK IND  2018         ENDOSCOPY, COLON, DIAGNOSTIC  10/03    polypectomy    ENDOSCOPY, COLON, DIAGNOSTIC      Dr. Muhammad Sample, COLON, DIAGNOSTIC  , reported due 2015    HX APPENDECTOMY  1970's    HX BREAST REDUCTION      HX COLONOSCOPY  2015    dr Hunter Ruiz. 2 polyps removed -repeat in 2018     HX HEMORRHOIDECTOMY      HX HYSTERECTOMY      ovaries spared    HX KNEE REPLACEMENT  11/03, 10/05    left then right    HX TONSILLECTOMY  age 29    tonsils     Social History     Tobacco Use    Smoking status: Current Every Day Smoker     Packs/day: 0.50     Years: 40.00     Pack years: 20.00     Types: Cigarettes    Smokeless tobacco: Never Used   Substance Use Topics    Alcohol use: No     Alcohol/week: 0.0 standard drinks      Family History   Problem Relation Age of Onset    Arthritis-osteo Mother     Asthma Mother     Diabetes Mother     Hypertension Mother     Stroke Mother     Arthritis-osteo Father     Cancer Father     Elevated Lipids Father     Hypertension Father     Arthritis-osteo Sister     Asthma Sister     Diabetes Sister     Elevated Lipids Sister     Hypertension Sister     Heart Attack Sister     Hypertension Daughter     Hypertension Daughter     Hypertension Daughter     Anesth Problems Neg Hx       Allergies   Allergen Reactions    Augmentin [Amoxicillin-Pot Clavulanate] Diarrhea    Bactrim [Sulfamethoxazole-Trimethoprim] Nausea Only    Biaxin [Clarithromycin] Nausea Only    Demerol [Meperidine] Itching    Erythromycin Nausea Only    Gabapentin Nausea and Vomiting    Pcn [Penicillins] Hives    Percocet [Oxycodone-Acetaminophen] Itching    Pravastatin Nausea Only    Tetracycline Nausea Only    Voltaren [Diclofenac Sodium] Hives      Home Medications:  Prior to Admission Medications   Prescriptions Last Dose Informant Patient Reported? Taking? Blood Glucose Control High&Low (ACCU-CHEK JENNIE CONTROL SOLN) soln   No No   Sig: Use as directed for controls as needed. DX: E11.9   Blood-Glucose Meter (ACCU-CHEK JENNIE PLUS METER) misc   No No   Sig: Test blood sugar twice daily. DX: E11.9.  Substitute supply brand accepted by insurance. Cetirizine (ZYRTEC) 10 mg Cap   Yes No   Sig: Take 10 mg by mouth daily. HYDROcodone-acetaminophen (NORCO)  mg tablet   No No   Sig: Take 1 Tab by mouth every eight (8) hours as needed for Pain for up to 30 days. Max Daily Amount: 3 Tabs. Lancets (ACCU-CHEK SOFTCLIX LANCETS) misc   No No   Sig: Test blood sugar twice daily. DX: E11.9. Substitute supply brand accepted by insurance. albuterol (PROVENTIL HFA, VENTOLIN HFA, PROAIR HFA) 90 mcg/actuation inhaler   No No   Sig: Take 2 Puffs by inhalation every six (6) hours as needed for Wheezing. albuterol (PROVENTIL VENTOLIN) 2.5 mg /3 mL (0.083 %) nebulizer solution   No No   Sig: 3 mL by Nebulization route every six (6) hours as needed for Wheezing. alcohol swabs (ALCOHOL WIPES) padm   No No   Sig: Test blood sugar twice daily. DX: E11.9. Substitute supply brand accepted by insurance. allopurinol (ZYLOPRIM) 300 mg tablet   No No   Sig: Take 1 Tab by mouth daily. aspirin delayed-release 81 mg tablet   Yes No   Sig: aspirin 81 mg tablet,delayed release   Prescribed by non [facility] MD   atenoloL (TENORMIN) 25 mg tablet   No No   Sig: TAKE ONE TABLET BY MOUTH DAILY   atorvastatin (LIPITOR) 20 mg tablet   No No   Sig: Take 1 Tab by mouth daily. diltiazem (TIAZAC) 420 mg SR capsule   No No   Sig: TAKE 1 CAPSULE EVERY DAY   dulaglutide (Trulicity) 1.5 GA/2.8 mL sub-q pen   No No   Si.5 mL by SubCUTAneous route every seven (7) days. ergocalciferol (ERGOCALCIFEROL) 50,000 unit capsule   No No   Sig: Take 1 Cap by mouth every seven (7) days. fenofibrate nanocrystallized (TRICOR) 48 mg tablet   No No   Sig: Take 1 Tab by mouth daily. fluticasone propion-salmeterol (ADVAIR/WIXELA) 250-50 mcg/dose diskus inhaler   No No   Sig: Take 1 Puff by inhalation every twelve (12) hours. folic acid (FOLVITE) 1 mg tablet   No No   Sig: Take 1 Tab by mouth daily.    glucose blood VI test strips (ACCU-CHEK JENNIE PLUS TEST STRP) strip   No No   Sig: Test blood sugar twice daily. DX: E11.9. Substitute supply brand accepted by insurance. glucose blood VI test strips (ACCU-CHEK JENNIE PLUS TEST STRP) strip   Yes No   Sig: Accu-Chek Jennie Plus test strips   losartan-hydroCHLOROthiazide (HYZAAR) 100-25 mg per tablet   No No   Sig: Take 1 Tab by mouth daily. metFORMIN (GLUCOPHAGE) 500 mg tablet   No No   Sig: Take 2 Tabs by mouth two (2) times daily (with meals). methotrexate (RHEUMATREX) 2.5 mg tablet   Yes No   Sig: methotrexate sodium 2.5 mg tablet   multivit-min-FA-lycopen-lutein (CENTRUM SILVER) 0.4-300-250 mg-mcg-mcg tab   Yes No   Sig: Centrum Silver tablet   Prescribed by non [facility] MD   potassium chloride (KLOR-CON) 10 mEq tablet   No No   Sig: TAKE 1 TABLET EVERY DAY   pregabalin (LYRICA) 200 mg capsule   No No   Sig: TAKE ONE CAPSULE BY MOUTH THREE TIMES A DAY **MAX 3 PER DAY   raNITIdine (ZANTAC) 150 mg tablet   Yes No   Sig: ranitidine 150 mg tablet   tiotropium (Spiriva with HandiHaler) 18 mcg inhalation capsule   No No   Sig: Take 1 Cap by inhalation daily.       Facility-Administered Medications: None     Hospital medications:  Current Facility-Administered Medications   Medication Dose Route Frequency    fluconazole (DIFLUCAN) tablet 200 mg  200 mg Oral DAILY    pantoprazole (PROTONIX) 40 mg in 0.9% sodium chloride 10 mL injection  40 mg IntraVENous Q12H    hydrALAZINE (APRESOLINE) 20 mg/mL injection 10 mg  10 mg IntraVENous Q6H PRN    atenoloL (TENORMIN) tablet 25 mg  25 mg Oral DAILY    Vancomycin- Pharmacy to Dose    Other Rx Dosing/Monitoring    morphine injection 1 mg  1 mg IntraVENous Q3H PRN    albuterol-ipratropium (DUO-NEB) 2.5 MG-0.5 MG/3 ML  3 mL Nebulization Q4H PRN    ondansetron (ZOFRAN) injection 4 mg  4 mg IntraVENous Q6H    dilTIAZem ER (CARDIZEM CD) capsule 360 mg  360 mg Oral DAILY    prochlorperazine (COMPAZINE) with saline injection 5 mg  5 mg IntraVENous Q6H PRN    meropenem (MERREM) 500 mg in 0.9% sodium chloride (MBP/ADV) 50 mL  0.5 g IntraVENous Q8H    [START ON 10/8/2020] vancomycin (VANCOCIN) 1250 mg in  ml infusion  1,250 mg IntraVENous Q24H    insulin glargine (LANTUS) injection 30 Units  30 Units SubCUTAneous QHS    ipratropium (ATROVENT) 0.02 % nebulizer solution 0.5 mg  0.5 mg Nebulization QID RT    albuterol (PROVENTIL VENTOLIN) nebulizer solution 2.5 mg  2.5 mg Nebulization Q6H PRN    [Held by provider] allopurinoL (ZYLOPRIM) tablet 300 mg  300 mg Oral DAILY    [Held by provider] aspirin delayed-release tablet 81 mg  81 mg Oral DAILY    folic acid (FOLVITE) tablet 1 mg  1 mg Oral DAILY    pregabalin (LYRICA) capsule 200 mg  200 mg Oral TID    sodium chloride (NS) flush 5-40 mL  5-40 mL IntraVENous Q8H    sodium chloride (NS) flush 5-40 mL  5-40 mL IntraVENous PRN    acetaminophen (TYLENOL) tablet 650 mg  650 mg Oral Q6H PRN    Or    acetaminophen (TYLENOL) suppository 650 mg  650 mg Rectal Q6H PRN    polyethylene glycol (MIRALAX) packet 17 g  17 g Oral DAILY PRN    enoxaparin (LOVENOX) injection 40 mg  40 mg SubCUTAneous DAILY    lactated Ringers infusion  125 mL/hr IntraVENous CONTINUOUS    glucose chewable tablet 16 g  4 Tab Oral PRN    glucagon (GLUCAGEN) injection 1 mg  1 mg IntraMUSCular PRN    dextrose 10% infusion 0-250 mL  0-250 mL IntraVENous PRN    insulin lispro (HUMALOG) injection   SubCUTAneous Q6H    nicotine (NICODERM CQ) 21 mg/24 hr patch 1 Patch  1 Patch TransDERmal Q24H     REVIEW OF SYSTEMS:        Unobtainable as she has altered mentation.         Objective:   VITALS:    Visit Vitals  BP (!) 153/97 (BP 1 Location: Left arm, BP Patient Position: At rest;Head of bed elevated (Comment degrees))   Pulse 69   Temp 99.2 °F (37.3 °C)   Resp 18   Ht 5' 2\" (1.575 m)   Wt 101 kg (222 lb 10.6 oz)   SpO2 98%   BMI 40.73 kg/m²     Temp (24hrs), Av.9 °F (37.2 °C), Min:98.5 °F (36.9 °C), Max:99.3 °F (37.4 °C)    PHYSICAL EXAM:   General:    Can be woken up easily, but she drifts back to sleep. Head:   Normocephalic, without obvious abnormality, atraumatic. Eyes:   Conjunctivae clear, anicteric sclerae. Nose:  Nares normal.   Throat:    Lips normal  Neck:  Supple, symmetrical    no carotid bruit and no JVD. :    No CVA tenderness, no almanza catheter  Lungs:   Clear to auscultation bilaterally. No Wheezing or Rhonchi. No rales. Heart:   Regular rate and rhythm,  no murmur, rub or gallop. Abdomen:   Soft, non-tender,not distended. Bowel sounds normal.   Extremities: Knees, ankles, wrists, elbows are not warm and not tender. No pedal edema  Skin:     No rashes or lesions. Not Jaundiced  Lymph: Cervical normal  Neurologic: She was able to obey simple commands like squeezing my hand and raising her forearms. LAB DATA REVIEWED:    Recent Results (from the past 48 hour(s))   GLUCOSE, POC    Collection Time: 10/06/20  5:40 PM   Result Value Ref Range    Glucose (POC) 300 (H) 65 - 100 mg/dL    Performed by Vira Casas    CBC WITH AUTOMATED DIFF    Collection Time: 10/06/20  5:43 PM   Result Value Ref Range    WBC 12.4 (H) 3.6 - 11.0 K/uL    RBC 5.25 (H) 3.80 - 5.20 M/uL    HGB 15.3 11.5 - 16.0 g/dL    HCT 46.5 35.0 - 47.0 %    MCV 88.6 80.0 - 99.0 FL    MCH 29.1 26.0 - 34.0 PG    MCHC 32.9 30.0 - 36.5 g/dL    RDW 14.8 (H) 11.5 - 14.5 %    PLATELET 181 193 - 397 K/uL    MPV 12.6 8.9 - 12.9 FL    NRBC 0.0 0  WBC    ABSOLUTE NRBC 0.00 0.00 - 0.01 K/uL    NEUTROPHILS 82 (H) 32 - 75 %    LYMPHOCYTES 9 (L) 12 - 49 %    MONOCYTES 8 5 - 13 %    EOSINOPHILS 0 0 - 7 %    BASOPHILS 0 0 - 1 %    IMMATURE GRANULOCYTES 1 (H) 0.0 - 0.5 %    ABS. NEUTROPHILS 10.2 (H) 1.8 - 8.0 K/UL    ABS. LYMPHOCYTES 1.2 0.8 - 3.5 K/UL    ABS. MONOCYTES 0.9 0.0 - 1.0 K/UL    ABS. EOSINOPHILS 0.0 0.0 - 0.4 K/UL    ABS. BASOPHILS 0.1 0.0 - 0.1 K/UL    ABS. IMM.  GRANS. 0.1 (H) 0.00 - 0.04 K/UL    DF AUTOMATED     METABOLIC PANEL, COMPREHENSIVE    Collection Time: 10/06/20  5:43 PM   Result Value Ref Range Sodium 137 136 - 145 mmol/L    Potassium 3.6 3.5 - 5.1 mmol/L    Chloride 102 97 - 108 mmol/L    CO2 27 21 - 32 mmol/L    Anion gap 8 5 - 15 mmol/L    Glucose 292 (H) 65 - 100 mg/dL    BUN 19 6 - 20 MG/DL    Creatinine 1.32 (H) 0.55 - 1.02 MG/DL    BUN/Creatinine ratio 14 12 - 20      GFR est AA 48 (L) >60 ml/min/1.73m2    GFR est non-AA 40 (L) >60 ml/min/1.73m2    Calcium 9.8 8.5 - 10.1 MG/DL    Bilirubin, total 0.4 0.2 - 1.0 MG/DL    ALT (SGPT) 36 12 - 78 U/L    AST (SGOT) 18 15 - 37 U/L    Alk. phosphatase 348 (H) 45 - 117 U/L    Protein, total 8.6 (H) 6.4 - 8.2 g/dL    Albumin 3.9 3.5 - 5.0 g/dL    Globulin 4.7 (H) 2.0 - 4.0 g/dL    A-G Ratio 0.8 (L) 1.1 - 2.2     SAMPLES BEING HELD    Collection Time: 10/06/20  5:43 PM   Result Value Ref Range    SAMPLES BEING HELD 1RED, 1BLU     COMMENT        Add-on orders for these samples will be processed based on acceptable specimen integrity and analyte stability, which may vary by analyte.    LIPASE    Collection Time: 10/06/20  5:43 PM   Result Value Ref Range    Lipase 102 73 - 393 U/L   TSH 3RD GENERATION    Collection Time: 10/06/20  5:43 PM   Result Value Ref Range    TSH 1.15 0.36 - 3.74 uIU/mL   CORTISOL    Collection Time: 10/06/20  5:43 PM   Result Value Ref Range    Cortisol, random 38.3 ug/dL   GLUCOSE, POC    Collection Time: 10/07/20 12:39 AM   Result Value Ref Range    Glucose (POC) 322 (H) 65 - 100 mg/dL    Performed by Ansley Palomino    URINALYSIS W/MICROSCOPIC    Collection Time: 10/07/20  1:33 AM   Result Value Ref Range    Color DARK YELLOW      Appearance CLOUDY (A) CLEAR      Specific gravity 1.030      pH (UA) 5.0 5.0 - 8.0      Protein 300 (A) NEG mg/dL    Glucose >1,000 (A) NEG mg/dL    Ketone 15 (A) NEG mg/dL    Bilirubin Negative NEG      Blood SMALL (A) NEG      Urobilinogen 1.0 0.2 - 1.0 EU/dL    Nitrites Negative NEG      Leukocyte Esterase Negative NEG      WBC 0-4 0 - 4 /hpf    RBC 0-5 0 - 5 /hpf    Epithelial cells FEW FEW /lpf    Bacteria 1+ (A) NEG /hpf    Mucus 1+ (A) NEG /lpf    Budding yeast PRESENT (A) NEG     URINE CULTURE HOLD SAMPLE    Collection Time: 10/07/20  1:33 AM    Specimen: Serum; Urine   Result Value Ref Range    Urine culture hold        Urine on hold in Microbiology dept for 2 days. If unpreserved urine is submitted, it cannot be used for addtional testing after 24 hours, recollection will be required.    GLUCOSE, POC    Collection Time: 10/07/20  6:42 AM   Result Value Ref Range    Glucose (POC) 304 (H) 65 - 100 mg/dL    Performed by [facility]    EKG, 12 LEAD, INITIAL    Collection Time: 10/07/20  9:50 AM   Result Value Ref Range    Ventricular Rate 81 BPM    Atrial Rate 81 BPM    P-R Interval 124 ms    QRS Duration 72 ms    Q-T Interval 372 ms    QTC Calculation (Bezet) 432 ms    Calculated P Axis 72 degrees    Calculated R Axis -20 degrees    Calculated T Axis -27 degrees    Diagnosis       Normal sinus rhythm  Right atrial enlargement  Minimal voltage criteria for LVH, may be normal variant  Septal infarct , age undetermined  When compared with ECG of 16-OCT-2010 12:50,  Septal infarct is now present  Confirmed by Yue Williamson M.D., Phoebe Minaya ([id number]) on 10/7/2020 10:54:12 AM     GLUCOSE, POC    Collection Time: 10/07/20 12:00 PM   Result Value Ref Range    Glucose (POC) 242 (H) 65 - 100 mg/dL    Performed by Norwell Cypriot  PCT    ECHO ADULT COMPLETE    Collection Time: 10/07/20  2:57 PM   Result Value Ref Range    LV Mass AL 94.5 67 - 162 g    LV Mass AL Index 47.2 43 - 95 g/m2    Left Atrium Minor Axis 1.29 cm    IVSd 0.90 0.6 - 0.9 cm    LVIDd 3.66 (A) 3.9 - 5.3 cm    LVIDs 2.12 cm    LVOT d 1.98 cm    LVPWd 0.89 0.6 - 0.9 cm    LVOT Peak Gradient 4.51 mmHg    LVOT Peak Velocity 106.17 cm/s    Left Atrium Major Axis 2.58 cm    Aortic Valve Area by Continuity of Peak Velocity 2.43 cm2    Aortic Valve Area by Continuity of Peak Velocity 2.23 cm2    Aortic Valve Area by Continuity of Peak Velocity 2.48 cm2    AoV PG 7.18 mmHg Aortic Valve Systolic Peak Velocity 619.51 cm/s    MV A Hunter 78.20 cm/s    Mitral Valve E Wave Deceleration Time 0.25 s    MV E Hunter 50.09 cm/s    MV E/A 0.64     E/E' ratio (averaged) 10.09     E/E' lateral 9.28     E/E' septal 10.91     LV E' Lateral Velocity 5.40 cm/s    LV E' Septal Velocity 4.59 cm/s    Mitral Valve Pressure Half-time 0.07 s    MVA (PHT) 3.05 cm2    Pulmonic Valve Systolic Peak Instantaneous Gradient 2.87 mmHg    Pulmonic Valve Max Velocity 84.66 cm/s    Tapse 1.91 1.5 - 2.0 cm    Ao Root D 2.88 cm   GLUCOSE, POC    Collection Time: 10/07/20  6:35 PM   Result Value Ref Range    Glucose (POC) 200 (H) 65 - 100 mg/dL    Performed by Carmina Garza  PCT          IMPRESSION    #1 colitis    #2 mild renal insufficiency    #3 diabetes    #4 hyperlipidemia    #5 hypertension    PLAN    The leukocytosis is likely coming from her colitis. I agree with the stool studies have been ordered. None has been sent yet. I have spoken to the nurse about this.   I think we can discontinue vancomycin on her.                   ___________________________________________________  ID: Chela Cameron MD

## 2020-10-07 NOTE — PROGRESS NOTES
[facility] Adult  Hospitalist Group                                                                                          Hospitalist Progress Note  Frannie Alexandra NP  Answering service: [phone] from in house phone        Date of Service:  10/7/2020  NAME:  Andrey Castro  :  1950  MRN:  [id number]      Admission Summary:   Andrey Castro is a 72 y.o. female with PMH of Left Atrial Thrombus, Chronic Bronchitis, T2DM, Tobacco Dependence and who presents to the ED with complaints of nausea vomiting and diarrhea x 5 days. 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 She was last admitted about a year ago for a similar illness after a trip on a cruise ship. Patient is moaning on the bed and she is currently a poor historian, history obtained from daughter who is by the bedside. There is no associated fever, no shortness of breath, chest pain, cough, or dysuria. A head CT scan performed in the ED showed no acute process. she was bolused with a liter of normal saline intravenous fluid and the hospitalist service has been consulted for hospital admission and for evaluation. Interval history / Subjective: Follow-up for N/V/D, Leukocytosis, T2DM, HTN, ZACHARY, Elevated Alk Phos and Tobacco Dependence.   -Patient seen and examined, no c/o's. Assessment & Plan:     N/V/D: viral gastroenteritis versus bacterial etiology, leukocytosis with left shift. On metformin at home. -IVF LR  -Antiemetics  -NPO  -stool studies: C. difficile and enteric bacterial culture  -CT head: No evidence of acute process.   -CT abd/pelv: small hiatal hernia, Hepatic steatosis, Colitis   -PPI BID  -EKG  -GI consult      Hepatitis: unclear about which  -Hep panel  -Hepatology panel   -Ammonia level  -CT abd/pelv: Hepatic steatosis    Leukocytosis with left shift: reactionary from N/V/D, afebrile.   -Cultures  -UA C&S: + fungus- fluconazole added  -monitor hematology  -IV ABT  -ID consult     T2DM with hyperglycemia: Hold home metformin and Trulicity. -ACHS Accu-Cheks  -ISS  -HgA1c  -Diabetes Ed     HTN: Uncontrolled. Hold home Hyzaar.   -hydralazine as needed    continue home atenolol and cardizem  -EKG: Normal sinus rhythm. Right atrial enlargement. Minimal voltage criteria for LVH, may be normal variant. Septal infarct , age undetermined when compared with ECG of 16-OCT-2010 12:50, Septal infarct is now present.  -remote tele    ZACHARY: 2/2 intravascular volume depletion:   -IVF  -monitor chemistry     Elevated alkaline phosphatase: Hx of hepatitis. -Acute Hepatitis Panel     Tobacco Dependence: Start empiric nicotine patch 21 mg/day. Morbid Obesity: BMI: 38.62  RA: on methotrexate @ home. DVTppx: Lovenox  Gippx: Protonix  Code Status: Full Code  Diet: Cardiac/ NPO  Activity: OOB to chair TID and PRN  Discharge: TBD  Surrogate decision maker:  Mr. BARRAZA [facility] Problems  Date Reviewed: 7/31/2020          Codes Class Noted POA    * (Principal) Nausea and vomiting ICD-10-CM: R11.2  ICD-9-CM: 787.01  10/6/2020 Yes        Diarrhea ICD-10-CM: R19.7  ICD-9-CM: 787.91  10/6/2020 Unknown                Review of Systems:   A comprehensive review of systems was negative except for that written in the HPI. Vital Signs:    Last 24hrs VS reviewed since prior progress note. Most recent are:  Visit Vitals  BP (!) 167/89 (BP 1 Location: Left arm, BP Patient Position: At rest)   Pulse 95   Temp 98.5 °F (36.9 °C)   Resp 16   SpO2 95%       No intake or output data in the 24 hours ending 10/07/20 0711     Physical Examination:     Constitutional:  No acute distress, cooperative, pleasant    ENT:  Oral mucosa moist.    Resp:  CTA bilaterally. No wheezing/rhonchi/rales. No accessory muscle use. CV:  Regular rhythm, normal rate, no murmurs, gallops, rubs.     GI:  Soft, non distended, epigastric tenderness, rebound tenderness, no guarding, BS present. Musculoskeletal:  No edema, warm, 2+ pulses throughout. Neurologic:  Moves all extremities. AAOx3, CN II-XII reviewed. Skin:  Good turgor, no rashes or ulcers  Psych:  Good insight, Not anxious nor agitated. Data Review:    Review and/or order of clinical lab test      Labs:     Recent Labs     10/06/20  1743   WBC 12.4*   HGB 15.3   HCT 46.5        Recent Labs     10/06/20  1743      K 3.6      CO2 27   BUN 19   CREA 1.32*   *   CA 9.8     Recent Labs     10/06/20  1743   ALT 36   *   TBILI 0.4   TP 8.6*   ALB 3.9   GLOB 4.7*   LPSE 102     No results for input(s): INR, PTP, APTT, INREXT in the last 72 hours. No results for input(s): FE, TIBC, PSAT, FERR in the last 72 hours. No results found for: FOL, RBCF   No results for input(s): PH, PCO2, PO2 in the last 72 hours. No results for input(s): CPK, CKNDX, TROIQ in the last 72 hours.     No lab exists for component: CPKMB  Lab Results   Component Value Date/Time    Cholesterol, total 178 12/13/2019 03:32 PM    HDL Cholesterol 36 (L) 12/13/2019 03:32 PM    LDL,Direct 65 10/07/2009 11:05 AM    LDL, calculated Comment 12/13/2019 03:32 PM    Triglyceride 533 (H) 12/13/2019 03:32 PM    CHOL/HDL Ratio 2.6 10/07/2009 11:05 AM     Lab Results   Component Value Date/Time    Glucose (POC) 304 (H) 10/07/2020 06:42 AM    Glucose (POC) 322 (H) 10/07/2020 12:39 AM    Glucose (POC) 300 (H) 10/06/2020 05:40 PM    Glucose (POC) 163 (H) 10/06/2016 10:41 AM    Glucose (POC) 149 (H) 10/06/2016 08:05 AM    Glucose  11/09/2017 09:24 AM     Lab Results   Component Value Date/Time    Color DARK YELLOW 10/07/2020 01:33 AM    Appearance CLOUDY (A) 10/07/2020 01:33 AM    Specific gravity 1.030 10/07/2020 01:33 AM    Specific gravity 1.028 10/05/2016 08:59 AM    pH (UA) 5.0 10/07/2020 01:33 AM    Protein 300 (A) 10/07/2020 01:33 AM    Glucose >1,000 (A) 10/07/2020 01:33 AM    Ketone 15 (A) 10/07/2020 01:33 AM    Bilirubin Negative 10/07/2020 01:33 AM    Urobilinogen 1.0 10/07/2020 01:33 AM    Nitrites Negative 10/07/2020 01:33 AM    Leukocyte Esterase Negative 10/07/2020 01:33 AM    Epithelial cells FEW 10/07/2020 01:33 AM    Bacteria 1+ (A) 10/07/2020 01:33 AM    WBC 0-4 10/07/2020 01:33 AM    RBC 0-5 10/07/2020 01:33 AM         Medications Reviewed:     Current Facility-Administered Medications   Medication Dose Route Frequency    hydrALAZINE (APRESOLINE) tablet 25 mg  25 mg Oral Q6H PRN    allopurinoL (ZYLOPRIM) tablet 300 mg  300 mg Oral DAILY    aspirin delayed-release tablet 81 mg  81 mg Oral DAILY    folic acid (FOLVITE) tablet 1 mg  1 mg Oral DAILY    pregabalin (LYRICA) capsule 200 mg  200 mg Oral TID    tiotropium bromide (SPIRIVA RESPIMAT) 2.5 mcg /actuation  1 Puff Inhalation DAILY    sodium chloride (NS) flush 5-40 mL  5-40 mL IntraVENous Q8H    sodium chloride (NS) flush 5-40 mL  5-40 mL IntraVENous PRN    acetaminophen (TYLENOL) tablet 650 mg  650 mg Oral Q6H PRN    Or    acetaminophen (TYLENOL) suppository 650 mg  650 mg Rectal Q6H PRN    polyethylene glycol (MIRALAX) packet 17 g  17 g Oral DAILY PRN    promethazine (PHENERGAN) tablet 12.5 mg  12.5 mg Oral Q6H PRN    Or    ondansetron (ZOFRAN) injection 4 mg  4 mg IntraVENous Q6H PRN    enoxaparin (LOVENOX) injection 40 mg  40 mg SubCUTAneous DAILY    lactated Ringers infusion  125 mL/hr IntraVENous CONTINUOUS    glucose chewable tablet 16 g  4 Tab Oral PRN    glucagon (GLUCAGEN) injection 1 mg  1 mg IntraMUSCular PRN    dextrose 10% infusion 0-250 mL  0-250 mL IntraVENous PRN    insulin lispro (HUMALOG) injection   SubCUTAneous Q6H    albuterol (PROVENTIL HFA, VENTOLIN HFA, PROAIR HFA) inhaler 2 Puff  2 Puff Inhalation Q6H PRN    nicotine (NICODERM CQ) 21 mg/24 hr patch 1 Patch  1 Patch TransDERmal Q24H    [Held by provider] hydrALAZINE (APRESOLINE) 20 mg/mL injection 20 mg  20 mg IntraVENous Q6H PRN     ______________________________________________________________________  EXPECTED LENGTH OF STAY: - - -  ACTUAL LENGTH OF STAY:          1                 Silvana Montes NP

## 2020-10-07 NOTE — PROGRESS NOTES
TRANSFER - IN REPORT:    Verbal report received from Mark(júnior) on [facility]  being received from ER(unit) for routine progression of care      Report consisted of patients Situation, Background, Assessment and   Recommendations(SBAR). Information from the following report(s) ED Summary and Recent Results was reviewed with the receiving nurse. Opportunity for questions and clarification was provided. Assessment completed upon patients arrival to unit and care assumed.

## 2020-10-07 NOTE — CONSULTS
[facility]  SPECIALIST CONSULT  [facility]GRAM [facility]    INITIAL NOTE    Presentation   eClso Putnam is a 72 y.o. female who presented to the ED from PCP office with a concern of delirium after several days of nausea, vomiting, diarrhea and weakness. Her  reported that she has been \"out of it\" since yesterday. She had a similar occurrence one year ago for which she was managed at [facility] EMERGENCY [facility]. HX: hyperlipidemia, hypertension, diabetes, polymyalgia rheumatica, asthma, empty sella syndrome, tobacco use, and atrial thrombus    DX: Confusion, viral gastroenteritis vs bacterial etiology, ZACHARY    TX: IV hydration, stool studies    Current clinical course has been complicated by hyperglycemia. Diabetes: Patient has known Type two diabetes, treated with metformin and Trulicity PTA.   Admitting A1C 8.1% (up from 7.6% 9/19)    Consulted by Provider for advanced diabetes nursing assessment and care, specifically related to   [x] Inpatient management strategy  [x] Home management assessment    Diabetes-related medical history  Acute complications: Hyperglycemia without acidosis   Neurological complications: Unknown  Microvascular disease: None  Macrovascular disease: None      Diabetes medication history  Drug class Currently in use Discontinued Never used   Biguanide Metformin 500mg 2 tabs BID     DDP-4 inhibitor       Sulfonylurea      Thiazolidinedione      GLP-1 RA Trulicity SQ once weekly     SGLT-2 inhibitors      Basal insulin      Fixed Dose  Combinations      Bolus insulin        SUBJECTIVE:  Patient works 3 days a week with [employer]  Head CT negative   NPO  Abdominal CT: small hiatal hernia, hepatic steatosis, colitis  GI, ID also consulted   Admitting glucose 292  A1C 8.1%  WBC 12.4  ketones in urine  Anion gap 8  GFR 40  Attempted to reach , Dunia Graves to obtain a diabetes related history-  not available and no ability to leave [facility].    Objective   Physical exam  General Sleeping soundly, falling back asleep when attempting to wake. Vital Signs   Visit Vitals  BP (!) 159/105 (BP 1 Location: Right arm, BP Patient Position: At rest)   Pulse 94   Temp 99.3 °F (37.4 °C)   Resp 18   Ht 5' 2\" (1.575 m)   Wt 101.2 kg (223 lb)   SpO2 95%   BMI 40.79 kg/m²     Skin  Warm and dry. No acanthosis noted along neckline. Heart   Regular rate and rhythm. No murmurs, rubs or gallops  Lungs  Clear to auscultation without rales or rhonchi  Extremities No foot wounds     Laboratory  Lab Results   Component Value Date/Time    Hemoglobin A1c 8.1 (H) 12/13/2019 03:32 PM    Hemoglobin A1c (POC) 7.7 07/31/2020 02:44 PM     Lab Results   Component Value Date/Time    LDL,Direct 65 10/07/2009 11:05 AM    LDL, calculated Comment 12/13/2019 03:32 PM     Lab Results   Component Value Date/Time    Creatinine 1.32 (H) 10/06/2020 05:43 PM     Lab Results   Component Value Date/Time    Sodium 137 10/06/2020 05:43 PM    Potassium 3.6 10/06/2020 05:43 PM    Chloride 102 10/06/2020 05:43 PM    CO2 27 10/06/2020 05:43 PM    Anion gap 8 10/06/2020 05:43 PM    Glucose 292 (H) 10/06/2020 05:43 PM    BUN 19 10/06/2020 05:43 PM    Creatinine 1.32 (H) 10/06/2020 05:43 PM    BUN/Creatinine ratio 14 10/06/2020 05:43 PM    GFR est AA 48 (L) 10/06/2020 05:43 PM    GFR est non-AA 40 (L) 10/06/2020 05:43 PM    Calcium 9.8 10/06/2020 05:43 PM    Bilirubin, total 0.4 10/06/2020 05:43 PM    Alk.  phosphatase 348 (H) 10/06/2020 05:43 PM    Protein, total 8.6 (H) 10/06/2020 05:43 PM    Albumin 3.9 10/06/2020 05:43 PM    Globulin 4.7 (H) 10/06/2020 05:43 PM    A-G Ratio 0.8 (L) 10/06/2020 05:43 PM    ALT (SGPT) 36 10/06/2020 05:43 PM     Lab Results   Component Value Date/Time    ALT (SGPT) 36 10/06/2020 05:43 PM       Factors affecting BG pattern  Factor Dose Comments   Nutrition:  Carb-controlled meals   60 grams/meal NPO   Drugs:  IV antibiotics    Vancomycin, Meropenem      Infection Leukocytosis    Other: presumed gastroenteritis       Blood glucose pattern        Assessment and Plan   Nursing Diagnosis Risk for unstable blood glucose pattern   Nursing Intervention Domain 3257 Decision-making Support   Nursing Interventions Examined current inpatient diabetes control   Explored factors facilitating and impeding inpatient management  Identified self-management practices impeding diabetes control  Explored corrective strategies with patient and responsible inpatient provider   Informed patient of rational for insulin strategy while hospitalized     Evaluation   Olivia Jacinto is a 72year old female with uncontrolled diabetes on metformin and trulicity and other medical co-morbidities admitted for confusion and dehydration with a presumed viral/bacteral gastroenteritis. She was admitted for infectious work-up, IV antibiotics initiated along with IV rehydration. Patient is experiencing hyperglycemia to the 300s despite correctional insulin in this acute illness. Admitting A1C 8.1% and diabetes related history is not available at this time but will obtain once patient is alert and oriented. At this time, it is reasonable to advance SQ insulin therapy to target an inpatient glucose of 100-180. Recommendations   Recommend:  Initiate the subcutaneous insulin order set with:  1. Moderate dose basal insulin: 0.3 units/kg/day. First dose now  a. Advance to 0.4 units/kg tomorrow if fasting glucose over 200    2. Continue correctional insulin Q6 hrs (ACHS when eating) at normal sensitivity, start at a glucose of 200    3. Diet advancement per primary team, when advanced please ensure consistent carbohydrate diet    4. Please continue to hold metformin in the inpatient setting      Billing Code(s)   I personally reviewed chart, notes, data and current medications in the medical record, and examined the patient at bedside before making care recommendations.      [x] [id number] IP subsequent hospital care - 40 minutes      KAREL Padron  Program for Diabetes [facility]  Access via [address] [place] [address]  [phone]

## 2020-10-07 NOTE — CONSULTS
[address] [facility].   [address] NOTE  Will Brandon Bruner  [phone] office  [phone] NP/PA in-hospital cell phone M-F until 4:30PM  After 5PM or on weekends, please call  for physician on call        NAME:  Celso Putnam   :   1950   MRN:   [id number]       Referring Physician: Vicki Santana NP    Consult Date: 10/7/2020 12:27 PM    Chief Complaint: unable to obtain     History of Present Illness:  Patient is a 72 y.o. who is seen in consultation at the request of Vicki Santana NP, for nausea/vomiting/diarrhea, CT with colitis. Patient has a past medical history significant for hypertension, left atrial trhombus, chronic bronchitis, and diabetes mellitus. She presented to the ED with complains of nausea, vomiting, and diarrhea. She was admitted to the hospital on 10/6/20. Patient is very drowsy and was unable to be awakened to answer questions during this encounter. She is getting a bedside echo. History was obtained from chart review and the patient's RN. No family is at the bedside. Per chart review, patient was having up to 4-5 watery bowel movements daily. RN reports no vomiting since admission. Patient recently received Compazine per RN. She has had 1 loose bowel movement. No hematochezia or melena. Colonoscopy (18) by Dr. Yoly Gutierrez for history of colon polyps showed grade I internal hemorrhoids; mild sigmoid diverticulosis; normal mucosa throughout. A repeat colonoscopy was recommended in 5 years. I have reviewed the emergency room note, hospital admission note, notes by all other clinicians who have seen the patient during this hospitalization to date. I have reviewed the problem list and the reason for this hospitalization. I have reviewed the allergies and the medications the patient was taking at home prior to this hospitalization.       PMH:  Past Medical History:   Diagnosis Date  Abnormal pulmonary function test 9/28/2016    AR (allergic rhinitis)     Asthma     Atrial thrombus 1994    left    Chronic bronchitis (HCC) 9/28/2016    Chronic pain     DDD (degenerative disc disease), lumbar     Diabetes ([address]     DJD (degenerative joint disease) of hip     right    Empty sella ([address] 2000    by MRI    Former smoker 10/24/2016    Hoarseness of voice     Hypercholesterolemia     Hypertension     Lesion of vocal cord     PMR (polymyalgia rheumatica) ([facility] 5/18/2016    Smoker 12/5/2012       PSH:  Past Surgical History:   Procedure Laterality Date    COLONOSCOPY N/A 5/30/2018    COLONOSCOPY performed by Aviva Cartagena MD at [facility]; HI RISK IND  5/30/2018         ENDOSCOPY, COLON, DIAGNOSTIC  10/03    polypectomy    ENDOSCOPY, COLON, DIAGNOSTIC  2/05    Dr. Analia Guzman, COLON, DIAGNOSTIC  2010, reported    due 2015    HX APPENDECTOMY  1970's    HX BREAST REDUCTION      HX COLONOSCOPY  2015    dr Azam Ibrahim. 2 polyps removed -repeat in 2018     HX HEMORRHOIDECTOMY      HX HYSTERECTOMY      ovaries spared    HX KNEE REPLACEMENT  11/03, 10/05    left then right    HX TONSILLECTOMY  age 29    tonsils       Allergies: Allergies   Allergen Reactions    Augmentin [Amoxicillin-Pot Clavulanate] Diarrhea    Bactrim [Sulfamethoxazole-Trimethoprim] Nausea Only    Biaxin [Clarithromycin] Nausea Only    Demerol [Meperidine] Itching    Erythromycin Nausea Only    Gabapentin Nausea and Vomiting    Pcn [Penicillins] Hives    Percocet [Oxycodone-Acetaminophen] Itching    Pravastatin Nausea Only    Tetracycline Nausea Only    Voltaren [Diclofenac Sodium] Hives       Home Medications:  Prior to Admission Medications   Prescriptions Last Dose Informant Patient Reported? Taking? Blood Glucose Control High&Low (ACCU-CHEK JENNIE CONTROL SOLN) soln   No No   Sig: Use as directed for controls as needed.  DX: E11.9   Blood-Glucose Meter (ACCU-CHEK JENNIE PLUS METER) misc   No No   Sig: Test blood sugar twice daily. DX: E11.9. Substitute supply brand accepted by insurance. Cetirizine (ZYRTEC) 10 mg Cap   Yes No   Sig: Take 10 mg by mouth daily. HYDROcodone-acetaminophen (NORCO)  mg tablet   No No   Sig: Take 1 Tab by mouth every eight (8) hours as needed for Pain for up to 30 days. Max Daily Amount: 3 Tabs. Lancets (ACCU-CHEK SOFTCLIX LANCETS) misc   No No   Sig: Test blood sugar twice daily. DX: E11.9. Substitute supply brand accepted by insurance. albuterol (PROVENTIL HFA, VENTOLIN HFA, PROAIR HFA) 90 mcg/actuation inhaler   No No   Sig: Take 2 Puffs by inhalation every six (6) hours as needed for Wheezing. albuterol (PROVENTIL VENTOLIN) 2.5 mg /3 mL (0.083 %) nebulizer solution   No No   Sig: 3 mL by Nebulization route every six (6) hours as needed for Wheezing. alcohol swabs (ALCOHOL WIPES) pad   No No   Sig: Test blood sugar twice daily. DX: E11.9. Substitute supply brand accepted by insurance. allopurinol (ZYLOPRIM) 300 mg tablet   No No   Sig: Take 1 Tab by mouth daily. aspirin delayed-release 81 mg tablet   Yes No   Sig: aspirin 81 mg tablet,delayed release   Prescribed by non [facility] MD   atenoloL (TENORMIN) 25 mg tablet   No No   Sig: TAKE ONE TABLET BY MOUTH DAILY   atorvastatin (LIPITOR) 20 mg tablet   No No   Sig: Take 1 Tab by mouth daily. diltiazem (TIAZAC) 420 mg SR capsule   No No   Sig: TAKE 1 CAPSULE EVERY DAY   dulaglutide (Trulicity) 1.5 IC/7.9 mL sub-q pen   No No   Si.5 mL by SubCUTAneous route every seven (7) days. ergocalciferol (ERGOCALCIFEROL) 50,000 unit capsule   No No   Sig: Take 1 Cap by mouth every seven (7) days. fenofibrate nanocrystallized (TRICOR) 48 mg tablet   No No   Sig: Take 1 Tab by mouth daily. fluticasone propion-salmeterol (ADVAIR/WIXELA) 250-50 mcg/dose diskus inhaler   No No   Sig: Take 1 Puff by inhalation every twelve (12) hours.    folic acid (FOLVITE) 1 mg tablet No No   Sig: Take 1 Tab by mouth daily. glucose blood VI test strips (ACCU-CHEK JENNIE PLUS TEST STRP) strip   No No   Sig: Test blood sugar twice daily. DX: E11.9. Substitute supply brand accepted by insurance. glucose blood VI test strips (ACCU-CHEK JENNIE PLUS TEST STRP) strip   Yes No   Sig: Accu-Chek Jennie Plus test strips   losartan-hydroCHLOROthiazide (HYZAAR) 100-25 mg per tablet   No No   Sig: Take 1 Tab by mouth daily. metFORMIN (GLUCOPHAGE) 500 mg tablet   No No   Sig: Take 2 Tabs by mouth two (2) times daily (with meals). methotrexate (RHEUMATREX) 2.5 mg tablet   Yes No   Sig: methotrexate sodium 2.5 mg tablet   multivit-min-FA-lycopen-lutein (CENTRUM SILVER) 0.4-300-250 mg-mcg-mcg tab   Yes No   Sig: Centrum Silver tablet   Prescribed by non [facility] MD   potassium chloride (KLOR-CON) 10 mEq tablet   No No   Sig: TAKE 1 TABLET EVERY DAY   pregabalin (LYRICA) 200 mg capsule   No No   Sig: TAKE ONE CAPSULE BY MOUTH THREE TIMES A DAY **MAX 3 PER DAY   raNITIdine (ZANTAC) 150 mg tablet   Yes No   Sig: ranitidine 150 mg tablet   tiotropium (Spiriva with HandiHaler) 18 mcg inhalation capsule   No No   Sig: Take 1 Cap by inhalation daily.       Facility-Administered Medications: None       Hospital Medications:  Current Facility-Administered Medications   Medication Dose Route Frequency    fluconazole (DIFLUCAN) tablet 200 mg  200 mg Oral DAILY    pantoprazole (PROTONIX) 40 mg in 0.9% sodium chloride 10 mL injection  40 mg IntraVENous Q12H    hydrALAZINE (APRESOLINE) 20 mg/mL injection 10 mg  10 mg IntraVENous Q6H PRN    atenoloL (TENORMIN) tablet 25 mg  25 mg Oral DAILY    vancomycin (VANCOCIN) 2000 mg in  ml infusion  2,000 mg IntraVENous ONCE    Vancomycin- Pharmacy to Dose    Other Rx Dosing/Monitoring    morphine injection 1 mg  1 mg IntraVENous Q3H PRN    albuterol-ipratropium (DUO-NEB) 2.5 MG-0.5 MG/3 ML  3 mL Nebulization Q4H PRN    ondansetron (ZOFRAN) injection 4 mg  4 mg IntraVENous Q6H    dilTIAZem ER (CARDIZEM CD) capsule 360 mg  360 mg Oral DAILY    prochlorperazine (COMPAZINE) with saline injection 5 mg  5 mg IntraVENous Q6H PRN    meropenem (MERREM) 500 mg in 0.9% sodium chloride (MBP/ADV) 50 mL  0.5 g IntraVENous Q8H    [START ON 10/8/2020] vancomycin (VANCOCIN) 1250 mg in  ml infusion  1,250 mg IntraVENous Q24H    [Held by provider] allopurinoL (ZYLOPRIM) tablet 300 mg  300 mg Oral DAILY    [Held by provider] aspirin delayed-release tablet 81 mg  81 mg Oral DAILY    folic acid (FOLVITE) tablet 1 mg  1 mg Oral DAILY    pregabalin (LYRICA) capsule 200 mg  200 mg Oral TID    tiotropium bromide (SPIRIVA RESPIMAT) 2.5 mcg /actuation  1 Puff Inhalation DAILY    sodium chloride (NS) flush 5-40 mL  5-40 mL IntraVENous Q8H    sodium chloride (NS) flush 5-40 mL  5-40 mL IntraVENous PRN    acetaminophen (TYLENOL) tablet 650 mg  650 mg Oral Q6H PRN    Or    acetaminophen (TYLENOL) suppository 650 mg  650 mg Rectal Q6H PRN    polyethylene glycol (MIRALAX) packet 17 g  17 g Oral DAILY PRN    enoxaparin (LOVENOX) injection 40 mg  40 mg SubCUTAneous DAILY    lactated Ringers infusion  125 mL/hr IntraVENous CONTINUOUS    glucose chewable tablet 16 g  4 Tab Oral PRN    glucagon (GLUCAGEN) injection 1 mg  1 mg IntraMUSCular PRN    dextrose 10% infusion 0-250 mL  0-250 mL IntraVENous PRN    insulin lispro (HUMALOG) injection   SubCUTAneous Q6H    albuterol (PROVENTIL HFA, VENTOLIN HFA, PROAIR HFA) inhaler 2 Puff  2 Puff Inhalation Q6H PRN    nicotine (NICODERM CQ) 21 mg/24 hr patch 1 Patch  1 Patch TransDERmal Q24H       Social History:  Social History     Tobacco Use    Smoking status: Current Every Day Smoker     Packs/day: 0.50     Years: 40.00     Pack years: 20.00     Types: Cigarettes    Smokeless tobacco: Never Used   Substance Use Topics    Alcohol use: No     Alcohol/week: 0.0 standard drinks       Family History:  Family History   Problem Relation Age of Onset    Arthritis-osteo Mother     Asthma Mother     Diabetes Mother     Hypertension Mother     Stroke Mother     Arthritis-osteo Father     Cancer Father     Elevated Lipids Father     Hypertension Father    Lima Rape Sister     Asthma Sister     Diabetes Sister     Elevated Lipids Sister     Hypertension Sister     Heart Attack Sister     Hypertension Daughter     Hypertension Daughter     Hypertension Daughter     Anesth Problems Neg Hx        Review of Systems:  Unable to obtain as patient is drowsy and will not awaken to answer questions (per RN, recently received Compazine)      Objective:     Patient Vitals for the past 8 hrs:   BP Temp Pulse Resp Height Weight   10/07/20 1206 -- -- -- -- 5' 2\" (1.575 m) 101.2 kg (223 lb)   10/07/20 1023 (!) 159/105 99.3 °F (37.4 °C) 94 18 -- --   10/07/20 0819 (!) 210/108 98.8 °F (37.1 °C) 94 18 -- --     No intake/output data recorded. No intake/output data recorded. EXAM:     CONST:  No acute distress   NEURO:  Drowsy, intermittent eye opening   HEENT: No scleral icterus   LUNGS: No respiratory distress   CARD:  S1 S2   ABD:  Soft, non distended, no apparent tenderness, no rebound, no guarding. + Bowel sounds. EXT:  Warm   PSYCH: Unable to assess     Data Review     Recent Labs     10/06/20  1743   WBC 12.4*   HGB 15.3   HCT 46.5        Recent Labs     10/06/20  1743      K 3.6      CO2 27   BUN 19   CREA 1.32*   *   CA 9.8     Recent Labs     10/06/20  1743   *   TP 8.6*   ALB 3.9   GLOB 4.7*   LPSE 102     No results for input(s): INR, PTP, APTT, INREXT in the last 72 hours. (10/7/20)  EXAM: CT ABD PELV W CONT  CLINICAL HISTORY: Nausea vomiting and diarrhea  INDICATION: N/V/D  COMPARISON: None. CONTRAST: Isovue 370  TECHNIQUE:   Multislice helical CT was performed of the abdomen and pelvis following  uneventful rapid bolus intravenous contrast administration. Oral contrast was  not administered.  Contiguous 5 mm axial images were reconstructed and lung and  soft tissue windows were generated. Coronal and sagittal reformations were  generated. CT dose reduction was achieved through use of a standardized  protocol tailored for this examination and automatic exposure control for dose  modulation.       FINDINGS:  LUNG BASES:Mild cardiomegaly. Small hiatal hernia. LIVER: Hepatic steatosis There is no intrahepatic duct dilatation. There is no  hepatic parenchymal mass. Hepatic enhancement pattern is within normal limits. Portal vein is patent. GALLBLADDER:  No dilatation or wall thickening. SPLEEN/PANCREAS: No mass. There is no pancreatic duct dilatation. There is no  evidence of splenomegaly. ADRENALS/KIDNEYS: No mass. There is no hydronephrosis. There is no renal mass. There is no perinephric mass. STOMACH: No dilatation or wall thickening. COLON AND SMALL BOWEL: Circumferential colonic wall thickening is most prominent  in the descending and sigmoid colon also noted in the ascending and transverse  colon. Mild. There is no free intraperitoneal air. There is no evidence of  incarceration or obstruction. No mesenteric adenopathy. PERITONEUM: Unremarkable. APPENDIX: Absent  BLADDER/REPRODUCTIVE ORGANS: The bladder is nondistended. Hysterectomy. RETROPERITONEUM: Unremarkable. The abdominal aorta is normal in caliber. No  aneurysm. No retroperitoneal adenopathy. OSSEOUS STRUCTURES: No destructive bone lesion.     IMPRESSION  IMPRESSION:  Imaging findings most consistent with a mild to moderate diffuse colitis. Consider infectious and inflammatory etiologies. Additional incidental/nonemergent findings are as described above. Colonoscopy (5/20/18) by Dr. Sy Neighbor for history of colon polyps showed grade I internal hemorrhoids; mild sigmoid diverticulosis; normal mucosa throughout. A repeat colonoscopy was recommended in 5 years.       Assessment:   · Nausea/vomiting/diarrhea: WBC 12.4, Hgb 15.3, platelets 621, AST 18, ALT 36, alkaline phosphatase 348, total bilirubin 0.4, lipase normal. CT abdomen/pelvis with IV contrast (10/7/20): imaging findings most consistent with mild to moderate diffuse colitis, consider infectious and inflammatory findings; small hiatal hernia; hepatic steatosis. Colonoscopy in 2018 with above findings. Differential includes likely infectious versus inflammatory process. · Diabetes mellitus type II  · Hypertension  · Acute kidney injury  · Tobacco abuse     Patient Active Problem List   Diagnosis Code    Hypercholesterolemia E78.00    Osteoarthritis of hip M16.9    PMR (polymyalgia rheumatica) ([facility]) M35.3    [facility]itis ([facility] J42    Former smoker Z87.891    Chronic pain G89.29    Long term (current) use of systemic steroids Z79.52    Type 2 diabetes mellitus with diabetic polyneuropathy, without long-term current use of insulin ([facility]) E11.42    Severe obesity (BMI 35.0-39. 9) with comorbidity ([facility] E66.01    Essential hypertension I10    Seronegative rheumatoid arthritis of multiple sites ([facility] M06.09    MGUS (monoclonal gammopathy of unknown significance) D47.2    Vitamin D deficiency E55.9    Long-term use of immunosuppressant medication Z79.899    CAD (coronary artery disease) I25.10    Lung nodule seen on imaging study R91.1    Idiopathic chronic gout, multiple sites, with tophus (tophi) M1A.09X1    Hypertriglyceridemia E78.1    Nausea and vomiting R11.2    Diarrhea R19.7     Plan:     · Follow stool studies (enteric bacteria panel, C. difficile, and ova & parasites)  · On antibiotics  · Supportive measures: antiemetics PRN  · Patient was discussed with and will be seen by Dr. Janina Valiente  · Thank you for allowing me to participate in care of [facility]     Signed By: Jacqueline Roman     10/7/2020  12:27 PM         GI Attending: Agree with above plan. I came to see patient and she was having a procedure performed at the bedside.  I spoke to patient's daughter outside of the room and updated her on work up from our perspective. We will await stool studies. Continue antibiotics. Based on clinical course, we can decide on role of colonoscopy. Ta Downing MD

## 2020-10-07 NOTE — PROGRESS NOTES
Patient came to the floor around midnight from ED with her daughter. Daughter was not happy with the care she received in the ED. She stated that the nurse saw her mother twice since 1700 and BP was only taken once around 1700 and it was elevated and not treated. Daughter also said her blood sugar was not treated and she was not given nausea medication until she was ready to come up to 2N. Another issue arised when daughter learned that her mother will be tested for COVID (rule out) and that she is going to the covid floor. Daughter stated that none of this was communicated to them. Daughter has no intention on keeping her mother on covid floor and if she doesn't get transferred to another floor they were planning to leave the hospital AMA. MD was paged and spoke to patient and her daughter and decision was made to transfer her to [address] [place] [address] test was discontinued.

## 2020-10-07 NOTE — PROCEDURES
PICC Placement Note. Order received. PICC procedure, risks, complications and benefits reviewed with the patient's bedside nurse DEB Cormier she reviewed this with the patient's  to obtain informed consent for bedside placement of PICC line. This was done over the telephone. Consent noted to be on the patients chart    PRE-PROCEDURE VERIFICATION  Correct Procedure: yes  Correct Site:  yes  Temperature: Temp: 99.2 °F (37.3 °C), Temperature Source: Temp Source: Oral  Recent Labs     10/06/20  1743   BUN 19   CREA 1.32*      WBC 12.4*     Allergies: Augmentin [amoxicillin-pot clavulanate]; Bactrim [sulfamethoxazole-trimethoprim]; Biaxin [clarithromycin]; Demerol [meperidine]; Erythromycin; Gabapentin; Pcn [penicillins]; Percocet [oxycodone-acetaminophen]; Pravastatin; Tetracycline; and Voltaren [diclofenac sodium]  Education materials, including PICC Booklet, for PICC Care given to patient: yes. See Patient Education activity for further details. PROCEDURE DETAIL  A double lumen PICC line was started for vascular access, desire for reliable access and nurse/physician request. The following documentation is in addition to the PICC properties in the lines/airways flowsheet : The vein was accessed with a 20g IV catheter using ultrasound guidance and a guidewire was easily thread. Modified Seldinger Technique was used to thread the PICC and the tip direction was determined with a PICC tip  device  Lot #: [id number]  Was xylocaine 1% used intradermally:  yes  Catheter Length: 40 (cm)  Vein Selection for PICC:right brachial  Central Line Bundle followed yes  Complication Related to Insertion: Was a difficult access    The placement was verified by ECG technology:  Wave form placed on the patient's to document that the PICC tip is located within the superior vena cava. Report given to [facility], RN The PICC is functioning properly and ready for immediate use. Line is okay to use.

## 2020-10-07 NOTE — PROGRESS NOTES
RN discussed with Dr. Cassandra Paul regarding needing a doctors order for obtaining blood culture from a PICC line. Dr. Shadi Barriga may draw the blood culture from patient's PICC line. \" RN will implement Dr. Marianela Leonardo order.

## 2020-10-07 NOTE — PROGRESS NOTES
THERESA:  RUR: RRAT: 15%  TRANSITIONS OF CARE PLAN:   1. DESTINATION: Home with health & family support  2. TRANSPORT: spouse(Tres) to provide transport  3. ADDITIONAL SUPPORT: dtrs([address]  4. DME: shower chair, elevated toilet seat, nebulizer & glucometer  5. HOME HEALTH: no prior hh, anticipate hh at d/c.   6. CODE STATUS/AMD STATUS: full code, no amd on file  7. FOLLOW UP APPOINTMENTS: tbd  8. STILL NEEDS: medical clearance, therapy, cardiology & gi recommendations  Reason for Admission:   Nausea & vomiting                   RUR Score:                    RRAT: 15%  Plan for utilizing home health:          PCP: First and Last name: Freddy Kearney NP   Name of Practice:    Are you a current patient: Yes/No: YES   Approximate date of last visit:  One month ago   Can you participate in a virtual visit with your PCP: Yes                    Current Advanced Directive/Advance Care Plan:                        No amd, full code  Transition of Care Plan:    9. CM reviewed pt chart & attempted to meet pt in room but pt was very drowsy. CM contacted spouse Huey Fabry) who advised pt is ordinarily independent with adls until the last two days, he reported she has been weak. Spouse reported dme of:   shower chair, elevated toilet seat, nebulizer & glucometer. Pt uses [address] [place] [address] mail order pharmacy with no copay concerns. Pt's spouse to provide transprt at d/c. CM to follow for transitions of care. Care Management Interventions  PCP Verified by CM: Yes(PT spouse advised pt last saw pcp one month ago.)  Mode of Transport at Discharge:  Other (see comment)(pt's spouse to provide transport at d/c. )  Transition of Care Consult (CM Consult): Discharge Planning  Discharge Durable Medical Equipment: Yes(spouse reported dme of: shower chair, elevated toilet seat, nebulizer and glucometer)  Health Maintenance Reviewed: Yes(CM contacted spouse via phone, pt is drowsy, unable to assess orientation, as per spouse pt is usually alert & oriented. )  Confirm Follow Up Transport: Family(spouse to provide at d/c. )  The Plan for Transition of Care is Related to the Following Treatment Goals : medical clearance, neprology recommendations, hemodialysis  [employer] Information Provided?: No  Discharge Location  Discharge Placement: Home with home health   Observation notice provided in writing to patient and/or caregiver as well as verbal explanation of the policy. Patients who are in outpatient status also receive the Observation notice. Late entry: (0818 10/9/20)- Code 40 letter rendered, discussed with spouse via phone, copy left at bedside as per spouse's request along with observations letters.   Grisel Neff RN BSN CCM  CRM

## 2020-10-07 NOTE — CONSULTS
Cardiology Consult Note    Assessment/Plan/Discussion:Cardiology Attending:     Patient seen on the day of progress note and examined  and agree with Advance Practice Provider (WOLF, NP,PA)  assessment and plans. Dallas Camacho is a 72 y.o. female   New consult received for abnormal EKG  Her EKG is read should be sinus rhythm with voltage criteria for LVH possible septal infarct  Patient is obtunded and does not open eyes to request  She had nausea vomiting and diarrhea prior to admission with mild to moderate diffuse colitis suspected by GI  She has CAD listed on her history but with unclear details  Other history includes hypertension RA diabetes PMR chronic bronchitis dyslipidemia ZACHARY and tobacco abuse  Her current EKG and the previous EKG from 2016 are reviewed. The finding of septal infarction is very nonspecific and really is not diagnostic from this EKG or EKGs in general.  I do not think there is much change in 2016 EKG in terms the septal Q waves. There is certainly a change in the axis which is probably fairly benign. I would check a single troponin to ensure that she is not had an acute event if this is negative then no further work-up is required from a cardiac point of view  Of note her echocardiogram shows normal LV function with no septal or other wall motion abnormality though it is a bit difficult study was improved by the use of Definity. Selam Flores MD          Patient Name: Dallas Camacho  : 1950 MRN: [id number]  Date: 10/7/2020  Time: 12:38 PM    Admit Diagnosis: Diarrhea [R19.7]    Primary Cardiologist: none   Consulting Cardiologist: Selam Flores MD    Reason for Consult: EKG abnormalities    Requesting MD: [place] NP    HPI:  Dallas Camacho is a 72 y.o. female admitted on 10/6/2020  for Diarrhea [R19.7].    has a past medical history of Abnormal pulmonary function test (2016), AR (allergic rhinitis), Asthma, Atrial thrombus (1994), Chronic bronchitis ([facility] [address] (9/28/2016), Chronic pain, DDD (degenerative disc disease), lumbar, Diabetes ([address] DJD (degenerative joint disease) of hip, Empty sella ([facility] [address] (2000), Former smoker (10/24/2016), Hoarseness of voice, Hypercholesterolemia, Hypertension, Lesion of vocal cord, PMR (polymyalgia rheumatica) ([facility] [address] (5/18/2016), and Smoker (12/5/2012). Presented for 4 days of N/V and diarrhea prior to presentation. Felt lethargic and weak. Noted lightheadedness as well. C/o no appetite. Even unable to keep fluids down and thus was unable to keep her medications down, 2/2 vomiting. She is currently only arouses minimally. She cannot provide any usable history. Initial studies in the ED included EKG which suggested RA enlargement, septal infarct of indeterminate age. She has no hx on chart review for MI, CAD or CHF. H/o left atrial thrombus, chronic bronchitis, DM, tobacco abuse, HTN. Subjective:  Received laying in bed. Arouses minimally by opening eyes briefly. Echo tech at the bedside for echo. Assessment and Plan     1. Abnormal EKG   - No evidence for ACS. No BBB or ectopy. Compares similar to prior EKGs   - Echo viewed at the bedside while completing. No noticeable systolic dysfunction - official report to follow. 2. N/V/D   - per Primary team   - possible gastroenteritis   - GI consulted  3. Leukocytosis with shift   - Afebrile   - BCx and UA with c/x pending  4. DM II   - per Primary team  5. HTN   - Resume PTA meds as able   - Daughter reported patient unable to keep her meds down with N/V.     - suspect BP with control with resuming home meds  6. ZACHARY   - dehydration   - reported dizziness - depleted intravascular volume from V/D     EKG abnormalities of normal variant. Prior EKG with variation as well. No doubt significant dehydration and electrolyte  rearrangement contributing to picture. Echo completing at the bedside.   No gross abnormalities noted. Official report to follow. Needs no further cardiac studies at this time. OK for any necessary procedures, from Cardiology standpoint. Patient Active Problem List   Diagnosis Code    Hypercholesterolemia E78.00    Osteoarthritis of hip M16.9    PMR [facility]ymyalgia rheumatica) ([facility] M35.3    Chronic bronchitis ([facility] [address] J42    Former smoker Z87.891    Chronic pain G89.29    Long term (current) use of systemic steroids Z79.52    Type 2 diabetes mellitus with diabetic polyneuropathy, without long-term current use of insulin ([facility]) E11.42    Severe obesity (BMI 35.0-39. 9) with comorbidity ([facility] [address] E66.01    Essential hypertension I10    Seronegative rheumatoid arthritis of multiple sites ([facility] M06.09    MGUS (monoclonal gammopathy of unknown significance) D47.2    Vitamin D deficiency E55.9    Long-term use of immunosuppressant medication Z79.899    CAD (coronary artery disease) I25.10    Lung nodule seen on imaging study R91.1    Idiopathic chronic gout, multiple sites, with tophus (tophi) M1A.09X1    Hypertriglyceridemia E78.1    Nausea and vomiting R11.2    Diarrhea R19.7     No specialty comments available. Review of Systems:    [x] Patient unable to provide secondary to condition - does not arouse    [] All systems negative, except as checked below.   Constitutional:    []Weight Change  []Fever   []Chills   []Night Sweats  []Fatigue  []Malaise  []____  ENT/Mouth:     []Hearing Changes  []Ear Pain  []Nasal Congestion   []Sinus Pain  []Hoarseness   []Sore throat  []Rhinorrhea  []Swallowing Difficulty  []____  Eyes:    []Eye Pain  []Swelling  []Redness  []Foreign Body  []Discharge  []Vision Changes  []____  Cardiovascular:    []Chest Pain  []SOB  []PND  []MONREAL  []Orthopnea  []Claudication  []Edema   []Palpitations  []____  Respiratory:    []Cough  []Sputum  []Wheezing,  []SOB  []Hemoptysis  []____  Gastrointestinal:    []Nausea  []Vomiting  []Diarrhea  []Constipation  []Pain []Heartburn  []Anorexia  []Dysphagia  []Hematochezia  []Melena,  []Jaundice  []____  Genitourinary:    []Dysuria  []Urinary Frequency  []Hematuria  []Urinary Incontinence  []Urgency  []Flank Pain  []Hesitancy  []____  Musculoskeletal:    []Arthralgias  []Myalgias  []Joint Swelling  []Joint Stiffness  []Back Pain  []Neck Pain  []____  Skin:    []Skin Lesions  []Pruritis  []Hair Changes  []Skin rashes  []____  Neuro:    []Weakness  []Numbness  []Paresthesias  []Loss of Consciousness  []Syncope   []Dizziness  []Headache  []Coordination Changes  []Recent Falls  []____  Psych:    []Anxiety/Depression  []Insomnia  []Memory Changes  []Violence/Abuse Hx.  []____  Heme/Lymph:    []Bruising  []Bleeding  []Lymphadenopathy  []____  Endocrine:    []Polyuria  []Polydipsia  []Temperature Intolerance  []____         Previous treatment/evaluation includes   none  Cardiac risk factors:   smoking/ tobacco exposure, dyslipidemia, diabetes mellitus, obesity, sedentary life style, hypertension, post-menopausal.    Past Medical History:   Diagnosis Date    Abnormal pulmonary function test 9/28/2016    AR (allergic rhinitis)     Asthma     Atrial thrombus 1994    left    Chronic bronchitis (HCC) 9/28/2016    Chronic pain     DDD (degenerative disc disease), lumbar     Diabetes ([address]     DJD (degenerative joint disease) of hip     right    Empty sella ([address] 2000    by MRI    Former smoker 10/24/2016    Hoarseness of voice     Hypercholesterolemia     Hypertension     Lesion of vocal cord     PMR [facility]pol[facility]yalgia rheumatica) [facility] [address] 5/18/2016    Smoker 12/5/2012     Past Surgical History:   Procedure Laterality Date    COLONOSCOPY N/A 5/30/2018    COL[facility]COPY performed by [facility] at [facility] ENDOSCOPY    COLORECTAL SCRN; HI RISK IND  5/30/2018         ENDOSCOPY, COLON, DIAGNOSTIC  10/03    polypectomy    ENDOSCOPY, COLON, DIAGNOSTIC  2/05    Dr. Cordelia Carr, COLON, DIAGNOSTIC  2010, reported due 2015    HX APPENDECTOMY  1970's    HX BREAST REDUCTION      HX COLONOSCOPY  2015    dr Indira Mcgraw.  2 polyps removed -repeat in 2018     HX HEMORRHOIDECTOMY      HX HYSTERECTOMY      ovaries spared    HX KNEE REPLACEMENT  11/03, 10/05    left then right    HX TONSILLECTOMY  age 29    tonsils     Current Facility-Administered Medications   Medication Dose Route Frequency    fluconazole (DIFLUCAN) tablet 200 mg  200 mg Oral DAILY    pantoprazole (PROTONIX) 40 mg in 0.9% sodium chloride 10 mL injection  40 mg IntraVENous Q12H    hydrALAZINE (APRESOLINE) 20 mg/mL injection 10 mg  10 mg IntraVENous Q6H PRN    atenoloL (TENORMIN) tablet 25 mg  25 mg Oral DAILY    vancomycin (VANCOCIN) 2000 mg in  ml infusion  2,000 mg IntraVENous ONCE    Vancomycin- Pharmacy to Dose    Other Rx Dosing/Monitoring    morphine injection 1 mg  1 mg IntraVENous Q3H PRN    albuterol-ipratropium (DUO-NEB) 2.5 MG-0.5 MG/3 ML  3 mL Nebulization Q4H PRN    ondansetron (ZOFRAN) injection 4 mg  4 mg IntraVENous Q6H    dilTIAZem ER (CARDIZEM CD) capsule 360 mg  360 mg Oral DAILY    prochlorperazine (COMPAZINE) with saline injection 5 mg  5 mg IntraVENous Q6H PRN    meropenem (MERREM) 500 mg in 0.9% sodium chloride (MBP/ADV) 50 mL  0.5 g IntraVENous Q8H    [START ON 10/8/2020] vancomycin (VANCOCIN) 1250 mg in  ml infusion  1,250 mg IntraVENous Q24H    [Held by provider] allopurinoL (ZYLOPRIM) tablet 300 mg  300 mg Oral DAILY    [Held by provider] aspirin delayed-release tablet 81 mg  81 mg Oral DAILY    folic acid (FOLVITE) tablet 1 mg  1 mg Oral DAILY    pregabalin (LYRICA) capsule 200 mg  200 mg Oral TID    tiotropium bromide (SPIRIVA RESPIMAT) 2.5 mcg /actuation  1 Puff Inhalation DAILY    sodium chloride (NS) flush 5-40 mL  5-40 mL IntraVENous Q8H    sodium chloride (NS) flush 5-40 mL  5-40 mL IntraVENous PRN    acetaminophen (TYLENOL) tablet 650 mg  650 mg Oral Q6H PRN    Or    acetaminophen (TYLENOL) suppository 650 mg  650 mg Rectal Q6H PRN    polyethylene glycol (MIRALAX) packet 17 g  17 g Oral DAILY PRN    enoxaparin (LOVENOX) injection 40 mg  40 mg SubCUTAneous DAILY    lactated Ringers infusion  125 mL/hr IntraVENous CONTINUOUS    glucose chewable tablet 16 g  4 Tab Oral PRN    glucagon (GLUCAGEN) injection 1 mg  1 mg IntraMUSCular PRN    dextrose 10% infusion 0-250 mL  0-250 mL IntraVENous PRN    insulin lispro (HUMALOG) injection   SubCUTAneous Q6H    albuterol (PROVENTIL HFA, VENTOLIN HFA, PROAIR HFA) inhaler 2 Puff  2 Puff Inhalation Q6H PRN    nicotine (NICODERM CQ) 21 mg/24 hr patch 1 Patch  1 Patch TransDERmal Q24H       Allergies   Allergen Reactions    Augmentin [Amoxicillin-Pot Clavulanate] Diarrhea    Bactrim [Sulfamethoxazole-Trimethoprim] Nausea Only    Biaxin [Clarithromycin] Nausea Only    Demerol [Meperidine] Itching    Erythromycin Nausea Only    Gabapentin Nausea and Vomiting    Pcn [Penicillins] Hives    Percocet [Oxycodone-Acetaminophen] Itching    Pravastatin Nausea Only    Tetracycline Nausea Only    Voltaren [Diclofenac Sodium] Hives      Family History   Problem Relation Age of Onset    Arthritis-osteo Mother     Asthma Mother     Diabetes Mother     Hypertension Mother     Stroke Mother     Arthritis-osteo Father     Cancer Father     Elevated Lipids Father     Hypertension Father     Arthritis-osteo Sister     Asthma Sister     Diabetes Sister     Elevated Lipids Sister     Hypertension Sister     Heart Attack Sister     Hypertension Daughter     Hypertension Daughter     Hypertension Daughter     Anesth Problems Neg Hx       Social History     Socioeconomic History    Marital status:      Spouse name: Not on file    Number of children: Not on file    Years of education: Not on file    Highest education level: Not on file   Tobacco Use    Smoking status: Current Every Day Smoker     Packs/day: 0.50     Years: 40.00     Pack years: 20. 00     Types: Cigarettes    Smokeless tobacco: Never Used   Substance and Sexual Activity    Alcohol use: No     Alcohol/week: 0.0 standard drinks    Drug use: No    Sexual activity: Yes     Partners: Male       Objective:    Physical Exam    Vitals:   Vitals:    10/07/20 0358 10/07/20 0819 10/07/20 1023 10/07/20 1206   BP: (!) 167/89 (!) 210/108 (!) 159/105    Pulse: 95 94 94    Resp: 16 18 18    Temp: 98.5 °F (36.9 °C) 98.8 °F (37.1 °C) 99.3 °F (37.4 °C)    SpO2: 95%      Weight:    223 lb (101.2 kg)   Height:    5' 2\" (1.575 m)       General:    Minimally arouses, no distress, appears stated age. Neck:   Supple,  no JVD. Back:     Not assessed   Lungs:     Clear anteriorly to auscultation bilaterally. Heart[de-identified]    Regular rate and rhythm, S1, S2 normal, no murmur, click, rub or gallop. Abdomen:     Soft, non-tender. Bowel sounds present   Extremities:   Extremities normal, atraumatic, no cyanosis or edema.    Vascular:   Pulses - 2+ radials   Skin:   Skin color normal. No rashes or lesions   Neurologic:   Patient does not arouse for exam       Telemetry:     ECG:   EKG Results     Procedure 720 Value Units Date/Time    EKG, 12 LEAD, INITIAL [[id number]] Collected:  10/07/20 0950    Order Status:  Completed Updated:  10/07/20 1054     Ventricular Rate 81 BPM      Atrial Rate 81 BPM      P-R Interval 124 ms      QRS Duration 72 ms      Q-T Interval 372 ms      QTC Calculation (Bezet) 432 ms      Calculated P Axis 72 degrees      Calculated R Axis -20 degrees      Calculated T Axis -27 degrees      Diagnosis --     Normal sinus rhythm  Right atrial enlargement  Minimal voltage criteria for LVH, may be normal variant  Septal infarct , age undetermined  When compared with ECG of 16-OCT-2010 12:50,  Septal infarct is now present  Confirmed by Patrick Reed M.D., Gail Pleitez ([id number]) on 10/7/2020 10:54:12 AM              Data Review:     Radiology:   XR Results (most recent):  [place] [facility] [place]   XR CHEST PA LAT    Narrative EXAM: XR CHEST PA LAT    HISTORY: Confusion. COMPARISON: CT 3/27/2019    FINDINGS: 2 view(s) of the chest. The lungs are well expanded. No focal  consolidation, pleural effusion, or pneumothorax. The cardiomediastinal  silhouette is unremarkable. Multilevel spondylosis in the spine. Impression IMPRESSION:  No acute cardiopulmonary process. No results for input(s): CPK, TROIQ in the last 72 hours. No lab exists for component: CKQMB, CPKMB, BMPP  Recent Labs     10/06/20  1743      K 3.6      CO2 27   BUN 19   CREA 1.32*   *   CA 9.8     Recent Labs     10/06/20  1743   WBC 12.4*   HGB 15.3   HCT 46.5        Recent Labs     10/06/20  1743   *     No results for input(s): CHOL, LDLC in the last 72 hours. No lab exists for component: TGL, HDLC,  HBA1C  Recent Labs     10/06/20  1743   TSH 1.15       Skip Hernandez MD         [facility] [address] [place] [address] [address]     [facility]     [phone]    CC: Diane Cervantes NP

## 2020-10-07 NOTE — PROGRESS NOTES
Day #1 of meropenem  Indication:  sepsis of unknown origin  Current regimen:  1000 mg Q8H  Abx regimen: meropenem + vancomycin  Recent Labs     10/06/20  1743   WBC 12.4*   CREA 1.32*   BUN 19     Est CrCl: 44 ml/min  Temp (24hrs), Av.7 °F (37.1 °C), Min:98.2 °F (36.8 °C), Max:99.3 °F (37.4 °C)    Cultures:   10/7 urine, pending    Plan: Change to meropenem 500 mg Q8H for crcl < 50 ml/min

## 2020-10-08 ENCOUNTER — DOCUMENTATION ONLY (OUTPATIENT)
Dept: ONCOLOGY | Age: 70
End: 2020-10-08

## 2020-10-08 LAB
AMMONIA PLAS-SCNC: 17 UMOL/L
ANION GAP SERPL CALC-SCNC: 7 MMOL/L (ref 5–15)
ANION GAP SERPL CALC-SCNC: 7 MMOL/L (ref 5–15)
BACTERIA SPEC CULT: NORMAL
BASOPHILS # BLD: 0.1 K/UL (ref 0–0.1)
BASOPHILS NFR BLD: 1 % (ref 0–1)
BUN SERPL-MCNC: 23 MG/DL (ref 6–20)
BUN SERPL-MCNC: 24 MG/DL (ref 6–20)
BUN/CREAT SERPL: 20 (ref 12–20)
BUN/CREAT SERPL: 21 (ref 12–20)
CALCIUM SERPL-MCNC: 8.6 MG/DL (ref 8.5–10.1)
CALCIUM SERPL-MCNC: 8.7 MG/DL (ref 8.5–10.1)
CC UR VC: NORMAL
CHLORIDE SERPL-SCNC: 106 MMOL/L (ref 97–108)
CHLORIDE SERPL-SCNC: 108 MMOL/L (ref 97–108)
CO2 SERPL-SCNC: 27 MMOL/L (ref 21–32)
CO2 SERPL-SCNC: 29 MMOL/L (ref 21–32)
COMMENT, HOLDF: NORMAL
CREAT SERPL-MCNC: 1.13 MG/DL (ref 0.55–1.02)
CREAT SERPL-MCNC: 1.13 MG/DL (ref 0.55–1.02)
DIFFERENTIAL METHOD BLD: ABNORMAL
EOSINOPHIL # BLD: 0 K/UL (ref 0–0.4)
EOSINOPHIL NFR BLD: 0 % (ref 0–7)
ERYTHROCYTE [DISTWIDTH] IN BLOOD BY AUTOMATED COUNT: 15 % (ref 11.5–14.5)
EST. AVERAGE GLUCOSE BLD GHB EST-MCNC: 235 MG/DL
GLUCOSE BLD STRIP.AUTO-MCNC: 155 MG/DL (ref 65–100)
GLUCOSE BLD STRIP.AUTO-MCNC: 165 MG/DL (ref 65–100)
GLUCOSE BLD STRIP.AUTO-MCNC: 181 MG/DL (ref 65–100)
GLUCOSE BLD STRIP.AUTO-MCNC: 250 MG/DL (ref 65–100)
GLUCOSE SERPL-MCNC: 168 MG/DL (ref 65–100)
GLUCOSE SERPL-MCNC: 192 MG/DL (ref 65–100)
HAV IGM SER QL: NONREACTIVE
HBA1C MFR BLD: 9.8 % (ref 4–5.6)
HBV CORE IGM SER QL: NONREACTIVE
HBV SURFACE AG SER QL: <0.1 INDEX
HBV SURFACE AG SER QL: NEGATIVE
HCT VFR BLD AUTO: 41.6 % (ref 35–47)
HCV AB SERPL QL IA: NONREACTIVE
HCV COMMENT,HCGAC: NORMAL
HGB BLD-MCNC: 13.4 G/DL (ref 11.5–16)
IMM GRANULOCYTES # BLD AUTO: 0 K/UL (ref 0–0.04)
IMM GRANULOCYTES NFR BLD AUTO: 0 % (ref 0–0.5)
LACTATE SERPL-SCNC: 0.8 MMOL/L (ref 0.4–2)
LYMPHOCYTES # BLD: 2.2 K/UL (ref 0.8–3.5)
LYMPHOCYTES NFR BLD: 20 % (ref 12–49)
MAGNESIUM SERPL-MCNC: 1.7 MG/DL (ref 1.6–2.4)
MCH RBC QN AUTO: 29.3 PG (ref 26–34)
MCHC RBC AUTO-ENTMCNC: 32.2 G/DL (ref 30–36.5)
MCV RBC AUTO: 90.8 FL (ref 80–99)
MONOCYTES # BLD: 1.4 K/UL (ref 0–1)
MONOCYTES NFR BLD: 13 % (ref 5–13)
NEUTS SEG # BLD: 7 K/UL (ref 1.8–8)
NEUTS SEG NFR BLD: 66 % (ref 32–75)
NRBC # BLD: 0 K/UL (ref 0–0.01)
NRBC BLD-RTO: 0 PER 100 WBC
PLATELET # BLD AUTO: 173 K/UL (ref 150–400)
PMV BLD AUTO: 12.4 FL (ref 8.9–12.9)
POTASSIUM SERPL-SCNC: 3.1 MMOL/L (ref 3.5–5.1)
POTASSIUM SERPL-SCNC: 3.3 MMOL/L (ref 3.5–5.1)
RBC # BLD AUTO: 4.58 M/UL (ref 3.8–5.2)
SAMPLES BEING HELD,HOLD: NORMAL
SERVICE CMNT-IMP: ABNORMAL
SERVICE CMNT-IMP: NORMAL
SODIUM SERPL-SCNC: 142 MMOL/L (ref 136–145)
SODIUM SERPL-SCNC: 142 MMOL/L (ref 136–145)
SP1: NORMAL
SP2: NORMAL
SP3: NORMAL
TROPONIN I SERPL-MCNC: <0.05 NG/ML
TROPONIN I SERPL-MCNC: <0.05 NG/ML
WBC # BLD AUTO: 10.7 K/UL (ref 3.6–11)

## 2020-10-08 PROCEDURE — 83036 HEMOGLOBIN GLYCOSYLATED A1C: CPT

## 2020-10-08 PROCEDURE — 36415 COLL VENOUS BLD VENIPUNCTURE: CPT

## 2020-10-08 PROCEDURE — 99222 1ST HOSP IP/OBS MODERATE 55: CPT | Performed by: INTERNAL MEDICINE

## 2020-10-08 PROCEDURE — 82962 GLUCOSE BLOOD TEST: CPT

## 2020-10-08 PROCEDURE — 96376 TX/PRO/DX INJ SAME DRUG ADON: CPT

## 2020-10-08 PROCEDURE — 74011250636 HC RX REV CODE- 250/636: Performed by: NURSE PRACTITIONER

## 2020-10-08 PROCEDURE — 74011250637 HC RX REV CODE- 250/637: Performed by: HOSPITALIST

## 2020-10-08 PROCEDURE — 74011000258 HC RX REV CODE- 258: Performed by: NURSE PRACTITIONER

## 2020-10-08 PROCEDURE — 74011000258 HC RX REV CODE- 258: Performed by: HOSPITALIST

## 2020-10-08 PROCEDURE — 85025 COMPLETE CBC W/AUTO DIFF WBC: CPT

## 2020-10-08 PROCEDURE — 96372 THER/PROPH/DIAG INJ SC/IM: CPT

## 2020-10-08 PROCEDURE — 83735 ASSAY OF MAGNESIUM: CPT

## 2020-10-08 PROCEDURE — 99218 HC RM OBSERVATION: CPT

## 2020-10-08 PROCEDURE — 74011250636 HC RX REV CODE- 250/636: Performed by: HOSPITALIST

## 2020-10-08 PROCEDURE — 96375 TX/PRO/DX INJ NEW DRUG ADDON: CPT

## 2020-10-08 PROCEDURE — 94640 AIRWAY INHALATION TREATMENT: CPT

## 2020-10-08 PROCEDURE — 74011000250 HC RX REV CODE- 250: Performed by: NURSE PRACTITIONER

## 2020-10-08 PROCEDURE — 99232 SBSQ HOSP IP/OBS MODERATE 35: CPT | Performed by: CLINICAL NURSE SPECIALIST

## 2020-10-08 PROCEDURE — 80048 BASIC METABOLIC PNL TOTAL CA: CPT

## 2020-10-08 PROCEDURE — C9113 INJ PANTOPRAZOLE SODIUM, VIA: HCPCS | Performed by: NURSE PRACTITIONER

## 2020-10-08 PROCEDURE — 74011636637 HC RX REV CODE- 636/637: Performed by: HOSPITALIST

## 2020-10-08 PROCEDURE — 84484 ASSAY OF TROPONIN QUANT: CPT

## 2020-10-08 PROCEDURE — 74011000250 HC RX REV CODE- 250: Performed by: HOSPITALIST

## 2020-10-08 PROCEDURE — 89055 LEUKOCYTE ASSESSMENT FECAL: CPT

## 2020-10-08 PROCEDURE — 74011250637 HC RX REV CODE- 250/637: Performed by: NURSE PRACTITIONER

## 2020-10-08 RX ORDER — HYDROCODONE BITARTRATE AND ACETAMINOPHEN 10; 325 MG/1; MG/1
1 TABLET ORAL
Status: DISCONTINUED | OUTPATIENT
Start: 2020-10-08 | End: 2020-10-10

## 2020-10-08 RX ADMIN — SODIUM CHLORIDE 40 MG: 9 INJECTION, SOLUTION INTRAMUSCULAR; INTRAVENOUS; SUBCUTANEOUS at 09:14

## 2020-10-08 RX ADMIN — ENOXAPARIN SODIUM 40 MG: 40 INJECTION SUBCUTANEOUS at 09:14

## 2020-10-08 RX ADMIN — SODIUM CHLORIDE, SODIUM LACTATE, POTASSIUM CHLORIDE, AND CALCIUM CHLORIDE 125 ML/HR: 600; 310; 30; 20 INJECTION, SOLUTION INTRAVENOUS at 16:06

## 2020-10-08 RX ADMIN — FOLIC ACID 1 MG: 1 TABLET ORAL at 09:15

## 2020-10-08 RX ADMIN — HYDROCODONE BITARTRATE AND ACETAMINOPHEN 1 TABLET: 10; 325 TABLET ORAL at 16:06

## 2020-10-08 RX ADMIN — MEROPENEM 500 MG: 500 INJECTION, POWDER, FOR SOLUTION INTRAVENOUS at 12:19

## 2020-10-08 RX ADMIN — ATENOLOL 25 MG: 25 TABLET ORAL at 09:15

## 2020-10-08 RX ADMIN — PREGABALIN 200 MG: 100 CAPSULE ORAL at 09:15

## 2020-10-08 RX ADMIN — FLUCONAZOLE 200 MG: 100 TABLET ORAL at 09:15

## 2020-10-08 RX ADMIN — MEROPENEM 500 MG: 500 INJECTION, POWDER, FOR SOLUTION INTRAVENOUS at 02:31

## 2020-10-08 RX ADMIN — Medication 10 ML: at 07:43

## 2020-10-08 RX ADMIN — SODIUM CHLORIDE 40 MG: 9 INJECTION, SOLUTION INTRAMUSCULAR; INTRAVENOUS; SUBCUTANEOUS at 23:34

## 2020-10-08 RX ADMIN — IPRATROPIUM BROMIDE 0.5 MG: 0.5 SOLUTION RESPIRATORY (INHALATION) at 21:11

## 2020-10-08 RX ADMIN — MORPHINE SULFATE 1 MG: 2 INJECTION, SOLUTION INTRAMUSCULAR; INTRAVENOUS at 02:36

## 2020-10-08 RX ADMIN — PREGABALIN 200 MG: 100 CAPSULE ORAL at 02:28

## 2020-10-08 RX ADMIN — PREGABALIN 200 MG: 100 CAPSULE ORAL at 16:06

## 2020-10-08 RX ADMIN — PREGABALIN 200 MG: 100 CAPSULE ORAL at 23:35

## 2020-10-08 RX ADMIN — IPRATROPIUM BROMIDE 0.5 MG: 0.5 SOLUTION RESPIRATORY (INHALATION) at 12:24

## 2020-10-08 RX ADMIN — INSULIN LISPRO 2 UNITS: 100 INJECTION, SOLUTION INTRAVENOUS; SUBCUTANEOUS at 12:34

## 2020-10-08 RX ADMIN — Medication 10 ML: at 22:00

## 2020-10-08 RX ADMIN — ONDANSETRON 4 MG: 2 INJECTION INTRAMUSCULAR; INTRAVENOUS at 00:02

## 2020-10-08 RX ADMIN — MEROPENEM 500 MG: 500 INJECTION, POWDER, FOR SOLUTION INTRAVENOUS at 18:41

## 2020-10-08 RX ADMIN — DILTIAZEM HYDROCHLORIDE 360 MG: 180 CAPSULE, COATED, EXTENDED RELEASE ORAL at 09:15

## 2020-10-08 RX ADMIN — INSULIN LISPRO 2 UNITS: 100 INJECTION, SOLUTION INTRAVENOUS; SUBCUTANEOUS at 07:38

## 2020-10-08 RX ADMIN — IPRATROPIUM BROMIDE 0.5 MG: 0.5 SOLUTION RESPIRATORY (INHALATION) at 15:51

## 2020-10-08 RX ADMIN — ONDANSETRON 4 MG: 2 INJECTION INTRAMUSCULAR; INTRAVENOUS at 07:39

## 2020-10-08 RX ADMIN — INSULIN LISPRO 2 UNITS: 100 INJECTION, SOLUTION INTRAVENOUS; SUBCUTANEOUS at 18:42

## 2020-10-08 RX ADMIN — Medication 30 ML: at 00:02

## 2020-10-08 RX ADMIN — IPRATROPIUM BROMIDE 0.5 MG: 0.5 SOLUTION RESPIRATORY (INHALATION) at 07:34

## 2020-10-08 NOTE — PROGRESS NOTES
Renal Dosing/Monitoring  Medication: meropenem   Current regimen:  500 mg IV Q8H  Recent Labs     10/08/20  0418 10/07/20  2300 10/06/20  1743   CREA 1.13* 1.13* 1.32*   BUN 24* 23* 19     Estimated CrCl:  52 ml/min  Plan: Change to 500 mg IV Q6H  per [facility] P&T Committee Protocol with respect to renal function. Pharmacy will continue to monitor patient daily and will make dosage adjustments based upon changing renal function.

## 2020-10-08 NOTE — PROGRESS NOTES
Received phone call that hematology consult was placed. No hematology consult ordered. Called to floor nurse to clarify if patient to be seen by hematology or if possible consult was for hepatology. RN reviewed notes and believes consult for Dr. Benjamin Bradford with hepatology.      Signed By: Celeste Acevedo NP     October 8, 2020

## 2020-10-08 NOTE — PROGRESS NOTES
Edwige Smiley NP notified and hayes that patient blood sugar was 172 and patient is ordered scheduled Lantus 30 units SQ. Patient is NPO except for medications. Edwige Smiley NP also aware that there was a CBC with Diff ordered which fell off the nurses orders. RN requesting to have a new order for CBC with Diff. Edwige Smiley NP stated,\"Hold Lantus 30 units tonight. CBC with Diff may be ordered. \" RN will implement NP's orders.

## 2020-10-08 NOTE — PROGRESS NOTES
ID Progress Note  10/8/2020    Subjective:     Low-grade fever earlier. She only had one episode of diarrhea today and it was very little. C. difficile and stool culture were not sent. She has no shortness of breath or abdominal pain. She is not vomiting anymore. ROS: No anaphylaxis, seizures, syncope, hematemesis, hematochezia     Objective:     Vitals:   Visit Vitals  /62   Pulse 62   Temp 97.8 °F (36.6 °C)   Resp 17   Ht 5' 2\" (1.575 m)   Wt 101 kg (222 lb 10.6 oz)   SpO2 95%   BMI 40.73 kg/m²        Tmax:  Temp (24hrs), Av.9 °F (37.2 °C), Min:97.8 °F (36.6 °C), Max:99.8 °F (37.7 °C)      Exam:    Not in distress  Pink conjunctivae, anicteric sclerae  No cervical lymphdenopathy   Lung clear, no rales, wheezes or rhonchi   Heart: s1, s2, RRR, no murmurs rubs or clicks  Abdomen: soft nontender, no guarding or rebound  Knees not warm or tender  Speech fluent     Labs:   Lab Results   Component Value Date/Time    WBC 10.7 10/08/2020 12:05 AM    HGB 13.4 10/08/2020 12:05 AM    HCT 41.6 10/08/2020 12:05 AM    PLATELET 429  12:05 AM    MCV 90.8 10/08/2020 12:05 AM     Lab Results   Component Value Date/Time    Sodium 142 10/08/2020 04:18 AM    Potassium 3.3 (L) 10/08/2020 04:18 AM    Chloride 108 10/08/2020 04:18 AM    CO2 27 10/08/2020 04:18 AM    Anion gap 7 10/08/2020 04:18 AM    Glucose 192 (H) 10/08/2020 04:18 AM    BUN 24 (H) 10/08/2020 04:18 AM    Creatinine 1.13 (H) 10/08/2020 04:18 AM    BUN/Creatinine ratio 21 (H) 10/08/2020 04:18 AM    GFR est AA 58 (L) 10/08/2020 04:18 AM    GFR est non-AA 48 (L) 10/08/2020 04:18 AM    Calcium 8.6 10/08/2020 04:18 AM    Bilirubin, total 0.4 10/06/2020 05:43 PM    Alk.  phosphatase 348 (H) 10/06/2020 05:43 PM    Protein, total 8.6 (H) 10/06/2020 05:43 PM    Albumin 3.9 10/06/2020 05:43 PM    Globulin 4.7 (H) 10/06/2020 05:43 PM    A-G Ratio 0.8 (L) 10/06/2020 05:43 PM    ALT (SGPT) 36 10/06/2020 05:43 PM           Assessment:     #1 colitis     #2 mild renal insufficiency     #3 diabetes     #4 hyperlipidemia     #5 hypertension          Recommendations:     Her diarrhea has improved already. She has not had any antibiotics or acid suppressive medication prior to coming to the hospital.  Because of the improvement and the lack of risk factors, it seems C. difficile is unlikely. If we cannot send a stool sample and she gets better, I would probably challenge her with Augmentin tomorrow and see how she does.       Cleopatra Hayden MD

## 2020-10-08 NOTE — ROUTINE PROCESS
NP aware that the patients labs have not been obtained, multiple unsuccessful attempts by the night shift RN. Waiting for PICC line placement and consent from family. This RN obtain telephone consent from patient's  for a PICC line witnessed by second nurse Janae Garcia. Bedside shift change report given to [address] [place] [address]  (oncoming nurse) by Teresa Ramirez RN (offgoing nurse). Report included the following information SBAR, Kardex, Intake/Output and MAR.

## 2020-10-08 NOTE — DIABETES MGMT
[facility] SPECIALIST CONSULT  PROGRAM FOR DIABETES HEALTH    FOLLOW UP NOTE    Presentation   Mary Regalado is a 72 y.o. female who presented to the ED from PCP office with a concern of delirium after several days of nausea, vomiting, diarrhea and weakness. Her  reported that she has been \"out of it\" since yesterday. She had a similar occurrence one year ago for which she was managed at [facility]. HX: hyperlipidemia, hypertension, diabetes, polymyalgia rheumatica, asthma, empty sella syndrome, tobacco use, and atrial thrombus    DX: Confusion, viral gastroenteritis vs bacterial etiology, ZACHARY    TX: IV hydration, stool studies    Current clinical course has been complicated by hyperglycemia. Diabetes: Patient has known Type two diabetes, treated with metformin and Trulicity PTA. A1C 7.7% in July. Consulted by Provider for advanced diabetes nursing assessment and care, specifically related to   [x] Inpatient management strategy  [x] Home management assessment      Diabetes medication history  Drug class Currently in use Discontinued Never used   Biguanide Metformin 500mg 2 tabs BID     DDP-4 inhibitor       Sulfonylurea      Thiazolidinedione      GLP-1 RA Trulicity SQ once weekly     SGLT-2 inhibitors      Basal insulin      Fixed Dose  Combinations      Bolus insulin        SUBJECTIVE:  GI, Cardiology and hepatology consulted  No stool studies have been sent as diarrhea is resolving  ID d/c vancomycin  Lantus ordered but held  17 units correctional insulin given  Glucose 165-200 in the past 24 hours while NPO    Patient reports that this is the 4th episode with similar symptoms/2nd hospitalization. When feeling well, she does have diarrhea 1-2 times weekly. Objective   Physical exam  General Awake, oriented, tired, falling back asleep when talking to patient. Daughter at bedside.     Vital Signs   Visit Vitals  /62   Pulse 62   Temp 97.8 °F (36.6 °C)   Resp 17   Ht 5' 2\" (1.575 m)   Wt 101 kg (222 lb 10.6 oz)   SpO2 95%   BMI 40.73 kg/m²     Skin  Warm and dry. No acanthosis noted along neckline. Heart   Regular rate and rhythm. No murmurs, rubs or gallops  Lungs  Clear to auscultation without rales or rhonchi  Extremities No foot wounds     Laboratory  Lab Results   Component Value Date/Time    Hemoglobin A1c 8.1 (H) 12/13/2019 03:32 PM    Hemoglobin A1c (POC) 7.7 07/31/2020 02:44 PM     Lab Results   Component Value Date/Time    LDL,Direct 65 10/07/2009 11:05 AM    LDL, calculated Comment 12/13/2019 03:32 PM     Lab Results   Component Value Date/Time    Creatinine 1.13 (H) 10/08/2020 04:18 AM     Lab Results   Component Value Date/Time    Sodium 142 10/08/2020 04:18 AM    Potassium 3.3 (L) 10/08/2020 04:18 AM    Chloride 108 10/08/2020 04:18 AM    CO2 27 10/08/2020 04:18 AM    Anion gap 7 10/08/2020 04:18 AM    Glucose 192 (H) 10/08/2020 04:18 AM    BUN 24 (H) 10/08/2020 04:18 AM    Creatinine 1.13 (H) 10/08/2020 04:18 AM    BUN/Creatinine ratio 21 (H) 10/08/2020 04:18 AM    GFR est AA 58 (L) 10/08/2020 04:18 AM    GFR est non-AA 48 (L) 10/08/2020 04:18 AM    Calcium 8.6 10/08/2020 04:18 AM    Bilirubin, total 0.4 10/06/2020 05:43 PM    Alk.  phosphatase 348 (H) 10/06/2020 05:43 PM    Protein, total 8.6 (H) 10/06/2020 05:43 PM    Albumin 3.9 10/06/2020 05:43 PM    Globulin 4.7 (H) 10/06/2020 05:43 PM    A-G Ratio 0.8 (L) 10/06/2020 05:43 PM    ALT (SGPT) 36 10/06/2020 05:43 PM     Lab Results   Component Value Date/Time    ALT (SGPT) 36 10/06/2020 05:43 PM       Factors affecting BG pattern  Factor Dose Comments   Nutrition:  Carb-controlled meals   60 grams/meal NPO   Drugs:  IV antibiotics   Meropenem      Infection Leukocytosis    Other: presumed gastroenteritis       Blood glucose pattern        Assessment and Plan   Nursing Diagnosis Risk for unstable blood glucose pattern   Nursing Intervention Domain 5587 Decision-making Support   Nursing Interventions Examined current inpatient diabetes control   Explored factors facilitating and impeding inpatient management  Identified self-management practices impeding diabetes control  Explored corrective strategies with patient and responsible inpatient provider   Informed patient of rational for insulin strategy while hospitalized     Evaluation   Radha Cabrales is a 72year old female with uncontrolled diabetes on metformin and trulicity and other medical co-morbidities admitted for confusion and dehydration with a presumed viral/bacteral gastroenteritis. She was admitted for infectious work-up, IV antibiotics initiated along with IV rehydration. Patient is did experience hyperglycemia to the 300s largely resolved with rehydration and correctional insulin in this acute illness. Plans to advance PO intake this evening per GI to clear liquid diet. At this time, it is reasonable to slowly advance SQ insulin therapy to target an inpatient glucose of 100-180. Recommendations   Recommend:  Initiate the subcutaneous insulin order set with:  1. Low dose basal insulin: 0.2 units/kg/day. First dose tonight    2. Continue correctional insulin Q6 hrs (ACHS when eating) at normal sensitivity, start at a glucose of 200    3. Diet advancement per primary team, when advanced please ensure consistent carbohydrate diet    4. Please continue to hold metformin in the inpatient setting    5. A1C with next lab draw      Does c/o weekly intermittent diarrhea. Metformin may be a contributing factor. Can certainly change to extended release version which will decrease the side effect of diarrhea. Billing Code(s)   I personally reviewed chart, notes, data and current medications in the medical record, and examined the patient at bedside before making care recommendations.      [x] [id number] IP subsequent hospital care - 25 minutes      KAREL Chen  Program for Diabetes Health  Access via [address] [place] [address]  [phone]

## 2020-10-08 NOTE — PROGRESS NOTES
[facility] [facility] Adult  Hospitalist Group                                                                                          Hospitalist Progress Note  Lin Warren MD  Answering service: [phone] from in house phone        Date of Service:  10/8/2020  NAME:  Edwige Sal  :  1950  MRN:  [id number]      Admission Summary:   Edwige Sal is a 72 y.o. female with PMH of Left Atrial Thrombus, Chronic Bronchitis, T2DM, Tobacco Dependence and who presents to the ED with complaints of nausea vomiting and diarrhea x 5 days. 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 She was last admitted about a year ago for a similar illness after a trip on a cruise ship. Patient is moaning on the bed and she is currently a poor historian, history obtained from daughter who is by the bedside. There is no associated fever, no shortness of breath, chest pain, cough, or dysuria. A head CT scan performed in the ED showed no acute process. she was bolused with a liter of normal saline intravenous fluid and the hospitalist service has been consulted for hospital admission and for evaluation. Interval history / Subjective: Follow-up for colitis. -Patient seen and examined, daughter at the bedside.  -Patient denied nausea or vomiting. Abdominal pain improved. Patient only had one soft bowel movement today. She is asking for something to eat and drink. Started on clear liquid diets. Assessment & Plan:     Diffuse colitis most probably infectious, most probably bacterial.  -CAT scan of the abdomen pelvis showed findings consistent with mild to moderate diffuse colitis. -Supportive therapy, IV fluids, antiemetics. -stool studies: C. difficile and enteric bacterial culture  -PPI BID  -Start clear liquid diet. GI on board.       Hepatic steatosis  detected on CT abdomen pelvis. History of hepatitis?  -Elevated alkaline phosphatase otherwise ALT, AST and bilirubin within normal limit.  -Acute viral hepatitis A/B/C negative  -Hepatology already consulted. Leukocytosis with left shift likely from colitis: Resolved. Yeast in the urine, urine culture negative. She is asymptomatic. Discontinue Diflucan.     T2DM with hyperglycemia: Hold home metformin and Trulicity.    -Humalog before meals and at bedtime. Lantus.     Uncontrolled hypertension, accelerated on admission to hospital due to abdominal pain  -Continue atenolol, Cardizem. Dehydration, elevated BUN/creatinine due to volume depletion: Improved with IV fluids          Tobacco Dependence: Start empiric nicotine patch 21 mg/day. Morbid Obesity: BMI: 38.62  RA: on methotrexate @ home. Chronic bronchitis/asthma with wheezing: Use bronchodilators    DVTppx: Lovenox  Gippx: Protonix  Code Status: Full Code  Diet: Clear liquid diet, to advance as needed  Activity: OOB to chair TID and PRN  Discharge: Anticipate home when medically stable  Surrogate decision maker:  Mr. Charlie Ross  Discussed with her daughter who is an RN at the bedside. Hospital Problems  Date Reviewed: 7/31/2020          Codes Class Noted POA    Abdominal pain ICD-10-CM: R10.9  ICD-9-CM: 789.00  10/7/2020 Unknown        * (Principal) Nausea and vomiting ICD-10-CM: R11.2  ICD-9-CM: 787.01  10/6/2020 Yes        Diarrhea ICD-10-CM: R19.7  ICD-9-CM: 787.91  10/6/2020 Unknown                Review of Systems:   A comprehensive review of systems was negative except for that written in the HPI. Vital Signs:    Last 24hrs VS reviewed since prior progress note.  Most recent are:  Visit Vitals  /62   Pulse 62   Temp 97.8 °F (36.6 °C)   Resp 17   Ht 5' 2\" (1.575 m)   Wt 101 kg (222 lb 10.6 oz)   SpO2 95%   BMI 40.73 kg/m²         Intake/Output Summary (Last 24 hours) at 10/8/2020 1650  Last data filed at 10/8/2020 0009  Gross per 24 hour   Intake 1000 ml   Output 300 ml   Net 700 ml        Physical Examination:     Constitutional:  No acute distress, cooperative, pleasant    ENT:  Oral mucosa moist.    Resp:  CTA bilaterally. No wheezing/rhonchi/rales. No accessory muscle use. CV:  Regular rhythm, normal rate, no murmurs, gallops, rubs. GI:  Soft, non distended, epigastric tenderness, rebound tenderness, no guarding, BS present. Musculoskeletal:  No edema, warm, 2+ pulses throughout. Neurologic:  Moves all extremities. AAOx3, CN II-XII reviewed. Skin:  Good turgor, no rashes or ulcers  Psych:  Good insight, Not anxious nor agitated. Data Review:    Review and/or order of clinical lab test      Labs:     Recent Labs     10/08/20  0005 10/06/20  1743   WBC 10.7 12.4*   HGB 13.4 15.3   HCT 41.6 46.5    227     Recent Labs     10/08/20  0418 10/07/20  2300 10/06/20  1743    142 137   K 3.3* 3.1* 3.6    106 102   CO2 27 29 27   BUN 24* 23* 19   CREA 1.13* 1.13* 1.32*   * 168* 292*   CA 8.6 8.7 9.8   MG 1.7  --   --      Recent Labs     10/06/20  1743   ALT 36   *   TBILI 0.4   TP 8.6*   ALB 3.9   GLOB 4.7*   LPSE 102     No results for input(s): INR, PTP, APTT, INREXT, INREXT in the last 72 hours. No results for input(s): FE, TIBC, PSAT, FERR in the last 72 hours. No results found for: FOL, RBCF   No results for input(s): PH, PCO2, PO2 in the last 72 hours.   Recent Labs     10/08/20  0418 10/07/20  2300   TROIQ <0.05 <0.05     Lab Results   Component Value Date/Time    Cholesterol, total 178 12/13/2019 03:32 PM    HDL Cholesterol 36 (L) 12/13/2019 03:32 PM    LDL,Direct 65 10/07/2009 11:05 AM    LDL, calculated Comment 12/13/2019 03:32 PM    Triglyceride 533 (H) 12/13/2019 03:32 PM    CHOL/HDL Ratio 2.6 10/07/2009 11:05 AM     Lab Results   Component Value Date/Time    Glucose (POC) 165 (H) 10/08/2020 11:59 AM    Glucose (POC) 181 (H) 10/08/2020 06:01 AM    Glucose (POC) 160 (H) 10/07/2020 11:48 PM    Glucose (POC) 172 (H) 10/07/2020 10:04 PM    Glucose (POC) 200 (H) 10/07/2020 06:35 PM     Lab Results   Component Value Date/Time    Color DARK YELLOW 10/07/2020 01:33 AM    Appearance CLOUDY (A) 10/07/2020 01:33 AM    Specific gravity 1.030 10/07/2020 01:33 AM    Specific gravity 1.028 10/05/2016 08:59 AM    pH (UA) 5.0 10/07/2020 01:33 AM    Protein 300 (A) 10/07/2020 01:33 AM    Glucose >1,000 (A) 10/07/2020 01:33 AM    Ketone 15 (A) 10/07/2020 01:33 AM    Bilirubin Negative 10/07/2020 01:33 AM    Urobilinogen 1.0 10/07/2020 01:33 AM    Nitrites Negative 10/07/2020 01:33 AM    Leukocyte Esterase Negative 10/07/2020 01:33 AM    Epithelial cells FEW 10/07/2020 01:33 AM    Bacteria 1+ (A) 10/07/2020 01:33 AM    WBC 0-4 10/07/2020 01:33 AM    RBC 0-5 10/07/2020 01:33 AM         Medications Reviewed:     Current Facility-Administered Medications   Medication Dose Route Frequency    meropenem (MERREM) 500 mg in 0.9% sodium chloride (MBP/ADV) 50 mL  0.5 g IntraVENous Q6H    HYDROcodone-acetaminophen (NORCO)  mg tablet 1 Tab  1 Tab Oral Q8H PRN    fluconazole (DIFLUCAN) tablet 200 mg  200 mg Oral DAILY    pantoprazole (PROTONIX) 40 mg in 0.9% sodium chloride 10 mL injection  40 mg IntraVENous Q12H    hydrALAZINE (APRESOLINE) 20 mg/mL injection 10 mg  10 mg IntraVENous Q6H PRN    atenoloL (TENORMIN) tablet 25 mg  25 mg Oral DAILY    albuterol-ipratropium (DUO-NEB) 2.5 MG-0.5 MG/3 ML  3 mL Nebulization Q4H PRN    dilTIAZem ER (CARDIZEM CD) capsule 360 mg  360 mg Oral DAILY    prochlorperazine (COMPAZINE) with saline injection 5 mg  5 mg IntraVENous Q6H PRN    insulin glargine (LANTUS) injection 30 Units  30 Units SubCUTAneous QHS    ipratropium (ATROVENT) 0.02 % nebulizer solution 0.5 mg  0.5 mg Nebulization QID RT    albuterol (PROVENTIL VENTOLIN) nebulizer solution 2.5 mg  2.5 mg Nebulization Q6H PRN    [Held by provider] allopurinoL (ZYLOPRIM) tablet 300 mg  300 mg Oral DAILY    [Held by provider] aspirin delayed-release tablet 81 mg  81 mg Oral DAILY    folic acid (FOLVITE) tablet 1 mg  1 mg Oral DAILY    pregabalin (LYRICA) capsule 200 mg  200 mg Oral TID    sodium chloride (NS) flush 5-40 mL  5-40 mL IntraVENous Q8H    sodium chloride (NS) flush 5-40 mL  5-40 mL IntraVENous PRN    acetaminophen (TYLENOL) tablet 650 mg  650 mg Oral Q6H PRN    Or    acetaminophen (TYLENOL) suppository 650 mg  650 mg Rectal Q6H PRN    polyethylene glycol (MIRALAX) packet 17 g  17 g Oral DAILY PRN    enoxaparin (LOVENOX) injection 40 mg  40 mg SubCUTAneous DAILY    lactated Ringers infusion  125 mL/hr IntraVENous CONTINUOUS    glucose chewable tablet 16 g  4 Tab Oral PRN    glucagon (GLUCAGEN) injection 1 mg  1 mg IntraMUSCular PRN    dextrose 10% infusion 0-250 mL  0-250 mL IntraVENous PRN    insulin lispro (HUMALOG) injection   SubCUTAneous Q6H    nicotine (NICODERM CQ) 21 mg/24 hr patch 1 Patch  1 Patch TransDERmal Q24H     ______________________________________________________________________  EXPECTED LENGTH OF STAY: 2d 14h  ACTUAL LENGTH OF STAY:          1                 Elma Alicia MD

## 2020-10-08 NOTE — PROGRESS NOTES
[address]  [address] [facility], [address]       GI PROGRESS NOTE  [facility]  [phone] NP in-hospital cell phone M-F until 4:30  After 5pm or on weekends, please call  for physician on call      NAME: Nelly Iyer   :  1950   MRN:  [id number]       Subjective:   She is feeling better, still with abdominal pain but improved and no nausea/vomiting. She had one small episode of diarrhea this morning, not enough for sample. Objective:     VITALS:   Last 24hrs VS reviewed since prior progress note. Most recent are:  Visit Vitals  /62   Pulse 62   Temp 97.8 °F (36.6 °C)   Resp 17   Ht 5' 2\" (1.575 m)   Wt 101 kg (222 lb 10.6 oz)   SpO2 95%   BMI 40.73 kg/m²       PHYSICAL EXAM:  General: Cooperative, no acute distress    Neurologic:  Alert and oriented X 3. HEENT: EOMI, no scleral icterus   Lungs:  CTA bilaterally. No wheezing  Heart:  S1 S2, regular rhythm, no murmur   Abdomen: Soft, obese, genearlized tenderness. +Bowel sounds  Extremities: warm  Psych:   Good insight. Not anxious or agitated.     Lab Data Reviewed:     Recent Results (from the past 24 hour(s))   ECHO ADULT COMPLETE    Collection Time: 10/07/20  2:57 PM   Result Value Ref Range    LV Mass AL 94.5 67 - 162 g    LV Mass AL Index 47.2 43 - 95 g/m2    Left Atrium Minor Axis 1.29 cm    IVSd 0.90 0.6 - 0.9 cm    LVIDd 3.66 (A) 3.9 - 5.3 cm    LVIDs 2.12 cm    LVOT d 1.98 cm    LVPWd 0.89 0.6 - 0.9 cm    LVOT Peak Gradient 4.51 mmHg    LVOT Peak Velocity 106.17 cm/s    Left Atrium Major Axis 2.58 cm    Aortic Valve Area by Continuity of Peak Velocity 2.43 cm2    Aortic Valve Area by Continuity of Peak Velocity 2.23 cm2    Aortic Valve Area by Continuity of Peak Velocity 2.48 cm2    AoV PG 7.18 mmHg    Aortic Valve Systolic Peak Velocity 019.55 cm/s    MV A Hunter 78.20 cm/s    Mitral Valve E Wave Deceleration Time 0.25 s    MV E Hunter 50.09 cm/s    MV E/A 0.64     E/E' ratio (averaged) 10.09     E/E' lateral 9.28     E/E' septal 10.91     LV E' Lateral Velocity 5.40 cm/s    LV E' Septal Velocity 4.59 cm/s    Mitral Valve Pressure Half-time 0.07 s    MVA (PHT) 3.05 cm2    Pulmonic Valve Systolic Peak Instantaneous Gradient 2.87 mmHg    Pulmonic Valve Max Velocity 84.66 cm/s    Tapse 1.91 1.5 - 2.0 cm    Ao Root D 2.88 cm   GLUCOSE, POC    Collection Time: 10/07/20  6:35 PM   Result Value Ref Range    Glucose (POC) 200 (H) 65 - 100 mg/dL    Performed by Leonor MCNALLY    GLUCOSE, POC    Collection Time: 10/07/20 10:04 PM   Result Value Ref Range    Glucose (POC) 172 (H) 65 - 100 mg/dL    Performed by Suzy Perla    METABOLIC PANEL, BASIC    Collection Time: 10/07/20 11:00 PM   Result Value Ref Range    Sodium 142 136 - 145 mmol/L    Potassium 3.1 (L) 3.5 - 5.1 mmol/L    Chloride 106 97 - 108 mmol/L    CO2 29 21 - 32 mmol/L    Anion gap 7 5 - 15 mmol/L    Glucose 168 (H) 65 - 100 mg/dL    BUN 23 (H) 6 - 20 MG/DL    Creatinine 1.13 (H) 0.55 - 1.02 MG/DL    BUN/Creatinine ratio 20 12 - 20      GFR est AA 58 (L) >60 ml/min/1.73m2    GFR est non-AA 48 (L) >60 ml/min/1.73m2    Calcium 8.7 8.5 - 10.1 MG/DL   HEPATITIS PANEL, ACUTE    Collection Time: 10/07/20 11:00 PM   Result Value Ref Range    Hepatitis A, IgM NONREACTIVE NR      __          Hepatitis B surface Ag <0.10 Index    Hep B surface Ag Interp. Negative NEG      __          Hepatitis B core, IgM NONREACTIVE NR      __          Hep C virus Ab Interp.  NONREACTIVE NR      Hep C  virus Ab comment Method used is Siemens Advia Centaur     TROPONIN I    Collection Time: 10/07/20 11:00 PM   Result Value Ref Range    Troponin-I, Qt. <0.05 <0.05 ng/mL   GLUCOSE, POC    Collection Time: 10/07/20 11:48 PM   Result Value Ref Range    Glucose (POC) 160 (H) 65 - 100 mg/dL    Performed by Myriam Blackwell    CBC WITH AUTOMATED DIFF    Collection Time: 10/08/20 12:05 AM   Result Value Ref Range    WBC 10.7 3.6 - 11.0 K/uL    RBC 4.58 3.80 - 5.20 M/uL    HGB 13.4 11.5 - 16.0 g/dL    HCT 41.6 35.0 - 47.0 %    MCV 90.8 80.0 - 99.0 FL    MCH 29.3 26.0 - 34.0 PG    MCHC 32.2 30.0 - 36.5 g/dL    RDW 15.0 (H) 11.5 - 14.5 %    PLATELET 837 973 - 532 K/uL    MPV 12.4 8.9 - 12.9 FL    NRBC 0.0 0  WBC    ABSOLUTE NRBC 0.00 0.00 - 0.01 K/uL    NEUTROPHILS 66 32 - 75 %    LYMPHOCYTES 20 12 - 49 %    MONOCYTES 13 5 - 13 %    EOSINOPHILS 0 0 - 7 %    BASOPHILS 1 0 - 1 %    IMMATURE GRANULOCYTES 0 0.0 - 0.5 %    ABS. NEUTROPHILS 7.0 1.8 - 8.0 K/UL    ABS. LYMPHOCYTES 2.2 0.8 - 3.5 K/UL    ABS. MONOCYTES 1.4 (H) 0.0 - 1.0 K/UL    ABS. EOSINOPHILS 0.0 0.0 - 0.4 K/UL    ABS. BASOPHILS 0.1 0.0 - 0.1 K/UL    ABS. IMM. GRANS. 0.0 0.00 - 0.04 K/UL    DF AUTOMATED     METABOLIC PANEL, BASIC    Collection Time: 10/08/20  4:18 AM   Result Value Ref Range    Sodium 142 136 - 145 mmol/L    Potassium 3.3 (L) 3.5 - 5.1 mmol/L    Chloride 108 97 - 108 mmol/L    CO2 27 21 - 32 mmol/L    Anion gap 7 5 - 15 mmol/L    Glucose 192 (H) 65 - 100 mg/dL    BUN 24 (H) 6 - 20 MG/DL    Creatinine 1.13 (H) 0.55 - 1.02 MG/DL    BUN/Creatinine ratio 21 (H) 12 - 20      GFR est AA 58 (L) >60 ml/min/1.73m2    GFR est non-AA 48 (L) >60 ml/min/1.73m2    Calcium 8.6 8.5 - 10.1 MG/DL   SAMPLES BEING HELD    Collection Time: 10/08/20  4:18 AM   Result Value Ref Range    SAMPLES BEING HELD 1PST     COMMENT        Add-on orders for these samples will be processed based on acceptable specimen integrity and analyte stability, which may vary by analyte.    TROPONIN I    Collection Time: 10/08/20  4:18 AM   Result Value Ref Range    Troponin-I, Qt. <0.05 <0.05 ng/mL   MAGNESIUM    Collection Time: 10/08/20  4:18 AM   Result Value Ref Range    Magnesium 1.7 1.6 - 2.4 mg/dL   GLUCOSE, POC    Collection Time: 10/08/20  6:01 AM   Result Value Ref Range    Glucose (POC) 181 (H) 65 - 100 mg/dL    Performed by Clifford Ashton    GLUCOSE, POC    Collection Time: 10/08/20 11:59 AM   Result Value Ref Range    Glucose (POC) 165 (H) 65 - 100 mg/dL    Performed by Jose Camacho  PCT             Assessment:   · Nausea/vomiting/diarrhea: WBC 10.7, Hgb 13.4, platelets 652. CT abdomen/pelvis with IV contrast (10/7/20): imaging findings most consistent with mild to moderate diffuse colitis, consider infectious and inflammatory findings; small hiatal hernia; hepatic steatosis. Colonoscopy in 2018. · Diabetes mellitus type II  · Hypertension  · Acute kidney injury  · Tobacco abuse     Patient Active Problem List   Diagnosis Code    Hypercholesterolemia E78.00    Osteoarthritis of hip M16.9    PMR (polymyalgia rheumatica) (HCC) M35.3    Chronic bronchitis ([facility] J42    Former smoker Z87.891    Chronic pain G89.29    Long term (current) use of systemic steroids Z79.52    Type 2 diabetes mellitus with diabetic polyneuropathy, without long-term current use of insulin (HCC) E11.42    Severe obesity (BMI 35.0-39. 9) with comorbidity ([facility] [facility] E66.01    Essential hypertension I10    Seronegative [facility] M06.09    MGUS (monoclonal gammopathy of unknown significance) D47.2    Vitamin D deficiency E55.9    Long-term use of immunosuppressant medication Z79.899    CAD (coronary artery disease) I25.10    Lung nodule seen on imaging study R91.1    Idiopathic chronic gout, multiple sites, with tophus (tophi) M1A.09X1    Hypertriglyceridemia E78.1    Nausea and vomiting R11.2    Diarrhea R19.7    Abdominal pain R10.9     Plan:   · Stool studies if able - unable to send as diarrhea is resolving  · Okay for CLD  · On antibiotics  · Supportive care  · ID, cardiology following, hepatology consulted     Signed By: Missael Alonso NP     10/8/2020  1:15 PM         GI Attending: Plan as above. Based on history, c diff seems unlikely. It looks like hepatology was consulted for elevated LFT's. She has an isolated elevation in her alkaline phosphatase.  I would repeat CMP first.    She had an unremarkable colonoscopy in 2018 by Dr. Alba Dial. We can consider repeat colonoscopy based on her clinical course. Will follow. Ta Tejada MD

## 2020-10-09 LAB
ALBUMIN SERPL-MCNC: 2.8 G/DL (ref 3.5–5)
ALBUMIN SERPL-MCNC: 2.8 G/DL (ref 3.5–5)
ALBUMIN/GLOB SERPL: 0.8 {RATIO} (ref 1.1–2.2)
ALBUMIN/GLOB SERPL: 0.8 {RATIO} (ref 1.1–2.2)
ALP SERPL-CCNC: 196 U/L (ref 45–117)
ALP SERPL-CCNC: 204 U/L (ref 45–117)
ALT SERPL-CCNC: 23 U/L (ref 12–78)
ALT SERPL-CCNC: 24 U/L (ref 12–78)
ANION GAP SERPL CALC-SCNC: 4 MMOL/L (ref 5–15)
ANION GAP SERPL CALC-SCNC: 5 MMOL/L (ref 5–15)
AST SERPL-CCNC: 13 U/L (ref 15–37)
AST SERPL-CCNC: 15 U/L (ref 15–37)
BILIRUB SERPL-MCNC: 0.3 MG/DL (ref 0.2–1)
BILIRUB SERPL-MCNC: 0.4 MG/DL (ref 0.2–1)
BUN SERPL-MCNC: 18 MG/DL (ref 6–20)
BUN SERPL-MCNC: 19 MG/DL (ref 6–20)
BUN/CREAT SERPL: 17 (ref 12–20)
BUN/CREAT SERPL: 18 (ref 12–20)
CALCIUM SERPL-MCNC: 8 MG/DL (ref 8.5–10.1)
CALCIUM SERPL-MCNC: 8.2 MG/DL (ref 8.5–10.1)
CHLORIDE SERPL-SCNC: 108 MMOL/L (ref 97–108)
CHLORIDE SERPL-SCNC: 108 MMOL/L (ref 97–108)
CO2 SERPL-SCNC: 29 MMOL/L (ref 21–32)
CO2 SERPL-SCNC: 31 MMOL/L (ref 21–32)
CREAT SERPL-MCNC: 1.03 MG/DL (ref 0.55–1.02)
CREAT SERPL-MCNC: 1.04 MG/DL (ref 0.55–1.02)
GLOBULIN SER CALC-MCNC: 3.4 G/DL (ref 2–4)
GLOBULIN SER CALC-MCNC: 3.4 G/DL (ref 2–4)
GLUCOSE BLD STRIP.AUTO-MCNC: 208 MG/DL (ref 65–100)
GLUCOSE BLD STRIP.AUTO-MCNC: 236 MG/DL (ref 65–100)
GLUCOSE BLD STRIP.AUTO-MCNC: 254 MG/DL (ref 65–100)
GLUCOSE BLD STRIP.AUTO-MCNC: 264 MG/DL (ref 65–100)
GLUCOSE SERPL-MCNC: 194 MG/DL (ref 65–100)
GLUCOSE SERPL-MCNC: 216 MG/DL (ref 65–100)
POTASSIUM SERPL-SCNC: 3.2 MMOL/L (ref 3.5–5.1)
POTASSIUM SERPL-SCNC: 3.2 MMOL/L (ref 3.5–5.1)
PROT SERPL-MCNC: 6.2 G/DL (ref 6.4–8.2)
PROT SERPL-MCNC: 6.2 G/DL (ref 6.4–8.2)
SERVICE CMNT-IMP: ABNORMAL
SODIUM SERPL-SCNC: 142 MMOL/L (ref 136–145)
SODIUM SERPL-SCNC: 143 MMOL/L (ref 136–145)
WBC #/AREA STL HPF: NORMAL /HPF (ref 0–4)

## 2020-10-09 PROCEDURE — 74011250637 HC RX REV CODE- 250/637: Performed by: HOSPITALIST

## 2020-10-09 PROCEDURE — 74011000250 HC RX REV CODE- 250: Performed by: HOSPITALIST

## 2020-10-09 PROCEDURE — 74011250636 HC RX REV CODE- 250/636: Performed by: HOSPITALIST

## 2020-10-09 PROCEDURE — 82962 GLUCOSE BLOOD TEST: CPT

## 2020-10-09 PROCEDURE — 97161 PT EVAL LOW COMPLEX 20 MIN: CPT | Performed by: PHYSICAL THERAPIST

## 2020-10-09 PROCEDURE — 74011636637 HC RX REV CODE- 636/637: Performed by: HOSPITALIST

## 2020-10-09 PROCEDURE — 74011250637 HC RX REV CODE- 250/637: Performed by: INTERNAL MEDICINE

## 2020-10-09 PROCEDURE — 74011000258 HC RX REV CODE- 258: Performed by: HOSPITALIST

## 2020-10-09 PROCEDURE — 36415 COLL VENOUS BLD VENIPUNCTURE: CPT

## 2020-10-09 PROCEDURE — 99218 HC RM OBSERVATION: CPT

## 2020-10-09 PROCEDURE — 94640 AIRWAY INHALATION TREATMENT: CPT

## 2020-10-09 PROCEDURE — 74011636637 HC RX REV CODE- 636/637: Performed by: NURSE PRACTITIONER

## 2020-10-09 PROCEDURE — 80053 COMPREHEN METABOLIC PANEL: CPT

## 2020-10-09 PROCEDURE — 74011250636 HC RX REV CODE- 250/636: Performed by: NURSE PRACTITIONER

## 2020-10-09 PROCEDURE — 97116 GAIT TRAINING THERAPY: CPT | Performed by: PHYSICAL THERAPIST

## 2020-10-09 PROCEDURE — 96376 TX/PRO/DX INJ SAME DRUG ADON: CPT

## 2020-10-09 PROCEDURE — 87177 OVA AND PARASITES SMEARS: CPT

## 2020-10-09 PROCEDURE — C9113 INJ PANTOPRAZOLE SODIUM, VIA: HCPCS | Performed by: NURSE PRACTITIONER

## 2020-10-09 PROCEDURE — 74011250637 HC RX REV CODE- 250/637: Performed by: NURSE PRACTITIONER

## 2020-10-09 PROCEDURE — 99231 SBSQ HOSP IP/OBS SF/LOW 25: CPT | Performed by: CLINICAL NURSE SPECIALIST

## 2020-10-09 PROCEDURE — 96372 THER/PROPH/DIAG INJ SC/IM: CPT

## 2020-10-09 PROCEDURE — 74011000250 HC RX REV CODE- 250: Performed by: NURSE PRACTITIONER

## 2020-10-09 RX ORDER — EPINEPHRINE 1 MG/ML
0.5 INJECTION, SOLUTION, CONCENTRATE INTRAVENOUS
Status: ACTIVE | OUTPATIENT
Start: 2020-10-09 | End: 2020-10-10

## 2020-10-09 RX ORDER — DEXTROSE MONOHYDRATE 100 MG/ML
0-250 INJECTION, SOLUTION INTRAVENOUS AS NEEDED
Status: DISCONTINUED | OUTPATIENT
Start: 2020-10-09 | End: 2020-10-09 | Stop reason: SDUPTHER

## 2020-10-09 RX ORDER — POTASSIUM CHLORIDE 750 MG/1
40 TABLET, FILM COATED, EXTENDED RELEASE ORAL DAILY
Status: DISCONTINUED | OUTPATIENT
Start: 2020-10-09 | End: 2020-10-10 | Stop reason: HOSPADM

## 2020-10-09 RX ORDER — AMOXICILLIN AND CLAVULANATE POTASSIUM 875; 125 MG/1; MG/1
1 TABLET, FILM COATED ORAL EVERY 12 HOURS
Status: DISCONTINUED | OUTPATIENT
Start: 2020-10-09 | End: 2020-10-10 | Stop reason: HOSPADM

## 2020-10-09 RX ORDER — DIPHENHYDRAMINE HYDROCHLORIDE 50 MG/ML
50 INJECTION, SOLUTION INTRAMUSCULAR; INTRAVENOUS
Status: ACTIVE | OUTPATIENT
Start: 2020-10-09 | End: 2020-10-10

## 2020-10-09 RX ORDER — POTASSIUM CHLORIDE AND SODIUM CHLORIDE 900; 300 MG/100ML; MG/100ML
INJECTION, SOLUTION INTRAVENOUS CONTINUOUS
Status: DISCONTINUED | OUTPATIENT
Start: 2020-10-09 | End: 2020-10-10

## 2020-10-09 RX ORDER — MAGNESIUM SULFATE 100 %
4 CRYSTALS MISCELLANEOUS AS NEEDED
Status: DISCONTINUED | OUTPATIENT
Start: 2020-10-09 | End: 2020-10-09 | Stop reason: SDUPTHER

## 2020-10-09 RX ORDER — INSULIN LISPRO 100 [IU]/ML
INJECTION, SOLUTION INTRAVENOUS; SUBCUTANEOUS
Status: DISCONTINUED | OUTPATIENT
Start: 2020-10-09 | End: 2020-10-10 | Stop reason: HOSPADM

## 2020-10-09 RX ADMIN — ENOXAPARIN SODIUM 40 MG: 40 INJECTION SUBCUTANEOUS at 10:57

## 2020-10-09 RX ADMIN — PREGABALIN 200 MG: 100 CAPSULE ORAL at 21:34

## 2020-10-09 RX ADMIN — SODIUM CHLORIDE 40 MG: 9 INJECTION, SOLUTION INTRAMUSCULAR; INTRAVENOUS; SUBCUTANEOUS at 10:56

## 2020-10-09 RX ADMIN — PREGABALIN 200 MG: 100 CAPSULE ORAL at 15:43

## 2020-10-09 RX ADMIN — AMOXICILLIN AND CLAVULANATE POTASSIUM 1 TABLET: 875; 125 TABLET, FILM COATED ORAL at 21:34

## 2020-10-09 RX ADMIN — MEROPENEM 500 MG: 500 INJECTION, POWDER, FOR SOLUTION INTRAVENOUS at 07:12

## 2020-10-09 RX ADMIN — SODIUM CHLORIDE, SODIUM LACTATE, POTASSIUM CHLORIDE, AND CALCIUM CHLORIDE 125 ML/HR: 600; 310; 30; 20 INJECTION, SOLUTION INTRAVENOUS at 00:17

## 2020-10-09 RX ADMIN — Medication 5 ML: at 14:00

## 2020-10-09 RX ADMIN — IPRATROPIUM BROMIDE 0.5 MG: 0.5 SOLUTION RESPIRATORY (INHALATION) at 16:05

## 2020-10-09 RX ADMIN — ALLOPURINOL 300 MG: 300 TABLET ORAL at 10:57

## 2020-10-09 RX ADMIN — IPRATROPIUM BROMIDE 0.5 MG: 0.5 SOLUTION RESPIRATORY (INHALATION) at 19:40

## 2020-10-09 RX ADMIN — IPRATROPIUM BROMIDE 0.5 MG: 0.5 SOLUTION RESPIRATORY (INHALATION) at 07:58

## 2020-10-09 RX ADMIN — FOLIC ACID 1 MG: 1 TABLET ORAL at 10:57

## 2020-10-09 RX ADMIN — INSULIN LISPRO 5 UNITS: 100 INJECTION, SOLUTION INTRAVENOUS; SUBCUTANEOUS at 17:13

## 2020-10-09 RX ADMIN — ASPIRIN 81 MG: 81 TABLET, COATED ORAL at 10:57

## 2020-10-09 RX ADMIN — SODIUM CHLORIDE 40 MG: 9 INJECTION, SOLUTION INTRAMUSCULAR; INTRAVENOUS; SUBCUTANEOUS at 21:34

## 2020-10-09 RX ADMIN — POTASSIUM CHLORIDE AND SODIUM CHLORIDE: 900; 300 INJECTION, SOLUTION INTRAVENOUS at 10:56

## 2020-10-09 RX ADMIN — Medication 10 ML: at 21:57

## 2020-10-09 RX ADMIN — INSULIN LISPRO 3 UNITS: 100 INJECTION, SOLUTION INTRAVENOUS; SUBCUTANEOUS at 11:03

## 2020-10-09 RX ADMIN — MEROPENEM 500 MG: 500 INJECTION, POWDER, FOR SOLUTION INTRAVENOUS at 00:09

## 2020-10-09 RX ADMIN — HYDROCODONE BITARTRATE AND ACETAMINOPHEN 1 TABLET: 10; 325 TABLET ORAL at 21:34

## 2020-10-09 RX ADMIN — PREGABALIN 200 MG: 100 CAPSULE ORAL at 10:57

## 2020-10-09 RX ADMIN — DILTIAZEM HYDROCHLORIDE 360 MG: 180 CAPSULE, COATED, EXTENDED RELEASE ORAL at 10:57

## 2020-10-09 RX ADMIN — HYDROCODONE BITARTRATE AND ACETAMINOPHEN 1 TABLET: 10; 325 TABLET ORAL at 11:10

## 2020-10-09 RX ADMIN — AMOXICILLIN AND CLAVULANATE POTASSIUM 1 TABLET: 875; 125 TABLET, FILM COATED ORAL at 10:57

## 2020-10-09 RX ADMIN — INSULIN LISPRO 5 UNITS: 100 INJECTION, SOLUTION INTRAVENOUS; SUBCUTANEOUS at 00:02

## 2020-10-09 RX ADMIN — HYDROCODONE BITARTRATE AND ACETAMINOPHEN 1 TABLET: 10; 325 TABLET ORAL at 00:06

## 2020-10-09 RX ADMIN — INSULIN GLARGINE 30 UNITS: 100 INJECTION, SOLUTION SUBCUTANEOUS at 21:34

## 2020-10-09 RX ADMIN — INSULIN LISPRO 3 UNITS: 100 INJECTION, SOLUTION INTRAVENOUS; SUBCUTANEOUS at 21:35

## 2020-10-09 RX ADMIN — INSULIN GLARGINE 30 UNITS: 100 INJECTION, SOLUTION SUBCUTANEOUS at 00:01

## 2020-10-09 RX ADMIN — INSULIN LISPRO 3 UNITS: 100 INJECTION, SOLUTION INTRAVENOUS; SUBCUTANEOUS at 07:12

## 2020-10-09 RX ADMIN — IPRATROPIUM BROMIDE 0.5 MG: 0.5 SOLUTION RESPIRATORY (INHALATION) at 12:19

## 2020-10-09 RX ADMIN — POTASSIUM CHLORIDE 40 MEQ: 750 TABLET, FILM COATED, EXTENDED RELEASE ORAL at 10:57

## 2020-10-09 RX ADMIN — Medication 10 ML: at 07:14

## 2020-10-09 NOTE — CONSULTS
[facility], Dylan TORRES, Paula Wilde MD Mariella Sluder, NIA V[place], [facility]     Coreen Herrera, [facility]   MO Bonilla, [facility]       Avril [facility] 136    at [address], [facility] Ronaldo Frazier  22.    [phone]    FAX: [address] [place] [address], [address]    [phone]    FAX: [phone]       HEPATOLOGY CONSULT NOTE  I was asked to see this patient in consultation by Dr David Mcginnis for management of elevated ALP. I have reviewed the Emergency room note, Hospital admission note, Notes by all other physicians who have seen the patient during this hospitalization to date. I have reviewed the problem list and the reason for this hospitalization. I have reviewed the allergies and the medications the patient was taking at home prior to this hospitalization. HISTORY:  The patient is a 72year old female who came to the ED because of nausea, vomiting and diarrhea. CT scan of the abdomen demonstrated a fatty liver and colitis. Laboratory studies demonstrated an elevation in ALP. The ALP had been normal prior to this hospitalization. Hepatitis panel was negative for acute HAV, HBV, HCV. She has been treated with IV ABX and abdominal pain and diarrhea have improved. WBC is coming down      ASSESSMENT AND PLAN:  Elevated liver enzymes  Elevation in alkaline phosphatase that is most likely a reaction the the colitis.   Liver transaminases are normal.  Liver function is normal.  The platelet count is normal.      Serologic testing for causes of chronic liver disease were negative for HCV,HBV, acute HAV     Serology testing for cholestatic liver disease is not indicated since the ALP was normal prior to admission. Suggest repeat the hepatic panel and see if ALP has come down to normal already. SYSTEM REVIEW:  Constitution systems: Negative for fever, chills, weight gain, weight loss. Eyes: Negative for visual changes. ENT: Negative for sore throat, painful swallowing. Respiratory: Negative for cough, hemoptysis, SOB. Cardiology: Negative for chest pain, palpitations. GI:  Abdominal pain is better    : Negative for urinary frequency, dysuria, hematuria, nocturia. Skin: Negative for rash. Hematology: Negative for easy bruising, blood clots. Musculo-skelatal: Negative for back pain, muscle pain, weakness. Neurologic: Negative for headaches, dizziness, vertigo, memory problems not related to HE. Psychology: Negative for anxiety, depression. FAMILY HISTORY:  The father  of stomach cancer. The mother  of CVA. There is no family history of liver disease. SOCIAL HISTORY:  The patient is . The patient has 3 children, and 3 grandchildren. The patient currently smokes 2-3 cigarettes daily. The patient has never consumed significant amounts of alcohol. The patient used to work as CNA. PHYSICAL EXAMINATION:  VS: per nursing note  General:  No acute distress. Eyes:  Sclera anicteric. ENT:  No oral lesions. Thyroid normal.  Nodes:  No adenopathy. Skin:  No spider angiomata. No jaundice. Respiratory:  Lungs clear to auscultation. Cardiovascular:  Regular heart rate. Abdomen:  Soft non-tender, No obvious ascites. Extremities:  No lower extremity edema. Neurologic:  Alert and oriented. Cranial nerves grossly intact. No asterixis. LABORATORY:  Results for [place] [place] (MRN [id number]) as of 10/8/2020 20:35   Ref.  Range 10/6/2020 17:43 10/7/2020 23:00 10/8/2020 04:18   WBC Latest Ref Range: 3.6 - 11.0 K/uL 12.4 (H)  10.7   HGB Latest Ref Range: 11.5 - 16.0 g/dL 15.3  13.4   PLATELET Latest Ref Range: 150 - 400 K/uL 227  173   Sodium Latest Ref Range: 136 - 145 mmol/L 137 142 142   Potassium Latest Ref Range: 3.5 - 5.1 mmol/L 3.6 3.1 (L) 3.3 (L)   Chloride Latest Ref Range: 97 - 108 mmol/L 102 106 108   CO2 Latest Ref Range: 21 - 32 mmol/L 27 29 27   Glucose Latest Ref Range: 65 - 100 mg/dL 292 (H) 168 (H) 192 (H)   BUN Latest Ref Range: 6 - 20 MG/DL 19 23 (H) 24 (H)   Creatinine Latest Ref Range: 0.55 - 1.02 MG/DL 1.32 (H) 1.13 (H) 1.13 (H)   Bilirubin, total Latest Ref Range: 0.2 - 1.0 MG/DL 0.4     Albumin Latest Ref Range: 3.5 - 5.0 g/dL 3.9     ALT Latest Ref Range: 12 - 78 U/L 36     AST Latest Ref Range: 15 - 37 U/L 18     Alk. phosphatase Latest Ref Range: 45 - 117 U/L 348 (H)     Ammonia Latest Ref Range: <32 UMOL/L 17         RADIOLOGY:  10/2010. CT scan abdomen with IV contrast.  Changes consistent with fatty liver. No liver mass lesions. Normal spleen. No ascites. Colitis.       Janay Lora MD  [facility] 13  [address], [address] [place] [address].  [phone]  [address] [place] [address]

## 2020-10-09 NOTE — PROGRESS NOTES
Problem: Mobility Impaired (Adult and Pediatric)  Goal: *Acute Goals and Plan of Care (Insert Text)  Description: FUNCTIONAL STATUS PRIOR TO ADMISSION: Patient was independent and active without use of DME. 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 HOME SUPPORT PRIOR TO ADMISSION: The patient lived with  but did not require assist.    Physical Therapy Goals  Initiated 10/9/2020  1. Patient will move from supine to sit and sit to supine  in bed with modified independence within 7 day(s). 2.  Patient will transfer from bed to chair and chair to bed with modified independence using the least restrictive device within 7 day(s). 3.  Patient will perform sit to stand with modified independence within 7 day(s). 4.  Patient will ambulate with modified independence for 200 feet with the least restrictive device within 7 day(s). 5.  Patient will ascend/descend 4 stairs with 1 handrail(s) with modified independence within 7 day(s). Outcome: Progressing Towards Goal   PHYSICAL THERAPY TREATMENT  Patient: Herve Garcia (83 y.o. female)  Date: 10/9/2020  Diagnosis: Diarrhea [R19.7]  Abdominal pain [R10.9]   Nausea and vomiting       Precautions: Contact, Fall  Chart, physical therapy assessment, plan of care and goals were reviewed. ASSESSMENT  Patient continues with skilled PT services and is progressing towards goals. Patient currently limited by generalized weakness and slight gait/balance impairment. Currently needing supervision for bed mobility and CGA for transfers. Able to amb approx 60 feet with CGA with no overt LOB but generally weak. Patient to IN home with family support and does not feel that she needs [facility] PT follow up. Anticipate as her activity level increases at home her mobility will improve. Will continue to follow for mobility progression.      Other factors to consider for discharge: at risk for falls, slightly below functional baseline         PLAN :  Patient continues to benefit from skilled intervention to address the above impairments. Continue treatment per established plan of care. to address goals. Recommendation for discharge: (in order for the patient to meet his/her long term goals)  Physical therapy at least 2 days/week in the home       IF patient discharges home will need the following DME: none. Educated daughter on where she can get a RW if patient feels that she needs one once at home       SUBJECTIVE:   Patient stated I'm walking pretty good!     OBJECTIVE DATA SUMMARY:   Critical Behavior:  Neurologic State: Alert  Orientation Level: Oriented X4  Cognition: Appropriate decision making, Appropriate for age attention/concentration, Appropriate safety awareness, Follows commands     Functional Mobility Training:  Bed Mobility:  Rolling: Supervision  Supine to Sit: Supervision; Additional time     Scooting: Supervision        Transfers:  Sit to Stand: Contact guard assistance  Stand to Sit: Contact guard assistance                             Balance:  Sitting: Intact  Standing: Impaired  Standing - Static: Good  Standing - Dynamic : Fair  Ambulation/Gait Training:  Distance (ft): 60 Feet (ft)  Assistive Device: Gait belt  Ambulation - Level of Assistance: Contact guard assistance     Gait Description (WDL): Exceptions to WDL  Gait Abnormalities: Decreased step clearance        Base of Support: Widened     Speed/Mer[place]: Pace decreased (<100 feet/min); Shuffled; Slow  Step Length: Left shortened;Right shortened       Pain Rating:  No c/o pain    Activity Tolerance:   Good and requires rest breaks  Please refer to the flowsheet for vital signs taken during this treatment.     After treatment patient left in no apparent distress:   Sitting in chair, Call bell within reach, Bed / chair alarm activated, and Caregiver / family present    COMMUNICATION/COLLABORATION:   The patients plan of care was discussed with: Physical therapist, Occupational therapist, and Registered nurse.      Foster Andersen, PT, DPT   Time Calculation: 16 mins

## 2020-10-09 NOTE — DIABETES MGMT
[facility] SPECIALIST CONSULT  PROGRAM FOR DIABETES HEALTH    FOLLOW UP NOTE    Presentation   Nevin Ceron is a 72 y.o. female who presented to the ED from PCP office with a concern of delirium after several days of nausea, vomiting, diarrhea and weakness. Her  reported that she has been \"out of it\" since yesterday. She had a similar occurrence one year ago for which she was managed at [facility]. HX: hyperlipidemia, hypertension, diabetes, polymyalgia rheumatica, asthma, empty sella syndrome, tobacco use, and atrial thrombus    DX: Confusion, viral gastroenteritis vs bacterial etiology, ZACHARY    TX: IV hydration, stool studies    Current clinical course has been complicated by hyperglycemia. Diabetes: Patient has known Type two diabetes, treated with metformin and Trulicity PTA. A1C 7.7% in July. Consulted by Provider for advanced diabetes nursing assessment and care, specifically related to   [x] Inpatient management strategy  [x] Home management assessment      Diabetes medication history  Drug class Currently in use Discontinued Never used   Biguanide Metformin 500 mg 2 tabs BID     DDP-4 inhibitor       Sulfonylurea      Thiazolidinedione      GLP-1 RA Trulicity SQ once weekly     SGLT-2 inhibitors      Basal insulin      Fixed Dose  Combinations      Bolus insulin        SUBJECTIVE:  GI, Cardiology and hepatology consulted  No stool studies have been sent as diarrhea is resolving  ID d/c vancomycin  GI lite diet  Lantus started  12 units correctional insulin given  Glucose 208-250 in the past 24 hours while NPO  A1C 9.8%    Patient reports that this is the 4th episode with similar symptoms/2nd hospitalization. When feeling well, she does have diarrhea 1-2 times weekly. Objective   Physical exam  General Awake, oriented, tired, falling back asleep when talking to patient. Daughter at bedside.     Vital Signs   Visit Vitals  BP (!) 143/73 (BP 1 Location: Left arm, BP Patient Position: At rest)   Pulse 69   Temp 98.2 °F (36.8 °C)   Resp 16   Ht 5' 2\" (1.575 m)   Wt 101 kg (222 lb 10.6 oz)   SpO2 (!) 89%   BMI 40.73 kg/m²     Skin  Warm and dry. No acanthosis noted along neckline. Heart   Regular rate and rhythm. No murmurs, rubs or gallops  Lungs  Clear to auscultation without rales or rhonchi  Extremities No foot wounds     Laboratory  Lab Results   Component Value Date/Time    Hemoglobin A1c 9.8 (H) 10/08/2020 12:05 AM    Hemoglobin A1c (POC) 7.7 07/31/2020 02:44 PM     Lab Results   Component Value Date/Time    LDL,Direct 65 10/07/2009 11:05 AM    LDL, calculated Comment 12/13/2019 03:32 PM     Lab Results   Component Value Date/Time    Creatinine 1.03 (H) 10/09/2020 10:22 AM     Lab Results   Component Value Date/Time    Sodium 142 10/09/2020 10:22 AM    Potassium 3.2 (L) 10/09/2020 10:22 AM    Chloride 108 10/09/2020 10:22 AM    CO2 29 10/09/2020 10:22 AM    Anion gap 5 10/09/2020 10:22 AM    Glucose 216 (H) 10/09/2020 10:22 AM    BUN 18 10/09/2020 10:22 AM    Creatinine 1.03 (H) 10/09/2020 10:22 AM    BUN/Creatinine ratio 17 10/09/2020 10:22 AM    GFR est AA >60 10/09/2020 10:22 AM    GFR est non-AA 53 (L) 10/09/2020 10:22 AM    Calcium 8.0 (L) 10/09/2020 10:22 AM    Bilirubin, total 0.3 10/09/2020 10:22 AM    Alk.  phosphatase 196 (H) 10/09/2020 10:22 AM    Protein, total 6.2 (L) 10/09/2020 10:22 AM    Albumin 2.8 (L) 10/09/2020 10:22 AM    Globulin 3.4 10/09/2020 10:22 AM    A-G Ratio 0.8 (L) 10/09/2020 10:22 AM    ALT (SGPT) 23 10/09/2020 10:22 AM     Lab Results   Component Value Date/Time    ALT (SGPT) 23 10/09/2020 10:22 AM       Factors affecting BG pattern  Factor Dose Comments   Nutrition:  Carb-controlled meals   60 grams/meal NPO   Drugs:  IV antibiotics   Meropenem      Infection Leukocytosis    Other: presumed gastroenteritis       Blood glucose pattern        Assessment and Plan   Nursing Diagnosis Risk for unstable blood glucose pattern   Nursing Intervention Domain 5935 Decision-making Support   Nursing Interventions Examined current inpatient diabetes control   Explored factors facilitating and impeding inpatient management  Identified self-management practices impeding diabetes control  Explored corrective strategies with patient and responsible inpatient provider   Informed patient of rational for insulin strategy while hospitalized     Evaluation   Mary Anne Laboy is a 72year old female with uncontrolled diabetes on metformin and trulicity and other medical co-morbidities admitted for confusion and dehydration with a presumed viral/bacteral gastroenteritis. She was admitted for infectious work-up, IV antibiotics initiated along with IV rehydration. Patient is did experience hyperglycemia to the 300s largely resolved with rehydration and correctional insulin in this acute illness. Plans to advance PO intake this evening per GI to clear liquid diet. At this time, it is reasonable to slowly advance SQ insulin therapy to target an inpatient glucose of 100-180. Recommendations   Recommend:  1. Advance basal insulin: 0.4 units/kg/day. 40 units daily    2. Continue correctional insulin Q6 hrs (ACHS when eating) at normal sensitivity, start at a glucose of 200    3. Diet advancement per primary team, when advanced please ensure consistent carbohydrate diet    4. Add low dose mealtime bolus: 5 units scheduled with meals as experiencing pre-prandial hyperglycemia      Patient will need to follow up with PCP on discharge. Will need to advance anti-hyperglycemic agents with elevated A1C. Ideally would advance dose of metformin but not ideal with recent colitis. Billing Code(s)   I personally reviewed chart, notes, data and current medications in the medical record, and examined the patient at bedside before making care recommendations.      [x] 30087 IP subsequent hospital care - 15 minutes      KAREL Gonsalez  Program for Diabetes Health  Access via [employer]  [phone]

## 2020-10-09 NOTE — PROGRESS NOTES
ID Progress Note  10/9/2020    Subjective:     afebrile. Diarrhea has resolved. C. difficile and stool culture were not sent. She has no shortness of breath or abdominal pain. She is not vomiting anymore. ROS: No anaphylaxis, seizures, syncope, hematemesis, hematochezia     Objective:     Vitals:   Visit Vitals  BP (!) 165/72   Pulse (!) 59   Temp 97.7 °F (36.5 °C)   Resp 16   Ht 5' 2\" (1.575 m)   Wt 101 kg (222 lb 10.6 oz)   SpO2 97%   BMI 40.73 kg/m²        Tmax:  Temp (24hrs), Av °F (36.7 °C), Min:97.7 °F (36.5 °C), Max:98.3 °F (36.8 °C)      Exam:    Not in distress  Pink conjunctivae, anicteric sclerae  No cervical lymphdenopathy   Lung clear, no rales, wheezes or rhonchi   Heart: s1, s2, RRR, no murmurs rubs or clicks  Abdomen: soft nontender, no guarding or rebound  Knees not warm or tender  Speech fluent     Labs:   Lab Results   Component Value Date/Time    WBC 10.7 10/08/2020 12:05 AM    HGB 13.4 10/08/2020 12:05 AM    HCT 41.6 10/08/2020 12:05 AM    PLATELET 473  12:05 AM    MCV 90.8 10/08/2020 12:05 AM     Lab Results   Component Value Date/Time    Sodium 143 10/09/2020 07:20 AM    Potassium 3.2 (L) 10/09/2020 07:20 AM    Chloride 108 10/09/2020 07:20 AM    CO2 31 10/09/2020 07:20 AM    Anion gap 4 (L) 10/09/2020 07:20 AM    Glucose 194 (H) 10/09/2020 07:20 AM    BUN 19 10/09/2020 07:20 AM    Creatinine 1.04 (H) 10/09/2020 07:20 AM    BUN/Creatinine ratio 18 10/09/2020 07:20 AM    GFR est AA >60 10/09/2020 07:20 AM    GFR est non-AA 53 (L) 10/09/2020 07:20 AM    Calcium 8.2 (L) 10/09/2020 07:20 AM    Bilirubin, total 0.4 10/09/2020 07:20 AM    Alk.  phosphatase 204 (H) 10/09/2020 07:20 AM    Protein, total 6.2 (L) 10/09/2020 07:20 AM    Albumin 2.8 (L) 10/09/2020 07:20 AM    Globulin 3.4 10/09/2020 07:20 AM    A-G Ratio 0.8 (L) 10/09/2020 07:20 AM    ALT (SGPT) 24 10/09/2020 07:20 AM           Assessment:     #1 colitis     #2 mild renal insufficiency     #3 diabetes     #4 hyperlipidemia     #5 hypertension          Recommendations:     Her diarrhea has resolved. She has not had any antibiotics or acid suppressive medication prior to coming to the hospital.  Because of the improvement and the lack of risk factors, it seems C. difficile is unlikely. She says she tolerates amoxicillin. Will try augmentin on her. If she tolerates this, would give it to her until oct 14. Team available over the weekend.         Justice Palma MD

## 2020-10-09 NOTE — PROGRESS NOTES
Bedside shift change report given to DEB Ko and Charan RN (oncoming nurse) by Tonia Love RN (offgoing nurse). Report included the following information SBAR, Kardex, Intake/Output, MAR and Recent Results.

## 2020-10-09 NOTE — PROGRESS NOTES
[facility] Adult  Hospitalist Group                                                                                          Hospitalist Progress Note  Raisa Harvey MD  Answering service: [phone] from in house phone        Date of Service:  10/9/2020  NAME:  Dallas Camacho  :  1950  MRN:  [id number]      Admission Summary:   Dallas Camacho is a 72 y.o. female with PMH of Left Atrial Thrombus, Chronic Bronchitis, T2DM, Tobacco Dependence and who presents to the ED with complaints of nausea vomiting and diarrhea x 5 days. 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 She was last admitted about a year ago for a similar illness after a trip on a cruise ship. Patient is moaning on the bed and she is currently a poor historian, history obtained from daughter who is by the bedside. There is no associated fever, no shortness of breath, chest pain, cough, or dysuria. A head CT scan performed in the ED showed no acute process. she was bolused with a liter of normal saline intravenous fluid and the hospitalist service has been consulted for hospital admission and for evaluation. Interval history / Subjective: Follow-up for colitis. -Feeling well. No BM since yesterday,denies n/v or abd pain.  -She is hungry ,wants to eat. Tolerated CLD,starting her on GI lite. Assessment & Plan:     Diffuse colitis most probably infectious, most probably bacterial.  -CAT scan of the abdomen pelvis showed findings consistent with mild to moderate diffuse colitis. -Supportive therapy, IV fluids, antiemetics. -unable to stool studies as she has no more diarrhea. C diff abhishekley  -PPI BID  -Tolerated cld, start GI lite      Hepatic steatosis  detected on CT abdomen pelvis.   History of hepatitis?  -Elevated alkaline phosphatase otherwise ALT, AST and bilirubin within normal limit.  -Acute viral hepatitis A/B/C negative  -Hepatology saw pt    Leukocytosis with left shift likely from colitis: Resolved. Yeast in the urine, urine culture negative. She is asymptomatic. Discontinue Diflucan.     T2DM with hyperglycemia: Hold home metformin and Trulicity.    -Humalog before meals and at bedtime. Lantus.     Uncontrolled hypertension, accelerated on admission to hospital due to abdominal pain  -Continue atenolol, Cardizem. Dehydration, elevated BUN/creatinine due to volume depletion: Improved with IV fluids    Hypokalemia: replacement ordered:PO and IV kcl with NS.          Tobacco Dependence:  nicotine patch 21 mg/day. Morbid Obesity: BMI: 38.62  RA: on methotrexate @ home. Chronic bronchitis/asthma with wheezing: Use bronchodilators    DVTppx: Lovenox  Gippx: Protonix  Code Status: Full Code  Diet: Clear liquid diet, to advance as needed  Activity: OOB to chair TID and PRN  Discharge: Anticipate home when medically stable  Surrogate decision maker:  Mr. Zeina Silva  Discussed with her daughter who is an RN at the bedside,10/8. Hospital Problems  Date Reviewed: 7/31/2020          Codes Class Noted POA    Abdominal pain ICD-10-CM: R10.9  ICD-9-CM: 789.00  10/7/2020 Unknown        * (Principal) Nausea and vomiting ICD-10-CM: R11.2  ICD-9-CM: 787.01  10/6/2020 Yes        Diarrhea ICD-10-CM: R19.7  ICD-9-CM: 787.91  10/6/2020 Unknown                Review of Systems:   A comprehensive review of systems was negative except for that written in the HPI. Vital Signs:    Last 24hrs VS reviewed since prior progress note.  Most recent are:  Visit Vitals  BP (!) 165/72   Pulse (!) 59   Temp 97.7 °F (36.5 °C)   Resp 16   Ht 5' 2\" (1.575 m)   Wt 101 kg (222 lb 10.6 oz)   SpO2 97%   BMI 40.73 kg/m²       No intake or output data in the 24 hours ending 10/09/20 0856     Physical Examination:     Constitutional:  No acute distress, cooperative, pleasant    ENT: Oral mucosa moist.    Resp:  CTA bilaterally. No wheezing/rhonchi/rales. No accessory muscle use. CV:  Regular rhythm, normal rate, no murmurs, gallops, rubs. GI:  Obese,soft,non tender. Musculoskeletal:  No edema, warm, 2+ pulses throughout. Neurologic:  Moves all extremities. AAOx3, CN II-XII reviewed. Skin:  Good turgor, no rashes or ulcers  Psych:  Good insight, Not anxious nor agitated. Data Review:    Review and/or order of clinical lab test      Labs:     Recent Labs     10/08/20  0005 10/06/20  1743   WBC 10.7 12.4*   HGB 13.4 15.3   HCT 41.6 46.5    227     Recent Labs     10/09/20  0720 10/08/20  0418 10/07/20  2300    142 142   K 3.2* 3.3* 3.1*    108 106   CO2 31 27 29   BUN 19 24* 23*   CREA 1.04* 1.13* 1.13*   * 192* 168*   CA 8.2* 8.6 8.7   MG  --  1.7  --      Recent Labs     10/09/20  0720 10/06/20  1743   ALT 24 36   * 348*   TBILI 0.4 0.4   TP 6.2* 8.6*   ALB 2.8* 3.9   GLOB 3.4 4.7*   LPSE  --  102     No results for input(s): INR, PTP, APTT, INREXT, INREXT in the last 72 hours. No results for input(s): FE, TIBC, PSAT, FERR in the last 72 hours. No results found for: FOL, RBCF   No results for input(s): PH, PCO2, PO2 in the last 72 hours.   Recent Labs     10/08/20  0418 10/07/20  2300   TROIQ <0.05 <0.05     Lab Results   Component Value Date/Time    Cholesterol, total 178 12/13/2019 03:32 PM    HDL Cholesterol 36 (L) 12/13/2019 03:32 PM    LDL,Direct 65 10/07/2009 11:05 AM    LDL, calculated Comment 12/13/2019 03:32 PM    Triglyceride 533 (H) 12/13/2019 03:32 PM    CHOL/HDL Ratio 2.6 10/07/2009 11:05 AM     Lab Results   Component Value Date/Time    Glucose (POC) 208 (H) 10/09/2020 06:54 AM    Glucose (POC) 250 (H) 10/08/2020 11:32 PM    Glucose (POC) 155 (H) 10/08/2020 05:02 PM    Glucose (POC) 165 (H) 10/08/2020 11:59 AM    Glucose (POC) 181 (H) 10/08/2020 06:01 AM     Lab Results   Component Value Date/Time    Color DARK YELLOW 10/07/2020 01:33 AM    Appearance CLOUDY (A) 10/07/2020 01:33 AM    Specific gravity 1.030 10/07/2020 01:33 AM    Specific gravity 1.028 10/05/2016 08:59 AM    pH (UA) 5.0 10/07/2020 01:33 AM    Protein 300 (A) 10/07/2020 01:33 AM    Glucose >1,000 (A) 10/07/2020 01:33 AM    Ketone 15 (A) 10/07/2020 01:33 AM    Bilirubin Negative 10/07/2020 01:33 AM    Urobilinogen 1.0 10/07/2020 01:33 AM    Nitrites Negative 10/07/2020 01:33 AM    Leukocyte Esterase Negative 10/07/2020 01:33 AM    Epithelial cells FEW 10/07/2020 01:33 AM    Bacteria 1+ (A) 10/07/2020 01:33 AM    WBC 0-4 10/07/2020 01:33 AM    RBC 0-5 10/07/2020 01:33 AM         Medications Reviewed:     Current Facility-Administered Medications   Medication Dose Route Frequency    insulin lispro (HUMALOG) injection   SubCUTAneous AC&HS    glucose chewable tablet 16 g  4 Tab Oral PRN    glucagon (GLUCAGEN) injection 1 mg  1 mg IntraMUSCular PRN    dextrose 10% infusion 0-250 mL  0-250 mL IntraVENous PRN    potassium chloride SR (KLOR-CON 10) tablet 40 mEq  40 mEq Oral DAILY    0.9% sodium chloride with KCl 40 mEq/L infusion   IntraVENous CONTINUOUS    meropenem (MERREM) 500 mg in 0.9% sodium chloride (MBP/ADV) 50 mL  0.5 g IntraVENous Q6H    HYDROcodone-acetaminophen (NORCO)  mg tablet 1 Tab  1 Tab Oral Q8H PRN    pantoprazole (PROTONIX) 40 mg in 0.9% sodium chloride 10 mL injection  40 mg IntraVENous Q12H    hydrALAZINE (APRESOLINE) 20 mg/mL injection 10 mg  10 mg IntraVENous Q6H PRN    atenoloL (TENORMIN) tablet 25 mg  25 mg Oral DAILY    albuterol-ipratropium (DUO-NEB) 2.5 MG-0.5 MG/3 ML  3 mL Nebulization Q4H PRN    dilTIAZem ER (CARDIZEM CD) capsule 360 mg  360 mg Oral DAILY    prochlorperazine (COMPAZINE) with saline injection 5 mg  5 mg IntraVENous Q6H PRN    insulin glargine (LANTUS) injection 30 Units  30 Units SubCUTAneous QHS    ipratropium (ATROVENT) 0.02 % nebulizer solution 0.5 mg  0.5 mg Nebulization QID RT    albuterol (PROVENTIL VENTOLIN) nebulizer solution 2.5 mg  2.5 mg Nebulization Q6H PRN    allopurinoL (ZYLOPRIM) tablet 300 mg  300 mg Oral DAILY    aspirin delayed-release tablet 81 mg  81 mg Oral DAILY    folic acid (FOLVITE) tablet 1 mg  1 mg Oral DAILY    pregabalin (LYRICA) capsule 200 mg  200 mg Oral TID    sodium chloride (NS) flush 5-40 mL  5-40 mL IntraVENous Q8H    sodium chloride (NS) flush 5-40 mL  5-40 mL IntraVENous PRN    acetaminophen (TYLENOL) tablet 650 mg  650 mg Oral Q6H PRN    Or    acetaminophen (TYLENOL) suppository 650 mg  650 mg Rectal Q6H PRN    polyethylene glycol (MIRALAX) packet 17 g  17 g Oral DAILY PRN    enoxaparin (LOVENOX) injection 40 mg  40 mg SubCUTAneous DAILY    glucose chewable tablet 16 g  4 Tab Oral PRN    glucagon (GLUCAGEN) injection 1 mg  1 mg IntraMUSCular PRN    dextrose 10% infusion 0-250 mL  0-250 mL IntraVENous PRN    nicotine (NICODERM CQ) 21 mg/24 hr patch 1 Patch  1 Patch TransDERmal Q24H     ______________________________________________________________________  EXPECTED LENGTH OF STAY: 2d 14h  ACTUAL LENGTH OF STAY:          1                 Luis Antonio Crespo MD

## 2020-10-09 NOTE — PROGRESS NOTES
THERESA  -RUR 18%  -Advancing diet, if tolerated, tentative discharge   -PT recommend HH, patient declined home health   -Like home with family  -Family transport     CM will follow     MAHESH Lopez/JOSELIN

## 2020-10-09 NOTE — PROGRESS NOTES
Problem: Risk for Spread of Infection  Goal: Prevent transmission of infectious organism to others  Description: Prevent the transmission of infectious organisms to other patients, staff members, and visitors. Outcome: Progressing Towards Goal     Problem: Breathing Pattern - Ineffective  Goal: *Absence of hypoxia  Outcome: Progressing Towards Goal     Problem: Falls - Risk of  Goal: *Absence of Falls  Description: Document Apoorva Fall Risk and appropriate interventions in the flowsheet.   Outcome: Progressing Towards Goal  Note: Fall Risk Interventions:  Mobility Interventions: Bed/chair exit alarm    Mentation Interventions: Adequate sleep, hydration, pain control, Door open when patient unattended    Medication Interventions: Assess postural VS orthostatic hypotension, Patient to call before getting OOB, Teach patient to arise slowly    Elimination Interventions: Bed/chair exit alarm, Call light in reach

## 2020-10-10 ENCOUNTER — APPOINTMENT (OUTPATIENT)
Dept: GENERAL RADIOLOGY | Age: 70
End: 2020-10-10
Attending: HOSPITALIST
Payer: MEDICARE

## 2020-10-10 VITALS
TEMPERATURE: 99.5 F | WEIGHT: 222.66 LBS | DIASTOLIC BLOOD PRESSURE: 97 MMHG | RESPIRATION RATE: 16 BRPM | HEART RATE: 82 BPM | SYSTOLIC BLOOD PRESSURE: 138 MMHG | OXYGEN SATURATION: 93 % | HEIGHT: 62 IN | BODY MASS INDEX: 40.98 KG/M2

## 2020-10-10 LAB
ALBUMIN SERPL-MCNC: 2.7 G/DL (ref 3.5–5)
ALBUMIN/GLOB SERPL: 0.8 {RATIO} (ref 1.1–2.2)
ALP SERPL-CCNC: 197 U/L (ref 45–117)
ALT SERPL-CCNC: 22 U/L (ref 12–78)
ANION GAP SERPL CALC-SCNC: 4 MMOL/L (ref 5–15)
AST SERPL-CCNC: 15 U/L (ref 15–37)
BILIRUB SERPL-MCNC: 0.2 MG/DL (ref 0.2–1)
BUN SERPL-MCNC: 17 MG/DL (ref 6–20)
BUN/CREAT SERPL: 17 (ref 12–20)
CALCIUM SERPL-MCNC: 8 MG/DL (ref 8.5–10.1)
CHLORIDE SERPL-SCNC: 109 MMOL/L (ref 97–108)
CO2 SERPL-SCNC: 28 MMOL/L (ref 21–32)
CREAT SERPL-MCNC: 1.03 MG/DL (ref 0.55–1.02)
GLOBULIN SER CALC-MCNC: 3.4 G/DL (ref 2–4)
GLUCOSE BLD STRIP.AUTO-MCNC: 168 MG/DL (ref 65–100)
GLUCOSE BLD STRIP.AUTO-MCNC: 218 MG/DL (ref 65–100)
GLUCOSE BLD STRIP.AUTO-MCNC: 274 MG/DL (ref 65–100)
GLUCOSE SERPL-MCNC: 212 MG/DL (ref 65–100)
POTASSIUM SERPL-SCNC: 3.8 MMOL/L (ref 3.5–5.1)
PROT SERPL-MCNC: 6.1 G/DL (ref 6.4–8.2)
SERVICE CMNT-IMP: ABNORMAL
SODIUM SERPL-SCNC: 141 MMOL/L (ref 136–145)

## 2020-10-10 PROCEDURE — 74011250637 HC RX REV CODE- 250/637: Performed by: INTERNAL MEDICINE

## 2020-10-10 PROCEDURE — 36415 COLL VENOUS BLD VENIPUNCTURE: CPT

## 2020-10-10 PROCEDURE — 80053 COMPREHEN METABOLIC PANEL: CPT

## 2020-10-10 PROCEDURE — 74011250637 HC RX REV CODE- 250/637: Performed by: HOSPITALIST

## 2020-10-10 PROCEDURE — 96375 TX/PRO/DX INJ NEW DRUG ADDON: CPT

## 2020-10-10 PROCEDURE — C9113 INJ PANTOPRAZOLE SODIUM, VIA: HCPCS | Performed by: NURSE PRACTITIONER

## 2020-10-10 PROCEDURE — 74011000250 HC RX REV CODE- 250: Performed by: NURSE PRACTITIONER

## 2020-10-10 PROCEDURE — 96376 TX/PRO/DX INJ SAME DRUG ADON: CPT

## 2020-10-10 PROCEDURE — 74011250636 HC RX REV CODE- 250/636: Performed by: HOSPITALIST

## 2020-10-10 PROCEDURE — 71045 X-RAY EXAM CHEST 1 VIEW: CPT

## 2020-10-10 PROCEDURE — 99218 HC RM OBSERVATION: CPT

## 2020-10-10 PROCEDURE — 82962 GLUCOSE BLOOD TEST: CPT

## 2020-10-10 PROCEDURE — 94640 AIRWAY INHALATION TREATMENT: CPT

## 2020-10-10 PROCEDURE — 74011250636 HC RX REV CODE- 250/636: Performed by: NURSE PRACTITIONER

## 2020-10-10 PROCEDURE — 74011000250 HC RX REV CODE- 250: Performed by: HOSPITALIST

## 2020-10-10 PROCEDURE — 96372 THER/PROPH/DIAG INJ SC/IM: CPT

## 2020-10-10 PROCEDURE — 74011636637 HC RX REV CODE- 636/637: Performed by: HOSPITALIST

## 2020-10-10 PROCEDURE — 74011250637 HC RX REV CODE- 250/637: Performed by: NURSE PRACTITIONER

## 2020-10-10 RX ORDER — BACITRACIN 500 UNIT/G
PACKET (EA) TOPICAL ONCE
Status: COMPLETED | OUTPATIENT
Start: 2020-10-10 | End: 2020-10-10

## 2020-10-10 RX ORDER — FUROSEMIDE 10 MG/ML
20 INJECTION INTRAMUSCULAR; INTRAVENOUS ONCE
Status: COMPLETED | OUTPATIENT
Start: 2020-10-10 | End: 2020-10-10

## 2020-10-10 RX ORDER — HYDROCODONE BITARTRATE AND ACETAMINOPHEN 10; 325 MG/1; MG/1
1 TABLET ORAL
Status: DISCONTINUED | OUTPATIENT
Start: 2020-10-10 | End: 2020-10-10 | Stop reason: HOSPADM

## 2020-10-10 RX ORDER — ALBUTEROL SULFATE 90 UG/1
2 AEROSOL, METERED RESPIRATORY (INHALATION)
Qty: 1 INHALER | Refills: 1 | Status: SHIPPED | OUTPATIENT
Start: 2020-10-10

## 2020-10-10 RX ORDER — IBUPROFEN 200 MG
1 TABLET ORAL EVERY 24 HOURS
Qty: 30 PATCH | Refills: 0 | Status: SHIPPED | OUTPATIENT
Start: 2020-10-10 | End: 2020-11-09

## 2020-10-10 RX ORDER — ALBUTEROL SULFATE 0.83 MG/ML
2.5 SOLUTION RESPIRATORY (INHALATION)
Qty: 60 EACH | Refills: 0 | Status: SHIPPED | OUTPATIENT
Start: 2020-10-10

## 2020-10-10 RX ORDER — BACITRACIN 500 UNIT/G
PACKET (EA) TOPICAL
Status: DISCONTINUED
Start: 2020-10-10 | End: 2020-10-10 | Stop reason: HOSPADM

## 2020-10-10 RX ORDER — AMOXICILLIN AND CLAVULANATE POTASSIUM 875; 125 MG/1; MG/1
1 TABLET, FILM COATED ORAL EVERY 12 HOURS
Qty: 8 TAB | Refills: 0 | Status: SHIPPED | OUTPATIENT
Start: 2020-10-10 | End: 2020-10-14

## 2020-10-10 RX ADMIN — HYDROCODONE BITARTRATE AND ACETAMINOPHEN 1 TABLET: 10; 325 TABLET ORAL at 13:23

## 2020-10-10 RX ADMIN — ASPIRIN 81 MG: 81 TABLET, COATED ORAL at 08:32

## 2020-10-10 RX ADMIN — PREGABALIN 200 MG: 100 CAPSULE ORAL at 08:31

## 2020-10-10 RX ADMIN — Medication 10 ML: at 16:32

## 2020-10-10 RX ADMIN — AMOXICILLIN AND CLAVULANATE POTASSIUM 1 TABLET: 875; 125 TABLET, FILM COATED ORAL at 08:32

## 2020-10-10 RX ADMIN — FUROSEMIDE 20 MG: 10 INJECTION, SOLUTION INTRAMUSCULAR; INTRAVENOUS at 16:31

## 2020-10-10 RX ADMIN — INSULIN LISPRO 3 UNITS: 100 INJECTION, SOLUTION INTRAVENOUS; SUBCUTANEOUS at 16:31

## 2020-10-10 RX ADMIN — POTASSIUM CHLORIDE 40 MEQ: 750 TABLET, FILM COATED, EXTENDED RELEASE ORAL at 08:31

## 2020-10-10 RX ADMIN — IPRATROPIUM BROMIDE 0.5 MG: 0.5 SOLUTION RESPIRATORY (INHALATION) at 08:41

## 2020-10-10 RX ADMIN — HYDROCODONE BITARTRATE AND ACETAMINOPHEN 1 TABLET: 10; 325 TABLET ORAL at 06:03

## 2020-10-10 RX ADMIN — PREGABALIN 200 MG: 100 CAPSULE ORAL at 16:31

## 2020-10-10 RX ADMIN — INSULIN LISPRO 3 UNITS: 100 INJECTION, SOLUTION INTRAVENOUS; SUBCUTANEOUS at 12:08

## 2020-10-10 RX ADMIN — ENOXAPARIN SODIUM 40 MG: 40 INJECTION SUBCUTANEOUS at 08:32

## 2020-10-10 RX ADMIN — DILTIAZEM HYDROCHLORIDE 360 MG: 180 CAPSULE, COATED, EXTENDED RELEASE ORAL at 08:32

## 2020-10-10 RX ADMIN — INSULIN LISPRO 2 UNITS: 100 INJECTION, SOLUTION INTRAVENOUS; SUBCUTANEOUS at 07:09

## 2020-10-10 RX ADMIN — ALLOPURINOL 300 MG: 300 TABLET ORAL at 08:32

## 2020-10-10 RX ADMIN — BACITRACIN 1 PACKET: 500 OINTMENT TOPICAL at 18:10

## 2020-10-10 RX ADMIN — Medication 10 ML: at 05:10

## 2020-10-10 RX ADMIN — SODIUM CHLORIDE 40 MG: 9 INJECTION, SOLUTION INTRAMUSCULAR; INTRAVENOUS; SUBCUTANEOUS at 08:32

## 2020-10-10 RX ADMIN — POTASSIUM CHLORIDE AND SODIUM CHLORIDE: 900; 300 INJECTION, SOLUTION INTRAVENOUS at 06:00

## 2020-10-10 NOTE — DISCHARGE INSTRUCTIONS
Discharge Instructions       PATIENT ID: Cara Wakefield  MRN: [id number]   YOB: 1950    DATE OF ADMISSION: 10/6/2020  6:57 PM    DATE OF DISCHARGE: 10/10/2020    PRIMARY CARE PROVIDER: Marquise Reina NP     ATTENDING PHYSICIAN: Don Rousseau MD  DISCHARGING PROVIDER: Marianne Velasquez MD    To contact this individual call [phone] and ask the  to page. If unavailable ask to be transferred the Adult Hospitalist Department. DISCHARGE DIAGNOSES and CARE RECOMMENDATIONS:   Inflammation of the large intestine (colitis). Take the prescribed antibiotics as instructed and also take probiotics which she can find over-the-counter. We recommend you follow-up with your gastroenterology doctor, you may need colonoscopy. There were some EKG changes which are similar to EKG he had in 2016. Ultrasound of heart (echocardiogram) is unremarkable. You are evaluated by cardiology, no issues so no further testing needed. DIET: Regular Diet      ACTIVITY: Activity as tolerated    PENDING TEST RESULTS:   At the time of discharge the following test results are still pending: None    FOLLOW UP APPOINTMENTS:   Follow-up Information     Follow up With Specialties Details Why Contact Rosy Pagan NP Nurse Practitioner, Emergency Medicine Call on 10/29/2020 telehealth at 11:30 [address]      Katya Tierney MD Gastroenterology   [address] [place] [address]  [phone]               YOU WERE SEEN BY THE FOLLOWING CONSULTATIONS: Gastroenterology, infectious disease, hepatology and cardiology    DISCHARGE MEDICATIONS:   See Medication Reconciliation Form    · It is important that you take the medication exactly as they are prescribed. · Keep your medication in the bottles provided by the pharmacist and keep a list of the medication names, dosages, and times to be taken in your wallet. · Do not take other medications without consulting your doctor. NOTIFY YOUR PHYSICIAN FOR ANY OF THE FOLLOWING:   Fever over 101 degrees for 24 hours. Recurrence of nausea, vomiting, abdominal pain or diarrhea. If you feel your symptoms are severe, call 911/go to the ER        Signed:    Josefa Sanders MD  10/10/2020  3:21 PM

## 2020-10-10 NOTE — PROGRESS NOTES
Chest x-ray is clear. Patient looks puffy with trace leg edema. Give her Lasix 20 mg IV prior to discharge. She will continue with her home HCTZ on discharge. Discussed and updated daughter at the bedside.

## 2020-10-10 NOTE — PROGRESS NOTES
[address] Adult  Hospitalist Group                                                                                          Hospitalist Progress Note  Raisa Harvey MD  Answering service: [phone] from in house phone        Date of Service:  10/10/2020  NAME:  Dallas Camacho  :  1950  MRN:  [id number]      Admission Summary:   Dallas Camacho is a 72 y.o. female with PMH of Left Atrial Thrombus, Chronic Bronchitis, T2DM, Tobacco Dependence and who presents to the ED with complaints of nausea vomiting and diarrhea x 5 days. 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 She was last admitted about a year ago for a similar illness after a trip on a cruise ship. Patient is moaning on the bed and she is currently a poor historian, history obtained from daughter who is by the bedside. There is no associated fever, no shortness of breath, chest pain, cough, or dysuria. A head CT scan performed in the ED showed no acute process. she was bolused with a liter of normal saline intravenous fluid and the hospitalist service has been consulted for hospital admission and for evaluation. Interval history / Subjective: Follow-up for colitis.  -Ms. Albert Boogie continues to feel better, she only had 2 loose BM last night. She is tolerating diet. She asked if she could go home today. She has some wheezing. She is tolerating Augmentin despite history of hives due to penicillin     Assessment & Plan:     Diffuse colitis most probably infectious, most probably bacterial.  -CAT scan of the abdomen pelvis showed findings consistent with mild to moderate diffuse colitis. -Improved. Tolerating Augmentin since 10/9. Discontinue IV fluid. -unable to stool studies as she has no more diarrhea. C diff unlikley  -PPI BID  -GI and ID okay to discharge.       Hepatic steatosis  detected on CT abdomen pelvis. History of hepatitis?  -Elevated alkaline phosphatase otherwise ALT, AST and bilirubin within normal limit.  -Acute viral hepatitis A/B/C negative  -Hepatology saw pt    Leukocytosis with left shift likely from colitis: Resolved. Yeast in the urine, urine culture negative. She is asymptomatic. Discontinue Diflucan.     T2DM with hyperglycemia: Hold home metformin and Trulicity.    -Humalog before meals and at bedtime. Lantus.     Uncontrolled hypertension, accelerated on admission to hospital due to abdominal pain  -Continue atenolol, Cardizem  -    Dehydration, elevated BUN/creatinine due to volume depletion: Improved with IV fluids    Hypokalemia: replacement ordered: Corrected.          Tobacco Dependence:  nicotine patch 21 mg/day. Morbid Obesity: BMI: 38.62  RA: on methotrexate @ home. Chronic bronchitis/asthma with wheezing: Use bronchodilators  -Discontinue IV fluids.  -Obtain chest x-ray, she has some basal rales on the right posterior lung field. Morbidly obese  Body mass index is 40.73 kg/m².  -Counseled on healthy lifestyle, physical activity and color selection    DVTppx: Lovenox  Gippx: Protonix  Code Status: Full Code  Diet: Diabetic diet, tolerating. Activity: OOB to chair TID and PRN  Discharge: Anticipate home later today. Surrogate decision maker:  Mr. Pablo Winkler  Discussed with her daughter who is an RN at the bedside,10/8. Hospital Problems  Date Reviewed: 7/31/2020          Codes Class Noted POA    Abdominal pain ICD-10-CM: R10.9  ICD-9-CM: 789.00  10/7/2020 Unknown        * (Principal) Nausea and vomiting ICD-10-CM: R11.2  ICD-9-CM: 787.01  10/6/2020 Yes        Diarrhea ICD-10-CM: R19.7  ICD-9-CM: 787.91  10/6/2020 Unknown                Review of Systems:   A comprehensive review of systems was negative except for that written in the HPI. Vital Signs:    Last 24hrs VS reviewed since prior progress note.  Most recent are:  Visit Vitals  BP (!) 145/97   Pulse 85   Temp 98.1 °F (36.7 °C)   Resp 16   Ht 5' 2\" (1.575 m)   Wt 101 kg (222 lb 10.6 oz)   SpO2 93%   BMI 40.73 kg/m²         Intake/Output Summary (Last 24 hours) at 10/10/2020 6983  Last data filed at 10/9/2020 2022  Gross per 24 hour   Intake 591.67 ml   Output --   Net 591.67 ml        Physical Examination:     Constitutional:  No acute distress, cooperative, pleasant    ENT:  Oral mucosa moist.    Resp:  On room air. Not in distress. She has scattered wheezing with basal rales on the right posterior lung field. CV:  Regular rhythm, normal rate, no murmurs, gallops, rubs. GI:  Obese,soft,non tender. Musculoskeletal:  No peripheral edema. Neurologic:  Moves all extremities. AAOx3, CN II-XII reviewed. Skin:  Good turgor, no rashes or ulcers  Psych:  Good insight, Not anxious nor agitated. Data Review:    Review and/or order of clinical lab test      Labs:     Recent Labs     10/08/20  0005   WBC 10.7   HGB 13.4   HCT 41.6        Recent Labs     10/10/20  0050 10/09/20  1022 10/09/20  0720 10/08/20  0418    142 143 142   K 3.8 3.2* 3.2* 3.3*   * 108 108 108   CO2 28 29 31 27   BUN 17 18 19 24*   CREA 1.03* 1.03* 1.04* 1.13*   * 216* 194* 192*   CA 8.0* 8.0* 8.2* 8.6   MG  --   --   --  1.7     Recent Labs     10/10/20  0050 10/09/20  1022 10/09/20  0720   ALT 22 23 24   * 196* 204*   TBILI 0.2 0.3 0.4   TP 6.1* 6.2* 6.2*   ALB 2.7* 2.8* 2.8*   GLOB 3.4 3.4 3.4     No results for input(s): INR, PTP, APTT, INREXT, INREXT in the last 72 hours. No results for input(s): FE, TIBC, PSAT, FERR in the last 72 hours. No results found for: FOL, RBCF   No results for input(s): PH, PCO2, PO2 in the last 72 hours.   Recent Labs     10/08/20  0418 10/07/20  2300   TROIQ <0.05 <0.05     Lab Results   Component Value Date/Time    Cholesterol, total 178 12/13/2019 03:32 PM    HDL Cholesterol 36 (L) 12/13/2019 03:32 PM LDL,Direct 65 10/07/2009 11:05 AM    LDL, calculated Comment 12/13/2019 03:32 PM    Triglyceride 533 (H) 12/13/2019 03:32 PM    CHOL/HDL Ratio 2.6 10/07/2009 11:05 AM     Lab Results   Component Value Date/Time    Glucose (POC) 168 (H) 10/10/2020 05:45 AM    Glucose (POC) 264 (H) 10/09/2020 09:11 PM    Glucose (POC) 254 (H) 10/09/2020 04:50 PM    Glucose (POC) 236 (H) 10/09/2020 10:51 AM    Glucose (POC) 208 (H) 10/09/2020 06:54 AM     Lab Results   Component Value Date/Time    Color DARK YELLOW 10/07/2020 01:33 AM    Appearance CLOUDY (A) 10/07/2020 01:33 AM    Specific gravity 1.030 10/07/2020 01:33 AM    Specific gravity 1.028 10/05/2016 08:59 AM    pH (UA) 5.0 10/07/2020 01:33 AM    Protein 300 (A) 10/07/2020 01:33 AM    Glucose >1,000 (A) 10/07/2020 01:33 AM    Ketone 15 (A) 10/07/2020 01:33 AM    Bilirubin Negative 10/07/2020 01:33 AM    Urobilinogen 1.0 10/07/2020 01:33 AM    Nitrites Negative 10/07/2020 01:33 AM    Leukocyte Esterase Negative 10/07/2020 01:33 AM    Epithelial cells FEW 10/07/2020 01:33 AM    Bacteria 1+ (A) 10/07/2020 01:33 AM    WBC 0-4 10/07/2020 01:33 AM    RBC 0-5 10/07/2020 01:33 AM         Medications Reviewed:     Current Facility-Administered Medications   Medication Dose Route Frequency    HYDROcodone-acetaminophen (NORCO)  mg tablet 1 Tab  1 Tab Oral Q8H PRN    insulin lispro (HUMALOG) injection   SubCUTAneous AC&HS    potassium chloride SR (KLOR-CON 10) tablet 40 mEq  40 mEq Oral DAILY    amoxicillin-clavulanate (AUGMENTIN) 875-125 mg per tablet 1 Tab  1 Tab Oral Q12H    diphenhydrAMINE (BENADRYL) injection 50 mg  50 mg IntraVENous ONCE PRN    EPINEPHrine HCl (PF) (ADRENALIN) 1 mg/mL (1 mL) injection 0.5 mg  0.5 mg IntraMUSCular ONCE PRN    pantoprazole (PROTONIX) 40 mg in 0.9% sodium chloride 10 mL injection  40 mg IntraVENous Q12H    hydrALAZINE (APRESOLINE) 20 mg/mL injection 10 mg  10 mg IntraVENous Q6H PRN    albuterol-ipratropium (DUO-NEB) 2.5 MG-0.5 MG/3 ML 3 mL Nebulization Q4H PRN    dilTIAZem ER (CARDIZEM CD) capsule 360 mg  360 mg Oral DAILY    prochlorperazine (COMPAZINE) with saline injection 5 mg  5 mg IntraVENous Q6H PRN    insulin glargine (LANTUS) injection 30 Units  30 Units SubCUTAneous QHS    ipratropium (ATROVENT) 0.02 % nebulizer solution 0.5 mg  0.5 mg Nebulization QID RT    albuterol (PROVENTIL VENTOLIN) nebulizer solution 2.5 mg  2.5 mg Nebulization Q6H PRN    allopurinoL (ZYLOPRIM) tablet 300 mg  300 mg Oral DAILY    aspirin delayed-release tablet 81 mg  81 mg Oral DAILY    folic acid (FOLVITE) tablet 1 mg  1 mg Oral DAILY    pregabalin (LYRICA) capsule 200 mg  200 mg Oral TID    sodium chloride (NS) flush 5-40 mL  5-40 mL IntraVENous Q8H    sodium chloride (NS) flush 5-40 mL  5-40 mL IntraVENous PRN    acetaminophen (TYLENOL) tablet 650 mg  650 mg Oral Q6H PRN    Or    acetaminophen (TYLENOL) suppository 650 mg  650 mg Rectal Q6H PRN    polyethylene glycol (MIRALAX) packet 17 g  17 g Oral DAILY PRN    enoxaparin (LOVENOX) injection 40 mg  40 mg SubCUTAneous DAILY    glucose chewable tablet 16 g  4 Tab Oral PRN    glucagon (GLUCAGEN) injection 1 mg  1 mg IntraMUSCular PRN    dextrose 10% infusion 0-250 mL  0-250 mL IntraVENous PRN    nicotine (NICODERM CQ) 21 mg/24 hr patch 1 Patch  1 Patch TransDERmal Q24H     ______________________________________________________________________  EXPECTED LENGTH OF STAY: 2d 14h  ACTUAL LENGTH OF STAY:          1                 Josefa Sanders MD

## 2020-10-10 NOTE — PROGRESS NOTES
Problem: Risk for Spread of Infection  Goal: Prevent transmission of infectious organism to others  Description: Prevent the transmission of infectious organisms to other patients, staff members, and visitors. Outcome: Progressing Towards Goal     Problem: Falls - Risk of  Goal: *Absence of Falls  Description: Document Carlos Alberto Cast Fall Risk and appropriate interventions in the flowsheet.   Outcome: Progressing Towards Goal  Note: Fall Risk Interventions:  Mobility Interventions: Bed/chair exit alarm    Mentation Interventions: Adequate sleep, hydration, pain control, Door open when patient unattended    Medication Interventions: Assess postural VS orthostatic hypotension, Patient to call before getting OOB, Teach patient to arise slowly    Elimination Interventions: Bed/chair exit alarm, Call light in reach

## 2020-10-10 NOTE — DISCHARGE SUMMARY
Discharge Summary       PATIENT ID: Norma Rodriguez  MRN: [id number]   YOB: 1950    DATE OF ADMISSION: 10/6/2020  6:57 PM    DATE OF DISCHARGE: 10/10/20     PRIMARY CARE PROVIDER: Karen Yanez NP     ATTENDING PHYSICIAN: Lula Aldana MD    DISCHARGING PROVIDER: Lula Aldana MD    To contact this individual call [phone] and ask the  to page. If unavailable ask to be transferred the Adult Hospitalist Department. CONSULTATIONS: GI, hepatology cardiology. PROCEDURES/SURGERIES: * No surgery found *    ADMITTING DIAGNOSES & HOSPITAL COURSE:   Norma Rodriguez is a 72 y.o. female with PMH of Left Atrial Thrombus, Chronic Bronchitis, T2DM, Tobacco Dependence and who presents to the ED with complaints of nausea vomiting and diarrhea x 5 days. She had similar episodes in the past but this 1 is described as the worst.          DISCHARGE DIAGNOSES / PLAN:    Diffuse colitis most probably infectious, most probably bacterial a CAT scan of the abdomen pelvis showed findings consistent with mild to moderate diffuse colitis. This had happened twice already but this is the worst per patient's family. Symptoms improved with supportive therapy IV fluid antibiotics. Patient's discharged with Augmentin to complete course of antibiotics. She will follow-up with her GI, she probably needs colonoscopy. Hepatic steatosis  detected on CT abdomen pelvis. History of hepatitis?  -Elevated alkaline phosphatase otherwise ALT, AST and bilirubin within normal limit. ALT improved. He pathology consulted, suggested this may be due to the colitis, no further work-up needed.  -Acute viral hepatitis A/B/C negative    Leukocytosis with left shift likely from colitis: Resolved. Yeast in the urine, urine culture negative. She is asymptomatic. Discontinue Diflucan. T2DM with hyperglycemia: Resume home regimen.      Uncontrolled hypertension, accelerated on admission to hospital due to abdominal pain  -BP stable. Resume home regimen. Abnormal EKG, chronic. No chest pain, troponin is negative. Echocardiogram unremarkable. She was seen by cardiology, no further work-up recommended. Dehydration, elevated BUN/creatinine due to volume depletion: Resolved. Hypokalemia: replacement ordered: Corrected. She takes potassium at home. Tobacco Dependence:  nicotine patch 21 mg/day. Morbid Obesity: BMI: 38.62  RA: on methotrexate @ home. Chronic bronchitis/asthma with wheezing: Use bronchodilators  -Chest x-ray clear except hypoaeration.  -Patient looked-has some peripheral edema. Received Lasix 20 mg IV x1 prior to discharge. She is on hydrochlorothiazide which she will resume on discharge. PENDING TEST RESULTS:   At the time of discharge the following test results are still pending: None    FOLLOW UP APPOINTMENTS:    Follow-up Information     Follow up With Specialties Details Why Contact Info    Nabil Reynoso NP Nurse Practitioner, Emergency Medicine Call on 10/29/2020 telehealth at 11:30 [address] [place] [address]  [address], Miguel Arreaga MD Gastroenterology   [address]  [address] [address]  [phone]      Angela Cervantes, NP Nurse Practitioner   [address] [phone]               DIET: Regular Diet and Diabetic Diet    ACTIVITY: Activity as tolerated  -Patient refused home health physical therapy services. DISCHARGE MEDICATIONS:  Current Discharge Medication List      START taking these medications    Details   amoxicillin-clavulanate (AUGMENTIN) 875-125 mg per tablet Take 1 Tab by mouth every twelve (12) hours for 4 days. Qty: 8 Tab, Refills: 0      nicotine (NICODERM CQ) 21 mg/24 hr 1 Patch by TransDERmal route every twenty-four (24) hours for 30 days.   Qty: 30 Patch, Refills: 0         CONTINUE these medications which have CHANGED    Details tiotropium (Spiriva with HandiHaler) 18 mcg inhalation capsule Take 1 Cap by inhalation daily. Qty: 90 Cap, Refills: 0      albuterol (PROVENTIL VENTOLIN) 2.5 mg /3 mL (0.083 %) nebu 3 mL by Nebulization route every six (6) hours as needed for Wheezing. Qty: 60 Each, Refills: 0    Associated Diagnoses: Uncomplicated asthma         CONTINUE these medications which have NOT CHANGED    Details   HYDROcodone-acetaminophen (NORCO)  mg tablet Take 1 Tab by mouth every eight (8) hours as needed for Pain for up to 30 days. Max Daily Amount: 3 Tabs. Qty: 90 Tab, Refills: 0    Associated Diagnoses: Chronic pain disorder      metFORMIN (GLUCOPHAGE) 500 mg tablet Take 2 Tabs by mouth two (2) times daily (with meals). Qty: 360 Tab, Refills: 1      atenoloL (TENORMIN) 25 mg tablet TAKE ONE TABLET BY MOUTH DAILY  Qty: 90 Tab, Refills: 3    Associated Diagnoses: Essential hypertension      pregabalin (LYRICA) 200 mg capsule TAKE ONE CAPSULE BY MOUTH THREE TIMES A DAY **MAX 3 PER DAY  Qty: 90 Cap, Refills: 2    Associated Diagnoses: Polyneuropathy      dulaglutide (Trulicity) 1.5 US/2.6 mL sub-q pen 0.5 mL by SubCUTAneous route every seven (7) days. Qty: 12 Syringe, Refills: 3      diltiazem (TIAZAC) 420 mg SR capsule TAKE 1 CAPSULE EVERY DAY  Qty: 90 Cap, Refills: 3    Associated Diagnoses: Essential hypertension with goal blood pressure less than 130/80      potassium chloride (KLOR-CON) 10 mEq tablet TAKE 1 TABLET EVERY DAY  Qty: 90 Tab, Refills: 3    Associated Diagnoses: Hypokalemia      fenofibrate nanocrystallized (TRICOR) 48 mg tablet Take 1 Tab by mouth daily.   Qty: 30 Tab, Refills: 3    Associated Diagnoses: Hypertriglyceridemia      !! glucose blood VI test strips (ACCU-CHEK JENNIE PLUS TEST STRP) strip Accu-Chek Jennie Plus test strips      multivit-min-FA-lycopen-lutein (CENTRUM SILVER) 0.4-300-250 mg-mcg-mcg tab Centrum Silver tablet   Prescribed by non [facility] MD      methotrexate (RHEUMATREX) 2.5 mg tablet methotrexate sodium 2.5 mg tablet      raNITIdine (ZANTAC) 150 mg tablet ranitidine 150 mg tablet      aspirin delayed-release 81 mg tablet aspirin 81 mg tablet,delayed release   Prescribed by non [facility] MD      fluticasone propion-salmeterol (ADVAIR/WIXELA) 250-50 mcg/dose diskus inhaler Take 1 Puff by inhalation every twelve (12) hours. Qty: 3 Inhaler, Refills: 3    Associated Diagnoses: Chronic bronchitis, unspecified chronic bronchitis type (HCC)      allopurinol (ZYLOPRIM) 300 mg tablet Take 1 Tab by mouth daily. Qty: 90 Tab, Refills: 0    Associated Diagnoses: Idiopathic chronic gout, multiple sites, with tophus (tophi)      atorvastatin (LIPITOR) 20 mg tablet Take 1 Tab by mouth daily. Qty: 90 Tab, Refills: 3      losartan-hydroCHLOROthiazide (HYZAAR) 100-25 mg per tablet Take 1 Tab by mouth daily. Qty: 90 Tab, Refills: 3    Associated Diagnoses: Essential hypertension with goal blood pressure less than 130/80      ergocalciferol (ERGOCALCIFEROL) 50,000 unit capsule Take 1 Cap by mouth every seven (7) days. Qty: 12 Cap, Refills: 3    Associated Diagnoses: Vitamin D deficiency      folic acid (FOLVITE) 1 mg tablet Take 1 Tab by mouth daily. Qty: 90 Tab, Refills: 0    Associated Diagnoses: Seronegative rheumatoid arthritis of multiple sites ([facility]      Blood Glucose Control High&Low (ACCU-CHEK JENNIE CONTROL SOLN) soln Use as directed for controls as needed. DX: E11.9  Qty: 1 Bottle, Refills: 6      Blood-Glucose Meter (ACCU-CHEK JENNIE PLUS METER) misc Test blood sugar twice daily. DX: E11.9. Substitute supply brand accepted by insurance. Qty: 1 Each, Refills: 0      Lancets (ACCU-CHEK SOFTCLIX LANCETS) misc Test blood sugar twice daily. DX: E11.9. Substitute supply brand accepted by insurance. Qty: 1 Each, Refills: 11      !! glucose blood VI test strips (ACCU-CHEK JENNIE PLUS TEST STRP) strip Test blood sugar twice daily. DX: E11.9. Substitute supply brand accepted by insurance.   Qty: 100 Strip, Refills: 11 alcohol swabs (ALCOHOL WIPES) padm Test blood sugar twice daily. DX: E11.9. Substitute supply brand accepted by insurance. Qty: 100 Pad, Refills: 1      albuterol (PROVENTIL HFA, VENTOLIN HFA, PROAIR HFA) 90 mcg/actuation inhaler Take 2 Puffs by inhalation every six (6) hours as needed for Wheezing. Qty: 1 Inhaler, Refills: 1    Associated Diagnoses: Uncomplicated asthma, unspecified asthma severity      Cetirizine (ZYRTEC) 10 mg Cap Take 10 mg by mouth daily. !! - Potential duplicate medications found. Please discuss with provider. NOTIFY YOUR PHYSICIAN FOR ANY OF THE FOLLOWING:   Fever over 101 degrees for 24 hours. Chest pain, shortness of breath, fever, chills, nausea, vomiting, diarrhea, change in mentation, falling, weakness, bleeding. Severe pain or pain not relieved by medications. Or, any other signs or symptoms that you may have questions about. DISPOSITION:   x Home With:   OT  PT  HH  RN       Long term SNF/Inpatient Rehab    Independent/assisted living    Hospice    Other:       PATIENT CONDITION AT DISCHARGE:     Functional status   x Poor     Deconditioned     Independent      Cognition   x  Lucid     Forgetful     Dementia      Catheters/lines (plus indication)    Dong     PICC     PEG    x None      Code status    x Full code     DNR      PHYSICAL EXAMINATION AT DISCHARGE:  General:          Alert, cooperative, no distress, appears stated age. HEENT:           Atraumatic, anicteric sclerae, pink conjunctivae                          No oral ulcers, mucosa moist, throat clear, dentition fair  Neck:               Supple, symmetrical  Lungs:             Clear to auscultation bilaterally. Scattered wheezing. Chest wall:      No tenderness  No Accessory muscle use. Heart:              Regular  rhythm,  No  murmur   No edema  Abdomen:        Soft, non-tender. Not distended. Bowel sounds normal  Extremities:      Trace pedal edema. Skin:                Not pale. Not Jaundiced  No rashes   Psych:             Not anxious or agitated. Neurologic:      Alert, moves all extremities, answers questions appropriately and responds to commands       [address]:  Problem List as of 10/10/2020 Date Reviewed: 7/31/2020          Codes Class Noted - Resolved    Abdominal pain ICD-10-CM: R10.9  ICD-9-CM: 789.00  10/7/2020 - Present        * (Principal) Nausea and vomiting ICD-10-CM: R11.2  ICD-9-CM: 787.01  10/6/2020 - Present        Diarrhea ICD-10-CM: R19.7  ICD-9-CM: 787.91  10/6/2020 - Present        Hypertriglyceridemia ICD-10-CM: E78.1  ICD-9-CM: 272.1  12/17/2019 - Present        Idiopathic chronic gout, multiple sites, with tophus (tophi) ICD-10-CM: J4R.00S8  ICD-9-CM: 274.03  11/19/2019 - Present        CAD (coronary artery disease) ICD-10-CM: I25.10  ICD-9-CM: 414.00  5/7/2019 - Present        Lung nodule seen on imaging study ICD-10-CM: R91.1  ICD-9-CM: 793.11  5/7/2019 - Present    Overview Signed 5/7/2019  1:10 PM by Kalyani Doan NP     Repeat in 2020. Long-term use of immunosuppressant medication ICD-10-CM: Z79.899  ICD-9-CM: V58.69  11/27/2018 - Present        Seronegative rheumatoid arthritis of multiple sites [facility]) ICD-10-CM: M06.09  ICD-9-CM: 714.0  10/15/2018 - Present        MGUS (monoclonal gammopathy of unknown significance) ICD-10-CM: D47.2  ICD-9-CM: 273.1  10/15/2018 - Present        Vitamin D deficiency ICD-10-CM: E55.9  ICD-9-CM: 268.9  10/15/2018 - Present        Essential hypertension ICD-10-CM: I10  ICD-9-CM: 401.9  9/11/2018 - Present        Severe obesity (BMI 35.0-39. 9) with comorbidity [facility]) ICD-10-CM: E66.01  ICD-9-CM: 278.01  3/26/2018 - Present        Type 2 diabetes mellitus with diabetic polyneuropathy, without long-term current use of insulin (HCC) ICD-10-CM: E11.42  ICD-9-CM: 250.60, 357.2  12/21/2017 - Present        Chronic pain ICD-10-CM: G89.29  ICD-9-CM: 338.29  7/31/2017 - Present    Overview Signed 7/31/2017 11:11 AM by Tank Collins     bilateral hip arthritis, chronic low back pain             Long term (current) use of systemic steroids ICD-10-CM: Z79.52  ICD-9-CM: V58.65  7/31/2017 - Present        Former smoker ICD-10-CM: Z87.891  ICD-9-CM: V15.82  10/24/2016 - Present        Chronic bronchitis ([facility] ICD-10-CM: G90  ICD-9-CM: 491.9  9/28/2016 - Present        PMR (polymyalgia rheumatica) (HCC) ICD-10-CM: M35.3  ICD-9-CM: 186  5/18/2016 - Present        Osteoarthritis of hip ICD-10-CM: M16.9  ICD-9-CM: 715.95  Unknown - Present    Overview Signed 1/29/2016 12:41 PM by Tank Collins     right             Hypercholesterolemia ICD-10-CM: E78.00  ICD-9-CM: 272.0  Unknown - Present        RESOLVED: Obesity (BMI 30-39. 9) ICD-10-CM: E66.9  ICD-9-CM: 278.00  12/12/2017 - 9/11/2018        RESOLVED: Abnormal pulmonary function test ICD-10-CM: R94.2  ICD-9-CM: 794.2  9/28/2016 - 9/11/2018        RESOLVED: Type 2 diabetes mellitus without complication ([address] [id number].) [id number]: E11.9  ICD-9-CM: 250.00  1/29/2016 - 9/11/2018        RESOLVED: Smoker ICD-10-CM: F17.200  ICD-9-CM: 305.1  12/5/2012 - 10/24/2016        RESOLVED: Other \"heavy-for-dates\" infants ICD-10-CM: P08.1  ICD-9-CM: 766.1  Unknown - 9/11/2018        RESOLVED: Hypertension ICD-10-CM: I10  ICD-9-CM: 401.9  Unknown - 9/11/2018        RESOLVED: Type II or unspecified type diabetes mellitus without mention of complication, not stated as uncontrolled ICD-10-CM: E11.9  ICD-9-CM: 250.00  5/6/2010 - 9/11/2018              Greater than 30 minutes were spent with the patient on counseling and coordination of care    Signed:    Barney Hudson MD  10/10/2020  3:30 PM

## 2020-10-12 ENCOUNTER — PATIENT OUTREACH (OUTPATIENT)
Dept: CASE MANAGEMENT | Age: 70
End: 2020-10-12

## 2020-10-13 LAB
BACTERIA SPEC CULT: NORMAL
SERVICE CMNT-IMP: NORMAL

## 2020-10-14 LAB
O+P SPEC MICRO: NORMAL
O+P STL CONC: NORMAL
SPECIMEN SOURCE: NORMAL

## 2020-10-17 NOTE — PROGRESS NOTES
CTN has been unable to reach patient X 3 attempts. Contact information left on messages. Will attempt to contact in 5 days.

## 2020-10-21 ENCOUNTER — PATIENT OUTREACH (OUTPATIENT)
Dept: CASE MANAGEMENT | Age: 70
End: 2020-10-21

## 2020-10-30 ENCOUNTER — OFFICE VISIT (OUTPATIENT)
Dept: FAMILY MEDICINE CLINIC | Age: 70
End: 2020-10-30
Payer: MEDICARE

## 2020-10-30 VITALS
WEIGHT: 221.8 LBS | TEMPERATURE: 98 F | BODY MASS INDEX: 40.82 KG/M2 | DIASTOLIC BLOOD PRESSURE: 72 MMHG | HEIGHT: 62 IN | HEART RATE: 65 BPM | RESPIRATION RATE: 16 BRPM | SYSTOLIC BLOOD PRESSURE: 132 MMHG | OXYGEN SATURATION: 96 %

## 2020-10-30 DIAGNOSIS — G89.4 CHRONIC PAIN DISORDER: ICD-10-CM

## 2020-10-30 DIAGNOSIS — K52.9 COLITIS: ICD-10-CM

## 2020-10-30 DIAGNOSIS — E87.6 HYPOKALEMIA: Primary | ICD-10-CM

## 2020-10-30 PROCEDURE — G8399 PT W/DXA RESULTS DOCUMENT: HCPCS | Performed by: NURSE PRACTITIONER

## 2020-10-30 PROCEDURE — G8417 CALC BMI ABV UP PARAM F/U: HCPCS | Performed by: NURSE PRACTITIONER

## 2020-10-30 PROCEDURE — 99214 OFFICE O/P EST MOD 30 MIN: CPT | Performed by: NURSE PRACTITIONER

## 2020-10-30 PROCEDURE — 3017F COLORECTAL CA SCREEN DOC REV: CPT | Performed by: NURSE PRACTITIONER

## 2020-10-30 PROCEDURE — G8432 DEP SCR NOT DOC, RNG: HCPCS | Performed by: NURSE PRACTITIONER

## 2020-10-30 PROCEDURE — 1101F PT FALLS ASSESS-DOCD LE1/YR: CPT | Performed by: NURSE PRACTITIONER

## 2020-10-30 PROCEDURE — G8427 DOCREV CUR MEDS BY ELIG CLIN: HCPCS | Performed by: NURSE PRACTITIONER

## 2020-10-30 PROCEDURE — G8752 SYS BP LESS 140: HCPCS | Performed by: NURSE PRACTITIONER

## 2020-10-30 PROCEDURE — 1090F PRES/ABSN URINE INCON ASSESS: CPT | Performed by: NURSE PRACTITIONER

## 2020-10-30 PROCEDURE — G8754 DIAS BP LESS 90: HCPCS | Performed by: NURSE PRACTITIONER

## 2020-10-30 PROCEDURE — G8536 NO DOC ELDER MAL SCRN: HCPCS | Performed by: NURSE PRACTITIONER

## 2020-10-30 RX ORDER — HYDROCODONE BITARTRATE AND ACETAMINOPHEN 10; 325 MG/1; MG/1
1 TABLET ORAL
Qty: 90 TAB | Refills: 0 | Status: SHIPPED | OUTPATIENT
Start: 2020-10-30 | End: 2020-10-30 | Stop reason: SDUPTHER

## 2020-10-30 RX ORDER — HYDROCODONE BITARTRATE AND ACETAMINOPHEN 10; 325 MG/1; MG/1
1 TABLET ORAL
Qty: 90 TAB | Refills: 0 | Status: SHIPPED | OUTPATIENT
Start: 2020-11-30 | End: 2020-10-30 | Stop reason: SDUPTHER

## 2020-10-30 RX ORDER — HYDROCODONE BITARTRATE AND ACETAMINOPHEN 10; 325 MG/1; MG/1
1 TABLET ORAL
Qty: 90 TAB | Refills: 0 | Status: SHIPPED | OUTPATIENT
Start: 2020-12-30 | End: 2021-01-29 | Stop reason: SDUPTHER

## 2020-10-30 NOTE — PROGRESS NOTES
Chief Complaint   Patient presents with   [facility] Follow Up     [facility] 10/6/20- 10/10/20. patient had a few episodes after being discharged of confussion and being slow     1. Have you been to the ER, urgent care clinic since your last visit? Hospitalized since your last visit? 2. Have you seen or consulted any other health care providers outside of the [address] [place] [address] since your last visit? Include any pap smears or colon screening.        Colonoscopy scheduled for 2/21/21 - if possible would like to look at getting this done sooner

## 2020-10-30 NOTE — PROGRESS NOTES
Assessment/Plan:     Diagnoses and all orders for this visit:    1. Hypokalemia  -     METABOLIC PANEL, COMPREHENSIVE  Back to a regular diet. 2. Colitis  -     CBC WITH AUTOMATED DIFF  We have contacted dr. Ramón Floyd office to move up her scope dates. 3. Chronic pain disorder  -     HYDROcodone-acetaminophen (NORCO)  mg tablet; Take 1 Tab by mouth every eight (8) hours as needed for Pain for up to 30 days. Max Daily Amount: 3 Tabs. Stable on current therapy however she is noncompliant with treatments from Rheumatology. Will follow up on this next evaluation. Refilled for three months today.  up to date. Follow-up and Dispositions    · Return in about 3 months (around 1/30/2021) for Follow Up. Discussed expected course/resolution/complications of diagnosis in detail with patient. Medication risks/benefits/costs/interactions/alternatives discussed with patient. Pt was given after visit summary which includes diagnoses, current medications & vitals. Pt expressed understanding with the diagnosis and plan          Subjective:      Sy Jiang is a 72 y.o. female who presents for had concerns including Hospital Follow Up ([facility] 10/6/20- 10/10/20. patient had a few episodes after being discharged of confussion and being slow. possible vascular dementia ). She is accompanied by her daughter today, Priti Malik. Hospital Follow Up  Sy Jiang is seen for follow up from recent admission to [address] [place] [address] on 10/6/2020. We reviewed the lab results, imaging, the notes, the records. She presented with nausea and vomiting. She took her Augmentin as directed & without any side effects. She reports symptoms are significantly improved. Medication Reconciliation reviewed today. She is scheduled for Colonoscopy with Dr. Liliana Luz in February.      Chronic Pain  Sy Jiang RTC today to follow up on chronic pain diagnosis.  We discussed her osteoarthritis that is affecting her bilateral hips, left shoulder and lumbar back.  We discussed her seronegative rheumatoid arthritis affecting her bilateral hands.  Significant changes since last visit: none.  She is  able to do her normal daily activities.  She reports the following adverse side effects: none. Previous treatments include heat, cold, Lidocaine patches otc, Tramadol, methotrexate, cortisone injection which all provided minimal relief.  Not a candidate for oral NSAIDS. She is followed by rheumatology. She admits towards noncompliance with her treatment plan. Patient Active Problem List   Diagnosis Code    Hypercholesterolemia E78.00    Osteoarthritis of hip M16.9    PMR (polymyalgia rheumatica) ([facility]) M35.3    Chronic bronchitis ([facility] J42    Former smoker Z87.891    Chronic pain G89.29    Long term (current) use of systemic steroids Z79.52    Type 2 diabetes mellitus with diabetic polyneuropathy, without long-term current use of insulin ([facility]) E11.42    Severe obesity (BMI 35.0-39. 9) with comorbidity ([facility] E66.01    Essential hypertension I10    Seronegative rheumatoid arthritis of multiple sites ([facility] M06.09    MGUS (monoclonal gammopathy of unknown significance) D47.2    Vitamin D deficiency E55.9    Long-term use of immunosuppressant medication Z79.899    CAD (coronary artery disease) I25.10    Lung nodule seen on imaging study R91.1    Idiopathic chronic gout, multiple sites, with tophus (tophi) M1A.09X1    Hypertriglyceridemia E78.1    Nausea and vomiting R11.2    Diarrhea R19.7    Abdominal pain R10.9       Current Outpatient Medications   Medication Sig Dispense Refill    [START ON 12/30/2020] HYDROcodone-acetaminophen (NORCO)  mg tablet Take 1 Tab by mouth every eight (8) hours as needed for Pain for up to 30 days. Max Daily Amount: 3 Tabs. 90 Tab 0    tiotropium (Spiriva with HandiHaler) 18 mcg inhalation capsule Take 1 Cap by inhalation daily.  90 Cap 0    albuterol (PROVENTIL VENTOLIN) 2.5 mg /3 mL (0.083 %) nebu 3 mL by Nebulization route every six (6) hours as needed for Wheezing. 60 Each 0    nicotine (NICODERM CQ) 21 mg/24 hr 1 Patch by TransDERmal route every twenty-four (24) hours for 30 days. 30 Patch 0    albuterol (PROVENTIL HFA, VENTOLIN HFA, PROAIR HFA) 90 mcg/actuation inhaler Take 2 Puffs by inhalation every six (6) hours as needed for Wheezing or Shortness of Breath. 1 Inhaler 1    metFORMIN (GLUCOPHAGE) 500 mg tablet Take 2 Tabs by mouth two (2) times daily (with meals). 360 Tab 1    atenoloL (TENORMIN) 25 mg tablet TAKE ONE TABLET BY MOUTH DAILY 90 Tab 3    pregabalin (LYRICA) 200 mg capsule TAKE ONE CAPSULE BY MOUTH THREE TIMES A DAY **MAX 3 PER DAY 90 Cap 2    dulaglutide (Trulicity) 1.5 PX/0.8 mL sub-q pen 0.5 mL by SubCUTAneous route every seven (7) days. 12 Syringe 3    diltiazem (TIAZAC) 420 mg SR capsule TAKE 1 CAPSULE EVERY DAY 90 Cap 3    potassium chloride (KLOR-CON) 10 mEq tablet TAKE 1 TABLET EVERY DAY 90 Tab 3    fenofibrate nanocrystallized (TRICOR) 48 mg tablet Take 1 Tab by mouth daily. 30 Tab 3    glucose blood VI test strips (ACCU-CHEK JENNIE PLUS TEST STRP) strip Accu-Chek Jennie Plus test strips      multivit-min-FA-lycopen-lutein (CENTRUM SILVER) 0.4-300-250 mg-mcg-mcg tab Centrum Silver tablet   Prescribed by non [facility] MD      raNITIdine (ZANTAC) 150 mg tablet ranitidine 150 mg tablet      aspirin delayed-release 81 mg tablet aspirin 81 mg tablet,delayed release   Prescribed by non [facility] MD      fluticasone propion-salmeterol (ADVAIR/WIXELA) 250-50 mcg/dose diskus inhaler Take 1 Puff by inhalation every twelve (12) hours. 3 Inhaler 3    allopurinol (ZYLOPRIM) 300 mg tablet Take 1 Tab by mouth daily. 90 Tab 0    atorvastatin (LIPITOR) 20 mg tablet Take 1 Tab by mouth daily. 90 Tab 3    losartan-hydroCHLOROthiazide (HYZAAR) 100-25 mg per tablet Take 1 Tab by mouth daily.  90 Tab 3    ergocalciferol (ERGOCALCIFEROL) 50,000 unit capsule Take 1 Cap by mouth every seven (7) days. 12 Cap 3    folic acid (FOLVITE) 1 mg tablet Take 1 Tab by mouth daily. 90 Tab 0    Blood Glucose Control High&Low (ACCU-CHEK JENNIE CONTROL SOLN) soln Use as directed for controls as needed. DX: E11.9 1 Bottle 6    Blood-Glucose Meter (ACCU-CHEK JENNIE PLUS METER) misc Test blood sugar twice daily. DX: E11.9. Substitute supply brand accepted by insurance. 1 Each 0    Lancets (ACCU-CHEK SOFTCLIX LANCETS) misc Test blood sugar twice daily. DX: E11.9. Substitute supply brand accepted by insurance. 1 Each 11    glucose blood VI test strips (ACCU-CHEK JENNIE PLUS TEST STRP) strip Test blood sugar twice daily. DX: E11.9. Substitute supply brand accepted by insurance. 100 Strip 11    alcohol swabs (ALCOHOL WIPES) padm Test blood sugar twice daily. DX: E11.9. Substitute supply brand accepted by insurance. 100 Pad 1    Cetirizine (ZYRTEC) 10 mg Cap Take 10 mg by mouth daily.  methotrexate (RHEUMATREX) 2.5 mg tablet methotrexate sodium 2.5 mg tablet         Allergies   Allergen Reactions    Augmentin [Amoxicillin-Pot Clavulanate] Diarrhea    Bactrim [Sulfamethoxazole-Trimethoprim] Nausea Only    Biaxin [Clarithromycin] Nausea Only    Demerol [Meperidine] Itching    Erythromycin Nausea Only    Gabapentin Nausea and Vomiting    Pcn [Penicillins] Hives    Percocet [Oxycodone-Acetaminophen] Itching    Pravastatin Nausea Only    Tetracycline Nausea Only    Voltaren [Diclofenac Sodium] Hives       ROS:   Review of Systems   Constitutional: Negative for fever and malaise/fatigue. Eyes: Negative for blurred vision. Respiratory: Negative for shortness of breath. Cardiovascular: Negative for chest pain. Gastrointestinal: Negative for abdominal pain, constipation, diarrhea, nausea and vomiting. Musculoskeletal: Positive for joint pain. Psychiatric/Behavioral: Negative for memory loss.        Objective:     Visit Vitals  /72 (BP 1 Location: Left arm, BP Patient Position: Sitting)   Pulse 65   Temp 98 °F (36.7 °C) (Oral)   Resp 16   Ht 5' 2\" (1.575 m)   Wt 221 lb 12.8 oz (100.6 kg)   SpO2 96%   BMI 40.57 kg/m²       Vitals and Nurse Documentation reviewed. Physical Exam  Constitutional:       General: She is not in acute distress. Cardiovascular:      Heart sounds: S1 normal and S2 normal. No murmur. No friction rub. No gallop. Pulmonary:      Effort: No respiratory distress. Breath sounds: Wheezing present. Skin:     General: Skin is warm and dry.    Psychiatric:         Mood and Affect: Mood and affect normal.

## 2020-11-02 ENCOUNTER — APPOINTMENT (OUTPATIENT)
Dept: FAMILY MEDICINE CLINIC | Age: 70
End: 2020-11-02

## 2020-11-06 ENCOUNTER — TELEPHONE (OUTPATIENT)
Dept: FAMILY MEDICINE CLINIC | Age: 70
End: 2020-11-06

## 2020-11-06 LAB
BASOPHILS # BLD: 0.1 K/UL (ref 0–0.1)
BASOPHILS NFR BLD: 1 % (ref 0–1)
DIFFERENTIAL METHOD BLD: ABNORMAL
EOSINOPHIL # BLD: 0.2 K/UL (ref 0–0.4)
EOSINOPHIL NFR BLD: 3 % (ref 0–7)
ERYTHROCYTE [DISTWIDTH] IN BLOOD BY AUTOMATED COUNT: 15.3 % (ref 11.5–14.5)
HCT VFR BLD AUTO: 41.2 % (ref 35–47)
HGB BLD-MCNC: 12.9 G/DL (ref 11.5–16)
IMM GRANULOCYTES # BLD AUTO: 0 K/UL (ref 0–0.04)
IMM GRANULOCYTES NFR BLD AUTO: 1 % (ref 0–0.5)
LYMPHOCYTES # BLD: 1.2 K/UL (ref 0.8–3.5)
LYMPHOCYTES NFR BLD: 17 % (ref 12–49)
MCH RBC QN AUTO: 28.8 PG (ref 26–34)
MCHC RBC AUTO-ENTMCNC: 31.3 G/DL (ref 30–36.5)
MCV RBC AUTO: 92 FL (ref 80–99)
MONOCYTES # BLD: 0.7 K/UL (ref 0–1)
MONOCYTES NFR BLD: 9 % (ref 5–13)
NEUTS SEG # BLD: 5 K/UL (ref 1.8–8)
NEUTS SEG NFR BLD: 70 % (ref 32–75)
NRBC # BLD: 0 K/UL (ref 0–0.01)
NRBC BLD-RTO: 0 PER 100 WBC
PLATELET # BLD AUTO: 223 K/UL (ref 150–400)
RBC # BLD AUTO: 4.48 M/UL (ref 3.8–5.2)
WBC # BLD AUTO: 7.1 K/UL (ref 3.6–11)

## 2020-11-06 NOTE — TELEPHONE ENCOUNTER
Pharmacy Note:  Medication Reconciliation    S/O:  Placed an outgoing telephone call to patient to conduct a post-discharge medication reconciliation (2 identifiers used) and to discuss medication costs. Medication information provided by: patient     During interview the following was noted:  · Hasn't Trulicity in about 2 months due to cost  · Stopped taking methotrexate a while ago due to nausea. [facility] rheumatologist switched her to something else but adverse effects were worse and she never restarted the methotrexate  · Not taking fenofibrate-didn't even know what it was  · Stopped taking allopurinol 4-5 months ago. [facility] rheumatologist stated she could stop since no problems with gout  · IS still using nicotine patch. States she didn't smoke any for a couple weeks after hospitalization but now smokes 2-3/day-she just takes off the patch. · Reports still using Spiriva but it is expensive  · Reports advair is not expensive for her and is using    Medications added:   Probiotic      Medications removed:  Metothrexate  Ranitidine    Medications changed:         Patient denies adverse effects from her medications    A/P: Medication Reconciliation:  Medication list updated and discussed following with patient:    Will send patient assistance forms for Trulicity and Spiriva for patient to fill out and send back to [facility] Smoking while using nicotine patch is not recommended even if you remove the patch since it is still present in your skin and it will cause ADRs. Encouraged patient to try and stay smoke-free but congratulated her on cutting back. · Recommended that she contact her rheumatologist since she didn't restart the methotrexate and verify she should still be off the allopurinol.      Unsure of medication adherence with her inhalers as dispense history in chart shows advair last dispensed 7/11/20 and no dispenses for spiriva however she could have received samples or using inhalers from hospitalizations.     Johnathon Norton PharmD, BCACP     CLINICAL PHARMACY CONSULT: MED RECONCILIATION/REVIEW ADDENDUM    For Pharmacy Admin Tracking Only    PHSO: PHSO Patient?: Yes  Total # of Interventions Recommended: Count: 4  - Discontinued Medication #: 2 Discontinue Reason(s): No Longer Used  - Updated Order #: 1 Updated Order Reason(s): OTC  Total Interventions Accepted: 4  Time Spent (min): 15    Margarette Witt PHARMD, BCACP

## 2020-11-09 ENCOUNTER — DOCUMENTATION ONLY (OUTPATIENT)
Dept: FAMILY MEDICINE CLINIC | Age: 70
End: 2020-11-09

## 2020-11-09 NOTE — PROGRESS NOTES
Patient assistance forms for both Trulicity and Sprivia were mailed to the patient. Patient instructed to fill out and return along with any required supporting documentation and to call the office with any questions.     CLINICAL PHARMACY CONSULT: MED RECONCILIATION/REVIEW ADDENDUM    For Pharmacy Admin Tracking Only    PHSO: PHSO Patient?: Yes  Total # of Interventions Recommended: Count: 1  Total Interventions Accepted: 1  Time Spent (min): 5    Amara Khalil PHARMD, BCACP

## 2020-11-11 ENCOUNTER — APPOINTMENT (OUTPATIENT)
Dept: FAMILY MEDICINE CLINIC | Age: 70
End: 2020-11-11

## 2020-11-11 LAB
ALBUMIN SERPL-MCNC: 3.5 G/DL (ref 3.5–5)
ALBUMIN/GLOB SERPL: 1 {RATIO} (ref 1.1–2.2)
ALP SERPL-CCNC: 1011 U/L (ref 45–117)
ALT SERPL-CCNC: 91 U/L (ref 12–78)
ANION GAP SERPL CALC-SCNC: 8 MMOL/L (ref 5–15)
AST SERPL-CCNC: 64 U/L (ref 15–37)
BASOPHILS # BLD: 0.1 K/UL (ref 0–0.1)
BASOPHILS NFR BLD: 1 % (ref 0–1)
BILIRUB SERPL-MCNC: 0.4 MG/DL (ref 0.2–1)
BUN SERPL-MCNC: 32 MG/DL (ref 6–20)
BUN/CREAT SERPL: 24 (ref 12–20)
CALCIUM SERPL-MCNC: 10.6 MG/DL (ref 8.5–10.1)
CHLORIDE SERPL-SCNC: 100 MMOL/L (ref 97–108)
CO2 SERPL-SCNC: 28 MMOL/L (ref 21–32)
CREAT SERPL-MCNC: 1.31 MG/DL (ref 0.55–1.02)
DIFFERENTIAL METHOD BLD: ABNORMAL
EOSINOPHIL # BLD: 0.2 K/UL (ref 0–0.4)
EOSINOPHIL NFR BLD: 3 % (ref 0–7)
ERYTHROCYTE [DISTWIDTH] IN BLOOD BY AUTOMATED COUNT: 16.2 % (ref 11.5–14.5)
GLOBULIN SER CALC-MCNC: 3.6 G/DL (ref 2–4)
GLUCOSE SERPL-MCNC: 330 MG/DL (ref 65–100)
HCT VFR BLD AUTO: 40 % (ref 35–47)
HGB BLD-MCNC: 12.6 G/DL (ref 11.5–16)
IMM GRANULOCYTES # BLD AUTO: 0 K/UL (ref 0–0.04)
IMM GRANULOCYTES NFR BLD AUTO: 1 % (ref 0–0.5)
LYMPHOCYTES # BLD: 1.3 K/UL (ref 0.8–3.5)
LYMPHOCYTES NFR BLD: 16 % (ref 12–49)
MCH RBC QN AUTO: 29.2 PG (ref 26–34)
MCHC RBC AUTO-ENTMCNC: 31.5 G/DL (ref 30–36.5)
MCV RBC AUTO: 92.8 FL (ref 80–99)
MONOCYTES # BLD: 0.8 K/UL (ref 0–1)
MONOCYTES NFR BLD: 10 % (ref 5–13)
NEUTS SEG # BLD: 5.8 K/UL (ref 1.8–8)
NEUTS SEG NFR BLD: 71 % (ref 32–75)
NRBC # BLD: 0 K/UL (ref 0–0.01)
NRBC BLD-RTO: 0 PER 100 WBC
PLATELET # BLD AUTO: 205 K/UL (ref 150–400)
POTASSIUM SERPL-SCNC: 4.1 MMOL/L (ref 3.5–5.1)
PROT SERPL-MCNC: 7.1 G/DL (ref 6.4–8.2)
RBC # BLD AUTO: 4.31 M/UL (ref 3.8–5.2)
SODIUM SERPL-SCNC: 136 MMOL/L (ref 136–145)
WBC # BLD AUTO: 8.2 K/UL (ref 3.6–11)

## 2020-11-12 DIAGNOSIS — R79.89 ELEVATED LFTS: ICD-10-CM

## 2020-11-12 DIAGNOSIS — R74.8 ELEVATED ALKALINE PHOSPHATASE LEVEL: Primary | ICD-10-CM

## 2020-11-12 NOTE — PROGRESS NOTES
Please add alkaline phosphatase isoenzymes to her labs. Need to send for an ultrasound of her liver as well. Let her or the daughter know her liver tests are elevated and her kidney function is worse. I need any recent blood sugars that she has? I am going to start her on glipizide once a day to get her blood sugars down as they are in the 300s since she cannot afford the trulicity. I will send the glipizide to her pharmacy. If she is drinking any alcohol, needs to stop.

## 2020-11-16 RX ORDER — GLIPIZIDE 5 MG/1
5 TABLET, FILM COATED, EXTENDED RELEASE ORAL DAILY
Qty: 30 TAB | Refills: 3 | Status: SHIPPED | OUTPATIENT
Start: 2020-11-16 | End: 2021-02-01

## 2020-11-16 NOTE — PROGRESS NOTES
Name and  verified with Deanna the daughter on [address] [place] [address]. Gave her information for lab results and recommendations. She will be scheduling the Ultrasound today and I will be mailing the lab results to her via mail as well.  Patient is willing to start glipizide and would like it sent to the [facility] on file

## 2020-11-18 ENCOUNTER — HOSPITAL ENCOUNTER (OUTPATIENT)
Dept: ULTRASOUND IMAGING | Age: 70
Discharge: HOME | End: 2020-11-18
Attending: NURSE PRACTITIONER
Payer: MEDICARE

## 2020-11-18 DIAGNOSIS — R74.8 ELEVATED ALKALINE PHOSPHATASE LEVEL: ICD-10-CM

## 2020-11-18 DIAGNOSIS — R79.89 ELEVATED LFTS: ICD-10-CM

## 2020-11-18 PROCEDURE — 76705 ECHO EXAM OF ABDOMEN: CPT

## 2020-11-19 ENCOUNTER — TELEPHONE (OUTPATIENT)
Dept: FAMILY MEDICINE CLINIC | Age: 70
End: 2020-11-19

## 2020-11-19 NOTE — TELEPHONE ENCOUNTER
Deanna Carreon Humberto (daughter) states patient is have nausea, vomiting and diarrhea and was in the hospital for it    Patient daughter will like a call back. Best call back # [phone].

## 2020-11-30 DIAGNOSIS — I10 ESSENTIAL HYPERTENSION WITH GOAL BLOOD PRESSURE LESS THAN 130/80: ICD-10-CM

## 2020-12-01 RX ORDER — LOSARTAN POTASSIUM AND HYDROCHLOROTHIAZIDE 25; 100 MG/1; MG/1
TABLET ORAL
Qty: 90 TAB | Refills: 3 | Status: SHIPPED | OUTPATIENT
Start: 2020-12-01 | End: 2021-12-22

## 2020-12-07 RX ORDER — ATORVASTATIN CALCIUM 20 MG/1
TABLET, FILM COATED ORAL
Qty: 90 TAB | Refills: 3 | Status: SHIPPED | OUTPATIENT
Start: 2020-12-07 | End: 2021-12-30

## 2020-12-21 ENCOUNTER — LAB ONLY (OUTPATIENT)
Dept: FAMILY MEDICINE CLINIC | Age: 70
End: 2020-12-21

## 2020-12-21 DIAGNOSIS — R74.8 ELEVATED ALKALINE PHOSPHATASE LEVEL: ICD-10-CM

## 2020-12-21 DIAGNOSIS — R79.89 ELEVATED LFTS: ICD-10-CM

## 2020-12-25 LAB
ALP BONE CFR SERPL: 15 % (ref 14–68)
ALP INTEST CFR SERPL: 0 % (ref 0–18)
ALP LIVER CFR SERPL: 85 % (ref 18–85)
ALP SERPL-CCNC: 855 IU/L (ref 39–117)

## 2020-12-26 ENCOUNTER — HOSPITAL ENCOUNTER (OUTPATIENT)
Dept: PREADMISSION TESTING | Age: 70
Discharge: HOME | End: 2020-12-26
Payer: MEDICARE

## 2020-12-26 PROCEDURE — 87635 SARS-COV-2 COVID-19 AMP PRB: CPT

## 2020-12-27 LAB
HEALTH STATUS, XMCV2T: NORMAL
SARS-COV-2, COV2NT: NOT DETECTED
SOURCE, COVRS: NORMAL
SPECIMEN SOURCE, FCOV2M: NORMAL
SPECIMEN TYPE, XMCV1T: NORMAL

## 2020-12-28 NOTE — PROGRESS NOTES
Her alkaline phosphotase follow up labs confirms it is coming from the liver, which is not an urgent matter. I would like her to follow up with Dr. Gera Dorsey, hepatology.

## 2020-12-28 NOTE — PROGRESS NOTES
Call placed to patient, name and  verified.  Reviewed recent test results and recommendations per provider pt verbalized understanding

## 2020-12-30 ENCOUNTER — ANESTHESIA EVENT (OUTPATIENT)
Dept: ENDOSCOPY | Age: 70
End: 2020-12-30
Payer: MEDICARE

## 2020-12-30 ENCOUNTER — HOSPITAL ENCOUNTER (OUTPATIENT)
Age: 70
Setting detail: OUTPATIENT SURGERY
Discharge: HOME | End: 2020-12-30
Attending: INTERNAL MEDICINE | Admitting: INTERNAL MEDICINE
Payer: MEDICARE

## 2020-12-30 ENCOUNTER — ANESTHESIA (OUTPATIENT)
Dept: ENDOSCOPY | Age: 70
End: 2020-12-30
Payer: MEDICARE

## 2020-12-30 VITALS
TEMPERATURE: 98.3 F | HEART RATE: 60 BPM | DIASTOLIC BLOOD PRESSURE: 99 MMHG | SYSTOLIC BLOOD PRESSURE: 155 MMHG | HEIGHT: 64 IN | RESPIRATION RATE: 13 BRPM | BODY MASS INDEX: 38.07 KG/M2 | OXYGEN SATURATION: 93 % | WEIGHT: 223 LBS

## 2020-12-30 PROCEDURE — 2709999900 HC NON-CHARGEABLE SUPPLY: Performed by: INTERNAL MEDICINE

## 2020-12-30 PROCEDURE — 88312 SPECIAL STAINS GROUP 1: CPT

## 2020-12-30 PROCEDURE — 74011250636 HC RX REV CODE- 250/636: Performed by: INTERNAL MEDICINE

## 2020-12-30 PROCEDURE — 76060000031 HC ANESTHESIA FIRST 0.5 HR: Performed by: INTERNAL MEDICINE

## 2020-12-30 PROCEDURE — 76040000019: Performed by: INTERNAL MEDICINE

## 2020-12-30 PROCEDURE — 74011250636 HC RX REV CODE- 250/636: Performed by: NURSE ANESTHETIST, CERTIFIED REGISTERED

## 2020-12-30 PROCEDURE — 77030039825 HC MSK NSL PAP SUPERNO2VA VYRM -B: Performed by: NURSE ANESTHETIST, CERTIFIED REGISTERED

## 2020-12-30 PROCEDURE — 74011000250 HC RX REV CODE- 250: Performed by: NURSE ANESTHETIST, CERTIFIED REGISTERED

## 2020-12-30 PROCEDURE — 77030019988 HC FCPS ENDOSC DISP BSC -B: Performed by: INTERNAL MEDICINE

## 2020-12-30 PROCEDURE — 74011000250 HC RX REV CODE- 250: Performed by: ANESTHESIOLOGY

## 2020-12-30 PROCEDURE — 88305 TISSUE EXAM BY PATHOLOGIST: CPT

## 2020-12-30 RX ORDER — SODIUM CHLORIDE 0.9 % (FLUSH) 0.9 %
5-40 SYRINGE (ML) INJECTION EVERY 8 HOURS
Status: DISCONTINUED | OUTPATIENT
Start: 2020-12-30 | End: 2020-12-30 | Stop reason: HOSPADM

## 2020-12-30 RX ORDER — ATROPINE SULFATE 0.1 MG/ML
0.5 INJECTION INTRAVENOUS
Status: DISCONTINUED | OUTPATIENT
Start: 2020-12-30 | End: 2020-12-30 | Stop reason: HOSPADM

## 2020-12-30 RX ORDER — PROPOFOL 10 MG/ML
INJECTION, EMULSION INTRAVENOUS AS NEEDED
Status: DISCONTINUED | OUTPATIENT
Start: 2020-12-30 | End: 2020-12-30 | Stop reason: HOSPADM

## 2020-12-30 RX ORDER — ALBUTEROL SULFATE 0.83 MG/ML
2.5 SOLUTION RESPIRATORY (INHALATION)
Status: COMPLETED | OUTPATIENT
Start: 2020-12-30 | End: 2020-12-30

## 2020-12-30 RX ORDER — DEXTROMETHORPHAN/PSEUDOEPHED 2.5-7.5/.8
1.2 DROPS ORAL
Status: DISCONTINUED | OUTPATIENT
Start: 2020-12-30 | End: 2020-12-30 | Stop reason: HOSPADM

## 2020-12-30 RX ORDER — GLYCOPYRROLATE 0.2 MG/ML
INJECTION INTRAMUSCULAR; INTRAVENOUS AS NEEDED
Status: DISCONTINUED | OUTPATIENT
Start: 2020-12-30 | End: 2020-12-30 | Stop reason: HOSPADM

## 2020-12-30 RX ORDER — SODIUM CHLORIDE 0.9 % (FLUSH) 0.9 %
5-40 SYRINGE (ML) INJECTION AS NEEDED
Status: DISCONTINUED | OUTPATIENT
Start: 2020-12-30 | End: 2020-12-30 | Stop reason: HOSPADM

## 2020-12-30 RX ORDER — FLUMAZENIL 0.1 MG/ML
0.2 INJECTION INTRAVENOUS
Status: DISCONTINUED | OUTPATIENT
Start: 2020-12-30 | End: 2020-12-30 | Stop reason: HOSPADM

## 2020-12-30 RX ORDER — SODIUM CHLORIDE 9 MG/ML
100 INJECTION, SOLUTION INTRAVENOUS CONTINUOUS
Status: DISCONTINUED | OUTPATIENT
Start: 2020-12-30 | End: 2020-12-30 | Stop reason: HOSPADM

## 2020-12-30 RX ORDER — ESOMEPRAZOLE MAGNESIUM 40 MG/1
40 CAPSULE, DELAYED RELEASE ORAL DAILY
Qty: 30 CAP | Refills: 5 | Status: SHIPPED | OUTPATIENT
Start: 2020-12-30 | End: 2021-07-06 | Stop reason: SDUPTHER

## 2020-12-30 RX ORDER — NALOXONE HYDROCHLORIDE 0.4 MG/ML
0.4 INJECTION, SOLUTION INTRAMUSCULAR; INTRAVENOUS; SUBCUTANEOUS
Status: DISCONTINUED | OUTPATIENT
Start: 2020-12-30 | End: 2020-12-30 | Stop reason: HOSPADM

## 2020-12-30 RX ADMIN — PROPOFOL 20 MG: 10 INJECTION, EMULSION INTRAVENOUS at 09:31

## 2020-12-30 RX ADMIN — PROPOFOL 20 MG: 10 INJECTION, EMULSION INTRAVENOUS at 09:43

## 2020-12-30 RX ADMIN — SODIUM CHLORIDE: 900 INJECTION, SOLUTION INTRAVENOUS at 09:18

## 2020-12-30 RX ADMIN — PROPOFOL 30 MG: 10 INJECTION, EMULSION INTRAVENOUS at 09:45

## 2020-12-30 RX ADMIN — ALBUTEROL SULFATE 2.5 MG: 2.5 SOLUTION RESPIRATORY (INHALATION) at 09:05

## 2020-12-30 RX ADMIN — PROPOFOL 20 MG: 10 INJECTION, EMULSION INTRAVENOUS at 09:47

## 2020-12-30 RX ADMIN — PROPOFOL 20 MG: 10 INJECTION, EMULSION INTRAVENOUS at 09:39

## 2020-12-30 RX ADMIN — PROPOFOL 30 MG: 10 INJECTION, EMULSION INTRAVENOUS at 09:49

## 2020-12-30 RX ADMIN — PROPOFOL 20 MG: 10 INJECTION, EMULSION INTRAVENOUS at 09:35

## 2020-12-30 RX ADMIN — PROPOFOL 30 MG: 10 INJECTION, EMULSION INTRAVENOUS at 09:33

## 2020-12-30 RX ADMIN — SODIUM CHLORIDE: 900 INJECTION, SOLUTION INTRAVENOUS at 09:03

## 2020-12-30 RX ADMIN — SODIUM CHLORIDE 100 ML/HR: 900 INJECTION, SOLUTION INTRAVENOUS at 09:02

## 2020-12-30 RX ADMIN — PROPOFOL 30 MG: 10 INJECTION, EMULSION INTRAVENOUS at 09:41

## 2020-12-30 RX ADMIN — PROPOFOL 30 MG: 10 INJECTION, EMULSION INTRAVENOUS at 09:37

## 2020-12-30 RX ADMIN — PROPOFOL 50 MG: 10 INJECTION, EMULSION INTRAVENOUS at 09:29

## 2020-12-30 RX ADMIN — GLYCOPYRROLATE 0.1 MG: 0.2 INJECTION, SOLUTION INTRAMUSCULAR; INTRAVENOUS at 09:29

## 2020-12-30 NOTE — ANESTHESIA POSTPROCEDURE EVALUATION
Procedure(s):  ESOPHAGOGASTRODUODENOSCOPY (EGD)  COLONOSCOPY  ESOPHAGOGASTRODUODENAL (EGD) BIOPSY. total IV anesthesia    Anesthesia Post Evaluation        Patient location during evaluation: PACU  Note status: Adequate. Level of consciousness: responsive to verbal stimuli and sleepy but conscious  Pain management: satisfactory to patient  Airway patency: patent  Anesthetic complications: no  Cardiovascular status: acceptable  Respiratory status: acceptable  Hydration status: acceptable  Comments: +Post-Anesthesia Evaluation and Assessment    Patient: Eran Arredondo MRN: [id number]  SSN: [id number]   YOB: 1950  Age: 72 y.o. Sex: female      Cardiovascular Function/Vital Signs    BP (!) 137/43   Pulse 62   Temp 36.8 °C (98.3 °F)   Resp 12   Ht 5' 4\" (1.626 m)   Wt 101.2 kg (223 lb)   SpO2 93%   Breastfeeding No   BMI 38.28 kg/m²     Patient is status post Procedure(s):  ESOPHAGOGASTRODUODENOSCOPY (EGD)  COLONOSCOPY  ESOPHAGOGASTRODUODENAL (EGD) BIOPSY. Nausea/Vomiting: Controlled. Postoperative hydration reviewed and adequate. Pain:  Pain Scale 1: Visual (12/30/20 1012)  Pain Intensity 1: 0 (12/30/20 1012)   Managed. Neurological Status: At baseline. Mental Status and Level of Consciousness: Arousable. Pulmonary Status:   O2 Device: Room air (12/30/20 1009)   Adequate oxygenation and airway patent. Complications related to anesthesia: None    Post-anesthesia assessment completed. No concerns. Signed By: Milli Winter MD    12/30/2020  Post anesthesia nausea and vomiting:  controlled      INITIAL Post-op Vital signs:   Vitals Value Taken Time   /42 12/30/20 1015   Temp 36.8 °C (98.3 °F) 12/30/20 1006   Pulse 62 12/30/20 1017   Resp 14 12/30/20 1017   SpO2 93 % 12/30/20 1017   Vitals shown include unvalidated device data.

## 2020-12-30 NOTE — PROCEDURES
[facility]                  Colonoscopy Operative Report    12/30/2020      Soumya Valladares  [id number]  1950    Procedure Type:   Colonoscopy --diagnostic     Indications:    Abdominal pain, generalized, recent acute colitis of the left colon     Pre-operative Diagnosis: see indication above    Post-operative Diagnosis:  See findings below    :  Mariela Hodge MD    Referring Provider: Deshawn Caballero NP      Sedation:  MAC anesthesia Propofol    Pre-Procedural Exam:      Airway: clear,  No airway problems anticipated  Heart: RRR, without gallops or rubs  Lungs: clear bilaterally without wheezes, crackles, or rhonchi  Abdomen: soft, nontender, nondistended, bowel sounds present  Mental Status: awake, alert and oriented to person, place and time     Procedure Details:  After informed consent was obtained with all risks and benefits of procedure explained and preoperative exam completed, the patient was taken to the endoscopy suite and placed in the left lateral decubitus position. Upon sequential sedation as per above, a digital rectal exam was performed . The Olympus videocolonoscope  was inserted in the rectum and carefully advanced to the cecum, which was identified by the ileocecal valve and appendiceal orifice. The cecum was identified by the ileocecal valve and appendiceal orifice. The quality of preparation was adequate. The colonoscope was slowly withdrawn with careful evaluation between folds. Retroflexion in the rectum was completed demonstrating internal hemorrhoids. Findings:   Rectum: Grade 1 internal hemorrhoid(s); Sigmoid:     - Diverticulosis, mild  Descending Colon: normal  Transverse Colon: normal  Ascending Colon: normal  Cecum: normal  Terminal Ileum: not intubated      Specimen Removed:  none    Complications: None. EBL:  None.     Impression:    normal colonic mucosa throughout  diverticulosis,  Mild in degree, involving the sigmoid  hemorrhoids internal, Moderate in size    Recommendations: --Repeat colonoscopy in 5 years. High fiber diet. Resume normal medication(s). Discharge Disposition:  Home in the company of a  when able to ambulate. Stefanie Ferguson MD    12/30/2020     ADRIANNE Huerta MD  [facility], [address] [place] [address]  [phone]  [email or web address]

## 2020-12-30 NOTE — PROCEDURES
[facility]                   Endoscopic Gastroduodenoscopy Procedure Note      12/30/2020  Augusta Carlcci  1950  [id number]    Procedure: Endoscopic Gastroduodenoscopy with biopsy    Indication: nausea and vomiting, GERD     Pre-operative Diagnosis: see indication above    Post-operative Diagnosis: see findings below    : ADRIANNE Boss MD    Referring Provider:  Cory Saavedra NP      Anesthesia/Sedation:  MAC anesthesia Propofol    Airway assessment: No airway problems anticipated    Pre-Procedural Exam:      Airway: clear, no airway problems anticipated  Heart: RRR, without gallops or rubs  Lungs: clear bilaterally without wheezes, crackles, or rhonchi  Abdomen: soft, nontender, nondistended, bowel sounds present  Mental Status: awake, alert and oriented to person, place and time       Procedure Details     After infomed consent was obtained for the procedure, with all risks and benefits of procedure explained the patient was taken to the endoscopy suite and placed in the left lateral decubitus position. Following sequential administration of sedation as per above, the endoscope was inserted into the mouth and advanced under direct vision to second portion of the duodenum. A careful inspection was made as the gastroscope was withdrawn, including a retroflexed view of the proximal stomach; findings and interventions are described below. Findings:   Esophagus: At the Ge junction there is erosive esophagitis which was biopsied  Stomach: diffuse hemorrhagic gastritis, biopsied the antrum  Duodenum: normal    Therapies:  biopsy of esophagus  biopsy of stomach antrum    Specimens: 1. Biopsies of gastric antrum  2 biopsies of the GE junction           Complications:   None; patient tolerated the procedure well. EBL:  None. Impression:      1. Erosive esophagitis  2.  Hemorrhagic gastritis    Recommendations:  -Start acid suppression with a proton pump inhibitor---esomeprazole 40 mg daily. , -Await pathology.     Adam Polanco., [id number]

## 2020-12-30 NOTE — H&P
Mayur Sweet MD  [facility], [address]  [phone]  [email or web address]    Gastroenterology Outpatient History and Physical    Patient: Maximilian Lofton    Physician: Kathy Cogan., MD    Vital Signs: Blood pressure (!) 172/60, pulse 61, resp. rate 12, height 5' 4\" (1.626 m), weight 101.2 kg (223 lb), SpO2 94 %, not currently breastfeeding. Allergies: Allergies   Allergen Reactions    Augmentin [Amoxicillin-Pot Clavulanate] Diarrhea    Bactrim [Sulfamethoxazole-Trimethoprim] Nausea Only    Biaxin [Clarithromycin] Nausea Only    Demerol [Meperidine] Itching    Erythromycin Nausea Only    Gabapentin Nausea and Vomiting    Pcn [Penicillins] Hives    Percocet [Oxycodone-Acetaminophen] Itching    Pravastatin Nausea Only    Tetracycline Nausea Only    Voltaren [Diclofenac Sodium] Hives       Chief Complaint: colitis, vomiting, Hx of polyps    History of Present Illness: Personal history of colonic polyps. Last colonoscopy was 2018 and showed no polyps. Here now due to recent hospitalization at [facility] with acute colitis. This was associated with abdominal pain and vomiting. The vomiting has continued    CT:.   COLON AND SMALL BOWEL: Circumferential colonic wall thickening is most prominent  in the descending and sigmoid colon also noted in the ascending and transverse  colon. Mild. There is no free intraperitoneal air. There is no evidence of  incarceration or obstruction. No mesenteric adenopathy.        Also has GERD, so here for EGD      History:  Past Medical History:   Diagnosis Date    Abnormal pulmonary function test 9/28/2016    AR (allergic rhinitis)     Asthma     Atrial thrombus 1994    left    Chronic bronchitis (HCC) 9/28/2016    Chronic pain     DDD (degenerative disc disease), lumbar     Diabetes ([facility]     DJD (degenerative joint disease) of hip     right    [facility]) 2000    by MRI    Former smoker 10/24/2016    Hoarseness of voice     Hypercholesterolemia     Hypertension     Lesion of vocal cord     PMR (polymyalgia rheumatica) ([facility] 5/18/2016    Smoker 12/5/2012      Past Surgical History:   Procedure Laterality Date    COLONOSCOPY N/A 5/30/2018    COLONOSCOPY performed by Dina Boateng MD at [address]; HI RISK IND  5/30/2018         ENDOSCOPY, COLON, DIAGNOSTIC  10/03    polypectomy    ENDOSCOPY, COLON, DIAGNOSTIC  2/05    Dr. Edwards Sat, COLON, DIAGNOSTIC  2010, reported    due 2015    HX APPENDECTOMY  1970's    HX BREAST REDUCTION      HX COLONOSCOPY  2015    dr Minerva Alexandre.  2 polyps removed -repeat in 2018     HX HEMORRHOIDECTOMY      HX HYSTERECTOMY      ovaries spared    HX KNEE REPLACEMENT  11/03, 10/05    left then right    HX TONSILLECTOMY  age 29    tonsils      Social History     Socioeconomic History    Marital status:      Spouse name: Not on file    Number of children: Not on file    Years of education: Not on file    Highest education level: Not on file   Tobacco Use    Smoking status: Current Every Day Smoker     Packs/day: 0.50     Years: 40.00     Pack years: 20.00     Types: Cigarettes    Smokeless tobacco: Never Used   Substance and Sexual Activity    Alcohol use: No     Alcohol/week: 0.0 standard drinks    Drug use: No    Sexual activity: Yes     Partners: Male      Family History   Problem Relation Age of Onset    Arthritis-osteo Mother     Asthma Mother     Diabetes Mother     Hypertension Mother     Stroke Mother     Arthritis-osteo Father     Cancer Father     Elevated Lipids Father     Hypertension Father     Arthritis-osteo Sister     Asthma Sister     Diabetes Sister     Elevated Lipids Sister     Hypertension Sister     Heart Attack Sister     Hypertension Daughter     Hypertension Daughter     Hypertension Daughter     Anesth Problems Neg Hx       Patient Active Problem List   Diagnosis Code    Hypercholesterolemia E78.00    Osteoarthritis of hip M16.9    PMR (polymyalgia rheumatica) ([facility]) M35.3    Chronic bronchitis ([facility] J42    Former smoker Z87.891    Chronic pain G89.29    Long term (current) use of systemic steroids Z79.52    Type 2 diabetes mellitus with diabetic polyneuropathy, without long-term current use of insulin ([facility]) E11.42    Severe obesity (BMI 35.0-39. 9) with comorbidity ([facility] E66.01    Essential hypertension I10    Seronegative rheumatoid arthritis of multiple sites ([facility] M06.09    MGUS (monoclonal gammopathy of unknown significance) D47.2    Vitamin D deficiency E55.9    Long-term use of immunosuppressant medication Z79.899    CAD (coronary artery disease) I25.10    Lung nodule seen on imaging study R91.1    Idiopathic chronic gout, multiple sites, with tophus (tophi) M1A.09X1    Hypertriglyceridemia E78.1    Nausea and vomiting R11.2    Diarrhea R19.7    Abdominal pain R10.9       Medications:   Prior to Admission medications    Medication Sig Start Date End Date Taking? Authorizing Provider   atorvastatin (LIPITOR) 20 mg tablet TAKE 1 TABLET EVERY DAY 12/7/20  Yes Deidra Cervantes NP   losartan-hydroCHLOROthiazide (HYZAAR) 100-25 mg per tablet TAKE 1 TABLET EVERY DAY 12/1/20  Yes Deidra Cervantes NP   glipiZIDE SR (GLUCOTROL XL) 5 mg CR tablet Take 1 Tab by mouth daily. 11/16/20  Yes Deidra Cervantes NP   L.acid/B.bifidum/B.animal/FOS (PROBIOTIC COMPLEX PO) Take 2 Caps by mouth daily. Yes Provider, Historical   pregabalin (LYRICA) 200 mg capsule TAKE ONE CAPSULE BY MOUTH THREE TIMES A DAY **MAX 3 PER DAY 11/5/20  Yes Holsinger, Deidra Borrow, NP   HYDROcodone-acetaminophen (NORCO)  mg tablet Take 1 Tab by mouth every eight (8) hours as needed for Pain for up to 30 days. Max Daily Amount: 3 Tabs.  12/30/20 1/29/21 Yes Deidra Cervantes NP   tiotropium (Spiriva with HandiHaler) 18 mcg inhalation capsule Take 1 Cap by inhalation daily. 10/10/20  Yes Scoboy Crenshaw MD   albuterol (PROVENTIL VENTOLIN) 2.5 mg /3 mL (0.083 %) nebu 3 mL by Nebulization route every six (6) hours as needed for Wheezing. 10/10/20  Yes Scooby Crenshaw MD   albuterol (PROVENTIL HFA, VENTOLIN HFA, PROAIR HFA) 90 mcg/actuation inhaler Take 2 Puffs by inhalation every six (6) hours as needed for Wheezing or Shortness of Breath. 10/10/20  Yes Scooby Crenshaw MD   atenoloL (TENORMIN) 25 mg tablet TAKE ONE TABLET BY MOUTH DAILY 5/6/20  Yes Belén Cervantes NP   dulaglutide (Trulicity) 1.5 BR/8.0 mL sub-q pen 0.5 mL by SubCUTAneous route every seven (7) days. 3/24/20  Yes Belén Cervantes NP   diltiazem (TIAZAC) 420 mg SR capsule TAKE 1 CAPSULE EVERY DAY 3/24/20  Yes Belén Cervantes NP   potassium chloride (KLOR-CON) 10 mEq tablet TAKE 1 TABLET EVERY DAY 1/6/20  Yes Belén Cervantes NP   fenofibrate nanocrystallized (TRICOR) 48 mg tablet Take 1 Tab by mouth daily. 12/17/19  Yes Belén eCrvantes NP   multivit-min-FA-lycopen-lutein (CENTRUM SILVER) 0.4-300-250 mg-mcg-mcg tab Centrum Silver tablet   Prescribed by non [facility] MD 12/30/08  Yes Provider, Historical   aspirin delayed-release 81 mg tablet aspirin 81 mg tablet,delayed release   Prescribed by [facility] MD 12/30/08  Yes Provider, Historical   fluticasone propion-salmeterol (ADVAIR/WIXELA) 250-50 mcg/dose diskus inhaler Take 1 Puff by inhalation every twelve (12) hours. 12/13/19  Yes Belén Cervantes NP   allopurinol (ZYLOPRIM) 300 mg tablet Take 1 Tab by mouth daily. 11/19/19  Yes Blaire Morrell MD   ergocalciferol (ERGOCALCIFEROL) 50,000 unit capsule Take 1 Cap by mouth every seven (7) days. Patient taking differently: Take  by mouth daily. 10/15/18  Yes Blaire Morrell MD   folic acid (FOLVITE) 1 mg tablet Take 1 Tab by mouth daily. 10/15/18  Yes Blaire Morrell MD   Blood Glucose Control High&Low (ACCU-CHEK JENNIE CONTROL SOLN) soln Use as directed for controls as needed.  DX: E11.9 7/16/18  Yes Mellissa Cervantes NP   Blood-Glucose Meter (ACCU-CHEK JENNIE PLUS METER) misc Test blood sugar twice daily. DX: E11.9. Substitute supply brand accepted by insurance. 7/1/18  Yes Mellissa Cervantes NP   Lancets (ACCU-CHEK SOFTCLIX LANCETS) misc Test blood sugar twice daily. DX: E11.9. Substitute supply brand accepted by insurance. 7/1/18  Yes Mellissa Cervantes NP   glucose blood VI test strips (ACCU-CHEK JENNIE PLUS TEST STRP) strip Test blood sugar twice daily. DX: E11.9. Substitute supply brand accepted by insurance. 7/1/18  Yes Mellissa Cervantes NP   alcohol swabs (ALCOHOL WIPES) padm Test blood sugar twice daily. DX: E11.9. Substitute supply brand accepted by insurance. 7/1/18  Yes Mellissa Cervantes NP   Cetirizine (ZYRTEC) 10 mg Cap Take 10 mg by mouth daily. Yes Provider, Historical   metFORMIN (GLUCOPHAGE) 500 mg tablet TAKE TWO TABLETS BY MOUTH TWICE A DAY WITH A MEAL 12/29/20   Mellissa Cervantes NP       Physical Exam:     General: well developed, well nourished   HEENT: unremarkable   Heart: regular rhythm no mumur    Lungs: clear   Abdominal:  benign   Neurological: unremarkable   Extremities: no edema     Findings/Diagnosis: Personal history of colonic polyps. Recent colitis.  Persistent vomiting  Plan of Care/Planned Procedure: Colonoscopy and EGD with monitored anesthesia care sedation    Signed:  Adilene Patterson MD 12/30/2020

## 2020-12-30 NOTE — ANESTHESIA PREPROCEDURE EVALUATION
Anesthetic History        Pertinent negatives: PONV: Pt denies probs with prev colonoscopy using propofol.        Review of Systems / Medical History  Patient summary reviewed, nursing notes reviewed and pertinent labs reviewed    Pulmonary    COPD    Sleep apnea (has CPAP, does not use it): No treatment  Smoker (20 pk yr smoker)  Asthma        Neuro/Psych   Within defined limits           Cardiovascular    Hypertension          CAD and hyperlipidemia    Exercise tolerance: >4 METS  Comments: 10-7-2020 EKG:  NSR, BROCK, LVH, septal infarct of undetermined age    10-7-2020 ECHO: 55-60% EF, aortic valve focal thickening      Distant Hx of Left Atrial thrombus, not noted on recent studies   GI/Hepatic/Renal  Within defined limits             Comments: Colitis, Reflux esophagitis Endo/Other    Diabetes    Morbid obesity and arthritis     Other Findings   Comments: Hx of vocal cord lesion    DJD, DDD    Polymyalgia rheumatica    MGUS  Vit D defic  Gout         Physical Exam    Airway  Mallampati: IV  TM Distance: 4 - 6 cm  Neck ROM: normal range of motion   Mouth opening: Normal     Cardiovascular  Regular rate and rhythm,  S1 and S2 normal,  no murmur, click, rub, or gallop             Dental  No notable dental hx       Pulmonary        Wheezes        Comments: Decreased inspiratory effort Abdominal  GI exam deferred       Other Findings            Anesthetic Plan    ASA: 3  Anesthesia type: total IV anesthesia          Induction: Intravenous  Anesthetic plan and risks discussed with: Patient      Will give albuterol neb preop    Super[employer]ova    Took beta blker last night on schedule 24-May-2018 20:24

## 2020-12-30 NOTE — ROUTINE PROCESS
Yarely Beam  1950  [id number]    Situation:  Verbal report received from: Alvarado العراقي RN  Procedure: Procedure(s):  ESOPHAGOGASTRODUODENOSCOPY (EGD)  COLONOSCOPY  ESOPHAGOGASTRODUODENAL (EGD) BIOPSY    Background:    Preoperative diagnosis: COLITIS, REFLUX ESOPHAGITIS  Postoperative diagnosis: EGD- Hemorrhagic gastritis, esophagitis  Colon- History of colitis, hemorrhoids    :  Dr. Billie Sousa  Assistant(s): Endoscopy Technician-1: Marianela Pacheco  Endoscopy RN-1: Wiliam Pizarro RN  Endoscopy RN-2: Denise Worrell    Specimens:   ID Type Source Tests Collected by Time Destination   1 : gastric antrum bx Preservative Gastric  [place]lie Rodriguez MD 12/30/2020 4288 Pathology   2 : GE Junction bx Preservative Maura Torres MD 12/30/2020 4717 Pathology     H. Pylori  no    Assessment:    Anesthesia gave intra-procedure sedation and medications, see anesthesia flow sheet yes    Intravenous fluids: NS@ KVO     Vital signs stable     Abdominal assessment: round and soft     Recommendation:  Discharge patient per MD order.     Family or Friend   Permission to share finding with family or friend yes

## 2020-12-30 NOTE — DISCHARGE INSTRUCTIONS
Yasmin Santoro MD  [facility], [address]  [facility] [address] [facility]  [phone]  [email or web address]    Gardenia Kimble  [id number]  1950    EGD DISCHARGE INSTRUCTIONS    Discomfort:  Sore throat- throat lozenges or warm salt water gargle  redness at IV site- apply warm compress to area; if redness or soreness persist- contact your physician  Gaseous discomfort- walking, belching will help relieve any discomfort  You may not operate a vehicle for 12 hours  You may not engage in an occupation involving machinery or appliances for rest of today  You may not drink alcoholic beverages for at least 12 hours  Avoid making any critical decisions for at least 24 hour  DIET  You may have anything by mouth. You may eat and drink immediately. You may resume your regular diet - however -  remember your colon is empty and a heavy meal will produce gas. Avoid these foods:  vegetables, fried / greasy foods, carbonated drinks      ACTIVITY  You may resume your normal daily activities   Spend the remainder of the day resting -  avoid any strenuous activity. CALL M.D. ANY SIGN OF   Increasing pain, nausea, vomiting  Abdominal distension (swelling)  New increased bleeding (oral or rectal)  Fever (chills)  Pain in chest area  Bloody discharge from nose or mouth  Shortness of breath    Follow-up Instructions:   Call Dr. Yasmin Santoro for any questions or problems. Telephone # [phone]  Dr. Deborah Feng office will notify you of the biopsy results available  Within 7 to 10 days. We will call you or send a letter   Stop taking Advil    ENDOSCOPY FINDINGS:   Your endoscopy showed hemorrhagic gastritis and esophagitis. The advil is causing the hemorrhagic gastritis and vomiting.       DISCHARGE SUMMARY from Nurse    The following personal items collected during your admission are returned to you:   Dental Appliance: Dental Appliances: None  Vision: Visual Aid: None  Hearing Aid:    Jewelry:    Clothing:    Other Valuables:    Valuables sent to safe:   1950    COLON DISCHARGE INSTRUCTIONS  Discomfort:  Redness at IV site- apply warm compress to area; if redness or soreness persist- contact your physician  There may be a slight amount of blood passed from the rectum  Gaseous discomfort- walking, belching will help relieve any discomfort  You may not operate a vehicle for 12 hours  You may not engage in an occupation involving machinery or appliances for rest of today  You may not drink alcoholic beverages for at least 12 hours  Avoid making any critical decisions for at least 24 hour  DIET:   High fiber diet. - however -  remember your colon is empty and a heavy meal will produce gas. Avoid these foods:  vegetables, fried / greasy foods, carbonated drinks for today      ACTIVITY:  You may resume your normal daily activities it is recommended that you spend the remainder of the day resting -  avoid any strenuous activity. CALL M.D. ANY SIGN OF:   Increasing pain, nausea, vomiting  Abdominal distension (swelling)  New increased bleeding (oral or rectal)  Fever (chills)  Pain in chest area  Bloody discharge from nose or mouth  Shortness of breath     COLONOSCOPY FINDINGS:  Your colonoscopy showed: mild diverticulosis and hemorrhoids. No polyps found. No colitis seen. Follow-up Instructions:   Call Dr. Veto Sharpe if any questions or problems. Telephone # [phone]  Dr. Elvis Lewis office will notify you of the biopsy results within 7 to 10 days. Should have a repeat colonoscopy in *** years.

## 2020-12-30 NOTE — PROGRESS NOTES
Essie KAREEM Will  1950  [id number]    Situation:  Verbal report received from: Stacy Tierney RN  Procedure: Procedure(s):  ESOPHAGOGASTRODUODENOSCOPY (EGD)  COLONOSCOPY  ESOPHAGOGASTRODUODENAL (EGD) BIOPSY    Background:    Preoperative diagnosis: COLITIS, REFLUX ESOPHAGITIS  Postoperative diagnosis: EGD- Hemorrhagic gastritis, esophagitis  Colon- History of colitis, hemorrhoids    :  Dr. Saba  Assistant(s): Endoscopy Technician-1: Stanley Noriega  Endoscopy RN-1: Soumya Bryson RN  Endoscopy RN-2: Stacy Tierney    Specimens:   ID Type Source Tests Collected by Time Destination   1 : gastric antrum bx Preservative Gastric  Anuj Saba Jr., MD 12/30/2020 0859 Pathology   2 : GE Junction bx Preservative GE Junction  Anuj Saba Jr., MD 12/30/2020 0936 Pathology     H. Pylori  no    Assessment:  Intra-procedure medications   Anesthesia gave intra-procedure sedation and medications, see anesthesia flow sheet   Intravenous fluids: NS@ KVO     Vital signs stable     Abdominal assessment: round and soft     Recommendation:  Discharge patient per MD order.  Family or Friend - dtr Carolyn  Permission to share finding with family or friend

## 2021-01-29 ENCOUNTER — OFFICE VISIT (OUTPATIENT)
Dept: FAMILY MEDICINE CLINIC | Age: 71
End: 2021-01-29
Payer: MEDICARE

## 2021-01-29 VITALS
WEIGHT: 219 LBS | SYSTOLIC BLOOD PRESSURE: 165 MMHG | TEMPERATURE: 98.2 F | OXYGEN SATURATION: 96 % | DIASTOLIC BLOOD PRESSURE: 75 MMHG | RESPIRATION RATE: 16 BRPM | HEIGHT: 64 IN | BODY MASS INDEX: 37.39 KG/M2 | HEART RATE: 87 BPM

## 2021-01-29 DIAGNOSIS — G89.4 CHRONIC PAIN DISORDER: ICD-10-CM

## 2021-01-29 DIAGNOSIS — Z12.31 OTHER SCREENING MAMMOGRAM: ICD-10-CM

## 2021-01-29 DIAGNOSIS — E11.42 TYPE 2 DIABETES MELLITUS WITH DIABETIC POLYNEUROPATHY, WITHOUT LONG-TERM CURRENT USE OF INSULIN (HCC): ICD-10-CM

## 2021-01-29 DIAGNOSIS — I25.10 CORONARY ARTERY DISEASE INVOLVING NATIVE CORONARY ARTERY OF NATIVE HEART WITHOUT ANGINA PECTORIS: ICD-10-CM

## 2021-01-29 DIAGNOSIS — Z00.00 MEDICARE ANNUAL WELLNESS VISIT, SUBSEQUENT: Primary | ICD-10-CM

## 2021-01-29 DIAGNOSIS — Z72.0 TOBACCO ABUSE: ICD-10-CM

## 2021-01-29 LAB — UR CULT HOLD, URHOLD: NORMAL

## 2021-01-29 PROCEDURE — G8399 PT W/DXA RESULTS DOCUMENT: HCPCS | Performed by: NURSE PRACTITIONER

## 2021-01-29 PROCEDURE — G8754 DIAS BP LESS 90: HCPCS | Performed by: NURSE PRACTITIONER

## 2021-01-29 PROCEDURE — G0439 PPPS, SUBSEQ VISIT: HCPCS | Performed by: NURSE PRACTITIONER

## 2021-01-29 PROCEDURE — G8417 CALC BMI ABV UP PARAM F/U: HCPCS | Performed by: NURSE PRACTITIONER

## 2021-01-29 PROCEDURE — G8427 DOCREV CUR MEDS BY ELIG CLIN: HCPCS | Performed by: NURSE PRACTITIONER

## 2021-01-29 PROCEDURE — 3046F HEMOGLOBIN A1C LEVEL >9.0%: CPT | Performed by: NURSE PRACTITIONER

## 2021-01-29 PROCEDURE — 2022F DILAT RTA XM EVC RTNOPTHY: CPT | Performed by: NURSE PRACTITIONER

## 2021-01-29 PROCEDURE — G8536 NO DOC ELDER MAL SCRN: HCPCS | Performed by: NURSE PRACTITIONER

## 2021-01-29 PROCEDURE — 1101F PT FALLS ASSESS-DOCD LE1/YR: CPT | Performed by: NURSE PRACTITIONER

## 2021-01-29 PROCEDURE — G8432 DEP SCR NOT DOC, RNG: HCPCS | Performed by: NURSE PRACTITIONER

## 2021-01-29 PROCEDURE — G9899 SCRN MAM PERF RSLTS DOC: HCPCS | Performed by: NURSE PRACTITIONER

## 2021-01-29 PROCEDURE — G8753 SYS BP > OR = 140: HCPCS | Performed by: NURSE PRACTITIONER

## 2021-01-29 PROCEDURE — 3017F COLORECTAL CA SCREEN DOC REV: CPT | Performed by: NURSE PRACTITIONER

## 2021-01-29 RX ORDER — HYDROCODONE BITARTRATE AND ACETAMINOPHEN 10; 325 MG/1; MG/1
1 TABLET ORAL
Qty: 90 TAB | Refills: 0 | Status: SHIPPED | OUTPATIENT
Start: 2021-03-28 | End: 2021-04-28 | Stop reason: SDUPTHER

## 2021-01-29 RX ORDER — HYDROCODONE BITARTRATE AND ACETAMINOPHEN 10; 325 MG/1; MG/1
1 TABLET ORAL
Qty: 90 TAB | Refills: 0 | Status: SHIPPED | OUTPATIENT
Start: 2021-01-29 | End: 2021-01-29 | Stop reason: SDUPTHER

## 2021-01-29 RX ORDER — HYDROCODONE BITARTRATE AND ACETAMINOPHEN 10; 325 MG/1; MG/1
1 TABLET ORAL
Qty: 90 TAB | Refills: 0 | Status: SHIPPED | OUTPATIENT
Start: 2021-02-28 | End: 2021-01-29 | Stop reason: SDUPTHER

## 2021-01-29 NOTE — LETTER
Mango Faulkner E:[birth year] MR #:[id number] [address] Page 1 of 5 CONTROLLED SUBSTANCE AGREEMENT I may be prescribed medications that are controlled substances as part  of my treatment plan for management of my medical condition(s). The goal of my treatment plan is to maintain and/or improve my health and wellbeing. Because controlled substances have an increased risk of abuse or harm, continual re-evaluation is needed determine if the goals of my treatment plan are being met for my safety and the safety of others. Avtar Knows  am entering into this Controlled Substance Agreement with my provider, __________________________________ at [address] . I understand that successful treatment requires mutual trust and honesty between me and my provider. I understand that there are state and federal laws and regulations which apply to the medications that my provider may prescribe that must be followed. I understand there are risks and benefits ts of taking the medicines that my provider may prescribe. I understand and agree that following this Agreement is necessary in continuing my provider-patient relationship and success of my treatment plan. As a part of my treatment plan, I agree to the following: COMMUNICATION: 
 
1. I will communicate fully with my provider about my medical condition(s), including the effect on my daily life and how well my medications are helping. I will tell my provider all of the medications that I take for any reason, including medications I receive from another health care provider, and will notify my provider about all issues, problems or concerns, including any side effects, which may be related to my medications. I understand that this information allows my provider to adjust my treatment plan to help manage my medical condition. I understand that this information will become part of my permanent medical record. 2. I will notify my provider if I have a history of alcohol/drug misuse/addiction or if I have had treatment for alcohol/drug addiction in the past, or if I have a new problem with or concern about alcohol/drug use/addiction, because this increases the likelihood of high risk behaviors and may lead to serious medical conditions. 3. Females Only: I will notify my provider if I am or become pregnant, or if I intend to become pregnant, or if I intend to breastfeed. I understand that communication of these issues with my provider is important, due to possible effects my medication could have on an unborn fetus or breastfeeding child. Initials_____ Tashi Ascencio ESO:0[birth year] MR #:[id number] [address] Page 2 of 5 MISUSE OF MEDICATIONS / DRUGS: 
 
1. I agree to take all controlled substances as prescribed, and will not misuse or abuse any controlled substances prescribed by my provider. For my safety, I will not increase the amount of medicine I take without first talking with and getting permission from my provider. 2. If I have a medical emergency, another health care provider may prescribe me medication. If I seek emergency treatment, I will notify my provider within seventy-two (72) hours. 3. I understand that my provider may discuss my use and/or possible misuse/abuse of controlled substances and alcohol, as appropriate, with any health care provider involved in my care, pharmacist or legal authority. ILLEGAL DRUGS: 
 
1. I will not use illegal drugs of any kind, including but not limited to marijuana, heroin, cocaine, or any prescription drug which is not prescribed to me.  
 
DRUG DIVERSION / PRESCRIPTION FRAUD: 
 
 1. I will not share, sell, trade, give away, or otherwise misuse my prescriptions or medications. 
 
2. I will not alter any prescriptions provided to me by my provider. 
 
SINGLE PROVIDER: 
 
1. I agree that all controlled substances that I take will be prescribed only by my provider (or his/her covering provider) under this Agreement. This agreement does not prevent me from seeking emergency medical treatment or receiving pain management related to a surgery. 
 
PROTECTING MEDICATIONS: 
 
1. I am responsible for keeping my prescriptions and medications in a safe and secure place including safeguarding them from loss or theft. I understand that lost, stolen or damaged/destroyed prescriptions or medications will not be replaced. 
 
 
 
 
Initials____ 
 
 
 
 
Name:.Essie Will  
:1950  
MR #:[id number]  
Office:[facility]  
Page 3 of 5  
PRESCRIPTION RENEWALS/REFILLS: 
 
1. I will follow my controlled substance medication schedule as prescribed by my provider. 
 
2. I understand and agree that I will make any requests for renewals or refills of my prescriptions only at the time of an office visit or during my provider’s regular office hours subject to the prescription refill requirements of the individual practice. 
 
3. I understand that my provider may not call in prescriptions for controlled substances to my pharmacy. 
 
4. I understand that my provider may adjust or discontinue these medications as deemed appropriate for my medical treatment plan. This Agreement does not guarantee the prescription of controlled medications. 
 
5. I agree that if my medications are adjusted or discontinued, I will properly dispose of any remaining medications. I understand that I will be required to dispose of any remaining controlled medications prior to being provided with any prescriptions for other controlled medications. 
 
 6. I understand that the renewal of my prescription depends on my medical condition, my consistent participation, and my adherence with my treatment plan and this Agreement. 7. I understand that if I do not keep an appointment with my provider, I may not receive a renewal or refill for my controlled substance medication. PRESCRIPTION MONITORING / DRUG TESTIN. I understand that my provider may require me to provide urine, saliva or blood for testing at any time. I understand that this testing will be used to monitor for safety and adherence with my treatment plan and this Agreement. 2. I understand that my provider may ask me to provide an observed urine specimen, which means that a nurse or other health care provider may watch me provide urine, and I agree to cooperate if I am asked to provide an observed specimen. 3. I understand that if I do not provide urine, saliva or blood samples within two (2) hours of my providers request, or other timeframe decided by my provider, my treatment plan could be changed, or my prescriptions and medications may be changed or ended. 4. I understand that urine, saliva and blood test results will be a part of my permanent medical record. Initials_____ Celestine Milian URI:[id number] MR #:[id number] [address] 17 Page 4 of 5 
 
 
1. I authorize my provider and my pharmacy to cooperate fully with any local, state, or federal law enforcement agency in the investigation of any possible misuse, sale, or other diversion of my controlled substance prescriptions or medications. RISKS: 
 
1. I understand that my level of consciousness may be affected from the use of controlled substances, and I understand that there are risks, benefits, effects and potential alternatives (including no treatment) to the medications that my provider has prescribed. 2. I understand that I may become drowsy, tired, dizzy, constipated, and sick to my stomach, or have changes in my mood or in my sleep while taking my medications. I have talked with my provider about these possible side effects, risks, benefits, and alternative treatments, and my provider has answered all of my questions. 3. I understand that I should not suddenly stop taking my medications without first speaking with my provider. I understand that if I suddenly stop taking my medications, I may experience nausea, vomiting, sweating,anxiety, sleeplessness, itching or other uncomfortable feelings. 4. I will not take my medications with alcohol of any kind, including beer, wine or liquor. I understand that drinking alcohol with my medications increases the chances of side effects, including breathing problems or even death. 5. I understand that if I have a history of alcoholism or other drug addiction I may be at increased risk of addiction to my medications. Signs of addiction might include craving, compulsive use, and continued use despite harm. Since addiction is a disease, I understand my provider may decide to change my medications and refer me to appropriate treatment services. I understand that this information would become part of my permanent medical record. Initials_____ Mango Faulkner W:634 MR #:[id number] Lory  Page 5 of 5  
 
 
6. Females only: Children born to women who regularly take controlled substances are likely to have physical problems and suffer withdrawal symptoms at birth. If I am of child-bearing age, I understand that I should use safe and effective birth control while taking any controlled substances to avoid the impact of medications on an unborn fetus or  child. I agree to notify my provider immediately if I should become pregnant so that my treatment plan can be adjusted. 7. Males only: I understand that chronic use of controlled substances has been associated with low testosterone levels in males which may affect my mood, stamina, sexual desire, and general health. I understand that my provider may order the appropriate laboratory test to determine my testosterone level,and I agree to this testing. ADHERENCE: 
 
 
UNDERSTANDING THIS AGREEMENT: 
 
 I understand that my provider may adjust or stop my prescriptions for controlled substances based on my medical condition and my treatment plan. I understand that this Agreement does not guarantee that I will be prescribed medications or controlled substances. I understand that controlled substances may be just one part 
of my treatment plan. My initial on each page and my signature below shows that I have read each page of this Agreement, I have had an opportunity to ask questions, and all of my questions have been answered to my satisfaction by my provider. By signing below, I agree to comply with this Agreement, and I understand that if I do not follow the Agreements listed above, my provider may stop 
 
_________________________________________  Date/Time 1/29/2021 11:55 AM   
             (Patient Signature) 
 
________________________________________    Date/Time 1/29/2021 11:55 AM 
 (Parent or Guardian Signature if <18 yrs) 
 
_________________________________________ Date/Time 1/29/2021 11:55 AM 
 (Provider Signature)

## 2021-01-29 NOTE — PROGRESS NOTES
Assessment/Plan   Education and counseling provided:  Are appropriate based on today's review and evaluation    Diagnoses and all orders for this visit:    1. Medicare annual wellness visit, subsequent    2. Type 2 diabetes mellitus with diabetic polyneuropathy, without long-term current use of insulin (HCC)  -     METABOLIC PANEL, COMPREHENSIVE; Future  -     HEMOGLOBIN A1C WITH EAG; Future  -     MICROALBUMIN, UR, RAND W/ MICROALB/CREAT RATIO; Future  -     LIPID PANEL; Future  -      DIABETES FOOT EXAM    3. Other screening mammogram    4. Coronary artery disease involving native coronary artery of native heart without angina pectoris  -     REFERRAL TO CARDIOLOGY    5. Chronic pain disorder  -     HYDROcodone-acetaminophen (NORCO)  mg tablet; Take 1 Tab by mouth every eight (8) hours as needed for Pain for up to 30 days. Max Daily Amount: 3 Tabs. Refilled for three months. Stable on current therapy. No changes at this time.  reviewed and appropriate. She will follow up for overdue rheumatology evaluation. 6. Tobacco abuse    Other orders  -     URINE CULTURE HOLD SAMPLE        Health Maintenance Due   Topic Date Due    COVID-19 Vaccine (1 of 2) 07/04/1966    Breast Cancer Screen Mammogram  10/06/2019    Medicare Yearly Exam  09/11/2020           SUBSEQUENT MEDICARE WELLNESS  This is the Subsequent Medicare Annual Wellness Exam, performed 12 months or more after the Initial AWV or the last Subsequent AWV    I have reviewed the patient's medical history in detail and updated the computerized patient record.      Chronic Pain  Yarely Contreras RTC today to follow up on chronic pain diagnosis.  We discussed her osteoarthritis that is affecting her bilateral hips, left shoulder and lumbar back.  We discussed her seronegative rheumatoid arthritis affecting her bilateral hands.  Significant changes since last visit: none.  She is  able to do her normal daily activities.  She reports the following adverse side effects: none. Previous treatments include heat, cold, Lidocaine patches otc, Tramadol, methotrexate, cortisone injection which all provided minimal relief.  Not a candidate for oral NSAIDS. She is followed by rheumatology.       She is overdue for follow up with rheumatology, Dr. Iris Cowden. She is not on treatment at this time as she has not been seen in over one year. She is overdue for follow up with Pulmonology, Dr. Angelika Mccormick. She cannot recall her last evaluation. She is followed by [facility] and is up to date on mammogram.      History     Past Medical History:   Diagnosis Date    Abnormal pulmonary function test 9/28/2016    AR (allergic rhinitis)     Asthma     Atrial thrombus 1994    left    Chronic bronchitis ([address] 9/28/2016    Chronic pain     DDD (degenerative disc disease), lumbar     Diabetes ([address]     DJD (degenerative joint disease) of hip     right    Empty sella ([address] 2000    by MRI    Former smoker 10/24/2016    Hoarseness of voice     Hypercholesterolemia     Hypertension     Lesion of vocal cord     PMR (polymyalgia rheumatica) ([address] 5/18/2016    Smoker 12/5/2012      Past Surgical History:   Procedure Laterality Date    COLONOSCOPY N/A 5/30/2018    COLONOSCOPY [facility] Alba Bourne [facility] ENDOSCOPY    COLONOSCOPY N/A 12/30/2020    COL[facility]on [facility]  12/30/2020         COLORECTAL SCRN; HI RISK IND  5/30/2018         ENDOSCOPY, COLON, DIAGNOSTIC  10/03    polypectomy    ENDOSCOPY, COLON, DIAGNOSTIC  2/05    Dr. Villegas Im, DIAGNOSTIC  2010, reported    due 2015    HX APPENDECTOMY  1970's    HX BREAST REDUCTION      HX COLONOSCOPY  2015    dr Nando Durham.  2 polyps removed -repeat in 2018     HX HEMORRHOIDECTOMY      HX HYSTERECTOMY      ovaries spared    HX KNEE REPLACEMENT  11/03, 10/05    left then right    HX TONSILLECTOMY  age 29    tonsils    UPPER GI ENDOSCOPY,BIOPSY  12/30/2020          Current Outpatient Medications   Medication Sig Dispense Refill    [START ON 3/28/2021] HYDROcodone-acetaminophen (NORCO)  mg tablet Take 1 Tab by mouth every eight (8) hours as needed for Pain for up to 30 days. Max Daily Amount: 3 Tabs. 90 Tab 0    esomeprazole (NEXIUM) 40 mg capsule Take 1 Cap by mouth daily. Indications: gastroesophageal reflux disease, hemorrhagic gastritis 30 Cap 5    metFORMIN (GLUCOPHAGE) 500 mg tablet TAKE TWO TABLETS BY MOUTH TWICE A DAY WITH A MEAL 360 Tab 3    atorvastatin (LIPITOR) 20 mg tablet TAKE 1 TABLET EVERY DAY 90 Tab 3    losartan-hydroCHLOROthiazide (HYZAAR) 100-25 mg per tablet TAKE 1 TABLET EVERY DAY 90 Tab 3    glipiZIDE SR (GLUCOTROL XL) 5 mg CR tablet Take 1 Tab by mouth daily. 30 Tab 3    L.acid/B.bifidum/B.animal/FOS (PROBIOTIC COMPLEX PO) Take 2 Caps by mouth daily.  pregabalin (LYRICA) 200 mg capsule TAKE ONE CAPSULE BY MOUTH THREE TIMES A DAY **MAX 3 PER DAY 90 Cap 1    tiotropium (Spiriva with HandiHaler) 18 mcg inhalation capsule Take 1 Cap by inhalation daily. 90 Cap 0    albuterol (PROVENTIL VENTOLIN) 2.5 mg /3 mL (0.083 %) nebu 3 mL by Nebulization route every six (6) hours as needed for Wheezing. 60 Each 0    albuterol (PROVENTIL HFA, VENTOLIN HFA, PROAIR HFA) 90 mcg/actuation inhaler Take 2 Puffs by inhalation every six (6) hours as needed for Wheezing or Shortness of Breath.  1 Inhaler 1    diltiazem (TIAZAC) 420 mg SR capsule TAKE 1 CAPSULE EVERY DAY 90 Cap 3    potassium chloride (KLOR-CON) 10 mEq tablet TAKE 1 TABLET EVERY DAY 90 Tab 3    multivit-min-FA-lycopen-lutein (CENTRUM SILVER) 0.4-300-250 mg-mcg-mcg tab Centrum Silver tablet   Prescribed by non [facility] MD      aspirin delayed-release 81 mg tablet aspirin 81 mg tablet,delayed release   Prescribed by non [facility] MD      fluticasone propion-salmeterol (ADVAIR/WIXELA) 250-50 mcg/dose diskus inhaler Take 1 Puff by inhalation every twelve (12) hours. 3 Inhaler 3    ergocalciferol (ERGOCALCIFEROL) 50,000 unit capsule Take 1 Cap by mouth every seven (7) days. (Patient taking differently: Take  by mouth daily.) 12 Cap 3    folic acid (FOLVITE) 1 mg tablet Take 1 Tab by mouth daily. 90 Tab 0    Blood Glucose Control High&Low (ACCU-CHEK JENNIE CONTROL SOLN) soln Use as directed for controls as needed. DX: E11.9 1 Bottle 6    Blood-Glucose Meter (ACCU-CHEK JENNIE PLUS METER) misc Test blood sugar twice daily. DX: E11.9. Substitute supply brand accepted by insurance. 1 Each 0    Lancets (ACCU-CHEK SOFTCLIX LANCETS) misc Test blood sugar twice daily. DX: E11.9. Substitute supply brand accepted by insurance. 1 Each 11    glucose blood VI test strips (ACCU-CHEK JENNIE PLUS TEST STRP) strip Test blood sugar twice daily. DX: E11.9. Substitute supply brand accepted by insurance. 100 Strip 11    alcohol swabs (ALCOHOL WIPES) padm Test blood sugar twice daily. DX: E11.9. Substitute supply brand accepted by insurance. 100 Pad 1    Cetirizine (ZYRTEC) 10 mg Cap Take 10 mg by mouth daily.  atenoloL (TENORMIN) 25 mg tablet TAKE ONE TABLET BY MOUTH DAILY 90 Tab 3    dulaglutide (Trulicity) 1.5 SW/0.5 mL sub-q pen 0.5 mL by SubCUTAneous route every seven (7) days. 12 Syringe 3    fenofibrate nanocrystallized (TRICOR) 48 mg tablet Take 1 Tab by mouth daily. 30 Tab 3    allopurinol (ZYLOPRIM) 300 mg tablet Take 1 Tab by mouth daily.  90 Tab 0     Allergies   Allergen Reactions    Augmentin [Amoxicillin-Pot Clavulanate] Diarrhea    Bactrim [Sulfamethoxazole-Trimethoprim] Nausea Only    Biaxin [Clarithromycin] Nausea Only    Demerol [Meperidine] Itching    Erythromycin Nausea Only    Gabapentin Nausea and Vomiting    Pcn [Penicillins] Hives    Percocet [Oxycodone-Acetaminophen] Itching    Pravastatin Nausea Only    Tetracycline Nausea Only    Voltaren [Diclofenac Sodium] Hives     Family History   Problem Relation Age of Onset    Arthritis-osteo Mother     Asthma Mother     Diabetes Mother     Hypertension Mother     Stroke Mother     Arthritis-osteo Father     Cancer Father     Elevated Lipids Father     Hypertension Father     Arthritis-osteo Sister     Asthma Sister     Diabetes Sister     Elevated Lipids Sister     Hypertension Sister     Heart Attack Sister     Hypertension Daughter     Hypertension Daughter     Hypertension Daughter     Anesth Problems Neg Hx      Social History     Tobacco Use    Smoking status: Current Every Day Smoker     Packs/day: 0.50     Years: 40.00     Pack years: 20.00     Types: Cigarettes    Smokeless tobacco: Never Used   Substance Use Topics    Alcohol use: No     Alcohol/week: 0.0 standard drinks     Patient Active Problem List   Diagnosis Code    Hypercholesterolemia E78.00    Osteoarthritis of hip M16.9    PMR (polymyalgia rheumatica) ([facility]) M35.3    Chronic bronchitis ([facility] J42    Former smoker Z87.891    Chronic pain G89.29    Long term (current) use of systemic steroids Z79.52    Type 2 diabetes mellitus with diabetic polyneuropathy, without long-term current use of insulin ([facility]) E11.42    Severe obesity (BMI 35.0-39. 9) with comorbidity ([facility] E66.01    Essential hypertension I10    Seronegative rheumatoid arthritis of multiple sites ([facility] M06.09    MGUS (monoclonal gammopathy of unknown significance) D47.2    Vitamin D deficiency E55.9    Long-term use of immunosuppressant medication Z79.899    CAD (coronary artery disease) I25.10    Lung nodule seen on imaging study R91.1    Idiopathic chronic gout, multiple sites, with tophus (tophi) M1A.09X1    Hypertriglyceridemia E78.1    Nausea and vomiting R11.2    Diarrhea R19.7    Abdominal pain R10.9       Depression Risk Factor Screening:     3 most recent PHQ Screens 7/31/2020   Little interest or pleasure in doing things Not at all   Feeling down, depressed, irritable, or hopeless Not at all   Total Score PHQ 2 0     Alcohol Risk Factor Screening:   Do you average more than 1 drink per night or more than 7 drinks a week:  No    On any one occasion in the past three months have you have had more than 3 drinks containing alcohol:  No    Functional Ability and Level of Safety:   Hearing Loss  Hearing is good. Activities of Daily Living  The home contains: no safety equipment. Patient does total self care    Fall Risk  Fall Risk Assessment, last 12 mths 10/30/2020   Able to walk? Yes   Fall in past 12 months? Yes   Number of falls in past 12 months 2   Fall with injury?  0       Abuse Screen  Patient is not abused    Cognitive Screening   Evaluation of Cognitive Function:  Has your family/caregiver stated any concerns about your memory: no  Normal    Patient Care Team   Patient Care Team:  Tony Cervantes NP as PCP - General (Nurse Practitioner)  Tony Cervantes NP as PCP - [facility]  Artur Caal MD as Physician (Orthopedic Surgery)  Piper Norris MD (Pulmonary Disease)

## 2021-01-29 NOTE — PROGRESS NOTES
Chief Complaint   Patient presents with    Follow-up     3 month follow up     Hypertension    Diabetes     1. Have you been to the ER, urgent care clinic since your last visit? Hospitalized since your last visit? No     2. Have you seen or consulted any other health care providers outside of the [address] [place] [address] since your last visit? Include any pap smears or colon screening.  No

## 2021-01-30 LAB
ALBUMIN SERPL-MCNC: 3.7 G/DL (ref 3.5–5)
ALBUMIN/GLOB SERPL: 0.9 {RATIO} (ref 1.1–2.2)
ALP SERPL-CCNC: 415 U/L (ref 45–117)
ALT SERPL-CCNC: 37 U/L (ref 12–78)
ANION GAP SERPL CALC-SCNC: 6 MMOL/L (ref 5–15)
AST SERPL-CCNC: 17 U/L (ref 15–37)
BILIRUB SERPL-MCNC: 0.3 MG/DL (ref 0.2–1)
BUN SERPL-MCNC: 29 MG/DL (ref 6–20)
BUN/CREAT SERPL: 21 (ref 12–20)
CALCIUM SERPL-MCNC: 9.9 MG/DL (ref 8.5–10.1)
CHLORIDE SERPL-SCNC: 102 MMOL/L (ref 97–108)
CHOLEST SERPL-MCNC: 172 MG/DL
CO2 SERPL-SCNC: 29 MMOL/L (ref 21–32)
CREAT SERPL-MCNC: 1.39 MG/DL (ref 0.55–1.02)
CREAT UR-MCNC: 334 MG/DL
EST. AVERAGE GLUCOSE BLD GHB EST-MCNC: 240 MG/DL
GLOBULIN SER CALC-MCNC: 4.1 G/DL (ref 2–4)
GLUCOSE SERPL-MCNC: 211 MG/DL (ref 65–100)
HBA1C MFR BLD: 10 % (ref 4–5.6)
HDLC SERPL-MCNC: 49 MG/DL
HDLC SERPL: 3.5 {RATIO} (ref 0–5)
LDLC SERPL CALC-MCNC: 67.4 MG/DL (ref 0–100)
LIPID PROFILE,FLP: ABNORMAL
MICROALBUMIN UR-MCNC: 4.96 MG/DL
MICROALBUMIN/CREAT UR-RTO: 15 MG/G (ref 0–30)
POTASSIUM SERPL-SCNC: 4.3 MMOL/L (ref 3.5–5.1)
PROT SERPL-MCNC: 7.8 G/DL (ref 6.4–8.2)
SODIUM SERPL-SCNC: 137 MMOL/L (ref 136–145)
TRIGL SERPL-MCNC: 278 MG/DL (ref ?–150)
VLDLC SERPL CALC-MCNC: 55.6 MG/DL

## 2021-02-01 RX ORDER — GLIPIZIDE 5 MG/1
TABLET, FILM COATED, EXTENDED RELEASE ORAL
Qty: 90 TAB | Refills: 2 | Status: SHIPPED | OUTPATIENT
Start: 2021-02-01 | End: 2021-04-28 | Stop reason: SDUPTHER

## 2021-02-07 DIAGNOSIS — E87.6 HYPOKALEMIA: ICD-10-CM

## 2021-02-07 RX ORDER — POTASSIUM CHLORIDE 750 MG/1
TABLET, EXTENDED RELEASE ORAL
Qty: 90 TAB | Refills: 3 | Status: SHIPPED | OUTPATIENT
Start: 2021-02-07 | End: 2022-03-22

## 2021-02-12 RX ORDER — INSULIN GLARGINE 100 [IU]/ML
20 INJECTION, SOLUTION SUBCUTANEOUS
Qty: 2 ADJUSTABLE DOSE PRE-FILLED PEN SYRINGE | Refills: 3 | Status: SHIPPED
Start: 2021-02-12 | End: 2021-04-22

## 2021-02-17 ENCOUNTER — TELEPHONE (OUTPATIENT)
Dept: INTERNAL MEDICINE CLINIC | Age: 71
End: 2021-02-17

## 2021-02-17 NOTE — TELEPHONE ENCOUNTER
Pharmacy Progress Note - Telephone Encounter    S/O: Ms. Capri Jacob 72 y.o. female contacted office/me via an inbound telephone call to discuss her diabetes management today. Verified patients identifiers (name & ) per HIPAA policy. - Last A1c was 10% (2021). - Reports to resuming Trulicity 1.5 mg weekly last Wednesday. Reports she has 90ds supply of this medication.  $125.   - Reports lapse in Trulicity therapy. \"It's been a while since I took it. \"  Reports SMBGs are improving. Checking SMBG twice daily. Reports fasting today was 150. Last night was 152. It was 230 last Wednesday. - Also taking glipizide 5 mg daily and metformin 500 mg two tabs BID.   - Has not picked up Lantus. Reports this is ~$100 for 1 box. Preferred contact #: [phone]    Lab Results   Component Value Date/Time    Sodium 137 2021 12:16 PM    Potassium 4.3 2021 12:16 PM    Chloride 102 2021 12:16 PM    CO2 29 2021 12:16 PM    Anion gap 6 2021 12:16 PM    Glucose 211 (H) 2021 12:16 PM    BUN 29 (H) 2021 12:16 PM    Creatinine 1.39 (H) 2021 12:16 PM    BUN/Creatinine ratio 21 (H) 2021 12:16 PM    GFR est AA 46 (L) 2021 12:16 PM    GFR est non-AA 38 (L) 2021 12:16 PM    Calcium 9.9 2021 12:16 PM     Lab Results   Component Value Date/Time    Hemoglobin A1c 10.0 (H) 2021 12:16 PM    Hemoglobin A1c 9.8 (H) 10/08/2020 12:05 AM    Hemoglobin A1c 8.1 (H) 2019 03:32 PM     Estimated Creatinine Clearance: 43.7 mL/min (A) (by C-G formula based on SCr of 1.39 mg/dL (H)). A/P:  - Last A1c was above her goal of <7%. - Encourage patient to continue with twice daily SMBG checks. - Continue with Trulicity 1.5 mg weekly, metformin 1 gm BID, and glipizide 5 mg daily.  - Given recent SMBG decrease, hold off on Lantus for now. - Will evaluate progress in 2 weeks.   Scheduled for 3/4/21.   - Patient endorses understanding to the provided information. All questions answered at this time. There are no discontinued medications. No orders of the defined types were placed in this encounter.       Thank you,  Kina Rodrigues, PharmD, BCACP, [facility]            CLINICAL PHARMACY CONSULT: MED RECONCILIATION/REVIEW ADDENDUM    For Pharmacy Admin Tracking Only    PHSO: PHSO Patient?: Yes    Time Spent (min): 15

## 2021-02-17 NOTE — TELEPHONE ENCOUNTER
Pharmacy Progress Note - Telephone Call    Ms. Essie Will 70 y.o. was contacted via an outbound telephone call regarding her diabetes management today.  A voicemail was left for patient to return my call.       Thank you,  Kina Bonilla, PharmD, BCACP, [facility]      CLINICAL PHARMACY CONSULT: MED RECONCILIATION/REVIEW ADDENDUM    For Pharmacy Admin Tracking Only    PHSO: PHSO Patient?: Yes

## 2021-02-23 ENCOUNTER — OFFICE VISIT (OUTPATIENT)
Dept: HEMATOLOGY | Age: 71
End: 2021-02-23
Payer: MEDICARE

## 2021-02-23 VITALS
RESPIRATION RATE: 22 BRPM | HEIGHT: 64 IN | TEMPERATURE: 97.3 F | DIASTOLIC BLOOD PRESSURE: 72 MMHG | WEIGHT: 218 LBS | BODY MASS INDEX: 37.22 KG/M2 | SYSTOLIC BLOOD PRESSURE: 155 MMHG | OXYGEN SATURATION: 94 % | HEART RATE: 85 BPM

## 2021-02-23 DIAGNOSIS — R74.8 ELEVATED LIVER ENZYMES: Primary | ICD-10-CM

## 2021-02-23 PROBLEM — R11.2 NAUSEA AND VOMITING: Status: RESOLVED | Noted: 2020-10-06 | Resolved: 2021-02-23

## 2021-02-23 PROBLEM — R19.7 DIARRHEA: Status: RESOLVED | Noted: 2020-10-06 | Resolved: 2021-02-23

## 2021-02-23 PROBLEM — Z79.52 LONG TERM (CURRENT) USE OF SYSTEMIC STEROIDS: Status: RESOLVED | Noted: 2017-07-31 | Resolved: 2021-02-23

## 2021-02-23 PROBLEM — R10.9 ABDOMINAL PAIN: Status: RESOLVED | Noted: 2020-10-07 | Resolved: 2021-02-23

## 2021-02-23 LAB
ALBUMIN SERPL-MCNC: 3.8 G/DL (ref 3.5–5)
ALBUMIN/GLOB SERPL: 0.9 {RATIO} (ref 1.1–2.2)
ALP SERPL-CCNC: 325 U/L (ref 45–117)
ALT SERPL-CCNC: 31 U/L (ref 12–78)
ANION GAP SERPL CALC-SCNC: 6 MMOL/L (ref 5–15)
AST SERPL-CCNC: 16 U/L (ref 15–37)
BASOPHILS # BLD: 0.1 K/UL (ref 0–0.1)
BASOPHILS NFR BLD: 1 % (ref 0–1)
BILIRUB DIRECT SERPL-MCNC: 0.1 MG/DL (ref 0–0.2)
BILIRUB SERPL-MCNC: 0.3 MG/DL (ref 0.2–1)
BUN SERPL-MCNC: 17 MG/DL (ref 6–20)
BUN/CREAT SERPL: 14 (ref 12–20)
CALCIUM SERPL-MCNC: 9.9 MG/DL (ref 8.5–10.1)
CHLORIDE SERPL-SCNC: 103 MMOL/L (ref 97–108)
CO2 SERPL-SCNC: 28 MMOL/L (ref 21–32)
CREAT SERPL-MCNC: 1.22 MG/DL (ref 0.55–1.02)
DIFFERENTIAL METHOD BLD: ABNORMAL
EOSINOPHIL # BLD: 0.2 K/UL (ref 0–0.4)
EOSINOPHIL NFR BLD: 2 % (ref 0–7)
ERYTHROCYTE [DISTWIDTH] IN BLOOD BY AUTOMATED COUNT: 15.9 % (ref 11.5–14.5)
FERRITIN SERPL-MCNC: 19 NG/ML (ref 8–252)
GLOBULIN SER CALC-MCNC: 4.4 G/DL (ref 2–4)
GLUCOSE SERPL-MCNC: 148 MG/DL (ref 65–100)
HCT VFR BLD AUTO: 42 % (ref 35–47)
HGB BLD-MCNC: 13.5 G/DL (ref 11.5–16)
IMM GRANULOCYTES # BLD AUTO: 0 K/UL (ref 0–0.04)
IMM GRANULOCYTES NFR BLD AUTO: 0 % (ref 0–0.5)
INR PPP: 1 (ref 0.9–1.1)
IRON SATN MFR SERPL: 13 % (ref 20–50)
IRON SERPL-MCNC: 56 UG/DL (ref 35–150)
LYMPHOCYTES # BLD: 2 K/UL (ref 0.8–3.5)
LYMPHOCYTES NFR BLD: 24 % (ref 12–49)
MCH RBC QN AUTO: 28.2 PG (ref 26–34)
MCHC RBC AUTO-ENTMCNC: 32.1 G/DL (ref 30–36.5)
MCV RBC AUTO: 87.7 FL (ref 80–99)
MONOCYTES # BLD: 0.8 K/UL (ref 0–1)
MONOCYTES NFR BLD: 10 % (ref 5–13)
NEUTS SEG # BLD: 5.2 K/UL (ref 1.8–8)
NEUTS SEG NFR BLD: 63 % (ref 32–75)
NRBC # BLD: 0 K/UL (ref 0–0.01)
NRBC BLD-RTO: 0 PER 100 WBC
PLATELET # BLD AUTO: 215 K/UL (ref 150–400)
PMV BLD AUTO: 13 FL (ref 8.9–12.9)
POTASSIUM SERPL-SCNC: 4.1 MMOL/L (ref 3.5–5.1)
PROT SERPL-MCNC: 8.2 G/DL (ref 6.4–8.2)
PROTHROMBIN TIME: 10.9 SEC (ref 9–11.1)
RBC # BLD AUTO: 4.79 M/UL (ref 3.8–5.2)
SODIUM SERPL-SCNC: 137 MMOL/L (ref 136–145)
TIBC SERPL-MCNC: 446 UG/DL (ref 250–450)
WBC # BLD AUTO: 8.2 K/UL (ref 3.6–11)

## 2021-02-23 PROCEDURE — G9899 SCRN MAM PERF RSLTS DOC: HCPCS | Performed by: INTERNAL MEDICINE

## 2021-02-23 PROCEDURE — G8417 CALC BMI ABV UP PARAM F/U: HCPCS | Performed by: INTERNAL MEDICINE

## 2021-02-23 PROCEDURE — G8754 DIAS BP LESS 90: HCPCS | Performed by: INTERNAL MEDICINE

## 2021-02-23 PROCEDURE — G8399 PT W/DXA RESULTS DOCUMENT: HCPCS | Performed by: INTERNAL MEDICINE

## 2021-02-23 PROCEDURE — 99213 OFFICE O/P EST LOW 20 MIN: CPT | Performed by: INTERNAL MEDICINE

## 2021-02-23 PROCEDURE — 3017F COLORECTAL CA SCREEN DOC REV: CPT | Performed by: INTERNAL MEDICINE

## 2021-02-23 PROCEDURE — G8427 DOCREV CUR MEDS BY ELIG CLIN: HCPCS | Performed by: INTERNAL MEDICINE

## 2021-02-23 PROCEDURE — G8432 DEP SCR NOT DOC, RNG: HCPCS | Performed by: INTERNAL MEDICINE

## 2021-02-23 PROCEDURE — 1090F PRES/ABSN URINE INCON ASSESS: CPT | Performed by: INTERNAL MEDICINE

## 2021-02-23 PROCEDURE — G8753 SYS BP > OR = 140: HCPCS | Performed by: INTERNAL MEDICINE

## 2021-02-23 PROCEDURE — G8536 NO DOC ELDER MAL SCRN: HCPCS | Performed by: INTERNAL MEDICINE

## 2021-02-23 PROCEDURE — 1101F PT FALLS ASSESS-DOCD LE1/YR: CPT | Performed by: INTERNAL MEDICINE

## 2021-02-23 RX ORDER — ONDANSETRON 4 MG/1
TABLET, ORALLY DISINTEGRATING ORAL AS NEEDED
COMMUNITY
End: 2021-04-22

## 2021-02-23 NOTE — Clinical Note
3/1/2021 Patient: Jossy Tovar YOB: 1950 Date of Visit: 2/23/2021 Aris Mujica NP 
[phone] [address] [employer] Dear Aris Mujica NP, Thank you for referring Ms. Cristian Prescott to [address] for evaluation. My notes for this consultation are attached. If you have questions, please do not hesitate to call me. I look forward to following your patient along with you. Sincerely, Reilly Donahue MD

## 2021-02-23 NOTE — PROGRESS NOTES
Creig Freda [address]      Christina Stout MD, Claire Richards, Henry Flanagan MD, MPH      SIS Santos-ELIAZBETH Chi, [facility]-BC     April S Sharon, [facility]   Emerita Platt, FNP-C    Charrebekah Mitchellly, [facility]       Avrilthai Beyer [facility] 136    at [address], [address] [place] [address], [facility] 22.    [phone]    FAX: [address] [place] [address], [address]    [phone]    FAX: [phone]       Patient Care Team:  Aston Cervantes NP as PCP - General (Nurse Practitioner)  Aston Cervantes NP as PCP - [address] [place] [address] Provider  Shawn Almonte MD as Physician (Orthopedic Surgery)  Joshua Shah MD (Pulmonary Disease)      Problem List  Date Reviewed: 3/1/2021          Codes Class Noted    Elevated liver enzymes ICD-10-CM: R74.8  ICD-9-CM: 790.5  3/1/2021        Hypertriglyceridemia ICD-10-CM: E78.1  ICD-9-CM: 272.1  12/17/2019        Idiopathic chronic gout, multiple sites, with tophus (tophi) ICD-10-CM: B3H.05N7  ICD-9-CM: 274.03  11/19/2019        CAD (coronary artery disease) ICD-10-CM: I25.10  ICD-9-CM: 414.00  5/7/2019        Lung nodule seen on imaging study ICD-10-CM: R91.1  ICD-9-CM: 793.11  5/7/2019    Overview Signed 5/7/2019  1:10 PM by Tami Coombs NP     Repeat in 2020.               Long-term use of immunosuppressant medication ICD-10-CM: Z79.899  ICD-9-CM: V58.69  11/27/2018        Seronegative rheumatoid arthritis of multiple sites [facility]) ICD-10-CM: M06.09  ICD-9-CM: 714.0  10/15/2018        MGUS (monoclonal gammopathy of unknown significance) ICD-10-CM: D47.2  ICD-9-CM: 273.1  10/15/2018        Vitamin D deficiency ICD-10-CM: E55.9  ICD-9-CM: 268.9  10/15/2018        Essential hypertension ICD-10-CM: I10  ICD-9-CM: 401.9  9/11/2018 Severe obesity (BMI 35.0-39. 9) with comorbidity ([facility].) ICD-10-CM: E66.01  ICD-9-CM: 278.01  3/26/2018        Type 2 diabetes mellitus with diabetic polyneuropathy, without long-term current use of insulin ([facility].) ICD-10-CM: E11.42  ICD-9-CM: 250.60, 357.2  12/21/2017        Chronic pain ICD-10-CM: G89.29  ICD-9-CM: 338.29  7/31/2017    Overview Signed 7/31/2017 11:11 AM by Maren Woods     bilateral hip arthritis, chronic low back pain             Former smoker ICD-10-CM: Z87.891  ICD-9-CM: V15.82  10/24/2016        Chronic bronchitis ([facility].) ICD-10-CM: Z88  ICD-9-CM: 491.9  9/28/2016        PMR (polymyalgia rheumatica) ([facility].) ICD-10-CM: M35.3  ICD-9-CM: 458  5/18/2016        Osteoarthritis of hip ICD-10-CM: M16.9  ICD-9-CM: 715.95  Unknown    Overview Signed 1/29/2016 12:41 PM by Maren Woods     right             Hypercholesterolemia ICD-10-CM: E78.00  ICD-9-CM: 272.0  Unknown              The clinicians listed above have asked me to see Gorge Trinh in consultation regarding elevated liver enzymes and its management. All medical records sent by the referring physicians were reviewed including imaging studies     The patient is a 72 y.o. female who was found to have elevated alkaline phosphatase in 10/2020. Serologic evaluation for markers of chronic liver disease was negative for HCV, HBV,     Ultrasound of the liver was performed in 11/2020. The results of the imaging suggested fatty liver disease. An assessment of liver fibrosis with biopsy or elastography has not been performed. The patient has the following symptoms which are thought to be due to the liver disease:  fatigue,     The patient is not currently experiencing the following symptoms of liver disease:  pain in the right side over the liver,     The patient mild has limitations in functional activities which can be attributed to other medical problems that are not related to the liver disease.       ASSESSMENT AND PLAN:  Elevated liver enzymes  Persistent elevation in alkaline phosphatase with intermittent elevation in liver transaminases of unclear etiology at this time. Have performed laboratory testing to monitor liver function and degree of liver injury. This included BMP, hepatic panel, CBC with platelet count, INR. Liver transaminases are now normal.  ALP is elevated. Liver function is normal.  The platelet count is   normal.      Based upon laboratory studies and imaging the patient does not appear to have advanced liver disease. Serologic testing for causes of chronic liver disease was positive for ASMA    The most likely causes for the liver chemistry abnormalities were discussed with the patient and include   immune liver disorders,     The need to perform an assessment of liver fibrosis was discussed with the patient. The Fibroscan can assess liver fibrosis and determine if a patient has advanced fibrosis or cirrhosis without the need for liver biopsy. This will be performed at the next office visit. If the Fibroscan suggests advanced fibrosis then a liver biopsy should be considered. The Fibroscan can be repeated annually or as often as clinically indicated to assess for fibrosis progression and/or regression. Will need perform imaging of the liver with MRI and MRCP because of persistent elevation in ALP and possible bile duct disease. The need to perform a liver biopsy to help determine the cause and severity of the liver test abnormalities was discussed. The risks of performing the liver biopsy including pain, puncture of the lung, gallbladder, intestine or kidney and bleeding were discussed. Will defer liver biopsy until we see results of MRCP. Screening for Hepatocellular Carcinoma  HCC screening is not necessary if the patient has no evidence of cirrhosis.     Treatment of other medical problems in patients with chronic liver disease  There are no contraindications for the patient to take most medications that are necessary for treatment of other medical issues. Counseling for alcohol in patients with chronic liver disease  The patient was counseled regarding alcohol consumption and the effect of alcohol on chronic liver disease. The patient does not consume any significant amount of alcohol. Vaccinations   Vaccination for viral hepatitis A and B is not needed. The patient has serologic evidence of prior exposure or vaccination with immunity. Routine vaccinations against other bacterial and viral agents can be performed as indicated. Annual flu vaccination should be administered if indicated. ALLERGIES  Allergies   Allergen Reactions    Augmentin [Amoxicillin-Pot Clavulanate] Diarrhea    Bactrim [Sulfamethoxazole-Trimethoprim] Nausea Only    Biaxin [Clarithromycin] Nausea Only    Demerol [Meperidine] Itching    Erythromycin Nausea Only    Gabapentin Nausea and Vomiting    Pcn [Penicillins] Hives    Percocet [Oxycodone-Acetaminophen] Itching    Pravastatin Nausea Only    Tetracycline Nausea Only    Voltaren [Diclofenac Sodium] Hives       MEDICATIONS  Current Outpatient Medications   Medication Sig    ondansetron (ZOFRAN ODT) 4 mg disintegrating tablet as needed.  potassium chloride (KLOR-CON) 10 mEq tablet TAKE 1 TABLET EVERY DAY    glipiZIDE SR (GLUCOTROL XL) 5 mg CR tablet TAKE ONE TABLET BY MOUTH DAILY    [START ON 3/28/2021] HYDROcodone-acetaminophen (NORCO)  mg tablet Take 1 Tab by mouth every eight (8) hours as needed for Pain for up to 30 days. Max Daily Amount: 3 Tabs.  esomeprazole (NEXIUM) 40 mg capsule Take 1 Cap by mouth daily.  Indications: gastroesophageal reflux disease, hemorrhagic gastritis    metFORMIN (GLUCOPHAGE) 500 mg tablet TAKE TWO TABLETS BY MOUTH TWICE A DAY WITH A MEAL    atorvastatin (LIPITOR) 20 mg tablet TAKE 1 TABLET EVERY DAY    losartan-hydroCHLOROthiazide (HYZAAR) 100-25 mg per tablet TAKE 1 TABLET EVERY DAY  L.acid/B.bifidum/B.animal/FOS (PROBIOTIC COMPLEX PO) Take 2 Caps by mouth daily.  pregabalin (LYRICA) 200 mg capsule TAKE ONE CAPSULE BY MOUTH THREE TIMES A DAY **MAX 3 PER DAY    tiotropium (Spiriva with HandiHaler) 18 mcg inhalation capsule Take 1 Cap by inhalation daily.  albuterol (PROVENTIL VENTOLIN) 2.5 mg /3 mL (0.083 %) nebu 3 mL by Nebulization route every six (6) hours as needed for Wheezing.  albuterol (PROVENTIL HFA, VENTOLIN HFA, PROAIR HFA) 90 mcg/actuation inhaler Take 2 Puffs by inhalation every six (6) hours as needed for Wheezing or Shortness of Breath.  dulaglutide (Trulicity) 1.5 PM/4.1 mL sub-q pen 0.5 mL by SubCUTAneous route every seven (7) days.  diltiazem (TIAZAC) 420 mg SR capsule TAKE 1 CAPSULE EVERY DAY    fenofibrate nanocrystallized (TRICOR) 48 mg tablet Take 1 Tab by mouth daily.  multivit-min-FA-lycopen-lutein (CENTRUM SILVER) 0.4-300-250 mg-mcg-mcg tab Centrum Silver tablet   Prescribed by non [facility] MD    aspirin delayed-release 81 mg tablet aspirin 81 mg tablet,delayed release   Prescribed by jose raul 259/708 Dinesh Macias MD    fluticasone propion-salmeterol (ADVAIR/WIXELA) 250-50 mcg/dose diskus inhaler Take 1 Puff by inhalation every twelve (12) hours.  ergocalciferol (ERGOCALCIFEROL) 50,000 unit capsule Take 1 Cap by mouth every seven (7) days. (Patient taking differently: Take  by mouth daily.)    folic acid (FOLVITE) 1 mg tablet Take 1 Tab by mouth daily.  Blood Glucose Control High&Low (ACCU-CHEK JENNIE CONTROL SOLN) soln Use as directed for controls as needed. DX: E11.9    Blood-Glucose Meter (ACCU-CHEK JENNIE PLUS METER) misc Test blood sugar twice daily. DX: E11.9. Substitute supply brand accepted by insurance.  Lancets (ACCU-CHEK SOFTCLIX LANCETS) misc Test blood sugar twice daily. DX: E11.9. Substitute supply brand accepted by insurance.  glucose blood VI test strips (ACCU-CHEK JENNIE PLUS TEST STRP) strip Test blood sugar twice daily. DX: E11.9.  Substitute supply brand accepted by insurance.  alcohol swabs (ALCOHOL WIPES) padm Test blood sugar twice daily. DX: E11.9. Substitute supply brand accepted by insurance.  Cetirizine (ZYRTEC) 10 mg Cap Take 10 mg by mouth daily.  insulin glargine (LANTUS,BASAGLAR) 100 unit/mL (3 mL) inpn 20 Units by SubCUTAneous route nightly.  atenoloL (TENORMIN) 25 mg tablet TAKE ONE TABLET BY MOUTH DAILY    allopurinol (ZYLOPRIM) 300 mg tablet Take 1 Tab by mouth daily. No current facility-administered medications for this visit. SYSTEM REVIEW NOT RELATED TO LIVER DISEASE OR REVIEWED ABOVE:  Constitution systems: Negative for fever, chills, weight gain, weight loss. Eyes: Negative for visual changes. ENT: Negative for sore throat, painful swallowing. Respiratory: Negative for cough, hemoptysis, SOB. Cardiology: Negative for chest pain, palpitations. GI:  Negative for constipation or diarrhea. : Negative for urinary frequency, dysuria, hematuria, nocturia. Skin: Negative for rash. Hematology: Negative for easy bruising, blood clots. Musculo-skelatal: Negative for back pain, muscle pain, weakness. Neurologic: Negative for headaches, dizziness, vertigo, memory problems not related to HE. Psychology: Negative for anxiety, depression. FAMILY HISTORY:  The father  of stomach cancer. The mother  of CVA. There is no family history of liver disease. SOCIAL HISTORY:  The patient is . The patient has 3 children,   The patient currently smokes 1/2 pack of tobacco daily. The patient has never consumed significant amounts of alcohol. The patient used to work as CNA. PHYSICAL EXAMINATION:  Visit Vitals  BP (!) 155/72 (BP 1 Location: Right arm, BP Patient Position: Sitting, BP Cuff Size: Large adult)   Pulse 85   Temp 97.3 °F (36.3 °C) (Temporal)   Resp 22   Ht 5' 4\" (1.626 m)   Wt 218 lb (98.9 kg)   SpO2 94%   BMI 37.42 kg/m²     General: No acute distress.    Eyes: Sclera anicteric.   ENT: No oral lesions.  Thyroid normal.  Nodes: No adenopathy.   Skin: No spider angiomata.  No jaundice.  No palmar erythema.  Respiratory: Lungs clear to auscultation.   Cardiovascular: Regular heart rate.  No murmurs.  No JVD.  Abdomen: Soft non-tender.  Liver size normal to percussion/palpation.  Spleen not palpable. No obvious ascites.  Extremities: No edema.  No muscle wasting.  No gross arthritic changes.  Neurologic: Alert and oriented.  Cranial nerves grossly intact.  No asterixis.    LABORATORY STUDIES:  [facility] Latest Ref Rng & Units 2/23/2021 1/29/2021   WBC 3.6 - 11.0 K/uL 8.2    ANC 1.8 - 8.0 K/UL 5.2    HGB 11.5 - 16.0 g/dL 13.5     - 400 K/uL 215    INR 0.9 - 1.1   1.0    AST 15 - 37 U/L 16 17   ALT 12 - 78 U/L 31 37   Alk Phos 45 - 117 U/L 325 (H) 415 (H)   Bili, Total 0.2 - 1.0 MG/DL 0.3 0.3   Bili, Direct 0.0 - 0.2 MG/DL 0.1    Albumin 3.5 - 5.0 g/dL 3.8 3.7   BUN 6 - 20 MG/DL 17 29 (H)   Creat 0.55 - 1.02 MG/DL 1.22 (H) 1.39 (H)   Na 136 - 145 mmol/L 137 137   K 3.5 - 5.1 mmol/L 4.1 4.3   Cl 97 - 108 mmol/L 103 102   CO2 21 - 32 mmol/L 28 29   Glucose 65 - 100 mg/dL 148 (H) 211 (H)     SEROLOGIES:  Serologies Latest Ref Rng & Units 10/7/2020 6/27/2018   Hep B Surface Ag Negative  Negative   Hep B Surface Ag Index <0.10    Hep B Surface Ag Interp NEG   Negative    Hep B Core Ab, Total Negative  Positive (A)   Hep B Surface AB QL   Non Reactive   Hep C Ab 0.0 - 0.9 s/co ratio  <0.1   Hep C Ab NR   NONREACTIVE      Serologies Latest Ref Rng & Units 2/23/2021   Hep A Ab, Total Negative   Positive (A)   Ferritin 8 - 252 NG/ML 19   Iron % Saturation 20 - 50 % 13 (L)   [facility], IFA  Negative   C-ANCA Neg:<1:20 titer <1:20   P-ANCA Neg:<1:20 titer <1:20   ANCA Neg:<1:20 titer <1:20   ASMCA 0 - 19 Units 20 (H)   M2 Ab 0.0 - 20.0 Units <20.0   Ceruloplasmin 19.0 - 39.0 mg/dL 36.8   Alpha-1 antitrypsin level 101 - 187 mg/dL 181     LIVER HISTOLOGY:  Not available or  performed    ENDOSCOPIC PROCEDURES:  Not available or performed    RADIOLOGY:  10/2020. CT scan abdomen with IV contrast.  Changes consistent with fatty liver. No liver mass lesions. Normal spleen. No ascites. OTHER TESTIN2019. DEXA bone density. Normal.    FOLLOW-UP:  All of the issues listed above in the Assessment and Plan were discussed with the patient. All questions were answered. The patient expressed a clear understanding of the above. [address] [place] [address] in 4 weeks for Fibroscan to review all data and determine the treatment plan.       MD Scott Hodge[address] 22.  [phone]  [address] [place] [address]

## 2021-02-23 NOTE — PROGRESS NOTES
Identified pt with two pt identifiers(name and ). Reviewed record in preparation for visit and have obtained necessary documentation. Chief Complaint   Patient presents with    New Patient    Elevated Liver Enzymes      Vitals:    21 1400   BP: (!) 155/72   Pulse: 85   Resp: 22   Temp: 97.3 °F (36.3 °C)   TempSrc: Temporal   SpO2: 94%   Weight: 218 lb (98.9 kg)   Height: 5' 4\" (1.626 m)   PainSc:   0 - No pain       Health Maintenance Review: Patient reminded of \"due or due soon\" health maintenance. I have asked the patient to contact his/her primary care provider (PCP) for follow-up on his/her health maintenance. Coordination of Care Questionnaire:  :   1) Have you been to an emergency room, urgent care, or hospitalized since your last visit? If yes, where when, and reason for visit? yes     4-5 mo ago; [facility]    2. Have seen or consulted any other health care provider since your last visit? If yes, where when, and reason for visit? NO      Patient is accompanied by self I have received verbal consent from Janet Hanson to discuss any/all medical information while they are present in the room.

## 2021-02-25 LAB
A1AT SERPL-MCNC: 181 MG/DL (ref 101–187)
ACTIN IGG SERPL-ACNC: 20 UNITS (ref 0–19)
ANA TITR SER IF: NEGATIVE {TITER}
C-ANCA TITR SER IF: NORMAL TITER
CERULOPLASMIN SERPL-MCNC: 36.8 MG/DL (ref 19–39)
HAV AB SER QL IA: POSITIVE
MITOCHONDRIA M2 IGG SER-ACNC: <20 UNITS (ref 0–20)
P-ANCA ATYPICAL TITR SER IF: NORMAL TITER
P-ANCA TITR SER IF: NORMAL TITER

## 2021-03-01 PROBLEM — R74.8 ELEVATED LIVER ENZYMES: Status: ACTIVE | Noted: 2021-03-01

## 2021-03-04 ENCOUNTER — VIRTUAL VISIT (OUTPATIENT)
Dept: INTERNAL MEDICINE CLINIC | Age: 71
End: 2021-03-04

## 2021-03-04 DIAGNOSIS — E11.42 TYPE 2 DIABETES MELLITUS WITH DIABETIC POLYNEUROPATHY, WITHOUT LONG-TERM CURRENT USE OF INSULIN (HCC): Primary | ICD-10-CM

## 2021-03-04 RX ORDER — IBUPROFEN 200 MG
CAPSULE ORAL
Qty: 100 STRIP | Refills: 11 | Status: SHIPPED | OUTPATIENT
Start: 2021-03-04

## 2021-03-04 RX ORDER — INSULIN PUMP SYRINGE, 3 ML
EACH MISCELLANEOUS
Qty: 1 KIT | Refills: 0 | Status: SHIPPED | OUTPATIENT
Start: 2021-03-04

## 2021-03-04 RX ORDER — LANCETS
EACH MISCELLANEOUS
Qty: 1 EACH | Refills: 11 | Status: SHIPPED | OUTPATIENT
Start: 2021-03-04

## 2021-03-04 NOTE — Clinical Note
Hello! Please evaluate Ms. Will's refill request for lyrica. [facility] mail order pharmacy. Thank you!   Sheila Lanza, PharmD, [facility]

## 2021-03-04 NOTE — PROGRESS NOTES
Pharmacy Progress Note - Diabetes Management    S/O: Ms. Bo Neal is a 72 y.o. female, referred by Charlotte Cervantes NP, with a PMH of T2DM + neuropathy, HTN, HLD, CAD, PMR, MGUS, chronic bronchitis, was seen virtually today for diabetes management follow up. Patient's last A1c was 10% (Jan 2021). PHI verified. Preferred contact #: [phone]     Interim update:   Received her 1st COVID 19 vaccine (Narendra Locke) yesterday. Doing ok. Back to taking Trulicity 1.5 mg weekly. Current anti-hyperglycemic regimen include(s):    - Trulicity 1.5 mg weekly Wed  - Glipizide 5 mg daily  - Metformin 500 mg - two tabs BID    - ASA 81 mg daily, Atorvastatin 20 mg daily,     Holding off on Lantus. ROS:  Today, Pt endorses:  - Symptoms of Hyperglycemia: none  - Symptoms of Hypoglycemia: feels weak in the afternoon - most days of the week    Self Monitoring Blood Glucose (SMBG) or CGM:  Checking only fasting BGs  Reports readings are mostly in the 120s  Highest 157 - two days after starting Trulicity    Lowest reading 86, 96 - all fasting  No readings in the 200s     Requests for a new diabetic meter and testing supply - to [facility] mail order     Nutrition/Lifestyle Modifications:  Reports she's not a big eater   Admits she likes Mt Dew - would drinks 3 bottles of (16 oz) per day. Knows she needs to cut back  Does not like diet sodas  Willing to document her daily meal    Request refill for Lyrica to [facility]    Vitals:   Wt Readings from Last 3 Encounters:   02/23/21 218 lb (98.9 kg)   01/29/21 219 lb (99.3 kg)   12/30/20 223 lb (101.2 kg)     BP Readings from Last 3 Encounters:   02/23/21 (!) 155/72   01/29/21 (!) 165/75   12/30/20 (!) 155/99     Pulse Readings from Last 3 Encounters:   02/23/21 85   01/29/21 87   12/30/20 60       Past Medical History:   Diagnosis Date    Abnormal pulmonary function test 9/28/2016    AR (allergic rhinitis)     Asthma     Atrial thrombus 1994    left    Chronic bronchitis ([facility] [address] 9/28/2016    Chronic pain     DDD (degenerative disc disease), lumbar     Diabetes ([facility] [address]     DJD (degenerative joint disease) of hip     right    Empty sella ([facility] [address] 2000    by MRI    Former smoker 10/24/2016    Hoarseness of voice     Hypercholesterolemia     Hypertension     Lesion of vocal cord     PM[facility]algia [facility] 5/18/2016    Smoker 12/5/2012     Allergies   Allergen Reactions    Augmentin [Amoxicillin-Pot Clavulanate] Diarrhea    Bactrim [Sulfamethoxazole-Trimethoprim] Nausea Only    Biaxin [Clarithromycin] Nausea Only    Demerol [Meperidine] Itching    Erythromycin Nausea Only    Gabapentin Nausea and Vomiting    Pcn [Penicillins] Hives    Percocet [Oxycodone-Acetaminophen] Itching    Pravastatin Nausea Only    Tetracycline Nausea Only    Voltaren [Diclofenac Sodium] Hives       Current Outpatient Medications   Medication Sig    ondansetron (ZOFRAN ODT) 4 mg disintegrating tablet as needed.  insulin glargine (LANTUS,BASAGLAR) 100 unit/mL (3 mL) inpn 20 Units by SubCUTAneous route nightly.  potassium chloride (KLOR-CON) 10 mEq tablet TAKE 1 TABLET EVERY DAY    glipiZIDE SR (GLUCOTROL XL) 5 mg CR tablet TAKE ONE TABLET BY MOUTH DAILY    [START ON 3/28/2021] HYDROcodone-acetaminophen (NORCO)  mg tablet Take 1 Tab by mouth every eight (8) hours as needed for Pain for up to 30 days. Max Daily Amount: 3 Tabs.  esomeprazole (NEXIUM) 40 mg capsule Take 1 Cap by mouth daily. Indications: gastroesophageal reflux disease, hemorrhagic gastritis    metFORMIN (GLUCOPHAGE) 500 mg tablet TAKE TWO TABLETS BY MOUTH TWICE A DAY WITH A MEAL    atorvastatin (LIPITOR) 20 mg tablet TAKE 1 TABLET EVERY DAY    losartan-hydroCHLOROthiazide (HYZAAR) 100-25 mg per tablet TAKE 1 TABLET EVERY DAY    L.acid/B.bifidum/B.animal/FOS (PROBIOTIC COMPLEX PO) Take 2 Caps by mouth daily.     pregabalin (LYRICA) 200 mg capsule TAKE ONE CAPSULE BY MOUTH THREE TIMES A DAY **MAX 3 PER DAY    tiotropium (Spiriva with HandiHaler) 18 mcg inhalation capsule Take 1 Cap by inhalation daily.  albuterol (PROVENTIL VENTOLIN) 2.5 mg /3 mL (0.083 %) nebu 3 mL by Nebulization route every six (6) hours as needed for Wheezing.  albuterol (PROVENTIL HFA, VENTOLIN HFA, PROAIR HFA) 90 mcg/actuation inhaler Take 2 Puffs by inhalation every six (6) hours as needed for Wheezing or Shortness of Breath.  atenoloL (TENORMIN) 25 mg tablet TAKE ONE TABLET BY MOUTH DAILY    dulaglutide (Trulicity) 1.5 DP/5.3 mL sub-q pen 0.5 mL by SubCUTAneous route every seven (7) days.  diltiazem (TIAZAC) 420 mg SR capsule TAKE 1 CAPSULE EVERY DAY    fenofibrate nanocrystallized (TRICOR) 48 mg tablet Take 1 Tab by mouth daily.  multivit-min-FA-lycopen-lutein (CENTRUM SILVER) 0.4-300-250 mg-mcg-mcg tab Centrum Silver tablet   Prescribed by non ELIZABETH TORRES    aspirin delayed-release 81 mg tablet aspirin 81 mg tablet,delayed release   Prescribed by jose raul 606/706 Dinesh Macias MD    fluticasone propion-salmeterol (ADVAIR/WIXELA) 250-50 mcg/dose diskus inhaler Take 1 Puff by inhalation every twelve (12) hours.  allopurinol (ZYLOPRIM) 300 mg tablet Take 1 Tab by mouth daily.  ergocalciferol (ERGOCALCIFEROL) 50,000 unit capsule Take 1 Cap by mouth every seven (7) days. (Patient taking differently: Take  by mouth daily.)    folic acid (FOLVITE) 1 mg tablet Take 1 Tab by mouth daily.  Blood Glucose Control High&Low (ACCU-CHEK JENNIE CONTROL SOLN) soln Use as directed for controls as needed. DX: E11.9    Blood-Glucose Meter (ACCU-CHEK JENNIE PLUS METER) misc Test blood sugar twice daily. DX: E11.9. Substitute supply brand accepted by insurance.  Lancets (ACCU-CHEK SOFTCLIX LANCETS) misc Test blood sugar twice daily. DX: E11.9. Substitute supply brand accepted by insurance.  glucose blood VI test strips (ACCU-CHEK JENNIE PLUS TEST STRP) strip Test blood sugar twice daily. DX: E11.9. Substitute supply brand accepted by insurance.     alcohol swabs (ALCOHOL WIPES) padm Test blood sugar twice daily. DX: E11.9. Substitute supply brand accepted by insurance.  Cetirizine (ZYRTEC) 10 mg Cap Take 10 mg by mouth daily. No current facility-administered medications for this visit. Lab Results   Component Value Date/Time    Sodium 137 02/23/2021 02:50 PM    Potassium 4.1 02/23/2021 02:50 PM    Chloride 103 02/23/2021 02:50 PM    CO2 28 02/23/2021 02:50 PM    Anion gap 6 02/23/2021 02:50 PM    Glucose 148 (H) 02/23/2021 02:50 PM    BUN 17 02/23/2021 02:50 PM    Creatinine 1.22 (H) 02/23/2021 02:50 PM    BUN/Creatinine ratio 14 02/23/2021 02:50 PM    GFR est AA 53 (L) 02/23/2021 02:50 PM    GFR est non-AA 44 (L) 02/23/2021 02:50 PM    Calcium 9.9 02/23/2021 02:50 PM    Bilirubin, total 0.3 02/23/2021 02:50 PM    Alk.  phosphatase 325 (H) 02/23/2021 02:50 PM    Protein, total 8.2 02/23/2021 02:50 PM    Albumin 3.8 02/23/2021 02:50 PM    Globulin 4.4 (H) 02/23/2021 02:50 PM    A-G Ratio 0.9 (L) 02/23/2021 02:50 PM    ALT (SGPT) 31 02/23/2021 02:50 PM       Lab Results   Component Value Date/Time    Cholesterol, total 172 01/29/2021 12:16 PM    HDL Cholesterol 49 01/29/2021 12:16 PM    LDL,Direct 65 10/07/2009 11:05 AM    LDL, calculated 67.4 01/29/2021 12:16 PM    VLDL, calculated 55.6 01/29/2021 12:16 PM    Triglyceride 278 (H) 01/29/2021 12:16 PM    CHOL/HDL Ratio 3.5 01/29/2021 12:16 PM       Lab Results   Component Value Date/Time    WBC 8.2 02/23/2021 02:50 PM    WBC 8.9 05/09/2012 12:23 PM    HGB 13.5 02/23/2021 02:50 PM    HCT 42.0 02/23/2021 02:50 PM    PLATELET 039 42/28/8528 02:50 PM    MCV 87.7 02/23/2021 02:50 PM       Lab Results   Component Value Date/Time    Microalbumin/Creat ratio (mg/g creat) 15 01/29/2021 12:16 PM    Microalbumin,urine random 4.96 01/29/2021 12:16 PM       HbA1c:  Lab Results   Component Value Date/Time    Hemoglobin A1c 10.0 (H) 01/29/2021 12:16 PM    Hemoglobin A1c (POC) 7.7 07/31/2020 02:44 PM     No components found for: 2     Last Point of Care HGB A1C  Hemoglobin A1c (POC)   Date Value Ref Range Status   07/31/2020 7.7 % Final        CrCl cannot be calculated (Unknown ideal weight. ). A/P:    Diabetes Management:  - Per ADA guidelines, Pt's A1c is not at goal of < 7%. Making progress. - Motivated to improve hyperglycemia  - Report fasting SMBGs largely improved. Discuss importance of checking PM readings - particularly d/t weak spell complaints in the afternoon.  - Pt willing to document BG and meal plan in a log to be reviewed at next visit    - Continue Trulicity 1.5 mg weekly, Metformin 1 gm BID  - Continue glipizide 5 mg daily for now. Recommend stopping this if hypoglycemia confirmed in the afternoon.  - Continue to hold off on Lantus for now. - Will pend rx for diabetic testing supply. - VV follow up in 2 weeks. Medication reconciliation was completed during the visit. Medications Discontinued During This Encounter   Medication Reason    Blood Glucose Control High&Low (ACCU-CHEK JENNIE CONTROL SOLN) soln REORDER    Blood-Glucose Meter (ACCU-CHEK JENNIE PLUS METER) misc REORDER    glucose blood VI test strips (ACCU-CHEK JENNIE PLUS TEST STRP) strip REORDER     Patient's Immunization History:    Immunizations by Immunization family     Influenza Vaccine 12/5/2012 11/17/2014 1/29/2016 10/5/2016     11/9/2017 9/11/2018 9/11/2019 9/28/2020    Pneumococcal Conjugate (PCV) 9/11/2019       Pneumococcal Polysaccharide (PPSV-23) 5/13/2013       Sars-cov-2 (Covid-19) Vaccine, Unspecified  3/3/2021       TB Skin Test (PPD) 1/25/1999       Td 5/12/1999       Tdap 5/13/2013           Patient verbalized understanding of the information presented and all of the patients questions were answered. Patient advised to call the office with any additional questions or concerns. Notifications of recommendations will be sent to  Holsinger, Ernesta Hammans, NP for review.     Thank you for the consult,  Thu Mariluz Padilla, PharmD, BCACP, [facility]    CLINICAL PHARMACY CONSULT: MED RECONCILIATION/REVIEW ADDENDUM    For Pharmacy Admin Tracking Only    PHSO: PHSO Patient?: Yes  Total # of Interventions Recommended: Count: 4  - Discontinued Medication #: 3 Discontinue Reason(s): Needs Additional Medication Therapy  - Updated Order #: 1 Updated Order Reason(s):  Other    Time Spent (min): 30

## 2021-03-23 DIAGNOSIS — G62.9 POLYNEUROPATHY: ICD-10-CM

## 2021-03-25 ENCOUNTER — OFFICE VISIT (OUTPATIENT)
Dept: HEMATOLOGY | Age: 71
End: 2021-03-25
Payer: MEDICARE

## 2021-03-25 VITALS
OXYGEN SATURATION: 94 % | WEIGHT: 219.6 LBS | TEMPERATURE: 97.2 F | SYSTOLIC BLOOD PRESSURE: 144 MMHG | HEIGHT: 64 IN | DIASTOLIC BLOOD PRESSURE: 67 MMHG | RESPIRATION RATE: 18 BRPM | BODY MASS INDEX: 37.49 KG/M2 | HEART RATE: 80 BPM

## 2021-03-25 DIAGNOSIS — R74.8 ELEVATED LIVER ENZYMES: Primary | ICD-10-CM

## 2021-03-25 PROCEDURE — G8427 DOCREV CUR MEDS BY ELIG CLIN: HCPCS | Performed by: INTERNAL MEDICINE

## 2021-03-25 PROCEDURE — G8399 PT W/DXA RESULTS DOCUMENT: HCPCS | Performed by: INTERNAL MEDICINE

## 2021-03-25 PROCEDURE — G8754 DIAS BP LESS 90: HCPCS | Performed by: INTERNAL MEDICINE

## 2021-03-25 PROCEDURE — 1090F PRES/ABSN URINE INCON ASSESS: CPT | Performed by: INTERNAL MEDICINE

## 2021-03-25 PROCEDURE — G8417 CALC BMI ABV UP PARAM F/U: HCPCS | Performed by: INTERNAL MEDICINE

## 2021-03-25 PROCEDURE — 3017F COLORECTAL CA SCREEN DOC REV: CPT | Performed by: INTERNAL MEDICINE

## 2021-03-25 PROCEDURE — 91200 LIVER ELASTOGRAPHY: CPT | Performed by: INTERNAL MEDICINE

## 2021-03-25 PROCEDURE — G9899 SCRN MAM PERF RSLTS DOC: HCPCS | Performed by: INTERNAL MEDICINE

## 2021-03-25 PROCEDURE — G8753 SYS BP > OR = 140: HCPCS | Performed by: INTERNAL MEDICINE

## 2021-03-25 PROCEDURE — G8536 NO DOC ELDER MAL SCRN: HCPCS | Performed by: INTERNAL MEDICINE

## 2021-03-25 PROCEDURE — 1101F PT FALLS ASSESS-DOCD LE1/YR: CPT | Performed by: INTERNAL MEDICINE

## 2021-03-25 PROCEDURE — G8432 DEP SCR NOT DOC, RNG: HCPCS | Performed by: INTERNAL MEDICINE

## 2021-03-25 PROCEDURE — 99214 OFFICE O/P EST MOD 30 MIN: CPT | Performed by: INTERNAL MEDICINE

## 2021-03-25 RX ORDER — PREGABALIN 200 MG/1
CAPSULE ORAL
Qty: 90 CAP | Refills: 0 | Status: SHIPPED | OUTPATIENT
Start: 2021-03-25 | End: 2021-05-13

## 2021-03-25 NOTE — PROGRESS NOTES
Identified pt with two pt identifiers(name and ). Reviewed record in preparation for visit and have obtained necessary documentation. No chief complaint on file. Vitals:    21 1006   BP: (!) 144/67   Pulse: 80   Resp: 18   Temp: 97.2 °F (36.2 °C)   TempSrc: Temporal   SpO2: 94%   Weight: 219 lb 9.6 oz (99.6 kg)   Height: 5' 4\" (1.626 m)   PainSc:   0 - No pain       Health Maintenance Review: Patient reminded of \"due or due soon\" health maintenance. I have asked the patient to contact his/her primary care provider (PCP) for follow-up on his/her health maintenance. Coordination of Care Questionnaire:  :   1) Have you been to an emergency room, urgent care, or hospitalized since your last visit? If yes, where when, and reason for visit? no       2. Have seen or consulted any other health care provider since your last visit? If yes, where when, and reason for visit? NO      Patient is accompanied by self I have received verbal consent from Janine Pritchard to discuss any/all medical information while they are present in the room.

## 2021-03-25 NOTE — Clinical Note
4/4/2021 Patient: Ibrahima Blue YOB: 1950 Date of Visit: 3/25/2021 Radha Hamm NP 
[phone] [id number] [address] [employer] Dear Radha Hamm NP, Thank you for referring Ms. Lois Shah to [address] for evaluation. My notes for this consultation are attached. If you have questions, please do not hesitate to call me. I look forward to following your patient along with you. Sincerely, Benny Cote MD

## 2021-03-25 NOTE — PROGRESS NOTES
Jeni Mendes [facility]      Jitendra Blue MD, Omar Pickett, Christina Rose MD, MPH      Dolly Pop, PAJOSE ALFREDO Shannon, [facility]     Coreen Herrera, [facility]   Misti Horn, P-C Buster Sever, [facility]       Avril [facility] 136    at [address]    [address] [place] [address], [address] [place] [address], [facility] 22. [phone]    FAX: [address] [place] [address], [address], [address]    [phone]    FAX: [phone]       Patient Care Team:  Clay Cervantes NP as PCP - General (Nurse Practitioner)  Clay Cervantes NP as PCP - [facility]  Peewee Preciado MD as Physician (Orthopedic Surgery)  Mariah Blair MD (Pulmonary Disease)      Problem List  Date Reviewed: 3/1/2021          Codes Class Noted    Elevated liver enzymes ICD-10-CM: R74.8  ICD-9-CM: 790.5  3/1/2021        Hypertriglyceridemia ICD-10-CM: E78.1  ICD-9-CM: 272.1  12/17/2019        Idiopathic chronic gout, multiple sites, with tophus (tophi) ICD-10-CM: E8T.99G5  ICD-9-CM: 274.03  11/19/2019        CAD (coronary artery disease) ICD-10-CM: I25.10  ICD-9-CM: 414.00  5/7/2019        Lung nodule seen on imaging study ICD-10-CM: R91.1  ICD-9-CM: 793.11  5/7/2019    Overview Signed 5/7/2019  1:10 PM by Jimi Flores NP     Repeat in 2020.               Long-term use of immunosuppressant medication ICD-10-CM: Z79.899  ICD-9-CM: V58.69  11/27/2018        Seronegative rheumatoid arthritis of multiple sites [facility]) ICD-10-CM: M06.09  ICD-9-CM: 714.0  10/15/2018        MGUS (monoclonal gammopathy of unknown significance) ICD-10-CM: D47.2  ICD-9-CM: 273.1  10/15/2018        Vitamin D deficiency ICD-10-CM: E55.9  ICD-9-CM: 268.9  10/15/2018        Essential hypertension ICD-10-CM: I10  ICD-9-CM: 401.9  9/11/2018 Severe obesity (BMI 35.0-39. 9) with comorbidity ([facility].) ICD-10-CM: E66.01  ICD-9-CM: 278.01  3/26/2018        Type 2 diabetes mellitus with diabetic polyneuropathy, without long-term current use of insulin ([facility].) ICD-10-CM: E11.42  ICD-9-CM: 250.60, 357.2  12/21/2017        Chronic pain ICD-10-CM: G89.29  ICD-9-CM: 338.29  7/31/2017    Overview Signed 7/31/2017 11:11 AM by Navid Bledsoe     bilateral hip arthritis, chronic low back pain             Former smoker ICD-10-CM: Z87.891  ICD-9-CM: V15.82  10/24/2016        Chronic bronchitis ([facility].) ICD-10-CM: X48  ICD-9-CM: 491.9  9/28/2016        PMR (polymyalgia rheumatica) ([facility].) ICD-10-CM: M35.3  ICD-9-CM: 510  5/18/2016        Osteoarthritis of hip ICD-10-CM: M16.9  ICD-9-CM: 715.95  Unknown    Overview Signed 1/29/2016 12:41 PM by Navid Bledsoe     right             Hypercholesterolemia ICD-10-CM: E78.00  ICD-9-CM: 272.0  Unknown              Jannette Arzate is being seen at [address] for management of elevated liver enzymes. The active problem list, all pertinent past medical history, medications, radiologic findings and laboratory findings related to the liver disorder were reviewed and discussed with the patient. The patient is a 72 y.o. female who was found to have elevated alkaline phosphatase in 10/2020. Serologic evaluation for markers of chronic liver disease was positive for ASAMA. Ultrasound of the liver was performed in 11/2020. The results of the imaging suggested fatty liver disease. Assessment of liver fibrosis with Fibroscan was performed in the office today. The result was 24.3 kPa which correlates with cirrhosis. The CAP score of 393 suggests hepatic steatosis.     The patient has the following symptoms which are thought to be due to the liver disease:  fatigue,     The patient is not currently experiencing the following symptoms of liver disease:  pain in the right side over the liver,     The patient mild has limitations in functional activities which can be attributed to other medical problems that are not related to the liver disease. ASSESSMENT AND PLAN:  Elevated liver enzymes  Persistent elevation in alkaline phosphatase with intermittent elevation in liver transaminases of unclear etiology at this time. Liver transaminases are now normal.  ALP is elevated. Liver function is normal.  The platelet count is   normal.      Based upon laboratory studies Fibroscan, and imaging the patient may have advanced liver disease or cirrhosis. Serologic testing for causes of chronic liver disease was positive for ASMA    The most likely causes for the liver chemistry abnormalities were discussed with the patient and include immune liver disorders,     The need to perform a liver biopsy to help determine the cause and severity of the liver test abnormalities was discussed. The risks of performing the liver biopsy including pain, puncture of the lung, gallbladder, intestine or kidney and bleeding were discussed. The patient has decided to have a liver biopsy. This will be scheduled. Screening for Hepatocellular Carcinoma  HCC screening is not necessary if the patient has no evidence of cirrhosis. Treatment of other medical problems in patients with chronic liver disease  There are no contraindications for the patient to take most medications that are necessary for treatment of other medical issues. Counseling for alcohol in patients with chronic liver disease  The patient was counseled regarding alcohol consumption and the effect of alcohol on chronic liver disease. The patient does not consume any significant amount of alcohol. Vaccinations   Vaccination for viral hepatitis A and B is not needed. The patient has serologic evidence of prior exposure or vaccination with immunity. Routine vaccinations against other bacterial and viral agents can be performed as indicated.   Annual flu vaccination should be administered if indicated. ALLERGIES  Allergies   Allergen Reactions    Augmentin [Amoxicillin-Pot Clavulanate] Diarrhea    Bactrim [Sulfamethoxazole-Trimethoprim] Nausea Only    Biaxin [Clarithromycin] Nausea Only    Demerol [Meperidine] Itching    Erythromycin Nausea Only    Gabapentin Nausea and Vomiting    Pcn [Penicillins] Hives    Percocet [Oxycodone-Acetaminophen] Itching    Pravastatin Nausea Only    Tetracycline Nausea Only    Voltaren [Diclofenac Sodium] Hives       MEDICATIONS  Current Outpatient Medications   Medication Sig    glucose blood VI test strips (blood glucose test) strip Use to check blood sugar up to 3 times daily. E11.65    Blood-Glucose Meter monitoring kit Use to check blood sugar up to 3 times daily. E11.65    lancets misc Use to check blood sugar up to 3 times daily. E11.65    ondansetron (ZOFRAN ODT) 4 mg disintegrating tablet as needed.  insulin glargine (LANTUS,BASAGLAR) 100 unit/mL (3 mL) inpn 20 Units by SubCUTAneous route nightly.  potassium chloride (KLOR-CON) 10 mEq tablet TAKE 1 TABLET EVERY DAY    glipiZIDE SR (GLUCOTROL XL) 5 mg CR tablet TAKE ONE TABLET BY MOUTH DAILY    [START ON 3/28/2021] HYDROcodone-acetaminophen (NORCO)  mg tablet Take 1 Tab by mouth every eight (8) hours as needed for Pain for up to 30 days. Max Daily Amount: 3 Tabs.  esomeprazole (NEXIUM) 40 mg capsule Take 1 Cap by mouth daily. Indications: gastroesophageal reflux disease, hemorrhagic gastritis    metFORMIN (GLUCOPHAGE) 500 mg tablet TAKE TWO TABLETS BY MOUTH TWICE A DAY WITH A MEAL    atorvastatin (LIPITOR) 20 mg tablet TAKE 1 TABLET EVERY DAY    losartan-hydroCHLOROthiazide (HYZAAR) 100-25 mg per tablet TAKE 1 TABLET EVERY DAY    L.acid/B.bifidum/B.animal/FOS (PROBIOTIC COMPLEX PO) Take 2 Caps by mouth daily.     pregabalin (LYRICA) 200 mg capsule TAKE ONE CAPSULE BY MOUTH THREE TIMES A DAY **MAX 3 PER DAY    tiotropium (Spiriva with HandiHaler) 18 mcg inhalation capsule Take 1 Cap by inhalation daily.  albuterol (PROVENTIL VENTOLIN) 2.5 mg /3 mL (0.083 %) nebu 3 mL by Nebulization route every six (6) hours as needed for Wheezing.  albuterol (PROVENTIL HFA, VENTOLIN HFA, PROAIR HFA) 90 mcg/actuation inhaler Take 2 Puffs by inhalation every six (6) hours as needed for Wheezing or Shortness of Breath.  atenoloL (TENORMIN) 25 mg tablet TAKE ONE TABLET BY MOUTH DAILY    dulaglutide (Trulicity) 1.5 ZT/1.8 mL sub-q pen 0.5 mL by SubCUTAneous route every seven (7) days.  diltiazem (TIAZAC) 420 mg SR capsule TAKE 1 CAPSULE EVERY DAY    fenofibrate nanocrystallized (TRICOR) 48 mg tablet Take 1 Tab by mouth daily.  multivit-min-FA-lycopen-lutein (CENTRUM SILVER) 0.4-300-250 mg-mcg-mcg tab Centrum Silver tablet   Prescribed by non [facility] MD    aspirin delayed-release 81 mg tablet aspirin 81 mg tablet,delayed release   Prescribed by non [facility] MD    fluticasone propion-salmeterol (ADVAIR/WIXELA) 250-50 mcg/dose diskus inhaler Take 1 Puff by inhalation every twelve (12) hours.  allopurinol (ZYLOPRIM) 300 mg tablet Take 1 Tab by mouth daily.  ergocalciferol (ERGOCALCIFEROL) 50,000 unit capsule Take 1 Cap by mouth every seven (7) days. (Patient taking differently: Take  by mouth daily.)    folic acid (FOLVITE) 1 mg tablet Take 1 Tab by mouth daily.  Lancets (ACCU-CHEK SOFTCLIX LANCETS) misc Test blood sugar twice daily. DX: E11.9. Substitute supply brand accepted by insurance.  alcohol swabs (ALCOHOL WIPES) padm Test blood sugar twice daily. DX: E11.9. Substitute supply brand accepted by insurance.  Cetirizine (ZYRTEC) 10 mg Cap Take 10 mg by mouth daily. No current facility-administered medications for this visit. SYSTEM REVIEW NOT RELATED TO LIVER DISEASE OR REVIEWED ABOVE:  Constitution systems: Negative for fever, chills, weight gain, weight loss. Eyes: Negative for visual changes.   ENT: Negative for sore throat, painful swallowing. Respiratory: Negative for cough, hemoptysis, SOB. Cardiology: Negative for chest pain, palpitations. GI:  Negative for constipation or diarrhea. : Negative for urinary frequency, dysuria, hematuria, nocturia. Skin: Negative for rash. Hematology: Negative for easy bruising, blood clots. Musculo-skelatal: Negative for back pain, muscle pain, weakness. Neurologic: Negative for headaches, dizziness, vertigo, memory problems not related to HE. Psychology: Negative for anxiety, depression. FAMILY HISTORY:  The father  of stomach cancer. The mother  of CVA. There is no family history of liver disease. SOCIAL HISTORY:  The patient is . The patient has 3 children,   The patient currently smokes 1/2 pack of tobacco daily. The patient has never consumed significant amounts of alcohol. The patient used to work as CNA. PHYSICAL EXAMINATION:  Visit Vitals  BP (!) 144/67 (BP 1 Location: Right upper arm, BP Patient Position: Sitting, BP Cuff Size: Large adult)   Pulse 80   Temp 97.2 °F (36.2 °C) (Temporal)   Resp 18   Ht 5' 4\" (1.626 m)   Wt 219 lb 9.6 oz (99.6 kg)   SpO2 94%   BMI 37.69 kg/m²     General: No acute distress. Eyes: Sclera anicteric. ENT: No oral lesions. Thyroid normal.  Nodes: No adenopathy. Skin: No spider angiomata. No jaundice. No palmar erythema. Respiratory: Lungs clear to auscultation. Cardiovascular: Regular heart rate. No murmurs. No JVD. Abdomen: Soft non-tender. Liver size normal to percussion/palpation. Spleen not palpable. No obvious ascites. Extremities: No edema. No muscle wasting. No gross arthritic changes. Neurologic: Alert and oriented. Cranial nerves grossly intact. No asterixis.     LABORATORY STUDIES:  Liver [place] of [address] & Units 2021   WBC 3.6 - 11.0 K/uL 8.2    ANC 1.8 - 8.0 K/UL 5.2    HGB 11.5 - 16.0 g/dL 13.5     - 400 K/uL 215    INR 0.9 - 1.1   1.0    AST 15 - 37 U/L 16 17   ALT 12 - 78 U/L 31 37   Alk Phos 45 - 117 U/L 325 (H) 415 (H)   Bili, Total 0.2 - 1.0 MG/DL 0.3 0.3   Bili, Direct 0.0 - 0.2 MG/DL 0.1    Albumin 3.5 - 5.0 g/dL 3.8 3.7   BUN 6 - 20 MG/DL 17 29 (H)   Creat 0.55 - 1.02 MG/DL 1.22 (H) 1.39 (H)   Na 136 - 145 mmol/L 137 137   K 3.5 - 5.1 mmol/L 4.1 4.3   Cl 97 - 108 mmol/L 103 102   CO2 21 - 32 mmol/L 28 29   Glucose 65 - 100 mg/dL 148 (H) 211 (H)     SEROLOGIES:  Serologies Latest Ref Rng & Units 10/7/2020 2018   Hep B Surface Ag Negative  Negative   Hep B Surface Ag Index <0.10    Hep B Surface Ag Interp NEG   Negative    Hep B Core Ab, Total Negative  Positive (A)   Hep B Surface AB QL   Non Reactive   Hep C Ab 0.0 - 0.9 s/co ratio  <0.1   Hep C Ab NR   NONREACTIVE      Serologies Latest Ref Rng & Units 2021   Hep A Ab, Total Negative   Positive (A)   Ferritin 8 - 252 NG/ML 19   Iron % Saturation 20 - 50 % 13 (L)   [facility], IFA  Negative   C-ANCA Neg:<1:20 titer <1:20   P-ANCA Neg:<1:20 titer <1:20   ANCA Neg:<1:20 titer <1:20   ASMCA 0 - 19 Units 20 (H)   M2 Ab 0.0 - 20.0 Units <20.0   Ceruloplasmin 19.0 - 39.0 mg/dL 36.8   Alpha-1 antitrypsin level 101 - 187 mg/dL 181     LIVER HISTOLOGY:  3/2021. [facility]. EkPa was 24.3. IQR/med 267%. . The results suggested a fibrosis level of F4. However, the high IQR% suggests that the measurement may not be reliable. ENDOSCOPIC PROCEDURES:  Not available or performed    RADIOLOGY:  10/2020. CT scan abdomen with IV contrast.  Changes consistent with fatty liver. No liver mass lesions. Normal spleen. No ascites. 2020. Ultrasound of liver. Echogenic consistent with fatty liver. No liver mass lesions. No dilated bile ducts. No ascites. OTHER TESTIN2019. DEXA bone density. Normal.    FOLLOW-UP:  All of the issues listed above in the Assessment and Plan were discussed with the patient.   All questions were answered. The patient expressed a clear understanding of the above. [address] [address] in 2 weeks after liver biopsy.       MD Stacey Lino 13 of [address] [place] [address] A, [address] [place] [address] 22.  [phone]  [address] [place] [address]

## 2021-04-21 ENCOUNTER — TRANSCRIBE ORDER (OUTPATIENT)
Dept: SCHEDULING | Age: 71
End: 2021-04-21

## 2021-04-21 DIAGNOSIS — R74.8 ACID PHOSPHATASE ELEVATED: Primary | ICD-10-CM

## 2021-04-22 RX ORDER — ALLOPURINOL 300 MG/1
300 TABLET ORAL
COMMUNITY
End: 2021-04-28 | Stop reason: SDUPTHER

## 2021-04-22 RX ORDER — FLUTICASONE PROPIONATE AND SALMETEROL 250; 50 UG/1; UG/1
1 POWDER RESPIRATORY (INHALATION) AS NEEDED
COMMUNITY

## 2021-04-22 RX ORDER — ASCORBIC ACID 125 MG
5000 TABLET,CHEWABLE ORAL DAILY
COMMUNITY

## 2021-04-22 RX ORDER — GLUCOSAMINE SULFATE 1500 MG
1000 POWDER IN PACKET (EA) ORAL DAILY
COMMUNITY

## 2021-04-22 RX ORDER — VITAMIN E 1000 UNIT
1000 CAPSULE ORAL DAILY
COMMUNITY

## 2021-04-25 ENCOUNTER — TRANSCRIBE ORDER (OUTPATIENT)
Dept: REGISTRATION | Age: 71
End: 2021-04-25

## 2021-04-25 ENCOUNTER — HOSPITAL ENCOUNTER (OUTPATIENT)
Dept: PREADMISSION TESTING | Age: 71
Discharge: HOME | End: 2021-04-25
Payer: MEDICARE

## 2021-04-25 DIAGNOSIS — Z01.812 PRE-PROCEDURE LAB EXAM: Primary | ICD-10-CM

## 2021-04-25 PROCEDURE — U0003 INFECTIOUS AGENT DETECTION BY NUCLEIC ACID (DNA OR RNA); SEVERE ACUTE RESPIRATORY SYNDROME CORONAVIRUS 2 (SARS-COV-2) (CORONAVIRUS DISEASE [COVID-19]), AMPLIFIED PROBE TECHNIQUE, MAKING USE OF HIGH THROUGHPUT TECHNOLOGIES AS DESCRIBED BY CMS-2020-01-R: HCPCS

## 2021-04-26 LAB — SARS-COV-2, COV2NT: NOT DETECTED

## 2021-04-28 ENCOUNTER — OFFICE VISIT (OUTPATIENT)
Dept: FAMILY MEDICINE CLINIC | Age: 71
End: 2021-04-28
Attending: INTERNAL MEDICINE
Payer: MEDICARE

## 2021-04-28 VITALS
RESPIRATION RATE: 16 BRPM | TEMPERATURE: 97.7 F | DIASTOLIC BLOOD PRESSURE: 66 MMHG | SYSTOLIC BLOOD PRESSURE: 100 MMHG | WEIGHT: 216 LBS | BODY MASS INDEX: 36.88 KG/M2 | OXYGEN SATURATION: 93 % | HEART RATE: 83 BPM | HEIGHT: 64 IN

## 2021-04-28 DIAGNOSIS — G89.4 CHRONIC PAIN DISORDER: ICD-10-CM

## 2021-04-28 DIAGNOSIS — E11.42 TYPE 2 DIABETES MELLITUS WITH DIABETIC POLYNEUROPATHY, WITHOUT LONG-TERM CURRENT USE OF INSULIN (HCC): Primary | ICD-10-CM

## 2021-04-28 DIAGNOSIS — M1A.9XX0 CHRONIC GOUT WITHOUT TOPHUS, UNSPECIFIED CAUSE, UNSPECIFIED SITE: ICD-10-CM

## 2021-04-28 DIAGNOSIS — M35.3 PMR (POLYMYALGIA RHEUMATICA) (HCC): ICD-10-CM

## 2021-04-28 DIAGNOSIS — J42 CHRONIC BRONCHITIS, UNSPECIFIED CHRONIC BRONCHITIS TYPE (HCC): ICD-10-CM

## 2021-04-28 PROBLEM — E66.01 SEVERE OBESITY (BMI 35.0-39.9) WITH COMORBIDITY (HCC): Status: RESOLVED | Noted: 2018-03-26 | Resolved: 2021-04-28

## 2021-04-28 PROCEDURE — G8399 PT W/DXA RESULTS DOCUMENT: HCPCS | Performed by: NURSE PRACTITIONER

## 2021-04-28 PROCEDURE — G8752 SYS BP LESS 140: HCPCS | Performed by: NURSE PRACTITIONER

## 2021-04-28 PROCEDURE — 99215 OFFICE O/P EST HI 40 MIN: CPT | Performed by: NURSE PRACTITIONER

## 2021-04-28 PROCEDURE — G8432 DEP SCR NOT DOC, RNG: HCPCS | Performed by: NURSE PRACTITIONER

## 2021-04-28 PROCEDURE — G9899 SCRN MAM PERF RSLTS DOC: HCPCS | Performed by: NURSE PRACTITIONER

## 2021-04-28 PROCEDURE — 3052F HG A1C>EQUAL 8.0%<EQUAL 9.0%: CPT | Performed by: NURSE PRACTITIONER

## 2021-04-28 PROCEDURE — G8754 DIAS BP LESS 90: HCPCS | Performed by: NURSE PRACTITIONER

## 2021-04-28 PROCEDURE — 1090F PRES/ABSN URINE INCON ASSESS: CPT | Performed by: NURSE PRACTITIONER

## 2021-04-28 PROCEDURE — G8536 NO DOC ELDER MAL SCRN: HCPCS | Performed by: NURSE PRACTITIONER

## 2021-04-28 PROCEDURE — 2022F DILAT RTA XM EVC RTNOPTHY: CPT | Performed by: NURSE PRACTITIONER

## 2021-04-28 PROCEDURE — 3017F COLORECTAL CA SCREEN DOC REV: CPT | Performed by: NURSE PRACTITIONER

## 2021-04-28 PROCEDURE — G8427 DOCREV CUR MEDS BY ELIG CLIN: HCPCS | Performed by: NURSE PRACTITIONER

## 2021-04-28 PROCEDURE — G8417 CALC BMI ABV UP PARAM F/U: HCPCS | Performed by: NURSE PRACTITIONER

## 2021-04-28 PROCEDURE — 1101F PT FALLS ASSESS-DOCD LE1/YR: CPT | Performed by: NURSE PRACTITIONER

## 2021-04-28 RX ORDER — DULAGLUTIDE 1.5 MG/.5ML
3 INJECTION, SOLUTION SUBCUTANEOUS
Qty: 12 EACH | Refills: 1 | Status: SHIPPED | OUTPATIENT
Start: 2021-04-28 | End: 2021-04-30 | Stop reason: ALTCHOICE

## 2021-04-28 RX ORDER — DULAGLUTIDE 1.5 MG/.5ML
3 INJECTION, SOLUTION SUBCUTANEOUS
Qty: 12 EACH | Refills: 1 | Status: SHIPPED | OUTPATIENT
Start: 2021-04-28 | End: 2021-04-28 | Stop reason: SDUPTHER

## 2021-04-28 RX ORDER — HYDROCODONE BITARTRATE AND ACETAMINOPHEN 10; 325 MG/1; MG/1
1 TABLET ORAL
Qty: 90 TAB | Refills: 0 | Status: SHIPPED | OUTPATIENT
Start: 2021-05-28 | End: 2021-04-28 | Stop reason: SDUPTHER

## 2021-04-28 RX ORDER — ALLOPURINOL 300 MG/1
300 TABLET ORAL DAILY
Qty: 90 TAB | Refills: 3 | Status: SHIPPED | OUTPATIENT
Start: 2021-04-28 | End: 2022-04-29 | Stop reason: SDUPTHER

## 2021-04-28 RX ORDER — HYDROCODONE BITARTRATE AND ACETAMINOPHEN 10; 325 MG/1; MG/1
1 TABLET ORAL
Qty: 90 TAB | Refills: 0 | Status: SHIPPED | OUTPATIENT
Start: 2021-06-28 | End: 2021-07-28 | Stop reason: SDUPTHER

## 2021-04-28 RX ORDER — HYDROCODONE BITARTRATE AND ACETAMINOPHEN 10; 325 MG/1; MG/1
1 TABLET ORAL
Qty: 90 TAB | Refills: 0 | Status: CANCELLED | OUTPATIENT
Start: 2021-04-28 | End: 2021-05-28

## 2021-04-28 RX ORDER — GLIPIZIDE 2.5 MG/1
2.5 TABLET, EXTENDED RELEASE ORAL DAILY
Qty: 30 TAB | Refills: 3 | Status: SHIPPED | OUTPATIENT
Start: 2021-04-28 | End: 2021-04-28 | Stop reason: SDUPTHER

## 2021-04-28 RX ORDER — GLIPIZIDE 2.5 MG/1
2.5 TABLET, EXTENDED RELEASE ORAL DAILY
Qty: 30 TAB | Refills: 3 | Status: SHIPPED | OUTPATIENT
Start: 2021-04-28 | End: 2021-09-27

## 2021-04-28 RX ORDER — HYDROCODONE BITARTRATE AND ACETAMINOPHEN 10; 325 MG/1; MG/1
1 TABLET ORAL
Qty: 90 TAB | Refills: 0 | Status: SHIPPED | OUTPATIENT
Start: 2021-04-28 | End: 2021-04-28 | Stop reason: SDUPTHER

## 2021-04-28 NOTE — PROGRESS NOTES
Chief Complaint   Patient presents with    Gout     RT foot painful     Diabetes     1. Have you been to the ER, urgent care clinic since your last visit? Hospitalized since your last visit? No     2. Have you seen or consulted any other health care providers outside of the [address] [place] [address] since your last visit? Include any pap smears or colon screening.  No

## 2021-04-28 NOTE — PATIENT INSTRUCTIONS
Purine-Restricted Diet: Care Instructions  Your Care Instructions     Purines are substances that are found in some foods. Your body turns purines into uric acid. High levels of uric acid can cause gout, which is a form of arthritis that causes pain and inflammation in joints. You may be able to help control the amount of uric acid in your body by limiting high-purine foods in your diet. Follow-up care is a key part of your treatment and safety. Be sure to make and go to all appointments, and call your doctor if you are having problems. It's also a good idea to know your test results and keep a list of the medicines you take. How can you care for yourself at home? · Plan your meals and snacks around foods that are low in purines and are safe for you to eat. These foods include:  ? Green vegetables and tomatoes. ? Fruits. ? Whole-grain breads, rice, and cereals. ? Eggs, peanut butter, and nuts. ? Low-fat milk, cheese, and other milk products. ? Popcorn. ? Gelatin desserts, chocolate, cocoa, and cakes and sweets, in small amounts. · You can eat certain foods that are medium-high in purines, but eat them only once in a while. These foods include:  ? Legumes, such as dried beans and dried peas. You can have 1 cup cooked legumes each day. ? Asparagus, cauliflower, spinach, mushrooms, and green peas. ? Fish and seafood (other than very high-purine seafood). ? Oatmeal, wheat bran, and wheat germ. · Limit very high-purine foods, including:  ? Organ meats, such as liver, kidneys, sweetbreads, and brains. ? Meats, including pérez, beef, pork, and lamb. ? Game meats and any other meats in large amounts. ? Anchovies, sardines, herring, mackerel, and scallops. ? Gravy. ? Beer. Where can you learn more? Go to http://www.gray.com/  Enter F448 in the search box to learn more about \"Purine-Restricted Diet: Care Instructions. \"  Current as of: December 17, 2020               Content Version: 12.8  © 9618-2588 Healthwise, Incorporated. Care instructions adapted under license by [employer] (which disclaims liability or warranty for this information). If you have questions about a medical condition or this instruction, always ask your healthcare professional. [address] any warranty or liability for your use of this information.

## 2021-04-29 ENCOUNTER — HOSPITAL ENCOUNTER (OUTPATIENT)
Dept: ULTRASOUND IMAGING | Age: 71
Discharge: HOME | End: 2021-04-29
Attending: INTERNAL MEDICINE
Payer: MEDICARE

## 2021-04-29 ENCOUNTER — HOSPITAL ENCOUNTER (OUTPATIENT)
Age: 71
Setting detail: OUTPATIENT SURGERY
Discharge: HOME | End: 2021-04-29
Attending: INTERNAL MEDICINE | Admitting: INTERNAL MEDICINE
Payer: MEDICARE

## 2021-04-29 VITALS
OXYGEN SATURATION: 96 % | BODY MASS INDEX: 36.88 KG/M2 | TEMPERATURE: 99.1 F | HEIGHT: 64 IN | DIASTOLIC BLOOD PRESSURE: 45 MMHG | HEART RATE: 70 BPM | SYSTOLIC BLOOD PRESSURE: 108 MMHG | RESPIRATION RATE: 12 BRPM | WEIGHT: 216 LBS

## 2021-04-29 DIAGNOSIS — E11.42 TYPE 2 DIABETES MELLITUS WITH DIABETIC POLYNEUROPATHY, WITHOUT LONG-TERM CURRENT USE OF INSULIN (HCC): Primary | ICD-10-CM

## 2021-04-29 DIAGNOSIS — R74.8 ACID PHOSPHATASE ELEVATED: ICD-10-CM

## 2021-04-29 DIAGNOSIS — R74.8 ELEVATED LIVER ENZYMES: ICD-10-CM

## 2021-04-29 DIAGNOSIS — K76.0 NAFLD (NONALCOHOLIC FATTY LIVER DISEASE): ICD-10-CM

## 2021-04-29 LAB — HBA1C MFR BLD HPLC: 8.3 %

## 2021-04-29 PROCEDURE — 88307 TISSUE EXAM BY PATHOLOGIST: CPT

## 2021-04-29 PROCEDURE — 76040000019: Performed by: INTERNAL MEDICINE

## 2021-04-29 PROCEDURE — 76942 ECHO GUIDE FOR BIOPSY: CPT | Performed by: INTERNAL MEDICINE

## 2021-04-29 PROCEDURE — 77030013826 HC NDL BIOP MAXCOR BARD -B: Performed by: INTERNAL MEDICINE

## 2021-04-29 PROCEDURE — 76942 ECHO GUIDE FOR BIOPSY: CPT

## 2021-04-29 PROCEDURE — 83036 HEMOGLOBIN GLYCOSYLATED A1C: CPT | Performed by: NURSE PRACTITIONER

## 2021-04-29 PROCEDURE — 74011250636 HC RX REV CODE- 250/636: Performed by: INTERNAL MEDICINE

## 2021-04-29 PROCEDURE — 88313 SPECIAL STAINS GROUP 2: CPT

## 2021-04-29 PROCEDURE — 47000 NEEDLE BIOPSY OF LIVER PERQ: CPT | Performed by: INTERNAL MEDICINE

## 2021-04-29 PROCEDURE — 77030014115: Performed by: INTERNAL MEDICINE

## 2021-04-29 RX ORDER — ONDANSETRON 2 MG/ML
4 INJECTION INTRAMUSCULAR; INTRAVENOUS
Status: DISCONTINUED | OUTPATIENT
Start: 2021-04-29 | End: 2021-04-29 | Stop reason: HOSPADM

## 2021-04-29 RX ORDER — SODIUM CHLORIDE 0.9 % (FLUSH) 0.9 %
5-40 SYRINGE (ML) INJECTION AS NEEDED
Status: DISCONTINUED | OUTPATIENT
Start: 2021-04-29 | End: 2021-04-29 | Stop reason: HOSPADM

## 2021-04-29 RX ORDER — HYDROMORPHONE HYDROCHLORIDE 1 MG/ML
1 INJECTION, SOLUTION INTRAMUSCULAR; INTRAVENOUS; SUBCUTANEOUS
Status: DISCONTINUED | OUTPATIENT
Start: 2021-04-29 | End: 2021-04-29 | Stop reason: HOSPADM

## 2021-04-29 RX ORDER — SODIUM CHLORIDE 0.9 % (FLUSH) 0.9 %
5-40 SYRINGE (ML) INJECTION EVERY 8 HOURS
Status: DISCONTINUED | OUTPATIENT
Start: 2021-04-29 | End: 2021-04-29 | Stop reason: HOSPADM

## 2021-04-29 RX ORDER — LIDOCAINE HYDROCHLORIDE 10 MG/ML
10 INJECTION INFILTRATION; PERINEURAL ONCE
Status: DISCONTINUED | OUTPATIENT
Start: 2021-04-29 | End: 2021-04-29 | Stop reason: HOSPADM

## 2021-04-29 RX ADMIN — HYDROMORPHONE HYDROCHLORIDE 1 MG: 1 INJECTION, SOLUTION INTRAMUSCULAR; INTRAVENOUS; SUBCUTANEOUS at 08:06

## 2021-04-29 NOTE — DISCHARGE INSTRUCTIONS
[address] [facility], MD, Agnes Stafford MD, MPH      [place], PA-C Vito Landau, [facility]     Coreen HUDSON [facility]   [place] [facility]       Avrilthai Beyer [facility] 136    at [address]    [address] [facility], [address] [address]ier  22.    [phone]    FAX: [address] [place] [address] [address], [address]    [phone]    FAX: [phone]         LIVER BIOPSY DISCHARGE INSTRUCTIONS      Deven Nadia  1950  Date: 4/29/2021    DIET:    Delorse Quiet may resume your previous diet. ACTIVITIES:  Rest quietly the rest of today. You should not lift any objects more than 20 pounds for the next 2 days. If you work sitting down without strenuous activity you may return to work tomorrow. If you exert yourself or do heavy lifting at work you should take tomorrow off. Do not drive or operate hazardous machinery for 12 hours after you are discharged from this procedure. SPECIAL INSTRUCTIONS:  Do not use any aspirin or non-steroidal (Motin, Advil, Naproxen, etc) pain medications for the next 2 days. You may use extra-strength Tylenol (acetaminophen) if you experience pain or discomfort later today. Restarting blood thinners: If you were taking blood thinners prior to the procedure you can restart these in 2 days. Call the [facility] [address] office if you experience any of the following:  Persistent or severe abdominal pain. Persistent or severe abdominal distention. Fever and chills   Nausea and vomiting. New or unusual symptoms. Follow-up care: You should have a follow up appointment with Dr. Trivedi First to review the results of the liver biopsy results in 2 weeks.   If you do not have an appointment please call the office at the number listed above to schedule this. Other instructions: If you have any problems or questions call the [address] office at the phone number listed above. DISCHARGE SUMMARY from Nurse: The following personal items collected during your admission are returned to you:   Dental Appliance: Dental Appliances: None  Vision: Visual Aid: None  Hearing Aid:    Jewelry:    Clothing:    Other Valuables:    Valuables sent to safe:            Learning About Coronavirus (COVID-19)  Coronavirus (COVID-19): Overview  What is coronavirus ([id number])? The coronavirus disease (COVID-19) is caused by a virus. It is an illness that was first found in [place], [place], in December 2019. It has since spread worldwide. The virus can cause fever, cough, and trouble breathing. In severe cases, it can cause pneumonia and make it hard to breathe without help. It can cause death. Coronaviruses are a large group of viruses. They cause the common cold. They also cause more serious illnesses like Middle East respiratory syndrome (MERS) and severe acute respiratory syndrome (SARS). COVID-19 is caused by a novel coronavirus. That means it's a new type that has not been seen in people before. This virus spreads person-to-person through droplets from coughing and sneezing. It can also spread when you are close to someone who is infected. And it can spread when you touch something that has the virus on it, such as a doorknob or a tabletop. What can you do to protect yourself from coronavirus (COVID-19)? The best way to protect yourself from getting sick is to:  · Avoid areas where there is an outbreak. · Avoid contact with people who may be infected. · Wash your hands often with soap or alcohol-based hand sanitizers. · Avoid crowds and try to stay at least 6 feet away from other people. · Wash your hands often, especially after you cough or sneeze.  Use soap and water, and scrub for at least 20 seconds. If soap and water aren't available, use an alcohol-based hand . · Avoid touching your mouth, nose, and eyes. What can you do to avoid spreading the virus to others? To help avoid spreading the virus to others:  · Cover your mouth with a tissue when you cough or sneeze. Then throw the tissue in the trash. · Use a disinfectant to clean things that you touch often. · Stay home if you are sick or have been exposed to the virus. Don't go to school, work, or public areas. And don't use public transportation. · If you are sick:  ? Leave your home only if you need to get medical care. But call the doctor's office first so they know you're coming. And wear a face mask, if you have one.  ? If you have a face mask, wear it whenever you're around other people. It can help stop the spread of the virus when you cough or sneeze. ? Clean and disinfect your home every day. Use household  and disinfectant wipes or sprays. Take special care to clean things that you grab with your hands. These include doorknobs, remote controls, phones, and handles on your refrigerator and microwave. And don't forget countertops, tabletops, bathrooms, and computer keyboards. When to call for help  Call 911 anytime you think you may need emergency care. For example, call if:  · You have severe trouble breathing. (You can't talk at all.)  · You have constant chest pain or pressure. · You are severely dizzy or lightheaded. · You are confused or can't think clearly. · Your face and lips have a blue color. · You pass out (lose consciousness) or are very hard to wake up. Call your doctor now if you develop symptoms such as:  · Shortness of breath. · Fever. · Cough. If you need to get care, call ahead to the doctor's office for instructions before you go. Make sure you wear a face mask, if you have one, to prevent exposing other people to the virus. Where can you get the latest information?   The following health organizations are tracking and studying this virus. Their websites contain the most up-to-date information. Malik Hamm also learn what to do if you think you may have been exposed to the virus. · U.S. Centers for Disease Control and Prevention (CDC): The CDC provides updated news about the disease and travel advice. The website also tells you how to prevent the spread of infection. www.cdc.gov  · World Health Organization [facility]): WHO offers information about the virus outbreaks. WHO also has travel advice. www.who.int  Current as of: April 1, 2020               Content Version: 12.4  © 4092-8814 Healthwise, Incorporated. Care instructions adapted under license by your healthcare professional. If you have questions about a medical condition or this instruction, always ask your healthcare professional. [address] any warranty or liability for your use of this information.

## 2021-04-29 NOTE — H&P
Rayshawn Westfall MD, Carito Moreno MD, MPH      Inna Rowland, NIA Allan, [facility]     Coreen Herrera, [facility]   [facility], JUAQUIN-ELIZABETH Mari, [facility]       Avril Deputado [address]    at [address], [address] Ronaldo Frazier  22.    [phone]    FAX: [address] [place] [address], [address]    [phone]    FAX: [phone]         PRE-PROCEDURE NOTE - LIVER BIOPSY    H and P from last office visit reviewed. Allergies reviewed. Out-patient medication list reviewed.     Patient Active Problem List   Diagnosis Code    Hypercholesterolemia E78.00    Osteoarthritis of hip M16.9    PMR (polymyalgia rheumatica) (HCC) M35.3    Chronic bronchitis ([facility] J42    Former smoker Z87.891    Chronic pain G89.29    Type 2 diabetes mellitus with diabetic polyneuropathy, without long-term current use of insulin ([facility] E11.42    Essential hypertension I10    Seronegative rheumatoid arthritis of multiple sites ([facility] M06.09    MGUS (monoclonal gammopathy of unknown significance) D47.2    Vitamin D deficiency E55.9    Long-term use of immunosuppressant medication Z79.899    CAD (coronary artery disease) I25.10    Lung nodule seen on imaging study R91.1    Idiopathic chronic gout, multiple sites, with tophus (tophi) M1A.09X1    Hypertriglyceridemia E78.1    Elevated liver enzymes R74.8       Allergies   Allergen Reactions    Augmentin [Amoxicillin-Pot Clavulanate] Diarrhea    Bactrim [Sulfamethoxazole-Trimethoprim] Nausea Only    Biaxin [Clarithromycin] Nausea Only    Demerol [Meperidine] Itching    Erythromycin Nausea Only    Gabapentin Nausea and Vomiting    Pcn [Penicillins] Hives    Percocet [Oxycodone-Acetaminophen] Itching    Pravastatin Nausea Only    Tetracycline Nausea Only    Voltaren [Diclofenac Sodium] Hives       No current facility-administered medications on file prior to encounter. Current Outpatient Medications on File Prior to Encounter   Medication Sig Dispense Refill    multivit-min/iron/folic/lutein (CENTRUM SILVER WOMEN PO) Take 1 Tab by mouth daily.  cholecalciferol (Vitamin D3) 25 mcg (1,000 unit) cap Take 1,000 Units by mouth daily.  ascorbic acid, vitamin C, (Vitamin C) 1,000 mg tablet Take 1,000 mg by mouth daily.  cyanocobalamin, vitamin B-12, 5,000 mcg cap Take 5,000 mcg by mouth daily.  fluticasone propion-salmeteroL (Advair Diskus) 250-50 mcg/dose diskus inhaler Take 1 Puff by inhalation as needed.  Lactobacillus acidophilus (PROBIOTIC PO) Take  by mouth. Takes one po once daily.  dilTIAZem ER (TIAZAC) 420 mg capsule TAKE 1 CAPSULE EVERY DAY 90 Cap 3    pregabalin (LYRICA) 200 mg capsule TAKE ONE CAPSULE BY MOUTH THREE TIMES A DAY (MAX 3 PER DAY) 90 Cap 0    potassium chloride (KLOR-CON) 10 mEq tablet TAKE 1 TABLET EVERY DAY 90 Tab 3    esomeprazole (NEXIUM) 40 mg capsule Take 1 Cap by mouth daily. Indications: gastroesophageal reflux disease, hemorrhagic gastritis 30 Cap 5    metFORMIN (GLUCOPHAGE) 500 mg tablet TAKE TWO TABLETS BY MOUTH TWICE A DAY WITH A MEAL 360 Tab 3    atorvastatin (LIPITOR) 20 mg tablet TAKE 1 TABLET EVERY DAY 90 Tab 3    losartan-hydroCHLOROthiazide (HYZAAR) 100-25 mg per tablet TAKE 1 TABLET EVERY DAY 90 Tab 3    tiotropium (Spiriva with HandiHaler) 18 mcg inhalation capsule Take 1 Cap by inhalation daily. 90 Cap 0    albuterol (PROVENTIL VENTOLIN) 2.5 mg /3 mL (0.083 %) nebu 3 mL by Nebulization route every six (6) hours as needed for Wheezing.  60 Each 0    albuterol (PROVENTIL HFA, VENTOLIN HFA, PROAIR HFA) 90 mcg/actuation inhaler Take 2 Puffs by inhalation every six (6) hours as needed for Wheezing or Shortness of Breath. 1 Inhaler 1    aspirin delayed-release 81 mg tablet Take 81 mg by mouth daily.  fluticasone propion-salmeterol (ADVAIR/WIXELA) 250-50 mcg/dose diskus inhaler Take 1 Puff by inhalation every twelve (12) hours. 3 Inhaler 3    folic acid (FOLVITE) 1 mg tablet Take 1 Tab by mouth daily. 90 Tab 0    Cetirizine (ZYRTEC) 10 mg Cap Take 10 mg by mouth daily.  ONDANSETRON PO Take  by mouth as needed.  glucose blood VI test strips (blood glucose test) strip Use to check blood sugar up to 3 times daily. E11.65 100 Strip 11    Blood-Glucose Meter monitoring kit Use to check blood sugar up to 3 times daily. E11.65 1 Kit 0    lancets misc Use to check blood sugar up to 3 times daily. E11.65 1 Each 11    Lancets (ACCU-CHEK SOFTCLIX LANCETS) misc Test blood sugar twice daily. DX: E11.9. Substitute supply brand accepted by insurance. 1 Each 11    alcohol swabs (ALCOHOL WIPES) padm Test blood sugar twice daily. DX: E11.9. Substitute supply brand accepted by insurance. 100 Pad 1       For liver biopsy to assess Elevated liver enzymes. Suspect NALFD. The risks of the procedure were discussed with the patient. This included bleeding, pain, and puncture of other organs. All questions were answered. The patient wishes to proceed with the procedure. The patient was counseled at length about the risks of ronnell Covid-19 in the lynn-operative and post-operative states including the recovery window of their procedure. The patient was made aware that ronnell Covid-19 after a surgical procedure may worsen their prognosis for recovering from the virus and lend to a higher morbidity and or mortality risk. The patient was given the options of postponing their procedure. All of the risks, benefits, and alternatives were discussed. The patient does  wish to proceed with the procedure.       PHYSICAL EXAMINATION:  Visit Vitals  BP (!) 131/57   Pulse 76   Temp 99.1 °F (37.3 °C)   Resp 17   Ht 5' 4\" (1.626 m)   Wt 216 lb (98 kg)   SpO2 95%   Breastfeeding No   BMI 37.08 kg/m²       General: No acute distress. Eyes: Sclera anicteric. ENT: No oral lesions. Thyroid normal.  Nodes: No adenopathy. Skin: No spider angiomata. No jaundice. No palmar erythema. Respiratory: Lungs clear to auscultation. Cardiovascular: Regular heart rate. No murmurs. No JVD. Abdomen: Soft non-tender, liver size normal to percussion/palpation. Spleen not palpable. No obvious ascites. Extremities: No edema. No muscle wasting. No gross arthritic changes. Neurologic: Alert and oriented. Cranial nerves grossly intact. No asterixis. LABS:  Lab Results   Component Value Date/Time    WBC 8.2 02/23/2021 02:50 PM    WBC 8.9 05/09/2012 12:23 PM    HGB 13.5 02/23/2021 02:50 PM    HCT 42.0 02/23/2021 02:50 PM    PLATELET 907 01/60/3933 02:50 PM    MCV 87.7 02/23/2021 02:50 PM     Lab Results   Component Value Date/Time    INR 1.0 02/23/2021 02:50 PM    Prothrombin time 10.9 02/23/2021 02:50 PM       ASSESSMENT AND PLAN:  Liver biopsy under ultrasound guidance.       Benny Cote MD  [facility] [address], [address] [place] [address] 22.  [phone]  [address]

## 2021-04-29 NOTE — PROCEDURES
Miguel Viera MD, [address], Cite Ivan Collins, Leighton Tello MD, MPH      Jeanette Fernandez, PA-ELIZABETH Davenport, [facility]     April S Sharon, [facility]   [facility]    Latimer Duane, [facility]       Avril [facility] 136    at [place] [address] [address] [facility], [address] Ronaldo Frazier  22.    [phone]    FAX: [address] [place] [address], [address] [place] [address] - [address]    [phone]    FAX: [phone]         LIVER BIOPSY PROCEDURE NOTE    Augustine Grant  1950    INDICATIONS/PRE-OPERATIVE  DIAGNOSIS:  NAFLD    : Luis Bah MD    SURGICAL ASSISTANT:  None    PROSTHETIC DEVICES, TISSUE GRAFTS, TRANSPLANTED ORGANS:  Not applicable    SEDATION: 1% Lidocaine injection 15 ml    PROCEDURE:  Informed consent to perform the procedure was obtained from the patient. The patient was positioned on the edge of the stretcher lying flat in the supine position. Ultrasound was utilized to image the liver. The diaphragm and any major mass lesion or vascular structures within the liver were identified. An appropriate site for liver biopsy was identified. The distance from the surface of the skin to the liver capsule was 5 cm. This area was prepped with betadine and draped in sterile fashion. The skin was infiltrated with 1% lidocaine. The deeper subcutanous tissues and liver capsule overlying the biopsy site were then infiltrated with 1% lidocaine until appropriate anesthesia was obtained. A small incision was made in the skin so the biopsy devise could be easily inserted. A total of 2 passes with the 16 gauge Bard biopsy devise was then made into the liver. Core(s) of liver tissue totaling 4 cm in length were obtained and placed into tissue fixative. A band aid was placed over the biopsy site. The patient was then repositioned on the right side and transported to the recovery area on the stretcher for routine monitoring until discharge. The specimen was sent to pathology for processing via the normal transport mechanism. SPECIMEN COLLECTED: Liver    INTERVENTIONS:  None    ESTIMATED BLOOD LOSS: Negligible.      POST-OPERATIVE DIAGNOSIS: Same as Pre-operative Diagnosis      Cj Glass MD [facility], [address] [facility] 22.  [phone]  [address] [place] [address]

## 2021-04-30 ENCOUNTER — TELEPHONE (OUTPATIENT)
Dept: FAMILY MEDICINE CLINIC | Age: 71
End: 2021-04-30

## 2021-04-30 NOTE — TELEPHONE ENCOUNTER
Pharmacy Progress Note - Telephone Call    Ms. Glez Rising 72 y.o. was contacted via an outbound telephone call regarding scheduling DM management visit today. A voicemail was left for patient to return my call. This writer's work mobile number was provided as a callback contact - [phone].       Sherren Boos, PharmD, [facility]  Clinical Pharmacist Specialist

## 2021-04-30 NOTE — PROGRESS NOTES
Assessment/Plan:     Diagnoses and all orders for this visit:    1. Type 2 diabetes mellitus with diabetic polyneuropathy, without long-term current use of insulin ([facility])  -     glipiZIDE SR (GLUCOTROL XL) 2.5 mg CR tablet; Take 1 Tab by mouth daily. -     dulaglutide (Trulicity) 1.5 NX/8.9 mL sub-q pen; 1 mL by SubCUTAneous route every seven (7) days.  -     REFERRAL TO PHARMACIST  A1c improved but not to goal.  There is some volatility to her blood sugars at this time. We will decrease glipizide as above and increase Trulicity. We will get pharmacy on board as she has cost issues as well as compliance issues. 2. Chronic pain disorder  -     COMPLIANCE DRUG SCREEN/PRESCRIPTION MONITORING; Future  -     HYDROcodone-acetaminophen (NORCO)  mg tablet; Take 1 Tab by mouth every eight (8) hours as needed for Pain for up to 30 days. Max Daily Amount: 3 Tabs. Stable on current therapy which was refilled for 3 months today.  reviewed and appropriate. She has previously failed other therapies including low-dose opioids. 3. PMR (polymyalgia rheumatica) ([facility])  Stable and managed by rheumatology. 4. Chronic bronchitis, unspecified chronic bronchitis type ([facility] [address]  She has had full smoking cessation at this time. She is followed by pulmonology. 5. Chronic gout without tophus, unspecified cause, unspecified site  -     allopurinoL (ZYLOPRIM) 300 mg tablet; Take 1 Tab by mouth daily. She will restart and continue allopurinol as above. We have discussed the importance of utilizing allopurinol routinely. She is not a candidate for Colcrys due to concomitant use of diltiazem. She is not a candidate for NSAID therapy. We have discussed steroid injection however she declines this option as well. Follow-up and Dispositions    · Return in about 3 months (around 7/28/2021). Discussed expected course/resolution/complications of diagnosis in detail with patient.     Medication risks/benefits/costs/interactions/alternatives discussed with patient. Pt was given after visit summary which includes diagnoses, current medications & vitals. Pt expressed understanding with the diagnosis and plan          Subjective:      Fabricio Diaz is a 72 y.o. female who presents for had concerns including Gout (RT foot painful ) and Diabetes. Chronic Pain  Fabricio Diaz RTC today to follow up on chronic pain diagnosis.  We discussed her osteoarthritis that is affecting her bilateral hips, left shoulder and lumbar back.  We discussed her seronegative rheumatoid arthritis affecting her bilateral hands.  Significant changes since last visit: none.  She is  able to do her normal daily activities.  She reports the following adverse side effects: none. Previous treatments include heat, cold, Lidocaine patches otc, Tramadol, methotrexate, cortisone injection which all provided minimal relief.  Not a candidate for oral NSAIDS. She is followed by rheumatology.     Gout HPI  Fabricio Diaz is an 72 y.o.  female who presents for follow-up of gout. Past gout history: acute gouty arthritis. Current gout treatment: none. Side effects of current therapy: none. Progress since last visit: gouty attacks are acute. The patient reports current attack in the right foot lasting several days. The patient is not attempting to avoid high purine foods and alcohol. She admits to a very high purine diet at the onset of this most recent attack. She has never started the Allopurinol. She has been filling the prescription and started taking several days ago. Patient is a 72 y.o. female that comes today for follow up of Diabetes Mellitus Type 2. Patient does not regularly monitor  their FSBG at home.  The fasting plasma glucose (fpg) usually runs around  mg/L. not attempting to follow a low fat, low cholesterol diet  Patients activity level: sedentery  there was no change in patient's weight. .  The patient has experienced recent hypoglycemia in the morning with a blood sugar of 90. reports that she has been smoking cigarettes. She has a 20.00 pack-year smoking history. She has never used smokeless tobacco.    Lab Results   Component Value Date/Time    Hemoglobin A1c 10.0 (H) 01/29/2021 12:16 PM    Hemoglobin A1c 9.8 (H) 10/08/2020 12:05 AM    Hemoglobin A1c 8.1 (H) 12/13/2019 03:32 PM         Patient Active Problem List   Diagnosis Code    Hypercholesterolemia E78.00    Osteoarthritis of hip M16.9    PMR (polymyalgia rheumatica) ([facility]) M35.3    Chronic bronchitis ([facility] [address] J42    Former smoker Z87.891    Chronic pain G89.29    Type 2 diabetes mellitus with diabetic polyneuropathy, without long-term current use of insulin ([facility] [address] E11.42    Essential hypertension I10    Seronegative rheumatoid arthritis of multiple sites [facility] M06.09    MGUS (monoclonal gammopathy of unknown significance) D47.2    Vitamin D deficiency E55.9    Long-term use of immunosuppressant medication Z79.899    CAD (coronary artery disease) I25.10    Lung nodule seen on imaging study R91.1    Idiopathic chronic gout, multiple sites, with tophus (tophi) M1A.09X1    Hypertriglyceridemia E78.1    Elevated liver enzymes R74.8       Current Outpatient Medications   Medication Sig Dispense Refill    glipiZIDE SR (GLUCOTROL XL) 2.5 mg CR tablet Take 1 Tab by mouth daily. 30 Tab 3    dulaglutide (Trulicity) 1.5 LI/4.5 mL sub-q pen 1 mL by SubCUTAneous route every seven (7) days. 12 Each 1    allopurinoL (ZYLOPRIM) 300 mg tablet Take 1 Tab by mouth daily. 90 Tab 3    [START ON 6/28/2021] HYDROcodone-acetaminophen (NORCO)  mg tablet Take 1 Tab by mouth every eight (8) hours as needed for Pain for up to 30 days. Max Daily Amount: 3 Tabs. 90 Tab 0    multivit-min/iron/folic/lutein (CENTRUM SILVER WOMEN PO) Take 1 Tab by mouth daily.       cholecalciferol (Vitamin D3) 25 mcg (1,000 unit) cap Take 1,000 Units by mouth daily.  ascorbic acid, vitamin C, (Vitamin C) 1,000 mg tablet Take 1,000 mg by mouth daily.  cyanocobalamin, vitamin B-12, 5,000 mcg cap Take 5,000 mcg by mouth daily.  fluticasone propion-salmeteroL (Advair Diskus) 250-50 mcg/dose diskus inhaler Take 1 Puff by inhalation as needed.  Lactobacillus acidophilus (PROBIOTIC PO) Take  by mouth. Takes one po once daily.  ONDANSETRON PO Take  by mouth as needed.  dilTIAZem ER (TIAZAC) 420 mg capsule TAKE 1 CAPSULE EVERY DAY 90 Cap 3    pregabalin (LYRICA) 200 mg capsule TAKE ONE CAPSULE BY MOUTH THREE TIMES A DAY (MAX 3 PER DAY) 90 Cap 0    glucose blood VI test strips (blood glucose test) strip Use to check blood sugar up to 3 times daily. E11.65 100 Strip 11    Blood-Glucose Meter monitoring kit Use to check blood sugar up to 3 times daily. E11.65 1 Kit 0    lancets misc Use to check blood sugar up to 3 times daily. E11.65 1 Each 11    potassium chloride (KLOR-CON) 10 mEq tablet TAKE 1 TABLET EVERY DAY 90 Tab 3    esomeprazole (NEXIUM) 40 mg capsule Take 1 Cap by mouth daily. Indications: gastroesophageal reflux disease, hemorrhagic gastritis 30 Cap 5    metFORMIN (GLUCOPHAGE) 500 mg tablet TAKE TWO TABLETS BY MOUTH TWICE A DAY WITH A MEAL 360 Tab 3    atorvastatin (LIPITOR) 20 mg tablet TAKE 1 TABLET EVERY DAY 90 Tab 3    losartan-hydroCHLOROthiazide (HYZAAR) 100-25 mg per tablet TAKE 1 TABLET EVERY DAY 90 Tab 3    tiotropium (Spiriva with HandiHaler) 18 mcg inhalation capsule Take 1 Cap by inhalation daily. 90 Cap 0    albuterol (PROVENTIL VENTOLIN) 2.5 mg /3 mL (0.083 %) nebu 3 mL by Nebulization route every six (6) hours as needed for Wheezing. 60 Each 0    albuterol (PROVENTIL HFA, VENTOLIN HFA, PROAIR HFA) 90 mcg/actuation inhaler Take 2 Puffs by inhalation every six (6) hours as needed for Wheezing or Shortness of Breath. 1 Inhaler 1    aspirin delayed-release 81 mg tablet Take 81 mg by mouth daily.       fluticasone propion-salmeterol (ADVAIR/WIXELA) 250-50 mcg/dose diskus inhaler Take 1 Puff by inhalation every twelve (12) hours. 3 Inhaler 3    folic acid (FOLVITE) 1 mg tablet Take 1 Tab by mouth daily. 90 Tab 0    Lancets (ACCU-CHEK SOFTCLIX LANCETS) misc Test blood sugar twice daily. DX: E11.9. Substitute supply brand accepted by insurance. 1 Each 11    alcohol swabs (ALCOHOL WIPES) padm Test blood sugar twice daily. DX: E11.9. Substitute supply brand accepted by insurance. 100 Pad 1    Cetirizine (ZYRTEC) 10 mg Cap Take 10 mg by mouth daily. Allergies   Allergen Reactions    Augmentin [Amoxicillin-Pot Clavulanate] Diarrhea    Bactrim [Sulfamethoxazole-Trimethoprim] Nausea Only    Biaxin [Clarithromycin] Nausea Only    Demerol [Meperidine] Itching    Erythromycin Nausea Only    Gabapentin Nausea and Vomiting    Pcn [Penicillins] Hives    Percocet [Oxycodone-Acetaminophen] Itching    Pravastatin Nausea Only    Tetracycline Nausea Only    Voltaren [Diclofenac Sodium] Hives       ROS:   Review of Systems   Constitutional: Positive for malaise/fatigue. Eyes: Negative for blurred vision. Respiratory: Positive for wheezing. Negative for shortness of breath. Cardiovascular: Negative for chest pain. Musculoskeletal: Positive for joint pain. Objective:     Visit Vitals  /66 (BP 1 Location: Right upper arm, BP Patient Position: Sitting, BP Cuff Size: Large adult)   Pulse 83   Temp 97.7 °F (36.5 °C) (Temporal)   Resp 16   Ht 5' 4\" (1.626 m)   Wt 216 lb (98 kg)   SpO2 93%   BMI 37.08 kg/m²       Vitals and Nurse Documentation reviewed. Physical Exam  Constitutional:       General: She is not in acute distress. Appearance: She is obese. Cardiovascular:      Heart sounds: S1 normal and S2 normal. No murmur. No friction rub. No gallop. Pulmonary:      Effort: No respiratory distress. Breath sounds: Wheezing present.    Musculoskeletal:      Right foot: Decreased range of motion. Tenderness and swelling present. Skin:     General: Skin is warm and dry.    Psychiatric:         Mood and Affect: Mood and affect normal.

## 2021-05-19 ENCOUNTER — TELEPHONE (OUTPATIENT)
Dept: FAMILY MEDICINE CLINIC | Age: 71
End: 2021-05-19

## 2021-05-19 NOTE — TELEPHONE ENCOUNTER
Pharmacy Progress Note - Telephone Encounter    S/O: Ms. Merchant Killer 72 y.o. female, referred by Dr. Rama Parish, NP, was contacted via an inbound telephone call to discuss scheduling DM edu/managment visit today. Verified patients identifiers (name & ) per HIPAA policy. - Been on the higher dose of Trulicity x2 doses. Denies GI side effects. States that she is paying approx $45/mo for med. - Fasting BG rdgs have been in the 120s    Prescription Assistance Eligibility Screening:   - Total # People in Household: 2  - Income: approx $36,000  - Likely eligible: YES      A/P:  - Scheduled visit with PharmD  at [place] for Trulicity PAP  - Pt will bring BG rdgs, meds, and proof of income  - Patient endorses understanding to the provided information. All questions answered at this time. There are no discontinued medications. No orders of the defined types were placed in this encounter. Willa Jimenez, PharmD, BCGP  Clinical Pharmacist Specialist      For Pharmacy Admin Tracking Only     CPA in place:  Yes   Recommendation Provided To: Patient/Caregiver: 1 via Telephone   Intervention Detail: Scheduled Appointment   Gap Closed?: No   Total # of Interventions Recommended: 1   Total # of Interventions Accepted: 1   Intervention Accepted By: Patient/Caregiver: 1   Time Spent (min): 10

## 2021-05-21 ENCOUNTER — OFFICE VISIT (OUTPATIENT)
Dept: HEMATOLOGY | Age: 71
End: 2021-05-21
Payer: MEDICARE

## 2021-05-21 VITALS
WEIGHT: 216.6 LBS | SYSTOLIC BLOOD PRESSURE: 147 MMHG | RESPIRATION RATE: 18 BRPM | OXYGEN SATURATION: 96 % | HEART RATE: 79 BPM | HEIGHT: 64 IN | DIASTOLIC BLOOD PRESSURE: 56 MMHG | TEMPERATURE: 97.8 F | BODY MASS INDEX: 36.98 KG/M2

## 2021-05-21 DIAGNOSIS — K74.3 PRIMARY BILIARY CHOLANGITIS (HCC): Primary | ICD-10-CM

## 2021-05-21 PROCEDURE — G8432 DEP SCR NOT DOC, RNG: HCPCS | Performed by: INTERNAL MEDICINE

## 2021-05-21 PROCEDURE — 1090F PRES/ABSN URINE INCON ASSESS: CPT | Performed by: INTERNAL MEDICINE

## 2021-05-21 PROCEDURE — G8399 PT W/DXA RESULTS DOCUMENT: HCPCS | Performed by: INTERNAL MEDICINE

## 2021-05-21 PROCEDURE — G8417 CALC BMI ABV UP PARAM F/U: HCPCS | Performed by: INTERNAL MEDICINE

## 2021-05-21 PROCEDURE — 99214 OFFICE O/P EST MOD 30 MIN: CPT | Performed by: INTERNAL MEDICINE

## 2021-05-21 PROCEDURE — G8753 SYS BP > OR = 140: HCPCS | Performed by: INTERNAL MEDICINE

## 2021-05-21 PROCEDURE — 3017F COLORECTAL CA SCREEN DOC REV: CPT | Performed by: INTERNAL MEDICINE

## 2021-05-21 PROCEDURE — G8427 DOCREV CUR MEDS BY ELIG CLIN: HCPCS | Performed by: INTERNAL MEDICINE

## 2021-05-21 PROCEDURE — G9899 SCRN MAM PERF RSLTS DOC: HCPCS | Performed by: INTERNAL MEDICINE

## 2021-05-21 PROCEDURE — G8536 NO DOC ELDER MAL SCRN: HCPCS | Performed by: INTERNAL MEDICINE

## 2021-05-21 PROCEDURE — 1101F PT FALLS ASSESS-DOCD LE1/YR: CPT | Performed by: INTERNAL MEDICINE

## 2021-05-21 PROCEDURE — G8754 DIAS BP LESS 90: HCPCS | Performed by: INTERNAL MEDICINE

## 2021-05-21 NOTE — PROGRESS NOTES
Identified pt with two pt identifiers(name and ). Reviewed record in preparation for visit and have obtained necessary documentation. Chief Complaint   Patient presents with    Follow-up      Vitals:    21 1633   BP: (!) 147/56   Pulse: 79   Resp: 18   Temp: 97.8 °F (36.6 °C)   TempSrc: Temporal   SpO2: 96%   Weight: 216 lb 9.6 oz (98.2 kg)   Height: 5' 4\" (1.626 m)   PainSc:   0 - No pain       Health Maintenance Review: Patient reminded of \"due or due soon\" health maintenance. I have asked the patient to contact his/her primary care provider (PCP) for follow-up on his/her health maintenance. Coordination of Care Questionnaire:  :   1) Have you been to an emergency room, urgent care, or hospitalized since your last visit? If yes, where when, and reason for visit? no       2. Have seen or consulted any other health care provider since your last visit? If yes, where when, and reason for visit? NO      Patient is accompanied by self I have received verbal consent from Mckinley Shine to discuss any/all medical information while they are present in the room.

## 2021-05-21 NOTE — Clinical Note
6/6/2021 Patient: Amaris Sauer YOB: 1950 Date of Visit: 5/21/2021 Alicia Patino NP 
[address] [id number] [address] Dear Alicia Patino NP, Thank you [facility]ingo [facility]. My notes for this consultation are attached. If you have questions, please do not hesitate to call me. I look forward to following your patient along with you. Sincerely, Emili Hays MD

## 2021-05-27 ENCOUNTER — TELEPHONE (OUTPATIENT)
Dept: FAMILY MEDICINE CLINIC | Age: 71
End: 2021-05-27

## 2021-05-27 NOTE — TELEPHONE ENCOUNTER
Pharmacy Progress Note - Telephone Call    Ms. Jorge Bassett 72 y.o. was contacted via an outbound telephone call regarding rescheduling visit today. A voicemail was left for patient to return my call. This writer's work mobile number was provided as a callback contact - [phone]. Received voicemail on work cell from pt asking to reschedule visit. She has a conflict.       Sarah Ontiveros, PharmD, BCGP  Clinical Pharmacist Specialist

## 2021-05-27 NOTE — TELEPHONE ENCOUNTER
Pharmacy Progress Note - Telephone Encounter    S/O: Ms. Mckinley Shine 72 y.o. female, referred by Dr. Carole Frank, CARLOS EDUARDO, contacted this writer via inbound telephone call to discuss scheduled appt today. Verified patients identifiers (name & ) per HIPAA policy. - Pt left a voicemail in this writer's phone stating she was unable to make appointment tomorrow. Attempted to call back in order to reschedule. Pt unreachable at that time. LVM. ePri Baires, PharmD, BCGP  Clinical Pharmacist Specialist      For Pharmacy Admin Tracking Only     CPA in place:  Yes   Time Spent (min): 5

## 2021-06-04 ENCOUNTER — TELEPHONE (OUTPATIENT)
Dept: FAMILY MEDICINE CLINIC | Age: 71
End: 2021-06-04

## 2021-06-04 NOTE — TELEPHONE ENCOUNTER
Pharmacy Progress Note - Telephone Call    Ms. Ignacio Haw 72 y.o. was contacted via an outbound telephone call regarding rescheduling medication PAP visit for Trulicity today. A voicemail was left for patient to return my call. This writer's work mobile number was provided as a callback contact - [phone].       René Poe, PharmD, [facility]  Clinical Pharmacist Specialist

## 2021-06-06 PROBLEM — K74.3 PRIMARY BILIARY CHOLANGITIS (HCC): Status: ACTIVE | Noted: 2021-03-01

## 2021-06-06 PROBLEM — K76.0 NAFL (NONALCOHOLIC FATTY LIVER): Status: ACTIVE | Noted: 2021-06-06

## 2021-06-06 RX ORDER — URSODIOL 500 MG/1
500 TABLET, FILM COATED ORAL 2 TIMES DAILY
Qty: 60 TABLET | Refills: 3 | Status: SHIPPED | OUTPATIENT
Start: 2021-06-06

## 2021-06-06 NOTE — PROGRESS NOTES
[facility] [address] [facility] [address]      Queenie Roy MD, Frankie Dance, Jessica Bravo MD, MPH      Shane Garza, NIA Garcia Ma, ACNP-BC     Coreen Herrera, [facility]   Lexy Bennett, FNP-C    James Chaney, [facility]       Avril Beyer [facility] 136    at [address], [address] [place] [address], [facility] 22.    [phone]    FAX: [address] [place] [address], [address]    [phone]    FAX: [phone]       Patient Care Team:  Geovany Cervantes NP as PCP - General (Nurse Practitioner)  Geovany Cervantes NP as PCP - [address] [place] [address] Provider  Glory Mello MD as Physician (Orthopedic Surgery)  Abrahan Perez MD (Pulmonary Disease)      Problem List  Date Reviewed: 4/30/2021        Codes Class Noted    NAFL (nonalcoholic fatty liver) OBX-67-QY: K76.0  ICD-9-CM: 571.8  6/6/2021        Primary biliary cholangitis ([facility] ICD-10-CM: K74.3  ICD-9-CM: 571.6  3/1/2021        Hypertriglyceridemia ICD-10-CM: E78.1  ICD-9-CM: 272.1  12/17/2019        Idiopathic chronic gout, multiple sites, with tophus (tophi) ICD-10-CM: A5J.76D6  ICD-9-CM: 274.03  11/19/2019        CAD (coronary artery disease) ICD-10-CM: I25.10  ICD-9-CM: 414.00  5/7/2019        Lung nodule seen on imaging study ICD-10-CM: R91.1  ICD-9-CM: 793.11  5/7/2019    Overview Signed 5/7/2019  1:10 PM by Grant Mcintosh NP     Repeat in 2020.               Long-term use of immunosuppressant medication ICD-10-CM: Z79.899  ICD-9-CM: V58.69  11/27/2018        Seronegative rheumatoid arthritis of multiple sites [facility]) ICD-10-CM: M06.09  ICD-9-CM: 714.0  10/15/2018        MGUS (monoclonal gammopathy of unknown significance) ICD-10-CM: D47.2  ICD-9-CM: 273.1  10/15/2018        Vitamin D deficiency ICD-10-CM: E55.9  ICD-9-CM: 268.9  10/15/2018        Essential hypertension ICD-10-CM: I10  ICD-9-CM: 401.9  9/11/2018        Type 2 diabetes mellitus with diabetic polyneuropathy, without long-term current use of insulin ([facility] ICD-10-CM: E11.42  ICD-9-CM: 250.60, 357.2  12/21/2017        Chronic pain ICD-10-CM: G89.29  ICD-9-CM: 338.29  7/31/2017    Overview Signed 7/31/2017 11:11 AM by Jayden Neal     bilateral hip arthritis, chronic low back pain             Former smoker ICD-10-CM: Z87.891  ICD-9-CM: V15.82  10/24/2016        Chronic bronchitis ([facility] ICD-10-CM: E38  ICD-9-CM: 491.9  9/28/2016        PMR (polymyalgia rheumatica) ([facility] ICD-10-CM: M35.3  ICD-9-CM: 355  5/18/2016        Osteoarthritis of hip ICD-10-CM: M16.9  ICD-9-CM: 715.95  Unknown    Overview Signed 1/29/2016 12:41 PM by Jayden Neal     right             Hypercholesterolemia ICD-10-CM: E78.00  ICD-9-CM: 272.0  Unknown              Patricia Swain is being seen at [address] for management of Primary Biliary Cholangitis and NAFL (non-alocholic fatty liver). elevated liver enzymes. The active problem list, all pertinent past medical history, medications, radiologic findings and laboratory findings related to the liver disorder were reviewed and discussed with the patient. The patient is a 72 y.o. female who was found to have elevated alkaline phosphatase in 10/2020. Serologic evaluation for markers of chronic liver disease was positive for ASMA. The patient underwent a liver biopsy in 5/2021. The procedure was well tolerated. I have personally reviewed and interpreted the liver biopsy slides. This demonstrates bile duct changes consistent with PBC, 25-33% steatosis without ballooning consistent with NAFL and stage 1 portal fibrosis.     The patient has the following symptoms which are thought to be due to the liver disease:  fatigue,     The patient is not currently experiencing the following symptoms of liver disease:  pain in the right side over the liver,     The patient mild has limitations in functional activities which can be attributed to other medical problems that are not related to the liver disease. ASSESSMENT AND PLAN:  Primary Biliary Cholangitis  The diagnosis is based upon liver biopsy, and an elevation in ALP. Serology is positive for ASMA    A liver biopsy was performed in 5/2021. This demonstrates bile duct changes consistent with PBC and   stage 1 portal fibrosis. Assessment of liver fibrosis was performed with Fibroscan in 3/2021. The result was 24 kPa suggesting cirrhosis. The CAP score of 393 suggested this may be due to fatty liver. Liver transaminases are normal.  ALP is elevated. Liver function is normal.  The platelet count is   normal.      The patient will be started on AMINA at a dose of 1000 mg every day. The side effects of AMINA including a 2% risk of itching and a 2% risk of diarrhea were discussed. Benign steatosis/NAFL  The diagnosis is based upon liver biopsy, Fiboscan CAP score, features of metabolic syndrome,     A liver biopsy performed in 5/2021 shows 25-33% steatosis without lobular inflammation or ballooning. Fibroscan CAP score is 393 consistent with steatosis    NAFL is a benign form of fatty liver disease and not thought to progress to fibrosis or cirrhosis. Counseling for diet and weight loss in patients with confirmed or suspected NAFLD  The patient was counseled regarding diet and exercise to achieve weight loss. The best diet for patients with fatty liver is one very low in carbohydrates and enriched with protein such as an Lan's program.      The patient was told not to consume any food products and drinks containing fructose as this enhances hepatic fat synthesis. There is no medication or vitamin supplements that we advocate for ESTEBAN.     Using glitazones in patients without diabetes mellitus has been shown to reduce fat content in the liver but has no effect on fibrosis and is associated with weight gain. Vitamin E has also been used but the data is not very good and most experts no longer advocate this. Hypercholesterolemia   This is common in patients with PBC. Controlled clinical trials demonstrate that women with PBC do not have an increased risk of CAD depsite the elevated total cholesterol. The cholesterol is typically HDL and does not always require treatment. Stains are not contraindicated if felt to be clinically warranted. Vitamin malabsorption  Patients with PBC do not efficiently absorb fat soluble vitamins A,D,E, K. It has been recommended that the patient take multivitamins with excess fat soluble vitamins. Breast cancer screeing   Women with PBC have an increased risk of breast cancer and should undergo regular mammography screenings. Screening for Hepatocellular Carcinoma  HCC screening is not necessary if the patient has no evidence of cirrhosis. Treatment of other medical problems in patients with chronic liver disease  There are no contraindications for the patient to take most medications that are necessary for treatment of other medical issues. Counseling for alcohol in patients with chronic liver disease  The patient was counseled regarding alcohol consumption and the effect of alcohol on chronic liver disease. The patient does not consume any significant amount of alcohol. Vaccinations   Vaccination for viral hepatitis A and B is not needed. The patient has serologic evidence of prior exposure or vaccination with immunity. Routine vaccinations against other bacterial and viral agents can be performed as indicated. Annual flu vaccination should be administered if indicated.       ALLERGIES  Allergies   Allergen Reactions    Augmentin [Amoxicillin-Pot Clavulanate] Diarrhea    Bactrim [Sulfamethoxazole-Trimethoprim] Nausea Only    Biaxin [Clarithromycin] Nausea Only    Demerol [Meperidine] Itching    Erythromycin Nausea Only    Gabapentin Nausea and Vomiting    Pcn [Penicillins] Hives    Percocet [Oxycodone-Acetaminophen] Itching    Pravastatin Nausea Only    Tetracycline Nausea Only    Voltaren [Diclofenac Sodium] Hives       MEDICATIONS  Current Outpatient Medications   Medication Sig    ursodioL (AMINA Forte) 500 mg tablet Take 1 Tablet by mouth two (2) times a day.  pregabalin (LYRICA) 200 mg capsule TAKE ONE CAPSULE BY MOUTH THREE TIMES A DAY **MAX THREE PER DAY**    dulaglutide (Trulicity) 3 AP/6.7 mL pnij 1 Adjustable Dose Pre-filled Pen Syringe by SubCUTAneous route every seven (7) days.  glipiZIDE SR (GLUCOTROL XL) 2.5 mg CR tablet Take 1 Tab by mouth daily.  allopurinoL (ZYLOPRIM) 300 mg tablet Take 1 Tab by mouth daily.  [START ON 6/28/2021] HYDROcodone-acetaminophen (NORCO)  mg tablet Take 1 Tab by mouth every eight (8) hours as needed for Pain for up to 30 days. Max Daily Amount: 3 Tabs.  multivit-min/iron/folic/lutein (CENTRUM SILVER WOMEN PO) Take 1 Tab by mouth daily.  cholecalciferol (Vitamin D3) 25 mcg (1,000 unit) cap Take 1,000 Units by mouth daily.  ascorbic acid, vitamin C, (Vitamin C) 1,000 mg tablet Take 1,000 mg by mouth daily.  cyanocobalamin, vitamin B-12, 5,000 mcg cap Take 5,000 mcg by mouth daily.  fluticasone propion-salmeteroL (Advair Diskus) 250-50 mcg/dose diskus inhaler Take 1 Puff by inhalation as needed.  Lactobacillus acidophilus (PROBIOTIC PO) Take  by mouth. Takes one po once daily.  ONDANSETRON PO Take  by mouth as needed.  dilTIAZem ER (TIAZAC) 420 mg capsule TAKE 1 CAPSULE EVERY DAY    glucose blood VI test strips (blood glucose test) strip Use to check blood sugar up to 3 times daily. E11.65    Blood-Glucose Meter monitoring kit Use to check blood sugar up to 3 times daily. E11.65    lancets misc Use to check blood sugar up to 3 times daily.   E11.65    potassium chloride (KLOR-CON) 10 mEq tablet TAKE 1 TABLET EVERY DAY    esomeprazole (NEXIUM) 40 mg capsule Take 1 Cap by mouth daily. Indications: gastroesophageal reflux disease, hemorrhagic gastritis    metFORMIN (GLUCOPHAGE) 500 mg tablet TAKE TWO TABLETS BY MOUTH TWICE A DAY WITH A MEAL    atorvastatin (LIPITOR) 20 mg tablet TAKE 1 TABLET EVERY DAY    losartan-hydroCHLOROthiazide (HYZAAR) 100-25 mg per tablet TAKE 1 TABLET EVERY DAY    tiotropium (Spiriva with HandiHaler) 18 mcg inhalation capsule Take 1 Cap by inhalation daily.  albuterol (PROVENTIL VENTOLIN) 2.5 mg /3 mL (0.083 %) nebu 3 mL by Nebulization route every six (6) hours as needed for Wheezing.  albuterol (PROVENTIL HFA, VENTOLIN HFA, PROAIR HFA) 90 mcg/actuation inhaler Take 2 Puffs by inhalation every six (6) hours as needed for Wheezing or Shortness of Breath.  aspirin delayed-release 81 mg tablet Take 81 mg by mouth daily.  fluticasone propion-salmeterol (ADVAIR/WIXELA) 250-50 mcg/dose diskus inhaler Take 1 Puff by inhalation every twelve (12) hours.  folic acid (FOLVITE) 1 mg tablet Take 1 Tab by mouth daily.  Lancets (ACCU-CHEK SOFTCLIX LANCETS) misc Test blood sugar twice daily. DX: E11.9. Substitute supply brand accepted by insurance.  alcohol swabs (ALCOHOL WIPES) padm Test blood sugar twice daily. DX: E11.9. Substitute supply brand accepted by insurance.  Cetirizine (ZYRTEC) 10 mg Cap Take 10 mg by mouth daily. No current facility-administered medications for this visit. SYSTEM REVIEW NOT RELATED TO LIVER DISEASE OR REVIEWED ABOVE:  Constitution systems: Negative for fever, chills, weight gain, weight loss. Eyes: Negative for visual changes. ENT: Negative for sore throat, painful swallowing. Respiratory: Negative for cough, hemoptysis, SOB. Cardiology: Negative for chest pain, palpitations. GI:  Negative for constipation or diarrhea. : Negative for urinary frequency, dysuria, hematuria, nocturia.    Skin: Negative for rash.  Hematology: Negative for easy bruising, blood clots. Musculo-skelatal: Negative for back pain, muscle pain, weakness. Neurologic: Negative for headaches, dizziness, vertigo, memory problems not related to HE. Psychology: Negative for anxiety, depression. FAMILY HISTORY:  The father  of stomach cancer. The mother  of CVA. There is no family history of liver disease. SOCIAL HISTORY:  The patient is . The patient has 3 children,   The patient currently smokes 1/2 pack of tobacco daily. The patient has never consumed significant amounts of alcohol. The patient used to work as CNA. PHYSICAL EXAMINATION:  Visit Vitals  BP (!) 147/56 (BP 1 Location: Left upper arm, BP Patient Position: Sitting, BP Cuff Size: Large adult)   Pulse 79   Temp 97.8 °F (36.6 °C) (Temporal)   Resp 18   Ht 5' 4\" (1.626 m)   Wt 216 lb 9.6 oz (98.2 kg)   SpO2 96%   BMI 37.18 kg/m²     General: No acute distress. Eyes: Sclera anicteric. ENT: No oral lesions. Thyroid normal.  Nodes: No adenopathy. Skin: No spider angiomata. No jaundice. No palmar erythema. Respiratory: Lungs clear to auscultation. Cardiovascular: Regular heart rate. No murmurs. No JVD. Abdomen: Soft non-tender. Liver size normal to percussion/palpation. Spleen not palpable. No obvious ascites. Extremities: No edema. No muscle wasting. No gross arthritic changes. Neurologic: Alert and oriented. Cranial nerves grossly intact. No asterixis.     LABORATORY STUDIES:  Liver [place] [address] Units 2021   WBC 3.6 - 11.0 K/uL 8.2    ANC 1.8 - 8.0 K/UL 5.2    HGB 11.5 - 16.0 g/dL 13.5     - 400 K/uL 215    INR 0.9 - 1.1   1.0    AST 15 - 37 U/L 16 17   ALT 12 - 78 U/L 31 37   Alk Phos 45 - 117 U/L 325 (H) 415 (H)   Bili, Total 0.2 - 1.0 MG/DL 0.3 0.3   Bili, Direct 0.0 - 0.2 MG/DL 0.1    Albumin 3.5 - 5.0 g/dL 3.8 3.7   BUN 6 - 20 MG/DL 17 29 (H)   Creat 0.55 - 1.02 MG/DL 1.22 (H) 1.39 (H)   Na 136 - 145 mmol/L 137 137   K 3.5 - 5.1 mmol/L 4.1 4.3   Cl 97 - 108 mmol/L 103 102   CO2 21 - 32 mmol/L 28 29   Glucose 65 - 100 mg/dL 148 (H) 211 (H)     SEROLOGIES:  Serologies Latest Ref Rng & Units 2021 10/7/2020   Hep A Ab, Total Negative   Positive (A)    Hep B Surface Ag Index  <0.10   Hep B Surface Ag Interp NEG    Negative   Hep C Ab NR    NONREACTIVE   Ferritin 8 - 252 NG/ML 19    Iron % Saturation 20 - 50 % 13 (L)    [facility], IFA  Negative    C-ANCA Neg:<1:20 titer <1:20    P-ANCA Neg:<1:20 titer <1:20    ANCA Neg:<1:20 titer <1:20    ASMCA 0 - 19 Units 20 (H)    M2 Ab 0.0 - 20.0 Units <20.0    Ceruloplasmin 19.0 - 39.0 mg/dL 36.8    Alpha-1 antitrypsin level 101 - 187 mg/dL 181      Serologies Latest Ref Rng & Units 2018   Hep B Surface Ag Negative Negative   Hep B Core Ab, Total Negative Positive (A)   Hep B Surface AB QL  Non Reactive   Hep C Ab 0.0 - 0.9 s/co ratio <0.1     LIVER HISTOLOGY:  3/2021. FibroScan performed at [address]. EkPa was 24.3. IQR/med 267%. . The results suggested a fibrosis level of F4. However, the high IQR% suggests that the measurement may not be reliable. 2021. Slides reviewed by MLS. Moderate inflammation in only some portal tracts. NO interface inflammation. Bile duct injury, distortion, proliferation. No florid bile duct lesions. Periductal granuloma. 25-33% macrovesicualr and micovesicular steatosis, no lobular inflammation, no ballooning, Stage 1 fibrosis. GEORGI (110). ENDOSCOPIC PROCEDURES:  Not available or performed    RADIOLOGY:  10/2020. CT scan abdomen with IV contrast.  Changes consistent with fatty liver. No liver mass lesions. Normal spleen. No ascites. 2020. Ultrasound of liver. Echogenic consistent with fatty liver. No liver mass lesions. No dilated bile ducts. No ascites. OTHER TESTIN2019. DEXA bone density.   Normal.    FOLLOW-UP:  All of the issues listed above in the Assessment and Plan were discussed with the patient. All questions were answered. The patient expressed a clear understanding of the above. [address] [place] [address] in 4 weeks to assess the effects and tolerability of AMINA.       Ronit Harley MD  [facility] [address] [place] [address] 22.  [phone]  [address] [place] [address]

## 2021-06-09 ENCOUNTER — TELEPHONE (OUTPATIENT)
Dept: HEMATOLOGY | Age: 71
End: 2021-06-09

## 2021-06-09 NOTE — TELEPHONE ENCOUNTER
----- Message from Jarred Huang MD sent at 6/6/2021  9:55 AM EDT -----  Regarding: Call and make sure she got AMINA. FU 4 weeks NP      [email or web address] Scheduled patient for [email or web address] with April, NP Patient will  medication today or tomorrow. (KF)

## 2021-07-06 RX ORDER — METFORMIN HYDROCHLORIDE 500 MG/1
TABLET ORAL
Qty: 360 TABLET | Refills: 1 | Status: SHIPPED | OUTPATIENT
Start: 2021-07-06 | End: 2021-11-15

## 2021-07-06 RX ORDER — ESOMEPRAZOLE MAGNESIUM 40 MG/1
40 CAPSULE, DELAYED RELEASE ORAL DAILY
Qty: 90 CAPSULE | Refills: 1 | Status: SHIPPED | OUTPATIENT
Start: 2021-07-06 | End: 2021-11-15

## 2021-07-06 NOTE — TELEPHONE ENCOUNTER
[facility] Thanks, Ela    Last Visit: 4/28/21 CARLOS EDUARDO Cervantes  Next Appointment: 7/28/21 CARLOS EDUARDO Cervantes  Previous Refill Encounter(s):   Esomeprazole 12/30/20 30 + 5   Metformin 12/29/20 90 + 3 ([place])    Requested Prescriptions     Pending Prescriptions Disp Refills    esomeprazole (NEXIUM) 40 mg capsule 90 Capsule 1     Sig: Take 1 Capsule by mouth daily.  Indications: gastroesophageal reflux disease, hemorrhagic gastritis    metFORMIN (GLUCOPHAGE) 500 mg tablet 360 Tablet 1     Sig: TAKE TWO TABLETS BY MOUTH TWICE A DAY WITH A MEAL

## 2021-07-28 ENCOUNTER — OFFICE VISIT (OUTPATIENT)
Dept: FAMILY MEDICINE CLINIC | Age: 71
End: 2021-07-28
Payer: MEDICARE

## 2021-07-28 VITALS
RESPIRATION RATE: 16 BRPM | SYSTOLIC BLOOD PRESSURE: 122 MMHG | OXYGEN SATURATION: 95 % | TEMPERATURE: 97.4 F | HEIGHT: 64 IN | DIASTOLIC BLOOD PRESSURE: 74 MMHG | HEART RATE: 76 BPM | WEIGHT: 209 LBS | BODY MASS INDEX: 35.68 KG/M2

## 2021-07-28 DIAGNOSIS — G89.4 CHRONIC PAIN DISORDER: ICD-10-CM

## 2021-07-28 DIAGNOSIS — E66.01 SEVERE OBESITY (BMI 35.0-35.9 WITH COMORBIDITY) (HCC): ICD-10-CM

## 2021-07-28 DIAGNOSIS — E11.42 TYPE 2 DIABETES MELLITUS WITH DIABETIC POLYNEUROPATHY, WITHOUT LONG-TERM CURRENT USE OF INSULIN (HCC): Primary | ICD-10-CM

## 2021-07-28 PROCEDURE — 99214 OFFICE O/P EST MOD 30 MIN: CPT | Performed by: NURSE PRACTITIONER

## 2021-07-28 PROCEDURE — 3052F HG A1C>EQUAL 8.0%<EQUAL 9.0%: CPT | Performed by: NURSE PRACTITIONER

## 2021-07-28 PROCEDURE — 1101F PT FALLS ASSESS-DOCD LE1/YR: CPT | Performed by: NURSE PRACTITIONER

## 2021-07-28 PROCEDURE — 1090F PRES/ABSN URINE INCON ASSESS: CPT | Performed by: NURSE PRACTITIONER

## 2021-07-28 PROCEDURE — 2022F DILAT RTA XM EVC RTNOPTHY: CPT | Performed by: NURSE PRACTITIONER

## 2021-07-28 PROCEDURE — 3017F COLORECTAL CA SCREEN DOC REV: CPT | Performed by: NURSE PRACTITIONER

## 2021-07-28 PROCEDURE — G8536 NO DOC ELDER MAL SCRN: HCPCS | Performed by: NURSE PRACTITIONER

## 2021-07-28 PROCEDURE — G8752 SYS BP LESS 140: HCPCS | Performed by: NURSE PRACTITIONER

## 2021-07-28 PROCEDURE — G9899 SCRN MAM PERF RSLTS DOC: HCPCS | Performed by: NURSE PRACTITIONER

## 2021-07-28 PROCEDURE — G8432 DEP SCR NOT DOC, RNG: HCPCS | Performed by: NURSE PRACTITIONER

## 2021-07-28 PROCEDURE — G8417 CALC BMI ABV UP PARAM F/U: HCPCS | Performed by: NURSE PRACTITIONER

## 2021-07-28 PROCEDURE — G8754 DIAS BP LESS 90: HCPCS | Performed by: NURSE PRACTITIONER

## 2021-07-28 PROCEDURE — G8427 DOCREV CUR MEDS BY ELIG CLIN: HCPCS | Performed by: NURSE PRACTITIONER

## 2021-07-28 PROCEDURE — G8399 PT W/DXA RESULTS DOCUMENT: HCPCS | Performed by: NURSE PRACTITIONER

## 2021-07-28 RX ORDER — HYDROCODONE BITARTRATE AND ACETAMINOPHEN 10; 325 MG/1; MG/1
1 TABLET ORAL
Qty: 90 TABLET | Refills: 0 | Status: SHIPPED | OUTPATIENT
Start: 2021-08-28 | End: 2021-07-28 | Stop reason: SDUPTHER

## 2021-07-28 RX ORDER — HYDROCODONE BITARTRATE AND ACETAMINOPHEN 10; 325 MG/1; MG/1
1 TABLET ORAL
Qty: 90 TABLET | Refills: 0 | Status: SHIPPED | OUTPATIENT
Start: 2021-09-28 | End: 2021-10-26 | Stop reason: SDUPTHER

## 2021-07-28 RX ORDER — ACETAMINOPHEN AND CODEINE PHOSPHATE 300; 30 MG/1; MG/1
TABLET ORAL
COMMUNITY
Start: 2021-07-13 | End: 2021-07-28 | Stop reason: ALTCHOICE

## 2021-07-28 RX ORDER — HYDROCODONE BITARTRATE AND ACETAMINOPHEN 10; 325 MG/1; MG/1
1 TABLET ORAL
Qty: 90 TABLET | Refills: 0 | Status: SHIPPED | OUTPATIENT
Start: 2021-07-28 | End: 2021-07-28 | Stop reason: SDUPTHER

## 2021-07-28 RX ORDER — AMOXICILLIN 500 MG/1
CAPSULE ORAL
COMMUNITY
Start: 2021-07-13 | End: 2022-01-24 | Stop reason: ALTCHOICE

## 2021-07-28 RX ORDER — ONDANSETRON 4 MG/1
4 TABLET, ORALLY DISINTEGRATING ORAL
Qty: 60 TABLET | Refills: 2 | Status: SHIPPED | OUTPATIENT
Start: 2021-07-28

## 2021-07-28 NOTE — PROGRESS NOTES
Chief Complaint   Patient presents with    Diabetes     follow up    Cholesterol Problem    Hypertension    Hip Pain     left        Patient sister recently , stated no feelings of depression just mourning. Jimy Soliz, GERBER      1. Have you been to the ER, urgent care clinic since your last visit? Hospitalized since your last visit? No    2. Have you seen or consulted any other health care providers outside of the [address] [place] [address] since your last visit? Include any pap smears or colon screening.  No

## 2021-07-28 NOTE — PROGRESS NOTES
Assessment/Plan:     Diagnoses and all orders for this visit:    1. Type 2 diabetes mellitus with diabetic polyneuropathy, without long-term current use of insulin (HCC)  -     LIPID PANEL; Future  -     HEMOGLOBIN A1C WITH EAG; Future  Tolerated changes. Labs pending. 2. Severe obesity (BMI 35.0-35.9 with comorbidity) ([facility] [address]    3. Chronic pain disorder  -     HYDROcodone-acetaminophen (NORCO)  mg tablet; Take 1 Tablet by mouth every eight (8) hours as needed for Pain for up to 30 days. Max Daily Amount: 3 Tablets. Refilled for three months. Stable on current therapy.  reviewed and appropriate. UDS pending. Follow up in three months or sooner as needed. Discussed expected course/resolution/complications of diagnosis in detail with patient. Medication risks/benefits/costs/interactions/alternatives discussed with patient. Pt was given after visit summary which includes diagnoses, current medications & vitals. Pt expressed understanding with the diagnosis and plan          Subjective:      Ramana Lopez is a 80 y.o. female who presents for had concerns including Diabetes (follow up), Cholesterol Problem, Hypertension, and Hip Pain (left ). Chronic Pain  Ramana Lopez RTC today to follow up on chronic pain diagnosis.  We discussed her osteoarthritis that is affecting her bilateral hips, left shoulder and lumbar back.  We discussed her seronegative rheumatoid arthritis affecting her bilateral hands.  Significant changes since last visit: none.  She is  able to do her normal daily activities.  She reports the following adverse side effects: none. Previous treatments include heat, cold, Lidocaine patches otc, Tramadol, methotrexate, cortisone injection which all provided minimal relief.  Not a candidate for oral NSAIDS. She is followed by rheumatology.     Unfortunately, her sister has recently passed away.          Patient Active Problem List   Diagnosis Code    Hypercholesterolemia E78.00    Osteoarthritis of hip M16.9    PMR (polymyalgia rheumatica) ([facility]) M35.3    Chronic bronchitis ([facility]) J42    Former smoker Z87.891    Chronic pain G89.29    Type 2 diabetes mellitus with diabetic polyneuropathy, without long-term current use of insulin ([facility]) E11.42    Essential hypertension I10    Seronegative rheumatoid arthritis of multiple sites ([facility] [address] M06.09    MGUS (monoclonal gammopathy of unknown significance) D47.2    Vitamin D deficiency E55.9    Long-term use of immunosuppressant medication Z79.899    CAD (coronary artery disease) I25.10    Lung nodule seen on imaging study R91.1    Idiopathic chronic gout, multiple sites, with tophus (tophi) M1A.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 Max Daily Amount: 3 Tablets. 90 Tablet 0    esomeprazole (NEXIUM) 40 mg capsule Take 1 Capsule by mouth daily. Indications: gastroesophageal reflux disease, hemorrhagic gastritis 90 Capsule 1    metFORMIN (GLUCOPHAGE) 500 mg tablet TAKE TWO TABLETS BY MOUTH TWICE A DAY WITH A MEAL 360 Tablet 1    pregabalin (LYRICA) 200 mg capsule TAKE ONE CAPSULE BY MOUTH THREE TIMES A DAY **MAX THREE PER DAY** 90 Cap 2    dulaglutide (Trulicity) 3 LS/5.4 mL pnij 1 Adjustable Dose Pre-filled Pen Syringe by SubCUTAneous route every seven (7) days. 12 Adjustable Dose Pre-filled Pen Syringe 3    glipiZIDE SR (GLUCOTROL XL) 2.5 mg CR tablet Take 1 Tab by mouth daily. 30 Tab 3    allopurinoL (ZYLOPRIM) 300 mg tablet Take 1 Tab by mouth daily. 90 Tab 3    multivit-min/iron/folic/lutein (CENTRUM SILVER WOMEN PO) Take 1 Tab by mouth daily.       cholecalciferol (Vitamin D3) 25 mcg (1,000 unit) cap Take 1,000 Units by mouth daily.  ascorbic acid, vitamin C, (Vitamin C) 1,000 mg tablet Take 1,000 mg by mouth daily.  cyanocobalamin, vitamin B-12, 5,000 mcg cap Take 5,000 mcg by mouth daily.  fluticasone propion-salmeteroL (Advair Diskus) 250-50 mcg/dose diskus inhaler Take 1 Puff by inhalation as needed.  Lactobacillus acidophilus (PROBIOTIC PO) Take  by mouth. Takes one po once daily.  ONDANSETRON PO Take  by mouth as needed.  dilTIAZem ER (TIAZAC) 420 mg capsule TAKE 1 CAPSULE EVERY DAY 90 Cap 3    glucose blood VI test strips (blood glucose test) strip Use to check blood sugar up to 3 times daily. E11.65 100 Strip 11    Blood-Glucose Meter monitoring kit Use to check blood sugar up to 3 times daily. E11.65 1 Kit 0    lancets misc Use to check blood sugar up to 3 times daily. E11.65 1 Each 11    potassium chloride (KLOR-CON) 10 mEq tablet TAKE 1 TABLET EVERY DAY 90 Tab 3    atorvastatin (LIPITOR) 20 mg tablet TAKE 1 TABLET EVERY DAY 90 Tab 3    losartan-hydroCHLOROthiazide (HYZAAR) 100-25 mg per tablet TAKE 1 TABLET EVERY DAY 90 Tab 3    tiotropium (Spiriva with HandiHaler) 18 mcg inhalation capsule Take 1 Cap by inhalation daily. 90 Cap 0    albuterol (PROVENTIL VENTOLIN) 2.5 mg /3 mL (0.083 %) nebu 3 mL by Nebulization route every six (6) hours as needed for Wheezing. 60 Each 0    albuterol (PROVENTIL HFA, VENTOLIN HFA, PROAIR HFA) 90 mcg/actuation inhaler Take 2 Puffs by inhalation every six (6) hours as needed for Wheezing or Shortness of Breath. 1 Inhaler 1    aspirin delayed-release 81 mg tablet Take 81 mg by mouth daily.  fluticasone propion-salmeterol (ADVAIR/WIXELA) 250-50 mcg/dose diskus inhaler Take 1 Puff by inhalation every twelve (12) hours. 3 Inhaler 3    Lancets (ACCU-CHEK SOFTCLIX LANCETS) misc Test blood sugar twice daily. DX: E11.9. Substitute supply brand accepted by insurance.  1 Each 11    alcohol swabs (ALCOHOL WIPES) padm Test blood sugar twice daily. DX: E11.9. Substitute supply brand accepted by insurance. 100 Pad 1    Cetirizine (ZYRTEC) 10 mg Cap Take 10 mg by mouth daily.  amoxicillin (AMOXIL) 500 mg capsule  (Patient not taking: Reported on 7/28/2021)      ursodioL (AMINA Forte) 500 mg tablet Take 1 Tablet by mouth two (2) times a day. (Patient not taking: Reported on 7/28/2021) 60 Tablet 3    folic acid (FOLVITE) 1 mg tablet Take 1 Tab by mouth daily. (Patient not taking: Reported on 7/28/2021) 90 Tab 0       Allergies   Allergen Reactions    Augmentin [Amoxicillin-Pot Clavulanate] Diarrhea    Bactrim [Sulfamethoxazole-Trimethoprim] Nausea Only    Biaxin [Clarithromycin] Nausea Only    Demerol [Meperidine] Itching    Erythromycin Nausea Only    Gabapentin Nausea and Vomiting    Pcn [Penicillins] Hives    Percocet [Oxycodone-Acetaminophen] Itching    Pravastatin Nausea Only    Tetracycline Nausea Only    Voltaren [Diclofenac Sodium] Hives       ROS:   Review of Systems   Constitutional: Negative for malaise/fatigue. Eyes: Negative for blurred vision. Respiratory: Negative for shortness of breath. Cardiovascular: Negative for chest pain. Musculoskeletal: Positive for back pain and joint pain. Objective:     Visit Vitals  /74 (BP 1 Location: Left arm, BP Patient Position: Sitting, BP Cuff Size: Adult)   Pulse 76   Temp 97.4 °F (36.3 °C) (Temporal)   Resp 16   Ht 5' 4\" (1.626 m)   Wt 209 lb (94.8 kg)   SpO2 95%   BMI 35.87 kg/m²       Vitals and Nurse Documentation reviewed. Physical Exam  Constitutional:       General: She is not in acute distress. Appearance: She is obese. Cardiovascular:      Heart sounds: S1 normal and S2 normal. No murmur heard. No friction rub. No gallop. Pulmonary:      Effort: No respiratory distress. Breath sounds: Wheezing present. Skin:     General: Skin is warm and dry.    Psychiatric:         Mood and Affect: Mood and affect normal.

## 2021-07-29 LAB
CHOLEST SERPL-MCNC: 173 MG/DL
EST. AVERAGE GLUCOSE BLD GHB EST-MCNC: 151 MG/DL
HBA1C MFR BLD: 6.9 % (ref 4–5.6)
HDLC SERPL-MCNC: 48 MG/DL
HDLC SERPL: 3.6 {RATIO} (ref 0–5)
LDLC SERPL CALC-MCNC: 85 MG/DL (ref 0–100)
TRIGL SERPL-MCNC: 200 MG/DL (ref ?–150)
VLDLC SERPL CALC-MCNC: 40 MG/DL

## 2021-09-24 ENCOUNTER — APPOINTMENT (OUTPATIENT)
Dept: CT IMAGING | Age: 71
End: 2021-09-24
Attending: PHYSICIAN ASSISTANT
Payer: MEDICARE

## 2021-09-24 ENCOUNTER — HOSPITAL ENCOUNTER (EMERGENCY)
Age: 71
Discharge: HOME | End: 2021-09-24
Attending: EMERGENCY MEDICINE | Admitting: EMERGENCY MEDICINE
Payer: MEDICARE

## 2021-09-24 VITALS
HEIGHT: 64 IN | TEMPERATURE: 99.2 F | WEIGHT: 210 LBS | HEART RATE: 78 BPM | OXYGEN SATURATION: 93 % | BODY MASS INDEX: 35.85 KG/M2 | RESPIRATION RATE: 26 BRPM | SYSTOLIC BLOOD PRESSURE: 179 MMHG | DIASTOLIC BLOOD PRESSURE: 84 MMHG

## 2021-09-24 DIAGNOSIS — R06.02 SOB (SHORTNESS OF BREATH): Primary | ICD-10-CM

## 2021-09-24 DIAGNOSIS — J45.901 ASTHMA WITH ACUTE EXACERBATION, UNSPECIFIED ASTHMA SEVERITY, UNSPECIFIED WHETHER PERSISTENT: ICD-10-CM

## 2021-09-24 LAB
ALBUMIN SERPL-MCNC: 3.5 G/DL (ref 3.5–5)
ALBUMIN/GLOB SERPL: 0.7 {RATIO} (ref 1.1–2.2)
ALP SERPL-CCNC: 285 U/L (ref 45–117)
ALT SERPL-CCNC: 23 U/L (ref 12–78)
ANION GAP SERPL CALC-SCNC: 6 MMOL/L (ref 5–15)
AST SERPL-CCNC: 17 U/L (ref 15–37)
BASOPHILS # BLD: 0.1 K/UL (ref 0–0.1)
BASOPHILS NFR BLD: 1 % (ref 0–1)
BILIRUB SERPL-MCNC: 0.2 MG/DL (ref 0.2–1)
BNP SERPL-MCNC: 28 PG/ML
BUN SERPL-MCNC: 24 MG/DL (ref 6–20)
BUN/CREAT SERPL: 21 (ref 12–20)
CALCIUM SERPL-MCNC: 10 MG/DL (ref 8.5–10.1)
CHLORIDE SERPL-SCNC: 104 MMOL/L (ref 97–108)
CO2 SERPL-SCNC: 28 MMOL/L (ref 21–32)
COMMENT, HOLDF: NORMAL
CREAT SERPL-MCNC: 1.12 MG/DL (ref 0.55–1.02)
DIFFERENTIAL METHOD BLD: ABNORMAL
EOSINOPHIL # BLD: 0.2 K/UL (ref 0–0.4)
EOSINOPHIL NFR BLD: 2 % (ref 0–7)
ERYTHROCYTE [DISTWIDTH] IN BLOOD BY AUTOMATED COUNT: 15.7 % (ref 11.5–14.5)
GLOBULIN SER CALC-MCNC: 5.3 G/DL (ref 2–4)
GLUCOSE SERPL-MCNC: 146 MG/DL (ref 65–100)
HCT VFR BLD AUTO: 46.8 % (ref 35–47)
HGB BLD-MCNC: 15.3 G/DL (ref 11.5–16)
IMM GRANULOCYTES # BLD AUTO: 0 K/UL (ref 0–0.04)
IMM GRANULOCYTES NFR BLD AUTO: 0 % (ref 0–0.5)
LYMPHOCYTES # BLD: 2 K/UL (ref 0.8–3.5)
LYMPHOCYTES NFR BLD: 17 % (ref 12–49)
MAGNESIUM SERPL-MCNC: 1.8 MG/DL (ref 1.6–2.4)
MCH RBC QN AUTO: 28.8 PG (ref 26–34)
MCHC RBC AUTO-ENTMCNC: 32.7 G/DL (ref 30–36.5)
MCV RBC AUTO: 88.1 FL (ref 80–99)
MONOCYTES # BLD: 1.2 K/UL (ref 0–1)
MONOCYTES NFR BLD: 10 % (ref 5–13)
NEUTS SEG # BLD: 8.1 K/UL (ref 1.8–8)
NEUTS SEG NFR BLD: 70 % (ref 32–75)
NRBC # BLD: 0 K/UL (ref 0–0.01)
NRBC BLD-RTO: 0 PER 100 WBC
PLATELET # BLD AUTO: 202 K/UL (ref 150–400)
PMV BLD AUTO: 12.1 FL (ref 8.9–12.9)
POTASSIUM SERPL-SCNC: 3.8 MMOL/L (ref 3.5–5.1)
PROT SERPL-MCNC: 8.8 G/DL (ref 6.4–8.2)
RBC # BLD AUTO: 5.31 M/UL (ref 3.8–5.2)
SAMPLES BEING HELD,HOLD: NORMAL
SODIUM SERPL-SCNC: 138 MMOL/L (ref 136–145)
TROPONIN I SERPL-MCNC: <0.05 NG/ML
WBC # BLD AUTO: 11.5 K/UL (ref 3.6–11)

## 2021-09-24 PROCEDURE — 93005 ELECTROCARDIOGRAM TRACING: CPT

## 2021-09-24 PROCEDURE — 83735 ASSAY OF MAGNESIUM: CPT

## 2021-09-24 PROCEDURE — 84484 ASSAY OF TROPONIN QUANT: CPT

## 2021-09-24 PROCEDURE — 74011636637 HC RX REV CODE- 636/637: Performed by: EMERGENCY MEDICINE

## 2021-09-24 PROCEDURE — 74011000636 HC RX REV CODE- 636: Performed by: EMERGENCY MEDICINE

## 2021-09-24 PROCEDURE — 85025 COMPLETE CBC W/AUTO DIFF WBC: CPT

## 2021-09-24 PROCEDURE — 74011250637 HC RX REV CODE- 250/637: Performed by: EMERGENCY MEDICINE

## 2021-09-24 PROCEDURE — 80053 COMPREHEN METABOLIC PANEL: CPT

## 2021-09-24 PROCEDURE — 74011000250 HC RX REV CODE- 250: Performed by: EMERGENCY MEDICINE

## 2021-09-24 PROCEDURE — 94664 DEMO&/EVAL PT USE INHALER: CPT

## 2021-09-24 PROCEDURE — 71275 CT ANGIOGRAPHY CHEST: CPT

## 2021-09-24 PROCEDURE — 94640 AIRWAY INHALATION TREATMENT: CPT

## 2021-09-24 PROCEDURE — 99284 EMERGENCY DEPT VISIT MOD MDM: CPT

## 2021-09-24 PROCEDURE — 83880 ASSAY OF NATRIURETIC PEPTIDE: CPT

## 2021-09-24 PROCEDURE — 36415 COLL VENOUS BLD VENIPUNCTURE: CPT

## 2021-09-24 RX ORDER — PREDNISONE 20 MG/1
40 TABLET ORAL DAILY
Qty: 8 TABLET | Refills: 0 | Status: SHIPPED | OUTPATIENT
Start: 2021-09-25 | End: 2021-09-29

## 2021-09-24 RX ORDER — HYDROCODONE BITARTRATE AND ACETAMINOPHEN 5; 325 MG/1; MG/1
1 TABLET ORAL ONCE
Status: COMPLETED | OUTPATIENT
Start: 2021-09-24 | End: 2021-09-24

## 2021-09-24 RX ORDER — ALBUTEROL SULFATE 0.83 MG/ML
2.5 SOLUTION RESPIRATORY (INHALATION)
Qty: 30 NEBULE | Refills: 0 | Status: SHIPPED | OUTPATIENT
Start: 2021-09-24 | End: 2022-01-24 | Stop reason: ALTCHOICE

## 2021-09-24 RX ORDER — PREDNISONE 20 MG/1
40 TABLET ORAL
Status: COMPLETED | OUTPATIENT
Start: 2021-09-24 | End: 2021-09-24

## 2021-09-24 RX ADMIN — IOPAMIDOL 80 ML: 755 INJECTION, SOLUTION INTRAVENOUS at 22:40

## 2021-09-24 RX ADMIN — HYDROCODONE BITARTRATE AND ACETAMINOPHEN 1 TABLET: 5; 325 TABLET ORAL at 23:40

## 2021-09-24 RX ADMIN — ALBUTEROL SULFATE 1 DOSE: 2.5 SOLUTION RESPIRATORY (INHALATION) at 22:11

## 2021-09-24 RX ADMIN — PREDNISONE 40 MG: 20 TABLET ORAL at 23:40

## 2021-09-24 NOTE — ED NOTES
6:43 PM  I have evaluated the patient as the Provider in Triage. I have reviewed Her vital signs and the triage nurse assessment. I have talked with the patient and any available family and advised that I am the provider in triage and have ordered the appropriate study to initiate their work up based on the clinical presentation during my assessment. I have advised that the patient will be accommodated in the Main ED as soon as possible. I have also requested to contact the triage nurse or myself immediately if the patient experiences any changes in their condition during this brief waiting period. 4 days of CP, SOB. Hx VTE. Sent from [facility] for elevated dimer. Will order additional labs, CTA. See labs in provider note.   SIS Adler

## 2021-09-24 NOTE — ED PROVIDER NOTES
67-year-old female with a history of asthma, DVT/PE, diabetes, hypertension, hypercholesterolemia and current smoking that presents with a chief complaint of shortness of breath and left-sided neck/shoulder pain. Patient states her symptoms been getting worse over the last few days. [facility] and was found to have an elevated D-dimer. She was referred to the ED for further work-up and management. She describes her neck pain as a soreness and states it is worse when she moves her neck and shoulder around. Past Medical History:   Diagnosis Date    Abnormal pulmonary function test 9/28/2016    AR (allergic rhinitis)     Asthma     Atrial thrombus 1994    left    Chronic bronchitis ([address] 9/28/2016    Chronic pain     back/both hips/neuropathy    DDD (degenerative disc disease), lumbar     Diabetes ([address]     DJD (degenerative joint disease) of hip     right    Empty sella ([address] 2000    by MRI    Former smoker 10/24/2016    GERD (gastroesophageal reflux disease)     Hoarseness of voice     Hypercholesterolemia     Hypertension     Ill-defined condition     scar tissue - heart    Lesion of vocal cord     bx benign    PMR (polymyalgia rheumatica) ([address] 5/18/2016    Rheumatoid arthritis ([address]     Smoker 12/5/2012       Past Surgical History:   Procedure Laterality Date    COLONOSCOPY N/A 5/30/2018    COLONOSCOPY [facility] Funmilayo Bahena [facility] ENDOSCOPY    COLONOSCOPY N/A 12/30/2020    COLON[facility]COPY performed by Reginald Field [facility] at [facility]  12/30/2020         COLORECTAL SCRN; HI RISK IND  5/30/2018         ENDOSCOPY, COLON, DIAGNOSTIC  10/03    polypectomy    ENDOSCOPY, COLON, DIAGNOSTIC  2/05    Dr. Naveed Sow, DIAGNOSTIC  2010, reported    due 2015    HX APPENDECTOMY  1970's    HX BREAST REDUCTION      HX COLONOSCOPY  2015    dr Deon Luna.  2 polyps removed -repeat in 2018  HX HEENT      vocal cord bx - benign    HX HEMORRHOIDECTOMY      HX HYSTERECTOMY      ovaries spared    HX KNEE REPLACEMENT  11/03, 10/05    left then right    HX TONSILLECTOMY  age 29    tonsils    UPPER GI ENDOSCOPY,BIOPSY  12/30/2020              Family History:   Problem Relation Age of Onset    Arthritis-osteo Mother     Asthma Mother     Diabetes Mother     Hypertension Mother     Stroke Mother         brain aneurysm    Arthritis-osteo Father     Cancer Father         stomach    Elevated Lipids Father     Hypertension Father     Arthritis-osteo Sister     Asthma Sister     Diabetes Sister     Elevated Lipids Sister     Hypertension Sister     Heart Attack Sister     COPD Sister     Cancer Sister         vocal cords/bladder    Hypertension Daughter     Anesth Problems Neg Hx        Social History     Socioeconomic History    Marital status:      Spouse name: Not on file    Number of children: Not on file    Years of education: Not on file    Highest education level: Not on file   Occupational History    Not on file   Tobacco Use    Smoking status: Current Every Day Smoker     Packs/day: 0.50     Years: 40.00     Pack years: 20.00     Types: Cigarettes    Smokeless tobacco: Never Used   Vaping Use    Vaping Use: Never used   Substance and Sexual Activity    Alcohol use: No     Alcohol/week: 0.0 standard drinks    Drug use: No    Sexual activity: Yes     Partners: Male   Other Topics Concern    Not on file   Social History Narrative    Not on file     Social Determinants of Health     Financial Resource Strain:     Difficulty of Paying Living Expenses:    Food Insecurity:     Worried About Running Out of Food in the Last Year:     Ran Out of Food in the Last Year:    Transportation Needs:     Lack of Transportation (Medical):      Lack of Transportation (Non-Medical):    Physical Activity:     Days of Exercise per Week:     Minutes of Exercise per Session: Stress:     Feeling of Stress :    Social Connections:     Frequency of Communication with Friends and Family:     Frequency of Social Gatherings with Friends and Family:     Attends Voodoo Services:     Active Member of Clubs or Organizations:     Attends Club or Organization Meetings:     Marital Status:    Intimate Partner Violence:     Fear of Current or Ex-Partner:     Emotionally Abused:     Physically Abused:     Sexually Abused: ALLERGIES: Augmentin [amoxicillin-pot clavulanate], Bactrim [sulfamethoxazole-trimethoprim], Biaxin [clarithromycin], Demerol [meperidine], Erythromycin, Gabapentin, Pcn [penicillins], Percocet [oxycodone-acetaminophen], Pravastatin, Tetracycline, and Voltaren [diclofenac sodium]    Review of Systems   Constitutional: Negative for fever. HENT: Negative for rhinorrhea. Respiratory: Positive for shortness of breath. Cardiovascular: Negative for chest pain. Gastrointestinal: Negative for abdominal pain. Genitourinary: Negative for dysuria. Musculoskeletal: Positive for neck pain. Skin: Negative for wound. Neurological: Negative for headaches. Psychiatric/Behavioral: Negative for confusion. Vitals:    09/24/21 1843   BP: 111/77   Pulse: 78   Resp: 26   Temp: 99.2 °F (37.3 °C)   SpO2: 94%   Weight: 95.3 kg (210 lb)   Height: 5' 4\" (1.626 m)            Physical Exam  Vitals and nursing note reviewed. Constitutional:       General: She is not in acute distress. Appearance: Normal appearance. She is well-developed. She is not ill-appearing, toxic-appearing or diaphoretic. HENT:      Head: Normocephalic and atraumatic. Eyes:      Extraocular Movements: Extraocular movements intact. Cardiovascular:      Rate and Rhythm: Normal rate. Pulses: Normal pulses. Heart sounds: Normal heart sounds. Pulmonary:      Effort: Pulmonary effort is normal. No respiratory distress. Breath sounds: Wheezing present.    Abdominal: General: There is no distension. Palpations: Abdomen is soft. Tenderness: There is no abdominal tenderness. Musculoskeletal:         General: Normal range of motion. Cervical back: Normal range of motion. Skin:     General: Skin is warm and dry. Neurological:      Mental Status: She is alert and oriented to person, place, and time. Psychiatric:         Mood and Affect: Mood normal.          MDM  Number of Diagnoses or Management Options  Asthma with acute exacerbation, unspecified asthma severity, unspecified whether persistent  SOB (shortness of breath)  Diagnosis management comments: 29-year-old female presents with left neck and shoulder pain as well as shortness of breath. A D-dimer was obtained in outpatient clinic and found to be elevated. Patient's symptoms are not consistent with PE but much more consistent with musculoskeletal pain. Patient does have significant wheezing on exam.  Given the elevated D-dimer a CTA will be obtained to rule out PE however. Patient was given a breathing treatment the ED. She will end up being discharged on steroids and albuterol. 11 PM  Change of shift. Care of patient signed over to Dr. Syed Boateng. Bedside handoff complete. Awaiting CTA and disposition.             Procedures

## 2021-09-25 LAB
ATRIAL RATE: 79 BPM
CALCULATED P AXIS, ECG09: 71 DEGREES
CALCULATED R AXIS, ECG10: -15 DEGREES
CALCULATED T AXIS, ECG11: 94 DEGREES
DIAGNOSIS, 93000: NORMAL
P-R INTERVAL, ECG05: 140 MS
Q-T INTERVAL, ECG07: 378 MS
QRS DURATION, ECG06: 74 MS
QTC CALCULATION (BEZET), ECG08: 433 MS
VENTRICULAR RATE, ECG03: 79 BPM

## 2021-09-25 NOTE — DISCHARGE INSTRUCTIONS
Thank you for allowing us to provide you with medical care today. We realize that you have many choices for your emergency care needs. We thank you for choosing [facility]. Please choose us in the future for any continued health care needs. We hope we addressed all of your medical concerns. We strive to provide excellent quality care in the Emergency Department. Anything less than excellent does not meet our expectations. The exam and treatment you received in the Emergency Department were for an emergent problem and are not intended as complete care. It is important that you follow up with a doctor, nurse practitioner, or physician's assistant for ongoing care. If your symptoms worsen or you do not improve as expected and you are unable to reach your usual health care provider, you should return to the Emergency Department. We are available 24 hours a day. Take this sheet with you when you go to your follow-up visit. If you have any problem arranging the follow-up visit, contact the Emergency Department immediately. Make an appointment your family doctor for follow up of this visit. Return to the ER if you are unable to be seen in a timely manner.

## 2021-09-25 NOTE — ED NOTES
ED Course as of Sep 25 0357   Fri Sep 24, 2021   2302 11:03 PM  Change of shift. Care of patient taken over from Dr. Kayla Rose; H&P reviewed, bedside handoff complete. Awaiting CTA      [ZD]      ED Course User Index  [ZD] Clarisa Burt MD       Discussed the discharge impression and any labs and the results with the patient. Answered any questions and addressed any concerns. Discussed the importance of following up with their primary care provider and/or specialist.  Discussed signs or symptoms that would warrant return back to the ER for further evaluation. The patient is agreeable with discharge. Recent Results (from the past 24 hour(s))   EKG, 12 LEAD, INITIAL    Collection Time: 09/24/21  7:24 PM   Result Value Ref Range    Ventricular Rate 79 BPM    Atrial Rate 79 BPM    P-R Interval 140 ms    QRS Duration 74 ms    Q-T Interval 378 ms    QTC Calculation (Bezet) 433 ms    Calculated P Axis 71 degrees    Calculated R Axis -15 degrees    Calculated T Axis 94 degrees    Diagnosis       Normal sinus rhythm  Septal infarct (cited on or before 07-OCT-2020)  Abnormal ECG  When compared with ECG of 07-OCT-2020 09:50,  ST no longer depressed in Inferior leads  Nonspecific T wave abnormality no longer evident in Inferior leads     SAMPLES BEING HELD    Collection Time: 09/24/21  9:54 PM   Result Value Ref Range    SAMPLES BEING HELD 1red,1lav     COMMENT        Add-on orders for these samples will be processed based on acceptable specimen integrity and analyte stability, which may vary by analyte.    TROPONIN I    Collection Time: 09/24/21  9:54 PM   Result Value Ref Range    Troponin-I, Qt. <0.05 <0.05 ng/mL   NT-PRO BNP    Collection Time: 09/24/21  9:54 PM   Result Value Ref Range    NT pro-BNP 28 <125 PG/ML   MAGNESIUM    Collection Time: 09/24/21  9:54 PM   Result Value Ref Range    Magnesium 1.8 1.6 - 2.4 mg/dL   CBC WITH AUTOMATED DIFF    Collection Time: 09/24/21  9:54 PM   Result Value Ref Range    WBC 11.5 (H) 3.6 - 11.0 K/uL    RBC 5.31 (H) 3.80 - 5.20 M/uL    HGB 15.3 11.5 - 16.0 g/dL    HCT 46.8 35.0 - 47.0 %    MCV 88.1 80.0 - 99.0 FL    MCH 28.8 26.0 - 34.0 PG    MCHC 32.7 30.0 - 36.5 g/dL    RDW 15.7 (H) 11.5 - 14.5 %    PLATELET 478 049 - 036 K/uL    MPV 12.1 8.9 - 12.9 FL    NRBC 0.0 0  WBC    ABSOLUTE NRBC 0.00 0.00 - 0.01 K/uL    NEUTROPHILS 70 32 - 75 %    LYMPHOCYTES 17 12 - 49 %    MONOCYTES 10 5 - 13 %    EOSINOPHILS 2 0 - 7 %    BASOPHILS 1 0 - 1 %    IMMATURE GRANULOCYTES 0 0.0 - 0.5 %    ABS. NEUTROPHILS 8.1 (H) 1.8 - 8.0 K/UL    ABS. LYMPHOCYTES 2.0 0.8 - 3.5 K/UL    ABS. MONOCYTES 1.2 (H) 0.0 - 1.0 K/UL    ABS. EOSINOPHILS 0.2 0.0 - 0.4 K/UL    ABS. BASOPHILS 0.1 0.0 - 0.1 K/UL    ABS. IMM. GRANS. 0.0 0.00 - 0.04 K/UL    DF AUTOMATED     METABOLIC PANEL, COMPREHENSIVE    Collection Time: 09/24/21  9:54 PM   Result Value Ref Range    Sodium 138 136 - 145 mmol/L    Potassium 3.8 3.5 - 5.1 mmol/L    Chloride 104 97 - 108 mmol/L    CO2 28 21 - 32 mmol/L    Anion gap 6 5 - 15 mmol/L    Glucose 146 (H) 65 - 100 mg/dL    BUN 24 (H) 6 - 20 MG/DL    Creatinine 1.12 (H) 0.55 - 1.02 MG/DL    BUN/Creatinine ratio 21 (H) 12 - 20      GFR est AA 58 (L) >60 ml/min/1.73m2    GFR est non-AA 48 (L) >60 ml/min/1.73m2    Calcium 10.0 8.5 - 10.1 MG/DL    Bilirubin, total 0.2 0.2 - 1.0 MG/DL    ALT (SGPT) 23 12 - 78 U/L    AST (SGOT) 17 15 - 37 U/L    Alk. phosphatase 285 (H) 45 - 117 U/L    Protein, total 8.8 (H) 6.4 - 8.2 g/dL    Albumin 3.5 3.5 - 5.0 g/dL    Globulin 5.3 (H) 2.0 - 4.0 g/dL    A-G Ratio 0.7 (L) 1.1 - 2.2         CTA CHEST W OR W WO CONT    Result Date: 9/24/2021  Clinical history: Dyspnea, chest pain INDICATION:   Dyspnea and chest pain COMPARISON: None CONTRAST: 100 ml Isovue 370 TECHNIQUE: CT of the chest with  IV contrast , Isovue-370 is performed. Axial images from the thoracic inlet to the level of the upper abdomen are obtained.  Manual post-processing of the images and coronal reformatting is also performed. CT dose reduction was achieved through use of a standardized protocol tailored for this examination and automatic exposure control for dose modulation. Multiplanar reformatted imaging was performed. Sagittal and coronal reformatting. 3-D Postprocessing of imaging was performed. 3-D MIP reconstructed images were obtained in the coronal plane. FINDINGS: There is no pulmonary embolism. There is no aortic aneurysm or dissection. Hepatic steatosis. Nodular hepatic contour suggesting early cirrhotic change. Mild aortic atherosclerotic change. Degenerative changes at T10-T11. Cardiomegaly. . There is no pleural or pericardial effusion. There is no mediastinal, axillary or hilar lymphadenopathy. The aorta is normal in course and caliber. The proximal pulmonary arteries are without evidence of filling defects. No lytic or blastic lesions are identified. The remainder of the upper abdomen visualized is unremarkable. There is no pulmonary embolism. There is no aortic aneurysm or dissection. Cardiomegaly and coronary artery disease. No acute intrathoracic process is identified. Incidental findings are as described above.

## 2021-10-22 ENCOUNTER — OFFICE VISIT (OUTPATIENT)
Dept: FAMILY MEDICINE CLINIC | Age: 71
End: 2021-10-22
Payer: MEDICARE

## 2021-10-22 VITALS
RESPIRATION RATE: 16 BRPM | HEART RATE: 88 BPM | DIASTOLIC BLOOD PRESSURE: 74 MMHG | WEIGHT: 211 LBS | BODY MASS INDEX: 36.02 KG/M2 | OXYGEN SATURATION: 96 % | HEIGHT: 64 IN | SYSTOLIC BLOOD PRESSURE: 133 MMHG | TEMPERATURE: 97.7 F

## 2021-10-22 DIAGNOSIS — Z72.0 TOBACCO ABUSE: ICD-10-CM

## 2021-10-22 DIAGNOSIS — G89.4 CHRONIC PAIN DISORDER: Primary | ICD-10-CM

## 2021-10-22 PROCEDURE — G8399 PT W/DXA RESULTS DOCUMENT: HCPCS | Performed by: NURSE PRACTITIONER

## 2021-10-22 PROCEDURE — 1090F PRES/ABSN URINE INCON ASSESS: CPT | Performed by: NURSE PRACTITIONER

## 2021-10-22 PROCEDURE — 1101F PT FALLS ASSESS-DOCD LE1/YR: CPT | Performed by: NURSE PRACTITIONER

## 2021-10-22 PROCEDURE — G8432 DEP SCR NOT DOC, RNG: HCPCS | Performed by: NURSE PRACTITIONER

## 2021-10-22 PROCEDURE — G8752 SYS BP LESS 140: HCPCS | Performed by: NURSE PRACTITIONER

## 2021-10-22 PROCEDURE — G9899 SCRN MAM PERF RSLTS DOC: HCPCS | Performed by: NURSE PRACTITIONER

## 2021-10-22 PROCEDURE — G8427 DOCREV CUR MEDS BY ELIG CLIN: HCPCS | Performed by: NURSE PRACTITIONER

## 2021-10-22 PROCEDURE — G8417 CALC BMI ABV UP PARAM F/U: HCPCS | Performed by: NURSE PRACTITIONER

## 2021-10-22 PROCEDURE — 99214 OFFICE O/P EST MOD 30 MIN: CPT | Performed by: NURSE PRACTITIONER

## 2021-10-22 PROCEDURE — G8536 NO DOC ELDER MAL SCRN: HCPCS | Performed by: NURSE PRACTITIONER

## 2021-10-22 PROCEDURE — G8754 DIAS BP LESS 90: HCPCS | Performed by: NURSE PRACTITIONER

## 2021-10-22 PROCEDURE — 3017F COLORECTAL CA SCREEN DOC REV: CPT | Performed by: NURSE PRACTITIONER

## 2021-10-22 NOTE — PROGRESS NOTES
Assessment/Plan:     Diagnoses and all orders for this visit:    1. Chronic pain disorder  -     COMPLIANCE DRUG SCREEN/PRESCRIPTION MONITORING  -     HYDROcodone-acetaminophen (NORCO)  mg tablet; Take 1 Tablet by mouth every eight (8) hours as needed for Pain for up to 30 days. Max Daily Amount: 3 Tablets.  reviewed and appropriate. Stable on current therapy. No changes to regimen today. Refilled for 3 months. We will see her in 3 months or sooner as needed. Continue follow-up with rheumatology. 2. Lung nodule < 6cm on CT  Previous CTA chest did reveal a full resolution of previous lung nodules per Dr. Roland Nielsen. We will plan to repeat CT low-dose screening in 1 year. 3. Tobacco abuse         Follow-up and Dispositions    · Return in about 3 months (around 1/22/2022) for Follow Up. Discussed expected course/resolution/complications of diagnosis in detail with patient. Medication risks/benefits/costs/interactions/alternatives discussed with patient. Pt was given after visit summary which includes diagnoses, current medications & vitals. Pt expressed understanding with the diagnosis and plan          Subjective:      Liz Phalen is a 80 y.o. female who presents for had concerns including Follow-up. She is followed by [facility]. Chronic Pain  Liz Phalen RTC today to follow up on chronic pain diagnosis.  We discussed her osteoarthritis that is affecting her bilateral hips, left shoulder and lumbar back.  We discussed her seronegative rheumatoid arthritis affecting her bilateral hands.  Significant changes since last visit: none.  She is  able to do her normal daily activities.  She reports the following adverse side effects: none. Previous treatments include heat, cold, Lidocaine patches otc, Tramadol, methotrexate, cortisone injection which all provided minimal relief.  Not a candidate for oral NSAIDS.  She is followed by rheumatology.     She continues to use tobacco regularly. She has no plans for cessation at this time. Patient Active Problem List   Diagnosis Code    Hypercholesterolemia E78.00    Osteoarthritis of hip M16.9    PMR (polymyalgia rheumatica) (HCC) M35.3    Chronic bronchitis ([facility] J42    Former smoker Z87.891    Chronic pain G89.29    Type 2 diabetes mellitus with diabetic polyneuropathy, without long-term current use of insulin ([facility] E11.42    Essential hypertension I10    Seronegative rheumatoid arthritis of multiple sites ([facility] M06.09    MGUS (monoclonal gammopathy of unknown significance) D47.2    Vitamin D deficiency E55.9    Long-term use of immunosuppressant medication Z79.899    CAD (coronary artery disease) I25.10    Lung nodule seen on imaging study R91.1    Idiopathic chronic gout, multiple sites, with tophus (tophi) M1A.09X1    Hypertriglyceridemia E78.1    Primary biliary cholangitis (HCC) K74.3    NAFL (nonalcoholic fatty liver) S39.2       Current Outpatient Medications   Medication Sig Dispense Refill    [START ON 12/27/2021] HYDROcodone-acetaminophen (NORCO)  mg tablet Take 1 Tablet by mouth every eight (8) hours as needed for Pain for up to 30 days. Max Daily Amount: 3 Tablets. 90 Tablet 0    pregabalin (LYRICA) 200 mg capsule TAKE ONE CAPSULE BY MOUTH THREE TIMES A DAY 90 Capsule 1    glipiZIDE SR (GLUCOTROL XL) 2.5 mg CR tablet TAKE 1 TABLET EVERY DAY 90 Tablet 3    albuterol (PROVENTIL VENTOLIN) 2.5 mg /3 mL (0.083 %) nebu 3 mL by Nebulization route every four (4) hours as needed for Wheezing. 30 Nebule 0    ondansetron (ZOFRAN ODT) 4 mg disintegrating tablet Take 1 Tablet by mouth every eight (8) hours as needed for Nausea. 60 Tablet 2    esomeprazole (NEXIUM) 40 mg capsule Take 1 Capsule by mouth daily.  Indications: gastroesophageal reflux disease, hemorrhagic gastritis 90 Capsule 1    metFORMIN (GLUCOPHAGE) 500 mg tablet TAKE TWO TABLETS BY MOUTH TWICE A DAY WITH A MEAL 360 Tablet 1    dulaglutide (Trulicity) 3 RX/4.2 mL pnij 1 Adjustable Dose Pre-filled Pen Syringe by SubCUTAneous route every seven (7) days. 12 Adjustable Dose Pre-filled Pen Syringe 3    allopurinoL (ZYLOPRIM) 300 mg tablet Take 1 Tab by mouth daily. 90 Tab 3    multivit-min/iron/folic/lutein (CENTRUM SILVER WOMEN PO) Take 1 Tab by mouth daily.  cholecalciferol (Vitamin D3) 25 mcg (1,000 unit) cap Take 1,000 Units by mouth daily.  ascorbic acid, vitamin C, (Vitamin C) 1,000 mg tablet Take 1,000 mg by mouth daily.  cyanocobalamin, vitamin B-12, 5,000 mcg cap Take 5,000 mcg by mouth daily.  fluticasone propion-salmeteroL (Advair Diskus) 250-50 mcg/dose diskus inhaler Take 1 Puff by inhalation as needed.  Lactobacillus acidophilus (PROBIOTIC PO) Take  by mouth. Takes one po once daily.  dilTIAZem ER (TIAZAC) 420 mg capsule TAKE 1 CAPSULE EVERY DAY 90 Cap 3    glucose blood VI test strips (blood glucose test) strip Use to check blood sugar up to 3 times daily. E11.65 100 Strip 11    Blood-Glucose Meter monitoring kit Use to check blood sugar up to 3 times daily. E11.65 1 Kit 0    lancets misc Use to check blood sugar up to 3 times daily. E11.65 1 Each 11    potassium chloride (KLOR-CON) 10 mEq tablet TAKE 1 TABLET EVERY DAY 90 Tab 3    atorvastatin (LIPITOR) 20 mg tablet TAKE 1 TABLET EVERY DAY 90 Tab 3    losartan-hydroCHLOROthiazide (HYZAAR) 100-25 mg per tablet TAKE 1 TABLET EVERY DAY 90 Tab 3    tiotropium (Spiriva with HandiHaler) 18 mcg inhalation capsule Take 1 Cap by inhalation daily. 90 Cap 0    albuterol (PROVENTIL VENTOLIN) 2.5 mg /3 mL (0.083 %) nebu 3 mL by Nebulization route every six (6) hours as needed for Wheezing. 60 Each 0    albuterol (PROVENTIL HFA, VENTOLIN HFA, PROAIR HFA) 90 mcg/actuation inhaler Take 2 Puffs by inhalation every six (6) hours as needed for Wheezing or Shortness of Breath.  1 Inhaler 1    aspirin delayed-release 81 mg tablet Take 81 mg by mouth daily.  Lancets (ACCU-CHEK SOFTCLIX LANCETS) misc Test blood sugar twice daily. DX: E11.9. Substitute supply brand accepted by insurance. 1 Each 11    alcohol swabs (ALCOHOL WIPES) padm Test blood sugar twice daily. DX: E11.9. Substitute supply brand accepted by insurance. 100 Pad 1    Cetirizine (ZYRTEC) 10 mg Cap Take 10 mg by mouth daily.  amoxicillin (AMOXIL) 500 mg capsule  (Patient not taking: Reported on 10/22/2021)      ursodioL (AMINA Forte) 500 mg tablet Take 1 Tablet by mouth two (2) times a day. (Patient not taking: Reported on 7/28/2021) 60 Tablet 3       Allergies   Allergen Reactions    Augmentin [Amoxicillin-Pot Clavulanate] Diarrhea    Bactrim [Sulfamethoxazole-Trimethoprim] Nausea Only    Biaxin [Clarithromycin] Nausea Only    Demerol [Meperidine] Itching    Erythromycin Nausea Only    Gabapentin Nausea and Vomiting    Pcn [Penicillins] Hives    Percocet [Oxycodone-Acetaminophen] Itching    Pravastatin Nausea Only    Tetracycline Nausea Only    Voltaren [Diclofenac Sodium] Hives       ROS:   Review of Systems   Constitutional: Negative for malaise/fatigue. Eyes: Negative for blurred vision. Respiratory: Negative for shortness of breath. Cardiovascular: Negative for chest pain. Musculoskeletal: Positive for back pain and joint pain. Objective:     Visit Vitals  /74 (BP 1 Location: Right arm, BP Patient Position: Sitting, BP Cuff Size: Adult)   Pulse 88   Temp 97.7 °F (36.5 °C)   Resp 16   Ht 5' 4\" (1.626 m)   Wt 211 lb (95.7 kg)   SpO2 96%   BMI 36.22 kg/m²       Vitals and Nurse Documentation reviewed. Physical Exam  Constitutional:       General: She is not in acute distress. Appearance: She is obese. Cardiovascular:      Heart sounds: S1 normal and S2 normal. No murmur heard. No friction rub. No gallop. Pulmonary:      Effort: No respiratory distress. Breath sounds: Decreased breath sounds and wheezing present.    Skin: General: Skin is warm and dry.    Psychiatric:         Mood and Affect: Mood and affect normal.

## 2021-10-22 NOTE — PROGRESS NOTES
No chief complaint on file. 1. \"Have you been to the ER, urgent care clinic since your last visit? Hospitalized since your last visit? \" Yes Reason for visit: Neck Pain    2. \"Have you seen or consulted any other health care providers outside of the [address] [place] [address] since your last visit? \" No     3. For patients aged 39-70: Has the patient had a colonoscopy? Yes, HM satisfied with blue hyperlink     If the patient is female:    4. For patients aged 41-77: Has the patient had a mammogram within the past 2 years? Yes, HM satisfied with blue hyperlink    5. For patients aged 21-30: Has the patient had a pap smear?  No    3 most recent PHQ Screens 7/28/2021   Little interest or pleasure in doing things Not at all   Feeling down, depressed, irritable, or hopeless Not at all   Total Score PHQ 2 0

## 2021-10-26 ENCOUNTER — TELEPHONE (OUTPATIENT)
Dept: FAMILY MEDICINE CLINIC | Age: 71
End: 2021-10-26

## 2021-10-26 RX ORDER — HYDROCODONE BITARTRATE AND ACETAMINOPHEN 10; 325 MG/1; MG/1
1 TABLET ORAL
Qty: 90 TABLET | Refills: 0 | Status: SHIPPED | OUTPATIENT
Start: 2021-12-27 | End: 2022-01-24 | Stop reason: SDUPTHER

## 2021-10-26 RX ORDER — HYDROCODONE BITARTRATE AND ACETAMINOPHEN 10; 325 MG/1; MG/1
1 TABLET ORAL
Qty: 90 TABLET | Refills: 0 | Status: SHIPPED | OUTPATIENT
Start: 2021-10-28 | End: 2021-10-26 | Stop reason: SDUPTHER

## 2021-10-26 RX ORDER — HYDROCODONE BITARTRATE AND ACETAMINOPHEN 10; 325 MG/1; MG/1
1 TABLET ORAL
Qty: 90 TABLET | Refills: 0 | Status: SHIPPED | OUTPATIENT
Start: 2021-11-27 | End: 2021-10-26 | Stop reason: SDUPTHER

## 2021-10-26 NOTE — TELEPHONE ENCOUNTER
Pt called and said she was told by Helen  that she could  meds early, but the  pharmacy said she cannot get med until 10/28. Requested callback.      BCB# [phone]

## 2021-10-28 DIAGNOSIS — G89.4 CHRONIC PAIN DISORDER: ICD-10-CM

## 2021-10-28 LAB — DRUGS UR: NORMAL

## 2021-10-28 RX ORDER — HYDROCODONE BITARTRATE AND ACETAMINOPHEN 10; 325 MG/1; MG/1
1 TABLET ORAL
Qty: 90 TABLET | Refills: 0 | Status: CANCELLED | OUTPATIENT
Start: 2021-12-27 | End: 2022-01-26

## 2021-10-28 NOTE — TELEPHONE ENCOUNTER
----- Message from Chuck Randall sent at 10/25/2021 10:50 AM EDT -----  Subject: Message to Provider    QUESTIONS  Information for Provider? Patient was in office on 10/22, prescription for   Hydrocodone-acetaminophen (NORCO)  mg tablet/ every 8 hours/ 0 left   // sent to [facility] [phone] - [address], [place] [address]   ---------------------------------------------------------------------------  --------------  Peggy POST  What is the best way for the office to contact you? OK to leave message on   voicemail  Preferred Call Back Phone Number? [phone]  ---------------------------------------------------------------------------  --------------  SCRIPT ANSWERS  Relationship to Patient?  Self

## 2021-10-29 NOTE — TELEPHONE ENCOUNTER
Contacted the pharmacy and she picked them up on the 26th. And they have 2 on hold for the next fills.   Thanks, Ela    Deleted this request.  Thanks, Shivani Dominguez

## 2021-11-15 RX ORDER — ESOMEPRAZOLE MAGNESIUM 40 MG/1
CAPSULE, DELAYED RELEASE ORAL
Qty: 90 CAPSULE | Refills: 1 | Status: SHIPPED | OUTPATIENT
Start: 2021-11-15

## 2021-11-15 RX ORDER — METFORMIN HYDROCHLORIDE 500 MG/1
TABLET ORAL
Qty: 360 TABLET | Refills: 1 | Status: SHIPPED | OUTPATIENT
Start: 2021-11-15 | End: 2022-08-24 | Stop reason: SDUPTHER

## 2021-12-21 DIAGNOSIS — I10 ESSENTIAL HYPERTENSION WITH GOAL BLOOD PRESSURE LESS THAN 130/80: ICD-10-CM

## 2021-12-22 RX ORDER — LOSARTAN POTASSIUM AND HYDROCHLOROTHIAZIDE 25; 100 MG/1; MG/1
TABLET ORAL
Qty: 90 TABLET | Refills: 3 | Status: SHIPPED | OUTPATIENT
Start: 2021-12-22

## 2021-12-30 RX ORDER — ATORVASTATIN CALCIUM 20 MG/1
TABLET, FILM COATED ORAL
Qty: 90 TABLET | Refills: 3 | Status: SHIPPED | OUTPATIENT
Start: 2021-12-30

## 2022-01-24 ENCOUNTER — OFFICE VISIT (OUTPATIENT)
Dept: FAMILY MEDICINE CLINIC | Age: 72
End: 2022-01-24
Payer: MEDICARE

## 2022-01-24 VITALS
RESPIRATION RATE: 18 BRPM | BODY MASS INDEX: 34.83 KG/M2 | OXYGEN SATURATION: 95 % | DIASTOLIC BLOOD PRESSURE: 78 MMHG | HEART RATE: 73 BPM | HEIGHT: 64 IN | SYSTOLIC BLOOD PRESSURE: 121 MMHG | TEMPERATURE: 97.8 F | WEIGHT: 204 LBS

## 2022-01-24 DIAGNOSIS — M25.551 RIGHT HIP PAIN: ICD-10-CM

## 2022-01-24 DIAGNOSIS — E66.01 SEVERE OBESITY (BMI 35.0-35.9 WITH COMORBIDITY) (HCC): ICD-10-CM

## 2022-01-24 DIAGNOSIS — J42 CHRONIC BRONCHITIS, UNSPECIFIED CHRONIC BRONCHITIS TYPE (HCC): ICD-10-CM

## 2022-01-24 DIAGNOSIS — E11.42 TYPE 2 DIABETES MELLITUS WITH DIABETIC POLYNEUROPATHY, WITHOUT LONG-TERM CURRENT USE OF INSULIN (HCC): Primary | ICD-10-CM

## 2022-01-24 DIAGNOSIS — K74.3 PRIMARY BILIARY CHOLANGITIS (HCC): ICD-10-CM

## 2022-01-24 DIAGNOSIS — M1A.09X1 IDIOPATHIC CHRONIC GOUT, MULTIPLE SITES, WITH TOPHUS (TOPHI): ICD-10-CM

## 2022-01-24 DIAGNOSIS — M06.09 SERONEGATIVE RHEUMATOID ARTHRITIS OF MULTIPLE SITES (HCC): ICD-10-CM

## 2022-01-24 DIAGNOSIS — G89.4 CHRONIC PAIN DISORDER: ICD-10-CM

## 2022-01-24 LAB — HBA1C MFR BLD HPLC: 8 %

## 2022-01-24 PROCEDURE — 3017F COLORECTAL CA SCREEN DOC REV: CPT | Performed by: NURSE PRACTITIONER

## 2022-01-24 PROCEDURE — G8752 SYS BP LESS 140: HCPCS | Performed by: NURSE PRACTITIONER

## 2022-01-24 PROCEDURE — 1101F PT FALLS ASSESS-DOCD LE1/YR: CPT | Performed by: NURSE PRACTITIONER

## 2022-01-24 PROCEDURE — 1090F PRES/ABSN URINE INCON ASSESS: CPT | Performed by: NURSE PRACTITIONER

## 2022-01-24 PROCEDURE — G8432 DEP SCR NOT DOC, RNG: HCPCS | Performed by: NURSE PRACTITIONER

## 2022-01-24 PROCEDURE — G8536 NO DOC ELDER MAL SCRN: HCPCS | Performed by: NURSE PRACTITIONER

## 2022-01-24 PROCEDURE — 99215 OFFICE O/P EST HI 40 MIN: CPT | Performed by: NURSE PRACTITIONER

## 2022-01-24 PROCEDURE — 3052F HG A1C>EQUAL 8.0%<EQUAL 9.0%: CPT | Performed by: NURSE PRACTITIONER

## 2022-01-24 PROCEDURE — 2022F DILAT RTA XM EVC RTNOPTHY: CPT | Performed by: NURSE PRACTITIONER

## 2022-01-24 PROCEDURE — G8417 CALC BMI ABV UP PARAM F/U: HCPCS | Performed by: NURSE PRACTITIONER

## 2022-01-24 PROCEDURE — G8427 DOCREV CUR MEDS BY ELIG CLIN: HCPCS | Performed by: NURSE PRACTITIONER

## 2022-01-24 PROCEDURE — 83036 HEMOGLOBIN GLYCOSYLATED A1C: CPT | Performed by: NURSE PRACTITIONER

## 2022-01-24 PROCEDURE — G8399 PT W/DXA RESULTS DOCUMENT: HCPCS | Performed by: NURSE PRACTITIONER

## 2022-01-24 PROCEDURE — G8754 DIAS BP LESS 90: HCPCS | Performed by: NURSE PRACTITIONER

## 2022-01-24 PROCEDURE — G9899 SCRN MAM PERF RSLTS DOC: HCPCS | Performed by: NURSE PRACTITIONER

## 2022-01-24 RX ORDER — HYDROCODONE BITARTRATE AND ACETAMINOPHEN 10; 325 MG/1; MG/1
1 TABLET ORAL
Qty: 90 TABLET | Refills: 0 | Status: SHIPPED | OUTPATIENT
Start: 2022-03-24 | End: 2022-04-22 | Stop reason: SDUPTHER

## 2022-01-24 RX ORDER — HYDROCODONE BITARTRATE AND ACETAMINOPHEN 10; 325 MG/1; MG/1
1 TABLET ORAL
Qty: 90 TABLET | Refills: 0 | Status: SHIPPED | OUTPATIENT
Start: 2022-01-24 | End: 2022-01-24 | Stop reason: SDUPTHER

## 2022-01-24 RX ORDER — HYDROCODONE BITARTRATE AND ACETAMINOPHEN 10; 325 MG/1; MG/1
1 TABLET ORAL
Qty: 90 TABLET | Refills: 0 | Status: SHIPPED | OUTPATIENT
Start: 2022-02-24 | End: 2022-01-24 | Stop reason: SDUPTHER

## 2022-01-24 NOTE — PATIENT INSTRUCTIONS
1.  Call Dr. Mesha Peres for appointment. 2. See [facility] for eye check up. 3.  Schedule with new rheumatologist, Dr. Peter Shea. 4.  Check blood sugar once or twice a day for a week.

## 2022-01-24 NOTE — PROGRESS NOTES
Assessment/Plan:     Diagnoses and all orders for this visit:    1. Type 2 diabetes mellitus with diabetic polyneuropathy, without long-term current use of insulin (HCC)  -     AMB POC HEMOGLOBIN A1C  Uncontrolled. She will call back in one week with sugars. 2. Seronegative rheumatoid arthritis of multiple sites ([facility]  -     REFERRAL TO RHEUMATOLOGY  -     CBC WITH AUTOMATED DIFF; Future    3. Severe obesity (BMI 35.0-35.9 with comorbidity) ([facility]    4. Primary biliary cholangitis ([facility]  Overdue for follow up with hepatology. 5. Chronic bronchitis, unspecified chronic bronchitis type ([facility]  Followed by pulmonology. 6. Right hip pain  -     XR HIP RT W OR WO PELV 2-3 VWS; Future  Xray pending. 7. Idiopathic chronic gout, multiple sites, with tophus (tophi)  -     METABOLIC PANEL, COMPREHENSIVE; Future  -     URIC ACID; Future  Labs pending. Recent flare. 8. Chronic pain disorder  -     HYDROcodone-acetaminophen (NORCO)  mg tablet; Take 1 Tablet by mouth every eight (8) hours as needed for Pain for up to 30 days. Max Daily Amount: 3 Tablets.  reviewed and appropriate. Three month refilled today. Stable on current therapy. Failed conservative therapies. Re-establish with rheumatology as directed. Follow-up and Dispositions    · Return in about 3 months (around 4/24/2022) for Follow Up. Discussed expected course/resolution/complications of diagnosis in detail with patient. Medication risks/benefits/costs/interactions/alternatives discussed with patient. Pt was given after visit summary which includes diagnoses, current medications & vitals. Pt expressed understanding with the diagnosis and plan          Subjective:      Vinita Mccarthy is a 80 y.o. female who presents for had concerns including Diabetes. Patient is a 80 y.o. female that comes today for follow up of Diabetes Mellitus Type 2. Patient does not regularly monitor  their FSBG at home.  not attempting to follow a low fat, low cholesterol diet  Patients activity level: sedentery  lost,  6 lbs. .  The patient has not experienced recent hypoglycemia. reports that she has been smoking cigarettes. She has a 20.00 pack-year smoking history. She has never used smokeless tobacco.    Lab Results   Component Value Date/Time    Hemoglobin A1c 6.9 (H) 07/28/2021 12:37 PM    Hemoglobin A1c 10.0 (H) 01/29/2021 12:16 PM    Hemoglobin A1c 9.8 (H) 10/08/2020 12:05 AM     Chronic Pain  Fadyannetta Rubin RTC today to follow up on chronic pain diagnosis.  We discussed her osteoarthritis that is affecting her bilateral hips, left shoulder and lumbar back.  We discussed her seronegative rheumatoid arthritis affecting her bilateral hands.  Significant changes since last visit: none.  She is  able to do her normal daily activities.  She reports the following adverse side effects: none. Previous treatments include heat, cold, Lidocaine patches otc, Tramadol, methotrexate, cortisone injection which all provided minimal relief.  Not a candidate for oral NSAIDS. She is followed by rheumatology.     Reports worsening right hip pain over the last three months. Symptoms including aching. This is her first evaluation. Has not attempted additional treatment otc. Symptoms are worsening over time.       Patient Active Problem List   Diagnosis Code    Hypercholesterolemia E78.00    Osteoarthritis of hip M16.9    PMR (polymyalgia rheumatica) ([facility]) M35.3    Chronic bronchitis ([facility] J42    Former smoker Z87.891    Chronic pain G89.29    Type 2 diabetes mellitus with diabetic polyneuropathy, without long-term current use of insulin ([facility] E11.42    Essential hypertension I10    Seronegative rheumatoid arthritis of multiple sites ([facility] M06.09    MGUS (monoclonal gammopathy of unknown significance) D47.2    Vitamin D deficiency E55.9    Long-term use of immunosuppressant medication Z79.899    CAD (coronary artery disease) I25.10    Lung nodule seen on imaging study R91.1    Idiopathic chronic gout, multiple sites, with tophus (tophi) M1A.09X1    Hypertriglyceridemia E78.1    Primary biliary cholangitis (HCC) K74.3    NAFL (nonalcoholic fatty liver) P84.4       Current Outpatient Medications   Medication Sig Dispense Refill    [START ON 3/24/2022] HYDROcodone-acetaminophen (NORCO)  mg tablet Take 1 Tablet by mouth every eight (8) hours as needed for Pain for up to 30 days. Max Daily Amount: 3 Tablets. 90 Tablet 0    atorvastatin (LIPITOR) 20 mg tablet TAKE 1 TABLET EVERY DAY 90 Tablet 3    losartan-hydroCHLOROthiazide (HYZAAR) 100-25 mg per tablet TAKE 1 TABLET EVERY DAY 90 Tablet 3    metFORMIN (GLUCOPHAGE) 500 mg tablet TAKE 2 TABLETS TWICE DAILY WITH A MEAL 360 Tablet 1    esomeprazole (NEXIUM) 40 mg capsule TAKE 1 CAPSULE BY MOUTH DAILY FOR GASTROESOPHAGEAL REFLUX DISEASE, HEMORRHAGIC GASTRITIS 90 Capsule 1    pregabalin (LYRICA) 200 mg capsule TAKE ONE CAPSULE BY MOUTH THREE TIMES A DAY 90 Capsule 1    glipiZIDE SR (GLUCOTROL XL) 2.5 mg CR tablet TAKE 1 TABLET EVERY DAY 90 Tablet 3    ondansetron (ZOFRAN ODT) 4 mg disintegrating tablet Take 1 Tablet by mouth every eight (8) hours as needed for Nausea. 60 Tablet 2    dulaglutide (Trulicity) 3 BB/2.3 mL pnij 1 Adjustable Dose Pre-filled Pen Syringe by SubCUTAneous route every seven (7) days. 12 Adjustable Dose Pre-filled Pen Syringe 3    allopurinoL (ZYLOPRIM) 300 mg tablet Take 1 Tab by mouth daily. 90 Tab 3    multivit-min/iron/folic/lutein (CENTRUM SILVER WOMEN PO) Take 1 Tab by mouth daily.  cholecalciferol (Vitamin D3) 25 mcg (1,000 unit) cap Take 1,000 Units by mouth daily.  ascorbic acid, vitamin C, (Vitamin C) 1,000 mg tablet Take 1,000 mg by mouth daily.  cyanocobalamin, vitamin B-12, 5,000 mcg cap Take 5,000 mcg by mouth daily.       fluticasone propion-salmeteroL (Advair Diskus) 250-50 mcg/dose diskus inhaler Take 1 Puff by inhalation as needed.  Lactobacillus acidophilus (PROBIOTIC PO) Take  by mouth. Takes one po once daily.  dilTIAZem ER (TIAZAC) 420 mg capsule TAKE 1 CAPSULE EVERY DAY 90 Cap 3    glucose blood VI test strips (blood glucose test) strip Use to check blood sugar up to 3 times daily. E11.65 100 Strip 11    Blood-Glucose Meter monitoring kit Use to check blood sugar up to 3 times daily. E11.65 1 Kit 0    lancets misc Use to check blood sugar up to 3 times daily. E11.65 1 Each 11    potassium chloride (KLOR-CON) 10 mEq tablet TAKE 1 TABLET EVERY DAY 90 Tab 3    tiotropium (Spiriva with HandiHaler) 18 mcg inhalation capsule Take 1 Cap by inhalation daily. 90 Cap 0    albuterol (PROVENTIL VENTOLIN) 2.5 mg /3 mL (0.083 %) nebu 3 mL by Nebulization route every six (6) hours as needed for Wheezing. 60 Each 0    albuterol (PROVENTIL HFA, VENTOLIN HFA, PROAIR HFA) 90 mcg/actuation inhaler Take 2 Puffs by inhalation every six (6) hours as needed for Wheezing or Shortness of Breath. 1 Inhaler 1    aspirin delayed-release 81 mg tablet Take 81 mg by mouth daily.  Lancets (ACCU-CHEK SOFTCLIX LANCETS) misc Test blood sugar twice daily. DX: E11.9. Substitute supply brand accepted by insurance. 1 Each 11    alcohol swabs (ALCOHOL WIPES) padm Test blood sugar twice daily. DX: E11.9. Substitute supply brand accepted by insurance. 100 Pad 1    Cetirizine (ZYRTEC) 10 mg Cap Take 10 mg by mouth daily.  ursodioL (AMINA Forte) 500 mg tablet Take 1 Tablet by mouth two (2) times a day.  (Patient not taking: Reported on 7/28/2021) 60 Tablet 3       Allergies   Allergen Reactions    Augmentin [Amoxicillin-Pot Clavulanate] Diarrhea    Bactrim [Sulfamethoxazole-Trimethoprim] Nausea Only    Biaxin [Clarithromycin] Nausea Only    Demerol [Meperidine] Itching    Erythromycin Nausea Only    Gabapentin Nausea and Vomiting    Pcn [Penicillins] Hives    Percocet [Oxycodone-Acetaminophen] Itching    Pravastatin Nausea Only    Tetracycline Nausea Only    Voltaren [Diclofenac Sodium] Hives       ROS:   Review of Systems   Constitutional: Negative for malaise/fatigue. Eyes: Negative for blurred vision. Respiratory: Negative for shortness of breath. Cardiovascular: Negative for chest pain. Musculoskeletal: Positive for joint pain. Objective:     Visit Vitals  /78 (BP 1 Location: Left upper arm, BP Patient Position: Sitting, BP Cuff Size: Large adult long)   Pulse 73   Temp 97.8 °F (36.6 °C) (Temporal)   Resp 18   Ht 5' 4\" (1.626 m)   Wt 204 lb (92.5 kg)   SpO2 95%   BMI 35.02 kg/m²       Vitals and Nurse Documentation reviewed. Physical Exam  Constitutional:       General: She is not in acute distress. Appearance: She is obese. Cardiovascular:      Heart sounds: S1 normal and S2 normal. No murmur heard. No friction rub. No gallop. Pulmonary:      Effort: No respiratory distress. Breath sounds: Decreased breath sounds and wheezing present. Skin:     General: Skin is warm and dry.    Psychiatric:         Mood and Affect: Mood and affect normal.

## 2022-01-24 NOTE — PROGRESS NOTES
Chief Complaint   Patient presents with    Diabetes       1. Have you been to the ER, urgent care clinic since your last visit? Hospitalized since your last visit? No    2. Have you seen or consulted any other health care providers outside of the [address] [place] [address] [place] since your last visit? Include any pap smears or colon screening. No    3. For patients over 45: Has the patient had a colonoscopy? Yes - no Care Gap present     If the patient is female:    4. For patients over 40: Has the patient had a mammogram? Yes - no Care Gap present    5. For patients over 21: Has the patient had a pap smear? NA - based on age    1 most recent PHQ Screens 1/24/2022   Little interest or pleasure in doing things Not at all   Feeling down, depressed, irritable, or hopeless Not at all   Total Score PHQ 2 0     Abuse Screening Questionnaire 1/24/2022   Do you ever feel afraid of your partner? N   Are you in a relationship with someone who physically or mentally threatens you? N   Is it safe for you to go home? Y       Fall Risk Assessment, last 12 mths 1/24/2022   Able to walk? Yes   Fall in past 12 months? 0   Do you feel unsteady?  0   Are you worried about falling 0   Number of falls in past 12 months -   Fall with injury? -       ADL Assessment 1/24/2022   Feeding yourself No Help Needed   Getting from bed to chair No Help Needed   Getting dressed No Help Needed   Bathing or showering No Help Needed   Walk across the room (includes cane/walker) No Help Needed   Using the telphone No Help Needed   Taking your medications No Help Needed   Preparing meals No Help Needed   Managing money (expenses/bills) No Help Needed   Moderately strenuous housework (laundry) No Help Needed   Shopping for personal items (toiletries/medicines) No Help Needed   Shopping for groceries No Help Needed   Driving No Help Needed   Climbing a flight of stairs No Help Needed   Getting to places beyond walking distances No Help Needed     Health Maintenance Due   Topic Date Due    Pneumococcal 65+ years (2 of 2 - PPSV23) 09/11/2020    COVID-19 Vaccine (2 - Pfizer 3-dose series) 03/24/2021    Eye Exam Retinal or Dilated  04/16/2021    Foot Exam Q1  01/29/2022    MICROALBUMIN Q1  01/29/2022    Medicare Yearly Exam  01/30/2022

## 2022-01-25 LAB
ALBUMIN SERPL-MCNC: 3.5 G/DL (ref 3.5–5)
ALBUMIN/GLOB SERPL: 0.8 {RATIO} (ref 1.1–2.2)
ALP SERPL-CCNC: 475 U/L (ref 45–117)
ALT SERPL-CCNC: 27 U/L (ref 12–78)
ANION GAP SERPL CALC-SCNC: 7 MMOL/L (ref 5–15)
AST SERPL-CCNC: 17 U/L (ref 15–37)
BASOPHILS # BLD: 0.1 K/UL (ref 0–0.1)
BASOPHILS NFR BLD: 1 % (ref 0–1)
BILIRUB SERPL-MCNC: 0.3 MG/DL (ref 0.2–1)
BUN SERPL-MCNC: 21 MG/DL (ref 6–20)
BUN/CREAT SERPL: 17 (ref 12–20)
CALCIUM SERPL-MCNC: 10 MG/DL (ref 8.5–10.1)
CHLORIDE SERPL-SCNC: 103 MMOL/L (ref 97–108)
CO2 SERPL-SCNC: 27 MMOL/L (ref 21–32)
CREAT SERPL-MCNC: 1.22 MG/DL (ref 0.55–1.02)
DIFFERENTIAL METHOD BLD: ABNORMAL
EOSINOPHIL # BLD: 0.2 K/UL (ref 0–0.4)
EOSINOPHIL NFR BLD: 2 % (ref 0–7)
ERYTHROCYTE [DISTWIDTH] IN BLOOD BY AUTOMATED COUNT: 16 % (ref 11.5–14.5)
GLOBULIN SER CALC-MCNC: 4.4 G/DL (ref 2–4)
GLUCOSE SERPL-MCNC: 137 MG/DL (ref 65–100)
HCT VFR BLD AUTO: 43.8 % (ref 35–47)
HGB BLD-MCNC: 13.3 G/DL (ref 11.5–16)
IMM GRANULOCYTES # BLD AUTO: 0.1 K/UL (ref 0–0.04)
IMM GRANULOCYTES NFR BLD AUTO: 1 % (ref 0–0.5)
LYMPHOCYTES # BLD: 2.2 K/UL (ref 0.8–3.5)
LYMPHOCYTES NFR BLD: 20 % (ref 12–49)
MCH RBC QN AUTO: 28.1 PG (ref 26–34)
MCHC RBC AUTO-ENTMCNC: 30.4 G/DL (ref 30–36.5)
MCV RBC AUTO: 92.4 FL (ref 80–99)
MONOCYTES # BLD: 0.8 K/UL (ref 0–1)
MONOCYTES NFR BLD: 7 % (ref 5–13)
NEUTS SEG # BLD: 7.7 K/UL (ref 1.8–8)
NEUTS SEG NFR BLD: 69 % (ref 32–75)
NRBC # BLD: 0 K/UL (ref 0–0.01)
NRBC BLD-RTO: 0 PER 100 WBC
PLATELET # BLD AUTO: 268 K/UL (ref 150–400)
PMV BLD AUTO: 12.8 FL (ref 8.9–12.9)
POTASSIUM SERPL-SCNC: 4 MMOL/L (ref 3.5–5.1)
PROT SERPL-MCNC: 7.9 G/DL (ref 6.4–8.2)
RBC # BLD AUTO: 4.74 M/UL (ref 3.8–5.2)
SODIUM SERPL-SCNC: 137 MMOL/L (ref 136–145)
URATE SERPL-MCNC: 6.8 MG/DL (ref 2.6–6)
WBC # BLD AUTO: 11 K/UL (ref 3.6–11)

## 2022-01-25 NOTE — PROGRESS NOTES
Labs are mildly worse from prior values and needs to follow-up with hepatologist as soon as she is able.

## 2022-01-25 NOTE — PROGRESS NOTES
Attempted to call Pt about lab results. There was no answer and voice mail was full so I was unable to leave a message.

## 2022-01-27 NOTE — PROGRESS NOTES
Spoke with patient , 2 identifiers confirmed. Patient informed of lab results as reviewed by CARLOS EDUARDO Cervantes with instruction to follow up with hepatologist.  Patient was sent a letter. Patient verbalized understanding and that she would contact Dr. Naveed Kitchen office to schedule an appointment. Lab results faxed to Dr. Maty Martel.   Lexy Irwin LPN

## 2022-02-13 NOTE — PATIENT INSTRUCTIONS

## 2022-02-15 RX ORDER — DULAGLUTIDE 3 MG/.5ML
1 INJECTION, SOLUTION SUBCUTANEOUS
Qty: 12 EACH | Refills: 3 | Status: SHIPPED | OUTPATIENT
Start: 2022-02-15

## 2022-02-15 NOTE — TELEPHONE ENCOUNTER
Change in pharmacy to [facility]. Thanks, Jr Boyce    Last Visit: 22 CARLOS EDUARDO Cervantes  Next Appointment: 22 CARLOS EDUARDO Cervantes  Previous Refill Encounter(s): 21 12 + 3 (jose)    Requested Prescriptions     Pending Prescriptions Disp Refills    dulaglutide (Trulicity) 3 GF/7.8 mL pnij 12 Each 3     Si Adjustable Dose Pre-filled Pen Syringe by SubCUTAneous route every seven (7) days.

## 2022-02-18 ENCOUNTER — TELEPHONE (OUTPATIENT)
Dept: FAMILY MEDICINE CLINIC | Age: 72
End: 2022-02-18

## 2022-02-18 NOTE — TELEPHONE ENCOUNTER
Pharmacy Progress Note - Telephone Call    Ms. Rich Crystal 80 y.o. was contacted via an outbound telephone call regarding BG rdgs today. A voicemail was left for patient to return my call. This writer's work mobile number was provided as a callback contact - [phone]. Gopal Olmos, PharmD, BCGP  Clinical Pharmacist Specialist        For Pharmacy Admin Tracking Only     CPA in place:  Yes   Recommendation Provided To: Patient/Caregiver: 0 via Telephone   Intervention Accepted By: Patient/Caregiver: 0   Time Spent (min): 5

## 2022-03-18 PROBLEM — Z79.60 LONG-TERM USE OF IMMUNOSUPPRESSANT MEDICATION: Status: ACTIVE | Noted: 2018-11-27

## 2022-03-18 PROBLEM — D47.2 MGUS (MONOCLONAL GAMMOPATHY OF UNKNOWN SIGNIFICANCE): Status: ACTIVE | Noted: 2018-10-15

## 2022-03-18 PROBLEM — M1A.09X1 IDIOPATHIC CHRONIC GOUT, MULTIPLE SITES, WITH TOPHUS (TOPHI): Status: ACTIVE | Noted: 2019-11-19

## 2022-03-18 PROBLEM — I10 ESSENTIAL HYPERTENSION: Status: ACTIVE | Noted: 2018-09-11

## 2022-03-18 PROBLEM — K74.3 PRIMARY BILIARY CHOLANGITIS (HCC): Status: ACTIVE | Noted: 2021-03-01

## 2022-03-19 PROBLEM — E55.9 VITAMIN D DEFICIENCY: Status: ACTIVE | Noted: 2018-10-15

## 2022-03-19 PROBLEM — E78.1 HYPERTRIGLYCERIDEMIA: Status: ACTIVE | Noted: 2019-12-17

## 2022-03-19 PROBLEM — E11.42 TYPE 2 DIABETES MELLITUS WITH DIABETIC POLYNEUROPATHY, WITHOUT LONG-TERM CURRENT USE OF INSULIN (HCC): Status: ACTIVE | Noted: 2017-12-21

## 2022-03-19 PROBLEM — I25.10 CAD (CORONARY ARTERY DISEASE): Status: ACTIVE | Noted: 2019-05-07

## 2022-03-20 PROBLEM — G89.29 CHRONIC PAIN: Status: ACTIVE | Noted: 2017-07-31

## 2022-03-20 PROBLEM — R91.1 LUNG NODULE SEEN ON IMAGING STUDY: Status: ACTIVE | Noted: 2019-05-07

## 2022-03-20 PROBLEM — M06.09 SERONEGATIVE RHEUMATOID ARTHRITIS OF MULTIPLE SITES (HCC): Status: ACTIVE | Noted: 2018-10-15

## 2022-03-20 PROBLEM — K76.0 NAFL (NONALCOHOLIC FATTY LIVER): Status: ACTIVE | Noted: 2021-06-06

## 2022-03-22 DIAGNOSIS — E87.6 HYPOKALEMIA: ICD-10-CM

## 2022-03-22 RX ORDER — POTASSIUM CHLORIDE 750 MG/1
TABLET, EXTENDED RELEASE ORAL
Qty: 90 TABLET | Refills: 3 | Status: SHIPPED | OUTPATIENT
Start: 2022-03-22

## 2022-03-29 ENCOUNTER — TELEPHONE (OUTPATIENT)
Dept: RHEUMATOLOGY | Age: 72
End: 2022-03-29

## 2022-03-29 NOTE — TELEPHONE ENCOUNTER
CALLED PATIENT ON 3/29 LEFT VOICE MESSAGE TO CALL BACK INTO THE OFFICE TO RESCHEDULE APPT DUE TO A PROVIDER CANCELLATION PATIENT CAN BE ON 4/8/2022 PATIENT CAN BE RESCHEDULE FOR DATES OF SERVICE 4/29-6/24. Tanesha Edouard  SDH

## 2022-03-29 NOTE — TELEPHONE ENCOUNTER
CALLED PATIENT ON 3/29 LEFT VOICE MESSAGE TO CALL BACK INTO THE OFFICE TO RESCHEDULE APPT DUE TO A PROVIDER CANCELLATION ON 4/8/2022 PATIENT CAN BE RESCHEDULE FOR DATES OF SERVICE 4/29-6/24. Marissa Wills  SDH

## 2022-04-14 DIAGNOSIS — E11.42 TYPE 2 DIABETES MELLITUS WITH DIABETIC POLYNEUROPATHY, WITHOUT LONG-TERM CURRENT USE OF INSULIN (HCC): ICD-10-CM

## 2022-04-14 DIAGNOSIS — I10 ESSENTIAL HYPERTENSION WITH GOAL BLOOD PRESSURE LESS THAN 130/80: ICD-10-CM

## 2022-04-15 RX ORDER — GLIPIZIDE 2.5 MG/1
TABLET, EXTENDED RELEASE ORAL
Qty: 90 TABLET | Refills: 3 | Status: SHIPPED | OUTPATIENT
Start: 2022-04-15

## 2022-04-15 RX ORDER — DILTIAZEM HYDROCHLORIDE 420 MG/1
CAPSULE, EXTENDED RELEASE ORAL
Qty: 90 CAPSULE | Refills: 3 | Status: SHIPPED | OUTPATIENT
Start: 2022-04-15

## 2022-04-22 DIAGNOSIS — G89.4 CHRONIC PAIN DISORDER: ICD-10-CM

## 2022-04-22 NOTE — TELEPHONE ENCOUNTER
Writer spoke to pt and re-scheduled her for 5/5/22 @ 11:00 am due to provider being out of the office on 4/25/22. Pt need's a refill of her Hydrocodone  mg please send to her local [facility] that is on file.

## 2022-04-22 NOTE — TELEPHONE ENCOUNTER
Patient mychart request for refill. Check . Ela Coffey    Last Visit: 1/24/22 CARLOS EDUARDO Cervantes  Next Appointment: 5/5/22 NP Helen- Provider cancelled 4/25/22  Previous Refill Encounter(s): 3/24/22 90    Requested Prescriptions     Pending Prescriptions Disp Refills    HYDROcodone-acetaminophen (NORCO)  mg tablet 90 Tablet 0     Sig: Take 1 Tablet by mouth every eight (8) hours as needed for Pain for up to 30 days. Max Daily Amount: 3 Tablets.

## 2022-04-25 RX ORDER — HYDROCODONE BITARTRATE AND ACETAMINOPHEN 10; 325 MG/1; MG/1
1 TABLET ORAL
Qty: 90 TABLET | Refills: 0 | Status: SHIPPED | OUTPATIENT
Start: 2022-04-25 | End: 2022-05-17 | Stop reason: SDUPTHER

## 2022-04-29 ENCOUNTER — OFFICE VISIT (OUTPATIENT)
Dept: RHEUMATOLOGY | Age: 72
End: 2022-04-29
Payer: MEDICARE

## 2022-04-29 VITALS
DIASTOLIC BLOOD PRESSURE: 67 MMHG | BODY MASS INDEX: 34.93 KG/M2 | OXYGEN SATURATION: 94 % | RESPIRATION RATE: 20 BRPM | HEIGHT: 64 IN | TEMPERATURE: 97.3 F | HEART RATE: 84 BPM | WEIGHT: 204.6 LBS | SYSTOLIC BLOOD PRESSURE: 137 MMHG

## 2022-04-29 DIAGNOSIS — M16.12 PRIMARY OSTEOARTHRITIS OF LEFT HIP: ICD-10-CM

## 2022-04-29 DIAGNOSIS — M70.62 TROCHANTERIC BURSITIS, LEFT HIP: ICD-10-CM

## 2022-04-29 DIAGNOSIS — R74.8 ELEVATED ALKALINE PHOSPHATASE LEVEL: ICD-10-CM

## 2022-04-29 DIAGNOSIS — M1A.9XX0 CHRONIC GOUT WITHOUT TOPHUS, UNSPECIFIED CAUSE, UNSPECIFIED SITE: ICD-10-CM

## 2022-04-29 DIAGNOSIS — M1A.39X0 CHRONIC GOUT DUE TO RENAL IMPAIRMENT OF MULTIPLE SITES WITHOUT TOPHUS: Primary | ICD-10-CM

## 2022-04-29 PROCEDURE — 3017F COLORECTAL CA SCREEN DOC REV: CPT | Performed by: INTERNAL MEDICINE

## 2022-04-29 PROCEDURE — G8754 DIAS BP LESS 90: HCPCS | Performed by: INTERNAL MEDICINE

## 2022-04-29 PROCEDURE — 1090F PRES/ABSN URINE INCON ASSESS: CPT | Performed by: INTERNAL MEDICINE

## 2022-04-29 PROCEDURE — G8510 SCR DEP NEG, NO PLAN REQD: HCPCS | Performed by: INTERNAL MEDICINE

## 2022-04-29 PROCEDURE — G8752 SYS BP LESS 140: HCPCS | Performed by: INTERNAL MEDICINE

## 2022-04-29 PROCEDURE — 1101F PT FALLS ASSESS-DOCD LE1/YR: CPT | Performed by: INTERNAL MEDICINE

## 2022-04-29 PROCEDURE — G8536 NO DOC ELDER MAL SCRN: HCPCS | Performed by: INTERNAL MEDICINE

## 2022-04-29 PROCEDURE — G9899 SCRN MAM PERF RSLTS DOC: HCPCS | Performed by: INTERNAL MEDICINE

## 2022-04-29 PROCEDURE — 99215 OFFICE O/P EST HI 40 MIN: CPT | Performed by: INTERNAL MEDICINE

## 2022-04-29 PROCEDURE — G8399 PT W/DXA RESULTS DOCUMENT: HCPCS | Performed by: INTERNAL MEDICINE

## 2022-04-29 PROCEDURE — G8417 CALC BMI ABV UP PARAM F/U: HCPCS | Performed by: INTERNAL MEDICINE

## 2022-04-29 PROCEDURE — G8427 DOCREV CUR MEDS BY ELIG CLIN: HCPCS | Performed by: INTERNAL MEDICINE

## 2022-04-29 RX ORDER — ALLOPURINOL 300 MG/1
TABLET ORAL
Qty: 90 TABLET | Refills: 3 | Status: SHIPPED | OUTPATIENT
Start: 2022-04-29 | End: 2022-08-11

## 2022-04-29 NOTE — PATIENT INSTRUCTIONS
1. I think your pain is from a combination of osteoarthritis and gout. 2. Start allopurinol, 1/2 pill EVERY DAY WITH BREAKFAST. After 2 weeks, increase this to one pill every day with breakfast. The purpose of this pill is to help prevent flares of gout. .. it will not work when taken only on an as-needed basis. 3. Look for \"black cherry extract\" from your local pharmacy, or on [employer].com. Take this every day--it can also help prevent gout flares. 4. Xrays of the hands and shoulders, any [address] [place] [address] Diagnostic Imaging location would be OK, such as those associated with the [facility] ([address] [place] [address], [address]). . you don't need an appointment. 5. Repeat labs in 2 months. 6. Return in 2-3 months.

## 2022-04-29 NOTE — PROGRESS NOTES
REASON FOR VISIT    This is a follow-up visit for Ms. [address] [facility] for     ICD-10-CM   1. Seronegative rheumatoid arthritis of multiple sites ([facility] M06.09   2. PMR (polymyalgia rheumatica) ([facility]) M35.3   3. Idiopathic chronic gout, multiple sites, with tophus (tophi) M1A.09X1     Inflammatory arthritis phenotype includes:  Anti-CCP positive: no  Rheumatoid factor positive: no  Erosive disease: no  Extra-articular manifestations include: Polymyalgia Rheumatica, MGUS    Immunosuppression Screening (6/27/2018):   Quantiferon TB: negative  PPD:  Not performed  Hepatitis B: negative  Hepatitis C: negative    Therapy History includes:  Current DMARD therapy include: none  Prior DMARD therapy include: methotrexate 15-20 mg every Sunday (10/15/2018 to 1/29/2019; 2/26/2019 to 3/2019); leflunomide (11/19-12/19, unclear if started)  Discontinued DMARDs because of inefficacy: None  Discontinued DMARDs because of side effects: methotrexate 15-20 mg (nausea, vomiting, diarrhea, NAFLD)    Immunization History   Administered Date(s) Administered    COVID-19, Pfizer Purple top, DILUTE for use, 12+ yrs, 30mcg/0.3mL dose 03/03/2021    Influenza High Dose Vaccine PF 01/29/2016, 11/09/2017, 09/11/2018, 09/11/2019    Influenza Vaccine 12/05/2012    Influenza Vaccine (Quad) PF (>6 Mo Flulaval, Fluarix, and >3 Yrs Afluria, Fluzone 10257) 10/05/2016    Influenza Vaccine PF 11/17/2014    Influenza, High-dose, Quadrivalent (>65 Yrs Fluzone High Dose Quad 99739) 09/28/2020    PPD 01/25/1999    Pneumococcal Conjugate (PCV-13) 09/11/2019    Pneumococcal Polysaccharide (PPSV-23) 05/13/2013    TD Vaccine 05/12/1999    Tdap 05/13/2013       Patient Active Problem List   Diagnosis Code    Hypercholesterolemia E78.00    Osteoarthritis of hip M16.9    PMR (polymyalgia rheumatica) ([facility]) M35.3    Chronic bronchitis ([facility] J42    Former smoker Z87.891    Chronic pain G89.29    Type 2 diabetes mellitus with diabetic polyneuropathy, without long-term current use of insulin (HCC) E11.42    Essential hypertension I10    Seronegative rheumatoid arthritis of multiple sites ([facility] M06.09    MGUS (monoclonal gammopathy of unknown significance) D47.2    Vitamin D deficiency E55.9    Long-term use of immunosuppressant medication Z79.899    CAD (coronary artery disease) I25.10    Lung nodule seen on imaging study R91.1    Idiopathic chronic gout, multiple sites, with tophus (tophi) M1A.09X1    Hypertriglyceridemia E78.1    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 Jessica Rodriguez returns for a follow-up. About 2 weeks ago her left hand became painful and swollen, had woken up in the middle of the night with this. Didn't take anything for this, after about a week this had resolved spontaneously. Has posterolateral left hip pain, right shoulder pain. These pains also seem to flare acutely and then slowly resolve over a week or two. Doesn't drink alcohol. Denies seafood intake. Estimates she eats about one serving of red meat/month. Father had severe gout. Has Stage 3a CKD, last Cr 1.22 on 1/24/22. Has allopurinol 300mg pills which she only takes during flares. Takes hydrocodone 10/325mg which she usually uses at least once and up to 3x/day. Last uric acid on 1/24/22 was 6.8. Denies rashes. Still smokes about 1/3 PPD. Follows with Dr. Viviana Campbell in Pulmonary. Follows with Dr Oziel Hidalgo for biopsy-supported PBC and steatohepatitis. She remains on ursodiol 500mg bid. REVIEW OF SYSTEMS    A comprehensive review of systems was performed and pertinent results are documented in the HPI, review of systems is otherwise non-contributory.     PAST MEDICAL HISTORY    She has a past medical history of Abnormal pulmonary function test (9/28/2016), AR (allergic rhinitis), Asthma, Atrial thrombus (1994), Chronic bronchitis ([facility] (9/28/2016), Chronic pain, DDD (degenerative disc disease), lumbar, Diabetes ([facility] DJD (degenerative joint disease) of hip, Empty sella ([facility] (2000), Former smoker (10/24/2016), GERD (gastroesophageal reflux disease), Hoarseness of voice, Hypercholesterolemia, Hypertension, Ill-defined condition, Lesion of vocal cord, PMR (polymyalgia rheumatica) ([facility] (5/18/2016), Rheumatoid arthritis ([facility] and Smoker (12/5/2012). She has no past medical history of Adverse effect of anesthesia or Sleep apnea. FAMILY HISTORY    Her family history includes Asthma in her mother and sister; COPD in her sister; Cancer in her father and sister; Diabetes in her mother and sister; Elevated Lipids in her father and sister; Heart Attack in her sister; Hypertension in her daughter, father, mother, and sister; OSTEOARTHRITIS in her father, mother, and sister; Stroke in her mother. SOCIAL HISTORY    She reports that she has been smoking cigarettes. She has a 20.00 pack-year smoking history. She has never used smokeless tobacco. She reports that she does not drink alcohol and does not use drugs. MEDICATIONS    Current Outpatient Medications   Medication Sig Dispense Refill    HYDROcodone-acetaminophen (NORCO)  mg tablet Take 1 Tablet by mouth every eight (8) hours as needed for Pain for up to 30 days. Max Daily Amount: 3 Tablets. 90 Tablet 0    glipiZIDE SR (GLUCOTROL XL) 2.5 mg CR tablet TAKE 1 TABLET EVERY DAY 90 Tablet 3    dilTIAZem ER (TIAZAC) 420 mg capsule TAKE 1 CAPSULE EVERY DAY 90 Capsule 3    potassium chloride (KLOR-CON M10) 10 mEq tablet TAKE 1 TABLET EVERY DAY 90 Tablet 3    pregabalin (LYRICA) 200 mg capsule TAKE ONE CAPSULE BY MOUTH THREE TIMES A DAY 90 Capsule 2    dulaglutide (Trulicity) 3 OW/3.6 mL pnij 1 Adjustable Dose Pre-filled Pen Syringe by SubCUTAneous route every seven (7) days.  12 Each 3    atorvastatin (LIPITOR) 20 mg tablet TAKE 1 TABLET EVERY DAY 90 Tablet 3    losartan-hydroCHLOROthiazide (HYZAAR) 100-25 mg per tablet TAKE 1 TABLET EVERY DAY 90 Tablet 3    metFORMIN (GLUCOPHAGE) 500 mg tablet TAKE 2 TABLETS TWICE DAILY WITH A MEAL 360 Tablet 1    esomeprazole (NEXIUM) 40 mg capsule TAKE 1 CAPSULE BY MOUTH DAILY FOR GASTROESOPHAGEAL REFLUX DISEASE, HEMORRHAGIC GASTRITIS 90 Capsule 1    ondansetron (ZOFRAN ODT) 4 mg disintegrating tablet Take 1 Tablet by mouth every eight (8) hours as needed for Nausea. 60 Tablet 2    ursodioL (AMINA Forte) 500 mg tablet Take 1 Tablet by mouth two (2) times a day. (Patient not taking: Reported on 7/28/2021) 60 Tablet 3    allopurinoL (ZYLOPRIM) 300 mg tablet Take 1 Tab by mouth daily. 90 Tab 3    multivit-min/iron/folic/lutein (CENTRUM SILVER WOMEN PO) Take 1 Tab by mouth daily.  cholecalciferol (Vitamin D3) 25 mcg (1,000 unit) cap Take 1,000 Units by mouth daily.  ascorbic acid, vitamin C, (Vitamin C) 1,000 mg tablet Take 1,000 mg by mouth daily.  cyanocobalamin, vitamin B-12, 5,000 mcg cap Take 5,000 mcg by mouth daily.  fluticasone propion-salmeteroL (Advair Diskus) 250-50 mcg/dose diskus inhaler Take 1 Puff by inhalation as needed.  Lactobacillus acidophilus (PROBIOTIC PO) Take  by mouth. Takes one po once daily.  glucose blood VI test strips (blood glucose test) strip Use to check blood sugar up to 3 times daily. E11.65 100 Strip 11    Blood-Glucose Meter monitoring kit Use to check blood sugar up to 3 times daily. E11.65 1 Kit 0    lancets misc Use to check blood sugar up to 3 times daily. E11.65 1 Each 11    tiotropium (Spiriva with HandiHaler) 18 mcg inhalation capsule Take 1 Cap by inhalation daily. 90 Cap 0    albuterol (PROVENTIL VENTOLIN) 2.5 mg /3 mL (0.083 %) nebu 3 mL by Nebulization route every six (6) hours as needed for Wheezing. 60 Each 0    albuterol (PROVENTIL HFA, VENTOLIN HFA, PROAIR HFA) 90 mcg/actuation inhaler Take 2 Puffs by inhalation every six (6) hours as needed for Wheezing or Shortness of Breath.  1 Inhaler 1    aspirin delayed-release 81 mg tablet Take 81 mg by mouth daily.  Lancets (ACCU-CHEK SOFTCLIX LANCETS) misc Test blood sugar twice daily. DX: E11.9. Substitute supply brand accepted by insurance. 1 Each 11    alcohol swabs (ALCOHOL WIPES) padm Test blood sugar twice daily. DX: E11.9. Substitute supply brand accepted by insurance. 100 Pad 1    Cetirizine (ZYRTEC) 10 mg Cap Take 10 mg by mouth daily. ALLERGIES    Allergies   Allergen Reactions    Augmentin [Amoxicillin-Pot Clavulanate] Diarrhea    Bactrim [Sulfamethoxazole-Trimethoprim] Nausea Only    Biaxin [Clarithromycin] Nausea Only    Demerol [Meperidine] Itching    Erythromycin Nausea Only    Gabapentin Nausea and Vomiting    Pcn [Penicillins] Hives    Percocet [Oxycodone-Acetaminophen] Itching    Pravastatin Nausea Only    Tetracycline Nausea Only    Voltaren [Diclofenac Sodium] Hives       PHYSICAL EXAMINATION    Visit Vitals  /67 (BP 1 Location: Left upper arm, BP Patient Position: Sitting)   Pulse 84   Temp 97.3 °F (36.3 °C) (Oral)   Resp 20   Ht 5' 4\" (1.626 m)   Wt 204 lb 9.6 oz (92.8 kg)   SpO2 94%   BMI 35.12 kg/m²     Body mass index is 35.12 kg/m². General:  The patient is pleasant, obese, kyphotic, alert, and in no apparent distress. Eyes: Sclera are anicteric. No conjunctival injection. HEENT:  Oropharynx is clear. No oral ulcers. Adequate salivary pooling. No cervical or supraclavicular lymphadenopathy. Lungs:  Clear to auscultation bilaterally, without wheeze or stridor. Normal respiratory effort. Cor:  Regular rate and rhythm. No murmurs rubs or gallops. Abdomen: Soft, non-tender, without hepatomegaly or masses. Extremities: No calf tenderness or edema. Warm and well perfused. Skin:  No significant abnormalities. No tophi over ears, elbows, hands, knees, ankles, or feet.   Neuro: Nonfocal  Musculoskeletal:    A comprehensive musculoskeletal exam was performed for all joints of each upper and lower extremity and assessed for swelling, tenderness and range of motion. Results are documented as below:  Left wrist synovial prominence, +joint line tenderness in bilateral wrists and left thumb IP. No elza synovitis. Paralumbar tenderness left > right. Left trochanteric tenderness. Groin pain with internal and external rotation. Bilateral TKA scars. Bilateral pes planus. No evidence of synovitis in the small joints of the hands, wrists, shoulders, elbows, hips, knees or ankles. DATA REVIEW    Laboratory     Recent laboratory results were reviewed, summarized, and discussed with the patient. Imaging    Musculoskeletal Ultrasound    None    Radiographs    Bilateral Hand 6/27/2018: RIGHT: no fracture or other acute osseous or articular abnormality. The soft tissues are within normal limits. There is minimal distal interphalangeal joint space narrowing, soft tissue swelling. Minimal DJD at the base of the first digit no soft tissue calcification. LEFT: no fracture, dislocation or other acute osseous or articular abnormality. The soft tissues are within normal limits. The bone is normal internal contents. There is minimal distal interphalangeal joint space narrowing, there is mild DJD at the base of the first digit. There is no bony erosion or soft tissue calcification.     Bilateral Foot 6/27/2018: RIGHT: no fracture or other acute osseous or articular abnormality. The soft tissues are within normal limits. The bone is normal in mineral content, there is some soft tissue swelling. There is no bony erosions. Small posterior and plantar calcaneal spurs. No soft tissue calcification seen otherwise. LEFT: no fracture or other acute osseous or articular abnormality. The soft tissues are within normal limits. The bone is normal in mineral content. No bony erosions. Periosteal thickening likely chronic. Calcaneal spurs. Chest 5/07/2018: normal heart size. There is no acute process in the lung fields.  The osseous structures are unremarkable.     Chest 4/14/2018: Lungs: The lungs are clear of mass, nodule, airspace disease or edema. Pleura: There is no pleural effusion or pneumothorax. Mediastinum: The cardiac and mediastinal contours and pulmonary vascularity are normal. Bones and soft tissues: There are degenerative changes of the spine. Left Shoulder 7/31/2017: no fracture, dislocation or other acute abnormality. DJD AC joint and chronic deformity tuberosity    Left Ankle 9/16/2015: no fracture or disruption of the ankle mortise. There is no other acute osseous or articular abnormality. There is marked soft tissue swelling overlying the medial and lateral sides of the ankle as well as the dorsum of the foot. Subcutaneous edema noted. .  Calcaneal spurs noted. Right Hip 7/15/2015:  no fracture, dislocation or other acute abnormality. Mild osteoarthritis is present. Left Shoulder 11/17/2014:  no fracture, dislocation. A type II acromion is present. There is mild irregularity of the greater tuberosity. CT Imaging    CTA Chest with and without contrast 10/16/2010: There is no evidence for pulmonary embolus. There is no pleural or pericardial effusion. Heart size is normal. No mediastinal or hilar mass or adenopathy is shown. The lungs are clear. Mild to moderate thoracic spine degenerative changes are demonstrated.      MR Imaging    MRA Head without contrast 8/01/2011: The vertebral arteries are codominant. The basilar artery and its branches are normal. The internal carotid, anterior cerebral, and middle cerebral arteries are patent. There is no flow-limiting intracranial stenosis. There is no aneurysm. There is a patent left posterior communicating artery.      MRI Brain with and without contrast 8/01/2011: The ventricles are normal in size and are midline. Ventricular size is actually somewhat small for the patient's chronological age. Rickford Math is no intracranial hemorrhage or extra-axial fluid collection.  There are mild scattered foci of T2 hyperintensity in the cerebral white matter, which are nonspecific. Moderate patchy T2 hyperintensity within the central portion of the sol is noted. The appearance is typical of the changes seen with intracranial microangiopathy. . There is no acute infarction. There is no abnormal parenchymal or meningeal enhancement. The paranasal sinuses and mastoid air cells are clear. There is enlargement of the sella with extension of CSF into the sella and minimal visible pituitary tissue, consistent with an empty sella. DXA    DXA 9/14/2015: (excluded L1, right hip, distal radius) lumbar spine L2-L4 T score 3.6 (BMD 1.475 g/cm2), left femoral neck T score: 4.5 (1.351 g/cm2), left total hip T score: 3.6 (1.379 g/cm2). ASSESSMENT AND PLAN    This is a follow-up visit for Ms. Margarette Rodriguez for episodic joint pain and swelling still in keeping with gout, with improper allopurinol usage as an abortive treatment for gout attacks. Reviewed concepts of uric acid reduction and early flare treatment with other agents such as colchicine, NSAIDs, or prednisone when needed. 1. Chronic gout due to renal impairment of multiple sites without tophus  - URIC ACID; Future  - C REACTIVE PROTEIN, QT; Future  - CBC WITH AUTOMATED DIFF; Future  - METABOLIC PANEL, COMPREHENSIVE; Future  - SED RATE (ESR); Future  - ALKALINE PHOSPHATASE, BONE; Future  - XR HAND RT MIN 3 V; Future  - XR HAND LT MIN 3 V; Future  - XR SHOULDER RT AP/LAT MIN 2 V; Future  - XR SHOULDER LT AP/LAT MIN 2 V; Future    2. Chronic gout without tophus, unspecified cause, unspecified site  - allopurinoL (ZYLOPRIM) 300 mg tablet; Take 0.5 Tablets by mouth daily for 14 days, THEN 1 Tablet daily for 90 days. Dispense: 90 Tablet; Refill: 3    3. Elevated alkaline phosphatase level  - ALKALINE PHOSPHATASE, BONE; Future    4.  Trochanteric bursitis, left hip  - REFERRAL TO PHYSICAL THERAPY    5. Primary osteoarthritis of left hip  - REFERRAL TO PHYSICAL THERAPY    Patient Instructions   1. I think your pain is from a combination of osteoarthritis and gout. 2. Start allopurinol, 1/2 pill EVERY DAY WITH BREAKFAST. After 2 weeks, increase this to one pill every day with breakfast. The purpose of this pill is to help prevent flares of gout. .. it will not work when taken only on an as-needed basis. 3. Look for \"black cherry extract\" from your local pharmacy, or on [employer]. Take this every day--it can also help prevent gout flares. 4. Xrays of the hands and shoulders, any [facility] location would be OK, such as those associated with the hospitals ([facility], [address]). . you don't need an appointment. 5. Repeat labs in 2 months. 6. Return in 2-3 months.       TODAY'S ORDERS  Orders Placed This Encounter    XR HAND RT MIN 3 V     Standing Status:   Future     Standing Expiration Date:   5/29/2023    XR HAND LT MIN 3 V     Standing Status:   Future     Standing Expiration Date:   5/29/2023    XR SHOULDER RT AP/LAT MIN 2 V     Standing Status:   Future     Standing Expiration Date:   5/29/2022     Order Specific Question:   Reason for Exam     Answer:   evaluate for arthritis, erosions, calcium deposit    XR SHOULDER LT AP/LAT MIN 2 V     Standing Status:   Future     Standing Expiration Date:   5/29/2023    URIC ACID     Standing Status:   Future     Number of Occurrences:   1     Standing Expiration Date:   4/29/2023    C REACTIVE PROTEIN, QT     Standing Status:   Future     Number of Occurrences:   1     Standing Expiration Date:   10/29/2022    CBC WITH AUTOMATED DIFF     Standing Status:   Future     Number of Occurrences:   1     Standing Expiration Date:   39/07/7232    METABOLIC PANEL, COMPREHENSIVE     Standing Status:   Future     Number of Occurrences:   1     Standing Expiration Date:   10/29/2022    SED RATE (ESR)     Standing Status:   Future     Number of Occurrences:   1 Standing Expiration Date:   10/29/2022    ALKALINE PHOSPHATASE, BONE     Standing Status:   Future     Number of Occurrences:   1     Standing Expiration Date:   4/29/2023    REFERRAL TO PHYSICAL THERAPY     Referral Priority:   Routine     Referral Type:   PT/OT/ST     Referral Reason:   Specialty Services Required     Number of Visits Requested:   1    allopurinoL (ZYLOPRIM) 300 mg tablet     Sig: Take 0.5 Tablets by mouth daily for 14 days, THEN 1 Tablet daily for 90 days.      Dispense:  90 Tablet     Refill:  3         Future Appointments   Date Time Provider Nikki Cordovaisti   5/17/2022 11:15 AM Torres Cervantes NP PAFP BS AMB   6/30/2022  2:00 PM Kylee Miranda MD AOCR BS AMB     Face to face time: 25 minutes  Note preparation and records review day of service: 20 minutes  Total provider time day of service: 39 minutes      Alan Kitchen MD    Adult Rheumatology   [facility]  [facility], [address] [place] [address]   Phone [phone]  Fax [phone]

## 2022-05-17 ENCOUNTER — HOSPITAL ENCOUNTER (OUTPATIENT)
Dept: GENERAL RADIOLOGY | Age: 72
Discharge: HOME | End: 2022-05-17
Payer: MEDICARE

## 2022-05-17 ENCOUNTER — OFFICE VISIT (OUTPATIENT)
Dept: FAMILY MEDICINE CLINIC | Age: 72
End: 2022-05-17
Payer: MEDICARE

## 2022-05-17 VITALS
WEIGHT: 207.4 LBS | RESPIRATION RATE: 18 BRPM | DIASTOLIC BLOOD PRESSURE: 76 MMHG | BODY MASS INDEX: 35.41 KG/M2 | OXYGEN SATURATION: 98 % | SYSTOLIC BLOOD PRESSURE: 130 MMHG | TEMPERATURE: 98.4 F | HEART RATE: 75 BPM | HEIGHT: 64 IN

## 2022-05-17 DIAGNOSIS — K04.7 TOOTH INFECTION: ICD-10-CM

## 2022-05-17 DIAGNOSIS — G89.4 CHRONIC PAIN DISORDER: ICD-10-CM

## 2022-05-17 DIAGNOSIS — M1A.39X0 CHRONIC GOUT DUE TO RENAL IMPAIRMENT OF MULTIPLE SITES WITHOUT TOPHUS: ICD-10-CM

## 2022-05-17 DIAGNOSIS — N18.31 STAGE 3A CHRONIC KIDNEY DISEASE (HCC): ICD-10-CM

## 2022-05-17 DIAGNOSIS — Z00.00 MEDICARE ANNUAL WELLNESS VISIT, SUBSEQUENT: Primary | ICD-10-CM

## 2022-05-17 DIAGNOSIS — E11.42 TYPE 2 DIABETES MELLITUS WITH DIABETIC POLYNEUROPATHY, WITHOUT LONG-TERM CURRENT USE OF INSULIN (HCC): ICD-10-CM

## 2022-05-17 PROBLEM — N18.30 CHRONIC RENAL DISEASE, STAGE III (HCC): Status: ACTIVE | Noted: 2022-05-17

## 2022-05-17 LAB — HBA1C MFR BLD HPLC: 8.2 %

## 2022-05-17 PROCEDURE — 1101F PT FALLS ASSESS-DOCD LE1/YR: CPT | Performed by: NURSE PRACTITIONER

## 2022-05-17 PROCEDURE — 83036 HEMOGLOBIN GLYCOSYLATED A1C: CPT | Performed by: NURSE PRACTITIONER

## 2022-05-17 PROCEDURE — 73130 X-RAY EXAM OF HAND: CPT

## 2022-05-17 PROCEDURE — G8752 SYS BP LESS 140: HCPCS | Performed by: NURSE PRACTITIONER

## 2022-05-17 PROCEDURE — G8399 PT W/DXA RESULTS DOCUMENT: HCPCS | Performed by: NURSE PRACTITIONER

## 2022-05-17 PROCEDURE — G9899 SCRN MAM PERF RSLTS DOC: HCPCS | Performed by: NURSE PRACTITIONER

## 2022-05-17 PROCEDURE — 2022F DILAT RTA XM EVC RTNOPTHY: CPT | Performed by: NURSE PRACTITIONER

## 2022-05-17 PROCEDURE — G8432 DEP SCR NOT DOC, RNG: HCPCS | Performed by: NURSE PRACTITIONER

## 2022-05-17 PROCEDURE — G8754 DIAS BP LESS 90: HCPCS | Performed by: NURSE PRACTITIONER

## 2022-05-17 PROCEDURE — 1123F ACP DISCUSS/DSCN MKR DOCD: CPT | Performed by: NURSE PRACTITIONER

## 2022-05-17 PROCEDURE — G8427 DOCREV CUR MEDS BY ELIG CLIN: HCPCS | Performed by: NURSE PRACTITIONER

## 2022-05-17 PROCEDURE — 73030 X-RAY EXAM OF SHOULDER: CPT

## 2022-05-17 PROCEDURE — 3017F COLORECTAL CA SCREEN DOC REV: CPT | Performed by: NURSE PRACTITIONER

## 2022-05-17 PROCEDURE — 1090F PRES/ABSN URINE INCON ASSESS: CPT | Performed by: NURSE PRACTITIONER

## 2022-05-17 PROCEDURE — G8417 CALC BMI ABV UP PARAM F/U: HCPCS | Performed by: NURSE PRACTITIONER

## 2022-05-17 PROCEDURE — 99215 OFFICE O/P EST HI 40 MIN: CPT | Performed by: NURSE PRACTITIONER

## 2022-05-17 PROCEDURE — 3052F HG A1C>EQUAL 8.0%<EQUAL 9.0%: CPT | Performed by: NURSE PRACTITIONER

## 2022-05-17 PROCEDURE — G8536 NO DOC ELDER MAL SCRN: HCPCS | Performed by: NURSE PRACTITIONER

## 2022-05-17 RX ORDER — FLUCONAZOLE 150 MG/1
150 TABLET ORAL DAILY
Qty: 1 TABLET | Refills: 0 | Status: SHIPPED | OUTPATIENT
Start: 2022-05-17 | End: 2022-05-18

## 2022-05-17 RX ORDER — DOXYCYCLINE 100 MG/1
100 TABLET ORAL 2 TIMES DAILY
Qty: 14 TABLET | Refills: 0 | Status: SHIPPED | OUTPATIENT
Start: 2022-05-17 | End: 2022-05-24

## 2022-05-17 RX ORDER — HYDROCODONE BITARTRATE AND ACETAMINOPHEN 10; 325 MG/1; MG/1
1 TABLET ORAL
Qty: 90 TABLET | Refills: 0 | Status: SHIPPED | OUTPATIENT
Start: 2022-05-17 | End: 2022-05-27 | Stop reason: SDUPTHER

## 2022-05-17 NOTE — PROGRESS NOTES
Assessment/Plan   Education and counseling provided:  Are appropriate based on today's review and evaluation    Diagnoses and all orders for this visit:    1. Medicare annual wellness visit, subsequent    2. Tooth infection  -     doxycycline (ADOXA) 100 mg tablet; Take 1 Tablet by mouth two (2) times a day for 7 days. Treatment as above. Follow up with her dentist.     3. Type 2 diabetes mellitus with diabetic polyneuropathy, without long-term current use of insulin (HCC)  -     AMB POC HEMOGLOBIN A1C  -     empagliflozin (JARDIANCE) 10 mg tablet; Take 1 Tablet by mouth daily. -     fluconazole (DIFLUCAN) 150 mg tablet; Take 1 Tablet by mouth daily for 1 day. FDA advises cautious prescribing of oral fluconazole in pregnancy. Uncontrolled. Add Jardiance. Side effects discussed. Follow up in three months or sooner as needed. 4. Stage 3a chronic kidney disease ([facility] [address]    5. Chronic pain disorder  -     HYDROcodone-acetaminophen (NORCO)  mg tablet; Take 1 Tablet by mouth every eight (8) hours as needed for Pain for up to 30 days. Max Daily Amount: 3 Tablets. Refilled for 3 months.  reviewed and appropriate. No changes. Stable on current therapy. Health Maintenance Due   Topic Date Due    Shingrix Vaccine Age 49> (1 of 2) Never done    Pneumococcal 65+ years (3 - PPSV23 or PCV20) 09/11/2020    COVID-19 Vaccine (2 - Pfizer 3-dose series) 03/24/2021    Eye Exam Retinal or Dilated  04/16/2021    Foot Exam Q1  01/29/2022    MICROALBUMIN Q1  01/29/2022    Medicare Yearly Exam  01/30/2022           SUBSEQUENT MEDICARE WELLNESS  This is the Subsequent Medicare Annual Wellness Exam, performed 12 months or more after the Initial AWV or the last Subsequent AWV    I have reviewed the patient's medical history in detail and updated the computerized patient record.      Chronic Pain  Diogo Mends RTC today to follow up on chronic pain diagnosis.  We discussed her osteoarthritis that is affecting her bilateral hips, left shoulder and lumbar back.  We discussed her seronegative rheumatoid arthritis affecting her bilateral hands.  Significant changes since last visit: none.  She is  able to do her normal daily activities.  She reports the following adverse side effects: none. Previous treatments include heat, cold, Lidocaine patches otc, Tramadol, methotrexate, cortisone injection which all provided minimal relief.  Not a candidate for oral NSAIDS. She is followed by rheumatology.         Patient is a 80 y.o. female that comes today for follow up of Diabetes Mellitus Type 2. Patient does not regularly monitor  their FSBG at home. not attempting to follow a low fat, low cholesterol diet  Patients activity level: sedentery  there was no change in patient's weight. .  The patient has not experienced recent hypoglycemia. reports that she has been smoking cigarettes. She has a 20.00 pack-year smoking history. She has never used smokeless tobacco.    Lab Results   Component Value Date/Time    Hemoglobin A1c 6.9 (H) 07/28/2021 12:37 PM    Hemoglobin A1c 10.0 (H) 01/29/2021 12:16 PM    Hemoglobin A1c 9.8 (H) 10/08/2020 12:05 AM     She is currently undergoing evaluation with her dentist for tooth infection however she has completed prescribed antibiotics and this has not improved her symptoms.     History     Past Medical History:   Diagnosis Date    Abnormal pulmonary function test 9/28/2016    AR (allergic rhinitis)     Asthma     Atrial thrombus 1994    left    Chronic bronchitis ([address] 9/28/2016    Chronic pain     back/both hips/neuropathy    DDD (degenerative disc disease), lumbar     Diabetes ([address]     DJD (degenerative joint disease) of hip     right    Empty sella ([address] 2000    by MRI    Former smoker 10/24/2016    GERD (gastroesophageal reflux disease)     Hoarseness of voice     Hypercholesterolemia     Hypertension     Ill-defined condition     scar tissue - heart    Lesion of vocal cord     bx benign    PMR (polymyalgia rheumatica) ([facility] [address] 5/18/2016    Rheumatoid arthritis ([facility] [address]     Smoker 12/5/2012      Past Surgical History:   Procedure Laterality Date    COLONOSCOPY N/A 5/30/2018    COLONOSCOPY performed [facility]rrutia [facility] at [facility] ENDOSCOPY    COLONOSCOPY N/A 12/30/2020    COL[facility]  12/30/2020         COLORECTAL SCRN; HI RISK IND  5/30/2018         ENDOSCOPY, COLON, DIAGNOSTIC  10/03    polypectomy    ENDOSCOPY, COLON, DIAGNOSTIC  2/05    Dr. Brittney Eduardo, DIAGNOSTIC  2010, reported    due 2015    HX APPENDECTOMY  1970's    HX BREAST REDUCTION      HX COLONOSCOPY  2015    dr Nazario Espinoza. 2 polyps removed -repeat in 2018     HX HEENT      vocal cord bx - benign    HX HEMORRHOIDECTOMY      HX HYSTERECTOMY      ovaries spared    HX KNEE REPLACEMENT  11/03, 10/05    left then right    HX TONSILLECTOMY  age 29    tonsils    UPPER GI ENDOSCOPY,BIOPSY  12/30/2020          Current Outpatient Medications   Medication Sig Dispense Refill    [START ON 7/16/2022] HYDROcodone-acetaminophen (NORCO)  mg tablet Take 1 Tablet by mouth every eight (8) hours as needed for Pain for up to 30 days. Max Daily Amount: 3 Tablets. 90 Tablet 0    empagliflozin (JARDIANCE) 10 mg tablet Take 1 Tablet by mouth daily. 30 Tablet 3    allopurinoL (ZYLOPRIM) 300 mg tablet Take 0.5 Tablets by mouth daily for 14 days, THEN 1 Tablet daily for 90 days.  90 Tablet 3    glipiZIDE SR (GLUCOTROL XL) 2.5 mg CR tablet TAKE 1 TABLET EVERY DAY 90 Tablet 3    dilTIAZem ER (TIAZAC) 420 mg capsule TAKE 1 CAPSULE EVERY DAY 90 Capsule 3    potassium chloride (KLOR-CON M10) 10 mEq tablet TAKE 1 TABLET EVERY DAY 90 Tablet 3    pregabalin (LYRICA) 200 mg capsule TAKE ONE CAPSULE BY MOUTH THREE TIMES A DAY 90 Capsule 2    dulaglutide (Trulicity) 3 BW/3.3 mL pnij 1 Adjustable Dose Pre-filled Pen Syringe by SubCUTAneous route every seven (7) days. 12 Each 3    atorvastatin (LIPITOR) 20 mg tablet TAKE 1 TABLET EVERY DAY 90 Tablet 3    losartan-hydroCHLOROthiazide (HYZAAR) 100-25 mg per tablet TAKE 1 TABLET EVERY DAY 90 Tablet 3    metFORMIN (GLUCOPHAGE) 500 mg tablet TAKE 2 TABLETS TWICE DAILY WITH A MEAL 360 Tablet 1    esomeprazole (NEXIUM) 40 mg capsule TAKE 1 CAPSULE BY MOUTH DAILY FOR GASTROESOPHAGEAL REFLUX DISEASE, HEMORRHAGIC GASTRITIS 90 Capsule 1    ondansetron (ZOFRAN ODT) 4 mg disintegrating tablet Take 1 Tablet by mouth every eight (8) hours as needed for Nausea. 60 Tablet 2    multivit-min/iron/folic/lutein (CENTRUM SILVER WOMEN PO) Take 1 Tab by mouth daily.  cholecalciferol (Vitamin D3) 25 mcg (1,000 unit) cap Take 1,000 Units by mouth daily.  ascorbic acid, vitamin C, (Vitamin C) 1,000 mg tablet Take 1,000 mg by mouth daily.  cyanocobalamin, vitamin B-12, 5,000 mcg cap Take 5,000 mcg by mouth daily.  fluticasone propion-salmeteroL (Advair Diskus) 250-50 mcg/dose diskus inhaler Take 1 Puff by inhalation as needed.  Lactobacillus acidophilus (PROBIOTIC PO) Take  by mouth. Takes one po once daily.  glucose blood VI test strips (blood glucose test) strip Use to check blood sugar up to 3 times daily. E11.65 100 Strip 11    Blood-Glucose Meter monitoring kit Use to check blood sugar up to 3 times daily. E11.65 1 Kit 0    lancets misc Use to check blood sugar up to 3 times daily. E11.65 1 Each 11    tiotropium (Spiriva with HandiHaler) 18 mcg inhalation capsule Take 1 Cap by inhalation daily. 90 Cap 0    albuterol (PROVENTIL VENTOLIN) 2.5 mg /3 mL (0.083 %) nebu 3 mL by Nebulization route every six (6) hours as needed for Wheezing. 60 Each 0    albuterol (PROVENTIL HFA, VENTOLIN HFA, PROAIR HFA) 90 mcg/actuation inhaler Take 2 Puffs by inhalation every six (6) hours as needed for Wheezing or Shortness of Breath.  1 Inhaler 1    aspirin delayed-release 81 mg tablet Take 81 mg by mouth daily.  Lancets (ACCU-CHEK SOFTCLIX LANCETS) misc Test blood sugar twice daily. DX: E11.9. Substitute supply brand accepted by insurance. 1 Each 11    alcohol swabs (ALCOHOL WIPES) padm Test blood sugar twice daily. DX: E11.9. Substitute supply brand accepted by insurance. 100 Pad 1    Cetirizine (ZYRTEC) 10 mg Cap Take 10 mg by mouth daily.  ursodioL (AMINA Forte) 500 mg tablet Take 1 Tablet by mouth two (2) times a day.  (Patient not taking: Reported on 7/28/2021) 60 Tablet 3     Allergies   Allergen Reactions    Augmentin [Amoxicillin-Pot Clavulanate] Diarrhea    Bactrim [Sulfamethoxazole-Trimethoprim] Nausea Only    Biaxin [Clarithromycin] Nausea Only    Demerol [Meperidine] Itching    Erythromycin Nausea Only    Gabapentin Nausea and Vomiting    Pcn [Penicillins] Hives    Percocet [Oxycodone-Acetaminophen] Itching    Pravastatin Nausea Only    Tetracycline Nausea Only    Voltaren [Diclofenac Sodium] Hives     Family History   Problem Relation Age of Onset    OSTEOARTHRITIS Mother     Asthma Mother     Diabetes Mother     Hypertension Mother     Stroke Mother         brain aneurysm    OSTEOARTHRITIS Father     Cancer Father         stomach    Elevated Lipids Father     Hypertension Father     OSTEOARTHRITIS Sister     Asthma Sister     Diabetes Sister     Elevated Lipids Sister     Hypertension Sister     Heart Attack Sister     COPD Sister     Cancer Sister         vocal cords/bladder    Hypertension Daughter     Anesth Problems Neg Hx      Social History     Tobacco Use    Smoking status: Current Every Day Smoker     Packs/day: 0.50     Years: 40.00     Pack years: 20.00     Types: Cigarettes    Smokeless tobacco: Never Used   Substance Use Topics    Alcohol use: No     Alcohol/week: 0.0 standard drinks     Patient Active Problem List   Diagnosis Code    Hypercholesterolemia E78.00    Osteoarthritis of hip M16.9    PMR [facility]algia [facility]umatica) ([facility]) M35.3    Chronic bronchitis ([facility]) J42    Former smoker Z87.891    Chronic pain G89.29    Type 2 diabetes mellitus with diabetic polyneuropathy, without long-term current use of insulin ([facility]) E11.42    Essential hypertension I10    Seronegative rheumatoid arthritis of multiple sites ([facility]) M06.09    MGUS (monoclonal gammopathy of unknown significance) D47.2    Vitamin D deficiency E55.9    Long-term use of immunosuppressant medication Z79.899    CAD (coronary artery disease) I25.10    Lung nodule seen on imaging study R91.1    Idiopathic chronic gout, multiple sites, with tophus (tophi) M1A.09X1    Hypertriglyceridemia E78.1    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    NAFL (nonalcoholic fatty liver) Y59.7    Chronic renal disease, stage III N18.30       Depression Risk Factor Screening:     3 most recent PHQ Screens 5/17/2022   Little interest or pleasure in doing things Not at all   Feeling down, depressed, irritable, or hopeless Not at all   Total Score PHQ 2 0     Alcohol Risk Factor Screening:   Do you average more than 1 drink per night or more than 7 drinks a week:  No    On any one occasion in the past three months have you have had more than 3 drinks containing alcohol:  No    Functional Ability and Level of Safety:   Hearing Loss  Hearing is good. Activities of Daily Living  The home contains: no safety equipment. Patient does total self care    Fall Risk  Fall Risk Assessment, last 12 mths 5/17/2022   Able to walk? Yes   Fall in past 12 months? 0   Do you feel unsteady? 0   Are you worried about falling 0   Is the gait abnormal? -   Number of falls in past 12 months -   Fall with injury?  -       Abuse Screen  Patient is not abused    Cognitive Screening   Evaluation of Cognitive Function:  Has your family/caregiver stated any concerns about your memory: no  Normal    Patient Care Team   Patient Care Team:  Holsinger, Fredrik Severs, NP as PCP - General (Nurse Practitioner)  Rivera Matamoros NP as PCP - [facility] Empaneled Provider  Carmina Holder MD as Physician (Orthopedic Surgery)  Astrid Sever, MD (Pulmonary Disease)  Pilar Christopher MD as Physician (Obstetrics & Gynecology)

## 2022-05-17 NOTE — PROGRESS NOTES
Chief Complaint   Patient presents with    Follow-up    Diabetes       1. Have you been to the ER, urgent care clinic since your last visit? Hospitalized since your last visit? No    2. Have you seen or consulted any other health care providers outside of the [address] [place] [address] [place] since your last visit? Include any pap smears or colon screening. No    3. For patients over 45: Has the patient had a colonoscopy? Yes - no Care Gap present     If the patient is female:    4. For patients over 40: Has the patient had a mammogram? Yes - no Care Gap present    5. For patients over 21: Has the patient had a pap smear? Yes - no Care Gap present    3 most recent PHQ Screens 5/17/2022   Little interest or pleasure in doing things Not at all   Feeling down, depressed, irritable, or hopeless Not at all   Total Score PHQ 2 0     Abuse Screening Questionnaire 5/17/2022   Do you ever feel afraid of your partner? N   Are you in a relationship with someone who physically or mentally threatens you? N   Is it safe for you to go home? Y       Fall Risk Assessment, last 12 mths 5/17/2022   Able to walk? Yes   Fall in past 12 months? 0   Do you feel unsteady?  0   Are you worried about falling 0   Is the gait abnormal? -   Number of falls in past 12 months -   Fall with injury? -       ADL Assessment 5/17/2022   Feeding yourself No Help Needed   Getting from bed to chair No Help Needed   Getting dressed No Help Needed   Bathing or showering No Help Needed   Walk across the room (includes cane/walker) No Help Needed   Using the telphone No Help Needed   Taking your medications No Help Needed   Preparing meals No Help Needed   Managing money (expenses/bills) No Help Needed   Moderately strenuous housework (laundry) No Help Needed   Shopping for personal items (toiletries/medicines) No Help Needed   Shopping for groceries No Help Needed   Driving No Help Needed   Climbing a flight of stairs No Help Needed   Getting to places beyond walking distances No Help Needed     Health Maintenance Due   Topic Date Due    Shingrix Vaccine Age 49> (1 of 2) Never done    Pneumococcal 65+ years (3 - PPSV23 or PCV20) 09/11/2020    COVID-19 Vaccine (2 - Pfizer 3-dose series) 03/24/2021    Eye Exam Retinal or Dilated  04/16/2021    Foot Exam Q1  01/29/2022    MICROALBUMIN Q1  01/29/2022    Medicare Yearly Exam  01/30/2022

## 2022-05-27 RX ORDER — HYDROCODONE BITARTRATE AND ACETAMINOPHEN 10; 325 MG/1; MG/1
1 TABLET ORAL
Qty: 90 TABLET | Refills: 0 | Status: SHIPPED | OUTPATIENT
Start: 2022-06-16 | End: 2022-05-27 | Stop reason: SDUPTHER

## 2022-05-27 RX ORDER — HYDROCODONE BITARTRATE AND ACETAMINOPHEN 10; 325 MG/1; MG/1
1 TABLET ORAL
Qty: 90 TABLET | Refills: 0 | Status: SHIPPED | OUTPATIENT
Start: 2022-07-16 | End: 2022-08-25 | Stop reason: SDUPTHER

## 2022-06-21 LAB
ALBUMIN SERPL-MCNC: 4.1 G/DL (ref 3.8–4.8)
ALBUMIN/GLOB SERPL: 1.3 {RATIO} (ref 1.2–2.2)
ALP BONE SERPL-MCNC: 25.7 UG/L
ALP SERPL-CCNC: 221 IU/L (ref 44–121)
ALT SERPL-CCNC: 16 IU/L (ref 0–32)
AST SERPL-CCNC: 15 IU/L (ref 0–40)
BASOPHILS # BLD AUTO: 0.1 X10E3/UL (ref 0–0.2)
BASOPHILS NFR BLD AUTO: 1 %
BILIRUB SERPL-MCNC: <0.2 MG/DL (ref 0–1.2)
BUN SERPL-MCNC: 19 MG/DL (ref 8–27)
BUN/CREAT SERPL: 15 (ref 12–28)
CALCIUM SERPL-MCNC: 9.6 MG/DL (ref 8.7–10.3)
CHLORIDE SERPL-SCNC: 102 MMOL/L (ref 96–106)
CO2 SERPL-SCNC: 23 MMOL/L (ref 20–29)
CREAT SERPL-MCNC: 1.27 MG/DL (ref 0.57–1)
CRP SERPL-MCNC: 21 MG/L (ref 0–10)
EGFR: 45 ML/MIN/1.73
EOSINOPHIL # BLD AUTO: 0.2 X10E3/UL (ref 0–0.4)
EOSINOPHIL NFR BLD AUTO: 2 %
ERYTHROCYTE [DISTWIDTH] IN BLOOD BY AUTOMATED COUNT: 15.5 % (ref 11.7–15.4)
ERYTHROCYTE [SEDIMENTATION RATE] IN BLOOD BY WESTERGREN METHOD: 72 MM/HR (ref 0–40)
GLOBULIN SER CALC-MCNC: 3.1 G/DL (ref 1.5–4.5)
GLUCOSE SERPL-MCNC: 233 MG/DL (ref 65–99)
HCT VFR BLD AUTO: 38.7 % (ref 34–46.6)
HGB BLD-MCNC: 13 G/DL (ref 11.1–15.9)
IMM GRANULOCYTES # BLD AUTO: 0.1 X10E3/UL (ref 0–0.1)
IMM GRANULOCYTES NFR BLD AUTO: 1 %
LYMPHOCYTES # BLD AUTO: 2.1 X10E3/UL (ref 0.7–3.1)
LYMPHOCYTES NFR BLD AUTO: 21 %
MCH RBC QN AUTO: 29 PG (ref 26.6–33)
MCHC RBC AUTO-ENTMCNC: 33.6 G/DL (ref 31.5–35.7)
MCV RBC AUTO: 86 FL (ref 79–97)
MONOCYTES # BLD AUTO: 1 X10E3/UL (ref 0.1–0.9)
MONOCYTES NFR BLD AUTO: 10 %
NEUTROPHILS # BLD AUTO: 6.5 X10E3/UL (ref 1.4–7)
NEUTROPHILS NFR BLD AUTO: 65 %
PLATELET # BLD AUTO: 259 X10E3/UL (ref 150–450)
POTASSIUM SERPL-SCNC: 4 MMOL/L (ref 3.5–5.2)
PROT SERPL-MCNC: 7.2 G/DL (ref 6–8.5)
RBC # BLD AUTO: 4.48 X10E6/UL (ref 3.77–5.28)
SODIUM SERPL-SCNC: 143 MMOL/L (ref 134–144)
URATE SERPL-MCNC: 4.1 MG/DL (ref 3–7.2)
WBC # BLD AUTO: 9.9 X10E3/UL (ref 3.4–10.8)

## 2022-06-28 DIAGNOSIS — E11.42 TYPE 2 DIABETES MELLITUS WITH DIABETIC POLYNEUROPATHY, WITHOUT LONG-TERM CURRENT USE OF INSULIN (HCC): ICD-10-CM

## 2022-06-28 NOTE — TELEPHONE ENCOUNTER
Change in pharmacy to [facility]. Thanks, Ela    Last Visit: 5/17/22 CARLOS EDUARDO Cervantes  Next Appointment: 8/24/22 CARLOS EDUARDO Cervantes  Previous Refill Encounter(s): 5/17/22 30 + 3 (jose)    Requested Prescriptions     Pending Prescriptions Disp Refills    empagliflozin (JARDIANCE) 10 mg tablet 90 Tablet 1     Sig: Take 1 Tablet by mouth daily.      For Michael Plascencia in place:    Recommendation Provided To:    Intervention Detail: New Rx: 1, reason: Patient Preference   Gap Closed?:    Intervention Accepted By:   Angeilca Tavares Time Spent (min): 5

## 2022-07-07 ENCOUNTER — OFFICE VISIT (OUTPATIENT)
Dept: RHEUMATOLOGY | Age: 72
End: 2022-07-07
Payer: MEDICARE

## 2022-07-07 VITALS
DIASTOLIC BLOOD PRESSURE: 83 MMHG | HEIGHT: 64 IN | WEIGHT: 215.8 LBS | HEART RATE: 66 BPM | SYSTOLIC BLOOD PRESSURE: 121 MMHG | RESPIRATION RATE: 20 BRPM | OXYGEN SATURATION: 96 % | TEMPERATURE: 97.7 F | BODY MASS INDEX: 36.84 KG/M2

## 2022-07-07 DIAGNOSIS — M19.011 PRIMARY OSTEOARTHRITIS, RIGHT SHOULDER: ICD-10-CM

## 2022-07-07 DIAGNOSIS — R74.8 ELEVATED ALKALINE PHOSPHATASE LEVEL: ICD-10-CM

## 2022-07-07 DIAGNOSIS — M1A.39X0 CHRONIC GOUT DUE TO RENAL IMPAIRMENT OF MULTIPLE SITES WITHOUT TOPHUS: Primary | ICD-10-CM

## 2022-07-07 PROCEDURE — 1101F PT FALLS ASSESS-DOCD LE1/YR: CPT | Performed by: INTERNAL MEDICINE

## 2022-07-07 PROCEDURE — 1090F PRES/ABSN URINE INCON ASSESS: CPT | Performed by: INTERNAL MEDICINE

## 2022-07-07 PROCEDURE — G8754 DIAS BP LESS 90: HCPCS | Performed by: INTERNAL MEDICINE

## 2022-07-07 PROCEDURE — 20610 DRAIN/INJ JOINT/BURSA W/O US: CPT | Performed by: INTERNAL MEDICINE

## 2022-07-07 PROCEDURE — G8399 PT W/DXA RESULTS DOCUMENT: HCPCS | Performed by: INTERNAL MEDICINE

## 2022-07-07 PROCEDURE — G9899 SCRN MAM PERF RSLTS DOC: HCPCS | Performed by: INTERNAL MEDICINE

## 2022-07-07 PROCEDURE — 3017F COLORECTAL CA SCREEN DOC REV: CPT | Performed by: INTERNAL MEDICINE

## 2022-07-07 PROCEDURE — 99215 OFFICE O/P EST HI 40 MIN: CPT | Performed by: INTERNAL MEDICINE

## 2022-07-07 PROCEDURE — 1123F ACP DISCUSS/DSCN MKR DOCD: CPT | Performed by: INTERNAL MEDICINE

## 2022-07-07 PROCEDURE — G8427 DOCREV CUR MEDS BY ELIG CLIN: HCPCS | Performed by: INTERNAL MEDICINE

## 2022-07-07 PROCEDURE — G8417 CALC BMI ABV UP PARAM F/U: HCPCS | Performed by: INTERNAL MEDICINE

## 2022-07-07 PROCEDURE — G8536 NO DOC ELDER MAL SCRN: HCPCS | Performed by: INTERNAL MEDICINE

## 2022-07-07 PROCEDURE — G8510 SCR DEP NEG, NO PLAN REQD: HCPCS | Performed by: INTERNAL MEDICINE

## 2022-07-07 PROCEDURE — G8752 SYS BP LESS 140: HCPCS | Performed by: INTERNAL MEDICINE

## 2022-07-07 RX ORDER — LIDOCAINE HYDROCHLORIDE 10 MG/ML
1 INJECTION INFILTRATION; PERINEURAL ONCE
Qty: 1 ML | Refills: 0
Start: 2022-07-07 | End: 2022-07-07

## 2022-07-07 RX ORDER — TRIAMCINOLONE ACETONIDE 40 MG/ML
40 INJECTION, SUSPENSION INTRA-ARTICULAR; INTRAMUSCULAR ONCE
Qty: 1 ML | Refills: 0
Start: 2022-07-07 | End: 2022-07-07

## 2022-07-07 NOTE — PROGRESS NOTES
REASON FOR VISIT    This is a follow-up visit for Ms. [facility] for     ICD-10-CM   1. Seronegative rheumatoid arthritis of multiple sites ([facility] M06.09   2. PMR (polymyalgia rheumatica) ([facility]) M35.3   3. Idiopathic chronic gout, multiple sites, with tophus (tophi) M1A.09X1     Inflammatory arthritis phenotype includes:  Anti-CCP positive: no  Rheumatoid factor positive: no  Erosive disease: no  Extra-articular manifestations include: Polymyalgia Rheumatica, MGUS    Immunosuppression Screening (6/27/2018):   Quantiferon TB: negative  PPD:  Not performed  Hepatitis B: negative  Hepatitis C: negative    Therapy History includes:  Current DMARD therapy include: none  Prior DMARD therapy include: methotrexate 15-20 mg every Sunday (10/15/2018 to 1/29/2019; 2/26/2019 to 3/2019); leflunomide (11/19-12/19, unclear if started)  Discontinued DMARDs because of inefficacy: None  Discontinued DMARDs because of side effects: methotrexate 15-20 mg (nausea, vomiting, diarrhea, NAFLD)    Immunization History   Administered Date(s) Administered    COVID-19, PFIZER PURPLE top, DILUTE for use, (age 15 y+), IM, 30mcg/0.3mL 03/03/2021    Influenza High Dose Vaccine PF 01/29/2016, 11/09/2017, 09/11/2018, 09/11/2019    Influenza Vaccine 12/05/2012    Influenza Vaccine (Quad) PF (>6 Mo Flulaval, Fluarix, and >3 Yrs Afluria, Fluzone [id number]) 10/05/2016    Influenza Vaccine PF 11/17/2014    Influenza, High-dose, Quadrivalent (>65 Yrs Fluzone High Dose Quad [id number]) 09/28/2020    PPD 01/25/1999    Pneumococcal Conjugate (PCV-13) 09/11/2019    Pneumococcal Polysaccharide (PPSV-23) 05/13/2013    TD Vaccine 05/12/1999    Tdap 05/13/2013       Patient Active Problem List   Diagnosis Code    Hypercholesterolemia E78.00    Osteoarthritis of hip M16.9    PMR (polymyalgia rheumatica) ([facility]) M35.3    Chronic bronchitis ([facility] [address] Shyam Cespedes    Former smoker Z87.891    Chronic pain G89.29    Type 2 diabetes mellitus with diabetic polyneuropathy, without long-term current use of insulin (HCC) E11.42    Essential hypertension I10    Seronegative rheumatoid arthritis of multiple sites ([address] M06.09    MGUS (monoclonal gammopathy of unknown significance) D47.2    Vitamin D deficiency E55.9    Long-term use of immunosuppressant medication Z79.899    CAD (coronary artery disease) I25.10    Lung nodule seen on imaging study R91.1    Idiopathic chronic gout, multiple sites, with tophus (tophi) M1A.09X1    Hypertriglyceridemia E78.1    Primary biliary cholangitis (HCC) K74.3    NAFL (nonalcoholic fatty liver) C30.4    Chronic renal disease, stage III N18.30     HISTORY OF PRESENT ILLNESS    Ms. Mason Westfall returns for a follow-up. Pt still taking one pill of allopurinol (300mg) per day. Pt reports that her R shoulder and R elbow are giving her more pain. She reports that she has joint pain that flares up \"here and there\" since her last visit in April. She has not found black cherry extract. Pt has pain in her L hand. Pt says that her left hip is doing well; she is still going to physical therapy. Pt reports that she does not have much knee pain currently. Her knees are replaced. Pt has been taking Tylenol and codeine because she recently had dental surgery. Pt claims that her breathing is okay today. REVIEW OF SYSTEMS    A comprehensive review of systems was performed and pertinent results are documented in the HPI, review of systems is otherwise non-contributory.     PAST MEDICAL HISTORY    She has a past medical history of Abnormal pulmonary function test (9/28/2016), AR (allergic rhinitis), Asthma, Atrial thrombus (1994), Chronic bronchitis ([address] (9/28/2016), Chronic pain, DDD (degenerative disc disease), lumbar, Diabetes ([address] DJD (degenerative joint disease) of hip, Empty sella ([address] (2000), Former smoker (10/24/2016), GERD (gastroesophageal reflux disease), Hoarseness of voice, Hypercholesterolemia, Hypertension, Ill-defined condition, Lesion of vocal cord, PMR (polymyalgia rheumatica) ([facility] (5/18/2016), Rheumatoid arthritis ([facility] and Smoker (12/5/2012). She has no past medical history of Adverse effect of anesthesia or Sleep apnea. FAMILY HISTORY    Her family history includes Asthma in her mother and sister; COPD in her sister; Cancer in her father and sister; Diabetes in her mother and sister; Elevated Lipids in her father and sister; Heart Attack in her sister; Hypertension in her daughter, father, mother, and sister; OSTEOARTHRITIS in her father, mother, and sister; Stroke in her mother. SOCIAL HISTORY    She reports that she has been smoking cigarettes. She has a 20.00 pack-year smoking history. She has never used smokeless tobacco. She reports that she does not drink alcohol and does not use drugs. MEDICATIONS    Current Outpatient Medications   Medication Sig Dispense Refill    empagliflozin (JARDIANCE) 10 mg tablet Take 1 Tablet by mouth daily. 90 Tablet 0    [START ON 7/16/2022] HYDROcodone-acetaminophen (NORCO)  mg tablet Take 1 Tablet by mouth every eight (8) hours as needed for Pain for up to 30 days. Max Daily Amount: 3 Tablets. 90 Tablet 0    allopurinoL (ZYLOPRIM) 300 mg tablet Take 0.5 Tablets by mouth daily for 14 days, THEN 1 Tablet daily for 90 days. 90 Tablet 3    glipiZIDE SR (GLUCOTROL XL) 2.5 mg CR tablet TAKE 1 TABLET EVERY DAY 90 Tablet 3    dilTIAZem ER (TIAZAC) 420 mg capsule TAKE 1 CAPSULE EVERY DAY 90 Capsule 3    potassium chloride (KLOR-CON M10) 10 mEq tablet TAKE 1 TABLET EVERY DAY 90 Tablet 3    pregabalin (LYRICA) 200 mg capsule TAKE ONE CAPSULE BY MOUTH THREE TIMES A DAY 90 Capsule 2    dulaglutide (Trulicity) 3 CN/0.2 mL pnij 1 Adjustable Dose Pre-filled Pen Syringe by SubCUTAneous route every seven (7) days.  12 Each 3    atorvastatin (LIPITOR) 20 mg tablet TAKE 1 TABLET EVERY DAY 90 Tablet 3    losartan-hydroCHLOROthiazide (HYZAAR) 100-25 mg per tablet TAKE 1 TABLET EVERY DAY 90 Tablet 3    metFORMIN (GLUCOPHAGE) 500 mg tablet TAKE 2 TABLETS TWICE DAILY WITH A MEAL 360 Tablet 1    esomeprazole (NEXIUM) 40 mg capsule TAKE 1 CAPSULE BY MOUTH DAILY FOR GASTROESOPHAGEAL REFLUX DISEASE, HEMORRHAGIC GASTRITIS 90 Capsule 1    ondansetron (ZOFRAN ODT) 4 mg disintegrating tablet Take 1 Tablet by mouth every eight (8) hours as needed for Nausea. 60 Tablet 2    ursodioL (AMINA Forte) 500 mg tablet Take 1 Tablet by mouth two (2) times a day. (Patient not taking: Reported on 7/28/2021) 60 Tablet 3    multivit-min/iron/folic/lutein (CENTRUM SILVER WOMEN PO) Take 1 Tab by mouth daily. cholecalciferol (Vitamin D3) 25 mcg (1,000 unit) cap Take 1,000 Units by mouth daily. ascorbic acid, vitamin C, (Vitamin C) 1,000 mg tablet Take 1,000 mg by mouth daily. cyanocobalamin, vitamin B-12, 5,000 mcg cap Take 5,000 mcg by mouth daily. fluticasone propion-salmeteroL (Advair Diskus) 250-50 mcg/dose diskus inhaler Take 1 Puff by inhalation as needed. Lactobacillus acidophilus (PROBIOTIC PO) Take  by mouth. Takes one po once daily. glucose blood VI test strips (blood glucose test) strip Use to check blood sugar up to 3 times daily. E11.65 100 Strip 11    Blood-Glucose Meter monitoring kit Use to check blood sugar up to 3 times daily. E11.65 1 Kit 0    lancets misc Use to check blood sugar up to 3 times daily. E11.65 1 Each 11    tiotropium (Spiriva with HandiHaler) 18 mcg inhalation capsule Take 1 Cap by inhalation daily. 90 Cap 0    albuterol (PROVENTIL VENTOLIN) 2.5 mg /3 mL (0.083 %) nebu 3 mL by Nebulization route every six (6) hours as needed for Wheezing. 60 Each 0    albuterol (PROVENTIL HFA, VENTOLIN HFA, PROAIR HFA) 90 mcg/actuation inhaler Take 2 Puffs by inhalation every six (6) hours as needed for Wheezing or Shortness of Breath. 1 Inhaler 1    aspirin delayed-release 81 mg tablet Take 81 mg by mouth daily. Lancets (ACCU-CHEK SOFTCLIX LANCETS) misc Test blood sugar twice daily. DX: E11.9.  Substitute supply brand accepted by insurance. 1 Each 11    alcohol swabs (ALCOHOL WIPES) padm Test blood sugar twice daily. DX: E11.9. Substitute supply brand accepted by insurance. 100 Pad 1    Cetirizine (ZYRTEC) 10 mg Cap Take 10 mg by mouth daily. ALLERGIES    Allergies   Allergen Reactions    Augmentin [Amoxicillin-Pot Clavulanate] Diarrhea    Bactrim [Sulfamethoxazole-Trimethoprim] Nausea Only    Biaxin [Clarithromycin] Nausea Only    Demerol [Meperidine] Itching    Erythromycin Nausea Only    Gabapentin Nausea and Vomiting    Pcn [Penicillins] Hives    Percocet [Oxycodone-Acetaminophen] Itching    Pravastatin Nausea Only    Tetracycline Nausea Only    Voltaren [Diclofenac Sodium] Hives       PHYSICAL EXAMINATION    Visit Vitals  /83 (BP 1 Location: Left upper arm, BP Patient Position: Sitting)   Pulse 66   Temp 97.7 °F (36.5 °C) (Oral)   Resp 20   Ht 5' 4\" (1.626 m)   Wt 215 lb 12.8 oz (97.9 kg)   SpO2 96%   BMI 37.04 kg/m²     Body mass index is 37.04 kg/m². General:  The patient is pleasant, obese, kyphotic, alert, and in no apparent distress. Eyes: Sclera are anicteric. No conjunctival injection. HEENT:  Oropharynx is clear. No oral ulcers. Adequate salivary pooling. No cervical or supraclavicular lymphadenopathy. Lungs:  Clear to auscultation bilaterally, without wheeze or stridor. Normal respiratory effort. Cor:  Regular rate and rhythm. No murmur rub or gallop. Abdomen: Soft, non-tender, without hepatomegaly or masses. Extremities: No calf tenderness or edema. Warm and well perfused. Skin:  No significant abnormalities. Neuro: Nonfocal  Musculoskeletal:    A comprehensive musculoskeletal exam was performed for all joints of each upper and lower extremity and assessed for swelling, tenderness and range of motion. Results are documented as below:   Tenderness in L MCP 3 and Trace synovitis in L wrist. Joint line tenderness of R elbow with intact ROM.    Interval resolved left trochanteric tenderness  Bilateral TKA scars. Bilateral pes planus. No evidence of synovitis in the small joints of the hands, wrists, shoulders, elbows, hips, knees or ankles. DATA REVIEW    Laboratory   6/17/22: Tandem-R Ostase 25.7, ESR 72, Cr 1.27, Alk phos 221, AST 15, ALT 16, Tbili <0.2, WBC 9.9, HGB 13, Plt 259, CRP 21 mg/L, uric acid 4.1  Recent laboratory results were reviewed, summarized, and discussed with the patient. Imaging    Musculoskeletal Ultrasound    None    Radiographs  XR SHOULDER LT AP/LAT MIN 2 V (5/17/22): No acute abnormality. XR HAND RT MIN 3 V, XR HAND LT MIN 3 V (5/17/22): No specific radiographic evidence of gout at the left or right hand. There is mild osteoarthritis of the CMC joints bilaterally. XR HIP RT W OR WO PELV 2-3 VWS (1/24/22): Bilateral hip osteoarthritis. No acute abnormality. Bilateral Hand 6/27/2018: RIGHT: no fracture or other acute osseous or articular abnormality. The soft tissues are within normal limits. There is minimal distal interphalangeal joint space narrowing, soft tissue swelling. Minimal DJD at the base of the first digit no soft tissue calcification. LEFT: no fracture, dislocation or other acute osseous or articular abnormality. The soft tissues are within normal limits. The bone is normal internal contents. There is minimal distal interphalangeal joint space narrowing, there is mild DJD at the base of the first digit. There is no bony erosion or soft tissue calcification. Bilateral Foot 6/27/2018: RIGHT: no fracture or other acute osseous or articular abnormality. The soft tissues are within normal limits. The bone is normal in mineral content, there is some soft tissue swelling. There is no bony erosions. Small posterior and plantar calcaneal spurs. No soft tissue calcification seen otherwise. LEFT: no fracture or other acute osseous or articular abnormality. The soft tissues are within normal limits. The bone is normal in mineral content. No bony erosions. Periosteal thickening likely chronic. Calcaneal spurs. Chest 5/07/2018: normal heart size. There is no acute process in the lung fields. The osseous structures are unremarkable. Chest 4/14/2018: Lungs: The lungs are clear of mass, nodule, airspace disease or edema. Pleura: There is no pleural effusion or pneumothorax. Mediastinum: The cardiac and mediastinal contours and pulmonary vascularity are normal. Bones and soft tissues: There are degenerative changes of the spine. Left Shoulder 7/31/2017: no fracture, dislocation or other acute abnormality. DJD AC joint and chronic deformity tuberosity    Left Ankle 9/16/2015: no fracture or disruption of the ankle mortise. There is no other acute osseous or articular abnormality. There is marked soft tissue swelling overlying the medial and lateral sides of the ankle as well as the dorsum of the foot. Subcutaneous edema noted. .  Calcaneal spurs noted. Right Hip 7/15/2015:  no fracture, dislocation or other acute abnormality. Mild osteoarthritis is present. Left Shoulder 11/17/2014:  no fracture, dislocation. A type II acromion is present. There is mild irregularity of the greater tuberosity. CT Imaging    CTA Chest with and without contrast 10/16/2010: There is no evidence for pulmonary embolus. There is no pleural or pericardial effusion. Heart size is normal. No mediastinal or hilar mass or adenopathy is shown. The lungs are clear. Mild to moderate thoracic spine degenerative changes are demonstrated. MR Imaging    MRA Head without contrast 8/01/2011: The vertebral arteries are codominant. The basilar artery and its branches are normal. The internal carotid, anterior cerebral, and middle cerebral arteries are patent. There is no flow-limiting intracranial stenosis. There is no aneurysm. There is a patent left posterior communicating artery.       MRI Brain with and without contrast 8/01/2011: The ventricles are normal in size and are midline. Ventricular size is actually somewhat small for the patient's chronological age. There is no intracranial hemorrhage or extra-axial fluid collection. There are mild scattered foci of T2 hyperintensity in the cerebral white matter, which are nonspecific. Moderate patchy T2 hyperintensity within the central portion of the sol is noted. The appearance is typical of the changes seen with intracranial microangiopathy. . There is no acute infarction. There is no abnormal parenchymal or meningeal enhancement. The paranasal sinuses and mastoid air cells are clear. There is enlargement of the sella with extension of CSF into the sella and minimal visible pituitary tissue, consistent with an empty sella. DXA    DXA 9/14/2015: (excluded L1, right hip, distal radius) lumbar spine L2-L4 T score 3.6 (BMD 1.475 g/cm2), left femoral neck T score: 4.5 (1.351 g/cm2), left total hip T score: 3.6 (1.379 g/cm2). ASSESSMENT AND PLAN    This is a follow-up visit for Ms. Yaquelin Pepper for episodic joint pain and swelling still in keeping with gout, with improving uric acid since starting allopurinol. No elza synovitis currently, right shoulder pain appears related to underlying osteoarthritis, injected right shoulder today without any immediate complication. 1. Chronic gout due to renal impairment of multiple sites without tophus  - TRIAMCINOLONE ACETONIDE INJ  - triamcinolone acetonide (Kenalog) 40 mg/mL injection; 1 mL by Intra artICUlar route once for 1 dose. Dispense: 1 mL; Refill: 0  - lidocaine (XYLOCAINE) 10 mg/mL (1 %) injection; 1 mL by IntraDERMal route once for 1 dose. Dispense: 1 mL; Refill: 0  - [place] DRAIN/INJECT LARGE JOINT/BURSA  - REFERRAL TO PHYSICAL THERAPY  - URIC ACID; Future  - C REACTIVE PROTEIN, QT; Future  - CBC WITH AUTOMATED DIFF; Future  - METABOLIC PANEL, COMPREHENSIVE; Future  - SED RATE (ESR); Future    2.  Primary osteoarthritis, right shoulder  - TRIAMCINOLONE ACETONIDE INJ  - triamcinolone acetonide (Kenalog) 40 mg/mL injection; 1 mL by Intra artICUlar route once for 1 dose. Dispense: 1 mL; Refill: 0  - lidocaine (XYLOCAINE) 10 mg/mL (1 %) injection; 1 mL by IntraDERMal route once for 1 dose. Dispense: 1 mL; Refill: 0  - TN DRAIN/INJECT LARGE JOINT/BURSA  - REFERRAL TO PHYSICAL THERAPY  - URIC ACID; Future  - C REACTIVE PROTEIN, QT; Future  - CBC WITH AUTOMATED DIFF; Future  - METABOLIC PANEL, COMPREHENSIVE; Future  - SED RATE (ESR); Future      Patient Instructions   1) Stay on 300mg of Allopurinol daily. 2) Stay away from alcohol, seafood, and red meat to minimize your gout flare ups. 3) I will extend your referral for physical therapy. 4) I am going to inject your right shoulder today. 5) Check labs in 3 months. 6) Follow up in 4-5 months. Let me know if you have any questions in the meantime. TODAY'S ORDERS  Orders Placed This Encounter    URIC ACID     Standing Status:   Future     Number of Occurrences:   1     Standing Expiration Date:   2023    C REACTIVE PROTEIN, QT     Standing Status:   Future     Number of Occurrences:   1     Standing Expiration Date:   2023    CBC WITH AUTOMATED DIFF     Standing Status:   Future     Number of Occurrences:   1     Standing Expiration Date:   4197    METABOLIC PANEL, COMPREHENSIVE     Standing Status:   Future     Number of Occurrences:   1     Standing Expiration Date:   2023    SED RATE (ESR)     Standing Status:   Future     Number of Occurrences:   1     Standing Expiration Date:   2023    REFERRAL TO PHYSICAL THERAPY     Referral Priority:   Routine     Referral Type:   PT/OT/ST     Referral Reason:   Specialty Services Required     Number of Visits Requested:   1    TRIAMCINOLONE ACETONIDE INJ    TN DRAIN/INJECT LARGE JOINT/BURSA  -     triamcinolone acetonide (Kenalog) 40 mg/mL injection     Si mL by Intra artICUlar route once for 1 dose.      Dispense:  1 mL     Refill:  0 lidocaine (XYLOCAINE) 10 mg/mL (1 %) injection     Si mL by IntraDERMal route once for 1 dose. Dispense:  1 mL     Refill:  0         Future Appointments   Date Time Provider Nikki Mckeon   2022 11:15 AM Vlad Cervantes NP PAFP BS AMB   2022 11:30 AM Fran Franks MD AOCR BS AMB     Face to face time: 30 minutes (not including procedure)  Note preparation and records review day of service: 15 minutes  Total provider time day of service: 45 minutes    This was scribed by Angelica Luna in the presence of Dr. Linda Price MD    Adult Rheumatology   [address] [address], [address] [place] [address]   Phone [phone]  Fax [phone]    [facility] [address]  OFFICE PROCEDURE PROGRESS NOTE      Symptom relief from Right glenohumeral Arthritis. (22)     Chart reviewed for the following:   I, Angelica Luna, have reviewed the History, Physical and updated the Allergic reactions for [address] performed immediately prior to start of procedure:   Steffany Pierce, have performed the following reviews on Capri Jacob prior to the start of the procedure            * Patient was identified by name and date of birth   * Agreement on procedure being performed was verified  * Risks and Benefits explained to the patient  * Procedure site verified and marked as necessary  * Patient was positioned for comfort  * Consent was signed and verified     Time: 12:35 PM    Date of procedure: 2022    Procedure performed by: Lawence Lanes, MD    Provider assisted by: self    Patient assisted by: self    How tolerated by patient: tolerated the procedure well with no complications      After consent was obtained, using sterile technique the right shoulder w as prepped and topical ethyl chloride spray was used as local anesthetic. The joint was entered and no fluid was withdrawn.   Kenalog 40 mg and 1 ml plain Lidocaine was then injected and the needle withdrawn. The procedure was well tolerated. The patient is asked to continue to rest the joint for a few more days before resuming regular activities. It may be more painful for the first 1-2 days. Watch for fever, or increased swelling or persistent pain in the joint. Call or return to clinic prn if such symptoms occur or there is failure to improve as anticipated.

## 2022-07-07 NOTE — PROGRESS NOTES
Chief Complaint   Patient presents with    Gout    Joint Pain     elbow, shooulders     1. Have you been to the ER, urgent care clinic since your last visit? Hospitalized since your last visit? No    2. Have you seen or consulted any other health care providers outside of the [address] [place] [address] since your last visit? Include any pap smears or colon screening.  No

## 2022-07-07 NOTE — PATIENT INSTRUCTIONS
1) Stay on 300mg of Allopurinol daily. 2) Stay away from alcohol, seafood, and red meat to minimize your gout flare ups. 3) I will extend your referral for physical therapy. 4) I am going to inject your right shoulder today. 5) Check labs in 3 months. 6) Follow up in 4-5 months. Let me know if you have any questions in the meantime.

## 2022-07-22 DIAGNOSIS — G62.9 POLYNEUROPATHY: ICD-10-CM

## 2022-07-22 RX ORDER — PREGABALIN 200 MG/1
CAPSULE ORAL
Qty: 90 CAPSULE | Refills: 2 | Status: SHIPPED | OUTPATIENT
Start: 2022-07-22

## 2022-07-22 NOTE — TELEPHONE ENCOUNTER
NP Helen,    Request for refill pregabalin. Check . Thanks, Lorenzo Gibbs    Last Visit: 5/17/22 CARLOS EDUARDO Cervantes  Next Appointment: 8/24/22 CARLOS EDUARDO Cervantes  Previous Refill Encounter(s): 3/2/22 90 + 2    Requested Prescriptions     Pending Prescriptions Disp Refills    pregabalin (LYRICA) 200 mg capsule 90 Capsule 2     Sig: TAKE ONE CAPSULE BY MOUTH THREE TIMES A DAY     For Pharmacy Admin Tracking Only    CPA in place:   Recommendation Provided To:    Intervention Detail: New Rx: 1, reason: Patient Preference  Gap Closed?:   Intervention Accepted By:   Time Spent (min): 5

## 2022-08-22 ENCOUNTER — TELEPHONE (OUTPATIENT)
Dept: FAMILY MEDICINE CLINIC | Age: 72
End: 2022-08-22

## 2022-08-22 NOTE — TELEPHONE ENCOUNTER
----- Message from Loulou Sawantkyung sent at 8/22/2022 10:09 AM EDT -----  Subject: Refill Request    QUESTIONS  Name of Medication? HYDROcodone-acetaminophen (NORCO)  mg tablet  Patient-reported dosage and instructions? 10-325mg 3 x a day  How many days do you have left? 3  Preferred Pharmacy? [facility] [facility] [id number]  Pharmacy phone number (if available)? [phone]  ---------------------------------------------------------------------------  --------------  Gene Merced INFO  What is the best way for the office to contact you? OK to leave message on   voicemail  Preferred Call Back Phone Number? [phone]  ---------------------------------------------------------------------------  --------------  SCRIPT ANSWERS  Relationship to Patient?  Self

## 2022-08-24 ENCOUNTER — OFFICE VISIT (OUTPATIENT)
Dept: FAMILY MEDICINE CLINIC | Age: 72
End: 2022-08-24

## 2022-08-24 VITALS
DIASTOLIC BLOOD PRESSURE: 81 MMHG | TEMPERATURE: 97.7 F | HEIGHT: 64 IN | OXYGEN SATURATION: 96 % | HEART RATE: 70 BPM | WEIGHT: 207.6 LBS | SYSTOLIC BLOOD PRESSURE: 133 MMHG | BODY MASS INDEX: 35.44 KG/M2 | RESPIRATION RATE: 16 BRPM

## 2022-08-24 DIAGNOSIS — R05.3 PERSISTENT COUGH: ICD-10-CM

## 2022-08-24 DIAGNOSIS — R91.8 LUNG NODULES: ICD-10-CM

## 2022-08-24 DIAGNOSIS — Z00.00 MEDICARE ANNUAL WELLNESS VISIT, SUBSEQUENT: Primary | ICD-10-CM

## 2022-08-24 DIAGNOSIS — Z23 ENCOUNTER FOR IMMUNIZATION: ICD-10-CM

## 2022-08-24 DIAGNOSIS — E11.42 TYPE 2 DIABETES MELLITUS WITH DIABETIC POLYNEUROPATHY, WITHOUT LONG-TERM CURRENT USE OF INSULIN (HCC): ICD-10-CM

## 2022-08-24 DIAGNOSIS — G89.4 CHRONIC PAIN DISORDER: ICD-10-CM

## 2022-08-24 PROCEDURE — G8417 CALC BMI ABV UP PARAM F/U: HCPCS | Performed by: NURSE PRACTITIONER

## 2022-08-24 PROCEDURE — 3052F HG A1C>EQUAL 8.0%<EQUAL 9.0%: CPT | Performed by: NURSE PRACTITIONER

## 2022-08-24 PROCEDURE — G0439 PPPS, SUBSEQ VISIT: HCPCS | Performed by: NURSE PRACTITIONER

## 2022-08-24 PROCEDURE — G8432 DEP SCR NOT DOC, RNG: HCPCS | Performed by: NURSE PRACTITIONER

## 2022-08-24 PROCEDURE — G8752 SYS BP LESS 140: HCPCS | Performed by: NURSE PRACTITIONER

## 2022-08-24 PROCEDURE — G8754 DIAS BP LESS 90: HCPCS | Performed by: NURSE PRACTITIONER

## 2022-08-24 PROCEDURE — 1123F ACP DISCUSS/DSCN MKR DOCD: CPT | Performed by: NURSE PRACTITIONER

## 2022-08-24 PROCEDURE — G9899 SCRN MAM PERF RSLTS DOC: HCPCS | Performed by: NURSE PRACTITIONER

## 2022-08-24 PROCEDURE — 1101F PT FALLS ASSESS-DOCD LE1/YR: CPT | Performed by: NURSE PRACTITIONER

## 2022-08-24 PROCEDURE — G8399 PT W/DXA RESULTS DOCUMENT: HCPCS | Performed by: NURSE PRACTITIONER

## 2022-08-24 PROCEDURE — G8427 DOCREV CUR MEDS BY ELIG CLIN: HCPCS | Performed by: NURSE PRACTITIONER

## 2022-08-24 PROCEDURE — 2022F DILAT RTA XM EVC RTNOPTHY: CPT | Performed by: NURSE PRACTITIONER

## 2022-08-24 PROCEDURE — G8536 NO DOC ELDER MAL SCRN: HCPCS | Performed by: NURSE PRACTITIONER

## 2022-08-24 PROCEDURE — 3017F COLORECTAL CA SCREEN DOC REV: CPT | Performed by: NURSE PRACTITIONER

## 2022-08-24 RX ORDER — HYDROCODONE BITARTRATE AND ACETAMINOPHEN 10; 325 MG/1; MG/1
1 TABLET ORAL
Qty: 90 TABLET | Refills: 0 | Status: CANCELLED | OUTPATIENT
Start: 2022-08-24 | End: 2022-09-23

## 2022-08-24 RX ORDER — PNEUMOCOCCAL 20-VALENT CONJUGATE VACCINE 2.2; 2.2; 2.2; 2.2; 2.2; 2.2; 2.2; 2.2; 2.2; 2.2; 2.2; 2.2; 2.2; 2.2; 2.2; 2.2; 4.4; 2.2; 2.2; 2.2 UG/.5ML; UG/.5ML; UG/.5ML; UG/.5ML; UG/.5ML; UG/.5ML; UG/.5ML; UG/.5ML; UG/.5ML; UG/.5ML; UG/.5ML; UG/.5ML; UG/.5ML; UG/.5ML; UG/.5ML; UG/.5ML; UG/.5ML; UG/.5ML; UG/.5ML; UG/.5ML
0.5 INJECTION, SUSPENSION INTRAMUSCULAR ONCE
Qty: 1 EACH | Refills: 0 | Status: SHIPPED | OUTPATIENT
Start: 2022-08-24 | End: 2022-08-24

## 2022-08-24 NOTE — TELEPHONE ENCOUNTER
Last Visit: 5/17/22 CARLOS EDUARDO Cervantes  Next Appointment: Today- 8/24/22 CARLOS EDUARDO Cervantes  Previous Refill Encounter(s): 11/15/21 360 + 1    Requested Prescriptions     Pending Prescriptions Disp Refills    metFORMIN (GLUCOPHAGE) 500 mg tablet 360 Tablet 1     Sig: TAKE 2 TABLETS TWICE DAILY WITH A MEAL     For Pharmacy Admin Tracking Only    CPA in place:   Recommendation Provided To:    Intervention Detail: New Rx: 1, reason: Patient Preference  Gap Closed?:   Intervention Accepted By:   Time Spent (min): 5

## 2022-08-24 NOTE — PROGRESS NOTES
Assessment/Plan   Education and counseling provided:  Are appropriate based on today's review and evaluation    Diagnoses and all orders for this visit:    1. Medicare annual wellness visit, subsequent    2. Chronic pain disorder  -     HYDROcodone-acetaminophen (NORCO)  mg tablet; Take 1 Tablet by mouth every eight (8) hours as needed for Pain for up to 30 days. Max Daily Amount: 3 Tablets. Stable on current therapy. Refilled for 3 months.  reviewed and appropriate. UDS up-to-date. Controlled substance contract updated today. 3. Type 2 diabetes mellitus with diabetic polyneuropathy, without long-term current use of insulin (HCC)  -     METABOLIC PANEL, COMPREHENSIVE; Future  -     LIPID PANEL; Future  -     MICROALBUMIN, UR, RAND W/ MICROALB/CREAT RATIO; Future  -     HEMOGLOBIN A1C WITH EAG; Future  -      DIABETES FOOT EXAM  Continue current therapy. Labs pending. 4. Encounter for immunization  -     pneumococcal 20-jamie conj-dip, PF, (Prevnar 20, PF,) 0.5 mL syrg injection; 0.5 mL by IntraMUSCular route once for 1 dose. 5. Persistent cough  -     XR CHEST PA LAT; Future  X-ray pending due to persistent cough. Follow-up with pulmonology if unremarkable. 6. Lung nodules  -     CT CHEST WO CONT; Future    Other orders  -     [address] Maintenance Due   Topic Date Due    Shingrix Vaccine Age 49> (1 of 2) Never done    Pneumococcal 65+ years (3 - PPSV23 or PCV20) 09/11/2020    COVID-19 Vaccine (2 - Pfizer series) 03/24/2021    Eye Exam Retinal or Dilated  04/16/2021           SUBSEQUENT MEDICARE WELLNESS  This is the Subsequent Medicare Annual Wellness Exam, performed 12 months or more after the Initial AWV or the last Subsequent AWV    I have reviewed the patient's medical history in detail and updated the computerized patient record. Chronic Pain  Esther Noguera RTC today to follow up on chronic pain diagnosis.   We discussed her osteoarthritis that is affecting her bilateral hips, left shoulder and lumbar back. We discussed her seronegative rheumatoid arthritis affecting her bilateral hands. Significant changes since last visit: none. She is  able to do her normal daily activities. She reports the following adverse side effects: none. Previous treatments include heat, cold, Lidocaine patches otc, Tramadol, methotrexate, cortisone injection which all provided minimal relief. Not a candidate for oral NSAIDS. She is followed by rheumatology. Had covid several months ago. Continues with nonproductive cough. Current daily tobacco user. Due for routine lung cancer screening. Followed by pulmonology. No plans for smoking cessation. Cardiovascular Review:  The patient has diabetes, hypertension, hyperlipidemia, and obesity. Diet and Lifestyle: not attempting to follow a low fat, low cholesterol diet, sedentary, smoker daily  Home BP Monitoring: is not measured at home. Pertinent ROS: taking medications as instructed, no medication side effects noted, no TIA's, no chest pain on exertion, no dyspnea on exertion, no swelling of ankles.          History     Past Medical History:   Diagnosis Date    Abnormal pulmonary function test 9/28/2016    AR (allergic rhinitis)     Asthma     Atrial thrombus 1994    left    Chronic bronchitis ([address] 9/28/2016    Chronic pain     back/both hips/neuropathy    DDD (degenerative disc disease), lumbar     Diabetes ([address]     DJD (degenerative joint disease) of hip     right    Empty sella ([address] 2000    by MRI    Former smoker 10/24/2016    GERD (gastroesophageal reflux disease)     Hoarseness of voice     Hypercholesterolemia     Hypertension     Ill-defined condition     scar tissue - heart    Lesion of vocal cord     bx benign    PMR (polymyalgia rheumatica) ([address] 5/18/2016    Rheumatoid arthritis ([address]     Smoker 12/5/2012      Past Surgical History:   Procedure Laterality Date    COLONOSCOPY N/A 5/30/2018 COLONOSCOPY performed by Lillie Ferrara MD at \[facility]\"" ENDOSCOPY    COLONOSCOPY N/A 12/30/2020    COLONOSCOPY performed by Crow Adam MD at [facility]  12/30/2020         COLORECTAL SCRN; HI RISK IND  5/30/2018         ENDOSCOPY, COLON, DIAGNOSTIC  10/03    polypectomy    ENDOSCOPY, COLON, DIAGNOSTIC  2/05    Dr. Fermin Proctor, COLON, DIAGNOSTIC  2010, reported    due 2015    HX APPENDECTOMY  1970's    HX BREAST REDUCTION      HX COLONOSCOPY  2015    dr Antonia Ortiz. 2 polyps removed -repeat in 2018     HX HEENT      vocal cord bx - benign    HX HEMORRHOIDECTOMY      HX HYSTERECTOMY      ovaries spared    HX KNEE REPLACEMENT  11/03, 10/05    left then right    HX TONSILLECTOMY  age 29    tonsils    UPPER GI ENDOSCOPY,BIOPSY  12/30/2020          Current Outpatient Medications   Medication Sig Dispense Refill    [START ON 10/21/2022] HYDROcodone-acetaminophen (NORCO)  mg tablet Take 1 Tablet by mouth every eight (8) hours as needed for Pain for up to 30 days. Max Daily Amount: 3 Tablets. 90 Tablet 0    pregabalin (LYRICA) 200 mg capsule TAKE ONE CAPSULE BY MOUTH THREE TIMES A DAY 90 Capsule 2    glipiZIDE SR (GLUCOTROL XL) 2.5 mg CR tablet TAKE 1 TABLET EVERY DAY 90 Tablet 3    dilTIAZem ER (TIAZAC) 420 mg capsule TAKE 1 CAPSULE EVERY DAY 90 Capsule 3    potassium chloride (KLOR-CON M10) 10 mEq tablet TAKE 1 TABLET EVERY DAY 90 Tablet 3    dulaglutide (Trulicity) 3 TD/3.7 mL pnij 1 Adjustable Dose Pre-filled Pen Syringe by SubCUTAneous route every seven (7) days.  12 Each 3    atorvastatin (LIPITOR) 20 mg tablet TAKE 1 TABLET EVERY DAY 90 Tablet 3    losartan-hydroCHLOROthiazide (HYZAAR) 100-25 mg per tablet TAKE 1 TABLET EVERY DAY 90 Tablet 3    esomeprazole (NEXIUM) 40 mg capsule TAKE 1 CAPSULE BY MOUTH DAILY FOR GASTROESOPHAGEAL REFLUX DISEASE, HEMORRHAGIC GASTRITIS 90 Capsule 1    ondansetron (ZOFRAN ODT) 4 mg disintegrating tablet Take 1 Tablet by mouth every eight (8) hours as needed for Nausea. 60 Tablet 2    multivit-min/iron/folic/lutein (CENTRUM SILVER WOMEN PO) Take 1 Tab by mouth daily. cholecalciferol (VITAMIN D3) 25 mcg (1,000 unit) cap Take 1,000 Units by mouth daily. ascorbic acid, vitamin C, (VITAMIN C) 1,000 mg tablet Take 1,000 mg by mouth daily. cyanocobalamin, vitamin B-12, 5,000 mcg cap Take 5,000 mcg by mouth daily. fluticasone propion-salmeteroL (ADVAIR/WIXELA) 250-50 mcg/dose diskus inhaler Take 1 Puff by inhalation as needed. Lactobacillus acidophilus (PROBIOTIC PO) Take  by mouth. Takes one po once daily. glucose blood VI test strips (blood glucose test) strip Use to check blood sugar up to 3 times daily. E11.65 100 Strip 11    Blood-Glucose Meter monitoring kit Use to check blood sugar up to 3 times daily. E11.65 1 Kit 0    lancets misc Use to check blood sugar up to 3 times daily. E11.65 1 Each 11    tiotropium (Spiriva with HandiHaler) 18 mcg inhalation capsule Take 1 Cap by inhalation daily. 90 Cap 0    albuterol (PROVENTIL VENTOLIN) 2.5 mg /3 mL (0.083 %) nebu 3 mL by Nebulization route every six (6) hours as needed for Wheezing. 60 Each 0    albuterol (PROVENTIL HFA, VENTOLIN HFA, PROAIR HFA) 90 mcg/actuation inhaler Take 2 Puffs by inhalation every six (6) hours as needed for Wheezing or Shortness of Breath. 1 Inhaler 1    aspirin delayed-release 81 mg tablet Take 81 mg by mouth daily. Lancets (ACCU-CHEK SOFTCLIX LANCETS) misc Test blood sugar twice daily. DX: E11.9. Substitute supply brand accepted by insurance. 1 Each 11    alcohol swabs (ALCOHOL WIPES) pad Test blood sugar twice daily. DX: E11.9. Substitute supply brand accepted by insurance. 100 Pad 1    Cetirizine 10 mg cap Take 10 mg by mouth daily. metFORMIN (GLUCOPHAGE) 500 mg tablet TAKE 2 TABLETS TWICE DAILY WITH A MEAL 360 Tablet 1    empagliflozin (JARDIANCE) 25 mg tablet Take 1 Tablet by mouth daily.  90 Tablet 3    ursodioL (AMINA Forte) 500 mg tablet Take 1 Tablet by mouth two (2) times a day.  (Patient not taking: No sig reported) 60 Tablet 3     Allergies   Allergen Reactions    Augmentin [Amoxicillin-Pot Clavulanate] Diarrhea    Bactrim [Sulfamethoxazole-Trimethoprim] Nausea Only    Biaxin [Clarithromycin] Nausea Only    Demerol [Meperidine] Itching    Erythromycin Nausea Only    Gabapentin Nausea and Vomiting    Pcn [Penicillins] Hives    Percocet [Oxycodone-Acetaminophen] Itching    Pravastatin Nausea Only    Tetracycline Nausea Only    Voltaren [Diclofenac Sodium] Hives     Family History   Problem Relation Age of Onset    OSTEOARTHRITIS Mother     Asthma Mother     Diabetes Mother     Hypertension Mother     Stroke Mother         brain aneurysm    OSTEOARTHRITIS Father     Cancer Father         stomach    Elevated Lipids Father     Hypertension Father     OSTEOARTHRITIS Sister     Asthma Sister     Diabetes Sister     Elevated Lipids Sister     Hypertension Sister     Heart Attack Sister     COPD Sister     Cancer Sister         vocal cords/bladder    Hypertension Daughter     Anesth Problems Neg Hx      Social History     Tobacco Use    Smoking status: Every Day     Packs/day: 0.50     Years: 40.00     Pack years: 20.00     Types: Cigarettes    Smokeless tobacco: Never   Substance Use Topics    Alcohol use: No     Alcohol/week: 0.0 standard drinks     Patient Active Problem List   Diagnosis Code    Hypercholesterolemia E78.00    Osteoarthritis of hip M16.9    PMR (polymyalgia rheumatica) ([facility]) M35.3    Chronic bronchitis ([facility]) J42    Former smoker Z87.891    Chronic pain G89.29    Type 2 diabetes mellitus with diabetic polyneuropathy, without long-term current use of insulin ([facility]) E11.42    Essential hypertension I10    Seronegative rheumatoid arthritis of multiple sites ([facility]) M06.09    MGUS (monoclonal gammopathy of unknown significance) D47.2    Vitamin D deficiency E55.9    Long-term use of immunosuppressant medication H60.921    CAD (coronary artery disease) I25.10    Lung nodule seen on imaging study R91.1    Idiopathic chronic gout, multiple sites, with tophus (tophi) M1A.09X1    Hypertriglyceridemia E78.1    Primary biliary cholangitis (HCC) K74.3    NAFL (nonalcoholic fatty liver) A74.8    Chronic renal disease, stage III N18.30    Controlled substance agreement signed Z79.899       Depression Risk Factor Screening:     3 most recent PHQ Screens 8/24/2022   Little interest or pleasure in doing things Not at all   Feeling down, depressed, irritable, or hopeless Not at all   Total Score PHQ 2 0     Alcohol Risk Factor Screening:   Do you average more than 1 drink per night or more than 7 drinks a week:  No    On any one occasion in the past three months have you have had more than 3 drinks containing alcohol:  No    Functional Ability and Level of Safety:   Hearing Loss  Hearing is good. Activities of Daily Living  The home contains: no safety equipment. Patient does total self care    Fall Risk  Fall Risk Assessment, last 12 mths 8/24/2022   Able to walk? Yes   Fall in past 12 months? 0   Do you feel unsteady? 0   Are you worried about falling 0   Is the gait abnormal? -   Number of falls in past 12 months -   Fall with injury?  -       Abuse Screen  Patient is not abused    Cognitive Screening   Evaluation of Cognitive Function:  Has your family/caregiver stated any concerns about your memory: no  Normal    Patient Care Team   Patient Care Team:  Peace Cervantes NP as PCP - General (Nurse Practitioner)  Peace Cervantes NP as PCP - [facility] Empaneled Provider  Paz Lind MD as Physician (Orthopedic Surgery)  Marcelino Bach MD (Pulmonary Disease)  Steven Canada MD as Physician (Obstetrics & Gynecology)

## 2022-08-24 NOTE — PROGRESS NOTES
Sunday Fothergill is a 71 y.o. female    Chief Complaint   Patient presents with    Diabetes     Follow up       Visit Vitals  /81   Pulse 70   Temp 97.7 °F (36.5 °C)   Resp 16   Ht 5' 4\" (1.626 m)   Wt 207 lb 9.6 oz (94.2 kg)   SpO2 96%   BMI 35.63 kg/m²           1. Have you been to the ER, urgent care clinic since your last visit? Hospitalized since your last visit? NO    2. Have you seen or consulted any other health care providers outside of the [address] [place] [address] since your last visit? Include any pap smears or colon screening.  NO

## 2022-08-24 NOTE — LETTER
Name:Davi Goddard  NBZ:[birth year]   MR #:[id number]   [address]   Page 1 of 5        CONTROLLED SUBSTANCE AGREEMENT     I may be prescribed medications that are controlled substances as part  of my treatment plan for management of my medical condition(s). The goal of my treatment plan is to maintain and/or improve my health and wellbeing. Because controlled substances have an increased risk of abuse or harm, continual re-evaluation is needed determine if the goals of my treatment plan are being met for my safety and the safety of others. Christy Lauren  am entering into this Controlled Substance Agreement with my provider, __________________________________ at [address] . I understand that successful treatment requires mutual trust and honesty between me and my provider. I understand that there are state and federal laws and regulations which apply to the medications that my provider may prescribe that must be followed. I understand there are risks and benefits ts of taking the medicines that my provider may prescribe. I understand and agree that following this Agreement is necessary in continuing my provider-patient relationship and success of my treatment plan. As a part of my treatment plan, I agree to the following:    COMMUNICATION:    1. I will communicate fully with my provider about my medical condition(s), including the effect on my daily life and how well my medications are helping. I will tell my provider all of the medications that I take for any reason, including medications I receive from another health care provider, and will notify my provider about all issues, problems or concerns, including any side effects, which may be related to my medications. I understand that this information allows my provider to adjust my treatment plan to help manage my medical condition.  I understand that this information will become part of my permanent medical record. 2. I will notify my provider if I have a history of alcohol/drug misuse/addiction or if I have had treatment for alcohol/drug addiction in the past, or if I have a new problem with or concern about alcohol/drug use/addiction, because this increases the likelihood of high risk behaviors and may lead to serious medical conditions. 3. Females Only: I will notify my provider if I am or become pregnant, or if I intend to become pregnant, or if I intend to breastfeed. I understand that communication of these issues with my provider is important, due to possible effects my medication could have on an unborn fetus or breastfeeding child. Initials_____      Name:.Essie Goddard   WMP:[birth year]   MR #:[id number]   Office:[facility]   Page 2 of 5       MISUSE OF MEDICATIONS / DRUGS:    1. I agree to take all controlled substances as prescribed, and will not misuse or abuse any controlled substances prescribed by my provider. For my safety, I will not increase the amount of medicine I take without first talking with and getting permission from my provider. 2. If I have a medical emergency, another health care provider may prescribe me medication. If I seek emergency treatment, I will notify my provider within seventy-two (72) hours. 3. I understand that my provider may discuss my use and/or possible misuse/abuse of controlled substances and alcohol, as appropriate, with any health care provider involved in my care, pharmacist or legal authority. ILLEGAL DRUGS:    1. I will not use illegal drugs of any kind, including but not limited to marijuana, heroin, cocaine, or any prescription drug which is not prescribed to me. DRUG DIVERSION / PRESCRIPTION FRAUD:    1. I will not share, sell, trade, give away, or otherwise misuse my prescriptions or medications. 2. I will not alter any prescriptions provided to me by my provider.     SINGLE PROVIDER:    1. I agree that all controlled substances that I take will be prescribed only by my provider (or his/her covering provider) under this Agreement. This agreement does not prevent me from seeking emergency medical treatment or receiving pain management related to a surgery. PROTECTING MEDICATIONS:    1. I am responsible for keeping my prescriptions and medications in a safe and secure place including safeguarding them from loss or theft. I understand that lost, stolen or damaged/destroyed prescriptions or medications will not be replaced. Initials____          Name:Davi Meadows   VJ[birth year]3/[birth year]   MR #:[id number]   [address]   Page 3 of 5   PRESCRIPTION RENEWALS/REFILLS:    1. I will follow my controlled substance medication schedule as prescribed by my provider. 2. I understand and agree that I will make any requests for renewals or refills of my prescriptions only at the time of an office visit or during my providers regular office hours subject to the prescription refill requirements of the individual practice. 3. I understand that my provider may not call in prescriptions for controlled substances to my pharmacy. 4. I understand that my provider may adjust or discontinue these medications as deemed appropriate for my medical treatment plan. This Agreement does not guarantee the prescription of controlled medications. 5. I agree that if my medications are adjusted or discontinued, I will properly dispose of any remaining medications. I understand that I will be required to dispose of any remaining controlled medications prior to being provided with any prescriptions for other controlled medications. 6. I understand that the renewal of my prescription depends on my medical condition, my consistent participation, and my adherence with my treatment plan and this Agreement.     7. I understand that if I do not keep an appointment with my provider, I may not receive a renewal or refill for my controlled substance medication. PRESCRIPTION MONITORING / DRUG TESTIN. I understand that my provider may require me to provide urine, saliva or blood for testing at any time. I understand that this testing will be used to monitor for safety and adherence with my treatment plan and this Agreement. 2. I understand that my provider may ask me to provide an observed urine specimen, which means that a nurse or other health care provider may watch me provide urine, and I agree to cooperate if I am asked to provide an observed specimen. 3. I understand that if I do not provide urine, saliva or blood samples within two (2) hours of my providers request, or other timeframe decided by my provider, my treatment plan could be changed, or my prescriptions and medications may be changed or ended. 4. I understand that urine, saliva and blood test results will be a part of my permanent medical record. Initials_____        Name:Davi Farely   NFW:4832   MR #:[id number]   [address]   Page 4 of 5    5. I understand that my provider is required to obtain a copy of my State Prescription Monitoring Program () Report at any time in order to safely prescribe medications. 6. I will bring all of my prescribed controlled substance medications in their original bottles to all of my scheduled appointments. 7. I understand that my provider may ask me to come to the practice with all of my prescribed medications for a random pill count at any time. I agree to cooperate if I am asked to come in for a random pill count. I understand that if I do not arrive in the timeframe decided by my provider, my treatment plan could be changed, or my prescriptions and medications may be changed or ended.     COOPERATION WITH INVESTIGATIONS:    1. I authorize my provider and my pharmacy to cooperate fully with any local, state, or federal law enforcement agency in the investigation of any possible misuse, sale, or other diversion of my controlled substance prescriptions or medications. RISKS:    1. I understand that my level of consciousness may be affected from the use of controlled substances, and I understand that there are risks, benefits, effects and potential alternatives (including no treatment) to the medications that my provider has prescribed. 2. I understand that I may become drowsy, tired, dizzy, constipated, and sick to my stomach, or have changes in my mood or in my sleep while taking my medications. I have talked with my provider about these possible side effects, risks, benefits, and alternative treatments, and my provider has answered all of my questions. 3. I understand that I should not suddenly stop taking my medications without first speaking with my provider. I understand that if I suddenly stop taking my medications, I may experience nausea, vomiting, sweating,anxiety, sleeplessness, itching or other uncomfortable feelings. 4. I will not take my medications with alcohol of any kind, including beer, wine or liquor. I understand that drinking alcohol with my medications increases the chances of side effects, including breathing problems or even death. 5. I understand that if I have a history of alcoholism or other drug addiction I may be at increased risk of addiction to my medications. Signs of addiction might include craving, compulsive use, and continued use despite harm. Since addiction is a disease, I understand my provider may decide to change my medications and refer me to appropriate treatment services. I understand that this information would become part of my permanent medical record. Initials_____        Name:Davi Oneill   FAR:[birth year]   MR #:[id number]   [address]   Page 5 of 5       6.  Females only: Children born to women who regularly take controlled substances are likely to have physical problems and suffer withdrawal symptoms at birth. If I am of child-bearing age, I understand that I should use safe and effective birth control while taking any controlled substances to avoid the impact of medications on an unborn fetus or  child. I agree to notify my provider immediately if I should become pregnant so that my treatment plan can be adjusted. 7. Males only: I understand that chronic use of controlled substances has been associated with low testosterone levels in males which may affect my mood, stamina, sexual desire, and general health. I understand that my provider may order the appropriate laboratory test to determine my testosterone level,and I agree to this testing. ADHERENCE:    1. I understand that if I do not adhere to this Agreement in any way, my provider may change my prescriptions, stop prescribing controlled substances or end our provider-patient relationship. 2. If my provider decides to stop prescribing medication, or decides to end our provider-patient relationship,my provider may require that I taper my medications slowly. If necessary, my provider may also provide a prescription for other medications to treat my withdrawal symptoms. UNDERSTANDING THIS AGREEMENT:    I understand that my provider may adjust or stop my prescriptions for controlled substances based on my medical condition and my treatment plan. I understand that this Agreement does not guarantee that I will be prescribed medications or controlled substances. I understand that controlled substances may be just one part  of my treatment plan. My initial on each page and my signature below shows that I have read each page of this Agreement, I have had an opportunity to ask questions, and all of my questions have been answered to my satisfaction by my provider.     By signing below, I agree to comply with this Agreement, and I understand that if I do not follow the Agreements listed above, my provider may stop    _________________________________________  Date/Time 8/24/2022 11:32 AM                 (Patient Signature)    ________________________________________    Date/Time 8/24/2022 11:32 AM   (Parent or Guardian Signature if <18 yrs)    _________________________________________ Date/Time 8/24/2022 11:32 AM   (Provider Signature)

## 2022-08-24 NOTE — LETTER
8/24/2022    Ms. [address] [place] [id number]      Dear Darren Carmona:    Please find your most recent results below. Resulted Orders   XR CHEST PA LAT    Narrative    Indication:  Persistent cough. Exam: PA and lateral views of the chest.    Direct comparison is made to prior CXR dated October 2020. Findings: Cardiomediastinal silhouette is within normal limits. There is minimal  bibasilar atelectasis. Lungs are otherwise clear. No pleural fluid is  visualized. There is no pneumothorax. Impression    Minimal bibasilar atelectasis.        RECOMMENDATIONS:    Lungs show no infection.  Please  follow up with pulmonologist.         Please call me if you have any questions: [phone]    Sincerely,      Deidre Nicholson NP

## 2022-08-25 DIAGNOSIS — E11.42 TYPE 2 DIABETES MELLITUS WITH DIABETIC POLYNEUROPATHY, WITHOUT LONG-TERM CURRENT USE OF INSULIN (HCC): ICD-10-CM

## 2022-08-25 LAB
ALBUMIN SERPL-MCNC: 3.5 G/DL (ref 3.5–5)
ALBUMIN/GLOB SERPL: 0.9 {RATIO} (ref 1.1–2.2)
ALP SERPL-CCNC: 125 U/L (ref 45–117)
ALT SERPL-CCNC: 19 U/L (ref 12–78)
ANION GAP SERPL CALC-SCNC: 6 MMOL/L (ref 5–15)
AST SERPL-CCNC: 18 U/L (ref 15–37)
BILIRUB SERPL-MCNC: 0.3 MG/DL (ref 0.2–1)
BUN SERPL-MCNC: 27 MG/DL (ref 6–20)
BUN/CREAT SERPL: 22 (ref 12–20)
CALCIUM SERPL-MCNC: 9.6 MG/DL (ref 8.5–10.1)
CHLORIDE SERPL-SCNC: 107 MMOL/L (ref 97–108)
CHOLEST SERPL-MCNC: 132 MG/DL
CO2 SERPL-SCNC: 27 MMOL/L (ref 21–32)
COMMENT, HOLDF: NORMAL
CREAT SERPL-MCNC: 1.24 MG/DL (ref 0.55–1.02)
CREAT UR-MCNC: 88.4 MG/DL
EST. AVERAGE GLUCOSE BLD GHB EST-MCNC: 186 MG/DL
GLOBULIN SER CALC-MCNC: 3.8 G/DL (ref 2–4)
GLUCOSE SERPL-MCNC: 99 MG/DL (ref 65–100)
HBA1C MFR BLD: 8.1 % (ref 4–5.6)
HDLC SERPL-MCNC: 47 MG/DL
HDLC SERPL: 2.8 {RATIO} (ref 0–5)
LDLC SERPL CALC-MCNC: 38.8 MG/DL (ref 0–100)
MICROALBUMIN UR-MCNC: 0.56 MG/DL
MICROALBUMIN/CREAT UR-RTO: 6 MG/G (ref 0–30)
POTASSIUM SERPL-SCNC: 4.1 MMOL/L (ref 3.5–5.1)
PROT SERPL-MCNC: 7.3 G/DL (ref 6.4–8.2)
SAMPLES BEING HELD,HOLD: NORMAL
SODIUM SERPL-SCNC: 140 MMOL/L (ref 136–145)
TRIGL SERPL-MCNC: 231 MG/DL (ref ?–150)
VLDLC SERPL CALC-MCNC: 46.2 MG/DL

## 2022-08-25 RX ORDER — METFORMIN HYDROCHLORIDE 500 MG/1
TABLET ORAL
Qty: 360 TABLET | Refills: 1 | Status: SHIPPED | OUTPATIENT
Start: 2022-08-25

## 2022-08-25 RX ORDER — HYDROCODONE BITARTRATE AND ACETAMINOPHEN 10; 325 MG/1; MG/1
1 TABLET ORAL
Qty: 90 TABLET | Refills: 0 | Status: SHIPPED | OUTPATIENT
Start: 2022-08-25 | End: 2022-08-30 | Stop reason: SDUPTHER

## 2022-08-25 NOTE — PROGRESS NOTES
Outbound call to patient. Name and  verified, pt aware of lab result and agreed to increase in [place].

## 2022-08-30 PROBLEM — Z79.899 CONTROLLED SUBSTANCE AGREEMENT SIGNED: Status: ACTIVE | Noted: 2022-08-30

## 2022-08-30 RX ORDER — HYDROCODONE BITARTRATE AND ACETAMINOPHEN 10; 325 MG/1; MG/1
1 TABLET ORAL
Qty: 90 TABLET | Refills: 0 | Status: SHIPPED | OUTPATIENT
Start: 2022-10-21 | End: 2022-11-20

## 2022-08-30 RX ORDER — HYDROCODONE BITARTRATE AND ACETAMINOPHEN 10; 325 MG/1; MG/1
1 TABLET ORAL
Qty: 90 TABLET | Refills: 0 | Status: SHIPPED | OUTPATIENT
Start: 2022-09-23 | End: 2022-08-30 | Stop reason: SDUPTHER

## 2022-10-11 ENCOUNTER — HOSPITAL ENCOUNTER (OUTPATIENT)
Dept: CT IMAGING | Age: 72
Discharge: HOME | End: 2022-10-11
Attending: NURSE PRACTITIONER
Payer: MEDICARE

## 2022-10-11 DIAGNOSIS — R91.8 LUNG NODULES: ICD-10-CM

## 2022-10-11 PROCEDURE — 71250 CT THORAX DX C-: CPT

## 2022-10-14 NOTE — PROGRESS NOTES
Two patient identifiers confirmed:     Spoke with the patient and informed her that her lung cancer screening was normal per Helen

## 2022-11-21 DIAGNOSIS — G89.4 CHRONIC PAIN DISORDER: ICD-10-CM

## 2022-11-21 NOTE — TELEPHONE ENCOUNTER
Patient call for refill Bath.  Check . Thanks, chayito    Last Visit: 8/24/22 CARLOS EDUARDO Cervantes  Next Appointment: 11/28/220 CARLOS EDUARDO Cervantes  Previous Refill Encounter(s): 10/21/22 90    Requested Prescriptions     Pending Prescriptions Disp Refills    HYDROcodone-acetaminophen (NORCO)  mg tablet 90 Tablet 0     Sig: Take 1 Tablet by mouth every eight (8) hours as needed for Pain for up to 30 days. Max Daily Amount: 3 Tablets. For [address] [place] [address] in place:   Recommendation Provided To: Intervention Detail: New Rx: 1, reason: Patient Preference  Gap Closed?:   Intervention Accepted By:   Time Spent (min): 5    QUESTIONS   Name of Medication? HYDROcodone-acetaminophen (NORCO)  mg tablet   Patient-reported dosage and instructions? Take 1 Tablet by mouth every   eight (8) hours as needed for Pain for up to 30 days. Max Daily Amount? 3   Tablets. How many days do you have left? 5   Preferred Pharmacy? [facility] 21 [id number]   Pharmacy phone number (if available)? [phone]   Additional Information for Provider? 1 of 1 med being refilled   ---------------------------------------------------------------------------   --------------   CALL BACK INFO   What is the best way for the office to contact you? OK to leave message on   voicemail   Preferred Call Back Phone Number?  [phone]   ---------------------------------------------------------------------------

## 2022-11-22 RX ORDER — HYDROCODONE BITARTRATE AND ACETAMINOPHEN 10; 325 MG/1; MG/1
1 TABLET ORAL
Qty: 90 TABLET | Refills: 0 | Status: SHIPPED | OUTPATIENT
Start: 2022-11-22 | End: 2022-12-22

## 2022-11-28 ENCOUNTER — OFFICE VISIT (OUTPATIENT)
Dept: FAMILY MEDICINE CLINIC | Age: 72
End: 2022-11-28

## 2022-11-28 ENCOUNTER — OFFICE VISIT (OUTPATIENT)
Dept: RHEUMATOLOGY | Age: 72
End: 2022-11-28
Payer: MEDICARE

## 2022-11-28 VITALS
OXYGEN SATURATION: 96 % | DIASTOLIC BLOOD PRESSURE: 78 MMHG | SYSTOLIC BLOOD PRESSURE: 131 MMHG | TEMPERATURE: 98.3 F | WEIGHT: 217 LBS | HEIGHT: 64 IN | HEART RATE: 71 BPM | BODY MASS INDEX: 37.05 KG/M2 | RESPIRATION RATE: 16 BRPM

## 2022-11-28 VITALS
HEART RATE: 71 BPM | SYSTOLIC BLOOD PRESSURE: 131 MMHG | BODY MASS INDEX: 37.25 KG/M2 | OXYGEN SATURATION: 96 % | WEIGHT: 217 LBS | TEMPERATURE: 98.3 F | RESPIRATION RATE: 16 BRPM | DIASTOLIC BLOOD PRESSURE: 78 MMHG

## 2022-11-28 DIAGNOSIS — E11.42 TYPE 2 DIABETES MELLITUS WITH DIABETIC POLYNEUROPATHY, WITHOUT LONG-TERM CURRENT USE OF INSULIN (HCC): Primary | ICD-10-CM

## 2022-11-28 DIAGNOSIS — M70.61 TROCHANTERIC BURSITIS, RIGHT HIP: ICD-10-CM

## 2022-11-28 DIAGNOSIS — M19.012 PRIMARY OSTEOARTHRITIS, LEFT SHOULDER: Primary | ICD-10-CM

## 2022-11-28 DIAGNOSIS — G89.4 CHRONIC PAIN DISORDER: ICD-10-CM

## 2022-11-28 DIAGNOSIS — M1A.39X0 CHRONIC GOUT DUE TO RENAL IMPAIRMENT OF MULTIPLE SITES WITHOUT TOPHUS: ICD-10-CM

## 2022-11-28 DIAGNOSIS — G62.9 POLYNEUROPATHY: ICD-10-CM

## 2022-11-28 LAB — HBA1C MFR BLD HPLC: 8.8 %

## 2022-11-28 PROCEDURE — G9899 SCRN MAM PERF RSLTS DOC: HCPCS | Performed by: INTERNAL MEDICINE

## 2022-11-28 PROCEDURE — 3078F DIAST BP <80 MM HG: CPT | Performed by: INTERNAL MEDICINE

## 2022-11-28 PROCEDURE — 1090F PRES/ABSN URINE INCON ASSESS: CPT | Performed by: INTERNAL MEDICINE

## 2022-11-28 PROCEDURE — 1101F PT FALLS ASSESS-DOCD LE1/YR: CPT | Performed by: INTERNAL MEDICINE

## 2022-11-28 PROCEDURE — G8417 CALC BMI ABV UP PARAM F/U: HCPCS | Performed by: INTERNAL MEDICINE

## 2022-11-28 PROCEDURE — G8510 SCR DEP NEG, NO PLAN REQD: HCPCS | Performed by: INTERNAL MEDICINE

## 2022-11-28 PROCEDURE — 20610 DRAIN/INJ JOINT/BURSA W/O US: CPT | Performed by: INTERNAL MEDICINE

## 2022-11-28 PROCEDURE — 3017F COLORECTAL CA SCREEN DOC REV: CPT | Performed by: INTERNAL MEDICINE

## 2022-11-28 PROCEDURE — G8399 PT W/DXA RESULTS DOCUMENT: HCPCS | Performed by: INTERNAL MEDICINE

## 2022-11-28 PROCEDURE — G8536 NO DOC ELDER MAL SCRN: HCPCS | Performed by: INTERNAL MEDICINE

## 2022-11-28 PROCEDURE — 3074F SYST BP LT 130 MM HG: CPT | Performed by: INTERNAL MEDICINE

## 2022-11-28 PROCEDURE — G8427 DOCREV CUR MEDS BY ELIG CLIN: HCPCS | Performed by: INTERNAL MEDICINE

## 2022-11-28 PROCEDURE — 1123F ACP DISCUSS/DSCN MKR DOCD: CPT | Performed by: INTERNAL MEDICINE

## 2022-11-28 PROCEDURE — 99215 OFFICE O/P EST HI 40 MIN: CPT | Performed by: INTERNAL MEDICINE

## 2022-11-28 RX ORDER — TRIAMCINOLONE ACETONIDE 40 MG/ML
40 INJECTION, SUSPENSION INTRA-ARTICULAR; INTRAMUSCULAR ONCE
Qty: 1 ML | Refills: 0
Start: 2022-11-28 | End: 2022-11-28

## 2022-11-28 RX ORDER — PEN NEEDLE, DIABETIC 31 GX3/16"
NEEDLE, DISPOSABLE MISCELLANEOUS
Qty: 50 PEN NEEDLE | Refills: 1 | Status: SHIPPED | OUTPATIENT
Start: 2022-11-28

## 2022-11-28 RX ORDER — ONDANSETRON 4 MG/1
4 TABLET, FILM COATED ORAL
Qty: 30 TABLET | Refills: 1 | Status: SHIPPED | OUTPATIENT
Start: 2022-11-28

## 2022-11-28 RX ORDER — FLUCONAZOLE 150 MG/1
150 TABLET ORAL DAILY
Qty: 1 TABLET | Refills: 0 | Status: SHIPPED | OUTPATIENT
Start: 2022-11-28 | End: 2022-11-29

## 2022-11-28 RX ORDER — LIDOCAINE HYDROCHLORIDE 10 MG/ML
1 INJECTION INFILTRATION; PERINEURAL ONCE
Qty: 1 ML | Refills: 0
Start: 2022-11-28 | End: 2022-11-28

## 2022-11-28 NOTE — PROGRESS NOTES
REASON FOR VISIT    This is a follow-up visit for Ms. [address] [facility] for     ICD-10-CM   1. Seronegative rheumatoid arthritis of multiple sites ([facility] M06.09   2. PMR (polymyalgia rheumatica) ([facility]) M35.3   3. Idiopathic chronic gout, multiple sites, with tophus (tophi) M1A.09X1     Inflammatory arthritis phenotype includes:  Anti-CCP positive: no  Rheumatoid factor positive: no  Erosive disease: no  Extra-articular manifestations include: Polymyalgia Rheumatica, MGUS    Immunosuppression Screening (6/27/2018):   Quantiferon TB: negative  PPD:  Not performed  Hepatitis B: negative  Hepatitis C: negative    Therapy History includes:  Current DMARD therapy include: none  Prior DMARD therapy include: methotrexate 15-20 mg every Sunday (10/15/2018 to 1/29/2019; 2/26/2019 to 3/2019); leflunomide (11/19-12/19, unclear if started)  Discontinued DMARDs because of inefficacy: None  Discontinued DMARDs because of side effects: methotrexate 15-20 mg (nausea, vomiting, diarrhea, NAFLD)    Immunization History   Administered Date(s) Administered     Influenza, FLUZONE (age 72 y+), High Dose 09/28/2020    COVID-19, PFIZER PURPLE top, DILUTE for use, (age 15 y+), IM, 30mcg/0.3mL 03/03/2021    Influenza High Dose Vaccine PF 01/29/2016, 11/09/2017, 09/11/2018, 09/11/2019    Influenza Vaccine 12/05/2012    Influenza Vaccine PF 11/17/2014    Influenza, FLUARIX, FLULAVAL, FLUZONE (age 10 mo+) AND AFLURIA, (age 1 y+), PF, 0.5mL 10/05/2016    PPD 01/25/1999    Pneumococcal Conjugate (PCV-13) 09/11/2019    Pneumococcal Polysaccharide (PPSV-23) 05/13/2013    TD Vaccine 05/12/1999    Tdap 05/13/2013       Patient Active Problem List   Diagnosis Code    Hypercholesterolemia E78.00    Osteoarthritis of hip M16.9    PMR (polymyalgia rheumatica) ([facility]) M35.3    Chronic bronchitis ([facility] Acreynaldo Amin    Former smoker Z87.891    Chronic pain G89.29    Type 2 diabetes mellitus with diabetic polyneuropathy, without long-term current use of insulin ([facility] E11.42 Essential hypertension I10    Seronegative rheumatoid arthritis of multiple sites (HCC) M06.09    MGUS (monoclonal gammopathy of unknown significance) D47.2    Vitamin D deficiency E55.9    Long-term use of immunosuppressant medication Z79.60    CAD (coronary artery disease) I25.10    Lung nodule seen on imaging study R91.1    Idiopathic chronic gout, multiple sites, with tophus (tophi) M1A.09X1    Hypertriglyceridemia E78.1    Primary biliary cholangitis (HCC) K74.3    NAFL (nonalcoholic fatty liver) Q77.0    Chronic renal disease, stage III N18.30    Controlled substance agreement signed Z79.899     HISTORY OF PRESENT ILLNESS    Ms. Ema Han returns for a follow-up. LV 11/9/2022. Pt still takes one pill (300mg) of Allopurinol daily. Last uric acid was 5.1 on 11/2. Pt says that her shoulder and hip arthritis has been bad for the past 3 days. She thinks that this may be from increased activity during Thanksgiving. She recalls that her R shoulder injection she got LV helped reduce her pain for a good amount of time. She wants to get her L shoulder injected today. Pt feels that her gout has been flaring up in the base of her thumbs. She thinks that this may be related to all of the peeling she did for Thanksgiving. She denies swelling and pain in her toes since LV. REVIEW OF SYSTEMS    A comprehensive review of systems was performed and pertinent results are documented in the HPI, review of systems is otherwise non-contributory.     PAST MEDICAL HISTORY    She has a past medical history of Abnormal pulmonary function test (9/28/2016), AR (allergic rhinitis), Asthma, Atrial thrombus (1994), Chronic bronchitis ([address] (9/28/2016), Chronic pain, DDD (degenerative disc disease), lumbar, Diabetes ([address] DJD (degenerative joint disease) of hip, Empty sella ([address] (2000), Former smoker (10/24/2016), GERD (gastroesophageal reflux disease), Hoarseness of voice, Hypercholesterolemia, Hypertension, Ill-defined condition, Lesion of vocal cord, PMR (polymyalgia rheumatica) ([facility] (5/18/2016), Rheumatoid arthritis ([facility] and Smoker (12/5/2012). She has no past medical history of Adverse effect of anesthesia or Sleep apnea. FAMILY HISTORY    Her family history includes Asthma in her mother and sister; COPD in her sister; Cancer in her father and sister; Diabetes in her mother and sister; Elevated Lipids in her father and sister; Heart Attack in her sister; Hypertension in her daughter, father, mother, and sister; OSTEOARTHRITIS in her father, mother, and sister; Stroke in her mother. SOCIAL HISTORY    She reports that she has been smoking cigarettes. She has a 20.00 pack-year smoking history. She has never used smokeless tobacco. She reports that she does not drink alcohol and does not use drugs. MEDICATIONS    Current Outpatient Medications   Medication Sig Dispense Refill    ondansetron hcl (ZOFRAN) 4 mg tablet Take 1 Tablet by mouth every eight (8) hours as needed for Nausea or Vomiting. 30 Tablet 1    fluconazole (DIFLUCAN) 150 mg tablet Take 1 Tablet by mouth daily for 1 day. 1 Tablet 0    Insulin Needles, Disposable, 32 gauge x 5/32\" ndle Use as directed once weekly with ozempic 50 Pen Needle 1    semaglutide (OZEMPIC) 0.25 mg or 0.5 mg/dose (2 mg/1.5 ml) subq pen 0.25 mg by SubCUTAneous route every seven (7) days. 1 Box 0    HYDROcodone-acetaminophen (NORCO)  mg tablet Take 1 Tablet by mouth every eight (8) hours as needed for Pain for up to 30 days. Max Daily Amount: 3 Tablets. 90 Tablet 0    metFORMIN (GLUCOPHAGE) 500 mg tablet TAKE 2 TABLETS TWICE DAILY WITH A MEAL 360 Tablet 1    empagliflozin (JARDIANCE) 25 mg tablet Take 1 Tablet by mouth daily.  (Patient not taking: Reported on 11/28/2022) 90 Tablet 3    pregabalin (LYRICA) 200 mg capsule TAKE ONE CAPSULE BY MOUTH THREE TIMES A DAY 90 Capsule 2    glipiZIDE SR (GLUCOTROL XL) 2.5 mg CR tablet TAKE 1 TABLET EVERY DAY 90 Tablet 3    dilTIAZem ER (TIAZAC) 420 mg capsule TAKE 1 CAPSULE EVERY DAY 90 Capsule 3    potassium chloride (KLOR-CON M10) 10 mEq tablet TAKE 1 TABLET EVERY DAY 90 Tablet 3    atorvastatin (LIPITOR) 20 mg tablet TAKE 1 TABLET EVERY DAY 90 Tablet 3    losartan-hydroCHLOROthiazide (HYZAAR) 100-25 mg per tablet TAKE 1 TABLET EVERY DAY 90 Tablet 3    esomeprazole (NEXIUM) 40 mg capsule TAKE 1 CAPSULE BY MOUTH DAILY FOR GASTROESOPHAGEAL REFLUX DISEASE, HEMORRHAGIC GASTRITIS 90 Capsule 1    ursodioL (AMINA Forte) 500 mg tablet Take 1 Tablet by mouth two (2) times a day. 60 Tablet 3    multivit-min/iron/folic/lutein (CENTRUM SILVER WOMEN PO) Take 1 Tab by mouth daily. cholecalciferol (VITAMIN D3) 25 mcg (1,000 unit) cap Take 1,000 Units by mouth daily. ascorbic acid, vitamin C, (VITAMIN C) 1,000 mg tablet Take 1,000 mg by mouth daily. cyanocobalamin, vitamin B-12, 5,000 mcg cap Take 5,000 mcg by mouth daily. fluticasone propion-salmeteroL (ADVAIR/WIXELA) 250-50 mcg/dose diskus inhaler Take 1 Puff by inhalation as needed. Lactobacillus acidophilus (PROBIOTIC PO) Take  by mouth. Takes one po once daily. glucose blood VI test strips (blood glucose test) strip Use to check blood sugar up to 3 times daily. E11.65 100 Strip 11    Blood-Glucose Meter monitoring kit Use to check blood sugar up to 3 times daily. E11.65 1 Kit 0    lancets misc Use to check blood sugar up to 3 times daily. E11.65 1 Each 11    tiotropium (Spiriva with HandiHaler) 18 mcg inhalation capsule Take 1 Cap by inhalation daily. 90 Cap 0    albuterol (PROVENTIL VENTOLIN) 2.5 mg /3 mL (0.083 %) nebu 3 mL by Nebulization route every six (6) hours as needed for Wheezing. 60 Each 0    albuterol (PROVENTIL HFA, VENTOLIN HFA, PROAIR HFA) 90 mcg/actuation inhaler Take 2 Puffs by inhalation every six (6) hours as needed for Wheezing or Shortness of Breath. 1 Inhaler 1    aspirin delayed-release 81 mg tablet Take 81 mg by mouth daily.       Lancets (ACCU-CHEK SOFTCLIX LANCETS) misc Test blood sugar twice daily. DX: E11.9. Substitute supply brand accepted by insurance. 1 Each 11    alcohol swabs (ALCOHOL WIPES) padm Test blood sugar twice daily. DX: E11.9. Substitute supply brand accepted by insurance. 100 Pad 1    Cetirizine 10 mg cap Take 10 mg by mouth daily. ALLERGIES    Allergies   Allergen Reactions    Augmentin [Amoxicillin-Pot Clavulanate] Diarrhea    Bactrim [Sulfamethoxazole-Trimethoprim] Nausea Only    Biaxin [Clarithromycin] Nausea Only    Demerol [Meperidine] Itching    Erythromycin Nausea Only    Gabapentin Nausea and Vomiting    Pcn [Penicillins] Hives    Percocet [Oxycodone-Acetaminophen] Itching    Pravastatin Nausea Only    Tetracycline Nausea Only    Voltaren [Diclofenac Sodium] Hives       PHYSICAL EXAMINATION    Visit Vitals  /78 (BP 1 Location: Left upper arm, BP Patient Position: Sitting, BP Cuff Size: Adult)   Pulse 71   Temp 98.3 °F (36.8 °C) (Oral)   Resp 16   Wt 217 lb (98.4 kg)   SpO2 96%   BMI 37.25 kg/m²     General:  The patient is pleasant, obese, kyphotic, alert, and in no apparent distress. Eyes: Sclera are anicteric. No conjunctival injection. HEENT:  Oropharynx is clear. No oral ulcers. Adequate salivary pooling. No cervical or supraclavicular lymphadenopathy. Lungs:  Clear to auscultation bilaterally, without wheeze or stridor. Normal respiratory effort. Cor:  Regular rate and rhythm. No murmur rub or gallop. Abdomen: Soft, non-tender, without hepatomegaly or masses. Extremities: No calf tenderness or edema. Warm and well perfused. Skin:  No significant abnormalities. Neuro: Nonfocal  Musculoskeletal:    A comprehensive musculoskeletal exam was performed for all joints of each upper and lower extremity and assessed for swelling, tenderness and range of motion. Results are documented as below:   Bilateral shoulder crepitus, left shoulder tenderness through passive ROM.   Interval resolved L MCP 3 tenderness, no wrist synovitis today. Joint line tenderness of L > R elbow with intact ROM. Interval resolved left trochanteric tenderness but development of right trochanteric tenderness. Bilateral TKA scars. Bilateral pes planus. No evidence of synovitis in the small joints of the hands, wrists, shoulders, elbows, hips, knees or ankles. DATA REVIEW    Laboratory   11/2/22: ESR 54, Cr 1.4, Alk phos 129, Tbili 0.2, AST 14, ALT 11, CBC WNL, CRP 21 mg/L, Uric acid 5.1  8/24/22: HGB A1c 8.1, Microalbumin/Cr 6, Triglyceride 231, Cr 1.24, Alk phos 125, Tbili 0.3, ALT 19, AST 18  6/17/22: Tandem-R Ostase 25.7, ESR 72, Cr 1.27, Alk phos 221, AST 15, ALT 16, Tbili <0.2, WBC 9.9, HGB 13, Plt 259, CRP 21 mg/L, uric acid 4.1  Recent laboratory results were reviewed, summarized, and discussed with the patient. Imaging    Musculoskeletal Ultrasound    None    Radiographs    XR CHEST (8/24/22): Minimal bibasilar atelectasis. XR SHOULDER LT AP/LAT MIN 2 V (5/17/22): No acute abnormality. XR HAND RT MIN 3 V, XR HAND LT MIN 3 V (5/17/22): No specific radiographic evidence of gout at the left or right hand. There is mild osteoarthritis of the CMC joints bilaterally. XR HIP RT W OR WO PELV 2-3 VWS (1/24/22): Bilateral hip osteoarthritis. No acute abnormality. Bilateral Hand 6/27/2018: RIGHT: no fracture or other acute osseous or articular abnormality. The soft tissues are within normal limits. There is minimal distal interphalangeal joint space narrowing, soft tissue swelling. Minimal DJD at the base of the first digit no soft tissue calcification. LEFT: no fracture, dislocation or other acute osseous or articular abnormality. The soft tissues are within normal limits. The bone is normal internal contents. There is minimal distal interphalangeal joint space narrowing, there is mild DJD at the base of the first digit. There is no bony erosion or soft tissue calcification.      Bilateral Foot 6/27/2018: RIGHT: no fracture or other acute osseous or articular abnormality. The soft tissues are within normal limits. The bone is normal in mineral content, there is some soft tissue swelling. There is no bony erosions. Small posterior and plantar calcaneal spurs. No soft tissue calcification seen otherwise. LEFT: no fracture or other acute osseous or articular abnormality. The soft tissues are within normal limits. The bone is normal in mineral content. No bony erosions. Periosteal thickening likely chronic. Calcaneal spurs. Chest 5/07/2018: normal heart size. There is no acute process in the lung fields. The osseous structures are unremarkable. Chest 4/14/2018: Lungs: The lungs are clear of mass, nodule, airspace disease or edema. Pleura: There is no pleural effusion or pneumothorax. Mediastinum: The cardiac and mediastinal contours and pulmonary vascularity are normal. Bones and soft tissues: There are degenerative changes of the spine. Left Shoulder 7/31/2017: no fracture, dislocation or other acute abnormality. DJD AC joint and chronic deformity tuberosity    Left Ankle 9/16/2015: no fracture or disruption of the ankle mortise. There is no other acute osseous or articular abnormality. There is marked soft tissue swelling overlying the medial and lateral sides of the ankle as well as the dorsum of the foot. Subcutaneous edema noted. .  Calcaneal spurs noted. Right Hip 7/15/2015:  no fracture, dislocation or other acute abnormality. Mild osteoarthritis is present. Left Shoulder 11/17/2014:  no fracture, dislocation. A type II acromion is present. There is mild irregularity of the greater tuberosity. CT Imaging    CT CHEST (10/11/22):    1. No lung nodule. 2. Minimal chronic lung changes. CTA Chest with and without contrast 10/16/2010: There is no evidence for pulmonary embolus. There is no pleural or pericardial effusion. Heart size is normal. No mediastinal or hilar mass or adenopathy is shown.  The lungs are clear. Mild to moderate thoracic spine degenerative changes are demonstrated. MR Imaging    MRA Head without contrast 8/01/2011: The vertebral arteries are codominant. The basilar artery and its branches are normal. The internal carotid, anterior cerebral, and middle cerebral arteries are patent. There is no flow-limiting intracranial stenosis. There is no aneurysm. There is a patent left posterior communicating artery. MRI Brain with and without contrast 8/01/2011: The ventricles are normal in size and are midline. Ventricular size is actually somewhat small for the patient's chronological age. There is no intracranial hemorrhage or extra-axial fluid collection. There are mild scattered foci of T2 hyperintensity in the cerebral white matter, which are nonspecific. Moderate patchy T2 hyperintensity within the central portion of the sol is noted. The appearance is typical of the changes seen with intracranial microangiopathy. . There is no acute infarction. There is no abnormal parenchymal or meningeal enhancement. The paranasal sinuses and mastoid air cells are clear. There is enlargement of the sella with extension of CSF into the sella and minimal visible pituitary tissue, consistent with an empty sella. DXA    DXA 9/14/2015: (excluded L1, right hip, distal radius) lumbar spine L2-L4 T score 3.6 (BMD 1.475 g/cm2), left femoral neck T score: 4.5 (1.351 g/cm2), left total hip T score: 3.6 (1.379 g/cm2). ASSESSMENT AND PLAN    This is a follow-up visit for Ms. Martin Red for episodic joint pain and swelling still in keeping with gout, with improving uric acid since starting allopurinol. Pain in the [facility] joints she attributes to gout seem more likely to be related to overuse over the Thanksgiving holiday with both CMC osteoarthritis and associated de Quervain tendinopathy.  Injected left shoulder without immediate complication; she plans to start with PT exercises she learned with her right shoulder arthritis, but agrees to call us if she decides to pursue PT for the left shoulder too. 1. Primary osteoarthritis, left shoulder  - TRIAMCINOLONE ACETONIDE INJ  - triamcinolone acetonide (Kenalog) 40 mg/mL injection; 1 mL by Intra artICUlar route once for 1 dose. Dispense: 1 mL; Refill: 0  - lidocaine (XYLOCAINE) 10 mg/mL (1 %) injection; 1 mL by IntraDERMal route once for 1 dose. Dispense: 1 mL; Refill: 0  - GA DRAIN/INJECT LARGE JOINT/BURSA  - C REACTIVE PROTEIN, QT; Future  - CBC WITH AUTOMATED DIFF; Future  - METABOLIC PANEL, COMPREHENSIVE; Future  - SED RATE (ESR); Future  - URIC ACID; Future  - RHEUMATOID FACTOR BY TURB (RDL); Future    2. Chronic gout due to renal impairment of multiple sites without tophus  - CBC WITH AUTOMATED DIFF; Future  - METABOLIC PANEL, COMPREHENSIVE; Future  - URIC ACID; Future  - RHEUMATOID FACTOR BY TURB (RDL); Future    3. Trochanteric bursitis, right hip  - Reviewed stretching exercises she learnt previously, holding off on injection  - Encouraged continued weight loss efforts. Patient Instructions   1) Continue to take 300mg of Allopurinol daily. 2) Continue to stretch your shoulders at home so you do not lose any range of motion. Message me anytime you want a referral to physical therapy. 3) You can take 650mg of Tylenol up to 3 times a day for joint pain. 4) Try to use a spica splint in order to immobilize your thumb to reduce pain. You can also use topicals, such as Biofreeze and salon pas on your thumb. 5) I am injecting your left shoulder today. 6) Check labs in 3-4 months. 7) Follow up in 4 months. Let me know if you experience more joint swelling or any new symptoms.      TODAY'S ORDERS  Orders Placed This Encounter    C REACTIVE PROTEIN, QT     Standing Status:   Future     Number of Occurrences:   1     Standing Expiration Date:   5/28/2023    CBC WITH AUTOMATED DIFF     Standing Status:   Future     Number of Occurrences:   1 Standing Expiration Date:       METABOLIC PANEL, COMPREHENSIVE     Standing Status:   Future     Number of Occurrences:   1     Standing Expiration Date:   2023    SED RATE (ESR)     Standing Status:   Future     Number of Occurrences:   1     Standing Expiration Date:   2023    URIC ACID     Standing Status:   Future     Number of Occurrences:   1     Standing Expiration Date:   2023    RHEUMATOID FACTOR BY TURB (RDL)     Standing Status:   Future     Number of Occurrences:   1     Standing Expiration Date:   2023    TRIAMCINOLONE ACETONIDE INJ    IL DRAIN/INJECT LARGE JOINT/BURSA  -     triamcinolone acetonide (Kenalog) 40 mg/mL injection     Si mL by Intra artICUlar route once for 1 dose. Dispense:  1 mL     Refill:  0    lidocaine (XYLOCAINE) 10 mg/mL (1 %) injection     Si mL by IntraDERMal route once for 1 dose. Dispense:  1 mL     Refill:  0           Future Appointments   Date Time Provider [facility]   2023  9:20 AM Princess Trinidad MD AOCR BS AMB   2023 11:45 AM Lorie Cervantes, NP PAFP BS AMB   3/15/2023  2:30 PM Coreen Herrera NP LIVR BS AMB     Face to face time: 28 minutes (not including procedure)  Note preparation and records review day of service: 13 minutes  Total provider time day of service: 41 minutes    This was scribed by Peter Gillespie in the presence of Dr. Yolanda Cedillo. The note was reviewed and amended personally, and I agree with the above information. Peter Shea MD    Adult Rheumatology   [facility]  [facility] [address]   Phone [phone]  Fax [phone]     [address]  OFFICE PROCEDURE PROGRESS NOTE      Symptom relief from Left shoulder Arthritis.  (22)     Chart reviewed for the following:   I, Peter Shea MD, have reviewed the History, Physical and updated the Allergic reactions for Fady Rubin TIME OUT performed immediately prior to start of procedure:   IRamiro MD, have performed the following reviews on Neymar Sanford prior to the start of the procedure            * Patient was identified by name and date of birth   * Agreement on procedure being performed was verified  * Risks and Benefits explained to the patient  * Procedure site verified and marked as necessary  * Patient was positioned for comfort  * Consent was signed and verified     Time: 2:45 PM    Date of procedure: 11/28/2022    Procedure performed by: Ramiro Artis MD    Provider assisted by: self    Patient assisted by: self    How tolerated by patient: tolerated the procedure well with no complications    After consent was obtained, using sterile technique the left shoulder was prepped and topical ethyl chloride spray was used as local anesthetic. The joint was entered from a posterior approach and no fluid was withdrawn . Kenalog 40 mg and 2 ml plain Lidocaine was then injected and the needle withdrawn. The procedure was well tolerated. The patient is asked to continue to rest the joint for a few more days before resuming regular activities. It may be more painful for the first 1-2 days. Watch for fever, or increased swelling or persistent pain in the joint. Call or return to clinic prn if such symptoms occur or there is failure to improve as anticipated.

## 2022-11-28 NOTE — PROGRESS NOTES
Chief Complaint   Patient presents with    Follow-up       1. Have you been to the ER, urgent care clinic since your last visit? Hospitalized since your last visit? No    2. Have you seen or consulted any other health care providers outside of the [address] [place] [address] [place] since your last visit? Include any pap smears or colon screening. No    3. For patients over 45: Has the patient had a colonoscopy? Yes - no Care Gap present     If the patient is female:    4. For patients over 40: Has the patient had a mammogram? Yes - no Care Gap present    5. For patients over 21: Has the patient had a pap smear? Yes - no Care Gap present    3 most recent PHQ Screens 11/28/2022   Little interest or pleasure in doing things Not at all   Feeling down, depressed, irritable, or hopeless Not at all   Total Score PHQ 2 0     Abuse Screening Questionnaire 11/28/2022   Do you ever feel afraid of your partner? N   Are you in a relationship with someone who physically or mentally threatens you? N   Is it safe for you to go home? Y       Fall Risk Assessment, last 12 mths 11/28/2022   Able to walk? Yes   Fall in past 12 months? 1   Do you feel unsteady? 0   Are you worried about falling 0   Is TUG test greater than 12 seconds? 0   Is the gait abnormal? 0   Number of falls in past 12 months 1   Fall with injury?  0       ADL Assessment 11/28/2022   Feeding yourself No Help Needed   Getting from bed to chair No Help Needed   Getting dressed No Help Needed   Bathing or showering No Help Needed   Walk across the room (includes cane/walker) No Help Needed   Using the telphone No Help Needed   Taking your medications No Help Needed   Preparing meals No Help Needed   Managing money (expenses/bills) No Help Needed   Moderately strenuous housework (laundry) No Help Needed   Shopping for personal items (toiletries/medicines) No Help Needed   Shopping for groceries No Help Needed   Driving No Help Needed   Climbing a flight of stairs No Help Needed Getting to places beyond walking distances No Help Needed     Health Maintenance Due   Topic Date Due    Hepatitis B Vaccine (1 of 3 - Risk 3-dose series) Never done    Shingrix Vaccine Age 50> (1 of 2) Never done    Pneumococcal 65+ years (3 - PPSV23 if available, else PCV20) 09/11/2020    COVID-19 Vaccine (2 - Pfizer series) 03/24/2021    Eye Exam Retinal or Dilated  04/16/2021    Flu Vaccine (1) 08/01/2022    Low dose CT lung screening  09/05/2022

## 2022-11-28 NOTE — PATIENT INSTRUCTIONS
Your A1c was 8.1% which was uncontrolled. We applied for Trulicity Rx assistance thru [facility] [employer]. We applied for Jardiance Rx assistance thru [employer]. These programs will send multiple month's worth of medication to your home. Bring (or mail) proof of income to the office in order to complete your application. Call me if you have not heard anything about your application in 2 weeks after sending in your proof of income.     Blood Sugar Goal  Fastin-130 mg/dL  1-2 after meals: Less than 180 mg/dL    Carbohydrate Goals  Meal: 30-45 grams   Snacks: 15 grams    Pharmacist Contact Information:  Lesia Gutierrez, PharmD, [address], [facility]  Work Cell: [phone]

## 2022-11-28 NOTE — PROGRESS NOTES
Pharmacy Progress Note - Diabetes Management    S/O: Ms. John Rodriguez is a 71 y.o. female, referred by Dr. Jamil Moreno, CARLOS EDUARDO, with a PMH of HTN, CAD< primary biliary cholangitis, NAFL, T2DM, OA, RA, gout, hypercholesterolemia, CKD, PMR, Vit D deficiency, was seen today for diabetes management. Patient's last A1c was 8.1% (Aug 2022). Interim update: Pt arrives to clinic today to discuss cost options for Trulicity and Jardiance. She states they are too expensive. She ran out of Jardiance last week and Trulicity about a month ago. Cost is approx $1100/mo for both combined. She reports watching her diet - lower carb. Discussed BG and A1c goals. Current anti-hyperglycemic regimen includes:    - Metformin 500 mg 2 tabs BID  - Glipizide SR 2.5 mg daily  - Jardiance 25 mg daily - not taking d/t cost  - Trulicity 3 mg weekly - not taking d/t cost    ROS:  Today, Pt endorses:  - Symptoms of Hyperglycemia: none  - Symptoms of Hypoglycemia: none    Self Monitoring Blood Glucose (SMBG) or CGM:  - Brought in home glucometer/blood glucose log/CGM reader today:  no  BG was 104 (fasting this AM - pt reported)  In office ppBG - egg, ham, toast - 202      Vitals:   Wt Readings from Last 3 Encounters:   08/24/22 207 lb 9.6 oz (94.2 kg)   07/07/22 215 lb 12.8 oz (97.9 kg)   05/17/22 207 lb 6.4 oz (94.1 kg)     BP Readings from Last 3 Encounters:   08/24/22 133/81   07/07/22 121/83   05/17/22 130/76     Pulse Readings from Last 3 Encounters:   08/24/22 70   07/07/22 66   05/17/22 75       Past Medical History:   Diagnosis Date    Abnormal pulmonary function test 9/28/2016    AR (allergic rhinitis)     Asthma     Atrial thrombus 1994    left    Chronic bronchitis (HCC) 9/28/2016    Chronic pain     back/both hips/neuropathy    DDD (degenerative disc disease), lumbar     Diabetes ([address]     DJD (degenerative joint disease) of hip     right    Empty sella ([address] 2000    by MRI    Former smoker 10/24/2016    GERD (gastroesophageal reflux disease)     Hoarseness of voice     Hypercholesterolemia     Hypertension     Ill-defined condition     scar tissue - heart    Lesion of vocal cord     bx benign    PMR [facility] ([facility]) 5/18/2016    Rheumatoid arthritis ([facility]     Smoker 12/5/2012     Allergies   Allergen Reactions    Augmentin [Amoxicillin-Pot Clavulanate] Diarrhea    Bactrim [Sulfamethoxazole-Trimethoprim] Nausea Only    Biaxin [Clarithromycin] Nausea Only    Demerol [Meperidine] Itching    Erythromycin Nausea Only    Gabapentin Nausea and Vomiting    Pcn [Penicillins] Hives    Percocet [Oxycodone-Acetaminophen] Itching    Pravastatin Nausea Only    Tetracycline Nausea Only    Voltaren [Diclofenac Sodium] Hives       Current Outpatient Medications   Medication Sig    HYDROcodone-acetaminophen (NORCO)  mg tablet Take 1 Tablet by mouth every eight (8) hours as needed for Pain for up to 30 days. Max Daily Amount: 3 Tablets. metFORMIN (GLUCOPHAGE) 500 mg tablet TAKE 2 TABLETS TWICE DAILY WITH A MEAL    empagliflozin (JARDIANCE) 25 mg tablet Take 1 Tablet by mouth daily. pregabalin (LYRICA) 200 mg capsule TAKE ONE CAPSULE BY MOUTH THREE TIMES A DAY    glipiZIDE SR (GLUCOTROL XL) 2.5 mg CR tablet TAKE 1 TABLET EVERY DAY    dilTIAZem ER (TIAZAC) 420 mg capsule TAKE 1 CAPSULE EVERY DAY    potassium chloride (KLOR-CON M10) 10 mEq tablet TAKE 1 TABLET EVERY DAY    dulaglutide (Trulicity) 3 IL/9.9 mL pnij 1 Adjustable Dose Pre-filled Pen Syringe by SubCUTAneous route every seven (7) days. atorvastatin (LIPITOR) 20 mg tablet TAKE 1 TABLET EVERY DAY    losartan-hydroCHLOROthiazide (HYZAAR) 100-25 mg per tablet TAKE 1 TABLET EVERY DAY    esomeprazole (NEXIUM) 40 mg capsule TAKE 1 CAPSULE BY MOUTH DAILY FOR GASTROESOPHAGEAL REFLUX DISEASE, HEMORRHAGIC GASTRITIS    ondansetron (ZOFRAN ODT) 4 mg disintegrating tablet Take 1 Tablet by mouth every eight (8) hours as needed for Nausea.     ursodioL (AMINA Forte) 500 mg tablet Take 1 Tablet by mouth two (2) times a day. (Patient not taking: No sig reported)    multivit-min/iron/folic/lutein (CENTRUM SILVER WOMEN PO) Take 1 Tab by mouth daily. cholecalciferol (VITAMIN D3) 25 mcg (1,000 unit) cap Take 1,000 Units by mouth daily. ascorbic acid, vitamin C, (VITAMIN C) 1,000 mg tablet Take 1,000 mg by mouth daily. cyanocobalamin, vitamin B-12, 5,000 mcg cap Take 5,000 mcg by mouth daily. fluticasone propion-salmeteroL (ADVAIR/WIXELA) 250-50 mcg/dose diskus inhaler Take 1 Puff by inhalation as needed. Lactobacillus acidophilus (PROBIOTIC PO) Take  by mouth. Takes one po once daily. glucose blood VI test strips (blood glucose test) strip Use to check blood sugar up to 3 times daily. E11.65    Blood-Glucose Meter monitoring kit Use to check blood sugar up to 3 times daily. E11.65    lancets misc Use to check blood sugar up to 3 times daily. E11.65    tiotropium (Spiriva with HandiHaler) 18 mcg inhalation capsule Take 1 Cap by inhalation daily. albuterol (PROVENTIL VENTOLIN) 2.5 mg /3 mL (0.083 %) nebu 3 mL by Nebulization route every six (6) hours as needed for Wheezing. albuterol (PROVENTIL HFA, VENTOLIN HFA, PROAIR HFA) 90 mcg/actuation inhaler Take 2 Puffs by inhalation every six (6) hours as needed for Wheezing or Shortness of Breath. aspirin delayed-release 81 mg tablet Take 81 mg by mouth daily. Lancets (ACCU-CHEK SOFTCLIX LANCETS) misc Test blood sugar twice daily. DX: E11.9. Substitute supply brand accepted by insurance. alcohol swabs (ALCOHOL WIPES) pad Test blood sugar twice daily. DX: E11.9. Substitute supply brand accepted by insurance. Cetirizine 10 mg cap Take 10 mg by mouth daily. No current facility-administered medications for this visit.      Lab Results   Component Value Date/Time    Sodium 141 11/02/2022 03:02 PM    Potassium 4.0 11/02/2022 03:02 PM    Chloride 101 11/02/2022 03:02 PM    CO2 23 11/02/2022 03:02 PM    Anion gap 6 08/24/2022 12:01 PM    Glucose 292 (H) 11/02/2022 03:02 PM    BUN 25 11/02/2022 03:02 PM    Creatinine 1.40 (H) 11/02/2022 03:02 PM    BUN/Creatinine ratio 18 11/02/2022 03:02 PM    GFR est AA 52 (L) 08/24/2022 12:01 PM    GFR est non-AA 43 (L) 08/24/2022 12:01 PM    Calcium 9.4 11/02/2022 03:02 PM    Bilirubin, total 0.2 11/02/2022 03:02 PM    Alk. phosphatase 129 (H) 11/02/2022 03:02 PM    Protein, total 7.2 11/02/2022 03:02 PM    Albumin 4.3 11/02/2022 03:02 PM    Globulin 3.8 08/24/2022 12:01 PM    A-G Ratio 1.5 11/02/2022 03:02 PM    ALT (SGPT) 11 11/02/2022 03:02 PM     Lab Results   Component Value Date/Time    Cholesterol, total 132 08/24/2022 12:01 PM    HDL Cholesterol 47 08/24/2022 12:01 PM    LDL,Direct 65 10/07/2009 11:05 AM    LDL, calculated 38.8 08/24/2022 12:01 PM    VLDL, calculated 46.2 08/24/2022 12:01 PM    Triglyceride 231 (H) 08/24/2022 12:01 PM    CHOL/HDL Ratio 2.8 08/24/2022 12:01 PM     Lab Results   Component Value Date/Time    WBC 9.5 11/02/2022 03:02 PM    WBC 8.9 05/09/2012 12:23 PM    HGB 14.1 11/02/2022 03:02 PM    HCT 43.6 11/02/2022 03:02 PM    PLATELET 359 57/68/2840 03:02 PM    MCV 91 11/02/2022 03:02 PM       Lab Results   Component Value Date/Time    Microalbumin/Creat ratio (mg/g creat) 6 08/24/2022 12:01 PM    Microalbumin,urine random 0.56 08/24/2022 12:01 PM       Lab Results   Component Value Date/Time    Hemoglobin A1c 8.1 (H) 08/24/2022 12:01 PM    Hemoglobin A1c 6.9 (H) 07/28/2021 12:37 PM    Hemoglobin A1c 10.0 (H) 01/29/2021 12:16 PM     Hemoglobin A1c (POC)   Date Value Ref Range Status   05/17/2022 8.2 % Final        CrCl cannot be calculated (Unknown ideal weight. ). A/P:    1) T2DM: Per ADA guidelines, Pt's A1c is not at goal of < 7%. Current SMBG(s)/CGM trend is unknown as pt does not regularly check BG rdgs and there are only two rdgs. In office ppBG was uncontrolled. She is not adherent to meds d/t cost.  She is eligible for Rx assistance.     - Continue Metformin 500 mg 2 tabs BID  - Continue Glipizide SR 2.5 mg daily  - Started Rx assistance apps for Jardiance 25 mg and Trulicity 3 mg - proof of income still needed. Apps maintained by this writer at this time  - Pt to bring in proof of income. Medication reconciliation was completed during the visit. There are no discontinued medications. No orders of the defined types were placed in this encounter. Patient verbalized understanding of the information presented and all of the patients questions were answered. AVS was handed to the patient. Patient advised to call the office with any additional questions or concerns. Notifications of recommendations will be sent to Dr. Ferdie Heimlich, NP for review.       Sascha Lee, PharmD, BCGP, BCACP  Clinical Pharmacist Specialist          For Pharmacy Admin Tracking Only    CPA in place: Yes  Recommendation Provided To: Patient/Caregiver: 2 via In person  Intervention Detail: Patient Access Assistance/Sample Provided  Intervention Accepted By: Patient/Caregiver: 2  Time Spent (min): 45

## 2022-11-28 NOTE — PROGRESS NOTES
Chief Complaint   Patient presents with    Joint Pain     1. Have you been to the ER, urgent care clinic since your last visit? Hospitalized since your last visit? No    2. Have you seen or consulted any other health care providers outside of the [address] [place] [address] since your last visit? Include any pap smears or colon screening.  No

## 2022-11-28 NOTE — PROGRESS NOTES
Assessment/Plan:     Diagnoses and all orders for this visit:    1. Type 2 diabetes mellitus with diabetic polyneuropathy, without long-term current use of insulin (HCC)  -     AMB POC HEMOGLOBIN A1C  -     ondansetron hcl (ZOFRAN) 4 mg tablet; Take 1 Tablet by mouth every eight (8) hours as needed for Nausea or Vomiting.  -     fluconazole (DIFLUCAN) 150 mg tablet; Take 1 Tablet by mouth daily for 1 day. -     Insulin Needles, Disposable, 32 gauge x 5/32\" ndle; Use as directed once weekly with ozempic  -     semaglutide (OZEMPIC) 0.25 mg or 0.5 mg/dose (2 mg/1.5 ml) subq pen; 0.25 mg by SubCUTAneous route every seven (7) days. Start Ozempic as above which she has on hand at home. 2. Chronic pain disorder  -     COMPLIANCE DRUG SCREEN/PRESCRIPTION MONITORING; Future  -     HYDROcodone-acetaminophen (NORCO)  mg tablet; Take 1 Tablet by mouth every eight (8) hours as needed for Pain for up to 30 days. Max Daily Amount: 3 Tablets. Refilled for three months. Stable on current therapy. UDS and contract are up to date.  reviewed and appropriate. 3. Polyneuropathy  -     COMPLIANCE DRUG SCREEN/PRESCRIPTION MONITORING; Future       Follow-up and Dispositions    Return in about 3 months (around 2/28/2023) for Follow Up. Discussed expected course/resolution/complications of diagnosis in detail with patient. Medication risks/benefits/costs/interactions/alternatives discussed with patient. Pt was given after visit summary which includes diagnoses, current medications & vitals. Pt expressed understanding with the diagnosis and plan          Subjective:      Prince Bender is a 71 y.o. female who presents for had concerns including Follow-up. Chronic Pain  Prince Bender RTC today to follow up on chronic pain diagnosis. We discussed her osteoarthritis that is affecting her bilateral hips, left shoulder and lumbar back.   We discussed her seronegative rheumatoid arthritis affecting her bilateral hands. Significant changes since last visit: none. She is  able to do her normal daily activities. She reports the following adverse side effects: none. Previous treatments include heat, cold, Lidocaine patches otc, Tramadol, methotrexate, cortisone injection which all provided minimal relief. Not a candidate for oral NSAIDS. She is followed by rheumatology. Cardiovascular Review:  The patient has diabetes, hypertension, hyperlipidemia, and obesity. Diet and Lifestyle: not attempting to follow a low fat, low cholesterol diet, sedentary, smoker daily  Home BP Monitoring: is not measured at home. Pertinent ROS: taking medications as instructed, no medication side effects noted, no TIA's, no chest pain on exertion, no dyspnea on exertion, no swelling of ankles. She is awaiting patient assistance. She has been off jardiance and Trulicity due to affordability.         Patient Active Problem List   Diagnosis Code    Hypercholesterolemia E78.00    Osteoarthritis of hip M16.9    PMR (polymyalgia rheumatica) ([facility]) M35.3    Chronic bronchitis ([facility] Pearlene Overlie    Former smoker Z87.891    Chronic pain G89.29    Type 2 diabetes mellitus with diabetic polyneuropathy, without long-term current use of insulin ([facility] E11.42    Essential hypertension I10    Seronegative rheumatoid arthritis of multiple sites ([facility] M06.09    MGUS (monoclonal gammopathy of unknown significance) D47.2    Vitamin D deficiency E55.9    Long-term use of immunosuppressant medication Z79.60    CAD (coronary artery disease) I25.10    Lung nodule seen on imaging study R91.1    Idiopathic chronic gout, multiple sites, with tophus (tophi) M1A.09X1    Hypertriglyceridemia E78.1    Primary biliary cholangitis (HCC) K74.3    NAFL (nonalcoholic fatty liver) Q67.7    Chronic renal disease, stage III N18.30    Controlled substance agreement signed Z79.899       Current Outpatient Medications   Medication Sig Dispense Refill    [START ON 2/1/2023] HYDROcodone-acetaminophen (NORCO)  mg tablet Take 1 Tablet by mouth every eight (8) hours as needed for Pain for up to 30 days. Max Daily Amount: 3 Tablets. 90 Tablet 0    ondansetron hcl (ZOFRAN) 4 mg tablet Take 1 Tablet by mouth every eight (8) hours as needed for Nausea or Vomiting. 30 Tablet 1    Insulin Needles, Disposable, 32 gauge x 5/32\" ndle Use as directed once weekly with ozempic 50 Pen Needle 1    semaglutide (OZEMPIC) 0.25 mg or 0.5 mg/dose (2 mg/1.5 ml) subq pen 0.25 mg by SubCUTAneous route every seven (7) days. 1 Box 0    metFORMIN (GLUCOPHAGE) 500 mg tablet TAKE 2 TABLETS TWICE DAILY WITH A MEAL 360 Tablet 1    pregabalin (LYRICA) 200 mg capsule TAKE ONE CAPSULE BY MOUTH THREE TIMES A DAY 90 Capsule 2    glipiZIDE SR (GLUCOTROL XL) 2.5 mg CR tablet TAKE 1 TABLET EVERY DAY 90 Tablet 3    dilTIAZem ER (TIAZAC) 420 mg capsule TAKE 1 CAPSULE EVERY DAY 90 Capsule 3    potassium chloride (KLOR-CON M10) 10 mEq tablet TAKE 1 TABLET EVERY DAY 90 Tablet 3    losartan-hydroCHLOROthiazide (HYZAAR) 100-25 mg per tablet TAKE 1 TABLET EVERY DAY 90 Tablet 3    esomeprazole (NEXIUM) 40 mg capsule TAKE 1 CAPSULE BY MOUTH DAILY FOR GASTROESOPHAGEAL REFLUX DISEASE, HEMORRHAGIC GASTRITIS 90 Capsule 1    ursodioL (AMINA Forte) 500 mg tablet Take 1 Tablet by mouth two (2) times a day. 60 Tablet 3    multivit-min/iron/folic/lutein (CENTRUM SILVER WOMEN PO) Take 1 Tab by mouth daily. cholecalciferol (VITAMIN D3) 25 mcg (1,000 unit) cap Take 1,000 Units by mouth daily. ascorbic acid, vitamin C, (VITAMIN C) 1,000 mg tablet Take 1,000 mg by mouth daily. cyanocobalamin, vitamin B-12, 5,000 mcg cap Take 5,000 mcg by mouth daily. fluticasone propion-salmeteroL (ADVAIR/WIXELA) 250-50 mcg/dose diskus inhaler Take 1 Puff by inhalation as needed. Lactobacillus acidophilus (PROBIOTIC PO) Take  by mouth. Takes one po once daily.       glucose blood VI test strips (blood glucose test) strip Use to check blood sugar up to 3 times daily. E11.65 100 Strip 11    Blood-Glucose Meter monitoring kit Use to check blood sugar up to 3 times daily. E11.65 1 Kit 0    lancets misc Use to check blood sugar up to 3 times daily. E11.65 1 Each 11    tiotropium (Spiriva with HandiHaler) 18 mcg inhalation capsule Take 1 Cap by inhalation daily. 90 Cap 0    albuterol (PROVENTIL VENTOLIN) 2.5 mg /3 mL (0.083 %) nebu 3 mL by Nebulization route every six (6) hours as needed for Wheezing. 60 Each 0    albuterol (PROVENTIL HFA, VENTOLIN HFA, PROAIR HFA) 90 mcg/actuation inhaler Take 2 Puffs by inhalation every six (6) hours as needed for Wheezing or Shortness of Breath. 1 Inhaler 1    aspirin delayed-release 81 mg tablet Take 81 mg by mouth daily. Lancets (ACCU-CHEK SOFTCLIX LANCETS) misc Test blood sugar twice daily. DX: E11.9. Substitute supply brand accepted by insurance. 1 Each 11    alcohol swabs (ALCOHOL WIPES) padm Test blood sugar twice daily. DX: E11.9. Substitute supply brand accepted by insurance. 100 Pad 1    Cetirizine 10 mg cap Take 10 mg by mouth daily. atorvastatin (LIPITOR) 20 mg tablet TAKE 1 TABLET EVERY DAY 90 Tablet 3    empagliflozin (JARDIANCE) 25 mg tablet Take 1 Tablet by mouth daily. (Patient not taking: Reported on 11/28/2022) 90 Tablet 3       Allergies   Allergen Reactions    Augmentin [Amoxicillin-Pot Clavulanate] Diarrhea    Bactrim [Sulfamethoxazole-Trimethoprim] Nausea Only    Biaxin [Clarithromycin] Nausea Only    Demerol [Meperidine] Itching    Erythromycin Nausea Only    Gabapentin Nausea and Vomiting    Pcn [Penicillins] Hives    Percocet [Oxycodone-Acetaminophen] Itching    Pravastatin Nausea Only    Tetracycline Nausea Only    Voltaren [Diclofenac Sodium] Hives       ROS:   Review of Systems   Constitutional:  Negative for malaise/fatigue. Eyes:  Negative for blurred vision. Respiratory:  Negative for shortness of breath. Cardiovascular:  Negative for chest pain.      Objective: Visit Vitals  /78 (BP 1 Location: Left upper arm, BP Patient Position: Sitting, BP Cuff Size: Large adult long)   Pulse 71   Temp 98.3 °F (36.8 °C)   Resp 16   Ht 5' 4\" (1.626 m)   Wt 217 lb (98.4 kg)   SpO2 96%   BMI 37.25 kg/m²       Vitals and Nurse Documentation reviewed. Physical Exam  Constitutional:       General: She is not in acute distress. Appearance: She is obese. Cardiovascular:      Heart sounds: S1 normal and S2 normal. No murmur heard. No friction rub. No gallop. Pulmonary:      Effort: No respiratory distress. Breath sounds: Wheezing present. Skin:     General: Skin is warm and dry.    Psychiatric:         Mood and Affect: Mood and affect normal.

## 2022-11-28 NOTE — Clinical Note
Able to log 40mg of triamcinolone as given?  It may not be possible to chart as having given on 11/28--we'll have a few patients like this that were ordered triamcinolone the old way and now we're retroactively billing the new way

## 2022-11-28 NOTE — PATIENT INSTRUCTIONS
1) Continue to take 300mg of Allopurinol daily. 2) Continue to stretch your shoulders at home so you do not lose any range of motion. Message me anytime you want a referral to physical therapy. 3) You can take 650mg of Tylenol up to 3 times a day for joint pain. 4) Try to use a spica splint in order to immobilize your thumb to reduce pain. You can also use topicals, such as Biofreeze and salon pas on your thumb. 5) I am injecting your left shoulder today. 6) Check labs in 3-4 months. 7) Follow up in 4 months. Let me know if you experience more joint swelling or any new symptoms.

## 2022-12-01 RX ORDER — ATORVASTATIN CALCIUM 20 MG/1
TABLET, FILM COATED ORAL
Qty: 90 TABLET | Refills: 3 | Status: SHIPPED | OUTPATIENT
Start: 2022-12-01

## 2022-12-03 RX ORDER — HYDROCODONE BITARTRATE AND ACETAMINOPHEN 10; 325 MG/1; MG/1
1 TABLET ORAL
Qty: 90 TABLET | Refills: 0 | Status: SHIPPED | OUTPATIENT
Start: 2023-02-01 | End: 2023-03-03

## 2022-12-03 RX ORDER — HYDROCODONE BITARTRATE AND ACETAMINOPHEN 10; 325 MG/1; MG/1
1 TABLET ORAL
Qty: 90 TABLET | Refills: 0 | Status: SHIPPED | OUTPATIENT
Start: 2022-12-03 | End: 2022-12-03 | Stop reason: SDUPTHER

## 2022-12-03 RX ORDER — HYDROCODONE BITARTRATE AND ACETAMINOPHEN 10; 325 MG/1; MG/1
1 TABLET ORAL
Qty: 90 TABLET | Refills: 0 | Status: SHIPPED | OUTPATIENT
Start: 2023-01-02 | End: 2022-12-03 | Stop reason: SDUPTHER

## 2022-12-07 NOTE — PROGRESS NOTES
Tested positive for tramadol. Was not aware she had this. Where did it come from and when did she take?

## 2022-12-09 NOTE — PROGRESS NOTES
Two patient identifiers confirmed:  Patient stated that she received Tramadol from her dentist and the last time it was taken was about three weeks ago.

## 2022-12-13 NOTE — PROGRESS NOTES
Two patient identifiers confirmed:  Patient was made aware that she has to notify Helen anytime she is give pain medication from another provider because she is under contract.

## 2022-12-26 RX ORDER — TRIAMCINOLONE ACETONIDE 40 MG/ML
40 INJECTION, SUSPENSION INTRA-ARTICULAR; INTRAMUSCULAR ONCE
Status: COMPLETED | OUTPATIENT
Start: 2022-12-26 | End: 2022-12-27

## 2022-12-27 RX ADMIN — TRIAMCINOLONE ACETONIDE 40 MG: 40 INJECTION, SUSPENSION INTRA-ARTICULAR; INTRAMUSCULAR at 09:19

## 2023-01-24 DIAGNOSIS — G62.9 POLYNEUROPATHY: ICD-10-CM

## 2023-01-24 DIAGNOSIS — I10 ESSENTIAL HYPERTENSION WITH GOAL BLOOD PRESSURE LESS THAN 130/80: ICD-10-CM

## 2023-01-24 NOTE — TELEPHONE ENCOUNTER
Request for refill Pregabalin. Check . Thanks, Davey Lyn    Last Visit: 11/28/22 CARLOS EDUARDO Cervantes  Next Appointment: 2/28/23 CARLOS EDUARDO Cervantes  Previous Refill Encounter(s): 7/22/22 90 + 2    Requested Prescriptions     Pending Prescriptions Disp Refills    pregabalin (LYRICA) 200 mg capsule 90 Capsule 2     Sig: TAKE ONE CAPSULE BY MOUTH THREE TIMES A DAY     For Pharmacy Admin Tracking Only    Program: Medication Refill  CPA in place:   Recommendation Provided To:    Intervention Detail: New Rx: 1, reason: Patient Preference  Intervention Accepted By:   Noe Melvin Closed?:   Time Spent (min): 5

## 2023-01-26 RX ORDER — LOSARTAN POTASSIUM AND HYDROCHLOROTHIAZIDE 25; 100 MG/1; MG/1
TABLET ORAL
Qty: 90 TABLET | Refills: 3 | Status: SHIPPED | OUTPATIENT
Start: 2023-01-26

## 2023-01-26 RX ORDER — PREGABALIN 200 MG/1
CAPSULE ORAL
Qty: 90 CAPSULE | Refills: 2 | Status: SHIPPED | OUTPATIENT
Start: 2023-01-26

## 2023-02-13 ENCOUNTER — TELEPHONE (OUTPATIENT)
Dept: FAMILY MEDICINE CLINIC | Age: 73
End: 2023-02-13

## 2023-02-13 NOTE — TELEPHONE ENCOUNTER
Pharmacy Progress Note - Telephone Encounter    S/O: Ms. Bassett Antes 71 y.o. female contacted office/me via an inbound telephone call to discuss Rx assistance concern today. Verified patients identifiers (name & ) per HIPAA policy. - Pt received Rx assistance Jardiance but states she has not received Trulicity. She wants an update.    - Contacted Rx assistance program.  Pharmacy did not have an Rx for her. Contacted rep from program.  They stated that they can no longer provide Trulicity d/t short supply. - Rep states pt has been enrolled 23 thru 23. Order for Trulicity was never sent to the pharmacy. Sent while on the phone. Should take 5-7 business days to process. - Pt contacted and informed. - Patient endorses understanding to the provided information. All questions answered at this time. There are no discontinued medications. No orders of the defined types were placed in this encounter.         Emerson Mann, PharmD, BCGP, BCACP  Clinical Pharmacist Specialist        For Pharmacy Admin Tracking Only    Program: Medical Group  CPA in place: Yes  Recommendation Provided To: Patient/Caregiver: 1 via Telephone and Other: 1  Intervention Detail: Patient Access Assistance/Sample Provided  Intervention Accepted By: Patient/Caregiver: 1 and Other: 1  Time Spent (min):  75

## 2023-03-02 ENCOUNTER — HOSPITAL ENCOUNTER (OUTPATIENT)
Dept: CT IMAGING | Age: 73
Discharge: HOME | End: 2023-03-02
Attending: NURSE PRACTITIONER
Payer: MEDICARE

## 2023-03-02 ENCOUNTER — OFFICE VISIT (OUTPATIENT)
Dept: FAMILY MEDICINE CLINIC | Age: 73
End: 2023-03-02
Payer: MEDICARE

## 2023-03-02 VITALS
BODY MASS INDEX: 35.89 KG/M2 | RESPIRATION RATE: 16 BRPM | HEIGHT: 64 IN | DIASTOLIC BLOOD PRESSURE: 73 MMHG | OXYGEN SATURATION: 95 % | WEIGHT: 210.2 LBS | HEART RATE: 81 BPM | TEMPERATURE: 98.1 F | SYSTOLIC BLOOD PRESSURE: 116 MMHG

## 2023-03-02 DIAGNOSIS — K21.9 GASTROESOPHAGEAL REFLUX DISEASE WITHOUT ESOPHAGITIS: ICD-10-CM

## 2023-03-02 DIAGNOSIS — E66.01 SEVERE OBESITY (BMI 35.0-39.9) WITH COMORBIDITY (HCC): ICD-10-CM

## 2023-03-02 DIAGNOSIS — M06.09 SERONEGATIVE RHEUMATOID ARTHRITIS OF MULTIPLE SITES (HCC): ICD-10-CM

## 2023-03-02 DIAGNOSIS — J42 CHRONIC BRONCHITIS, UNSPECIFIED CHRONIC BRONCHITIS TYPE (HCC): ICD-10-CM

## 2023-03-02 DIAGNOSIS — E16.1 OTHER HYPOGLYCEMIA: ICD-10-CM

## 2023-03-02 DIAGNOSIS — R68.81 EARLY SATIETY: ICD-10-CM

## 2023-03-02 DIAGNOSIS — K74.3 PRIMARY BILIARY CHOLANGITIS (HCC): ICD-10-CM

## 2023-03-02 DIAGNOSIS — R11.0 NAUSEA: ICD-10-CM

## 2023-03-02 DIAGNOSIS — N18.31 STAGE 3A CHRONIC KIDNEY DISEASE (HCC): ICD-10-CM

## 2023-03-02 DIAGNOSIS — E11.42 TYPE 2 DIABETES MELLITUS WITH DIABETIC POLYNEUROPATHY, WITHOUT LONG-TERM CURRENT USE OF INSULIN (HCC): Primary | ICD-10-CM

## 2023-03-02 LAB — HBA1C MFR BLD HPLC: 8.2 %

## 2023-03-02 PROCEDURE — 1101F PT FALLS ASSESS-DOCD LE1/YR: CPT | Performed by: NURSE PRACTITIONER

## 2023-03-02 PROCEDURE — G8417 CALC BMI ABV UP PARAM F/U: HCPCS | Performed by: NURSE PRACTITIONER

## 2023-03-02 PROCEDURE — 2022F DILAT RTA XM EVC RTNOPTHY: CPT | Performed by: NURSE PRACTITIONER

## 2023-03-02 PROCEDURE — G8427 DOCREV CUR MEDS BY ELIG CLIN: HCPCS | Performed by: NURSE PRACTITIONER

## 2023-03-02 PROCEDURE — 1090F PRES/ABSN URINE INCON ASSESS: CPT | Performed by: NURSE PRACTITIONER

## 2023-03-02 PROCEDURE — 1123F ACP DISCUSS/DSCN MKR DOCD: CPT | Performed by: NURSE PRACTITIONER

## 2023-03-02 PROCEDURE — 3017F COLORECTAL CA SCREEN DOC REV: CPT | Performed by: NURSE PRACTITIONER

## 2023-03-02 PROCEDURE — G8536 NO DOC ELDER MAL SCRN: HCPCS | Performed by: NURSE PRACTITIONER

## 2023-03-02 PROCEDURE — 99214 OFFICE O/P EST MOD 30 MIN: CPT | Performed by: NURSE PRACTITIONER

## 2023-03-02 PROCEDURE — 3078F DIAST BP <80 MM HG: CPT | Performed by: NURSE PRACTITIONER

## 2023-03-02 PROCEDURE — 3052F HG A1C>EQUAL 8.0%<EQUAL 9.0%: CPT | Performed by: NURSE PRACTITIONER

## 2023-03-02 PROCEDURE — 74150 CT ABDOMEN W/O CONTRAST: CPT

## 2023-03-02 PROCEDURE — G8432 DEP SCR NOT DOC, RNG: HCPCS | Performed by: NURSE PRACTITIONER

## 2023-03-02 PROCEDURE — G8399 PT W/DXA RESULTS DOCUMENT: HCPCS | Performed by: NURSE PRACTITIONER

## 2023-03-02 PROCEDURE — 3074F SYST BP LT 130 MM HG: CPT | Performed by: NURSE PRACTITIONER

## 2023-03-02 PROCEDURE — 83036 HEMOGLOBIN GLYCOSYLATED A1C: CPT | Performed by: NURSE PRACTITIONER

## 2023-03-02 RX ORDER — ALLOPURINOL 300 MG/1
TABLET ORAL
COMMUNITY
Start: 2023-01-30

## 2023-03-02 NOTE — PROGRESS NOTES
Chief Complaint   Patient presents with    Follow-up       1. Have you been to the ER, urgent care clinic since your last visit? Hospitalized since your last visit? No    2. Have you seen or consulted any other health care providers outside of the [address] [place] [address] [place] since your last visit? Include any pap smears or colon screening. No    3. For patients over 45: Has the patient had a colonoscopy? Yes - no Care Gap present     If the patient is female:    4. For patients over 40: Has the patient had a mammogram? No, scheduled for some time in March    5. For patients over 21: Has the patient had a pap smear? NA - based on age    1 most recent PHQ Screens 3/2/2023   Little interest or pleasure in doing things Not at all   Feeling down, depressed, irritable, or hopeless Not at all   Total Score PHQ 2 0     Abuse Screening Questionnaire 3/2/2023   Do you ever feel afraid of your partner? N   Are you in a relationship with someone who physically or mentally threatens you? N   Is it safe for you to go home? Y       Fall Risk Assessment, last 12 mths 3/2/2023   Able to walk? Yes   Fall in past 12 months? 0   Do you feel unsteady? 0   Are you worried about falling -   Is TUG test greater than 12 seconds?  -   Is the gait abnormal? -   Number of falls in past 12 months -   Fall with injury? -       ADL Assessment 3/2/2023   Feeding yourself No Help Needed   Getting from bed to chair No Help Needed   Getting dressed No Help Needed   Bathing or showering No Help Needed   Walk across the room (includes cane/walker) No Help Needed   Using the telphone No Help Needed   Taking your medications No Help Needed   Preparing meals No Help Needed   Managing money (expenses/bills) No Help Needed   Moderately strenuous housework (laundry) No Help Needed   Shopping for personal items (toiletries/medicines) No Help Needed   Shopping for groceries No Help Needed   Driving No Help Needed   Climbing a flight of stairs No Help Needed Getting to places beyond walking distances No Help Needed     Health Maintenance Due   Topic Date Due    Hepatitis B Vaccine (1 of 3 - Risk 3-dose series) Never done    Shingles Vaccine (1 of 2) Never done    Pneumococcal 65+ years (3 - PPSV23 if available, else PCV20) 09/11/2020    COVID-19 Vaccine (2 - Pfizer series) 03/24/2021    Eye Exam Retinal or Dilated  04/16/2021    Flu Vaccine (1) 08/01/2022    Low dose CT lung screening  09/05/2022    Breast Cancer Screen Mammogram  02/17/2023

## 2023-03-14 DIAGNOSIS — G89.4 CHRONIC PAIN DISORDER: ICD-10-CM

## 2023-03-14 RX ORDER — HYDROCODONE BITARTRATE AND ACETAMINOPHEN 10; 325 MG/1; MG/1
1 TABLET ORAL
Qty: 90 TABLET | Refills: 0 | Status: SHIPPED | OUTPATIENT
Start: 2023-03-14 | End: 2023-04-13

## 2023-03-14 NOTE — TELEPHONE ENCOUNTER
----- Message from [facility] sent at 3/14/2023  9:34 AM EDT -----  Subject: Refill Request    QUESTIONS  Name of Medication? HYDROcodone-acetaminophen (NORCO)  mg tablet  Patient-reported dosage and instructions? 1 tablet every 8 hours  How many days do you have left? 0  Preferred Pharmacy? [facility] [id number]  Pharmacy phone number (if available)? [phone]  ---------------------------------------------------------------------------  --------------  Riky Martell INFO  What is the best way for the office to contact you? OK to leave message on   voicemail  Preferred Call Back Phone Number? [phone]  ---------------------------------------------------------------------------  --------------  SCRIPT ANSWERS  Relationship to Patient?  Self

## 2023-03-14 NOTE — TELEPHONE ENCOUNTER
Last Visit: 3/2/23 with CARLOS EDUARDO Cervantes  Next Appointment: 7/3/23 with CARLOS EDUARDO Cervantes  Previous Refill Encounter(s): 2/1/23 #90    Requested Prescriptions     Pending Prescriptions Disp Refills    HYDROcodone-acetaminophen (NORCO)  mg tablet 90 Tablet 0     Sig: Take 1 Tablet by mouth every eight (8) hours as needed for Pain for up to 30 days. Max Daily Amount: 3 Tablets. For Pharmacy Admin Tracking Only    Program: Medication Refill  CPA in place:   Recommendation Provided To:    Intervention Detail: New Rx: 1, reason: Patient Preference  Intervention Accepted By:   Dylan Eric Closed?:   Time Spent (min): 5

## 2023-03-15 ENCOUNTER — OFFICE VISIT (OUTPATIENT)
Dept: HEMATOLOGY | Age: 73
End: 2023-03-15
Payer: MEDICARE

## 2023-03-15 VITALS
HEIGHT: 64 IN | TEMPERATURE: 96.8 F | SYSTOLIC BLOOD PRESSURE: 135 MMHG | HEART RATE: 86 BPM | DIASTOLIC BLOOD PRESSURE: 68 MMHG | OXYGEN SATURATION: 94 % | BODY MASS INDEX: 35 KG/M2 | WEIGHT: 205 LBS

## 2023-03-15 DIAGNOSIS — K76.0 NAFL (NONALCOHOLIC FATTY LIVER): Primary | ICD-10-CM

## 2023-03-15 DIAGNOSIS — K74.3 PRIMARY BILIARY CHOLANGITIS (HCC): ICD-10-CM

## 2023-03-15 PROCEDURE — G8427 DOCREV CUR MEDS BY ELIG CLIN: HCPCS | Performed by: NURSE PRACTITIONER

## 2023-03-15 PROCEDURE — 3017F COLORECTAL CA SCREEN DOC REV: CPT | Performed by: NURSE PRACTITIONER

## 2023-03-15 PROCEDURE — 1090F PRES/ABSN URINE INCON ASSESS: CPT | Performed by: NURSE PRACTITIONER

## 2023-03-15 PROCEDURE — 99214 OFFICE O/P EST MOD 30 MIN: CPT | Performed by: NURSE PRACTITIONER

## 2023-03-15 PROCEDURE — G8536 NO DOC ELDER MAL SCRN: HCPCS | Performed by: NURSE PRACTITIONER

## 2023-03-15 PROCEDURE — 1123F ACP DISCUSS/DSCN MKR DOCD: CPT | Performed by: NURSE PRACTITIONER

## 2023-03-15 PROCEDURE — 3078F DIAST BP <80 MM HG: CPT | Performed by: NURSE PRACTITIONER

## 2023-03-15 PROCEDURE — G8399 PT W/DXA RESULTS DOCUMENT: HCPCS | Performed by: NURSE PRACTITIONER

## 2023-03-15 PROCEDURE — G8432 DEP SCR NOT DOC, RNG: HCPCS | Performed by: NURSE PRACTITIONER

## 2023-03-15 PROCEDURE — 3074F SYST BP LT 130 MM HG: CPT | Performed by: NURSE PRACTITIONER

## 2023-03-15 PROCEDURE — G8417 CALC BMI ABV UP PARAM F/U: HCPCS | Performed by: NURSE PRACTITIONER

## 2023-03-15 PROCEDURE — 1101F PT FALLS ASSESS-DOCD LE1/YR: CPT | Performed by: NURSE PRACTITIONER

## 2023-03-15 RX ORDER — AZITHROMYCIN 250 MG/1
TABLET, FILM COATED ORAL
COMMUNITY
Start: 2023-03-14

## 2023-03-15 RX ORDER — METHYLPREDNISOLONE 4 MG/1
TABLET ORAL
COMMUNITY
Start: 2023-03-14

## 2023-03-15 NOTE — PROGRESS NOTES
[facility] [address], MD, FACP, [place], [place]      NIA Dan, AGPCNP-BC   Layne Bravo, [facility]-AG   Aleisha Morales, [place] Elias FNP-ELIZABETH Win, PCNP-BC      [address]   at [address], [address]   [phone]   FAX: [address]   at [address], [address] [place] [address], [address]   [phone]   FAX: [phone]     Patient Care Team:  Alcides Cervantes NP as PCP - General (Nurse Practitioner)  Alcides Cervantes NP as PCP - [facility] Empaneled Provider  Eric Avlear MD (Pulmonary Disease)  Lorena Velazquez MD as Physician (Obstetrics & Gynecology)  Carlo Lomeli MD (Rheumatology Internal Medicine)      Problem List  Date Reviewed: 12/26/2022            Codes Class Noted    Controlled substance agreement signed ICD-10-CM: Z79.899  ICD-9-CM: V58.69  8/30/2022        Chronic renal disease, stage III ICD-10-CM: N18.30  ICD-9-CM: 585.3  5/17/2022        NAFL (nonalcoholic fatty liver) PVR-94-QO: K76.0  ICD-9-CM: 571.8  6/6/2021        Primary biliary cholangitis ([facility] ICD-10-CM: K74.3  ICD-9-CM: 571.6  3/1/2021        Hypertriglyceridemia ICD-10-CM: E78.1  ICD-9-CM: 272.1  12/17/2019        Idiopathic chronic gout, multiple sites, with tophus (tophi) ICD-10-CM: I6G.28P0  ICD-9-CM: 274.03  11/19/2019        CAD (coronary artery disease) ICD-10-CM: I25.10  ICD-9-CM: 414.00  5/7/2019        Lung nodule seen on imaging study ICD-10-CM: R91.1  ICD-9-CM: 793.11  5/7/2019    Overview Signed 5/7/2019  1:10 PM by Everton Keenan NP     Repeat in 2020.               Long-term use of immunosuppressant medication ICD-10-CM: Z79.60  ICD-9-CM: V58.69  11/27/2018        Seronegative rheumatoid arthritis of multiple sites [facility]) ICD-10-CM: M06.09  ICD-9-CM: 714.0  10/15/2018        MGUS (monoclonal gammopathy of unknown significance) ICD-10-CM: D47.2  ICD-9-CM: 273.1  10/15/2018        Vitamin D deficiency ICD-10-CM: E55.9  ICD-9-CM: 268.9  10/15/2018        Essential hypertension ICD-10-CM: I10  ICD-9-CM: 401.9  9/11/2018        Type 2 diabetes mellitus with diabetic polyneuropathy, without long-term current use of insulin ([facility] ICD-10-CM: E11.42  ICD-9-CM: 250.60, 357.2  12/21/2017        Chronic pain ICD-10-CM: G89.29  ICD-9-CM: 338.29  7/31/2017    Overview Signed 7/31/2017 11:11 AM by Princess Parson     bilateral hip arthritis, chronic low back pain             Former smoker ICD-10-CM: Z87.891  ICD-9-CM: V15.82  10/24/2016        Chronic bronchitis ([facility] ICD-10-CM: T50  ICD-9-CM: 491.9  9/28/2016        PMR (polymyalgia rheumatica) ([facility] ICD-10-CM: M35.3  ICD-9-CM: 083  5/18/2016        Osteoarthritis of hip ICD-10-CM: M16.9  ICD-9-CM: 715.95  Unknown    Overview Signed 1/29/2016 12:41 PM by Princess Parson     right             Hypercholesterolemia ICD-10-CM: E78.00  ICD-9-CM: 272.0  Unknown           Merle Delgado [facility] for management of Primary Biliary Cholangitis and NAFL (non-alocholic fatty liver). elevated liver enzymes. The active problem list, all pertinent past medical history, medications, radiologic findings and laboratory findings related to the liver disorder were reviewed and discussed with the patient. The patient is a 71 y.o. female who was found to have elevated alkaline phosphatase in 10/2020. Serologic evaluation for markers of chronic liver disease was positive for ASMA. Assessment of liver fibrosis was performed with Fibroscan in 3/2021. The result was 24 kPa suggesting cirrhosis. The CAP score of 393 suggested this may be due to fatty liver. The patient underwent a liver biopsy in 5/2021 by Dr. Joey Castellon.  The results demonstrated bile duct changes consistent with PBC, 25-33% steatosis without ballooning consistent with NAFL and stage 1 portal fibrosis. The patient was prescribed AMINA 5/2021 and this is the first office visit since 5/2021. Since the last office visit the patient did not start AMINA. The liver enzymes improved without treatment. The ALP was 1000 in 2020 and is now down to 129. There was a recent episode of colitis and she is scheduled to see GI. The patient has the following symptoms which are thought to be due to the liver disease:  fatigue,     The patient is not currently experiencing the following symptoms of liver disease:  pain in the right side over the liver,     The patient mild has limitations in functional activities which can be attributed to other medical problems that are not related to the liver disease. ASSESSMENT AND PLAN:  Primary Biliary Cholangitis  The diagnosis is based upon liver biopsy, and an elevation in ALP. Serology is positive for ASMA, AMA is negative. A liver biopsy was performed in 5/2021. This demonstrates bile duct changes consistent with PBC and   stage 1 portal fibrosis. Assessment of liver fibrosis was performed with Fibroscan in 3/2021. The result was 24 kPa suggesting cirrhosis. The CAP score of 393 suggested this may be due to fatty liver. AMINA was prescribed 5/2021 but the patient did not take the medication. The ALP decreased from 1000 in 2020 to 129 on 11/2022. Have performed laboratory testing to monitor liver function and degree of liver injury. This included BMP, hepatic panel, and CBC with platelet count. Laboratory testing ordered by outside provider from 11/02/2022 reviewed in detail. Follow-up testing ordered today. The liver transaminases are normal. The ALP has improved. The liver function is normal. The platelet count is normal.     With a near normal ALP will not start AMINA unless the ALP increased with laboratory testing performed today.  It is possible the patient does not have PBC. Imaging was performed with CT of Abdomen 3/2023 which noted no mass in the liver but did not comment on echogenicity. Benign steatosis/NAFL  The diagnosis is based upon liver biopsy, Fiboscan CAP score, features of metabolic syndrome,     A liver biopsy performed in 5/2021 shows 25-33% steatosis without lobular inflammation or ballooning. Fibroscan CAP score is 393 consistent with steatosis    NAFL is a benign form of fatty liver disease and not thought to progress to fibrosis or cirrhosis. Counseling for diet and weight loss in patients with confirmed or suspected NAFLD  The patient was counseled regarding diet and exercise to achieve weight loss. The best diet for patients with fatty liver is one very low in carbohydrates and enriched with protein. The patient was told not to consume any food products and drinks containing fructose as this enhances hepatic fat synthesis. The patient was prescribed Trulicity and weight is down 12 lbs since 11/2022. Discussed dietary changes of limiting starchy carbohydrates and sweets. Screening for Hepatocellular Carcinoma  HCC screening is not necessary if the patient has no evidence of cirrhosis. Treatment of other medical problems in patients with chronic liver disease  There are no contraindications for the patient to take most medications that are necessary for treatment of other medical issues. Counseling for alcohol in patients with chronic liver disease  The patient was counseled regarding alcohol consumption and the effect of alcohol on chronic liver disease. The patient does not consume any significant amount of alcohol. Vaccinations   Vaccination for viral hepatitis A and B is not needed. The patient has serologic evidence of prior exposure or vaccination with immunity. Routine vaccinations against other bacterial and viral agents can be performed as indicated.   Annual flu vaccination should be administered if indicated. ALLERGIES  Allergies   Allergen Reactions    Augmentin [Amoxicillin-Pot Clavulanate] Diarrhea    Bactrim [Sulfamethoxazole-Trimethoprim] Nausea Only    Biaxin [Clarithromycin] Nausea Only    Demerol [Meperidine] Itching    Erythromycin Nausea Only    Gabapentin Nausea and Vomiting    Pcn [Penicillins] Hives    Percocet [Oxycodone-Acetaminophen] Itching    Pravastatin Nausea Only    Tetracycline Nausea Only    Voltaren [Diclofenac Sodium] Hives       MEDICATIONS  Current Outpatient Medications   Medication Sig    dulaglutide 3 mg/0.5 mL pnij by SubCUTAneous route. azithromycin (ZITHROMAX) 250 mg tablet     methylPREDNISolone (MEDROL DOSEPACK) 4 mg tablet     HYDROcodone-acetaminophen (NORCO)  mg tablet Take 1 Tablet by mouth every eight (8) hours as needed for Pain for up to 30 days. Max Daily Amount: 3 Tablets. allopurinoL (ZYLOPRIM) 300 mg tablet     pregabalin (LYRICA) 200 mg capsule TAKE ONE CAPSULE BY MOUTH THREE TIMES A DAY    losartan-hydroCHLOROthiazide (HYZAAR) 100-25 mg per tablet TAKE 1 TABLET EVERY DAY    atorvastatin (LIPITOR) 20 mg tablet TAKE 1 TABLET EVERY DAY    ondansetron hcl (ZOFRAN) 4 mg tablet Take 1 Tablet by mouth every eight (8) hours as needed for Nausea or Vomiting. Insulin Needles, Disposable, 32 gauge x 5/32\" ndle Use as directed once weekly with ozempic    metFORMIN (GLUCOPHAGE) 500 mg tablet TAKE 2 TABLETS TWICE DAILY WITH A MEAL    empagliflozin (JARDIANCE) 25 mg tablet Take 1 Tablet by mouth daily. glipiZIDE SR (GLUCOTROL XL) 2.5 mg CR tablet TAKE 1 TABLET EVERY DAY    dilTIAZem ER (TIAZAC) 420 mg capsule TAKE 1 CAPSULE EVERY DAY    potassium chloride (KLOR-CON M10) 10 mEq tablet TAKE 1 TABLET EVERY DAY    esomeprazole (NEXIUM) 40 mg capsule TAKE 1 CAPSULE BY MOUTH DAILY FOR GASTROESOPHAGEAL REFLUX DISEASE, HEMORRHAGIC GASTRITIS    multivit-min/iron/folic/lutein (CENTRUM SILVER WOMEN PO) Take 1 Tab by mouth daily.     cholecalciferol (VITAMIN D3) 25 mcg (1,000 unit) cap Take 1,000 Units by mouth daily. ascorbic acid, vitamin C, (VITAMIN C) 1,000 mg tablet Take 1,000 mg by mouth daily. cyanocobalamin, vitamin B-12, 5,000 mcg cap Take 5,000 mcg by mouth daily. fluticasone propion-salmeteroL (ADVAIR/WIXELA) 250-50 mcg/dose diskus inhaler Take 1 Puff by inhalation as needed. Lactobacillus acidophilus (PROBIOTIC PO) Take  by mouth. Takes one po once daily. glucose blood VI test strips (blood glucose test) strip Use to check blood sugar up to 3 times daily. E11.65    Blood-Glucose Meter monitoring kit Use to check blood sugar up to 3 times daily. E11.65    lancets misc Use to check blood sugar up to 3 times daily. E11.65    tiotropium (Spiriva with HandiHaler) 18 mcg inhalation capsule Take 1 Cap by inhalation daily. albuterol (PROVENTIL VENTOLIN) 2.5 mg /3 mL (0.083 %) nebu 3 mL by Nebulization route every six (6) hours as needed for Wheezing. albuterol (PROVENTIL HFA, VENTOLIN HFA, PROAIR HFA) 90 mcg/actuation inhaler Take 2 Puffs by inhalation every six (6) hours as needed for Wheezing or Shortness of Breath. aspirin delayed-release 81 mg tablet Take 81 mg by mouth daily. Lancets (ACCU-CHEK SOFTCLIX LANCETS) misc Test blood sugar twice daily. DX: E11.9. Substitute supply brand accepted by insurance. alcohol swabs (ALCOHOL WIPES) padm Test blood sugar twice daily. DX: E11.9. Substitute supply brand accepted by insurance. Cetirizine 10 mg cap Take 10 mg by mouth daily. No current facility-administered medications for this visit. SYSTEM REVIEW NOT RELATED TO LIVER DISEASE OR REVIEWED ABOVE:  Constitution systems: Negative for fever, chills, weight gain, weight loss. Eyes: Negative for visual changes. ENT: Negative for sore throat, painful swallowing. Respiratory: Negative for cough, hemoptysis. Positive SOB, Wheezing. Smoker   Cardiology: Negative for chest pain, palpitations.   GI:  Negative for constipation or diarrhea. : Negative for urinary frequency, dysuria, hematuria, nocturia. Skin: Negative for rash. Hematology: Negative for easy bruising, blood clots. Musculo-skeletal: Myalgias, arthralgias, chronic pain. Neurologic: Negative for headaches, dizziness, vertigo, memory problems not related to HE. Psychology: Negative for anxiety, depression. FAMILY HISTORY:  The father  of stomach cancer. The mother  of CVA. There is no family history of liver disease. SOCIAL HISTORY:  The patient is . The patient has 3 children,   The patient currently smokes 1/2 pack of tobacco daily. The patient has never consumed significant amounts of alcohol. The patient used to work as CNA. PHYSICAL EXAMINATION:  Visit Vitals  /68 (BP 1 Location: Left upper arm, BP Patient Position: Sitting, BP Cuff Size: Adult)   Pulse 86   Temp 96.8 °F (36 °C) (Temporal)   Ht 5' 4\" (1.626 m)   Wt 205 lb (93 kg)   SpO2 94%   BMI 35.19 kg/m²       General: No acute distress. Eyes: Sclera anicteric. ENT: No oral lesions. Thyroid normal.  Nodes: No adenopathy. Skin: No spider angiomata. No jaundice. No palmar erythema. Respiratory: Wheezing, coarse rhonchi. SOB   Cardiovascular: Regular heart rate. No murmurs. No JVD. Abdomen: Soft non-tender, liver size normal to percussion/palpation. Spleen not palpable. No obvious ascites. Extremities: No edema. No muscle wasting. No gross arthritic changes. Neurologic: Alert and oriented. Cranial nerves grossly intact. No asterixis.     LABORATORY STUDIES:  Liver [place] of [address] Units 2022   WBC 3.4 - 10.8 x10E3/uL 9.5    ANC 1.4 - 7.0 x10E3/uL 5.9    HGB 11.1 - 15.9 g/dL 14.1     - 450 x10E3/uL 203    INR 0.9 - 1.1       AST 0 - 40 IU/L 14 18   ALT 0 - 32 IU/L 11 19   Alk Phos 44 - 121 IU/L 129 (H) 125 (H)   Bili, Total 0.0 - 1.2 mg/dL 0.2 0.3   Bili, Direct 0.0 - 0.2 MG/DL     Albumin 3.7 - 4.7 g/dL 4.3 3.5   BUN 8 - 27 mg/dL 25 27 (H)   Creat 0.57 - 1.00 mg/dL 1.40 (H) 1.24 (H)   Na 134 - 144 mmol/L 141 140   K 3.5 - 5.2 mmol/L 4.0 4.1   Cl 96 - 106 mmol/L 101 107   CO2 20 - 29 mmol/L 23 27   Glucose 70 - 99 mg/dL 292 (H) 99     Liver [place] of [address] [place] [address] Ref Rng & Units 6/17/2022   WBC 3.4 - 10.8 x10E3/uL 9.9   ANC 1.4 - 7.0 x10E3/uL 6.5   HGB 11.1 - 15.9 g/dL 13.0    - 450 x10E3/uL 259   INR 0.9 - 1.1      AST 0 - 40 IU/L 15   ALT 0 - 32 IU/L 16   Alk Phos 44 - 121 IU/L 221 (H)   Bili, Total 0.0 - 1.2 mg/dL <0.2   Bili, Direct 0.0 - 0.2 MG/DL    Albumin 3.7 - 4.7 g/dL 4.1   BUN 8 - 27 mg/dL 19   Creat 0.57 - 1.00 mg/dL 1.27 (H)   Na 134 - 144 mmol/L 143   K 3.5 - 5.2 mmol/L 4.0   Cl 96 - 106 mmol/L 102   CO2 20 - 29 mmol/L 23   Glucose 70 - 99 mg/dL 233 (H)     Laboratory testing from 11/02/2022 reviewed in detail. Additional testing included to evaluate progression or regression of disease. Laboratory testing results from today will be communicated by mail. SEROLOGIES:  Serologies Latest Ref Rng & Units 2/23/2021 10/7/2020   Hep A Ab, Total Negative   Positive (A)    Hep B Surface Ag Index  <0.10   Hep B Surface Ag Interp NEG    Negative   Hep C Ab NR    NONREACTIVE   Ferritin 8 - 252 NG/ML 19    Iron % Saturation 20 - 50 % 13 (L)    [facility], IFA  Negative    C-ANCA Neg:<1:20 titer <1:20    P-ANCA Neg:<1:20 titer <1:20    ANCA Neg:<1:20 titer <1:20    ASMCA 0 - 19 Units 20 (H)    M2 Ab 0.0 - 20.0 Units <20.0    Ceruloplasmin 19.0 - 39.0 mg/dL 36.8    Alpha-1 antitrypsin level 101 - 187 mg/dL 181      Serologies Latest Ref Rng & Units 6/27/2018   Hep B Surface Ag Negative Negative   Hep B Core Ab, Total Negative Positive (A)   Hep B Surface AB QL  Non Reactive   Hep C Ab 0.0 - 0.9 s/co ratio <0.1     LIVER HISTOLOGY:  3/2021. [facility]. EkPa was 24.3. IQR/med 267%. . The results suggested a fibrosis level of F4.   However, the high IQR% suggests that the measurement may not be reliable. 2021. Slides reviewed by MLS. Moderate inflammation in only some portal tracts. NO interface inflammation. Bile duct injury, distortion, proliferation. No florid bile duct lesions. Periductal granuloma. 25-33% macrovesicualr and micovesicular steatosis, no lobular inflammation, no ballooning, Stage 1 fibrosis. GEORGI (110). ENDOSCOPIC PROCEDURES:  Not available or performed    RADIOLOGY:  10/2020. CT scan abdomen with IV contrast.  Changes consistent with fatty liver. No liver mass lesions. Normal spleen. No ascites. 2020. Ultrasound of liver. Echogenic consistent with fatty liver. No liver mass lesions. No dilated bile ducts. No ascites. 2021. CTA. Hepatic steatosis. Nodular contour suggests early cirrhosis. No liver mass. 3/2023. CT of Abdomen. No liver mass or lesion. No stones or ductal dilatation. OTHER TESTIN2019. DEXA bone density. Normal.    FOLLOW-UP:  All of the issues listed above in the Assessment and Plan were discussed with the patient. All questions were answered. The patient expressed a clear understanding of the above. [address] [address] in 4 months for a Fibroscan. .  [facility] of [address] [address] [place], [address] [facility] [address].  [facility]

## 2023-03-15 NOTE — PROGRESS NOTES
Identified pt with two pt identifiers(name and ). Reviewed record in preparation for visit and have obtained necessary documentation. Chief Complaint   Patient presents with    Follow-up      Vitals:    03/15/23 1516   BP: 135/68   Pulse: 86   Temp: 96.8 °F (36 °C)   TempSrc: Temporal   SpO2: 94%   Weight: 205 lb (93 kg)   Height: 5' 4\" (1.626 m)   PainSc:   6   PainLoc: Knee       Health Maintenance Review: Patient reminded of \"due or due soon\" health maintenance. I have asked the patient to contact his/her primary care provider (PCP) for follow-up on his/her health maintenance. Coordination of Care Questionnaire:  :   1) Have you been to an emergency room, urgent care, or hospitalized since your last visit? If yes, where when, and reason for visit? no       2. Have seen or consulted any other health care provider since your last visit? If yes, where when, and reason for visit? Yes per pt a 3 month follow up visit      Patient is accompanied by self I have received verbal consent from Matt Guillen to discuss any/all medical information while they are present in the room.

## 2023-03-16 LAB
ALBUMIN SERPL-MCNC: 3.9 G/DL (ref 3.5–5)
ALBUMIN/GLOB SERPL: 1 (ref 1.1–2.2)
ALP SERPL-CCNC: 128 U/L (ref 45–117)
ALT SERPL-CCNC: 32 U/L (ref 12–78)
ANION GAP SERPL CALC-SCNC: 5 MMOL/L (ref 5–15)
AST SERPL-CCNC: 21 U/L (ref 15–37)
BASOPHILS # BLD: 0.1 K/UL (ref 0–0.1)
BASOPHILS NFR BLD: 1 % (ref 0–1)
BILIRUB DIRECT SERPL-MCNC: 0.2 MG/DL (ref 0–0.2)
BILIRUB SERPL-MCNC: 0.2 MG/DL (ref 0.2–1)
BUN SERPL-MCNC: 22 MG/DL (ref 6–20)
BUN/CREAT SERPL: 16 (ref 12–20)
CALCIUM SERPL-MCNC: 9.4 MG/DL (ref 8.5–10.1)
CHLORIDE SERPL-SCNC: 104 MMOL/L (ref 97–108)
CO2 SERPL-SCNC: 27 MMOL/L (ref 21–32)
CREAT SERPL-MCNC: 1.41 MG/DL (ref 0.55–1.02)
DIFFERENTIAL METHOD BLD: ABNORMAL
EOSINOPHIL # BLD: 0.1 K/UL (ref 0–0.4)
EOSINOPHIL NFR BLD: 1 % (ref 0–7)
ERYTHROCYTE [DISTWIDTH] IN BLOOD BY AUTOMATED COUNT: 15.5 % (ref 11.5–14.5)
GLOBULIN SER CALC-MCNC: 4 G/DL (ref 2–4)
GLUCOSE SERPL-MCNC: 161 MG/DL (ref 65–100)
HCT VFR BLD AUTO: 49.7 % (ref 35–47)
HGB BLD-MCNC: 15.5 G/DL (ref 11.5–16)
IMM GRANULOCYTES # BLD AUTO: 0 K/UL (ref 0–0.04)
IMM GRANULOCYTES NFR BLD AUTO: 0 % (ref 0–0.5)
LYMPHOCYTES # BLD: 1.6 K/UL (ref 0.8–3.5)
LYMPHOCYTES NFR BLD: 17 % (ref 12–49)
MCH RBC QN AUTO: 29.3 PG (ref 26–34)
MCHC RBC AUTO-ENTMCNC: 31.2 G/DL (ref 30–36.5)
MCV RBC AUTO: 94 FL (ref 80–99)
MONOCYTES # BLD: 0.9 K/UL (ref 0–1)
MONOCYTES NFR BLD: 10 % (ref 5–13)
NEUTS SEG # BLD: 6.5 K/UL (ref 1.8–8)
NEUTS SEG NFR BLD: 72 % (ref 32–75)
NRBC # BLD: 0 K/UL (ref 0–0.01)
NRBC BLD-RTO: 0 PER 100 WBC
PLATELET # BLD AUTO: 216 K/UL (ref 150–400)
PMV BLD AUTO: 12.5 FL (ref 8.9–12.9)
POTASSIUM SERPL-SCNC: 3.7 MMOL/L (ref 3.5–5.1)
PROT SERPL-MCNC: 7.9 G/DL (ref 6.4–8.2)
RBC # BLD AUTO: 5.29 M/UL (ref 3.8–5.2)
SODIUM SERPL-SCNC: 136 MMOL/L (ref 136–145)
WBC # BLD AUTO: 9.1 K/UL (ref 3.6–11)

## 2023-03-17 LAB
INR PPP: 1.1 (ref 0.9–1.1)
PROTHROMBIN TIME: 11.1 SEC (ref 9–11.1)

## 2023-03-23 NOTE — PROGRESS NOTES
Assessment/Plan:     Diagnoses and all orders for this visit:    1. Type 2 diabetes mellitus with diabetic polyneuropathy, without long-term current use of insulin (HCC)  -     AMB POC HEMOGLOBIN A1C    2. Severe obesity (BMI 35.0-39. 9) with comorbidity ([facility]    3. Seronegative rheumatoid arthritis of multiple sites [facility])  Assessment & Plan:   monitored by specialist. No acute findings meriting change in the plan      4. Primary biliary cholangitis (HCC)  Assessment & Plan:   monitored by specialist. No acute findings meriting change in the plan    Orders:  -     CT ABD WO CONT; Future  -     CT ABD PELV WO CONT; Future    5. Chronic bronchitis, unspecified chronic bronchitis type ([facility]  Assessment & Plan:   monitored by specialist. No acute findings meriting change in the plan      6. Stage 3a chronic kidney disease ([facility]  Assessment & Plan:   monitored by specialist. No acute findings meriting change in the plan    Orders:  -     CT ABD PELV WO CONT; Future    7. Gastroesophageal reflux disease without esophagitis  -     CT ABD WO CONT; Future  -     CT ABD PELV WO CONT; Future    8. Nausea  -     CT ABD WO CONT; Future  -     CT ABD PELV WO CONT; Future    9. Early satiety  -     CT ABD WO CONT; Future  -     CT ABD PELV WO CONT; Future    10. Other hypoglycemia   -     CT ABD WO CONT; Future  -     CT ABD PELV WO CONT; Future       Will proceed with CT as above. Etiology unclear. ER if symptoms worsen. Follow-up and Dispositions    Return if symptoms worsen or fail to improve. Discussed expected course/resolution/complications of diagnosis in detail with patient. Medication risks/benefits/costs/interactions/alternatives discussed with patient. Pt was given after visit summary which includes diagnoses, current medications & vitals.    Pt expressed understanding with the diagnosis and plan          Subjective:      Shana Pierce is a 71 y.o. female who presents for had concerns including Follow-up. She presents for a one week history of nausea, vomiting, and abdominal pain. History of biliary cholangitis and NAFL. Current daily tobacco user. Symptoms are stable over time. This is her first evaluation. She is tolerating scant fluids. She has not attempted otc treatment. Patient Active Problem List   Diagnosis Code    Hypercholesterolemia E78.00    Osteoarthritis of hip M16.9    PMR (polymyalgia rheumatica) (HCC) M35.3    Chronic bronchitis ([facility] Aleene Oar    Former smoker Z87.891    Chronic pain G89.29    Type 2 diabetes mellitus with diabetic polyneuropathy, without long-term current use of insulin ([facility] E11.42    Essential hypertension I10    Seronegative rheumatoid arthritis of multiple sites ([facility] M06.09    MGUS (monoclonal gammopathy of unknown significance) D47.2    Vitamin D deficiency E55.9    Long-term use of immunosuppressant medication Z79.60    CAD (coronary artery disease) I25.10    Lung nodule seen on imaging study R91.1    Idiopathic chronic gout, multiple sites, with tophus (tophi) M1A.09X1    Hypertriglyceridemia E78.1    Primary biliary cholangitis (HCC) K74.3    NAFL (nonalcoholic fatty liver) K83.3    Chronic renal disease, stage III N18.30    Controlled substance agreement signed Z79.899       Current Outpatient Medications   Medication Sig Dispense Refill    allopurinoL (ZYLOPRIM) 300 mg tablet       pregabalin (LYRICA) 200 mg capsule TAKE ONE CAPSULE BY MOUTH THREE TIMES A DAY 90 Capsule 2    losartan-hydroCHLOROthiazide (HYZAAR) 100-25 mg per tablet TAKE 1 TABLET EVERY DAY 90 Tablet 3    atorvastatin (LIPITOR) 20 mg tablet TAKE 1 TABLET EVERY DAY 90 Tablet 3    ondansetron hcl (ZOFRAN) 4 mg tablet Take 1 Tablet by mouth every eight (8) hours as needed for Nausea or Vomiting.  30 Tablet 1    Insulin Needles, Disposable, 32 gauge x 5/32\" ndle Use as directed once weekly with ozempic 50 Pen Needle 1    metFORMIN (GLUCOPHAGE) 500 mg tablet TAKE 2 TABLETS TWICE DAILY WITH A MEAL 360 Tablet 1    empagliflozin (JARDIANCE) 25 mg tablet Take 1 Tablet by mouth daily. 90 Tablet 3    glipiZIDE SR (GLUCOTROL XL) 2.5 mg CR tablet TAKE 1 TABLET EVERY DAY 90 Tablet 3    dilTIAZem ER (TIAZAC) 420 mg capsule TAKE 1 CAPSULE EVERY DAY 90 Capsule 3    potassium chloride (KLOR-CON M10) 10 mEq tablet TAKE 1 TABLET EVERY DAY 90 Tablet 3    esomeprazole (NEXIUM) 40 mg capsule TAKE 1 CAPSULE BY MOUTH DAILY FOR GASTROESOPHAGEAL REFLUX DISEASE, HEMORRHAGIC GASTRITIS 90 Capsule 1    multivit-min/iron/folic/lutein (CENTRUM SILVER WOMEN PO) Take 1 Tab by mouth daily. cholecalciferol (VITAMIN D3) 25 mcg (1,000 unit) cap Take 1,000 Units by mouth daily. ascorbic acid, vitamin C, (VITAMIN C) 1,000 mg tablet Take 1,000 mg by mouth daily. cyanocobalamin, vitamin B-12, 5,000 mcg cap Take 5,000 mcg by mouth daily. fluticasone propion-salmeteroL (ADVAIR/WIXELA) 250-50 mcg/dose diskus inhaler Take 1 Puff by inhalation as needed. Lactobacillus acidophilus (PROBIOTIC PO) Take  by mouth. Takes one po once daily. glucose blood VI test strips (blood glucose test) strip Use to check blood sugar up to 3 times daily. E11.65 100 Strip 11    Blood-Glucose Meter monitoring kit Use to check blood sugar up to 3 times daily. E11.65 1 Kit 0    lancets misc Use to check blood sugar up to 3 times daily. E11.65 1 Each 11    tiotropium (Spiriva with HandiHaler) 18 mcg inhalation capsule Take 1 Cap by inhalation daily. 90 Cap 0    albuterol (PROVENTIL VENTOLIN) 2.5 mg /3 mL (0.083 %) nebu 3 mL by Nebulization route every six (6) hours as needed for Wheezing. 60 Each 0    albuterol (PROVENTIL HFA, VENTOLIN HFA, PROAIR HFA) 90 mcg/actuation inhaler Take 2 Puffs by inhalation every six (6) hours as needed for Wheezing or Shortness of Breath. 1 Inhaler 1    aspirin delayed-release 81 mg tablet Take 81 mg by mouth daily. Lancets (ACCU-CHEK SOFTCLIX LANCETS) misc Test blood sugar twice daily. DX: E11.9. Substitute supply brand accepted by insurance. 1 Each 11    alcohol swabs (ALCOHOL WIPES) padm Test blood sugar twice daily. DX: E11.9. Substitute supply brand accepted by insurance. 100 Pad 1    Cetirizine 10 mg cap Take 10 mg by mouth daily. azithromycin (ZITHROMAX) 250 mg tablet       methylPREDNISolone (MEDROL DOSEPACK) 4 mg tablet       dulaglutide 3 mg/0.5 mL pnij by SubCUTAneous route. HYDROcodone-acetaminophen (NORCO)  mg tablet Take 1 Tablet by mouth every eight (8) hours as needed for Pain for up to 30 days. Max Daily Amount: 3 Tablets. 90 Tablet 0       Allergies   Allergen Reactions    Augmentin [Amoxicillin-Pot Clavulanate] Diarrhea    Bactrim [Sulfamethoxazole-Trimethoprim] Nausea Only    Biaxin [Clarithromycin] Nausea Only    Demerol [Meperidine] Itching    Erythromycin Nausea Only    Gabapentin Nausea and Vomiting    Pcn [Penicillins] Hives    Percocet [Oxycodone-Acetaminophen] Itching    Pravastatin Nausea Only    Tetracycline Nausea Only    Voltaren [Diclofenac Sodium] Hives       ROS:   Review of Systems   Constitutional:  Negative for malaise/fatigue. Eyes:  Negative for blurred vision. Respiratory:  Negative for shortness of breath. Cardiovascular:  Negative for chest pain. Gastrointestinal:  Positive for abdominal pain, nausea and vomiting. Objective:   Visit Vitals  /73 (BP 1 Location: Left upper arm, BP Patient Position: Sitting, BP Cuff Size: Large adult long)   Pulse 81   Temp 98.1 °F (36.7 °C)   Resp 16   Ht 5' 4\" (1.626 m)   Wt 210 lb 3.2 oz (95.3 kg)   SpO2 95%   BMI 36.08 kg/m²       Vitals and Nurse Documentation reviewed. Physical Exam  Constitutional:       General: She is not in acute distress. Appearance: She is obese. Cardiovascular:      Heart sounds: S1 normal and S2 normal. No murmur heard. No friction rub. No gallop. Pulmonary:      Effort: No respiratory distress. Breath sounds: Wheezing present.    Abdominal: General: Abdomen is protuberant. Bowel sounds are normal.      Palpations: Abdomen is soft. Tenderness: There is no abdominal tenderness. Skin:     General: Skin is warm and dry.    Psychiatric:         Mood and Affect: Mood and affect normal.

## 2023-03-29 DIAGNOSIS — E11.42 TYPE 2 DIABETES MELLITUS WITH DIABETIC POLYNEUROPATHY, WITHOUT LONG-TERM CURRENT USE OF INSULIN (HCC): ICD-10-CM

## 2023-03-30 RX ORDER — GLIPIZIDE 2.5 MG/1
TABLET, EXTENDED RELEASE ORAL
Qty: 90 TABLET | Refills: 3 | Status: SHIPPED | OUTPATIENT
Start: 2023-03-30

## 2023-04-04 NOTE — ASSESSMENT & PLAN NOTE
monitored by specialist. No acute findings meriting change in the plan Please follow up with your PCP.  Please follow up with Rheumatology.  Please take your medications as prescribed.  Please continue with Physical therapy.

## 2023-04-07 ENCOUNTER — OFFICE VISIT (OUTPATIENT)
Dept: RHEUMATOLOGY | Age: 73
End: 2023-04-07
Payer: MEDICARE

## 2023-04-07 VITALS
BODY MASS INDEX: 34.84 KG/M2 | DIASTOLIC BLOOD PRESSURE: 64 MMHG | RESPIRATION RATE: 16 BRPM | HEART RATE: 74 BPM | TEMPERATURE: 98.6 F | OXYGEN SATURATION: 93 % | WEIGHT: 203 LBS | SYSTOLIC BLOOD PRESSURE: 133 MMHG

## 2023-04-07 DIAGNOSIS — M1A.39X0 CHRONIC GOUT DUE TO RENAL IMPAIRMENT OF MULTIPLE SITES WITHOUT TOPHUS: ICD-10-CM

## 2023-04-07 DIAGNOSIS — M19.012 PRIMARY OSTEOARTHRITIS, LEFT SHOULDER: Primary | ICD-10-CM

## 2023-04-07 DIAGNOSIS — M19.022 PRIMARY OSTEOARTHRITIS, LEFT ELBOW: ICD-10-CM

## 2023-04-07 PROCEDURE — 99214 OFFICE O/P EST MOD 30 MIN: CPT | Performed by: INTERNAL MEDICINE

## 2023-04-07 PROCEDURE — G8510 SCR DEP NEG, NO PLAN REQD: HCPCS | Performed by: INTERNAL MEDICINE

## 2023-04-07 PROCEDURE — G9899 SCRN MAM PERF RSLTS DOC: HCPCS | Performed by: INTERNAL MEDICINE

## 2023-04-07 PROCEDURE — 3017F COLORECTAL CA SCREEN DOC REV: CPT | Performed by: INTERNAL MEDICINE

## 2023-04-07 PROCEDURE — 1090F PRES/ABSN URINE INCON ASSESS: CPT | Performed by: INTERNAL MEDICINE

## 2023-04-07 PROCEDURE — G8399 PT W/DXA RESULTS DOCUMENT: HCPCS | Performed by: INTERNAL MEDICINE

## 2023-04-07 PROCEDURE — G8417 CALC BMI ABV UP PARAM F/U: HCPCS | Performed by: INTERNAL MEDICINE

## 2023-04-07 PROCEDURE — 3074F SYST BP LT 130 MM HG: CPT | Performed by: INTERNAL MEDICINE

## 2023-04-07 PROCEDURE — G8536 NO DOC ELDER MAL SCRN: HCPCS | Performed by: INTERNAL MEDICINE

## 2023-04-07 PROCEDURE — G8427 DOCREV CUR MEDS BY ELIG CLIN: HCPCS | Performed by: INTERNAL MEDICINE

## 2023-04-07 PROCEDURE — 1123F ACP DISCUSS/DSCN MKR DOCD: CPT | Performed by: INTERNAL MEDICINE

## 2023-04-07 PROCEDURE — 1101F PT FALLS ASSESS-DOCD LE1/YR: CPT | Performed by: INTERNAL MEDICINE

## 2023-04-07 PROCEDURE — 3078F DIAST BP <80 MM HG: CPT | Performed by: INTERNAL MEDICINE

## 2023-04-07 RX ORDER — ALLOPURINOL 300 MG/1
300 TABLET ORAL DAILY
Qty: 90 TABLET | Refills: 1 | Status: SHIPPED | OUTPATIENT
Start: 2023-04-07

## 2023-04-07 RX ORDER — ALLOPURINOL 300 MG/1
300 TABLET ORAL DAILY
Qty: 90 TABLET | Refills: 1 | Status: SHIPPED | OUTPATIENT
Start: 2023-04-07 | End: 2023-04-07 | Stop reason: SDUPTHER

## 2023-04-24 DIAGNOSIS — E11.42 TYPE 2 DIABETES MELLITUS WITH DIABETIC POLYNEUROPATHY, WITHOUT LONG-TERM CURRENT USE OF INSULIN (HCC): ICD-10-CM

## 2023-04-24 NOTE — TELEPHONE ENCOUNTER
Last Visit: 3/2/23 NP Helen  Next Appointment: 6/1/23 Helen  Previous Refill Encounter(s): 11/28/22 30 + 1    Requested Prescriptions     Pending Prescriptions Disp Refills    ondansetron hcl (ZOFRAN) 4 mg tablet 30 Tablet 1     Sig: Take 1 Tablet by mouth every eight (8) hours as needed for Nausea or Vomiting.      For Pharmacy Admin Tracking Only    Program: Medication Refill  Intervention Detail: New Rx: 1, reason: Patient Preference  Time Spent (min): 5

## 2023-04-25 RX ORDER — ONDANSETRON 4 MG/1
4 TABLET, FILM COATED ORAL
Qty: 30 TABLET | Refills: 1 | Status: SHIPPED | OUTPATIENT
Start: 2023-04-25

## 2023-05-03 DIAGNOSIS — E87.6 HYPOKALEMIA: ICD-10-CM

## 2023-05-03 DIAGNOSIS — I10 ESSENTIAL HYPERTENSION WITH GOAL BLOOD PRESSURE LESS THAN 130/80: ICD-10-CM

## 2023-05-03 RX ORDER — DILTIAZEM HYDROCHLORIDE 420 MG/1
CAPSULE, EXTENDED RELEASE ORAL
Qty: 90 CAPSULE | Refills: 3 | Status: SHIPPED | OUTPATIENT
Start: 2023-05-03

## 2023-05-03 RX ORDER — POTASSIUM CHLORIDE 750 MG/1
TABLET, EXTENDED RELEASE ORAL
Qty: 90 TABLET | Refills: 3 | Status: SHIPPED | OUTPATIENT
Start: 2023-05-03

## 2023-05-12 RX ORDER — HYDROCODONE BITARTRATE AND ACETAMINOPHEN 10; 325 MG/1; MG/1
1 TABLET ORAL EVERY 8 HOURS PRN
Qty: 90 TABLET | Refills: 0 | Status: CANCELLED | OUTPATIENT
Start: 2023-05-12 | End: 2023-06-11

## 2023-05-15 ENCOUNTER — TELEPHONE (OUTPATIENT)
Age: 73
End: 2023-05-15

## 2023-05-15 DIAGNOSIS — M06.09 SERONEGATIVE RHEUMATOID ARTHRITIS OF MULTIPLE SITES (HCC): Primary | ICD-10-CM

## 2023-05-15 RX ORDER — HYDROCODONE BITARTRATE AND ACETAMINOPHEN 10; 325 MG/1; MG/1
1 TABLET ORAL EVERY 8 HOURS PRN
Qty: 90 TABLET | Refills: 0 | Status: SHIPPED | OUTPATIENT
Start: 2023-05-15 | End: 2023-06-14

## 2023-05-15 NOTE — TELEPHONE ENCOUNTER
CAMILLE Benson,    ABRIL:    Patient has called twice checking on refill of hydrocodone/apap. Pending to Marcelino, from 5/12/23.   Thanks, Julio César Eli

## 2023-05-16 ENCOUNTER — TRANSCRIBE ORDERS (OUTPATIENT)
Facility: HOSPITAL | Age: 73
End: 2023-05-16

## 2023-05-16 DIAGNOSIS — S42.402A: Primary | ICD-10-CM

## 2023-05-19 ENCOUNTER — HOSPITAL ENCOUNTER (OUTPATIENT)
Facility: HOSPITAL | Age: 73
End: 2023-05-19
Payer: MEDICARE

## 2023-05-19 DIAGNOSIS — S42.402A: ICD-10-CM

## 2023-05-19 PROCEDURE — 73200 CT UPPER EXTREMITY W/O DYE: CPT

## 2023-06-01 ENCOUNTER — OFFICE VISIT (OUTPATIENT)
Age: 73
End: 2023-06-01
Payer: MEDICARE

## 2023-06-01 VITALS
OXYGEN SATURATION: 96 % | TEMPERATURE: 97.5 F | HEART RATE: 77 BPM | BODY MASS INDEX: 36.29 KG/M2 | DIASTOLIC BLOOD PRESSURE: 60 MMHG | HEIGHT: 64 IN | RESPIRATION RATE: 16 BRPM | SYSTOLIC BLOOD PRESSURE: 122 MMHG | WEIGHT: 212.6 LBS

## 2023-06-01 DIAGNOSIS — E11.65 UNCONTROLLED TYPE 2 DIABETES MELLITUS WITH HYPERGLYCEMIA (HCC): Primary | ICD-10-CM

## 2023-06-01 DIAGNOSIS — F11.20 OPIOID DEPENDENCE WITH CURRENT USE (HCC): ICD-10-CM

## 2023-06-01 DIAGNOSIS — M06.09 SERONEGATIVE RHEUMATOID ARTHRITIS OF MULTIPLE SITES (HCC): ICD-10-CM

## 2023-06-01 LAB — HBA1C MFR BLD: 10.5 %

## 2023-06-01 PROCEDURE — 83036 HEMOGLOBIN GLYCOSYLATED A1C: CPT | Performed by: NURSE PRACTITIONER

## 2023-06-01 PROCEDURE — 99214 OFFICE O/P EST MOD 30 MIN: CPT | Performed by: NURSE PRACTITIONER

## 2023-06-01 RX ORDER — HYDROCODONE BITARTRATE AND ACETAMINOPHEN 10; 325 MG/1; MG/1
1 TABLET ORAL EVERY 8 HOURS PRN
Qty: 90 TABLET | Refills: 0 | Status: SHIPPED | OUTPATIENT
Start: 2023-07-10 | End: 2023-06-01 | Stop reason: SDUPTHER

## 2023-06-01 RX ORDER — PEN NEEDLE, DIABETIC 31 G X1/4"
1 NEEDLE, DISPOSABLE MISCELLANEOUS DAILY
Qty: 100 EACH | Refills: 3 | Status: SHIPPED | OUTPATIENT
Start: 2023-06-01

## 2023-06-01 RX ORDER — HYDROCODONE BITARTRATE AND ACETAMINOPHEN 10; 325 MG/1; MG/1
1 TABLET ORAL EVERY 8 HOURS PRN
Qty: 90 TABLET | Refills: 0 | Status: SHIPPED | OUTPATIENT
Start: 2023-08-08 | End: 2023-09-07

## 2023-06-01 RX ORDER — HYDROCODONE BITARTRATE AND ACETAMINOPHEN 10; 325 MG/1; MG/1
1 TABLET ORAL EVERY 8 HOURS PRN
Qty: 90 TABLET | Refills: 0 | Status: SHIPPED | OUTPATIENT
Start: 2023-06-13 | End: 2023-06-01 | Stop reason: SDUPTHER

## 2023-06-01 SDOH — ECONOMIC STABILITY: FOOD INSECURITY: WITHIN THE PAST 12 MONTHS, THE FOOD YOU BOUGHT JUST DIDN'T LAST AND YOU DIDN'T HAVE MONEY TO GET MORE.: NEVER TRUE

## 2023-06-01 SDOH — ECONOMIC STABILITY: INCOME INSECURITY: HOW HARD IS IT FOR YOU TO PAY FOR THE VERY BASICS LIKE FOOD, HOUSING, MEDICAL CARE, AND HEATING?: NOT HARD AT ALL

## 2023-06-01 SDOH — ECONOMIC STABILITY: FOOD INSECURITY: WITHIN THE PAST 12 MONTHS, YOU WORRIED THAT YOUR FOOD WOULD RUN OUT BEFORE YOU GOT MONEY TO BUY MORE.: NEVER TRUE

## 2023-06-01 SDOH — ECONOMIC STABILITY: HOUSING INSECURITY
IN THE LAST 12 MONTHS, WAS THERE A TIME WHEN YOU DID NOT HAVE A STEADY PLACE TO SLEEP OR SLEPT IN A SHELTER (INCLUDING NOW)?: NO

## 2023-06-01 ASSESSMENT — PATIENT HEALTH QUESTIONNAIRE - PHQ9
SUM OF ALL RESPONSES TO PHQ QUESTIONS 1-9: 0
SUM OF ALL RESPONSES TO PHQ9 QUESTIONS 1 & 2: 0
2. FEELING DOWN, DEPRESSED OR HOPELESS: 0
SUM OF ALL RESPONSES TO PHQ QUESTIONS 1-9: 0
1. LITTLE INTEREST OR PLEASURE IN DOING THINGS: 0

## 2023-06-01 ASSESSMENT — ENCOUNTER SYMPTOMS
BACK PAIN: 1
SHORTNESS OF BREATH: 0

## 2023-06-01 NOTE — PROGRESS NOTES
Assessment/Plan:     1. Uncontrolled type 2 diabetes mellitus with hyperglycemia (HCC)  -     AMB POC HEMOGLOBIN A1C  -     Insulin Glargine, 2 Unit Dial, 300 UNIT/ML SOPN; Inject 20 Units into the skin nightly, Disp-3 mL, R-3Normal  -     Insulin Pen Needle ([employer] PEN NEEDLES) 31G X 6 MM MISC; DAILY Starting Thu 6/1/2023, Disp-100 each, R-3, Normal  Significantly worse with an A1c of 10.5%. Initiate insulin treatment 20 units nightly as above. We have sent a aida monitoring device to her local medical supply store. We will see her back in 2 weeks or sooner as needed. 2. Opioid dependence with current use ([facility]  3. Seronegative rheumatoid arthritis of multiple sites (HCC)  -     HYDROcodone-acetaminophen (NORCO)  MG per tablet; Take 1 tablet by mouth every 8 hours as needed for Pain for up to 30 days. Max Daily Amount: 3 tablets, Disp-90 tablet, R-0Normal  Stable on current therapy which was continued for an additional 3 months.  reviewed and appropriate. Compliance drug screen up-to-date on November 28, 2022. Return in about 2 weeks (around 6/15/2023) for Follow Up. Discussed expected course/resolution/complications of diagnosis in detail with patient. Medication risks/benefits/costs/interactions/alternatives discussed with patient. Pt was given after visit summary which includes diagnoses, current medications & vitals. Pt expressed understanding with the diagnosis and plan          Subjective:      Ally Vanegas is a 71 y.o. female who presents for had concerns including Follow-up. Chronic Pain  Ally Vanegas RTC today to follow up on chronic pain diagnosis. We discussed her osteoarthritis that is affecting her bilateral hips, left shoulder and lumbar back. We discussed her seronegative rheumatoid arthritis affecting her bilateral hands. Significant changes since last visit: none. She is  able to do her normal daily activities.   She reports the following adverse side

## 2023-06-01 NOTE — PROGRESS NOTES
Chief Complaint   Patient presents with    Follow-up         1. \"Have you been to the ER, urgent care clinic since your last visit? Hospitalized since your last visit? \" no    2. \"Have you seen or consulted any other health care providers outside of the [address] [place] [address] since your last visit? \" no    3. For patients aged 39-70: Has the patient had a colonoscopy / FIT/ Cologuard? Yes      If the patient is female:    4. For patients aged 41-77: Has the patient had a mammogram within the past 2 years? yes       5. For patients aged 21-65: Has the patient had a pap smear?  N/A    PHQ-9  6/1/2023   Little interest or pleasure in doing things 0   Little interest or pleasure in doing things -   Feeling down, depressed, or hopeless 0   PHQ-2 Score 0   Total Score PHQ 2 -   PHQ-9 Total Score 0           Financial Resource Strain: Low Risk     Difficulty of Paying Living Expenses: Not hard at all      Food Insecurity: No Food Insecurity    Worried About Running Out of Food in the Last Year: Never true    Ran Out of Food in the Last Year: Never true          Health Maintenance Due   Topic Date Due    Hepatitis A vaccine (1 of 2 - Risk 2-dose series) Never done    Hepatitis B vaccine (1 of 3 - Risk 3-dose series) Never done    Shingles vaccine (1 of 2) Never done    Diabetic retinal exam  04/16/2020    Pneumococcal 65+ years Vaccine (3 - PPSV23 if available, else PCV20) 09/11/2020    COVID-19 Vaccine (2 - Pfizer series) 03/24/2021    DTaP/Tdap/Td vaccine (2 - Td or Tdap) 05/13/2023

## 2023-06-12 ENCOUNTER — TELEPHONE (OUTPATIENT)
Age: 73
End: 2023-06-12

## 2023-06-12 NOTE — TELEPHONE ENCOUNTER
Tried to contact pt [phone] to reschedule appt she has 6/15 due to Marcelino not in office in the afternoon. Phone went straight to  and  not set up. -TM 6/12/23

## 2023-06-16 ENCOUNTER — OFFICE VISIT (OUTPATIENT)
Age: 73
End: 2023-06-16
Payer: MEDICARE

## 2023-06-16 VITALS
HEART RATE: 71 BPM | SYSTOLIC BLOOD PRESSURE: 125 MMHG | HEIGHT: 64 IN | BODY MASS INDEX: 35.65 KG/M2 | RESPIRATION RATE: 18 BRPM | OXYGEN SATURATION: 93 % | WEIGHT: 208.8 LBS | TEMPERATURE: 97.8 F | DIASTOLIC BLOOD PRESSURE: 76 MMHG

## 2023-06-16 DIAGNOSIS — F41.8 SITUATIONAL ANXIETY: Primary | ICD-10-CM

## 2023-06-16 PROCEDURE — 99213 OFFICE O/P EST LOW 20 MIN: CPT | Performed by: NURSE PRACTITIONER

## 2023-06-16 PROCEDURE — 3017F COLORECTAL CA SCREEN DOC REV: CPT | Performed by: NURSE PRACTITIONER

## 2023-06-16 PROCEDURE — 1090F PRES/ABSN URINE INCON ASSESS: CPT | Performed by: NURSE PRACTITIONER

## 2023-06-16 PROCEDURE — 3078F DIAST BP <80 MM HG: CPT | Performed by: NURSE PRACTITIONER

## 2023-06-16 PROCEDURE — 1123F ACP DISCUSS/DSCN MKR DOCD: CPT | Performed by: NURSE PRACTITIONER

## 2023-06-16 PROCEDURE — 4004F PT TOBACCO SCREEN RCVD TLK: CPT | Performed by: NURSE PRACTITIONER

## 2023-06-16 PROCEDURE — G8427 DOCREV CUR MEDS BY ELIG CLIN: HCPCS | Performed by: NURSE PRACTITIONER

## 2023-06-16 PROCEDURE — 3074F SYST BP LT 130 MM HG: CPT | Performed by: NURSE PRACTITIONER

## 2023-06-16 PROCEDURE — G8400 PT W/DXA NO RESULTS DOC: HCPCS | Performed by: NURSE PRACTITIONER

## 2023-06-16 PROCEDURE — G8417 CALC BMI ABV UP PARAM F/U: HCPCS | Performed by: NURSE PRACTITIONER

## 2023-06-16 RX ORDER — BUSPIRONE HYDROCHLORIDE 5 MG/1
5 TABLET ORAL 2 TIMES DAILY
Qty: 60 TABLET | Refills: 0 | Status: SHIPPED | OUTPATIENT
Start: 2023-06-16 | End: 2023-07-16

## 2023-06-21 ASSESSMENT — ENCOUNTER SYMPTOMS: SHORTNESS OF BREATH: 0

## 2023-06-22 NOTE — PROGRESS NOTES
Assessment/Plan:     1. Situational anxiety  -     busPIRone (BUSPAR) 5 MG tablet; Take 1 tablet by mouth 2 times daily, Disp-60 tablet, R-0Normal   Treatment as above. She has good social support from her family. She appears to be coping as expected. Return if symptoms worsen or fail to improve. Discussed expected course/resolution/complications of diagnosis in detail with patient. Medication risks/benefits/costs/interactions/alternatives discussed with patient. Pt was given after visit summary which includes diagnoses, current medications & vitals. Pt expressed understanding with the diagnosis and plan          Subjective:      Angeli Carmona is a 71 y.o. female who presents for had concerns including Follow-up. She is accompanied by her daughter today. She is struggling with anxiety since the death of her  recently. She is not currently on treatment for anxiety. This has caused nausea and vomiting. She was supposed to see GI the day her   so she has been rescheduled for several months down the road.      Patient Active Problem List   Diagnosis    Essential hypertension    MGUS (monoclonal gammopathy of unknown significance)    Long-term use of immunosuppressant medication    Primary biliary cholangitis (HCC)    Idiopathic chronic gout, multiple sites, with tophus (tophi)    Osteoarthritis of hip    Type 2 diabetes mellitus with diabetic polyneuropathy, without long-term current use of insulin (HCC)    Chronic bronchitis ([address]    Hypercholesterolemia    Former smoker    Vitamin D deficiency    Hypertriglyceridemia    CAD (coronary artery disease)    Lung nodule seen on imaging study    Seronegative rheumatoid arthritis of multiple sites (HCC)    PMR (polymyalgia rheumatica) (HCC)    Chronic pain    NAFL (nonalcoholic fatty liver)    Chronic renal disease, stage III ([address]    Controlled substance agreement signed    Opioid dependence with current use ([address]

## 2023-06-29 ENCOUNTER — TELEPHONE (OUTPATIENT)
Age: 73
End: 2023-06-29

## 2023-06-29 DIAGNOSIS — K76.0 NAFL (NONALCOHOLIC FATTY LIVER): Primary | ICD-10-CM

## 2023-06-29 DIAGNOSIS — E11.42 TYPE 2 DIABETES MELLITUS WITH DIABETIC POLYNEUROPATHY, WITHOUT LONG-TERM CURRENT USE OF INSULIN (HCC): ICD-10-CM

## 2023-06-29 NOTE — TELEPHONE ENCOUNTER
PCP: RICK Velazquez NP    Last appt: 6/16/2023   Future Appointments   Date Time Provider [address]   7/24/2023  1:30 PM April L Cynthia, APRN - NP [facility]       Requested Prescriptions     Pending Prescriptions Disp Refills    metFORMIN (GLUCOPHAGE) 500 MG tablet [Pharmacy Med Name: Matoya Sabrina 500 MG Tablet] 360 tablet      Sig: TAKE 2 TABLETS TWICE A DAY WITH MEALS         Prior labs and Blood pressures:  BP Readings from Last 3 Encounters:   06/16/23 125/76   06/01/23 122/60   04/07/23 133/64     Lab Results   Component Value Date/Time     03/15/2023 04:00 PM    K 3.7 03/15/2023 04:00 PM     03/15/2023 04:00 PM    CO2 27 03/15/2023 04:00 PM    BUN 22 03/15/2023 04:00 PM    GFRAA 52 08/24/2022 12:01 PM     Lab Results   Component Value Date/Time    XYT2SEIZ 10.5 06/01/2023 12:46 PM     Lab Results   Component Value Date/Time    CHOL 132 08/24/2022 12:01 PM    HDL 47 08/24/2022 12:01 PM     No results found for: VITD3, VD3RIA    Lab Results   Component Value Date/Time    TSH 1.15 10/06/2020 05:43 PM

## 2023-08-04 ENCOUNTER — OFFICE VISIT (OUTPATIENT)
Age: 73
End: 2023-08-04
Payer: MEDICARE

## 2023-08-04 VITALS
RESPIRATION RATE: 16 BRPM | HEIGHT: 64 IN | DIASTOLIC BLOOD PRESSURE: 76 MMHG | WEIGHT: 203.6 LBS | SYSTOLIC BLOOD PRESSURE: 131 MMHG | TEMPERATURE: 98.2 F | HEART RATE: 78 BPM | OXYGEN SATURATION: 97 % | BODY MASS INDEX: 34.76 KG/M2

## 2023-08-04 DIAGNOSIS — E11.65 UNCONTROLLED TYPE 2 DIABETES MELLITUS WITH HYPERGLYCEMIA (HCC): Primary | ICD-10-CM

## 2023-08-04 DIAGNOSIS — G62.9 PERIPHERAL POLYNEUROPATHY: ICD-10-CM

## 2023-08-04 DIAGNOSIS — M06.09 SERONEGATIVE RHEUMATOID ARTHRITIS OF MULTIPLE SITES (HCC): ICD-10-CM

## 2023-08-04 PROCEDURE — 99214 OFFICE O/P EST MOD 30 MIN: CPT | Performed by: NURSE PRACTITIONER

## 2023-08-04 PROCEDURE — G8427 DOCREV CUR MEDS BY ELIG CLIN: HCPCS | Performed by: NURSE PRACTITIONER

## 2023-08-04 PROCEDURE — 2022F DILAT RTA XM EVC RTNOPTHY: CPT | Performed by: NURSE PRACTITIONER

## 2023-08-04 PROCEDURE — 1123F ACP DISCUSS/DSCN MKR DOCD: CPT | Performed by: NURSE PRACTITIONER

## 2023-08-04 PROCEDURE — 3074F SYST BP LT 130 MM HG: CPT | Performed by: NURSE PRACTITIONER

## 2023-08-04 PROCEDURE — 3046F HEMOGLOBIN A1C LEVEL >9.0%: CPT | Performed by: NURSE PRACTITIONER

## 2023-08-04 PROCEDURE — 3078F DIAST BP <80 MM HG: CPT | Performed by: NURSE PRACTITIONER

## 2023-08-04 PROCEDURE — G8400 PT W/DXA NO RESULTS DOC: HCPCS | Performed by: NURSE PRACTITIONER

## 2023-08-04 PROCEDURE — 1090F PRES/ABSN URINE INCON ASSESS: CPT | Performed by: NURSE PRACTITIONER

## 2023-08-04 PROCEDURE — 3017F COLORECTAL CA SCREEN DOC REV: CPT | Performed by: NURSE PRACTITIONER

## 2023-08-04 PROCEDURE — G8417 CALC BMI ABV UP PARAM F/U: HCPCS | Performed by: NURSE PRACTITIONER

## 2023-08-04 PROCEDURE — 4004F PT TOBACCO SCREEN RCVD TLK: CPT | Performed by: NURSE PRACTITIONER

## 2023-08-04 NOTE — PROGRESS NOTES
Chief Complaint   Patient presents with    Follow-up       1. Have you been to the ER, urgent care clinic since your last visit? Hospitalized since your last visit?         no    2. Have you seen or consulted any other health care providers outside of the [address] [place] [address] since your last visit? Include any pap smears or colon screening. no    3. For patients over 45: Has the patient had a colonoscopy? Yes     If the patient is female:    4. For patients over 40: Has the patient had a mammogram? Yes    5. For patients over 21: Has the patient had a pap smear? N/A        No flowsheet data found. No flowsheet data found.   Health Maintenance Due   Topic Date Due    Hepatitis A vaccine (1 of 2 - Risk 2-dose series) Never done    Hepatitis B vaccine (1 of 3 - Risk 3-dose series) Never done    Shingles vaccine (1 of 2) Never done    Diabetic retinal exam  04/16/2020    Pneumococcal 65+ years Vaccine (3 - PPSV23 if available, else PCV20) 09/11/2020    COVID-19 Vaccine (2 - Pfizer series) 04/28/2021    DTaP/Tdap/Td vaccine (2 - Td or Tdap) 05/13/2023    Flu vaccine (1) 08/01/2023    Diabetic foot exam  08/24/2023    Diabetic Alb to Cr ratio (uACR) test  08/24/2023    Lipids  08/24/2023    Annual Wellness Visit (AWV)  08/25/2023    A1C test (Diabetic or Prediabetic)  09/01/2023       PHQ-9  6/1/2023   Little interest or pleasure in doing things 0   Little interest or pleasure in doing things -   Feeling down, depressed, or hopeless 0   PHQ-2 Score 0   Total Score PHQ 2 -   PHQ-9 Total Score 0

## 2023-08-05 RX ORDER — HYDROCODONE BITARTRATE AND ACETAMINOPHEN 10; 325 MG/1; MG/1
1 TABLET ORAL EVERY 8 HOURS PRN
Qty: 90 TABLET | Refills: 0 | Status: SHIPPED | OUTPATIENT
Start: 2023-08-08 | End: 2023-08-05 | Stop reason: SDUPTHER

## 2023-08-05 RX ORDER — HYDROCODONE BITARTRATE AND ACETAMINOPHEN 10; 325 MG/1; MG/1
1 TABLET ORAL EVERY 8 HOURS PRN
Qty: 90 TABLET | Refills: 0 | Status: SHIPPED | OUTPATIENT
Start: 2023-09-06 | End: 2023-08-05 | Stop reason: SDUPTHER

## 2023-08-05 RX ORDER — HYDROCODONE BITARTRATE AND ACETAMINOPHEN 10; 325 MG/1; MG/1
1 TABLET ORAL EVERY 8 HOURS PRN
Qty: 90 TABLET | Refills: 0 | Status: SHIPPED | OUTPATIENT
Start: 2023-10-04 | End: 2023-11-03

## 2023-08-05 ASSESSMENT — ENCOUNTER SYMPTOMS
SHORTNESS OF BREATH: 0
BACK PAIN: 1

## 2023-08-05 NOTE — PROGRESS NOTES
Assessment/Plan:     1. Uncontrolled type 2 diabetes mellitus with hyperglycemia (HCC)  -     Insulin Glargine, 2 Unit Dial, 300 UNIT/ML SOPN; Inject 12 Units into the skin nightly, Disp-3 mL, R-3NO PRINT  Recent hypoglycemia. Decrease to 12 units. Call in one week with progress. 2. Peripheral polyneuropathy  -     External Referral To Podiatry  3. Seronegative rheumatoid arthritis of multiple sites (HCC)  -     HYDROcodone-acetaminophen (NORCO)  MG per tablet; Take 1 tablet by mouth every 8 hours as needed for Pain for up to 30 days. Max Daily Amount: 3 tablets, Disp-90 tablet, R-0Normal   Refilled for three months.  reviewed. UDS up to date. Contract on file. No follow-ups on file. Discussed expected course/resolution/complications of diagnosis in detail with patient. Medication risks/benefits/costs/interactions/alternatives discussed with patient. Pt was given after visit summary which includes diagnoses, current medications & vitals. Pt expressed understanding with the diagnosis and plan          Subjective:      Rodolfo Gonzalez is a 68 y.o. female who presents for had concerns including Follow-up. Chronic Pain  Rodolfo Gonzalez RTC today to follow up on chronic pain diagnosis. We discussed her osteoarthritis that is affecting her bilateral hips, left shoulder and lumbar back. We discussed her seronegative rheumatoid arthritis affecting her bilateral hands. Significant changes since last visit: none. She is  able to do her normal daily activities. She reports the following adverse side effects: none. Previous treatments include heat, cold, Lidocaine patches otc, Tramadol, methotrexate, cortisone injection which all provided minimal relief. Not a candidate for oral NSAIDS. She is followed by rheumatology. Cardiovascular Review:  The patient has diabetes, hypertension, hyperlipidemia, and obesity.   Diet and Lifestyle: not attempting to follow a low fat, low

## 2023-08-09 ENCOUNTER — HOSPITAL ENCOUNTER (OUTPATIENT)
Facility: HOSPITAL | Age: 73
Discharge: HOME | End: 2023-08-12
Payer: MEDICARE

## 2023-08-09 DIAGNOSIS — M16.11 PRIMARY OSTEOARTHRITIS OF RIGHT HIP: ICD-10-CM

## 2023-08-09 PROCEDURE — 20610 DRAIN/INJ JOINT/BURSA W/O US: CPT

## 2023-08-09 PROCEDURE — 6360000004 HC RX CONTRAST MEDICATION: Performed by: PHYSICIAN ASSISTANT

## 2023-08-09 RX ORDER — TRIAMCINOLONE ACETONIDE 40 MG/ML
40 INJECTION, SUSPENSION INTRA-ARTICULAR; INTRAMUSCULAR ONCE
Status: DISCONTINUED | OUTPATIENT
Start: 2023-08-09 | End: 2023-08-09

## 2023-08-09 RX ORDER — TRIAMCINOLONE ACETONIDE 40 MG/ML
40 INJECTION, SUSPENSION INTRA-ARTICULAR; INTRAMUSCULAR ONCE
Status: DISCONTINUED | OUTPATIENT
Start: 2023-08-09 | End: 2023-08-13 | Stop reason: HOSPADM

## 2023-08-09 RX ORDER — BUPIVACAINE HYDROCHLORIDE 5 MG/ML
30 INJECTION, SOLUTION EPIDURAL; INTRACAUDAL ONCE
Status: DISCONTINUED | OUTPATIENT
Start: 2023-08-09 | End: 2023-08-13 | Stop reason: HOSPADM

## 2023-08-09 RX ADMIN — IOPAMIDOL 30 ML: 612 INJECTION, SOLUTION INTRAVENOUS at 14:24

## 2023-08-17 ENCOUNTER — TELEPHONE (OUTPATIENT)
Age: 73
End: 2023-08-17

## 2023-08-17 NOTE — TELEPHONE ENCOUNTER
Pharmacy Progress Note - Telephone Call    Ms. Manjinder Barrett 68 y.o. was contacted via an outbound telephone call regarding obtaining BG rdgs today. Voicemail was full. Unable to leave message. Not connected to [employer]. Could not send message.       Sakina Flaherty, PharmD, [facility]  Clinical Pharmacist Specialist        For Pharmacy Admin Tracking Only    Program: Medical Group  CPA in place:  Yes  Recommendation Provided To: Patient/Caregiver: 0 via Telephone  Intervention Accepted By: Patient/Caregiver: 0  Time Spent (min): 5

## 2023-08-18 ENCOUNTER — SCHEDULED TELEPHONE ENCOUNTER (OUTPATIENT)
Age: 73
End: 2023-08-18

## 2023-08-18 DIAGNOSIS — E11.65 UNCONTROLLED TYPE 2 DIABETES MELLITUS WITH HYPERGLYCEMIA (HCC): Primary | ICD-10-CM

## 2023-08-18 NOTE — PROGRESS NOTES
3 via Telephone  Intervention Detail: Adherence Monitorin, Dose Adjustment: 1, reason: Therapy De-escalation, and Patient Access Assistance/Sample Provided  Intervention Accepted By: Patient/Caregiver: 3  Time Spent (min): 45

## 2023-08-24 ENCOUNTER — TELEPHONE (OUTPATIENT)
Dept: PRIMARY CARE CLINIC | Facility: CLINIC | Age: 73
End: 2023-08-24

## 2023-08-24 RX ORDER — DULAGLUTIDE 3 MG/.5ML
3 INJECTION, SOLUTION SUBCUTANEOUS WEEKLY
COMMUNITY

## 2023-08-24 NOTE — TELEPHONE ENCOUNTER
Pharmacy Progress Note     Contacted [employer] Rx assistance program in order to investigate pt's shipment. She has been without the med x1 week. Rx assistance  states shipment was sent out 23 and had been delivered    Contacted pt via phone and confirmed she now had Tennille Oka and had started taking it again. There are no discontinued medications. No orders of the defined types were placed in this encounter.         Diana LopezD, [facility]  Clinical Pharmacist Specialist      For Pharmacy Admin Tracking Only    Program: Medical Group  CPA in place:  Yes  Recommendation Provided To: Patient/Caregiver: 2 via Telephone and Other: 2  Intervention Detail: Adherence Monitorin and Patient Access Assistance/Sample Provided  Intervention Accepted By: Patient/Caregiver: 2 and Other: 2  Time Spent (min): 20

## 2023-08-25 ENCOUNTER — PHARMACY VISIT (OUTPATIENT)
Age: 73
End: 2023-08-25

## 2023-08-25 DIAGNOSIS — G62.9 PERIPHERAL POLYNEUROPATHY: Primary | ICD-10-CM

## 2023-08-25 DIAGNOSIS — E11.65 UNCONTROLLED TYPE 2 DIABETES MELLITUS WITH HYPERGLYCEMIA (HCC): Primary | ICD-10-CM

## 2023-08-25 RX ORDER — PREGABALIN 200 MG/1
200 CAPSULE ORAL 3 TIMES DAILY
Qty: 90 CAPSULE | Refills: 2 | Status: SHIPPED | OUTPATIENT
Start: 2023-08-25 | End: 2023-11-23

## 2023-08-25 NOTE — TELEPHONE ENCOUNTER
Last appointment: 8/4/23  Next appointment: 11/6/23  Previous refill encounter(s): 1/26/23 #90 with 2 refills    Requested Prescriptions     Pending Prescriptions Disp Refills    pregabalin (LYRICA) 200 MG capsule 90 capsule 2     Sig: Take 1 capsule by mouth in the morning, at noon, and at bedtime for 90 days. Max Daily Amount: 600 mg         For Pharmacy Admin Tracking Only    Program: Medication Refill  CPA in place:    Recommendation Provided To:    Intervention Detail: New Rx: 1, reason: Patient Preference  Intervention Accepted By:   Alejandra Guajardo Closed?:    Time Spent (min): 5

## 2023-08-25 NOTE — PROGRESS NOTES
Pharmacy Progress Note - Diabetes Management    S/O: Ms. Gelacio Johnson is a 68 y.o. female, referred by Dr. Marcie Dalton, APRN - NP, with a PMH of HTN, CAD, NAFL, T2DM, gout, OA, CKD, hypercholesterolemia, Vit D deficiency, chronic pain, polymyalgia rheumatica, was seen today for diabetes management. Patient's last A1c was 10.5% (June 2023) which is increased from 8.2% (March 2023). Interim update: Pt arrives to clinic today to f/up on DM management and complete Rx assistance application for inhalers. She states she has not used insulin the past 2 weeks d/t lows - fasting BG in 60s-80s. Pt states she has 10 weeks left of current Trulicity supply. BG rdgs reported from glucometer are:  , 105, 142, 119, 159, 170  Am 168, 144, 132, 128, 155, 158    Pt is concerned for inhaler costs. She does not use albuterol often and also uses Spiriva PRN d/t cost.  Started Rx assistance application for [employer]; however, she needs to provide proof of income in order to complete. Medication Adherence/Access:  - Receives Trulicity thru Rx assistance  - Hyun Beams thru Rx assistance    Current anti-hyperglycemic regimen includes:    - Toujeo U-300 to 10 units daily - none  - Trulicity 3 mg weekly  - Glipizide SR 2.5 mg daily  - Metformin 500 mg 2 tabs BID    ROS:  Today, Pt endorses:  - Symptoms of Hyperglycemia: none  - Symptoms of Hypoglycemia: none      Vitals:   Wt Readings from Last 3 Encounters:   08/04/23 203 lb 9.6 oz (92.4 kg)   07/24/23 210 lb (95.3 kg)   06/16/23 208 lb 12.8 oz (94.7 kg)     BP Readings from Last 3 Encounters:   08/04/23 131/76   06/16/23 125/76   06/01/23 122/60     Pulse Readings from Last 3 Encounters:   08/04/23 78   06/16/23 71   06/01/23 77       Past Medical History:   Diagnosis Date    Abnormal pulmonary function test 9/28/2016    AR (allergic rhinitis)     Asthma     Atrial thrombus 1994    left    Chronic bronchitis (HCC) 9/28/2016    Chronic pain

## 2023-08-25 NOTE — PATIENT INSTRUCTIONS
STOP Glipizide  STOP Toujeo [address]    Today we applied for Rx assistance    [employer] Cares  We applied for Spiriva Handihaler 1 inhalation daily.   Please mail your proof of income to:  Attn: [facility]  [id number]    Pharmacist Contact Information:  [facility], PharmD, [address] [address]  Work Cell: [phone]

## 2023-09-12 ENCOUNTER — TELEPHONE (OUTPATIENT)
Age: 73
End: 2023-09-12

## 2023-09-12 NOTE — TELEPHONE ENCOUNTER
----- Message from [employer] sent at 9/12/2023  2:53 PM EDT -----  Subject: Message to Provider    QUESTIONS  Information for Provider? Bethany Etienne need to change the pharmacy appt she   has on 9-19. Please contact her with an avail appt.  ---------------------------------------------------------------------------  --------------  Gallito Valentino [facility]  [phone]; OK to leave message on voicemail  ---------------------------------------------------------------------------  --------------  SCRIPT ANSWERS  Relationship to Patient?  Self

## 2023-09-13 NOTE — TELEPHONE ENCOUNTER
PCP: Juani Tao, APRN - NP    Last appt: 8/25/2023     Future Appointments   Date Time Provider [address]   9/18/2023  1:30 PM MD KEVEN Gallardo BS AMB   9/25/2023  1:30 PM Shelia Gibbs [facility] PA[facility] BS AMB   11/6/2023 11:00 AM Beatrice Benson, APRN - NP PAFP BS AMB       Requested Prescriptions     Pending Prescriptions Disp Refills    metFORMIN (GLUCOPHAGE) 500 MG tablet [Pharmacy Med Name: Nayeli Darting 500 MG Tablet] 360 tablet      Sig: TAKE 2 TABLETS TWICE DAILY WITH MEALS       Prior labs and Blood pressures:  BP Readings from Last 3 Encounters:   08/04/23 131/76   06/16/23 125/76   06/01/23 122/60     Lab Results   Component Value Date/Time     03/15/2023 04:00 PM    K 3.7 03/15/2023 04:00 PM     03/15/2023 04:00 PM    CO2 27 03/15/2023 04:00 PM    BUN 22 03/15/2023 04:00 PM    GFRAA 52 08/24/2022 12:01 PM     Lab Results   Component Value Date/Time    QZH0ZDZI 10.5 06/01/2023 12:46 PM     Lab Results   Component Value Date/Time    CHOL 132 08/24/2022 12:01 PM    HDL 47 08/24/2022 12:01 PM     No results found for: \"VITD3\", \"VD3RIA\"    Lab Results   Component Value Date/Time    TSH 1.15 10/06/2020 05:43 PM

## 2023-09-25 ENCOUNTER — PHARMACY VISIT (OUTPATIENT)
Age: 73
End: 2023-09-25
Payer: MEDICARE

## 2023-09-25 VITALS — WEIGHT: 200.4 LBS | BODY MASS INDEX: 34.4 KG/M2

## 2023-09-25 DIAGNOSIS — E11.65 UNCONTROLLED TYPE 2 DIABETES MELLITUS WITH HYPERGLYCEMIA (HCC): Primary | ICD-10-CM

## 2023-09-25 LAB — HBA1C MFR BLD: 7.4 %

## 2023-09-25 PROCEDURE — 83036 HEMOGLOBIN GLYCOSYLATED A1C: CPT | Performed by: PHARMACIST

## 2023-09-25 PROCEDURE — PBSHW AMB POC HEMOGLOBIN A1C: Performed by: PHARMACIST

## 2023-09-25 NOTE — PATIENT INSTRUCTIONS
Congratulations!   Today your A1c was 7.4%    To complete Rx assistance for inhalers, please provide the following information:  - Social Security Award Letter OR 3 months of bank statements that show your income  - Pharmacy Expense Report from January 1, 2023 to today - shows the money you've spent at the pharmacy so far this year    Mail this information to: [facility] [id number]  Attn: Kalpana Ha    Pharmacist Contact Information:  Kalpana Ha PharmD, [address] [place] [address]  Work Cell: [phone]

## 2023-10-05 ENCOUNTER — TELEPHONE (OUTPATIENT)
Age: 73
End: 2023-10-05

## 2023-10-05 DIAGNOSIS — M06.09 SERONEGATIVE RHEUMATOID ARTHRITIS OF MULTIPLE SITES (HCC): ICD-10-CM

## 2023-10-05 NOTE — TELEPHONE ENCOUNTER
Pt called, states [facility] does not have the HYDROcodone-acetaminophen  in stock and they do not know when they will get it back in stock. Pt would like the Rx send to [facility] [address].

## 2023-10-06 RX ORDER — HYDROCODONE BITARTRATE AND ACETAMINOPHEN 10; 325 MG/1; MG/1
1 TABLET ORAL EVERY 8 HOURS PRN
Qty: 90 TABLET | Refills: 0 | Status: SHIPPED | OUTPATIENT
Start: 2023-10-06 | End: 2023-11-05

## 2023-10-06 NOTE — TELEPHONE ENCOUNTER
Called the patient to inform her that her medication was sent to the requested pharmacy per Marcelino but there was no answer and I was unable to leave a voice mail as the mailbox was full.

## 2023-10-25 NOTE — PROGRESS NOTES
Pt presents to office today for a Initial [facility] Visit. Chief Complaint   Patient presents with    Annual Wellness Visit     Yearly Physical.     .1. Have you been to the ER, urgent care clinic since your last visit? Hospitalized since your last visit? No    2. Have you seen or consulted any other health care providers outside of the [address] [place] [address] since your last visit? Include any pap smears or colon screening. Yes to [facility] for cold symptoms. about 1 month ago. Consent (Spinal Accessory)/Introductory Paragraph: The rationale for Mohs was explained to the patient and consent was obtained. The risks, benefits and alternatives to therapy were discussed in detail. Specifically, the risks of damage to the spinal accessory nerve, infection, scarring, bleeding, prolonged wound healing, incomplete removal, allergy to anesthesia, and recurrence were addressed. Prior to the procedure, the treatment site was clearly identified and confirmed by the patient. All components of Universal Protocol/PAUSE Rule completed.

## 2023-11-02 ENCOUNTER — TELEPHONE (OUTPATIENT)
Age: 73
End: 2023-11-02

## 2023-11-02 DIAGNOSIS — M06.09 SERONEGATIVE RHEUMATOID ARTHRITIS OF MULTIPLE SITES (HCC): ICD-10-CM

## 2023-11-02 NOTE — TELEPHONE ENCOUNTER
Spoke with pt would like medication refill for hydrocodone sent to the [address] [place] [address]. Due to the [address] being out of stock for the medication.  Best call back number [phone]

## 2023-11-03 RX ORDER — HYDROCODONE BITARTRATE AND ACETAMINOPHEN 10; 325 MG/1; MG/1
1 TABLET ORAL EVERY 8 HOURS PRN
Qty: 90 TABLET | Refills: 0 | Status: SHIPPED | OUTPATIENT
Start: 2023-11-03 | End: 2023-11-04 | Stop reason: SDUPTHER

## 2023-11-04 ENCOUNTER — TELEPHONE (OUTPATIENT)
Age: 73
End: 2023-11-04

## 2023-11-04 DIAGNOSIS — M06.09 SERONEGATIVE RHEUMATOID ARTHRITIS OF MULTIPLE SITES (HCC): ICD-10-CM

## 2023-11-04 RX ORDER — HYDROCODONE BITARTRATE AND ACETAMINOPHEN 10; 325 MG/1; MG/1
1 TABLET ORAL EVERY 8 HOURS PRN
Qty: 90 TABLET | Refills: 0 | Status: SHIPPED | OUTPATIENT
Start: 2023-11-04 | End: 2023-12-04

## 2023-11-04 NOTE — TELEPHONE ENCOUNTER
Patient called on call provider as her prescription for Hydrocodone sent on 11/2 to an alternative pharmacy could not be filled due to short supply. Prescription cancelled at [facility] and sent to [facility] instead.

## 2023-11-08 ENCOUNTER — OFFICE VISIT (OUTPATIENT)
Age: 73
End: 2023-11-08
Payer: MEDICARE

## 2023-11-08 VITALS
OXYGEN SATURATION: 94 % | WEIGHT: 195.4 LBS | TEMPERATURE: 98.4 F | DIASTOLIC BLOOD PRESSURE: 73 MMHG | HEART RATE: 79 BPM | RESPIRATION RATE: 18 BRPM | SYSTOLIC BLOOD PRESSURE: 127 MMHG | BODY MASS INDEX: 33.36 KG/M2 | HEIGHT: 64 IN

## 2023-11-08 DIAGNOSIS — F11.90 CHRONIC, CONTINUOUS USE OF OPIOIDS: ICD-10-CM

## 2023-11-08 DIAGNOSIS — Z01.818 PRE-OP EVALUATION: Primary | ICD-10-CM

## 2023-11-08 DIAGNOSIS — Z23 ENCOUNTER FOR IMMUNIZATION: ICD-10-CM

## 2023-11-08 DIAGNOSIS — M16.11 PRIMARY OSTEOARTHRITIS OF RIGHT HIP: ICD-10-CM

## 2023-11-08 DIAGNOSIS — Z72.0 TOBACCO ABUSE: ICD-10-CM

## 2023-11-08 PROCEDURE — G8417 CALC BMI ABV UP PARAM F/U: HCPCS | Performed by: NURSE PRACTITIONER

## 2023-11-08 PROCEDURE — 99214 OFFICE O/P EST MOD 30 MIN: CPT | Performed by: NURSE PRACTITIONER

## 2023-11-08 PROCEDURE — 3074F SYST BP LT 130 MM HG: CPT | Performed by: NURSE PRACTITIONER

## 2023-11-08 PROCEDURE — 1090F PRES/ABSN URINE INCON ASSESS: CPT | Performed by: NURSE PRACTITIONER

## 2023-11-08 PROCEDURE — PBSHW PR IM ADM PRQ ID SUBQ/IM NJXS 1 VACCINE: Performed by: NURSE PRACTITIONER

## 2023-11-08 PROCEDURE — 3078F DIAST BP <80 MM HG: CPT | Performed by: NURSE PRACTITIONER

## 2023-11-08 PROCEDURE — G8400 PT W/DXA NO RESULTS DOC: HCPCS | Performed by: NURSE PRACTITIONER

## 2023-11-08 PROCEDURE — 90694 VACC AIIV4 NO PRSRV 0.5ML IM: CPT | Performed by: NURSE PRACTITIONER

## 2023-11-08 PROCEDURE — 3017F COLORECTAL CA SCREEN DOC REV: CPT | Performed by: NURSE PRACTITIONER

## 2023-11-08 PROCEDURE — PBSHW INFLUENZA, FLUAD, (AGE 65 Y+), IM, PF, 0.5 ML: Performed by: NURSE PRACTITIONER

## 2023-11-08 PROCEDURE — 1123F ACP DISCUSS/DSCN MKR DOCD: CPT | Performed by: NURSE PRACTITIONER

## 2023-11-08 PROCEDURE — 4004F PT TOBACCO SCREEN RCVD TLK: CPT | Performed by: NURSE PRACTITIONER

## 2023-11-08 PROCEDURE — G8484 FLU IMMUNIZE NO ADMIN: HCPCS | Performed by: NURSE PRACTITIONER

## 2023-11-08 PROCEDURE — G8427 DOCREV CUR MEDS BY ELIG CLIN: HCPCS | Performed by: NURSE PRACTITIONER

## 2023-11-08 NOTE — PATIENT INSTRUCTIONS
Stop Aspirin/Aleve/Advil on November 21    Tylenol is Fine. Day of Surgery- Hold all diabetes medications. Hold Hydrocodone day of surgery. Stop smoking two weeks prior to surgery.

## 2023-11-10 ENCOUNTER — TELEPHONE (OUTPATIENT)
Age: 73
End: 2023-11-10

## 2023-11-10 NOTE — TELEPHONE ENCOUNTER
Pharmacy Progress Note     Pt delivered proof of income for Rx assistance inhaler application; however, pt did not bring pharmacy expense report. Contacted pt via phone and scheduled for Rx assistance re-enrollment for DM meds/enrollment for inahler. Instructed to bring pharmacy expense report. Scheduled 11/20/23 at 9AM      There are no discontinued medications. No orders of the defined types were placed in this encounter.         Diana HuttonD, BCGP  Clinical Pharmacist Specialist      For Pharmacy Admin Tracking Only    Program: Medical Group  CPA in place:  Yes  Recommendation Provided To: Patient/Caregiver: 2 via Telephone  Intervention Detail: Patient Access Assistance/Sample Provided and Scheduled Appointment  Intervention Accepted By: Patient/Caregiver: 2  Time Spent (min): 10

## 2023-11-14 NOTE — PROGRESS NOTES
Celso Putnam, who was evaluated through a synchronous (real-time) audio-video encounter, and/or her healthcare decision maker, is aware that it is a billable service, with coverage as determined by her insurance carrier. She provided verbal consent to proceed: YES, and patient identification was verified. It was conducted pursuant to the emergency declaration under the [facility], [facility] authority and the [employer] Act. A caregiver was present when appropriate. Ability to conduct physical exam was limited. I was at home. The patient was at home. This virtual visit was conducted via [employer]. Pursuant to the emergency declaration under the [facility], [address] [place] [address] authority and the [employer] Act, this Virtual  Visit was conducted to reduce the patient's risk of exposure to COVID-19 and provide continuity of care for an established patient. Services were provided through a video synchronous discussion virtually to substitute for in-person clinic visit. Due to this being a TeleHealth evaluation, many elements of the physical examination are unable to be assessed. Total Time: minutes: 11-20 minutes. Nadira Baker MD    [phone]  Subjective:   Celso Putnam was seen for GI Problem    Pt's daughter is given most of the historoy. Pt has had few day hx of nausea, vomiting, diarrhea, and weakness. Her  states that she has been acting \"out of it\" since yesterday. Denies fevers, abdominal pain. Unable to tell me what her last sugars were. Daughter states that she has similar symptoms about twice a year and has been in the hospital at [facility] for this in the past; unsure what the cause was. Prior to Admission medications    Medication Sig Start Date End Date Taking?  Authorizing Provider   HYDROcodone-acetaminophen (NORCO)  mg tablet Take 1 Tab by mouth every eight (8) hours as needed for Pain for up to 30 days. Max Daily Amount: 3 Tabs. 9/30/20 10/30/20  Delgado Cervantes NP   tiotropium (Spiriva with HandiHaler) 18 mcg inhalation capsule Take 1 Cap by inhalation daily. 7/31/20   Delgado Cervantes NP   metFORMIN (GLUCOPHAGE) 500 mg tablet Take 2 Tabs by mouth two (2) times daily (with meals). 6/12/20   Delgado Cervantes NP   atenoloL (TENORMIN) 25 mg tablet TAKE ONE TABLET BY MOUTH DAILY 5/6/20   Delgado Cervantes NP   pregabalin (LYRICA) 200 mg capsule TAKE ONE CAPSULE BY MOUTH THREE TIMES A DAY **MAX 3 PER DAY 4/6/20   Delgado Cervantes NP   dulaglutide (Trulicity) 1.5 JV/5.4 mL sub-q pen 0.5 mL by SubCUTAneous route every seven (7) days. 3/24/20   Delgado Cervantes NP   diltiazem (TIAZAC) 420 mg SR capsule TAKE 1 CAPSULE EVERY DAY 3/24/20   Delgado Cervantes NP   potassium chloride (KLOR-CON) 10 mEq tablet TAKE 1 TABLET EVERY DAY 1/6/20   Delgado Cervantes NP   fenofibrate nanocrystallized (TRICOR) 48 mg tablet Take 1 Tab by mouth daily. 12/17/19   Delgado Cervantes NP   glucose blood VI test strips (ACCU-CHEK BELEN PLUS TEST STRP) strip Accu-Chek Belen Plus test strips    Provider, Historical   multivit-min-FA-lycopen-lutein (CENTRUM SILVER) 0.4-300-250 mg-mcg-mcg tab Centrum Silver tablet   Prescribed by jose raul Varghese6/Shayy Macias MD 12/30/08   Provider, Historical   methotrexate (RHEUMATREX) 2.5 mg tablet methotrexate sodium 2.5 mg tablet    Provider, Historical   raNITIdine (ZANTAC) 150 mg tablet ranitidine 150 mg tablet    Provider, Historical   aspirin delayed-release 81 mg tablet aspirin 81 mg tablet,delayed release   Prescribed by jose raul Rudolph/Shayy Macias MD 12/30/08   Provider, Historical   fluticasone propion-salmeterol (ADVAIR/WIXELA) 250-50 mcg/dose diskus inhaler Take 1 Puff by inhalation every twelve (12) hours. 12/13/19   Delgado Cervantes, NP   allopurinol (ZYLOPRIM) 300 mg tablet Take 1 Tab by mouth daily.  11/19/19   Leeann Rashad Medina MD   atorvastatin (LIPITOR) 20 mg tablet Take 1 Tab by mouth daily. 10/18/19   Josie Cervantes NP   losartan-hydroCHLOROthiazide (HYZAAR) 100-25 mg per tablet Take 1 Tab by mouth daily. 9/11/19   Josie Cervantes NP   ergocalciferol (ERGOCALCIFEROL) 50,000 unit capsule Take 1 Cap by mouth every seven (7) days. 10/15/18   Gama Ferrara MD   folic acid (FOLVITE) 1 mg tablet Take 1 Tab by mouth daily. 10/15/18   Gama Ferrara MD   Blood Glucose Control High&Low (ACCU-CHEK JENNIE CONTROL SOLN) soln Use as directed for controls as needed. DX: E11.9 7/16/18   Josie Cervantes NP   Blood-Glucose Meter (ACCU-CHEK JENNIE PLUS METER) misc Test blood sugar twice daily. DX: E11.9. Substitute supply brand accepted by insurance. 7/1/18   Josie Cervantes NP   Lancets (ACCU-CHEK SOFTCLIX LANCETS) misc Test blood sugar twice daily. DX: E11.9. Substitute supply brand accepted by insurance. 7/1/18   Josie Cervantes NP   glucose blood VI test strips (ACCU-CHEK JENNIE PLUS TEST STRP) strip Test blood sugar twice daily. DX: E11.9. Substitute supply brand accepted by insurance. 7/1/18   Josie Cervantes NP   alcohol swabs (ALCOHOL WIPES) padm Test blood sugar twice daily. DX: E11.9. Substitute supply brand accepted by insurance. 7/1/18   Josie Cervantes NP   albuterol (PROVENTIL VENTOLIN) 2.5 mg /3 mL (0.083 %) nebulizer solution 3 mL by Nebulization route every six (6) hours as needed for Wheezing. 5/8/17   Kerwin Primus, NP   albuterol (PROVENTIL HFA, VENTOLIN HFA, PROAIR HFA) 90 mcg/actuation inhaler Take 2 Puffs by inhalation every six (6) hours as needed for Wheezing. 5/8/17   Kerwin Primus, NP   Cetirizine (ZYRTEC) 10 mg Cap Take 10 mg by mouth daily.     Provider, Historical       Allergies   Allergen Reactions    Augmentin [Amoxicillin-Pot Clavulanate] Diarrhea    Bactrim [Sulfamethoxazole-Trimethoprim] Nausea Only    Biaxin [Clarithromycin] Nausea Only    Demerol [Meperidine] Itching    Erythromycin Nausea Only    Gabapentin Nausea and Vomiting    Pcn [Penicillins] Hives    Percocet [Oxycodone-Acetaminophen] Itching    Pravastatin Nausea Only    Tetracycline Nausea Only    Voltaren [Diclofenac Sodium] Hives       Review of Systems   Unable to perform ROS: Mental status change       Physical Exam:     There were no vitals taken for this visit. General: Disoriented   Mental  status: normal mood, slow speech, not AAOx 3   HENT: NCAT   Neck: no visualized mass   Resp: no respiratory distress   Neuro: no gross deficits   Skin: no discoloration or lesions of concern on visible areas   Psychiatric: Flat affect       Assessment & Plan:   Cara Wakefield is a 72 y.o. female who presents today for:    1. Delirium  Per family, pt is currently altered for the past 24 hours. Would recommend ER evaluation given age and medical history to r/o metabolic causes vs infection vs CVA. They will plan to go to [facility] ER for evaluation. 2. Nausea and vomiting, intractability of vomiting not specified, unspecified vomiting type  Defer to ER evaluation/management. There are no discontinued medications. Treatment risks/benefits/costs/interactions/alternatives discussed with patient. Advised patient to call back or return to office if symptoms worsen/change/persist. If patient cannot reach us or should anything more severe/urgent arise he/she should proceed directly to the nearest emergency department. Discussed expected course/resolution/complications of diagnosis in detail with patient. Patient expressed understanding with the diagnosis and plan. Pebbles Ponce M.D. Metronidazole Pregnancy And Lactation Text: This medication is Pregnancy Category B and considered safe during pregnancy.  It is also excreted in breast milk.

## 2023-11-20 ENCOUNTER — HOSPITAL ENCOUNTER (OUTPATIENT)
Facility: HOSPITAL | Age: 73
Discharge: HOME | End: 2023-11-23
Payer: MEDICARE

## 2023-11-20 ENCOUNTER — ENROLLMENT (OUTPATIENT)
Dept: PRIMARY CARE CLINIC | Facility: CLINIC | Age: 73
End: 2023-11-20

## 2023-11-20 ENCOUNTER — PHARMACY VISIT (OUTPATIENT)
Age: 73
End: 2023-11-20
Payer: MEDICARE

## 2023-11-20 VITALS
DIASTOLIC BLOOD PRESSURE: 77 MMHG | TEMPERATURE: 97.6 F | WEIGHT: 192 LBS | HEART RATE: 70 BPM | HEIGHT: 64 IN | BODY MASS INDEX: 32.78 KG/M2 | SYSTOLIC BLOOD PRESSURE: 119 MMHG

## 2023-11-20 DIAGNOSIS — J42 CHRONIC BRONCHITIS, UNSPECIFIED CHRONIC BRONCHITIS TYPE (HCC): ICD-10-CM

## 2023-11-20 DIAGNOSIS — E11.65 UNCONTROLLED TYPE 2 DIABETES MELLITUS WITH HYPERGLYCEMIA (HCC): Primary | ICD-10-CM

## 2023-11-20 LAB
ABO + RH BLD: NORMAL
ANION GAP SERPL CALC-SCNC: 7 MMOL/L (ref 5–15)
APPEARANCE UR: ABNORMAL
BACTERIA URNS QL MICRO: ABNORMAL /HPF
BILIRUB UR QL: NEGATIVE
BLOOD GROUP ANTIBODIES SERPL: NORMAL
BUN SERPL-MCNC: 27 MG/DL (ref 6–20)
BUN/CREAT SERPL: 17 (ref 12–20)
CALCIUM SERPL-MCNC: 8.8 MG/DL (ref 8.5–10.1)
CHLORIDE SERPL-SCNC: 104 MMOL/L (ref 97–108)
CO2 SERPL-SCNC: 28 MMOL/L (ref 21–32)
COLOR UR: ABNORMAL
CREAT SERPL-MCNC: 1.56 MG/DL (ref 0.55–1.02)
EKG ATRIAL RATE: 69 BPM
EKG DIAGNOSIS: NORMAL
EKG P AXIS: 39 DEGREES
EKG P-R INTERVAL: 132 MS
EKG Q-T INTERVAL: 418 MS
EKG QRS DURATION: 102 MS
EKG QTC CALCULATION (BAZETT): 447 MS
EKG R AXIS: -33 DEGREES
EKG T AXIS: 51 DEGREES
EKG VENTRICULAR RATE: 69 BPM
EPITH CASTS URNS QL MICRO: ABNORMAL /LPF
ERYTHROCYTE [DISTWIDTH] IN BLOOD BY AUTOMATED COUNT: 15.8 % (ref 11.5–14.5)
EST. AVERAGE GLUCOSE BLD GHB EST-MCNC: 146 MG/DL
GLUCOSE SERPL-MCNC: 127 MG/DL (ref 65–100)
GLUCOSE UR STRIP.AUTO-MCNC: >1000 MG/DL
HBA1C MFR BLD: 6.7 % (ref 4–5.6)
HCT VFR BLD AUTO: 45.1 % (ref 35–47)
HGB BLD-MCNC: 14.2 G/DL (ref 11.5–16)
HGB UR QL STRIP: NEGATIVE
INR PPP: 1 (ref 0.9–1.1)
KETONES UR QL STRIP.AUTO: ABNORMAL MG/DL
LEUKOCYTE ESTERASE UR QL STRIP.AUTO: NEGATIVE
MCH RBC QN AUTO: 28.3 PG (ref 26–34)
MCHC RBC AUTO-ENTMCNC: 31.5 G/DL (ref 30–36.5)
MCV RBC AUTO: 89.8 FL (ref 80–99)
NITRITE UR QL STRIP.AUTO: NEGATIVE
NRBC # BLD: 0 K/UL (ref 0–0.01)
NRBC BLD-RTO: 0 PER 100 WBC
PH UR STRIP: 5.5 (ref 5–8)
PLATELET # BLD AUTO: 199 K/UL (ref 150–400)
PMV BLD AUTO: 13 FL (ref 8.9–12.9)
POTASSIUM SERPL-SCNC: 3.5 MMOL/L (ref 3.5–5.1)
PROT UR STRIP-MCNC: ABNORMAL MG/DL
PROTHROMBIN TIME: 10.4 SEC (ref 9–11.1)
RBC # BLD AUTO: 5.02 M/UL (ref 3.8–5.2)
RBC #/AREA URNS HPF: ABNORMAL /HPF (ref 0–5)
SODIUM SERPL-SCNC: 139 MMOL/L (ref 136–145)
SP GR UR REFRACTOMETRY: >1.03
SPECIMEN EXP DATE BLD: NORMAL
URINE CULTURE IF INDICATED: ABNORMAL
UROBILINOGEN UR QL STRIP.AUTO: 1 EU/DL (ref 0.2–1)
WBC # BLD AUTO: 7.5 K/UL (ref 3.6–11)
WBC URNS QL MICRO: ABNORMAL /HPF (ref 0–4)

## 2023-11-20 PROCEDURE — 86850 RBC ANTIBODY SCREEN: CPT

## 2023-11-20 PROCEDURE — 86901 BLOOD TYPING SEROLOGIC RH(D): CPT

## 2023-11-20 PROCEDURE — 93005 ELECTROCARDIOGRAM TRACING: CPT | Performed by: ORTHOPAEDIC SURGERY

## 2023-11-20 PROCEDURE — 86900 BLOOD TYPING SEROLOGIC ABO: CPT

## 2023-11-20 PROCEDURE — APPNB30 APP NON BILLABLE TIME 0-30 MINS: Performed by: NURSE PRACTITIONER

## 2023-11-20 PROCEDURE — 93010 ELECTROCARDIOGRAM REPORT: CPT | Performed by: INTERNAL MEDICINE

## 2023-11-20 PROCEDURE — 80048 BASIC METABOLIC PNL TOTAL CA: CPT

## 2023-11-20 PROCEDURE — 36415 COLL VENOUS BLD VENIPUNCTURE: CPT

## 2023-11-20 PROCEDURE — 83036 HEMOGLOBIN GLYCOSYLATED A1C: CPT

## 2023-11-20 PROCEDURE — 81001 URINALYSIS AUTO W/SCOPE: CPT

## 2023-11-20 PROCEDURE — 85610 PROTHROMBIN TIME: CPT

## 2023-11-20 PROCEDURE — 85027 COMPLETE CBC AUTOMATED: CPT

## 2023-11-20 RX ORDER — DULAGLUTIDE 3 MG/.5ML
3 INJECTION, SOLUTION SUBCUTANEOUS WEEKLY
Qty: 16 ADJUSTABLE DOSE PRE-FILLED PEN SYRINGE | Refills: 3 | Status: SHIPPED | OUTPATIENT
Start: 2023-11-20

## 2023-11-20 ASSESSMENT — PAIN DESCRIPTION - LOCATION: LOCATION: HIP

## 2023-11-20 ASSESSMENT — PAIN DESCRIPTION - PAIN TYPE: TYPE: CHRONIC PAIN

## 2023-11-20 ASSESSMENT — PAIN DESCRIPTION - ORIENTATION: ORIENTATION: RIGHT

## 2023-11-20 ASSESSMENT — PAIN DESCRIPTION - DESCRIPTORS: DESCRIPTORS: ACHING

## 2023-11-20 ASSESSMENT — PAIN SCALES - GENERAL: PAINLEVEL_OUTOF10: 6

## 2023-11-20 NOTE — PERIOP NOTE
PATIENT GIVEN SURGICAL SITE INFECTION FAQ HANDOUT AND HAND WASHING TIP SHEET. PREOP INSTRUCTIONS REVIEWED AND PATIENT VERBALIZES UNDERSTANDING OF INSTRUCTIONS. PATIENT HAS BEEN GIVEN THE OPPORTUNITY TO ASK ADDITIONAL QUESTIONS.
treatments. You will apply this ointment to each nostril 2  times a day for 5 days. Wash your hands with  gel or soap and water for 20 seconds before using ointment. Place a pea-sized amount of ointment on a cotton Q-tip. Apply ointment just inside of each nostril with the Q-tip. Do not push Q-tip or ointment deep inside you nose. Press your nostrils together and massage for a few seconds. Wash your hands with  gel or soap and water after you are finished. Do not get ointment near your eyes. If it gets into your eyes, rinse them with cool water. If you need to use nasal spray, clean the tip of the bottle with alcohol before use and do not use both at the same time. If you are scheduled for COVID testing during the 5 days, do NOT apply morning dose until after the COVID test has been performed. How to use Chlorhexidine (CHG) 4% liquid soap  Purchase an 8 ounce bottle of CHG liquid soap (Chlorhexidine 4%, Hibiclens, Hex-A-Clens or store brand) at a pharmacy or grocery store. Wash your hair with your normal shampoo and your body with regular soap and rinse well to remove shampoo and soap from your skin. Wet a clean washcloth and turn off the shower. Put CHG soap on washcloth and apply to your entire body from the neck down. Do not use on your head, face or private parts(genitals). Do not use CHG soap on open sores, wounds or areas of skin irritation. Wash your body gently for 5 minutes. Do not wash your skin too hard. This soap does not create lather. Pay special attention to your underarms and from your belly button to your feet. Turn the shower back on and rinse well to get CHG soap off your body. Pat your skin dry with a clean, dry towel. Do not apply lotions or moisturizer. Put on clean clothes and sleep on fresh bed sheets the night before surgery. Do not allow pets to sleep with you.         Day of Procedure    Please park in the parking deck or any designated visitor parking

## 2023-11-21 LAB
BACTERIA SPEC CULT: NORMAL
BACTERIA SPEC CULT: NORMAL
SERVICE CMNT-IMP: NORMAL

## 2023-11-28 ENCOUNTER — ANESTHESIA (OUTPATIENT)
Facility: HOSPITAL | Age: 73
End: 2023-11-28
Payer: MEDICARE

## 2023-11-28 ENCOUNTER — APPOINTMENT (OUTPATIENT)
Facility: HOSPITAL | Age: 73
End: 2023-11-28
Attending: ORTHOPAEDIC SURGERY
Payer: MEDICARE

## 2023-11-28 ENCOUNTER — HOSPITAL ENCOUNTER (OUTPATIENT)
Facility: HOSPITAL | Age: 73
Setting detail: OBSERVATION
Discharge: HOME HEALTH | End: 2023-11-30
Attending: ORTHOPAEDIC SURGERY | Admitting: ORTHOPAEDIC SURGERY
Payer: MEDICARE

## 2023-11-28 ENCOUNTER — ANESTHESIA EVENT (OUTPATIENT)
Facility: HOSPITAL | Age: 73
End: 2023-11-28
Payer: MEDICARE

## 2023-11-28 DIAGNOSIS — M16.11 OSTEOARTHRITIS OF RIGHT HIP, UNSPECIFIED OSTEOARTHRITIS TYPE: Primary | ICD-10-CM

## 2023-11-28 LAB
GLUCOSE BLD STRIP.AUTO-MCNC: 148 MG/DL (ref 65–117)
GLUCOSE BLD STRIP.AUTO-MCNC: 336 MG/DL (ref 65–117)
GLUCOSE BLD STRIP.AUTO-MCNC: 342 MG/DL (ref 65–117)
SERVICE CMNT-IMP: ABNORMAL

## 2023-11-28 PROCEDURE — 6370000000 HC RX 637 (ALT 250 FOR IP): Performed by: PHYSICIAN ASSISTANT

## 2023-11-28 PROCEDURE — 3700000001 HC ADD 15 MINUTES (ANESTHESIA): Performed by: ORTHOPAEDIC SURGERY

## 2023-11-28 PROCEDURE — G0378 HOSPITAL OBSERVATION PER HR: HCPCS

## 2023-11-28 PROCEDURE — 3600000005 HC SURGERY LEVEL 5 BASE: Performed by: ORTHOPAEDIC SURGERY

## 2023-11-28 PROCEDURE — 27130 TOTAL HIP ARTHROPLASTY: CPT | Performed by: ORTHOPAEDIC SURGERY

## 2023-11-28 PROCEDURE — 82962 GLUCOSE BLOOD TEST: CPT

## 2023-11-28 PROCEDURE — 6370000000 HC RX 637 (ALT 250 FOR IP): Performed by: ORTHOPAEDIC SURGERY

## 2023-11-28 PROCEDURE — 2709999900 HC NON-CHARGEABLE SUPPLY: Performed by: ORTHOPAEDIC SURGERY

## 2023-11-28 PROCEDURE — 6360000002 HC RX W HCPCS: Performed by: ANESTHESIOLOGY

## 2023-11-28 PROCEDURE — 2500000003 HC RX 250 WO HCPCS: Performed by: ANESTHESIOLOGY

## 2023-11-28 PROCEDURE — 72170 X-RAY EXAM OF PELVIS: CPT

## 2023-11-28 PROCEDURE — 2580000003 HC RX 258: Performed by: PHYSICIAN ASSISTANT

## 2023-11-28 PROCEDURE — 97161 PT EVAL LOW COMPLEX 20 MIN: CPT

## 2023-11-28 PROCEDURE — 97530 THERAPEUTIC ACTIVITIES: CPT

## 2023-11-28 PROCEDURE — 2580000003 HC RX 258: Performed by: ANESTHESIOLOGY

## 2023-11-28 PROCEDURE — 97116 GAIT TRAINING THERAPY: CPT

## 2023-11-28 PROCEDURE — 7100000001 HC PACU RECOVERY - ADDTL 15 MIN: Performed by: ORTHOPAEDIC SURGERY

## 2023-11-28 PROCEDURE — 3700000000 HC ANESTHESIA ATTENDED CARE: Performed by: ORTHOPAEDIC SURGERY

## 2023-11-28 PROCEDURE — 3600000015 HC SURGERY LEVEL 5 ADDTL 15MIN: Performed by: ORTHOPAEDIC SURGERY

## 2023-11-28 PROCEDURE — C1776 JOINT DEVICE (IMPLANTABLE): HCPCS | Performed by: ORTHOPAEDIC SURGERY

## 2023-11-28 PROCEDURE — 7100000000 HC PACU RECOVERY - FIRST 15 MIN: Performed by: ORTHOPAEDIC SURGERY

## 2023-11-28 PROCEDURE — 6360000002 HC RX W HCPCS: Performed by: PHYSICIAN ASSISTANT

## 2023-11-28 DEVICE — TAPERFILL HIP STEM, LATERAL OFFSET, SIZE 8
Type: IMPLANTABLE DEVICE | Site: HIP | Status: FUNCTIONAL
Brand: DJO SURGICAL

## 2023-11-28 DEVICE — HEAD, FEMORAL, CERAMIC, BILOX DELTA, 36MM -4.0
Type: IMPLANTABLE DEVICE | Site: HIP | Status: FUNCTIONAL
Brand: DJO SURGICAL

## 2023-11-28 DEVICE — EMPOWR ACETABULAR SYSTEM, LINER, NEUTRAL, HXE+, 36F
Type: IMPLANTABLE DEVICE | Site: HIP | Status: FUNCTIONAL
Brand: DJO SURGICAL

## 2023-11-28 DEVICE — EMPOWR ACET SYSTEM, CUP, HEMISPHERICAL, CLUSTER HOLE, 52MM
Type: IMPLANTABLE DEVICE | Site: HIP | Status: FUNCTIONAL
Brand: DJO SURGICAL

## 2023-11-28 DEVICE — EMPOWR ACETABULAR, BONE SCREW, 30MM
Type: IMPLANTABLE DEVICE | Site: HIP | Status: FUNCTIONAL
Brand: DJO SURGICAL

## 2023-11-28 RX ORDER — 0.9 % SODIUM CHLORIDE 0.9 %
INTRAVENOUS SOLUTION INTRAVENOUS PRN
Status: DISCONTINUED | OUTPATIENT
Start: 2023-11-28 | End: 2023-11-28 | Stop reason: SDUPTHER

## 2023-11-28 RX ORDER — POTASSIUM CHLORIDE 750 MG/1
10 TABLET, FILM COATED, EXTENDED RELEASE ORAL NIGHTLY
Status: DISCONTINUED | OUTPATIENT
Start: 2023-11-28 | End: 2023-11-30 | Stop reason: HOSPADM

## 2023-11-28 RX ORDER — NALOXONE HYDROCHLORIDE 0.4 MG/ML
0.4 INJECTION, SOLUTION INTRAMUSCULAR; INTRAVENOUS; SUBCUTANEOUS PRN
Status: DISCONTINUED | OUTPATIENT
Start: 2023-11-28 | End: 2023-11-30 | Stop reason: HOSPADM

## 2023-11-28 RX ORDER — MIDAZOLAM HYDROCHLORIDE 2 MG/2ML
2 INJECTION, SOLUTION INTRAMUSCULAR; INTRAVENOUS
Status: DISCONTINUED | OUTPATIENT
Start: 2023-11-28 | End: 2023-11-28 | Stop reason: HOSPADM

## 2023-11-28 RX ORDER — HYDROMORPHONE HYDROCHLORIDE 1 MG/ML
0.5 INJECTION, SOLUTION INTRAMUSCULAR; INTRAVENOUS; SUBCUTANEOUS EVERY 5 MIN PRN
Status: DISCONTINUED | OUTPATIENT
Start: 2023-11-28 | End: 2023-11-28 | Stop reason: HOSPADM

## 2023-11-28 RX ORDER — HYDRALAZINE HYDROCHLORIDE 20 MG/ML
10 INJECTION INTRAMUSCULAR; INTRAVENOUS
Status: DISCONTINUED | OUTPATIENT
Start: 2023-11-28 | End: 2023-11-28 | Stop reason: HOSPADM

## 2023-11-28 RX ORDER — ALBUTEROL SULFATE 2.5 MG/3ML
2.5 SOLUTION RESPIRATORY (INHALATION) ONCE
Status: COMPLETED | OUTPATIENT
Start: 2023-11-28 | End: 2023-11-28

## 2023-11-28 RX ORDER — LIDOCAINE HYDROCHLORIDE 10 MG/ML
1 INJECTION, SOLUTION EPIDURAL; INFILTRATION; INTRACAUDAL; PERINEURAL
Status: DISCONTINUED | OUTPATIENT
Start: 2023-11-28 | End: 2023-11-28 | Stop reason: HOSPADM

## 2023-11-28 RX ORDER — ONDANSETRON 2 MG/ML
INJECTION INTRAMUSCULAR; INTRAVENOUS PRN
Status: DISCONTINUED | OUTPATIENT
Start: 2023-11-28 | End: 2023-11-28 | Stop reason: SDUPTHER

## 2023-11-28 RX ORDER — INSULIN LISPRO 100 [IU]/ML
0-4 INJECTION, SOLUTION INTRAVENOUS; SUBCUTANEOUS NIGHTLY
Status: DISCONTINUED | OUTPATIENT
Start: 2023-11-28 | End: 2023-11-30 | Stop reason: HOSPADM

## 2023-11-28 RX ORDER — PREGABALIN 100 MG/1
200 CAPSULE ORAL 3 TIMES DAILY
Status: DISCONTINUED | OUTPATIENT
Start: 2023-11-28 | End: 2023-11-30 | Stop reason: HOSPADM

## 2023-11-28 RX ORDER — DIPHENHYDRAMINE HYDROCHLORIDE 50 MG/ML
12.5 INJECTION INTRAMUSCULAR; INTRAVENOUS
Status: DISCONTINUED | OUTPATIENT
Start: 2023-11-28 | End: 2023-11-28 | Stop reason: HOSPADM

## 2023-11-28 RX ORDER — IPRATROPIUM BROMIDE AND ALBUTEROL SULFATE 2.5; .5 MG/3ML; MG/3ML
1 SOLUTION RESPIRATORY (INHALATION)
Status: DISCONTINUED | OUTPATIENT
Start: 2023-11-28 | End: 2023-11-28 | Stop reason: HOSPADM

## 2023-11-28 RX ORDER — HYDROCHLOROTHIAZIDE 25 MG/1
25 TABLET ORAL EVERY EVENING
Status: DISCONTINUED | OUTPATIENT
Start: 2023-11-28 | End: 2023-11-30 | Stop reason: HOSPADM

## 2023-11-28 RX ORDER — LORAZEPAM 2 MG/ML
0.5 INJECTION INTRAMUSCULAR
Status: DISCONTINUED | OUTPATIENT
Start: 2023-11-28 | End: 2023-11-28 | Stop reason: HOSPADM

## 2023-11-28 RX ORDER — CEFAZOLIN SODIUM 1 G/3ML
INJECTION, POWDER, FOR SOLUTION INTRAMUSCULAR; INTRAVENOUS PRN
Status: DISCONTINUED | OUTPATIENT
Start: 2023-11-28 | End: 2023-11-28 | Stop reason: SDUPTHER

## 2023-11-28 RX ORDER — SODIUM CHLORIDE 0.9 % (FLUSH) 0.9 %
5-40 SYRINGE (ML) INJECTION EVERY 12 HOURS SCHEDULED
Status: DISCONTINUED | OUTPATIENT
Start: 2023-11-28 | End: 2023-11-30 | Stop reason: HOSPADM

## 2023-11-28 RX ORDER — ONDANSETRON 2 MG/ML
4 INJECTION INTRAMUSCULAR; INTRAVENOUS EVERY 6 HOURS PRN
Status: DISCONTINUED | OUTPATIENT
Start: 2023-11-28 | End: 2023-11-30 | Stop reason: HOSPADM

## 2023-11-28 RX ORDER — ACETAMINOPHEN 500 MG
1000 TABLET ORAL ONCE
Status: COMPLETED | OUTPATIENT
Start: 2023-11-28 | End: 2023-11-28

## 2023-11-28 RX ORDER — DEXTROSE MONOHYDRATE 100 MG/ML
INJECTION, SOLUTION INTRAVENOUS CONTINUOUS PRN
Status: DISCONTINUED | OUTPATIENT
Start: 2023-11-28 | End: 2023-11-30 | Stop reason: HOSPADM

## 2023-11-28 RX ORDER — SODIUM CHLORIDE 0.9 % (FLUSH) 0.9 %
5-40 SYRINGE (ML) INJECTION EVERY 12 HOURS SCHEDULED
Status: DISCONTINUED | OUTPATIENT
Start: 2023-11-28 | End: 2023-11-28 | Stop reason: HOSPADM

## 2023-11-28 RX ORDER — HYDROXYZINE HYDROCHLORIDE 10 MG/1
10 TABLET, FILM COATED ORAL EVERY 8 HOURS PRN
Status: DISCONTINUED | OUTPATIENT
Start: 2023-11-28 | End: 2023-11-30 | Stop reason: HOSPADM

## 2023-11-28 RX ORDER — SODIUM CHLORIDE 9 MG/ML
INJECTION, SOLUTION INTRAVENOUS PRN
Status: DISCONTINUED | OUTPATIENT
Start: 2023-11-28 | End: 2023-11-28 | Stop reason: HOSPADM

## 2023-11-28 RX ORDER — PROCHLORPERAZINE EDISYLATE 5 MG/ML
5 INJECTION INTRAMUSCULAR; INTRAVENOUS
Status: DISCONTINUED | OUTPATIENT
Start: 2023-11-28 | End: 2023-11-28 | Stop reason: HOSPADM

## 2023-11-28 RX ORDER — ATORVASTATIN CALCIUM 40 MG/1
20 TABLET, FILM COATED ORAL NIGHTLY
Status: DISCONTINUED | OUTPATIENT
Start: 2023-11-28 | End: 2023-11-30 | Stop reason: HOSPADM

## 2023-11-28 RX ORDER — SODIUM CHLORIDE 0.9 % (FLUSH) 0.9 %
5-40 SYRINGE (ML) INJECTION PRN
Status: DISCONTINUED | OUTPATIENT
Start: 2023-11-28 | End: 2023-11-30 | Stop reason: HOSPADM

## 2023-11-28 RX ORDER — DILTIAZEM HYDROCHLORIDE 420 MG/1
420 CAPSULE, EXTENDED RELEASE ORAL NIGHTLY
Status: DISCONTINUED | OUTPATIENT
Start: 2023-11-28 | End: 2023-11-28 | Stop reason: CLARIF

## 2023-11-28 RX ORDER — LOSARTAN POTASSIUM AND HYDROCHLOROTHIAZIDE 25; 100 MG/1; MG/1
1 TABLET ORAL NIGHTLY
Status: DISCONTINUED | OUTPATIENT
Start: 2023-11-28 | End: 2023-11-28 | Stop reason: SDUPTHER

## 2023-11-28 RX ORDER — SODIUM CHLORIDE 0.9 % (FLUSH) 0.9 %
5-40 SYRINGE (ML) INJECTION PRN
Status: DISCONTINUED | OUTPATIENT
Start: 2023-11-28 | End: 2023-11-28 | Stop reason: HOSPADM

## 2023-11-28 RX ORDER — POLYETHYLENE GLYCOL 3350 17 G/17G
17 POWDER, FOR SOLUTION ORAL DAILY
Status: DISCONTINUED | OUTPATIENT
Start: 2023-11-28 | End: 2023-11-30 | Stop reason: HOSPADM

## 2023-11-28 RX ORDER — TRANEXAMIC ACID 100 MG/ML
INJECTION, SOLUTION INTRAVENOUS PRN
Status: DISCONTINUED | OUTPATIENT
Start: 2023-11-28 | End: 2023-11-28 | Stop reason: SDUPTHER

## 2023-11-28 RX ORDER — PROPOFOL 10 MG/ML
INJECTION, EMULSION INTRAVENOUS CONTINUOUS PRN
Status: DISCONTINUED | OUTPATIENT
Start: 2023-11-28 | End: 2023-11-28 | Stop reason: SDUPTHER

## 2023-11-28 RX ORDER — ALLOPURINOL 300 MG/1
300 TABLET ORAL NIGHTLY
Status: DISCONTINUED | OUTPATIENT
Start: 2023-11-28 | End: 2023-11-30 | Stop reason: HOSPADM

## 2023-11-28 RX ORDER — INSULIN LISPRO 100 [IU]/ML
0-4 INJECTION, SOLUTION INTRAVENOUS; SUBCUTANEOUS
Status: DISCONTINUED | OUTPATIENT
Start: 2023-11-28 | End: 2023-11-30 | Stop reason: HOSPADM

## 2023-11-28 RX ORDER — SENNA AND DOCUSATE SODIUM 50; 8.6 MG/1; MG/1
1 TABLET, FILM COATED ORAL 2 TIMES DAILY
Status: DISCONTINUED | OUTPATIENT
Start: 2023-11-28 | End: 2023-11-30 | Stop reason: HOSPADM

## 2023-11-28 RX ORDER — FENTANYL CITRATE 50 UG/ML
INJECTION, SOLUTION INTRAMUSCULAR; INTRAVENOUS PRN
Status: DISCONTINUED | OUTPATIENT
Start: 2023-11-28 | End: 2023-11-28 | Stop reason: SDUPTHER

## 2023-11-28 RX ORDER — ONDANSETRON 2 MG/ML
4 INJECTION INTRAMUSCULAR; INTRAVENOUS
Status: DISCONTINUED | OUTPATIENT
Start: 2023-11-28 | End: 2023-11-28 | Stop reason: HOSPADM

## 2023-11-28 RX ORDER — FENTANYL CITRATE 50 UG/ML
25 INJECTION, SOLUTION INTRAMUSCULAR; INTRAVENOUS EVERY 5 MIN PRN
Status: DISCONTINUED | OUTPATIENT
Start: 2023-11-28 | End: 2023-11-28 | Stop reason: HOSPADM

## 2023-11-28 RX ORDER — LOSARTAN POTASSIUM 50 MG/1
100 TABLET ORAL EVERY EVENING
Status: DISCONTINUED | OUTPATIENT
Start: 2023-11-28 | End: 2023-11-30 | Stop reason: HOSPADM

## 2023-11-28 RX ORDER — SODIUM CHLORIDE 9 MG/ML
INJECTION, SOLUTION INTRAVENOUS CONTINUOUS
Status: DISCONTINUED | OUTPATIENT
Start: 2023-11-28 | End: 2023-11-30 | Stop reason: HOSPADM

## 2023-11-28 RX ORDER — SODIUM CHLORIDE, SODIUM LACTATE, POTASSIUM CHLORIDE, CALCIUM CHLORIDE 600; 310; 30; 20 MG/100ML; MG/100ML; MG/100ML; MG/100ML
INJECTION, SOLUTION INTRAVENOUS CONTINUOUS
Status: DISCONTINUED | OUTPATIENT
Start: 2023-11-28 | End: 2023-11-28 | Stop reason: HOSPADM

## 2023-11-28 RX ORDER — EPHEDRINE SULFATE 50 MG/ML
INJECTION INTRAVENOUS PRN
Status: DISCONTINUED | OUTPATIENT
Start: 2023-11-28 | End: 2023-11-28 | Stop reason: SDUPTHER

## 2023-11-28 RX ORDER — HYDROCODONE BITARTRATE AND ACETAMINOPHEN 10; 325 MG/1; MG/1
1 TABLET ORAL EVERY 6 HOURS PRN
Status: DISCONTINUED | OUTPATIENT
Start: 2023-11-28 | End: 2023-11-28

## 2023-11-28 RX ORDER — OXYCODONE HYDROCHLORIDE 5 MG/1
5 TABLET ORAL EVERY 4 HOURS PRN
Status: DISCONTINUED | OUTPATIENT
Start: 2023-11-28 | End: 2023-11-30 | Stop reason: HOSPADM

## 2023-11-28 RX ORDER — SODIUM CHLORIDE 9 MG/ML
INJECTION, SOLUTION INTRAVENOUS PRN
Status: DISCONTINUED | OUTPATIENT
Start: 2023-11-28 | End: 2023-11-30 | Stop reason: HOSPADM

## 2023-11-28 RX ORDER — ONDANSETRON 4 MG/1
4 TABLET, ORALLY DISINTEGRATING ORAL EVERY 8 HOURS PRN
Status: DISCONTINUED | OUTPATIENT
Start: 2023-11-28 | End: 2023-11-30 | Stop reason: HOSPADM

## 2023-11-28 RX ORDER — HYDROMORPHONE HYDROCHLORIDE 1 MG/ML
0.5 INJECTION, SOLUTION INTRAMUSCULAR; INTRAVENOUS; SUBCUTANEOUS
Status: DISCONTINUED | OUTPATIENT
Start: 2023-11-28 | End: 2023-11-29

## 2023-11-28 RX ORDER — MIDAZOLAM HYDROCHLORIDE 1 MG/ML
INJECTION INTRAMUSCULAR; INTRAVENOUS PRN
Status: DISCONTINUED | OUTPATIENT
Start: 2023-11-28 | End: 2023-11-28 | Stop reason: SDUPTHER

## 2023-11-28 RX ORDER — FAMOTIDINE 20 MG/1
20 TABLET, FILM COATED ORAL 2 TIMES DAILY
Status: DISCONTINUED | OUTPATIENT
Start: 2023-11-28 | End: 2023-11-28

## 2023-11-28 RX ORDER — ACETAMINOPHEN 325 MG/1
650 TABLET ORAL EVERY 6 HOURS
Status: DISCONTINUED | OUTPATIENT
Start: 2023-11-28 | End: 2023-11-30 | Stop reason: HOSPADM

## 2023-11-28 RX ORDER — ASPIRIN 325 MG
325 TABLET, DELAYED RELEASE (ENTERIC COATED) ORAL 2 TIMES DAILY
Status: DISCONTINUED | OUTPATIENT
Start: 2023-11-29 | End: 2023-11-29

## 2023-11-28 RX ORDER — DEXAMETHASONE SODIUM PHOSPHATE 4 MG/ML
INJECTION, SOLUTION INTRA-ARTICULAR; INTRALESIONAL; INTRAMUSCULAR; INTRAVENOUS; SOFT TISSUE PRN
Status: DISCONTINUED | OUTPATIENT
Start: 2023-11-28 | End: 2023-11-28 | Stop reason: SDUPTHER

## 2023-11-28 RX ORDER — FAMOTIDINE 20 MG/1
20 TABLET, FILM COATED ORAL DAILY
Status: DISCONTINUED | OUTPATIENT
Start: 2023-11-29 | End: 2023-11-30 | Stop reason: HOSPADM

## 2023-11-28 RX ADMIN — ACETAMINOPHEN 650 MG: 325 TABLET ORAL at 11:31

## 2023-11-28 RX ADMIN — HYDROCHLOROTHIAZIDE 25 MG: 25 TABLET ORAL at 17:06

## 2023-11-28 RX ADMIN — FENTANYL CITRATE 50 MCG: 50 INJECTION, SOLUTION INTRAMUSCULAR; INTRAVENOUS at 09:24

## 2023-11-28 RX ADMIN — ACETAMINOPHEN 1000 MG: 500 TABLET ORAL at 06:50

## 2023-11-28 RX ADMIN — SENNOSIDES AND DOCUSATE SODIUM 1 TABLET: 8.6; 5 TABLET ORAL at 21:55

## 2023-11-28 RX ADMIN — SODIUM CHLORIDE: 9 INJECTION, SOLUTION INTRAVENOUS at 10:28

## 2023-11-28 RX ADMIN — EPHEDRINE SULFATE 10 MG: 50 INJECTION INTRAVENOUS at 07:57

## 2023-11-28 RX ADMIN — FENTANYL CITRATE 25 MCG: 50 INJECTION INTRAMUSCULAR; INTRAVENOUS at 10:09

## 2023-11-28 RX ADMIN — SENNOSIDES AND DOCUSATE SODIUM 1 TABLET: 8.6; 5 TABLET ORAL at 11:32

## 2023-11-28 RX ADMIN — OXYCODONE HYDROCHLORIDE 5 MG: 5 TABLET ORAL at 22:57

## 2023-11-28 RX ADMIN — MEPIVACAINE HYDROCHLORIDE 55 MG: 15 INJECTION, SOLUTION EPIDURAL; INFILTRATION at 07:31

## 2023-11-28 RX ADMIN — PREGABALIN 200 MG: 100 CAPSULE ORAL at 21:54

## 2023-11-28 RX ADMIN — POTASSIUM CHLORIDE 10 MEQ: 750 TABLET, FILM COATED, EXTENDED RELEASE ORAL at 21:55

## 2023-11-28 RX ADMIN — CEFAZOLIN 2000 MG: 1 INJECTION, POWDER, FOR SOLUTION INTRAMUSCULAR; INTRAVENOUS at 15:00

## 2023-11-28 RX ADMIN — MIDAZOLAM 2 MG: 1 INJECTION INTRAMUSCULAR; INTRAVENOUS at 07:18

## 2023-11-28 RX ADMIN — FENTANYL CITRATE 50 MCG: 50 INJECTION, SOLUTION INTRAMUSCULAR; INTRAVENOUS at 07:25

## 2023-11-28 RX ADMIN — ALBUTEROL SULFATE 2.5 MG: 2.5 SOLUTION RESPIRATORY (INHALATION) at 10:19

## 2023-11-28 RX ADMIN — POLYETHYLENE GLYCOL 3350 17 G: 17 POWDER, FOR SOLUTION ORAL at 11:31

## 2023-11-28 RX ADMIN — PROPOFOL 75 MCG/KG/MIN: 10 INJECTION, EMULSION INTRAVENOUS at 07:25

## 2023-11-28 RX ADMIN — FAMOTIDINE 20 MG: 20 TABLET, FILM COATED ORAL at 11:31

## 2023-11-28 RX ADMIN — Medication 3 UNITS: at 16:26

## 2023-11-28 RX ADMIN — LOSARTAN POTASSIUM 100 MG: 50 TABLET, FILM COATED ORAL at 17:06

## 2023-11-28 RX ADMIN — ONDANSETRON 4 MG: 2 INJECTION INTRAMUSCULAR; INTRAVENOUS at 09:24

## 2023-11-28 RX ADMIN — PREGABALIN 200 MG: 100 CAPSULE ORAL at 12:27

## 2023-11-28 RX ADMIN — ALLOPURINOL 300 MG: 300 TABLET ORAL at 21:55

## 2023-11-28 RX ADMIN — EPHEDRINE SULFATE 10 MG: 50 INJECTION INTRAVENOUS at 08:22

## 2023-11-28 RX ADMIN — ATORVASTATIN CALCIUM 20 MG: 40 TABLET, FILM COATED ORAL at 21:55

## 2023-11-28 RX ADMIN — ACETAMINOPHEN 650 MG: 325 TABLET ORAL at 18:17

## 2023-11-28 RX ADMIN — HYDROCODONE BITARTRATE AND ACETAMINOPHEN 1 TABLET: 10; 325 TABLET ORAL at 11:32

## 2023-11-28 RX ADMIN — CEFAZOLIN 2000 MG: 1 INJECTION, POWDER, FOR SOLUTION INTRAMUSCULAR; INTRAVENOUS at 23:42

## 2023-11-28 RX ADMIN — SODIUM CHLORIDE 174 ML: 9 INJECTION, SOLUTION INTRAVENOUS at 09:21

## 2023-11-28 RX ADMIN — TRANEXAMIC ACID 1000 MG: 100 INJECTION, SOLUTION INTRAVENOUS at 07:45

## 2023-11-28 RX ADMIN — PHENYLEPHRINE HYDROCHLORIDE 40 MCG/MIN: 10 INJECTION INTRAVENOUS at 07:26

## 2023-11-28 RX ADMIN — DILTIAZEM HYDROCHLORIDE 420 MG: 240 CAPSULE, EXTENDED RELEASE ORAL at 21:54

## 2023-11-28 RX ADMIN — CEFAZOLIN 2 G: 330 INJECTION, POWDER, FOR SOLUTION INTRAMUSCULAR; INTRAVENOUS at 07:45

## 2023-11-28 RX ADMIN — SODIUM CHLORIDE, POTASSIUM CHLORIDE, SODIUM LACTATE AND CALCIUM CHLORIDE: 600; 310; 30; 20 INJECTION, SOLUTION INTRAVENOUS at 07:00

## 2023-11-28 RX ADMIN — Medication 4 UNITS: at 21:54

## 2023-11-28 RX ADMIN — OXYCODONE HYDROCHLORIDE 5 MG: 5 TABLET ORAL at 14:58

## 2023-11-28 RX ADMIN — OXYCODONE HYDROCHLORIDE 5 MG: 5 TABLET ORAL at 18:17

## 2023-11-28 RX ADMIN — DEXAMETHASONE SODIUM PHOSPHATE 4 MG: 4 INJECTION INTRA-ARTICULAR; INTRALESIONAL; INTRAMUSCULAR; INTRAVENOUS; SOFT TISSUE at 09:24

## 2023-11-28 ASSESSMENT — PAIN SCALES - GENERAL
PAINLEVEL_OUTOF10: 7
PAINLEVEL_OUTOF10: 7
PAINLEVEL_OUTOF10: 8
PAINLEVEL_OUTOF10: 7
PAINLEVEL_OUTOF10: 6
PAINLEVEL_OUTOF10: 3
PAINLEVEL_OUTOF10: 8
PAINLEVEL_OUTOF10: 7
PAINLEVEL_OUTOF10: 6
PAINLEVEL_OUTOF10: 6
PAINLEVEL_OUTOF10: 7
PAINLEVEL_OUTOF10: 6
PAINLEVEL_OUTOF10: 7
PAINLEVEL_OUTOF10: 7
PAINLEVEL_OUTOF10: 8
PAINLEVEL_OUTOF10: 9
PAINLEVEL_OUTOF10: 7
PAINLEVEL_OUTOF10: 6
PAINLEVEL_OUTOF10: 9
PAINLEVEL_OUTOF10: 7
PAINLEVEL_OUTOF10: 7
PAINLEVEL_OUTOF10: 9
PAINLEVEL_OUTOF10: 0

## 2023-11-28 ASSESSMENT — PAIN DESCRIPTION - DESCRIPTORS
DESCRIPTORS: ACHING
DESCRIPTORS: DISCOMFORT;ACHING
DESCRIPTORS: ACHING
DESCRIPTORS: ACHING

## 2023-11-28 ASSESSMENT — PAIN DESCRIPTION - FREQUENCY
FREQUENCY: INTERMITTENT
FREQUENCY: CONTINUOUS
FREQUENCY: INTERMITTENT

## 2023-11-28 ASSESSMENT — PAIN DESCRIPTION - LOCATION
LOCATION: HIP
LOCATION: GROIN;HIP
LOCATION: HIP

## 2023-11-28 ASSESSMENT — PAIN DESCRIPTION - ONSET
ONSET: ON-GOING
ONSET: PROGRESSIVE
ONSET: PROGRESSIVE

## 2023-11-28 ASSESSMENT — PAIN DESCRIPTION - ORIENTATION
ORIENTATION: RIGHT

## 2023-11-28 ASSESSMENT — PAIN DESCRIPTION - PAIN TYPE
TYPE: SURGICAL PAIN

## 2023-11-28 ASSESSMENT — LIFESTYLE VARIABLES: SMOKING_STATUS: 1

## 2023-11-28 NOTE — ANESTHESIA PRE PROCEDURE
Readings from Last 3 Encounters:   11/20/23 87.1 kg (192 lb)   11/08/23 88.6 kg (195 lb 6.4 oz)   09/25/23 90.9 kg (200 lb 6.4 oz)     There is no height or weight on file to calculate BMI.    CBC:   Lab Results   Component Value Date/Time    WBC 7.5 11/20/2023 11:28 AM    RBC 5.02 11/20/2023 11:28 AM    HGB 14.2 11/20/2023 11:28 AM    HCT 45.1 11/20/2023 11:28 AM    MCV 89.8 11/20/2023 11:28 AM    RDW 15.8 11/20/2023 11:28 AM     11/20/2023 11:28 AM       CMP:   Lab Results   Component Value Date/Time     11/20/2023 11:28 AM    K 3.5 11/20/2023 11:28 AM     11/20/2023 11:28 AM    CO2 28 11/20/2023 11:28 AM    BUN 27 11/20/2023 11:28 AM    CREATININE 1.56 11/20/2023 11:28 AM    GFRAA 52 08/24/2022 12:01 PM    AGRATIO 1.0 03/15/2023 04:00 PM    LABGLOM 35 11/20/2023 11:28 AM    LABGLOM 40 11/02/2022 03:02 PM    GLUCOSE 127 11/20/2023 11:28 AM    PROT 7.9 03/15/2023 04:00 PM    CALCIUM 8.8 11/20/2023 11:28 AM    BILITOT 0.2 03/15/2023 04:00 PM    ALKPHOS 128 03/15/2023 04:00 PM    AST 21 03/15/2023 04:00 PM    ALT 32 03/15/2023 04:00 PM       POC Tests: No results for input(s): \"POCGLU\", \"POCNA\", \"POCK\", \"POCCL\", \"POCBUN\", \"POCHEMO\", \"POCHCT\" in the last 72 hours.     Coags:   Lab Results   Component Value Date/Time    PROTIME 10.4 11/20/2023 11:28 AM    INR 1.0 11/20/2023 11:28 AM       HCG (If Applicable): No results found for: \"PREGTESTUR\", \"PREGSERUM\", \"HCG\", \"HCGQUANT\"     ABGs: No results found for: \"PHART\", \"PO2ART\", \"IFF3ZOP\", \"RDF0BLV\", \"BEART\", \"C6AVCYBT\"     Type & Screen (If Applicable):  No results found for: \"LABABO\", \"LABRH\"    Drug/Infectious Status (If Applicable):  Lab Results   Component Value Date/Time    HEPCAB NONREACTIVE 10/07/2020 11:00 PM       COVID-19 Screening (If Applicable):   Lab Results   Component Value Date/Time    COVID19 Not Detected 04/25/2021 09:15 AM           Anesthesia Evaluation  Patient summary reviewed and Nursing notes reviewed  Airway: Mallampati: II  TM

## 2023-11-28 NOTE — PROGRESS NOTES
Occupational Therapy   11/28/23    Orders received, chart review completed. Note patient POD #0 s/p R KELVIN. OT will follow up tomorrow for evaluation. Recommend OOB to chair three times a day for meals, self-completion of ADLs as able and medically stable.      Thank you,   Agus White, OTD, OTR/L

## 2023-11-28 NOTE — PLAN OF CARE
Problem: Physical Therapy - Adult  Goal: By Discharge: Performs mobility at highest level of function for planned discharge setting. See evaluation for individualized goals. Description: FUNCTIONAL STATUS PRIOR TO ADMISSION: Patient was modified independent using a single point cane for functional mobility and experienced multiple falls \"especially in the mornings\"  because \"my legs buckled\". Patient was unable to verbalize why legs buckled. Daughter backs up the statement. HOME SUPPORT PRIOR TO ADMISSION: The patient lived alone with daughters to provide assistance. Physical Therapy Goals  Initiated 11/28/2023  1. Patient will move from supine to sit and sit to supine, scoot up and down, and roll side to side in bed with modified independence within 4 day(s). 2.  Patient will perform sit to stand with modified independence within 4 day(s). 3.  Patient will transfer from bed to chair and chair to bed with modified independence using the least restrictive device within 4 day(s). 4.  Patient will ambulate with modified independence for 150 feet with the least restrictive device within 4 day(s). 5.  Patient will perform post-KELVIN home exercise program per protocol with independence within 4 days. 6. Patient will verbalize and demonstrate understanding of posterior hip precautions per protocol within 4 days.      Outcome: Progressing   PHYSICAL THERAPY EVALUATION    Patient: Gustavo Brink (49 y.o. female)  Date: 11/28/2023  Primary Diagnosis: Primary osteoarthritis of right hip [M16.11]  Osteoarthritis of right hip, unspecified osteoarthritis type [M16.11]  Procedure(s) (LRB):  RIGHT POSTERIOR TOTAL HIP ARTHROPLASTY (Right) Day of Surgery   Precautions: Weight Bearing, Fall Risk Right Lower Extremity Weight Bearing: Weight Bearing As Tolerated                  ASSESSMENT :   DEFICITS/IMPAIRMENTS: Based on the objective data described below, the patient presents with  impairment in functional

## 2023-11-28 NOTE — PROGRESS NOTES
Per [employer] approved formulary policy. SGLT2's are only formulary with the indication of CKD or CHF therefore:    Please note that the  Empagliflozin Tony Outhouse) is non-formulary with indications of type 2 diabetes and has been discontinued while inpatient. If you feel the patient needs to continue their home therapy during the inpatient stay, the patient may bring their medication bottle for verification and administration pursuant to our home medication use policy. Please contact the pharmacy with any questions or concerns. Thank you.   Carol Hooper [facility], RPana/PharmD 11/28/2023 11:37 AM

## 2023-11-28 NOTE — ANESTHESIA PROCEDURE NOTES
Spinal Block    Patient location during procedure: OR  End time: 11/28/2023 7:31 AM  Reason for block: primary anesthetic  Staffing  Performed: anesthesiologist   Anesthesiologist: Star Dawson MD  Performed by: Star Dawson MD  Authorized by: Star Dawson MD    Spinal Block  Patient position: sitting  Prep: DuraPrep  Patient monitoring: cardiac monitor, continuous pulse ox, frequent blood pressure checks and oxygen  Approach: midline  Location: L3/L4  Provider prep: mask and sterile gloves  Needle  Needle type: pencil-tip   Needle gauge: 24 G  Needle length: 4 in  Assessment  Sensory level: T4  Events: None  Swirl obtained: Yes  CSF: clear  Attempts: 1  Hemodynamics: stable  Preanesthetic Checklist  Completed: patient identified, IV checked, site marked, risks and benefits discussed, surgical/procedural consents, equipment checked, pre-op evaluation, timeout performed, anesthesia consent given, oxygen available, monitors applied/VS acknowledged and fire risk safety assessment completed and verbalized

## 2023-11-28 NOTE — OP NOTE
[facility]  OPERATIVE REPORT    Name:  Nadine Xie  MR#:  [id number]  :  1950  ACCOUNT #:  [de-identified]  DATE OF SERVICE:  2023    PREOPERATIVE DIAGNOSIS:  Osteoarthritis, right hip. POSTOPERATIVE DIAGNOSIS:  Osteoarthritis, right hip. PROCEDURE PERFORMED:  Right total hip arthroplasty. SURGEON:  Lilian Rojas MD    ASSISTANT:  [employer] Jennings SA    ANESTHESIA:  Spinal with sedation. COMPLICATIONS:  None. SPECIMENS REMOVED:  None. IMPLANTS:  [employer]Joy 52-mm Empowr acetabular shell with one cancellous screw and 36-mm inside diameter neutral polyethylene liner, size 8 high-offset TaperFill femoral stem with 36 mm -4 ceramic femoral head. ESTIMATED BLOOD LOSS:  200 mL. INDICATIONS:  The patient is a 27-year-old female with progressive right hip and groin pain due to severe osteoarthritis. Symptoms have progressed despite comprehensive conservative treatment. She presents for right total hip replacement. Risks, benefits, and alternatives of procedure were reviewed with her in detail and she desires to proceed. PROCEDURE:  The patient was taken to the operating room where Anesthesia Team placed a spinal.  Preoperative IV antibiotics were administered. She was turned to the left lateral decubitus position on a Harrison frame hip positioner. All bony prominences were well padded and an axillary roll was placed. Right hip and thigh were prepped and draped in the usual sterile fashion. Through a lateral hip incision, performed a posterior approach to the right hip. Short rotators and posterior capsule were reflected posteriorly. Leg lengths were checked on the table for comparison with trial reduction later during the procedure. Hip was dislocated and femoral neck osteotomy was made. Acetabular retractors were placed and the remaining labrum was excised.   The acetabulum was reamed with hemispherical reamers up to 51 mm before impacting a 52-mm Empowr

## 2023-11-28 NOTE — PROGRESS NOTES
Renal Dosing/Monitoring  Medication: famotidine   Current regimen:  20 every 12 hr  No results for input(s): \"CREATININE\", \"BUN\" in the last 72 hours.   Estimated CrCl:  35 ml/min  Plan: Change to famotidine 20 mg daily

## 2023-11-28 NOTE — BRIEF OP NOTE
Brief Postoperative Note      Patient: Robert aCstanon  YOB: 1950  MRN: [id number]    Date of Procedure: 11/28/2023    Pre-Op Diagnosis Codes:     * Primary osteoarthritis of right hip [M16.11]    Post-Op Diagnosis: Same       Procedure(s):  RIGHT POSTERIOR TOTAL HIP ARTHROPLASTY    Surgeon(s):  Wilber Choi MD    Assistant:  Surgical Assistant: Jesús Marie    Anesthesia: Spinal    Estimated Blood Loss (mL): 671     Complications: None    Specimens:   * No specimens in log *    Implants:  Implant Name Type Inv.  Item Serial No.  Lot No. LRB No. Used Action   CUP ACET CLUS HOLE F 52 MM W/ DOME HOLE PLUG P2 COAT EMPOWR - SN/A  CUP ACET CLUS HOLE F 52 MM W/ DOME HOLE PLUG P2 COAT EMPOWR N/A [facility] [id number] Right 1 Implanted   STEM FEM 8 HIP LAT OFFSET TAPERFILL - SN/A  STEM FEM 8 HIP LAT OFFSET TAPERFILL N/A [facility] SURGICAL-WD [id number] Right 1 Implanted   LINER ACET NEUT F 36X52 MM HIP HXE+ VIT E POLYETH EMPOWR - SN/A  LINER ACET NEUT F 36X52 MM HIP HXE+ VIT E POLYETH EMPOWR N/A [facility] [id number] Right 1 Implanted   SCREW BNE 30 MM ACET EMPOWR - SN/A  SCREW BNE 30 MM ACET EMPOWR N/A [facility] [id number] Right 1 Implanted   HEAD FEM 4- MM 36 MM HIP OFFSET FOR FMP SYS BIOLOX DELT CERM - SN/A  HEAD FEM 4- MM 36 MM HIP OFFSET FOR FMP SYS BIOLOX DELT CERM [facility] [id number] Right 1 Implanted         Drains: * No LDAs found *    Findings: severe OA      Electronically signed by Wilber Choi MD on 11/28/2023 at 9:23 AM

## 2023-11-28 NOTE — FLOWSHEET NOTE
11/28/23 0805   Family Communication    Relationship to Patient Daughter    Phone Number Pablo Mishra [phone]   Family/Significant Other Update Called   Delivery Origin Nurse   Family Communication   Family Update Message Procedure started

## 2023-11-29 LAB
ANION GAP SERPL CALC-SCNC: 5 MMOL/L (ref 5–15)
BUN SERPL-MCNC: 27 MG/DL (ref 6–20)
BUN/CREAT SERPL: 21 (ref 12–20)
CALCIUM SERPL-MCNC: 8.8 MG/DL (ref 8.5–10.1)
CHLORIDE SERPL-SCNC: 109 MMOL/L (ref 97–108)
CO2 SERPL-SCNC: 25 MMOL/L (ref 21–32)
CREAT SERPL-MCNC: 1.27 MG/DL (ref 0.55–1.02)
GLUCOSE BLD STRIP.AUTO-MCNC: 187 MG/DL (ref 65–117)
GLUCOSE BLD STRIP.AUTO-MCNC: 207 MG/DL (ref 65–117)
GLUCOSE BLD STRIP.AUTO-MCNC: 234 MG/DL (ref 65–117)
GLUCOSE BLD STRIP.AUTO-MCNC: 294 MG/DL (ref 65–117)
GLUCOSE SERPL-MCNC: 168 MG/DL (ref 65–100)
HCT VFR BLD AUTO: 37.4 % (ref 35–47)
HGB BLD-MCNC: 12 G/DL (ref 11.5–16)
POTASSIUM SERPL-SCNC: 4.2 MMOL/L (ref 3.5–5.1)
SERVICE CMNT-IMP: ABNORMAL
SODIUM SERPL-SCNC: 139 MMOL/L (ref 136–145)

## 2023-11-29 PROCEDURE — 6370000000 HC RX 637 (ALT 250 FOR IP): Performed by: PHYSICIAN ASSISTANT

## 2023-11-29 PROCEDURE — 96376 TX/PRO/DX INJ SAME DRUG ADON: CPT

## 2023-11-29 PROCEDURE — 85014 HEMATOCRIT: CPT

## 2023-11-29 PROCEDURE — 96374 THER/PROPH/DIAG INJ IV PUSH: CPT

## 2023-11-29 PROCEDURE — 97165 OT EVAL LOW COMPLEX 30 MIN: CPT

## 2023-11-29 PROCEDURE — APPNB15 APP NON BILLABLE TIME 0-15 MINS: Performed by: PHYSICIAN ASSISTANT

## 2023-11-29 PROCEDURE — 80048 BASIC METABOLIC PNL TOTAL CA: CPT

## 2023-11-29 PROCEDURE — 97530 THERAPEUTIC ACTIVITIES: CPT

## 2023-11-29 PROCEDURE — 97116 GAIT TRAINING THERAPY: CPT

## 2023-11-29 PROCEDURE — 82962 GLUCOSE BLOOD TEST: CPT

## 2023-11-29 PROCEDURE — 97535 SELF CARE MNGMENT TRAINING: CPT

## 2023-11-29 PROCEDURE — 36415 COLL VENOUS BLD VENIPUNCTURE: CPT

## 2023-11-29 PROCEDURE — G0378 HOSPITAL OBSERVATION PER HR: HCPCS

## 2023-11-29 PROCEDURE — 85018 HEMOGLOBIN: CPT

## 2023-11-29 PROCEDURE — 6370000000 HC RX 637 (ALT 250 FOR IP): Performed by: ORTHOPAEDIC SURGERY

## 2023-11-29 PROCEDURE — 6360000002 HC RX W HCPCS: Performed by: PHYSICIAN ASSISTANT

## 2023-11-29 PROCEDURE — 2580000003 HC RX 258: Performed by: PHYSICIAN ASSISTANT

## 2023-11-29 RX ORDER — ASPIRIN 81 MG/1
81 TABLET ORAL 2 TIMES DAILY
Qty: 60 TABLET | Refills: 0 | Status: SHIPPED | OUTPATIENT
Start: 2023-11-29

## 2023-11-29 RX ORDER — OXYCODONE HYDROCHLORIDE 5 MG/1
10 TABLET ORAL EVERY 6 HOURS PRN
Status: DISCONTINUED | OUTPATIENT
Start: 2023-11-29 | End: 2023-11-30 | Stop reason: HOSPADM

## 2023-11-29 RX ORDER — OXYCODONE HYDROCHLORIDE 5 MG/1
5-10 TABLET ORAL EVERY 4 HOURS PRN
Qty: 60 TABLET | Refills: 0 | Status: SHIPPED | OUTPATIENT
Start: 2023-11-29 | End: 2023-12-06 | Stop reason: SDUPTHER

## 2023-11-29 RX ORDER — ACETAMINOPHEN 500 MG
500 TABLET ORAL EVERY 4 HOURS
Qty: 100 TABLET | Refills: 0 | Status: SHIPPED | OUTPATIENT
Start: 2023-11-29

## 2023-11-29 RX ORDER — ASPIRIN 81 MG/1
81 TABLET ORAL 2 TIMES DAILY
Status: DISCONTINUED | OUTPATIENT
Start: 2023-11-30 | End: 2023-11-30 | Stop reason: HOSPADM

## 2023-11-29 RX ORDER — AMOXICILLIN 250 MG
1 CAPSULE ORAL 2 TIMES DAILY PRN
Qty: 60 TABLET | Refills: 0 | Status: SHIPPED | OUTPATIENT
Start: 2023-11-29

## 2023-11-29 RX ADMIN — DILTIAZEM HYDROCHLORIDE 420 MG: 240 CAPSULE, EXTENDED RELEASE ORAL at 22:31

## 2023-11-29 RX ADMIN — ALLOPURINOL 300 MG: 300 TABLET ORAL at 22:30

## 2023-11-29 RX ADMIN — HYDROMORPHONE HYDROCHLORIDE 0.5 MG: 1 INJECTION, SOLUTION INTRAMUSCULAR; INTRAVENOUS; SUBCUTANEOUS at 07:36

## 2023-11-29 RX ADMIN — SODIUM CHLORIDE, PRESERVATIVE FREE 10 ML: 5 INJECTION INTRAVENOUS at 22:33

## 2023-11-29 RX ADMIN — ASPIRIN 325 MG: 325 TABLET, COATED ORAL at 09:01

## 2023-11-29 RX ADMIN — METFORMIN HYDROCHLORIDE 500 MG: 500 TABLET ORAL at 09:02

## 2023-11-29 RX ADMIN — PREGABALIN 200 MG: 100 CAPSULE ORAL at 09:02

## 2023-11-29 RX ADMIN — HYDROMORPHONE HYDROCHLORIDE 0.5 MG: 1 INJECTION, SOLUTION INTRAMUSCULAR; INTRAVENOUS; SUBCUTANEOUS at 10:41

## 2023-11-29 RX ADMIN — PREGABALIN 200 MG: 100 CAPSULE ORAL at 15:08

## 2023-11-29 RX ADMIN — Medication 1 UNITS: at 17:27

## 2023-11-29 RX ADMIN — ACETAMINOPHEN 650 MG: 325 TABLET ORAL at 00:26

## 2023-11-29 RX ADMIN — ACETAMINOPHEN 650 MG: 325 TABLET ORAL at 11:54

## 2023-11-29 RX ADMIN — SENNOSIDES AND DOCUSATE SODIUM 1 TABLET: 8.6; 5 TABLET ORAL at 09:03

## 2023-11-29 RX ADMIN — OXYCODONE HYDROCHLORIDE 10 MG: 5 TABLET ORAL at 18:32

## 2023-11-29 RX ADMIN — POLYETHYLENE GLYCOL 3350 17 G: 17 POWDER, FOR SOLUTION ORAL at 09:01

## 2023-11-29 RX ADMIN — LOSARTAN POTASSIUM 100 MG: 50 TABLET, FILM COATED ORAL at 17:28

## 2023-11-29 RX ADMIN — SODIUM CHLORIDE, PRESERVATIVE FREE 10 ML: 5 INJECTION INTRAVENOUS at 09:03

## 2023-11-29 RX ADMIN — FAMOTIDINE 20 MG: 20 TABLET, FILM COATED ORAL at 09:02

## 2023-11-29 RX ADMIN — HYDROMORPHONE HYDROCHLORIDE 0.5 MG: 1 INJECTION, SOLUTION INTRAMUSCULAR; INTRAVENOUS; SUBCUTANEOUS at 00:26

## 2023-11-29 RX ADMIN — POTASSIUM CHLORIDE 10 MEQ: 750 TABLET, FILM COATED, EXTENDED RELEASE ORAL at 22:31

## 2023-11-29 RX ADMIN — Medication 2 UNITS: at 11:32

## 2023-11-29 RX ADMIN — HYDROCHLOROTHIAZIDE 25 MG: 25 TABLET ORAL at 17:27

## 2023-11-29 RX ADMIN — HYDROMORPHONE HYDROCHLORIDE 0.5 MG: 1 INJECTION, SOLUTION INTRAMUSCULAR; INTRAVENOUS; SUBCUTANEOUS at 15:08

## 2023-11-29 RX ADMIN — METFORMIN HYDROCHLORIDE 500 MG: 500 TABLET ORAL at 16:54

## 2023-11-29 RX ADMIN — ATORVASTATIN CALCIUM 20 MG: 40 TABLET, FILM COATED ORAL at 22:30

## 2023-11-29 RX ADMIN — ACETAMINOPHEN 650 MG: 325 TABLET ORAL at 07:00

## 2023-11-29 RX ADMIN — SENNOSIDES AND DOCUSATE SODIUM 1 TABLET: 8.6; 5 TABLET ORAL at 22:31

## 2023-11-29 RX ADMIN — ACETAMINOPHEN 650 MG: 325 TABLET ORAL at 18:31

## 2023-11-29 RX ADMIN — PREGABALIN 200 MG: 100 CAPSULE ORAL at 22:31

## 2023-11-29 ASSESSMENT — PAIN DESCRIPTION - DESCRIPTORS
DESCRIPTORS: ACHING

## 2023-11-29 ASSESSMENT — PAIN DESCRIPTION - LOCATION
LOCATION: HIP

## 2023-11-29 ASSESSMENT — PAIN DESCRIPTION - ORIENTATION
ORIENTATION: RIGHT

## 2023-11-29 ASSESSMENT — PAIN SCALES - GENERAL
PAINLEVEL_OUTOF10: 5
PAINLEVEL_OUTOF10: 6
PAINLEVEL_OUTOF10: 8
PAINLEVEL_OUTOF10: 7
PAINLEVEL_OUTOF10: 8
PAINLEVEL_OUTOF10: 5
PAINLEVEL_OUTOF10: 4
PAINLEVEL_OUTOF10: 9
PAINLEVEL_OUTOF10: 7
PAINLEVEL_OUTOF10: 8
PAINLEVEL_OUTOF10: 9
PAINLEVEL_OUTOF10: 4
PAINLEVEL_OUTOF10: 9
PAINLEVEL_OUTOF10: 9
PAINLEVEL_OUTOF10: 4
PAINLEVEL_OUTOF10: 6
PAINLEVEL_OUTOF10: 8
PAINLEVEL_OUTOF10: 7
PAINLEVEL_OUTOF10: 7
PAINLEVEL_OUTOF10: 8
PAINLEVEL_OUTOF10: 9
PAINLEVEL_OUTOF10: 5
PAINLEVEL_OUTOF10: 8
PAINLEVEL_OUTOF10: 8
PAINLEVEL_OUTOF10: 7

## 2023-11-29 ASSESSMENT — PAIN DESCRIPTION - FREQUENCY
FREQUENCY: CONTINUOUS
FREQUENCY: INTERMITTENT
FREQUENCY: CONTINUOUS
FREQUENCY: INTERMITTENT
FREQUENCY: INTERMITTENT

## 2023-11-29 ASSESSMENT — PAIN DESCRIPTION - PAIN TYPE
TYPE: SURGICAL PAIN
TYPE: SURGICAL PAIN
TYPE: ACUTE PAIN;SURGICAL PAIN
TYPE: SURGICAL PAIN
TYPE: ACUTE PAIN;SURGICAL PAIN

## 2023-11-29 ASSESSMENT — PAIN DESCRIPTION - ONSET
ONSET: ON-GOING
ONSET: PROGRESSIVE
ONSET: ON-GOING
ONSET: PROGRESSIVE

## 2023-11-29 NOTE — PROGRESS NOTES
Occupational Therapy    Orders received, chart reviewed and patient evaluated by occupational therapy. Pending progression with skilled acute occupational therapy, recommend:  Therapy 2 days/week in the home    Recommend with nursing patient to complete as able in order to maintain strength, endurance and independence: OOB to chair 3x/day, ADLs with assist and mobilizing to the bathroom for toileting with RW with assist. Thank you for your assistance. Full evaluation to follow. Thank you!     Todd Pathak OT

## 2023-11-29 NOTE — DISCHARGE INSTRUCTIONS
Post-op Discharge Instructions Following Total Joint Replacement  Jana Keating MD  [address] [facility]  [phone]  Follow-up Office Visit  See Dr. Emir Rivera approximately 3-4 weeks from date of surgery. Call [phone] to make an appointment. If you have any postop questions for Dr. Lashay Saldana clinical team, please call [phone]. Activity  Use your walker for ambulation. Weight bearing as tolerated unless instructed otherwise by the physical therapist. Get up every hour you are awake and take a brief walk. Lengthen walking distance daily as your strength improves. Continue using your walker until seen in the office for your first follow up visit. Practice your exercises 3 times daily as instructed by the physical therapist. Eder Falling for 20 minutes after exercising. No driving until seen in the office for your first follow up visit. Incision Care  The light brown Aquacel surgical dressing is waterproof and is to remain on your incision for 7 days. On the 7th day, carefully lift the edge of the dressing to break the adhesive seal and gently peel it off. If your Aquacel dressings comes loose or falls off before the 7th day, replace it with a dry sterile gauze dressing and change this dressing daily. Once there is no drainage on the bandage, you mean leave the incision open to air. You may take a shower with the Aquacel dressing in place. After you remove the Aquacel dressing on day 7, you may continue to shower and get your incision wet in the shower. Do not submerge your incision under water in a bathtub, hot tub, swimming pool, etc. until after you have been evaluated at your first office visit. Medications  Blood Clot Prevention: Take medication as prescribed by your physician for 4 weeks postop. Pain Management: Take pain medication as prescribed; wean yourself off of pain medication as your pain lessens. Take with food. You make also take Tylenol every 4-6 hours as needed for pain.   Do not exceed 3 grams (3000mg) per day. Place an ice bag on or around the incision for 20 minutes on / 20 minutes off as needed throughout the day and night, especially after exercising. Stool Softener: You may want to take a stool softener (such as Senokot-S or Colace) to prevent constipation while taking pain medication. If constipation occurs, you may also use a laxative (such as Dulcolax tablets, Miralax, or a suppository). Diet  Resume usual diet at home. Drink plenty of fluids. Eat foods high in fiber and protein. Calcium and Vitamin D supplements recommended. Avoid alcoholic beverages. No smoking. When to call your Orthopaedic Surgeon: If you call after 5pm or on a weekend, the on call physician will return your call  Pain that is not relieved by pain medication, ice, and activity modification  Signs of infection (red incision, continuous drainage from the incision, malodorous drainage, persistent fever greater than 101 degrees Fahrenheit)  Signs of a blood clot in your leg (calf pain, tenderness, redness, and/or swelling of the lower leg)  ?   When to call your Primary Care Physician  Concerns about your medical conditions such as diabetes, high blood pressure, asthma, congestive heart failure  Call if blood sugars are elevated, if you have a persistent headache or dizziness, coughing or congestion, constipation or diarrhea, burning with urination, abnormal heart rate (fast or slow)  When to call 911 and go to the nearest Emergency Room  Acute onset of chest pain, shortness of breath, difficulty breathing

## 2023-11-29 NOTE — PLAN OF CARE
Problem: Physical Therapy - Adult  Goal: By Discharge: Performs mobility at highest level of function for planned discharge setting. See evaluation for individualized goals. Description: FUNCTIONAL STATUS PRIOR TO ADMISSION: Patient was modified independent using a single point cane for functional mobility and experienced multiple falls \"especially in the mornings\"  because \"my legs buckled\". Patient was unable to verbalize why legs buckled. Daughter backs up the statement. HOME SUPPORT PRIOR TO ADMISSION: The patient lived alone with daughters to provide assistance. Physical Therapy Goals  Initiated 11/28/2023  1. Patient will move from supine to sit and sit to supine, scoot up and down, and roll side to side in bed with modified independence within 4 day(s). 2.  Patient will perform sit to stand with modified independence within 4 day(s). 3.  Patient will transfer from bed to chair and chair to bed with modified independence using the least restrictive device within 4 day(s). 4.  Patient will ambulate with modified independence for 150 feet with the least restrictive device within 4 day(s). 5.  Patient will perform post-KELVIN home exercise program per protocol with independence within 4 days. 6. Patient will verbalize and demonstrate understanding of posterior hip precautions per protocol within 4 days. Outcome: Progressing   PHYSICAL THERAPY TREATMENT    Patient: Christiano Purcell (36 y.o. female)  Date: 11/29/2023  Diagnosis: Primary osteoarthritis of right hip [M16.11]  Osteoarthritis of right hip, unspecified osteoarthritis type [M16.11] Osteoarthritis of right hip, unspecified osteoarthritis type  Procedure(s) (LRB):  RIGHT POSTERIOR TOTAL HIP ARTHROPLASTY (Right) 1 Day Post-Op  Precautions: Fall Risk Right Lower Extremity Weight Bearing: Weight Bearing As Tolerated                  ASSESSMENT:  Patient continues to benefit from skilled PT services and is slowly progressing towards goals.

## 2023-11-29 NOTE — CARE COORDINATION
Care Management Initial Assessment       RUR: N/A  Readmission? No  1st IM letter given? No  1st  letter given: No    CM met with patient at bedside to introduce self and explain role. Patient states she is independent with ADLs prior to admission. Patient does not own any DME, CM has placed an order through [employer]/ [employer]- pending delivery. Patient lives alone in a 1 level house, 0 steps to enter (there are 2 steps within the home). Patient states family will be staying with her while she recovers. Patient confirms family will transport her home at time of discharge. Patient uses [address] [facility] in [place]. CM offered [facility] for [facility], patient has no preference. Referrals pending. 10:41AM: [facility] can [facility] patient for , CM added to AVS.     1:23PM: CM delivered RW to patient. 11/29/23 1019   Service Assessment   Patient Orientation Alert and Oriented;Person;Place;Situation;Self   Cognition Alert   History Provided By Patient   Primary Caregiver Self   Support Systems Family Members   PCP Verified by CM Yes  (Valerie Benson, APRN - NP)   Last Visit to PCP Within last 3 months   Prior Functional Level Independent in ADLs/IADLs   Can patient return to prior living arrangement Yes   Ability to make needs known: Good   Family able to assist with home care needs: Yes   Financial Resources Medicare   Social/Functional History   Lives With Alone   Type of [address] One level   Home Equipment None   Discharge Planning   Type of [address]   DME Ordered? [facility] Discharge   Services At/After Discharge Home Health;DME   Condition of Participation: Discharge Planning   The Plan for Transition of Care is related to the following treatment goals: Home health   The Patient and/or Patient Representative was provided with a Choice of Provider?  Patient   The Patient and/Or Patient Representative agree with the Discharge Plan? Yes   Freedom of Choice list was provided with basic dialogue that supports the patient's individualized plan of care/goals, treatment preferences, and shares the quality data associated with the providers?   Yes     Pippa Almeida RN/CRM

## 2023-11-29 NOTE — PLAN OF CARE
Problem: Pain  Goal: Verbalizes/displays adequate comfort level or baseline comfort level  11/29/2023 1053 by Louise Gallegos RN  Outcome: Progressing  11/29/2023 0102 by Christie Sierra LPN  Outcome: Progressing     Problem: Safety - Adult  Goal: Free from fall injury  11/29/2023 1053 by Louise Gallegos RN  Outcome: Progressing  11/29/2023 0102 by Christie Sierra LPN  Outcome: Progressing     Problem: Discharge Planning  Goal: Discharge to home or other facility with appropriate resources  11/29/2023 1053 by Louise Gallegos RN  Outcome: Progressing  11/29/2023 0102 by Christie Sierra LPN  Outcome: Progressing

## 2023-11-29 NOTE — PLAN OF CARE
skilled PT services and is slowly progressing towards goals. Patient is now ambulating 40 ft with minimal assistance using rolling walker. Less assistance required this pm (minimal of 1; this am required moderate of 2). Barrier(s) to discharge: Anticipate that patient will need 2 more PT sessions in order to discharge Therapy 2 days/week in the home. SUBJECTIVE:   Patient stated, \"I have to get up again? I'm going home tomorrow. \"    OBJECTIVE DATA SUMMARY:     Functional Mobility Training for afternoon session:  Bed Mobility:  Bed Mobility Training  Bed Mobility Training: Yes  Interventions: Manual cues; Safety awareness training;Verbal cues; Tactile cues  Rolling: Minimum assistance;Assist X1  Supine to Sit: Minimum assistance; Moderate assistance;Assist X1;Adaptive equipment; Additional time  Sit to Supine: Minimum assistance; Moderate assistance;Assist X1;Additional time; Adaptive equipment  Scooting: Minimum assistance;Assist X1  Transfers:  Transfer Training  Transfer Training: Yes  Interventions: Safety awareness training;Verbal cues  Sit to Stand: Minimum assistance;Assist X1  Stand to Sit: Minimum assistance;Assist X1  Bed to Chair: Moderate assistance  Toilet Transfer: Minimum assistance;Assist X1  Balance:  Balance  Sitting: Intact  Standing: Impaired  Standing - Static: Good;Constant support  Standing - Dynamic: Good;Constant support   Ambulation/Gait Training:     Gait  Overall Level of Assistance: Minimum assistance  Distance (ft): 40 Feet  Assistive Device: Walker, rolling;Gait belt  Interventions: Safety awareness training;Verbal cues  Base of Support: Shift to left  Speed/Flaquita: Pace decreased (< 100 feet/min)  Step Length: Left shortened  Swing Pattern: Right asymmetrical  Stance: Right decreased  Gait Abnormalities: Antalgic;Decreased step clearance; Step to gait          Pain Ratin/10 (but patient as asleep when I entered the room)  Pain Intervention(s):   nursing notified and addressing and ice    Activity Tolerance:   Fair --limited by pain    After treatment:   Patient left in no apparent distress in bed, Call bell within reach, Side rails x3, and nurse notified. COMMUNICATION/EDUCATION:   The patient's plan of care was discussed with: occupational therapist, registered nurse, physician, and     Patient Education  Education Given To: Patient  Education Provided: Role of Therapy;Transfer Training;Equipment;Plan of Care;Energy Conservation;Home Exercise Program;Fall Prevention Strategies; ADL Adaptive Strategies  Education Provided Comments: Instructed patient NOT to get up unassisted.   Education Method: Demonstration;Verbal  Barriers to Learning: None  Education Outcome: Continued education needed      Zac Kennedy, PT  Minutes: 30

## 2023-11-29 NOTE — PLAN OF CARE
Problem: Pain  Goal: Verbalizes/displays adequate comfort level or baseline comfort level  11/29/2023 0102 by Rebeca Acevedo LPN  Outcome: Progressing  11/28/2023 1244 by Yadi Gregorio RN  Outcome: Progressing     Problem: Safety - Adult  Goal: Free from fall injury  11/29/2023 0102 by Rebeca Acevedo LPN  Outcome: Progressing  11/28/2023 1244 by Yadi Gregorio RN  Outcome: Progressing     Problem: Discharge Planning  Goal: Discharge to home or other facility with appropriate resources  11/29/2023 0102 by Rebeca Acevedo LPN  Outcome: Progressing  11/28/2023 1244 by Yadi Gregorio RN  Outcome: Progressing     Problem: Physical Therapy - Adult  Goal: By Discharge: Performs mobility at highest level of function for planned discharge setting. See evaluation for individualized goals. Description: FUNCTIONAL STATUS PRIOR TO ADMISSION: Patient was modified independent using a single point cane for functional mobility and experienced multiple falls \"especially in the mornings\"  because \"my legs buckled\". Patient was unable to verbalize why legs buckled. Daughter backs up the statement. HOME SUPPORT PRIOR TO ADMISSION: The patient lived alone with daughters to provide assistance. Physical Therapy Goals  Initiated 11/28/2023  1. Patient will move from supine to sit and sit to supine, scoot up and down, and roll side to side in bed with modified independence within 4 day(s). 2.  Patient will perform sit to stand with modified independence within 4 day(s). 3.  Patient will transfer from bed to chair and chair to bed with modified independence using the least restrictive device within 4 day(s). 4.  Patient will ambulate with modified independence for 150 feet with the least restrictive device within 4 day(s). 5.  Patient will perform post-KELVIN home exercise program per protocol with independence within 4 days.   6. Patient will verbalize and demonstrate understanding of posterior hip precautions per protocol

## 2023-11-29 NOTE — DISCHARGE SUMMARY
[address] [address] SUMMARY     Name: Bjorn Hutchinson       MR#: [id number]    : 1950  ADMIT DATE: 2023  DISCHARGE DATE: 2023     ADMISSION DIAGNOSIS: Primary osteoarthritis of right hip [M16.11]  Osteoarthritis of right hip, unspecified osteoarthritis type [M16.11]     DISCHARGE DIAGNOSIS: same as admission     PROCEDURE PERFORMED: Procedure(s):  RIGHT POSTERIOR TOTAL HIP ARTHROPLASTY     CONSULTATIONS:  None. HISTORY OF PRESENT ILLNESS:  The patient is a 71-year-old female with progressive right hip and groin pain due to severe osteoarthritis. Symptoms have progressed despite comprehensive conservative treatment. She presents for right total hip replacement. Risks, benefits, and alternatives of procedure were reviewed with her in detail and she desires to proceed. HOSPITAL COURSE:  The patient underwent the aforementioned procedure on date of admission under spinal anesthesia. There were no immediate postoperative complications. She was started on a multimodal pain regimen and DVT prophylaxis. DISPOSITION: The patient made slow, steady progress with physical therapy and was appropriate for discharge to Home in stable condition on postoperative day 2. DISCHARGE MEDICATIONS:  Reinitiate preadmission medications. In addition, the patient will be on ASA for DVT prophylaxis and low dose oxycodone and Tylenol for pain. DISCHARGE INSTRUCTIONS:  Detailed printed instructions were provided to the patient. Follow up with Dr. Kamini Delatorre in approximately 3 weeks. The patient will receive home health physical therapy in the meantime.     Signed by: Victorino Lewis PA-C  2023

## 2023-11-29 NOTE — PROGRESS NOTES
Ortho:     Difficulty with pain mgt  DC IV dailudid  Discussed goal/realistic expectations  Add PO oxycodone 10mg Q6      Ramos Vail PA-C

## 2023-11-30 VITALS
WEIGHT: 192.02 LBS | HEART RATE: 61 BPM | RESPIRATION RATE: 18 BRPM | DIASTOLIC BLOOD PRESSURE: 64 MMHG | BODY MASS INDEX: 32.96 KG/M2 | SYSTOLIC BLOOD PRESSURE: 116 MMHG | OXYGEN SATURATION: 97 % | TEMPERATURE: 97.7 F

## 2023-11-30 LAB
GLUCOSE BLD STRIP.AUTO-MCNC: 185 MG/DL (ref 65–117)
GLUCOSE BLD STRIP.AUTO-MCNC: 217 MG/DL (ref 65–117)
SERVICE CMNT-IMP: ABNORMAL
SERVICE CMNT-IMP: ABNORMAL

## 2023-11-30 PROCEDURE — 97116 GAIT TRAINING THERAPY: CPT

## 2023-11-30 PROCEDURE — 6370000000 HC RX 637 (ALT 250 FOR IP): Performed by: PHYSICIAN ASSISTANT

## 2023-11-30 PROCEDURE — 97535 SELF CARE MNGMENT TRAINING: CPT

## 2023-11-30 PROCEDURE — 2580000003 HC RX 258: Performed by: PHYSICIAN ASSISTANT

## 2023-11-30 PROCEDURE — 94760 N-INVAS EAR/PLS OXIMETRY 1: CPT

## 2023-11-30 PROCEDURE — G0378 HOSPITAL OBSERVATION PER HR: HCPCS

## 2023-11-30 PROCEDURE — 6370000000 HC RX 637 (ALT 250 FOR IP): Performed by: ORTHOPAEDIC SURGERY

## 2023-11-30 PROCEDURE — 82962 GLUCOSE BLOOD TEST: CPT

## 2023-11-30 PROCEDURE — 97530 THERAPEUTIC ACTIVITIES: CPT

## 2023-11-30 RX ADMIN — SODIUM CHLORIDE, PRESERVATIVE FREE 10 ML: 5 INJECTION INTRAVENOUS at 10:04

## 2023-11-30 RX ADMIN — ACETAMINOPHEN 650 MG: 325 TABLET ORAL at 01:24

## 2023-11-30 RX ADMIN — Medication 1 UNITS: at 13:07

## 2023-11-30 RX ADMIN — ACETAMINOPHEN 650 MG: 325 TABLET ORAL at 06:34

## 2023-11-30 RX ADMIN — ACETAMINOPHEN 650 MG: 325 TABLET ORAL at 13:09

## 2023-11-30 RX ADMIN — OXYCODONE HYDROCHLORIDE 5 MG: 5 TABLET ORAL at 13:58

## 2023-11-30 RX ADMIN — FAMOTIDINE 20 MG: 20 TABLET, FILM COATED ORAL at 09:00

## 2023-11-30 RX ADMIN — OXYCODONE HYDROCHLORIDE 10 MG: 5 TABLET ORAL at 01:24

## 2023-11-30 RX ADMIN — PREGABALIN 200 MG: 100 CAPSULE ORAL at 13:58

## 2023-11-30 RX ADMIN — OXYCODONE HYDROCHLORIDE 10 MG: 5 TABLET ORAL at 06:35

## 2023-11-30 RX ADMIN — SENNOSIDES AND DOCUSATE SODIUM 1 TABLET: 8.6; 5 TABLET ORAL at 09:00

## 2023-11-30 RX ADMIN — METFORMIN HYDROCHLORIDE 500 MG: 500 TABLET ORAL at 09:00

## 2023-11-30 RX ADMIN — PREGABALIN 200 MG: 100 CAPSULE ORAL at 09:00

## 2023-11-30 RX ADMIN — ASPIRIN 81 MG: 81 TABLET, COATED ORAL at 09:00

## 2023-11-30 RX ADMIN — POLYETHYLENE GLYCOL 3350 17 G: 17 POWDER, FOR SOLUTION ORAL at 09:59

## 2023-11-30 RX ADMIN — OXYCODONE HYDROCHLORIDE 5 MG: 5 TABLET ORAL at 17:04

## 2023-11-30 RX ADMIN — OXYCODONE HYDROCHLORIDE 5 MG: 5 TABLET ORAL at 10:01

## 2023-11-30 ASSESSMENT — PAIN DESCRIPTION - LOCATION
LOCATION: HIP
LOCATION: HIP
LOCATION: HIP;GROIN;KNEE
LOCATION: HIP

## 2023-11-30 ASSESSMENT — PAIN DESCRIPTION - DESCRIPTORS
DESCRIPTORS: ACHING
DESCRIPTORS: ACHING;THROBBING
DESCRIPTORS: ACHING

## 2023-11-30 ASSESSMENT — PAIN DESCRIPTION - FREQUENCY
FREQUENCY: INTERMITTENT

## 2023-11-30 ASSESSMENT — PAIN DESCRIPTION - ORIENTATION
ORIENTATION: RIGHT

## 2023-11-30 ASSESSMENT — PAIN - FUNCTIONAL ASSESSMENT

## 2023-11-30 ASSESSMENT — PAIN SCALES - GENERAL
PAINLEVEL_OUTOF10: 10
PAINLEVEL_OUTOF10: 7
PAINLEVEL_OUTOF10: 6
PAINLEVEL_OUTOF10: 9
PAINLEVEL_OUTOF10: 8
PAINLEVEL_OUTOF10: 5
PAINLEVEL_OUTOF10: 4
PAINLEVEL_OUTOF10: 8

## 2023-11-30 ASSESSMENT — PAIN DESCRIPTION - PAIN TYPE
TYPE: ACUTE PAIN;SURGICAL PAIN
TYPE: SURGICAL PAIN

## 2023-11-30 NOTE — PROGRESS NOTES
Occupational Therapy  11/30/2023    Chart reviewed in prep for OT session, cleared for therapy by RN. Upon arrival to room, pt ending session with PT in bed and requesting to rest. Will defer and follow up as appropriate.     Thank you,  Faith Ramirez, OTD, OTR/L

## 2023-11-30 NOTE — PLAN OF CARE
periods will be relevant. 6. Middle categories imply that the patient supplies over 50 per cent of the effort. 7. Use of aids to be independent is allowed. Score Interpretation (from [address] [place] [address])    Independent   60-79 Minimally independent   40-59 Partially dependent   20-39 Very dependent   <20 Totally dependent     -Silvio Hinson., Barthel, D.W. (1965). Functional evaluation: the Barthel Index. [address] [facility] (). The Barthel activities of daily living index: self-reporting versus actual performance in the old (> or = 75 years). Journal of [facility] 45(7), [facility], JGLENF, Eric Dooley., [place] (1999). Measuring the change in disability after inpatient rehabilitation; comparison of the responsiveness of the Barthel Index and Functional [place] Measure. Journal of Neurology, Neurosurgery, and Psychiatry, 66(4), 729-618. Olimpia Mueller, N.J.A, MELISSA Borrego, & Lillie Webb MKATRIN. (2004) Assessment of post-stroke quality of life in cost-effectiveness studies: The usefulness of the Barthel Index and the EuroQoL-5D. Quality of Life Research, 13, 427-43                                                                                                                                                                                                                                 Pain Ratin/10   Pain Intervention(s):   patient medicated for pain prior to session and ice    Activity Tolerance:   Good    After treatment:   Patient left in no apparent distress in bed, Call bell within reach, Side rails x3, and nurse notified.       COMMUNICATION/EDUCATION:   The patient's plan of care was discussed with: occupational therapist, registered nurse, physician, and     Patient Education  Education Given To: Patient  Education Provided: Role of Therapy;Transfer Training;Equipment;Plan of Care;Energy

## 2023-11-30 NOTE — PLAN OF CARE
Problem: Occupational Therapy - Adult  Goal: By Discharge: Performs self-care activities at highest level of function for planned discharge setting. See evaluation for individualized goals. Description: FUNCTIONAL STATUS PRIOR TO ADMISSION:  Pt was independent with IADL/BADLs prior to admission, although Pt reporting frequent falls. Pt lives alone in a house with 2 KATHI without handrails. Pt has two supportive daughters- one of which lives out of town. HOME SUPPORT: Patient lived alone. Will have assistance from daughters upon discharge. Occupational Therapy Goals:  Initiated 11/29/2023  1. Patient will perform lower body dressing with Modified Nelson within 7 day(s) using AE PRN. 2.  Patient will perform toilet transfers with Modified Nelson  within 7 day(s) using RW. 3.  Patient will perform all aspects of toileting with Modified Nelson within 7 day(s) using RW. 4.  Patient will participate in upper extremity therapeutic exercise/activities with Modified Nelson for 10 minutes within 7 day(s). 5.  Patient will utilize energy conservation techniques during functional activities with verbal cues within 7 day(s). Outcome: Progressing   OCCUPATIONAL THERAPY TREATMENT  Patient: Ezekiel Samaniego (1 y.o. female)  Date: 11/30/2023  Primary Diagnosis: Primary osteoarthritis of right hip [M16.11]  Osteoarthritis of right hip, unspecified osteoarthritis type [M16.11]  Procedure(s) (LRB):  RIGHT POSTERIOR TOTAL HIP ARTHROPLASTY (Right) 2 Days Post-Op   Precautions: Fall Risk Right Lower Extremity Weight Bearing: Weight Bearing As Tolerated              Chart, occupational therapy assessment, plan of care, and goals were reviewed. ASSESSMENT  Patient continues to benefit from skilled OT services and is progressing towards goals, but remains limited by increased RLE pain levels, activity tolerance, standing balance, and LE access.  Patient declining further AE education, however RLE up onto bed; daughters can help)  Scooting: Stand-by assistance     Transfers:   Transfer Training  Transfer Training: Yes  Interventions: Safety awareness training;Verbal cues  Sit to Stand: Contact-guard assistance; Adaptive equipment; Additional time  Stand to Sit: Contact-guard assistance;Assist X1;Additional time  Bed to Chair: Contact-guard assistance; Adaptive equipment; Additional time  Toilet Transfer: Stand-by assistance; Additional time           Balance:  Standing: With support  Balance  Sitting: Intact  Standing: With support  Standing - Static: Good;Constant support  Standing - Dynamic: Good;Constant support      ADL Intervention:           Grooming: - Washing Hands : Stand-by Assistance and Standing at sink    Toileting: - Bladder Hygiene : Stand-by Assistance and Standing   - Clothing Management : Stand-By Assistance and Standing       Educated patient on energy conservation techniques, strategies to maximize quality of life and decrease stress/anxiety. 1. Pacing and making sure he/she takes short frequent breaks (e.g. In the shower wash the upper body, rest for 1 minute, then wash the lower body, etc)  2. Re-arranging his/her routine to allow for rest breaks in the morning routine  3. Prioritizing daily tasks, spreading needed tasks throughout day/week as able  4. Accepting help when offered  5. Performing ADLS and IADLS seated PRN  6. DME used to help conserve energy, such as a shower seat, a stool or chair in the kitchen, and pushing or pulling items instead of carrying them. Precautions: Patient recalled and demonstrated 0/3 posterior hip precautions. Pt needs no cues during ADLs and functional mobility. Pt provided with handout.     Pt was instructed on the following:    Standing: Patient instructed and demonstrated to walk up to sink/countertop/surfaces, step into walker to increase safety of joint and fall prevention with Contact Guard Assist. Instructed to apply concept of

## 2023-11-30 NOTE — PLAN OF CARE
Problem: Pain  Goal: Verbalizes/displays adequate comfort level or baseline comfort level  Outcome: Progressing     Problem: Safety - Adult  Goal: Free from fall injury  Outcome: Progressing     Problem: Discharge Planning  Goal: Discharge to home or other facility with appropriate resources  Outcome: Progressing     Problem: Physical Therapy - Adult  Goal: By Discharge: Performs mobility at highest level of function for planned discharge setting. See evaluation for individualized goals. Description: FUNCTIONAL STATUS PRIOR TO ADMISSION: Patient was modified independent using a single point cane for functional mobility and experienced multiple falls \"especially in the mornings\"  because \"my legs buckled\". Patient was unable to verbalize why legs buckled. Daughter backs up the statement. HOME SUPPORT PRIOR TO ADMISSION: The patient lived alone with daughters to provide assistance. Physical Therapy Goals  Initiated 11/28/2023  1. Patient will move from supine to sit and sit to supine, scoot up and down, and roll side to side in bed with modified independence within 4 day(s). 2.  Patient will perform sit to stand with modified independence within 4 day(s). 3.  Patient will transfer from bed to chair and chair to bed with modified independence using the least restrictive device within 4 day(s). 4.  Patient will ambulate with modified independence for 150 feet with the least restrictive device within 4 day(s). 5.  Patient will perform post-KELVIN home exercise program per protocol with independence within 4 days. 6. Patient will verbalize and demonstrate understanding of posterior hip precautions per protocol within 4 days.      11/30/2023 1134 by Emile Noble PT  Outcome: Progressing

## 2023-12-06 ENCOUNTER — TELEPHONE (OUTPATIENT)
Age: 73
End: 2023-12-06

## 2023-12-06 NOTE — TELEPHONE ENCOUNTER
----- Message from Maida Max sent at 12/6/2023  9:06 AM EST -----  Subject: Message to Provider    QUESTIONS  Information for Provider? Please send at home health orders phone @   [phone]  ---------------------------------------------------------------------------  --------------  CALL BACK INFO  [phone]; Do not leave any message, patient will call back for answer  ---------------------------------------------------------------------------  --------------  SCRIPT ANSWERS  Relationship to Patient? Self

## 2023-12-06 NOTE — TELEPHONE ENCOUNTER
Spoke with Nighat at [facility] says pt was discharged from [facility], and would like to know if pt's PCP will follow through with orders. Best call back  number [phone]

## 2023-12-29 ENCOUNTER — OFFICE VISIT (OUTPATIENT)
Age: 73
End: 2023-12-29
Payer: MEDICARE

## 2023-12-29 VITALS
RESPIRATION RATE: 16 BRPM | HEIGHT: 64 IN | TEMPERATURE: 99.1 F | DIASTOLIC BLOOD PRESSURE: 74 MMHG | BODY MASS INDEX: 32.79 KG/M2 | OXYGEN SATURATION: 94 % | SYSTOLIC BLOOD PRESSURE: 120 MMHG | HEART RATE: 81 BPM

## 2023-12-29 DIAGNOSIS — G89.4 CHRONIC PAIN SYNDROME: ICD-10-CM

## 2023-12-29 DIAGNOSIS — M25.50 POLYARTHRALGIA: ICD-10-CM

## 2023-12-29 DIAGNOSIS — E11.42 TYPE 2 DIABETES MELLITUS WITH DIABETIC POLYNEUROPATHY, WITHOUT LONG-TERM CURRENT USE OF INSULIN (HCC): Primary | ICD-10-CM

## 2023-12-29 DIAGNOSIS — G62.9 POLYNEUROPATHY, UNSPECIFIED: ICD-10-CM

## 2023-12-29 LAB
GLUCOSE, POC: 151 MG/DL
HBA1C MFR BLD: 6.7 %

## 2023-12-29 PROCEDURE — PBSHW AMB POC HEMOGLOBIN A1C: Performed by: NURSE PRACTITIONER

## 2023-12-29 PROCEDURE — G8484 FLU IMMUNIZE NO ADMIN: HCPCS | Performed by: NURSE PRACTITIONER

## 2023-12-29 PROCEDURE — G8417 CALC BMI ABV UP PARAM F/U: HCPCS | Performed by: NURSE PRACTITIONER

## 2023-12-29 PROCEDURE — 4004F PT TOBACCO SCREEN RCVD TLK: CPT | Performed by: NURSE PRACTITIONER

## 2023-12-29 PROCEDURE — 3044F HG A1C LEVEL LT 7.0%: CPT | Performed by: NURSE PRACTITIONER

## 2023-12-29 PROCEDURE — 1123F ACP DISCUSS/DSCN MKR DOCD: CPT | Performed by: NURSE PRACTITIONER

## 2023-12-29 PROCEDURE — 3017F COLORECTAL CA SCREEN DOC REV: CPT | Performed by: NURSE PRACTITIONER

## 2023-12-29 PROCEDURE — 3078F DIAST BP <80 MM HG: CPT | Performed by: NURSE PRACTITIONER

## 2023-12-29 PROCEDURE — 82962 GLUCOSE BLOOD TEST: CPT | Performed by: NURSE PRACTITIONER

## 2023-12-29 PROCEDURE — 99214 OFFICE O/P EST MOD 30 MIN: CPT | Performed by: NURSE PRACTITIONER

## 2023-12-29 PROCEDURE — G8400 PT W/DXA NO RESULTS DOC: HCPCS | Performed by: NURSE PRACTITIONER

## 2023-12-29 PROCEDURE — 2022F DILAT RTA XM EVC RTNOPTHY: CPT | Performed by: NURSE PRACTITIONER

## 2023-12-29 PROCEDURE — 83036 HEMOGLOBIN GLYCOSYLATED A1C: CPT | Performed by: NURSE PRACTITIONER

## 2023-12-29 PROCEDURE — 1090F PRES/ABSN URINE INCON ASSESS: CPT | Performed by: NURSE PRACTITIONER

## 2023-12-29 PROCEDURE — G8427 DOCREV CUR MEDS BY ELIG CLIN: HCPCS | Performed by: NURSE PRACTITIONER

## 2023-12-29 PROCEDURE — 3074F SYST BP LT 130 MM HG: CPT | Performed by: NURSE PRACTITIONER

## 2023-12-29 PROCEDURE — PBSHW AMB POC GLUCOSE BLOOD, BY GLUCOSE MONITORING DEVICE: Performed by: NURSE PRACTITIONER

## 2023-12-29 RX ORDER — HYDROCODONE BITARTRATE AND ACETAMINOPHEN 10; 325 MG/1; MG/1
1 TABLET ORAL EVERY 8 HOURS PRN
Qty: 90 TABLET | Refills: 0 | Status: SHIPPED | OUTPATIENT
Start: 2024-01-27 | End: 2023-12-29 | Stop reason: SDUPTHER

## 2023-12-29 RX ORDER — HYDROCODONE BITARTRATE AND ACETAMINOPHEN 10; 325 MG/1; MG/1
1 TABLET ORAL EVERY 8 HOURS PRN
Qty: 90 TABLET | Refills: 0 | Status: SHIPPED | OUTPATIENT
Start: 2024-02-24 | End: 2024-03-25

## 2023-12-29 RX ORDER — HYDROCODONE BITARTRATE AND ACETAMINOPHEN 10; 325 MG/1; MG/1
1 TABLET ORAL EVERY 8 HOURS PRN
Qty: 90 TABLET | Refills: 0 | Status: SHIPPED | OUTPATIENT
Start: 2023-12-29 | End: 2023-12-29 | Stop reason: SDUPTHER

## 2023-12-29 NOTE — PROGRESS NOTES
Assessment/Plan:     1. Type 2 diabetes mellitus with diabetic polyneuropathy, without long-term current use of insulin ([facility])  -     AMB POC GLUCOSE BLOOD, BY GLUCOSE MONITORING DEVICE  -     AMB POC HEMOGLOBIN A1C  Sugars are all stable. Continue current therapies. 2. Chronic pain syndrome  Overview:  bilateral hip arthritis, chronic low back pain      Orders:  -     HYDROcodone-acetaminophen (NORCO)  MG per tablet; Take 1 tablet by mouth every 8 hours as needed for Pain for up to 30 days. Intended supply: 30 days Max Daily Amount: 3 tablets, Disp-90 tablet, R-0Normal  3. Polyneuropathy, unspecified  -     HYDROcodone-acetaminophen (NORCO)  MG per tablet; Take 1 tablet by mouth every 8 hours as needed for Pain for up to 30 days. Intended supply: 30 days Max Daily Amount: 3 tablets, Disp-90 tablet, R-0Normal  4. Polyarthralgia  -     HYDROcodone-acetaminophen (NORCO)  MG per tablet; Take 1 tablet by mouth every 8 hours as needed for Pain for up to 30 days. Intended supply: 30 days Max Daily Amount: 3 tablets, Disp-90 tablet, R-0Normal     Chronic pain meds refilled today. UDS up to date. Contract signed.  reviewed and appropriate. Three month refill given today. Continue current therapy. Return in about 3 months (around 3/29/2024) for Follow Up. Discussed expected course/resolution/complications of diagnosis in detail with patient. Medication risks/benefits/costs/interactions/alternatives discussed with patient. Pt was given after visit summary which includes diagnoses, current medications & vitals. Pt expressed understanding with the diagnosis and plan          Subjective:      Estuardo Reich is a 68 y.o. female who presents for had concerns including Blood Sugar Problem (Elevated only while at the hospital and fine when she was home). She is here for follow up. She had some hyperglycemia in the hospital after recent hip replacement.   Reports home sugars have

## 2024-01-10 RX ORDER — ONDANSETRON 4 MG/1
TABLET, FILM COATED ORAL
Qty: 30 TABLET | Refills: 0 | Status: SHIPPED | OUTPATIENT
Start: 2024-01-10

## 2024-01-10 NOTE — TELEPHONE ENCOUNTER
Last appointment: 12/29/2023 CAMILLE Benson   Next appointment: 03/29/2024 CAMILLE Benson   Previous refill encounter(s):   04/25/2023 Zofran #30 with 1 refill.     For Pharmacy Admin Tracking Only    Program: Medication Refill  Intervention Detail: New Rx: 1, reason: Patient Preference  Time Spent (min): 5      Requested Prescriptions     Pending Prescriptions Disp Refills    ondansetron (ZOFRAN) 4 MG tablet 30 tablet 0     Sig: Take 1 Tablet by mouth every eight (8) hours as needed for Nausea or Vomiting

## 2024-01-25 NOTE — TELEPHONE ENCOUNTER
PCP: Kristen Benson, APRN - NP    Last appt: 12/29/2023   Future Appointments   Date Time Provider Department Center   2/26/2024  1:30 PM Shelia Gibbs RPH PAFP BS AMB   3/29/2024  1:15 PM Kristen Benson APRN - NP PAFCLEMENT [facility]       Requested Prescriptions     Pending Prescriptions Disp Refills    atorvastatin (LIPITOR) 20 MG tablet [Pharmacy Med Name: ATORVASTATIN CALCIUM 20 MG Tablet] 90 tablet 3     Sig: TAKE 1 TABLET EVERY DAY         Prior labs and Blood pressures:  BP Readings from Last 3 Encounters:   12/29/23 120/74   11/30/23 116/64   11/20/23 119/77     Lab Results   Component Value Date/Time     11/29/2023 07:14 AM    K 4.2 11/29/2023 07:14 AM     11/29/2023 07:14 AM    CO2 25 11/29/2023 07:14 AM    BUN 27 11/29/2023 07:14 AM    GFRAA 52 08/24/2022 12:01 PM     Lab Results   Component Value Date/Time    FME9PSDB 6.7 12/29/2023 02:12 PM     Lab Results   Component Value Date/Time    CHOL 132 08/24/2022 12:01 PM    HDL 47 08/24/2022 12:01 PM     No results found for: \"VITD3\", \"VD3RIA\"    Lab Results   Component Value Date/Time    TSH 1.15 10/06/2020 05:43 PM

## 2024-01-26 RX ORDER — ATORVASTATIN CALCIUM 20 MG/1
20 TABLET, FILM COATED ORAL DAILY
Qty: 90 TABLET | Refills: 3 | Status: SHIPPED | OUTPATIENT
Start: 2024-01-26

## 2024-01-26 RX ORDER — ALLOPURINOL 300 MG/1
300 TABLET ORAL NIGHTLY
Qty: 90 TABLET | Refills: 3 | Status: SHIPPED | OUTPATIENT
Start: 2024-01-26

## 2024-02-12 DIAGNOSIS — G62.9 PERIPHERAL POLYNEUROPATHY: ICD-10-CM

## 2024-02-12 NOTE — TELEPHONE ENCOUNTER
NP Marcelino,    Call from [facility] asking to send a short term Rx for the diltiazem er 420mg (TIADYLT) per hx) to [facility].      (Rx Did not cross over appropriately).      RE-entered the Tiadylt per hx if appropriate.  fadi Marquez    Last appointment: 12/2923  Marcelino  Next appointment: 3/29/24 Marcelino  Previous refill encounter(s): 5/3/23 90 + 3 ([facility])    Requested Prescriptions     Pending Prescriptions Disp Refills    dilTIAZem (TIADYLT ER) 420 MG extended release capsule 30 capsule 0     Sig: Take 1 capsule by mouth daily     For Pharmacy Admin Tracking Only    Program: Medication Refill  CPA in place:    Recommendation Provided To:   Intervention Detail: New Rx: 1, reason: Patient Preference  Intervention Accepted By:   Gap Closed?:    Time Spent (min): 10

## 2024-02-12 NOTE — TELEPHONE ENCOUNTER
Patient requesting refill of pregabalin per [facility].  Check .  States last dispensed 12/15/23.  Nilam Marquez    Last appointment: 12/29/23 Marcelino  Next appointment: 3/29/24 Marcelino  Previous refill encounter(s): 8/25/23 90 + 2    Requested Prescriptions     Pending Prescriptions Disp Refills    pregabalin (LYRICA) 200 MG capsule 90 capsule 2     Sig: Take 1 capsule by mouth in the morning, at noon, and at bedtime for 90 days. Max Daily Amount: 600 mg     For Pharmacy Admin Tracking Only    Program: Medication Refill  CPA in place:    Recommendation Provided To:   Intervention Detail: New Rx: 1, reason: Patient Preference  Intervention Accepted By:   Gap Closed?:    Time Spent (min): 5

## 2024-02-14 RX ORDER — DILTIAZEM HYDROCHLORIDE 420 MG/1
420 CAPSULE, EXTENDED RELEASE ORAL DAILY
Qty: 30 CAPSULE | Refills: 0 | Status: SHIPPED | OUTPATIENT
Start: 2024-02-14

## 2024-02-20 RX ORDER — PREGABALIN 200 MG/1
200 CAPSULE ORAL 3 TIMES DAILY
Qty: 90 CAPSULE | Refills: 2 | Status: SHIPPED | OUTPATIENT
Start: 2024-02-20 | End: 2024-05-20

## 2024-02-26 ENCOUNTER — PHARMACY VISIT (OUTPATIENT)
Age: 74
End: 2024-02-26

## 2024-02-26 VITALS — BODY MASS INDEX: 33.33 KG/M2 | WEIGHT: 194.2 LBS

## 2024-02-26 DIAGNOSIS — E11.42 TYPE 2 DIABETES MELLITUS WITH DIABETIC POLYNEUROPATHY, WITHOUT LONG-TERM CURRENT USE OF INSULIN (HCC): Primary | ICD-10-CM

## 2024-02-26 NOTE — PROGRESS NOTES
Pharmacy Progress Note - Diabetes Management    S/O: Ms. Leatha Wilson is a 73 y.o. female, referred by Dr. Benson, RICK Manning - NP, with a PMH of HTN, CAD, NAFL, T2DM, gout, OA, CKD, hypercholesterolemia, Vit D deficiency, chronic pain, polymyalgia rheumatica, was seen today for diabetes management.  Patient's last A1c was 6.7% (Dec 2023) which is decreased from 7.4% (2023) which is decreased from 10.5% (2023).    Interim update: Pt arrives to clinic today and states she never received Spiriva Respimat from [facility] Rx assistance program.    This writer contacted [facility] and noted that the pt has been approved.  There was an error on the Rx and a clarification is needed at the pharmacy.    Pt has received Jardiance thru Rx assistance.      Medication Adherence/Access:  - Receives Trulicity thru Rx assistance ()  - Receives Jardiance thru Rx assistance ()    Current anti-hyperglycemic regimen includes:    - Trulicity 3 mg weekly  - Jardiance 25 mg daily  - Metformin 500 mg 2 tabs BID    ROS:  Today, Pt endorses:  - Symptoms of Hyperglycemia: none  - Symptoms of Hypoglycemia: none    Self Monitoring Blood Glucose (SMBG) or CGM:  - Brought in home glucometer/blood glucose log/CGM reader today:  no  States Fasting Bs-130s      Vitals:  Wt Readings from Last 3 Encounters:   01/15/24 85.3 kg (188 lb)   23 86.6 kg (191 lb)   23 87.1 kg (192 lb 0.3 oz)     BP Readings from Last 3 Encounters:   23 120/74   23 116/64   23 119/77     Pulse Readings from Last 3 Encounters:   23 81   23 61   23 70       Past Medical History:   Diagnosis Date    Abnormal pulmonary function test 2016    AR (allergic rhinitis)     Asthma     Atrial thrombus     left    Chronic bronchitis (HCC) 2016    Chronic pain     back/both hips/neuropathy    DDD (degenerative disc disease), lumbar     Diabetes (HCC)     DJD (degenerative joint disease) of hip

## 2024-02-26 NOTE — PATIENT INSTRUCTIONS
Pharmacist Contact Information:  Diana LazaroD, [facility]  Work Cell: [phone]

## 2024-02-28 ENCOUNTER — TELEPHONE (OUTPATIENT)
Age: 74
End: 2024-02-28

## 2024-02-28 NOTE — TELEPHONE ENCOUNTER
Pharmacy Progress Note     Contacted Rx assistance pharmacy to clarify order for Spiriva Respimat.    Order clarified and is being processed.  Should be sent to pt.      There are no discontinued medications.  No orders of the defined types were placed in this encounter.        Shelia Gibbs, PharmD, [facility]  Clinical Pharmacist Specialist      For Pharmacy Admin Tracking Only    Program: Medical Group  CPA in place:  Yes  Recommendation Provided To: Pharmacy: 1  Intervention Detail: New Rx: 1, reason: Needs Additional Therapy  Intervention Accepted By: Pharmacy: 1  Time Spent (min): 15

## 2024-03-09 DIAGNOSIS — G62.9 POLYNEUROPATHY, UNSPECIFIED: ICD-10-CM

## 2024-03-09 DIAGNOSIS — G89.4 CHRONIC PAIN SYNDROME: ICD-10-CM

## 2024-03-09 DIAGNOSIS — M25.50 POLYARTHRALGIA: ICD-10-CM

## 2024-03-09 RX ORDER — HYDROCODONE BITARTRATE AND ACETAMINOPHEN 10; 325 MG/1; MG/1
1 TABLET ORAL EVERY 8 HOURS PRN
Qty: 90 TABLET | Refills: 0 | Status: CANCELLED | OUTPATIENT
Start: 2024-03-09 | End: 2024-04-08

## 2024-03-11 ENCOUNTER — TELEPHONE (OUTPATIENT)
Age: 74
End: 2024-03-11

## 2024-03-11 RX ORDER — HYDROCODONE BITARTRATE AND ACETAMINOPHEN 10; 325 MG/1; MG/1
1 TABLET ORAL EVERY 8 HOURS PRN
Qty: 90 TABLET | Refills: 0 | Status: SHIPPED | OUTPATIENT
Start: 2024-03-11 | End: 2024-04-10

## 2024-03-11 NOTE — TELEPHONE ENCOUNTER
Patient called stating that she is needing a refill on her Hydrocodone - Acetaminophen medication. Patient is hoping for this to be refilled as soon as possible, as she is almost out of this medication.    Best call back number  [phone]

## 2024-03-11 NOTE — TELEPHONE ENCOUNTER
PCP: Kristen Benson APRN - NP    Last appt: 2/26/2024   Future Appointments   Date Time Provider Department Center   3/29/2024  1:15 PM Kristen Benson APRN - NP PAFCLEMENT BS AMB   12/2/2024  1:30 PM Shelia Gibbs [facility] PAFP BS AMB       Requested Prescriptions     Pending Prescriptions Disp Refills    HYDROcodone-acetaminophen (NORCO)  MG per tablet 90 tablet 0     Sig: Take 1 tablet by mouth every 8 hours as needed for Pain for up to 30 days. Intended supply: 30 days Max Daily Amount: 3 tablets         Prior labs and Blood pressures:  BP Readings from Last 3 Encounters:   12/29/23 120/74   11/30/23 116/64   11/20/23 119/77     Lab Results   Component Value Date/Time     11/29/2023 07:14 AM    K 4.2 11/29/2023 07:14 AM     11/29/2023 07:14 AM    CO2 25 11/29/2023 07:14 AM    BUN 27 11/29/2023 07:14 AM    GFRAA 52 08/24/2022 12:01 PM     Lab Results   Component Value Date/Time    QIT8VTWZ 6.7 12/29/2023 02:12 PM     Lab Results   Component Value Date/Time    CHOL 132 08/24/2022 12:01 PM    HDL 47 08/24/2022 12:01 PM     No results found for: \"VITD3\", \"VD3RIA\"    Lab Results   Component Value Date/Time    TSH 1.15 10/06/2020 05:43 PM

## 2024-03-26 ENCOUNTER — TELEPHONE (OUTPATIENT)
Age: 74
End: 2024-03-26

## 2024-03-26 NOTE — TELEPHONE ENCOUNTER
Patient called stating that she received multiple calls today, but there was no message left in her chart. Patient is  hoping to get another call back.    Best call back numbers  [phone]

## 2024-03-29 ENCOUNTER — OFFICE VISIT (OUTPATIENT)
Age: 74
End: 2024-03-29
Payer: MEDICARE

## 2024-03-29 VITALS
OXYGEN SATURATION: 93 % | TEMPERATURE: 97.7 F | HEIGHT: 64 IN | HEART RATE: 78 BPM | SYSTOLIC BLOOD PRESSURE: 108 MMHG | BODY MASS INDEX: 33.22 KG/M2 | RESPIRATION RATE: 20 BRPM | WEIGHT: 194.6 LBS | DIASTOLIC BLOOD PRESSURE: 60 MMHG

## 2024-03-29 DIAGNOSIS — J41.0 SIMPLE CHRONIC BRONCHITIS (HCC): ICD-10-CM

## 2024-03-29 DIAGNOSIS — N18.31 CHRONIC KIDNEY DISEASE, STAGE 3A (HCC): ICD-10-CM

## 2024-03-29 DIAGNOSIS — E11.42 TYPE 2 DIABETES MELLITUS WITH DIABETIC POLYNEUROPATHY, WITHOUT LONG-TERM CURRENT USE OF INSULIN (HCC): ICD-10-CM

## 2024-03-29 DIAGNOSIS — M06.09 SERONEGATIVE RHEUMATOID ARTHRITIS OF MULTIPLE SITES (HCC): Primary | ICD-10-CM

## 2024-03-29 DIAGNOSIS — K74.3 PRIMARY BILIARY CIRRHOSIS (HCC): ICD-10-CM

## 2024-03-29 DIAGNOSIS — F11.20 OPIOID DEPENDENCE WITH CURRENT USE (HCC): ICD-10-CM

## 2024-03-29 PROCEDURE — 3046F HEMOGLOBIN A1C LEVEL >9.0%: CPT | Performed by: NURSE PRACTITIONER

## 2024-03-29 PROCEDURE — 3017F COLORECTAL CA SCREEN DOC REV: CPT | Performed by: NURSE PRACTITIONER

## 2024-03-29 PROCEDURE — 1090F PRES/ABSN URINE INCON ASSESS: CPT | Performed by: NURSE PRACTITIONER

## 2024-03-29 PROCEDURE — 99214 OFFICE O/P EST MOD 30 MIN: CPT | Performed by: NURSE PRACTITIONER

## 2024-03-29 PROCEDURE — 3078F DIAST BP <80 MM HG: CPT | Performed by: NURSE PRACTITIONER

## 2024-03-29 PROCEDURE — 2022F DILAT RTA XM EVC RTNOPTHY: CPT | Performed by: NURSE PRACTITIONER

## 2024-03-29 PROCEDURE — G8484 FLU IMMUNIZE NO ADMIN: HCPCS | Performed by: NURSE PRACTITIONER

## 2024-03-29 PROCEDURE — G8400 PT W/DXA NO RESULTS DOC: HCPCS | Performed by: NURSE PRACTITIONER

## 2024-03-29 PROCEDURE — G8427 DOCREV CUR MEDS BY ELIG CLIN: HCPCS | Performed by: NURSE PRACTITIONER

## 2024-03-29 PROCEDURE — 3023F SPIROM DOC REV: CPT | Performed by: NURSE PRACTITIONER

## 2024-03-29 PROCEDURE — 4004F PT TOBACCO SCREEN RCVD TLK: CPT | Performed by: NURSE PRACTITIONER

## 2024-03-29 PROCEDURE — G8417 CALC BMI ABV UP PARAM F/U: HCPCS | Performed by: NURSE PRACTITIONER

## 2024-03-29 PROCEDURE — 1123F ACP DISCUSS/DSCN MKR DOCD: CPT | Performed by: NURSE PRACTITIONER

## 2024-03-29 PROCEDURE — 3074F SYST BP LT 130 MM HG: CPT | Performed by: NURSE PRACTITIONER

## 2024-03-29 ASSESSMENT — PATIENT HEALTH QUESTIONNAIRE - PHQ9
SUM OF ALL RESPONSES TO PHQ QUESTIONS 1-9: 0
SUM OF ALL RESPONSES TO PHQ9 QUESTIONS 1 & 2: 0
2. FEELING DOWN, DEPRESSED OR HOPELESS: NOT AT ALL
SUM OF ALL RESPONSES TO PHQ QUESTIONS 1-9: 0
1. LITTLE INTEREST OR PLEASURE IN DOING THINGS: NOT AT ALL

## 2024-03-29 NOTE — PROGRESS NOTES
Chief Complaint   Patient presents with    Diabetes     3 mth      \"Have you been to the ER, urgent care clinic since your last visit?  Hospitalized since your last visit?\"    YES - When: approximately 1 months ago.  Where and Why: [facility] Allergies.    “Have you seen or consulted any other health care providers outside of [facility] since your last visit?”    NO                   3/29/2024     1:13 PM   PHQ-9    Little interest or pleasure in doing things 0   Feeling down, depressed, or hopeless 0   PHQ-2 Score 0   PHQ-9 Total Score 0           Financial Resource Strain: Low Risk  (6/1/2023)    Overall Financial Resource Strain (CARDIA)     Difficulty of Paying Living Expenses: Not hard at all      Food Insecurity: Not on file (6/1/2023)          Health Maintenance Due   Topic Date Due    Respiratory Syncytial Virus (RSV) Pregnant or age 60 yrs+ (1 - 1-dose 60+ series) Never done    Diabetic retinal exam  04/16/2020    Diabetic foot exam  08/24/2023    Diabetic Alb to Cr ratio (uACR) test  08/24/2023    Lipids  08/24/2023    COVID-19 Vaccine (2 - 2023-24 season) 09/01/2023    Annual Wellness Visit (Medicare Advantage)  Never done

## 2024-03-29 NOTE — PATIENT INSTRUCTIONS
[email or web address]  Fabiola Cruz  [phone]  [facility], [address] [address]

## 2024-04-06 DIAGNOSIS — M25.50 POLYARTHRALGIA: ICD-10-CM

## 2024-04-06 DIAGNOSIS — G89.4 CHRONIC PAIN SYNDROME: ICD-10-CM

## 2024-04-06 DIAGNOSIS — G62.9 POLYNEUROPATHY, UNSPECIFIED: ICD-10-CM

## 2024-04-08 DIAGNOSIS — G89.4 CHRONIC PAIN SYNDROME: ICD-10-CM

## 2024-04-08 DIAGNOSIS — M25.50 POLYARTHRALGIA: ICD-10-CM

## 2024-04-08 DIAGNOSIS — G62.9 POLYNEUROPATHY, UNSPECIFIED: ICD-10-CM

## 2024-04-08 RX ORDER — HYDROCODONE BITARTRATE AND ACETAMINOPHEN 10; 325 MG/1; MG/1
1 TABLET ORAL EVERY 8 HOURS PRN
Qty: 90 TABLET | Refills: 0 | OUTPATIENT
Start: 2024-04-08 | End: 2024-05-08

## 2024-04-08 NOTE — TELEPHONE ENCOUNTER
PCP: Kristen Benson APRN - NP      Future Appointments   Date Time Provider Department Center   4/17/2024 11:00 AM Shell Perez APRN - NP AOCR BS AMB   7/1/2024  1:00 PM Kristen Benson APRN - NP PAFP BS AMB   12/2/2024  1:30 PM Shelia Gibbs RP PAFP BS AMB     Last seen: 3/29/2024    Requested Prescriptions     Pending Prescriptions Disp Refills    HYDROcodone-acetaminophen (NORCO)  MG per tablet 90 tablet 0     Sig: Take 1 tablet by mouth every 8 hours as needed for Pain for up to 30 days. Intended supply: 30 days Max Daily Amount: 3 tablets

## 2024-04-09 ENCOUNTER — TELEPHONE (OUTPATIENT)
Age: 74
End: 2024-04-09

## 2024-04-09 RX ORDER — HYDROCODONE BITARTRATE AND ACETAMINOPHEN 10; 325 MG/1; MG/1
1 TABLET ORAL EVERY 8 HOURS PRN
Qty: 90 TABLET | Refills: 0 | Status: SHIPPED | OUTPATIENT
Start: 2024-04-09 | End: 2024-05-09

## 2024-04-09 NOTE — TELEPHONE ENCOUNTER
----- Message from Maida Teagan sent at 4/8/2024  9:54 AM EDT -----  Subject: Appointment Request    Reason for Call: Established Patient Appointment needed: Routine Existing   Condition Follow Up    QUESTIONS    Reason for appointment request? Available appointments did not meet   patient need     Additional Information for Provider? Patient called states that she needs   a med check appt and a paper filled out for her insurance within the next   30 days. Please return call to schedule.  ---------------------------------------------------------------------------  --------------  CALL BACK INFO  [phone]; OK to leave message on voicemail  ---------------------------------------------------------------------------  --------------  SCRIPT ANSWERS

## 2024-04-11 ENCOUNTER — TELEPHONE (OUTPATIENT)
Age: 74
End: 2024-04-11

## 2024-04-11 ENCOUNTER — OFFICE VISIT (OUTPATIENT)
Age: 74
End: 2024-04-11
Payer: MEDICARE

## 2024-04-11 VITALS
SYSTOLIC BLOOD PRESSURE: 117 MMHG | WEIGHT: 195 LBS | HEIGHT: 64 IN | TEMPERATURE: 98.3 F | RESPIRATION RATE: 18 BRPM | HEART RATE: 71 BPM | BODY MASS INDEX: 33.29 KG/M2 | OXYGEN SATURATION: 96 % | DIASTOLIC BLOOD PRESSURE: 71 MMHG

## 2024-04-11 DIAGNOSIS — J43.8 OTHER EMPHYSEMA (HCC): Primary | ICD-10-CM

## 2024-04-11 DIAGNOSIS — Z72.0 TOBACCO ABUSE: ICD-10-CM

## 2024-04-11 DIAGNOSIS — M06.09 SERONEGATIVE RHEUMATOID ARTHRITIS OF MULTIPLE SITES (HCC): ICD-10-CM

## 2024-04-11 PROCEDURE — 1123F ACP DISCUSS/DSCN MKR DOCD: CPT | Performed by: NURSE PRACTITIONER

## 2024-04-11 PROCEDURE — G8427 DOCREV CUR MEDS BY ELIG CLIN: HCPCS | Performed by: NURSE PRACTITIONER

## 2024-04-11 PROCEDURE — 99213 OFFICE O/P EST LOW 20 MIN: CPT | Performed by: NURSE PRACTITIONER

## 2024-04-11 PROCEDURE — 3023F SPIROM DOC REV: CPT | Performed by: NURSE PRACTITIONER

## 2024-04-11 PROCEDURE — 3078F DIAST BP <80 MM HG: CPT | Performed by: NURSE PRACTITIONER

## 2024-04-11 PROCEDURE — 3017F COLORECTAL CA SCREEN DOC REV: CPT | Performed by: NURSE PRACTITIONER

## 2024-04-11 PROCEDURE — G8417 CALC BMI ABV UP PARAM F/U: HCPCS | Performed by: NURSE PRACTITIONER

## 2024-04-11 PROCEDURE — 4004F PT TOBACCO SCREEN RCVD TLK: CPT | Performed by: NURSE PRACTITIONER

## 2024-04-11 PROCEDURE — 1090F PRES/ABSN URINE INCON ASSESS: CPT | Performed by: NURSE PRACTITIONER

## 2024-04-11 PROCEDURE — G8400 PT W/DXA NO RESULTS DOC: HCPCS | Performed by: NURSE PRACTITIONER

## 2024-04-11 PROCEDURE — 3074F SYST BP LT 130 MM HG: CPT | Performed by: NURSE PRACTITIONER

## 2024-04-11 NOTE — PROGRESS NOTES
Chief Complaint   Patient presents with    Medication Refill    Forms     \"Have you been to the ER, urgent care clinic since your last visit?  Hospitalized since your last visit?\"    NO    “Have you seen or consulted any other health care providers outside of [facility] since your last visit?”    NO

## 2024-04-11 NOTE — TELEPHONE ENCOUNTER
----- Message from Maida Candelaria sent at 4/8/2024  9:52 AM EDT -----  Subject: Medication Problem     Medication: HYDROcodone-acetaminophen (NORCO)  MG per tablet  Dosage: 325 mg  Ordering Provider:     Question/Problem: Patient called asking to speak with nurse regarding this   medication she states that the prescription was for March and they would   not fill the medication. Please return call.       Pharmacy: [facility] [id number] - [place] [id number]   [facility] - P [phone] - F [phone]    ---------------------------------------------------------------------------  --------------  CALL BACK INFO  [phone]; OK to leave message on voicemail  ---------------------------------------------------------------------------  --------------    SCRIPT ANSWERS  Relationship to Patient: Self

## 2024-04-14 RX ORDER — HYDROCODONE BITARTRATE AND ACETAMINOPHEN 10; 325 MG/1; MG/1
1 TABLET ORAL EVERY 8 HOURS PRN
Qty: 90 TABLET | Refills: 0 | Status: SHIPPED | OUTPATIENT
Start: 2024-05-07 | End: 2024-06-06

## 2024-04-15 NOTE — TELEPHONE ENCOUNTER
PCP: Kristen Benson APRN - NP    Last appt: 4/11/2024     Future Appointments   Date Time Provider Department Center   4/17/2024 11:00 AM Shell Perez APRN - NP AOCR BS AMB   7/1/2024  1:00 PM Kristen Benson APRN - NP PAFP BS AMB   12/2/2024  1:30 PM Shelia Gibbs [facility] PAFP BS AMB       Requested Prescriptions     Pending Prescriptions Disp Refills    losartan-hydroCHLOROthiazide (HYZAAR) 100-25 MG per tablet [Pharmacy Med Name: LOSARTAN POTASSIUM/HYDROCHLOROTHIAZIDE 100-25 MG Tablet] 90 tablet 3     Sig: TAKE 1 TABLET EVERY DAY       Prior labs and Blood pressures:  BP Readings from Last 3 Encounters:   04/11/24 117/71   03/29/24 108/60   12/29/23 120/74     Lab Results   Component Value Date/Time     11/29/2023 07:14 AM    K 4.2 11/29/2023 07:14 AM     11/29/2023 07:14 AM    CO2 25 11/29/2023 07:14 AM    BUN 27 11/29/2023 07:14 AM    GFRAA 52 08/24/2022 12:01 PM     Lab Results   Component Value Date/Time    QVF1ZRJB 6.7 12/29/2023 02:12 PM     Lab Results   Component Value Date/Time    CHOL 132 08/24/2022 12:01 PM    HDL 47 08/24/2022 12:01 PM     No results found for: \"VITD3\", \"VD3RIA\"    Lab Results   Component Value Date/Time    TSH 1.15 10/06/2020 05:43 PM

## 2024-04-18 RX ORDER — LOSARTAN POTASSIUM AND HYDROCHLOROTHIAZIDE 25; 100 MG/1; MG/1
1 TABLET ORAL DAILY
Qty: 90 TABLET | Refills: 3 | Status: SHIPPED | OUTPATIENT
Start: 2024-04-18

## 2024-04-25 ENCOUNTER — OFFICE VISIT (OUTPATIENT)
Age: 74
End: 2024-04-25
Payer: MEDICARE

## 2024-04-25 VITALS
TEMPERATURE: 97 F | BODY MASS INDEX: 33.13 KG/M2 | HEART RATE: 77 BPM | RESPIRATION RATE: 16 BRPM | WEIGHT: 193 LBS | DIASTOLIC BLOOD PRESSURE: 56 MMHG | SYSTOLIC BLOOD PRESSURE: 106 MMHG | OXYGEN SATURATION: 93 %

## 2024-04-25 DIAGNOSIS — M1A.9XX0 CHRONIC GOUT WITHOUT TOPHUS, UNSPECIFIED CAUSE, UNSPECIFIED SITE: ICD-10-CM

## 2024-04-25 DIAGNOSIS — Z51.81 MEDICATION MONITORING ENCOUNTER: ICD-10-CM

## 2024-04-25 DIAGNOSIS — G56.03 BILATERAL CARPAL TUNNEL SYNDROME: ICD-10-CM

## 2024-04-25 DIAGNOSIS — M1A.9XX0 CHRONIC GOUT WITHOUT TOPHUS, UNSPECIFIED CAUSE, UNSPECIFIED SITE: Primary | ICD-10-CM

## 2024-04-25 PROCEDURE — 3078F DIAST BP <80 MM HG: CPT | Performed by: NURSE PRACTITIONER

## 2024-04-25 PROCEDURE — 4004F PT TOBACCO SCREEN RCVD TLK: CPT | Performed by: NURSE PRACTITIONER

## 2024-04-25 PROCEDURE — 99214 OFFICE O/P EST MOD 30 MIN: CPT | Performed by: NURSE PRACTITIONER

## 2024-04-25 PROCEDURE — 1123F ACP DISCUSS/DSCN MKR DOCD: CPT | Performed by: NURSE PRACTITIONER

## 2024-04-25 PROCEDURE — 3074F SYST BP LT 130 MM HG: CPT | Performed by: NURSE PRACTITIONER

## 2024-04-25 PROCEDURE — G8417 CALC BMI ABV UP PARAM F/U: HCPCS | Performed by: NURSE PRACTITIONER

## 2024-04-25 PROCEDURE — G8400 PT W/DXA NO RESULTS DOC: HCPCS | Performed by: NURSE PRACTITIONER

## 2024-04-25 PROCEDURE — 3017F COLORECTAL CA SCREEN DOC REV: CPT | Performed by: NURSE PRACTITIONER

## 2024-04-25 PROCEDURE — G8427 DOCREV CUR MEDS BY ELIG CLIN: HCPCS | Performed by: NURSE PRACTITIONER

## 2024-04-25 PROCEDURE — 1090F PRES/ABSN URINE INCON ASSESS: CPT | Performed by: NURSE PRACTITIONER

## 2024-04-25 ASSESSMENT — PATIENT HEALTH QUESTIONNAIRE - PHQ9
2. FEELING DOWN, DEPRESSED OR HOPELESS: NOT AT ALL
1. LITTLE INTEREST OR PLEASURE IN DOING THINGS: NOT AT ALL
SUM OF ALL RESPONSES TO PHQ9 QUESTIONS 1 & 2: 0
SUM OF ALL RESPONSES TO PHQ QUESTIONS 1-9: 0

## 2024-04-25 ASSESSMENT — ENCOUNTER SYMPTOMS
ABDOMINAL PAIN: 0
SHORTNESS OF BREATH: 0
VOMITING: 0
CONSTIPATION: 0
DIARRHEA: 0
NAUSEA: 1
BLOOD IN STOOL: 0
COUGH: 0

## 2024-04-25 NOTE — PROGRESS NOTES
Chief Complaint   Patient presents with    New Patient     1. Have you been to the ER, urgent care clinic since your last visit?  Hospitalized since your last visit?No    2. Have you seen or consulted any other health care providers outside of [facility] since your last visit?  Include any pap smears or colon screening. No

## 2024-04-25 NOTE — PROGRESS NOTES
Subjective     Patient ID: Leatha Wilson is a 73 y.o female    HPI  Patient is a 73 year old female here for a follow up visit for gout.  Her last visit was 4/7/2023 with Dr. Roe. She is currently taking Allopurinol 300mg daily, but she states she is not taking it consistently. She states it has been over a year since her last gout attack. She states shellfish causes her gout attacks. She drinks 2 bottles of water. She reports she had a right hip replacement 11/28/2023 and she states it still hurts. She states she is taking Hydrocodone but it's not working and she is scheduled to see pain management on Monday. She states she is unable to use NSAIDs. She reports she uses pain patches, hempvana, heat and ice as well. She finished PT a week, but continues to do the exercises at home every day. She denies pain elsewhere. She reports numbness and tingling in her hands. She denies recent infections or antibiotics.     Review of Systems   Constitutional:  Negative for chills and fever.   Respiratory:  Negative for cough and shortness of breath.    Cardiovascular:  Negative for chest pain.   Gastrointestinal:  Positive for nausea. Negative for abdominal pain, blood in stool, constipation, diarrhea and vomiting.        Denies dark, tarry stools   Genitourinary:  Negative for dysuria and hematuria.   Musculoskeletal:  Positive for arthralgias. Negative for joint swelling.     Current Outpatient Medications   Medication Sig Dispense Refill    losartan-hydroCHLOROthiazide (HYZAAR) 100-25 MG per tablet TAKE 1 TABLET EVERY DAY 90 tablet 3    [START ON 5/7/2024] HYDROcodone-acetaminophen (NORCO)  MG per tablet Take 1 tablet by mouth every 8 hours as needed for Pain for up to 30 days. Intended supply: 30 days Max Daily Amount: 3 tablets 90 tablet 0    Fexofenadine HCl (ALLEGRA ALLERGY PO) Take by mouth      HYDROcodone-acetaminophen (NORCO)  MG per tablet Take 1 tablet by mouth every 8 hours as needed for Pain for

## 2024-04-29 ENCOUNTER — TRANSCRIBE ORDERS (OUTPATIENT)
Facility: HOSPITAL | Age: 74
End: 2024-04-29

## 2024-04-29 ENCOUNTER — HOSPITAL ENCOUNTER (OUTPATIENT)
Facility: HOSPITAL | Age: 74
Discharge: HOME | End: 2024-05-02
Payer: MEDICARE

## 2024-04-29 DIAGNOSIS — G56.03 BILATERAL CARPAL TUNNEL SYNDROME: ICD-10-CM

## 2024-04-29 DIAGNOSIS — M1A.9XX0 CHRONIC GOUT WITHOUT TOPHUS, UNSPECIFIED CAUSE, UNSPECIFIED SITE: ICD-10-CM

## 2024-04-29 DIAGNOSIS — M25.551 PAIN IN RIGHT HIP: Primary | ICD-10-CM

## 2024-04-29 DIAGNOSIS — M25.551 PAIN IN RIGHT HIP: ICD-10-CM

## 2024-04-29 PROBLEM — J43.8 OTHER EMPHYSEMA (HCC): Status: ACTIVE | Noted: 2024-04-29

## 2024-04-29 PROCEDURE — 73130 X-RAY EXAM OF HAND: CPT

## 2024-04-29 PROCEDURE — 73522 X-RAY EXAM HIPS BI 3-4 VIEWS: CPT

## 2024-04-29 ASSESSMENT — ENCOUNTER SYMPTOMS
WHEEZING: 1
SHORTNESS OF BREATH: 1

## 2024-04-29 NOTE — PROGRESS NOTES
Assessment/Plan:     1. Other emphysema (HCC)  2. Tobacco abuse  3. Seronegative rheumatoid arthritis of multiple sites ([facility])     Forms completed and faxed.  She is a good candidate for in-home aides as she does require assistance with most ADLs.    20-29 minutes spent in visit face to face today with greater than 50% of this time spent in counseling and coordination of care.    Return in about 2 months (around 6/11/2024) for Follow Up.     Discussed expected course/resolution/complications of diagnosis in detail with patient.    Medication risks/benefits/costs/interactions/alternatives discussed with patient.    Pt was given after visit summary which includes diagnoses, current medications & vitals.   Pt expressed understanding with the diagnosis and plan          Subjective:      Leatha Wilson is a 73 y.o. female who presents for had concerns including Medication Refill and Forms.     She is here for form completion.    She is interested in applying for AN aid to help with household chores and ADLs.  She cannot ambulate long distances.  She is requiring assistance with transportation, cooking, cleaning, and bathing.  Her late  was a  and she has a veterans assistance form today to discuss.    Patient Active Problem List   Diagnosis    Essential hypertension    MGUS (monoclonal gammopathy of unknown significance)    Long-term use of immunosuppressant medication    Primary biliary cholangitis (HCC)    Idiopathic chronic gout, multiple sites, with tophus (tophi)    Osteoarthritis of hip    Type 2 diabetes mellitus with diabetic polyneuropathy, without long-term current use of insulin (HCC)    Chronic bronchitis (HCC)    Hypercholesterolemia    Vitamin D deficiency    Hypertriglyceridemia    CAD (coronary artery disease)    Lung nodule seen on imaging study    Seronegative rheumatoid arthritis of multiple sites (HCC)    PMR (polymyalgia rheumatica) (HCC)    Chronic pain    NAFL (nonalcoholic fatty

## 2024-04-30 LAB
ALBUMIN SERPL-MCNC: 4.2 G/DL (ref 3.8–4.8)
ALBUMIN/GLOB SERPL: 1.4 {RATIO} (ref 1.2–2.2)
ALP SERPL-CCNC: 185 IU/L (ref 44–121)
ALT SERPL-CCNC: 13 IU/L (ref 0–32)
AST SERPL-CCNC: 16 IU/L (ref 0–40)
BASOPHILS # BLD AUTO: 0.1 X10E3/UL (ref 0–0.2)
BASOPHILS NFR BLD AUTO: 1 %
BILIRUB SERPL-MCNC: <0.2 MG/DL (ref 0–1.2)
BUN SERPL-MCNC: 23 MG/DL (ref 8–27)
BUN/CREAT SERPL: 21 (ref 12–28)
CALCIUM SERPL-MCNC: 9.7 MG/DL (ref 8.7–10.3)
CHLORIDE SERPL-SCNC: 99 MMOL/L (ref 96–106)
CO2 SERPL-SCNC: 25 MMOL/L (ref 20–29)
CREAT SERPL-MCNC: 1.08 MG/DL (ref 0.57–1)
EGFRCR SERPLBLD CKD-EPI 2021: 55 ML/MIN/1.73
EOSINOPHIL # BLD AUTO: 0.4 X10E3/UL (ref 0–0.4)
EOSINOPHIL NFR BLD AUTO: 4 %
ERYTHROCYTE [DISTWIDTH] IN BLOOD BY AUTOMATED COUNT: 17.1 % (ref 11.7–15.4)
GLOBULIN SER CALC-MCNC: 3.1 G/DL (ref 1.5–4.5)
GLUCOSE SERPL-MCNC: 92 MG/DL (ref 70–99)
HCT VFR BLD AUTO: 43.2 % (ref 34–46.6)
HGB BLD-MCNC: 14 G/DL (ref 11.1–15.9)
IMM GRANULOCYTES # BLD AUTO: 0 X10E3/UL (ref 0–0.1)
IMM GRANULOCYTES NFR BLD AUTO: 0 %
LYMPHOCYTES # BLD AUTO: 2.5 X10E3/UL (ref 0.7–3.1)
LYMPHOCYTES NFR BLD AUTO: 29 %
MCH RBC QN AUTO: 27.2 PG (ref 26.6–33)
MCHC RBC AUTO-ENTMCNC: 32.4 G/DL (ref 31.5–35.7)
MCV RBC AUTO: 84 FL (ref 79–97)
MONOCYTES # BLD AUTO: 1 X10E3/UL (ref 0.1–0.9)
MONOCYTES NFR BLD AUTO: 11 %
NEUTROPHILS # BLD AUTO: 4.7 X10E3/UL (ref 1.4–7)
NEUTROPHILS NFR BLD AUTO: 55 %
PLATELET # BLD AUTO: 282 X10E3/UL (ref 150–450)
POTASSIUM SERPL-SCNC: 4.2 MMOL/L (ref 3.5–5.2)
PROT SERPL-MCNC: 7.3 G/DL (ref 6–8.5)
RBC # BLD AUTO: 5.15 X10E6/UL (ref 3.77–5.28)
SODIUM SERPL-SCNC: 139 MMOL/L (ref 134–144)
URATE SERPL-MCNC: 5.6 MG/DL (ref 3.1–7.9)
WBC # BLD AUTO: 8.6 X10E3/UL (ref 3.4–10.8)

## 2024-05-01 NOTE — TELEPHONE ENCOUNTER
PCP: Kristen Benson APRN - NP    Last appt: 4/11/2024       Future Appointments   Date Time Provider Department Center   7/1/2024  1:00 PM Kristen Benson APRN - NP PAFP BS [facility]   7/30/2024  3:00 PM Shell Perez APRN - NP AOCR BS AMB   12/2/2024  1:30 PM Shelia Gibbs [facility] PAFP BS AMB       Requested Prescriptions     Pending Prescriptions Disp Refills    ondansetron (ZOFRAN) 4 MG tablet 30 tablet 0     Sig: Take 1 Tablet by mouth every eight (8) hours as needed for Nausea or Vomiting       Prior labs and Blood pressures:  BP Readings from Last 3 Encounters:   04/25/24 (!) 106/56   04/11/24 117/71   03/29/24 108/60     Lab Results   Component Value Date/Time     04/29/2024 03:53 PM    K 4.2 04/29/2024 03:53 PM    CL 99 04/29/2024 03:53 PM    CO2 25 04/29/2024 03:53 PM    BUN 23 04/29/2024 03:53 PM    GFRAA 52 08/24/2022 12:01 PM     Lab Results   Component Value Date/Time    UTN8YQIM 6.7 12/29/2023 02:12 PM     Lab Results   Component Value Date/Time    CHOL 132 08/24/2022 12:01 PM    HDL 47 08/24/2022 12:01 PM    LDL 38.8 08/24/2022 12:01 PM     No results found for: \"VITD3\", \"VD3RIA\"    Lab Results   Component Value Date/Time    TSH 1.15 10/06/2020 05:43 PM

## 2024-05-04 RX ORDER — ONDANSETRON 4 MG/1
TABLET, FILM COATED ORAL
Qty: 30 TABLET | Refills: 0 | Status: SHIPPED | OUTPATIENT
Start: 2024-05-04

## 2024-05-06 RX ORDER — DILTIAZEM HYDROCHLORIDE 420 MG/1
420 CAPSULE, EXTENDED RELEASE ORAL DAILY
Qty: 90 CAPSULE | Refills: 3 | Status: SHIPPED | OUTPATIENT
Start: 2024-05-06

## 2024-05-21 ENCOUNTER — TELEPHONE (OUTPATIENT)
Age: 74
End: 2024-05-21

## 2024-05-24 ENCOUNTER — TELEPHONE (OUTPATIENT)
Age: 74
End: 2024-05-24

## 2024-05-24 NOTE — TELEPHONE ENCOUNTER
Pharmacy Progress Note     Pt contacted this writer stating she has not been able to get Trulicity 3 mg Rx assistance med from program.  New application was completed.  Sent to PCP for signing/faxing.    There are no discontinued medications.  No orders of the defined types were placed in this encounter.        Shelia Gibbs, PharmD, [facility]  Clinical Pharmacist Specialist      For Pharmacy Admin Tracking Only    Program: Medical Group  CPA in place:  Yes  Recommendation Provided To: Patient/Caregiver: 1 via Telephone  Intervention Detail: Patient Access Assistance/Sample Provided  Intervention Accepted By: Patient/Caregiver: 1  Time Spent (min): 20

## 2024-05-29 ENCOUNTER — TELEPHONE (OUTPATIENT)
Age: 74
End: 2024-05-29

## 2024-05-29 NOTE — TELEPHONE ENCOUNTER
Left  [phone]/[email or web address] to reschedule appt she has 7/1/24 at 1 pm due to CAMILLE Benson out of office.-TM 5/29/24

## 2024-05-30 ENCOUNTER — TELEPHONE (OUTPATIENT)
Age: 74
End: 2024-05-30

## 2024-07-02 NOTE — PATIENT INSTRUCTIONS
Thank you for visiting [facility]!      For future medication refills, we require updated lab results and an upcoming appointment to be in our system prior to refilling prescriptions.  Without these two things, your refill will be DENIED.  If you miss your upcoming appointment, your refill will also be DENIED.      We appreciate your understanding of this critical clinical aspect of our practice. -Dr. Flyod & Shell, NP

## 2024-07-15 ENCOUNTER — OFFICE VISIT (OUTPATIENT)
Age: 74
End: 2024-07-15
Payer: MEDICARE

## 2024-07-15 VITALS
DIASTOLIC BLOOD PRESSURE: 73 MMHG | RESPIRATION RATE: 16 BRPM | TEMPERATURE: 98.4 F | BODY MASS INDEX: 34.02 KG/M2 | WEIGHT: 198.2 LBS | OXYGEN SATURATION: 92 % | SYSTOLIC BLOOD PRESSURE: 139 MMHG | HEART RATE: 72 BPM

## 2024-07-15 DIAGNOSIS — Z51.81 MEDICATION MONITORING ENCOUNTER: ICD-10-CM

## 2024-07-15 DIAGNOSIS — M1A.9XX0 CHRONIC GOUT WITHOUT TOPHUS, UNSPECIFIED CAUSE, UNSPECIFIED SITE: ICD-10-CM

## 2024-07-15 DIAGNOSIS — M1A.9XX0 CHRONIC GOUT WITHOUT TOPHUS, UNSPECIFIED CAUSE, UNSPECIFIED SITE: Primary | ICD-10-CM

## 2024-07-15 DIAGNOSIS — G56.03 BILATERAL CARPAL TUNNEL SYNDROME: ICD-10-CM

## 2024-07-15 DIAGNOSIS — M75.51 SUBDELTOID BURSITIS OF RIGHT SHOULDER JOINT: ICD-10-CM

## 2024-07-15 PROCEDURE — 3075F SYST BP GE 130 - 139MM HG: CPT | Performed by: NURSE PRACTITIONER

## 2024-07-15 PROCEDURE — 99214 OFFICE O/P EST MOD 30 MIN: CPT | Performed by: NURSE PRACTITIONER

## 2024-07-15 PROCEDURE — G8399 PT W/DXA RESULTS DOCUMENT: HCPCS | Performed by: NURSE PRACTITIONER

## 2024-07-15 PROCEDURE — G8427 DOCREV CUR MEDS BY ELIG CLIN: HCPCS | Performed by: NURSE PRACTITIONER

## 2024-07-15 PROCEDURE — 1123F ACP DISCUSS/DSCN MKR DOCD: CPT | Performed by: NURSE PRACTITIONER

## 2024-07-15 PROCEDURE — 4004F PT TOBACCO SCREEN RCVD TLK: CPT | Performed by: NURSE PRACTITIONER

## 2024-07-15 PROCEDURE — 3017F COLORECTAL CA SCREEN DOC REV: CPT | Performed by: NURSE PRACTITIONER

## 2024-07-15 PROCEDURE — G8417 CALC BMI ABV UP PARAM F/U: HCPCS | Performed by: NURSE PRACTITIONER

## 2024-07-15 PROCEDURE — 1090F PRES/ABSN URINE INCON ASSESS: CPT | Performed by: NURSE PRACTITIONER

## 2024-07-15 PROCEDURE — 3078F DIAST BP <80 MM HG: CPT | Performed by: NURSE PRACTITIONER

## 2024-07-15 ASSESSMENT — PATIENT HEALTH QUESTIONNAIRE - PHQ9
SUM OF ALL RESPONSES TO PHQ9 QUESTIONS 1 & 2: 0
1. LITTLE INTEREST OR PLEASURE IN DOING THINGS: NOT AT ALL
2. FEELING DOWN, DEPRESSED OR HOPELESS: NOT AT ALL
SUM OF ALL RESPONSES TO PHQ QUESTIONS 1-9: 0

## 2024-07-15 ASSESSMENT — ENCOUNTER SYMPTOMS
NAUSEA: 1
ABDOMINAL PAIN: 0
DIARRHEA: 1
SHORTNESS OF BREATH: 0
CONSTIPATION: 0
VOMITING: 0
COUGH: 0
BLOOD IN STOOL: 0

## 2024-07-15 NOTE — PROGRESS NOTES
Subjective     Patient ID: Leatha Wilson is a 73 y.o female    HPI  Patient is a 73 year old female here for a follow up visit for gout. We reviewed her labs. She is currently taking Allopurinol 300mg daily. She denies gout attacks since her last visit. She reports 3 bottles of water daily. She drinks 2 bottles of water. She reports she had a right hip replacement 11/28/2023 and she states it still hurts. She states she is taking Hydrocodone for a back and right leg pain and she is seeing pain management. She reports right shoulder pain. She reports she uses pain patches, hempvana, heat and ice as well. She is doing PT exercises at home. She reports the numbness and tingling in her hands has improved because she is using her CTS splints almost every night. She denies infections or antibiotics since her last visit.     Review of Systems   Constitutional:  Positive for chills (sometimes at night). Negative for fever.   Respiratory:  Negative for cough and shortness of breath.    Cardiovascular:  Negative for chest pain.   Gastrointestinal:  Positive for diarrhea (sometimes) and nausea. Negative for abdominal pain, blood in stool, constipation and vomiting.        Denies dark, tarry stools   Genitourinary:  Negative for dysuria and hematuria.   Musculoskeletal:  Positive for arthralgias. Negative for joint swelling.     Current Outpatient Medications   Medication Sig Dispense Refill    TIADYLT  MG extended release capsule TAKE 1 CAPSULE EVERY DAY 90 capsule 3    ondansetron (ZOFRAN) 4 MG tablet Take 1 Tablet by mouth every eight (8) hours as needed for Nausea or Vomiting 30 tablet 0    losartan-hydroCHLOROthiazide (HYZAAR) 100-25 MG per tablet TAKE 1 TABLET EVERY DAY 90 tablet 3    Fexofenadine HCl (ALLEGRA ALLERGY PO) Take by mouth      atorvastatin (LIPITOR) 20 MG tablet TAKE 1 TABLET EVERY DAY 90 tablet 3    allopurinol (ZYLOPRIM) 300 MG tablet Take 1 tablet by mouth at bedtime ceived the following from Good

## 2024-07-15 NOTE — PROGRESS NOTES
Chief Complaint   Patient presents with    Follow-up     1. Have you been to the ER, urgent care clinic since your last visit?  Hospitalized since your last visit?No    2. Have you seen or consulted any other health care providers outside of the [facility] since your last visit?  Include any pap smears or colon screening. No

## 2024-07-25 LAB
ALBUMIN SERPL-MCNC: 4.2 G/DL (ref 3.8–4.8)
ALP SERPL-CCNC: 192 IU/L (ref 44–121)
ALT SERPL-CCNC: 17 IU/L (ref 0–32)
AST SERPL-CCNC: 26 IU/L (ref 0–40)
BASOPHILS # BLD AUTO: 0.1 X10E3/UL (ref 0–0.2)
BASOPHILS NFR BLD AUTO: 1 %
BILIRUB SERPL-MCNC: 0.2 MG/DL (ref 0–1.2)
BUN SERPL-MCNC: 34 MG/DL (ref 8–27)
BUN/CREAT SERPL: 31 (ref 12–28)
CALCIUM SERPL-MCNC: 9.5 MG/DL (ref 8.7–10.3)
CHLORIDE SERPL-SCNC: 98 MMOL/L (ref 96–106)
CO2 SERPL-SCNC: 25 MMOL/L (ref 20–29)
CREAT SERPL-MCNC: 1.11 MG/DL (ref 0.57–1)
EGFRCR SERPLBLD CKD-EPI 2021: 52 ML/MIN/1.73
EOSINOPHIL # BLD AUTO: 0.2 X10E3/UL (ref 0–0.4)
EOSINOPHIL NFR BLD AUTO: 3 %
ERYTHROCYTE [DISTWIDTH] IN BLOOD BY AUTOMATED COUNT: 15.9 % (ref 11.7–15.4)
GLOBULIN SER CALC-MCNC: 3.2 G/DL (ref 1.5–4.5)
GLUCOSE SERPL-MCNC: 86 MG/DL (ref 70–99)
HCT VFR BLD AUTO: 43.8 % (ref 34–46.6)
HGB BLD-MCNC: 14.5 G/DL (ref 11.1–15.9)
IMM GRANULOCYTES # BLD AUTO: 0 X10E3/UL (ref 0–0.1)
IMM GRANULOCYTES NFR BLD AUTO: 0 %
LYMPHOCYTES # BLD AUTO: 1.5 X10E3/UL (ref 0.7–3.1)
LYMPHOCYTES NFR BLD AUTO: 18 %
MCH RBC QN AUTO: 28.6 PG (ref 26.6–33)
MCHC RBC AUTO-ENTMCNC: 33.1 G/DL (ref 31.5–35.7)
MCV RBC AUTO: 86 FL (ref 79–97)
MONOCYTES # BLD AUTO: 0.8 X10E3/UL (ref 0.1–0.9)
MONOCYTES NFR BLD AUTO: 10 %
NEUTROPHILS # BLD AUTO: 5.5 X10E3/UL (ref 1.4–7)
NEUTROPHILS NFR BLD AUTO: 68 %
PLATELET # BLD AUTO: 257 X10E3/UL (ref 150–450)
POTASSIUM SERPL-SCNC: 4.7 MMOL/L (ref 3.5–5.2)
PROT SERPL-MCNC: 7.4 G/DL (ref 6–8.5)
RBC # BLD AUTO: 5.07 X10E6/UL (ref 3.77–5.28)
SODIUM SERPL-SCNC: 139 MMOL/L (ref 134–144)
URATE SERPL-MCNC: 7.6 MG/DL (ref 3.1–7.9)
WBC # BLD AUTO: 8.1 X10E3/UL (ref 3.4–10.8)

## 2024-07-30 NOTE — TELEPHONE ENCOUNTER
PCP: Kristen Benson APRN - NP    Last appt: 4/11/2024   Future Appointments   Date Time Provider Department Center   8/30/2024 11:00 AM Kristen Benson APRN - NP PAFP BS AMB   11/15/2024  2:00 PM Shell Perez APRN - NP AOCR BS AMB   12/2/2024  1:30 PM Shelia Gibbs RP PAFP BS AMB       Requested Prescriptions      No prescriptions requested or ordered in this encounter         Prior labs and Blood pressures:  BP Readings from Last 3 Encounters:   07/15/24 139/73   04/25/24 (!) 106/56   04/11/24 117/71     Lab Results   Component Value Date/Time     07/24/2024 03:19 PM    K 4.7 07/24/2024 03:19 PM    CL 98 07/24/2024 03:19 PM    CO2 25 07/24/2024 03:19 PM    BUN 34 07/24/2024 03:19 PM    GFRAA 52 08/24/2022 12:01 PM     Lab Results   Component Value Date/Time    [id number] 6.7 12/29/2023 02:12 PM     Lab Results   Component Value Date/Time    CHOL 132 08/24/2022 12:01 PM    HDL 47 08/24/2022 12:01 PM    LDL 38.8 08/24/2022 12:01 PM    VLDL 46.2 08/24/2022 12:01 PM     No results found for: \"VITD3\"    Lab Results   Component Value Date/Time    TSH 1.15 10/06/2020 05:43 PM

## 2024-07-31 RX ORDER — ONDANSETRON 4 MG/1
TABLET, FILM COATED ORAL
Qty: 30 TABLET | Refills: 0 | Status: SHIPPED | OUTPATIENT
Start: 2024-07-31

## 2024-08-05 NOTE — TELEPHONE ENCOUNTER
PCP: Kristen Benson APRN - NP    Last appt: 4/11/2024   Future Appointments   Date Time Provider Department Center   8/30/2024 11:00 AM Kristen Benson APRN - NP PAFP [facility] DEP   11/15/2024  2:00 PM Shell Perez APRN - NP AO[facility] BS AMB   12/2/2024  1:30 PM Shelia Gibbs [facility] PAFP [facility] DEP       Requested Prescriptions     Pending Prescriptions Disp Refills    potassium chloride (KLOR-CON M) 10 MEQ extended release tablet [Pharmacy Med Name: POTASSIUM CHLORIDE ER 10 MEQ Tablet Extended Release] 90 tablet 3     Sig: TAKE 1 TABLET EVERY DAY         Prior labs and Blood pressures:  BP Readings from Last 3 Encounters:   07/15/24 139/73   04/25/24 (!) 106/56   04/11/24 117/71     Lab Results   Component Value Date/Time     07/24/2024 03:19 PM    K 4.7 07/24/2024 03:19 PM    CL 98 07/24/2024 03:19 PM    CO2 25 07/24/2024 03:19 PM    BUN 34 07/24/2024 03:19 PM    GFRAA 52 08/24/2022 12:01 PM     Lab Results   Component Value Date/Time    [id number] 6.7 12/29/2023 02:12 PM     Lab Results   Component Value Date/Time    CHOL 132 08/24/2022 12:01 PM    HDL 47 08/24/2022 12:01 PM    LDL 38.8 08/24/2022 12:01 PM    VLDL 46.2 08/24/2022 12:01 PM     No results found for: \"VITD3\"    Lab Results   Component Value Date/Time    TSH 1.15 10/06/2020 05:43 PM

## 2024-08-06 RX ORDER — POTASSIUM CHLORIDE 750 MG/1
10 TABLET, EXTENDED RELEASE ORAL DAILY
Qty: 90 TABLET | Refills: 3 | Status: SHIPPED | OUTPATIENT
Start: 2024-08-06

## 2024-09-23 ENCOUNTER — OFFICE VISIT (OUTPATIENT)
Age: 74
End: 2024-09-23
Payer: MEDICARE

## 2024-09-23 VITALS
OXYGEN SATURATION: 91 % | HEART RATE: 68 BPM | BODY MASS INDEX: 34.15 KG/M2 | WEIGHT: 200 LBS | HEIGHT: 64 IN | SYSTOLIC BLOOD PRESSURE: 125 MMHG | DIASTOLIC BLOOD PRESSURE: 73 MMHG | TEMPERATURE: 97.2 F

## 2024-09-23 DIAGNOSIS — G89.4 CHRONIC PAIN SYNDROME: ICD-10-CM

## 2024-09-23 DIAGNOSIS — M51.36 DDD (DEGENERATIVE DISC DISEASE), LUMBAR: ICD-10-CM

## 2024-09-23 DIAGNOSIS — Z00.00 MEDICARE ANNUAL WELLNESS VISIT, SUBSEQUENT: Primary | ICD-10-CM

## 2024-09-23 DIAGNOSIS — E11.42 TYPE 2 DIABETES MELLITUS WITH DIABETIC POLYNEUROPATHY, WITHOUT LONG-TERM CURRENT USE OF INSULIN (HCC): ICD-10-CM

## 2024-09-23 DIAGNOSIS — Z23 NEEDS FLU SHOT: ICD-10-CM

## 2024-09-23 PROCEDURE — 3078F DIAST BP <80 MM HG: CPT | Performed by: NURSE PRACTITIONER

## 2024-09-23 PROCEDURE — 1123F ACP DISCUSS/DSCN MKR DOCD: CPT | Performed by: NURSE PRACTITIONER

## 2024-09-23 PROCEDURE — G0439 PPPS, SUBSEQ VISIT: HCPCS | Performed by: NURSE PRACTITIONER

## 2024-09-23 PROCEDURE — 3044F HG A1C LEVEL LT 7.0%: CPT | Performed by: NURSE PRACTITIONER

## 2024-09-23 PROCEDURE — 3074F SYST BP LT 130 MM HG: CPT | Performed by: NURSE PRACTITIONER

## 2024-09-23 PROCEDURE — 3017F COLORECTAL CA SCREEN DOC REV: CPT | Performed by: NURSE PRACTITIONER

## 2024-09-23 PROCEDURE — PBSHW INFLUENZA, FLUAD TRIVALENT, (AGE 65 Y+), IM, PRESERVATIVE FREE, 0.5ML: Performed by: NURSE PRACTITIONER

## 2024-09-23 PROCEDURE — G0008 ADMIN INFLUENZA VIRUS VAC: HCPCS | Performed by: NURSE PRACTITIONER

## 2024-09-23 SDOH — ECONOMIC STABILITY: FOOD INSECURITY: WITHIN THE PAST 12 MONTHS, THE FOOD YOU BOUGHT JUST DIDN'T LAST AND YOU DIDN'T HAVE MONEY TO GET MORE.: NEVER TRUE

## 2024-09-23 SDOH — ECONOMIC STABILITY: INCOME INSECURITY: HOW HARD IS IT FOR YOU TO PAY FOR THE VERY BASICS LIKE FOOD, HOUSING, MEDICAL CARE, AND HEATING?: NOT HARD AT ALL

## 2024-09-23 SDOH — ECONOMIC STABILITY: FOOD INSECURITY: WITHIN THE PAST 12 MONTHS, YOU WORRIED THAT YOUR FOOD WOULD RUN OUT BEFORE YOU GOT MONEY TO BUY MORE.: NEVER TRUE

## 2024-09-23 ASSESSMENT — PATIENT HEALTH QUESTIONNAIRE - PHQ9
2. FEELING DOWN, DEPRESSED OR HOPELESS: SEVERAL DAYS
SUM OF ALL RESPONSES TO PHQ QUESTIONS 1-9: 1
SUM OF ALL RESPONSES TO PHQ QUESTIONS 1-9: 1
SUM OF ALL RESPONSES TO PHQ9 QUESTIONS 1 & 2: 1
SUM OF ALL RESPONSES TO PHQ QUESTIONS 1-9: 1
SUM OF ALL RESPONSES TO PHQ QUESTIONS 1-9: 1
1. LITTLE INTEREST OR PLEASURE IN DOING THINGS: NOT AT ALL

## 2024-09-23 ASSESSMENT — LIFESTYLE VARIABLES
HOW MANY STANDARD DRINKS CONTAINING ALCOHOL DO YOU HAVE ON A TYPICAL DAY: PATIENT DOES NOT DRINK
HOW OFTEN DO YOU HAVE A DRINK CONTAINING ALCOHOL: NEVER

## 2024-09-24 LAB
ALBUMIN SERPL-MCNC: 3.8 G/DL (ref 3.5–5)
ALBUMIN/GLOB SERPL: 0.9 (ref 1.1–2.2)
ALP SERPL-CCNC: 150 U/L (ref 45–117)
ALT SERPL-CCNC: 17 U/L (ref 12–78)
ANION GAP SERPL CALC-SCNC: 7 MMOL/L (ref 2–12)
AST SERPL-CCNC: 18 U/L (ref 15–37)
BILIRUB SERPL-MCNC: 0.2 MG/DL (ref 0.2–1)
BUN SERPL-MCNC: 32 MG/DL (ref 6–20)
BUN/CREAT SERPL: 24 (ref 12–20)
CALCIUM SERPL-MCNC: 9.4 MG/DL (ref 8.5–10.1)
CHLORIDE SERPL-SCNC: 101 MMOL/L (ref 97–108)
CHOLEST SERPL-MCNC: 246 MG/DL
CO2 SERPL-SCNC: 29 MMOL/L (ref 21–32)
CREAT SERPL-MCNC: 1.35 MG/DL (ref 0.55–1.02)
EST. AVERAGE GLUCOSE BLD GHB EST-MCNC: 146 MG/DL
GLOBULIN SER CALC-MCNC: 4.2 G/DL (ref 2–4)
GLUCOSE SERPL-MCNC: 98 MG/DL (ref 65–100)
HBA1C MFR BLD: 6.7 % (ref 4–5.6)
HDLC SERPL-MCNC: 52 MG/DL
HDLC SERPL: 4.7 (ref 0–5)
LDLC SERPL CALC-MCNC: 141 MG/DL (ref 0–100)
POTASSIUM SERPL-SCNC: 4.2 MMOL/L (ref 3.5–5.1)
PROT SERPL-MCNC: 8 G/DL (ref 6.4–8.2)
SODIUM SERPL-SCNC: 137 MMOL/L (ref 136–145)
TRIGL SERPL-MCNC: 265 MG/DL
VLDLC SERPL CALC-MCNC: 53 MG/DL

## 2024-09-26 RX ORDER — HYDROCODONE BITARTRATE AND ACETAMINOPHEN 10; 325 MG/1; MG/1
1 TABLET ORAL EVERY 8 HOURS PRN
Qty: 90 TABLET | Refills: 0 | Status: SHIPPED | OUTPATIENT
Start: 2024-11-02 | End: 2024-09-26 | Stop reason: SDUPTHER

## 2024-09-26 RX ORDER — HYDROCODONE BITARTRATE AND ACETAMINOPHEN 10; 325 MG/1; MG/1
1 TABLET ORAL EVERY 8 HOURS PRN
Qty: 90 TABLET | Refills: 0 | Status: SHIPPED | OUTPATIENT
Start: 2024-10-04 | End: 2024-09-26 | Stop reason: SDUPTHER

## 2024-09-26 RX ORDER — HYDROCODONE BITARTRATE AND ACETAMINOPHEN 10; 325 MG/1; MG/1
1 TABLET ORAL EVERY 8 HOURS PRN
Qty: 90 TABLET | Refills: 0 | Status: SHIPPED | OUTPATIENT
Start: 2024-12-01 | End: 2024-12-31

## 2024-09-27 RX ORDER — ATORVASTATIN CALCIUM 40 MG/1
40 TABLET, FILM COATED ORAL DAILY
Qty: 90 TABLET | Refills: 3 | Status: SHIPPED | OUTPATIENT
Start: 2024-09-27

## 2024-10-10 NOTE — PATIENT INSTRUCTIONS
Thank you for visiting [facility]!      For future medication refills, we require updated lab results and an upcoming appointment to be in our system to refill prescriptions.  Without these two things, your refill will be DENIED.  If you miss your upcoming appointment, your refill will also be DENIED.      We appreciate your understanding of this critical clinical aspect of our practice. -Dr. Floyd & Shell, NP      Continue Allopurinol 300mg daily.    Return to the clinic in 4 months.

## 2024-10-11 NOTE — ED TRIAGE NOTES
Patient reports nausea for 4 days. Vomiting and diarrhea. Diarrhea x 4 today and 5 vomiting. Patient is feeling lethargic and weak with lightheadedness when standing. No appetite, one cup jello yesterday nothing today. Patient unable to keep fluids down.
[[id number]]

## 2024-10-14 ENCOUNTER — OFFICE VISIT (OUTPATIENT)
Age: 74
End: 2024-10-14
Payer: MEDICARE

## 2024-10-14 VITALS
BODY MASS INDEX: 34.67 KG/M2 | HEART RATE: 74 BPM | TEMPERATURE: 97 F | OXYGEN SATURATION: 93 % | SYSTOLIC BLOOD PRESSURE: 135 MMHG | RESPIRATION RATE: 16 BRPM | DIASTOLIC BLOOD PRESSURE: 83 MMHG | WEIGHT: 202 LBS

## 2024-10-14 DIAGNOSIS — M79.18 CHRONIC BUTTOCK PAIN: ICD-10-CM

## 2024-10-14 DIAGNOSIS — Z51.81 MEDICATION MONITORING ENCOUNTER: ICD-10-CM

## 2024-10-14 DIAGNOSIS — G89.29 CHRONIC BUTTOCK PAIN: ICD-10-CM

## 2024-10-14 DIAGNOSIS — M1A.9XX0 CHRONIC GOUT WITHOUT TOPHUS, UNSPECIFIED CAUSE, UNSPECIFIED SITE: Primary | ICD-10-CM

## 2024-10-14 PROCEDURE — G8417 CALC BMI ABV UP PARAM F/U: HCPCS | Performed by: NURSE PRACTITIONER

## 2024-10-14 PROCEDURE — 1123F ACP DISCUSS/DSCN MKR DOCD: CPT | Performed by: NURSE PRACTITIONER

## 2024-10-14 PROCEDURE — 4004F PT TOBACCO SCREEN RCVD TLK: CPT | Performed by: NURSE PRACTITIONER

## 2024-10-14 PROCEDURE — 3079F DIAST BP 80-89 MM HG: CPT | Performed by: NURSE PRACTITIONER

## 2024-10-14 PROCEDURE — 3075F SYST BP GE 130 - 139MM HG: CPT | Performed by: NURSE PRACTITIONER

## 2024-10-14 PROCEDURE — 99214 OFFICE O/P EST MOD 30 MIN: CPT | Performed by: NURSE PRACTITIONER

## 2024-10-14 PROCEDURE — G8482 FLU IMMUNIZE ORDER/ADMIN: HCPCS | Performed by: NURSE PRACTITIONER

## 2024-10-14 PROCEDURE — 1090F PRES/ABSN URINE INCON ASSESS: CPT | Performed by: NURSE PRACTITIONER

## 2024-10-14 PROCEDURE — 3017F COLORECTAL CA SCREEN DOC REV: CPT | Performed by: NURSE PRACTITIONER

## 2024-10-14 PROCEDURE — G8399 PT W/DXA RESULTS DOCUMENT: HCPCS | Performed by: NURSE PRACTITIONER

## 2024-10-14 PROCEDURE — G8427 DOCREV CUR MEDS BY ELIG CLIN: HCPCS | Performed by: NURSE PRACTITIONER

## 2024-10-14 ASSESSMENT — PATIENT HEALTH QUESTIONNAIRE - PHQ9
SUM OF ALL RESPONSES TO PHQ9 QUESTIONS 1 & 2: 0
SUM OF ALL RESPONSES TO PHQ QUESTIONS 1-9: 0
SUM OF ALL RESPONSES TO PHQ QUESTIONS 1-9: 0
2. FEELING DOWN, DEPRESSED OR HOPELESS: NOT AT ALL
1. LITTLE INTEREST OR PLEASURE IN DOING THINGS: NOT AT ALL
SUM OF ALL RESPONSES TO PHQ QUESTIONS 1-9: 0
SUM OF ALL RESPONSES TO PHQ QUESTIONS 1-9: 0

## 2024-10-14 ASSESSMENT — ENCOUNTER SYMPTOMS
COUGH: 0
BLOOD IN STOOL: 0
NAUSEA: 1
VOMITING: 0
ABDOMINAL PAIN: 0
SHORTNESS OF BREATH: 0
CONSTIPATION: 0
DIARRHEA: 1

## 2024-10-14 NOTE — PROGRESS NOTES
Chief Complaint   Patient presents with    Joint Pain     1. Have you been to the ER, urgent care clinic since your last visit?  Hospitalized since your last visit?No    2. Have you seen or consulted any other health care providers outside of the [facility] since your last visit?  Include any pap smears or colon screening. No

## 2024-10-14 NOTE — PROGRESS NOTES
Subjective     Patient ID: Leatha Wilson is a 73 y.o female    HPI  Patient is a 73 year old female here for a follow up visit for gout. She is here with her daughter Almita. We reviewed her labs and she states she is not taking her Allopurinol 300mg daily. She denies gout attacks since her last visit. She reports she drinks 2 bottles of water. She reports she had a right hip replacement 11/28/2023 and she states it still hurts and she also reports buttock pain. She states she is not returning to pain management and her pain management is currently being addressed by her PCP. She denies infections or antibiotics since her last visit.     Review of Systems   Constitutional:  Negative for chills and fever.   Respiratory:  Negative for cough and shortness of breath.    Cardiovascular:  Negative for chest pain.   Gastrointestinal:  Positive for diarrhea (sometimes) and nausea. Negative for abdominal pain, blood in stool, constipation and vomiting.        Denies dark, tarry stools  Reports a history of colitis   Genitourinary:  Negative for dysuria and hematuria.   Musculoskeletal:  Positive for arthralgias. Negative for joint swelling.     Current Outpatient Medications   Medication Sig Dispense Refill    atorvastatin (LIPITOR) 40 MG tablet Take 1 tablet by mouth daily 90 tablet 3    [START ON 12/1/2024] HYDROcodone-acetaminophen (NORCO)  MG per tablet Take 1 tablet by mouth every 8 hours as needed for Pain for up to 30 days. Intended supply: 30 days Max Daily Amount: 3 tablets 90 tablet 0    potassium chloride (KLOR-CON M) 10 MEQ extended release tablet TAKE 1 TABLET EVERY DAY 90 tablet 3    ondansetron (ZOFRAN) 4 MG tablet Take 1 Tablet by mouth every eight (8) hours as needed for Nausea or Vomiting 30 tablet 0    TIADYLT  MG extended release capsule TAKE 1 CAPSULE EVERY DAY 90 capsule 3    losartan-hydroCHLOROthiazide (HYZAAR) 100-25 MG per tablet TAKE 1 TABLET EVERY DAY 90 tablet 3    Fexofenadine

## 2024-10-15 LAB
ALBUMIN SERPL-MCNC: 3.6 G/DL (ref 3.5–5)
ALBUMIN/GLOB SERPL: 0.9 (ref 1.1–2.2)
ALP SERPL-CCNC: 147 U/L (ref 45–117)
ALT SERPL-CCNC: 13 U/L (ref 12–78)
ANION GAP SERPL CALC-SCNC: 6 MMOL/L (ref 2–12)
AST SERPL-CCNC: 11 U/L (ref 15–37)
BASOPHILS # BLD: 0.1 K/UL (ref 0–0.1)
BASOPHILS NFR BLD: 1 % (ref 0–1)
BILIRUB SERPL-MCNC: 0.2 MG/DL (ref 0.2–1)
BUN SERPL-MCNC: 26 MG/DL (ref 6–20)
BUN/CREAT SERPL: 18 (ref 12–20)
CALCIUM SERPL-MCNC: 9.3 MG/DL (ref 8.5–10.1)
CHLORIDE SERPL-SCNC: 104 MMOL/L (ref 97–108)
CO2 SERPL-SCNC: 28 MMOL/L (ref 21–32)
CREAT SERPL-MCNC: 1.48 MG/DL (ref 0.55–1.02)
DIFFERENTIAL METHOD BLD: ABNORMAL
EOSINOPHIL # BLD: 0.3 K/UL (ref 0–0.4)
EOSINOPHIL NFR BLD: 4 % (ref 0–7)
ERYTHROCYTE [DISTWIDTH] IN BLOOD BY AUTOMATED COUNT: 17.1 % (ref 11.5–14.5)
GLOBULIN SER CALC-MCNC: 4.1 G/DL (ref 2–4)
GLUCOSE SERPL-MCNC: 196 MG/DL (ref 65–100)
HCT VFR BLD AUTO: 45.8 % (ref 35–47)
HGB BLD-MCNC: 14.4 G/DL (ref 11.5–16)
IMM GRANULOCYTES # BLD AUTO: 0 K/UL (ref 0–0.04)
IMM GRANULOCYTES NFR BLD AUTO: 0 % (ref 0–0.5)
LYMPHOCYTES # BLD: 2.1 K/UL (ref 0.8–3.5)
LYMPHOCYTES NFR BLD: 24 % (ref 12–49)
MCH RBC QN AUTO: 28.5 PG (ref 26–34)
MCHC RBC AUTO-ENTMCNC: 31.4 G/DL (ref 30–36.5)
MCV RBC AUTO: 90.5 FL (ref 80–99)
MONOCYTES # BLD: 0.7 K/UL (ref 0–1)
MONOCYTES NFR BLD: 8 % (ref 5–13)
NEUTS SEG # BLD: 5.5 K/UL (ref 1.8–8)
NEUTS SEG NFR BLD: 63 % (ref 32–75)
NRBC # BLD: 0 K/UL (ref 0–0.01)
NRBC BLD-RTO: 0 PER 100 WBC
PLATELET # BLD AUTO: 236 K/UL (ref 150–400)
PMV BLD AUTO: 12 FL (ref 8.9–12.9)
POTASSIUM SERPL-SCNC: 3.9 MMOL/L (ref 3.5–5.1)
PROT SERPL-MCNC: 7.7 G/DL (ref 6.4–8.2)
RBC # BLD AUTO: 5.06 M/UL (ref 3.8–5.2)
SODIUM SERPL-SCNC: 138 MMOL/L (ref 136–145)
URATE SERPL-MCNC: 7.2 MG/DL (ref 2.6–6)
WBC # BLD AUTO: 8.6 K/UL (ref 3.6–11)

## 2024-10-21 ENCOUNTER — HOSPITAL ENCOUNTER (OUTPATIENT)
Facility: HOSPITAL | Age: 74
Discharge: HOME | End: 2024-10-24
Payer: MEDICARE

## 2024-10-21 PROCEDURE — 72100 X-RAY EXAM L-S SPINE 2/3 VWS: CPT

## 2024-10-30 ENCOUNTER — APPOINTMENT (OUTPATIENT)
Facility: HOSPITAL | Age: 74
End: 2024-10-30
Payer: MEDICARE

## 2024-10-30 ENCOUNTER — HOSPITAL ENCOUNTER (EMERGENCY)
Facility: HOSPITAL | Age: 74
Discharge: HOME | End: 2024-10-30
Attending: STUDENT IN AN ORGANIZED HEALTH CARE EDUCATION/TRAINING PROGRAM
Payer: MEDICARE

## 2024-10-30 VITALS
BODY MASS INDEX: 32.63 KG/M2 | HEART RATE: 81 BPM | TEMPERATURE: 98.5 F | OXYGEN SATURATION: 93 % | RESPIRATION RATE: 17 BRPM | SYSTOLIC BLOOD PRESSURE: 162 MMHG | WEIGHT: 191.14 LBS | DIASTOLIC BLOOD PRESSURE: 97 MMHG | HEIGHT: 64 IN

## 2024-10-30 DIAGNOSIS — R11.2 NAUSEA AND VOMITING, UNSPECIFIED VOMITING TYPE: Primary | ICD-10-CM

## 2024-10-30 LAB
ALBUMIN SERPL-MCNC: 3.8 G/DL (ref 3.5–5)
ALBUMIN/GLOB SERPL: 0.8 (ref 1.1–2.2)
ALP SERPL-CCNC: 104 U/L (ref 45–117)
ALT SERPL-CCNC: 19 U/L (ref 12–78)
ANION GAP SERPL CALC-SCNC: 7 MMOL/L (ref 2–12)
AST SERPL-CCNC: 14 U/L (ref 15–37)
BILIRUB DIRECT SERPL-MCNC: <0.1 MG/DL (ref 0–0.2)
BILIRUB SERPL-MCNC: 0.5 MG/DL (ref 0.2–1)
BUN SERPL-MCNC: 25 MG/DL (ref 6–20)
BUN/CREAT SERPL: 21 (ref 12–20)
CALCIUM SERPL-MCNC: 10.1 MG/DL (ref 8.5–10.1)
CHLORIDE SERPL-SCNC: 104 MMOL/L (ref 97–108)
CO2 SERPL-SCNC: 25 MMOL/L (ref 21–32)
COMMENT:: NORMAL
CREAT SERPL-MCNC: 1.19 MG/DL (ref 0.55–1.02)
ERYTHROCYTE [DISTWIDTH] IN BLOOD BY AUTOMATED COUNT: 18 % (ref 11.5–14.5)
GLOBULIN SER CALC-MCNC: 4.5 G/DL (ref 2–4)
GLUCOSE SERPL-MCNC: 151 MG/DL (ref 65–100)
HCT VFR BLD AUTO: 49.7 % (ref 35–47)
HGB BLD-MCNC: 16.6 G/DL (ref 11.5–16)
LIPASE SERPL-CCNC: 42 U/L (ref 13–75)
MCH RBC QN AUTO: 28 PG (ref 26–34)
MCHC RBC AUTO-ENTMCNC: 33.4 G/DL (ref 30–36.5)
MCV RBC AUTO: 84 FL (ref 80–99)
NRBC # BLD: 0 K/UL (ref 0–0.01)
NRBC BLD-RTO: 0 PER 100 WBC
PLATELET # BLD AUTO: 244 K/UL (ref 150–400)
PMV BLD AUTO: 10.4 FL (ref 8.9–12.9)
POTASSIUM SERPL-SCNC: 4 MMOL/L (ref 3.5–5.1)
PROT SERPL-MCNC: 8.3 G/DL (ref 6.4–8.2)
RBC # BLD AUTO: 5.92 M/UL (ref 3.8–5.2)
SODIUM SERPL-SCNC: 136 MMOL/L (ref 136–145)
SPECIMEN HOLD: NORMAL
WBC # BLD AUTO: 11.7 K/UL (ref 3.6–11)

## 2024-10-30 PROCEDURE — 36415 COLL VENOUS BLD VENIPUNCTURE: CPT

## 2024-10-30 PROCEDURE — 85027 COMPLETE CBC AUTOMATED: CPT

## 2024-10-30 PROCEDURE — 6360000004 HC RX CONTRAST MEDICATION: Performed by: RADIOLOGY

## 2024-10-30 PROCEDURE — 80048 BASIC METABOLIC PNL TOTAL CA: CPT

## 2024-10-30 PROCEDURE — 74177 CT ABD & PELVIS W/CONTRAST: CPT

## 2024-10-30 PROCEDURE — 6370000000 HC RX 637 (ALT 250 FOR IP): Performed by: STUDENT IN AN ORGANIZED HEALTH CARE EDUCATION/TRAINING PROGRAM

## 2024-10-30 PROCEDURE — 80076 HEPATIC FUNCTION PANEL: CPT

## 2024-10-30 PROCEDURE — 83690 ASSAY OF LIPASE: CPT

## 2024-10-30 PROCEDURE — 6360000002 HC RX W HCPCS: Performed by: STUDENT IN AN ORGANIZED HEALTH CARE EDUCATION/TRAINING PROGRAM

## 2024-10-30 PROCEDURE — 96374 THER/PROPH/DIAG INJ IV PUSH: CPT

## 2024-10-30 PROCEDURE — 99285 EMERGENCY DEPT VISIT HI MDM: CPT

## 2024-10-30 RX ORDER — HYDROCODONE BITARTRATE AND ACETAMINOPHEN 10; 325 MG/1; MG/1
1 TABLET ORAL ONCE
Status: COMPLETED | OUTPATIENT
Start: 2024-10-30 | End: 2024-10-30

## 2024-10-30 RX ORDER — ONDANSETRON 2 MG/ML
4 INJECTION INTRAMUSCULAR; INTRAVENOUS ONCE
Status: COMPLETED | OUTPATIENT
Start: 2024-10-30 | End: 2024-10-30

## 2024-10-30 RX ORDER — IOPAMIDOL 755 MG/ML
100 INJECTION, SOLUTION INTRAVASCULAR
Status: COMPLETED | OUTPATIENT
Start: 2024-10-30 | End: 2024-10-30

## 2024-10-30 RX ORDER — ONDANSETRON 4 MG/1
4 TABLET, ORALLY DISINTEGRATING ORAL 3 TIMES DAILY PRN
Qty: 21 TABLET | Refills: 0 | Status: SHIPPED | OUTPATIENT
Start: 2024-10-30

## 2024-10-30 RX ADMIN — ONDANSETRON 4 MG: 2 INJECTION INTRAMUSCULAR; INTRAVENOUS at 14:43

## 2024-10-30 RX ADMIN — HYDROCODONE BITARTRATE AND ACETAMINOPHEN 1 TABLET: 10; 325 TABLET ORAL at 18:43

## 2024-10-30 RX ADMIN — IOPAMIDOL 100 ML: 755 INJECTION, SOLUTION INTRAVENOUS at 15:01

## 2024-10-30 ASSESSMENT — PAIN SCALES - GENERAL
PAINLEVEL_OUTOF10: 4
PAINLEVEL_OUTOF10: 6

## 2024-10-30 ASSESSMENT — LIFESTYLE VARIABLES
HOW OFTEN DO YOU HAVE A DRINK CONTAINING ALCOHOL: NEVER
HOW MANY STANDARD DRINKS CONTAINING ALCOHOL DO YOU HAVE ON A TYPICAL DAY: PATIENT DOES NOT DRINK

## 2024-10-30 ASSESSMENT — PAIN DESCRIPTION - FREQUENCY: FREQUENCY: CONTINUOUS

## 2024-10-30 ASSESSMENT — PAIN DESCRIPTION - LOCATION
LOCATION: ABDOMEN
LOCATION: BACK

## 2024-10-30 ASSESSMENT — PAIN DESCRIPTION - PAIN TYPE: TYPE: ACUTE PAIN

## 2024-10-30 ASSESSMENT — PAIN - FUNCTIONAL ASSESSMENT: PAIN_FUNCTIONAL_ASSESSMENT: 0-10

## 2024-10-30 ASSESSMENT — PAIN DESCRIPTION - ONSET: ONSET: ON-GOING

## 2024-10-30 NOTE — ED PROVIDER NOTES
[facility] EMERGENCY DEP  EMERGENCY DEPARTMENT ENCOUNTER      Pt Name: Leatha Wilson  MRN: [id number]  Birthdate 1950  Date of evaluation: 10/30/2024  Provider: Margarito Jennings DO    CHIEF COMPLAINT       Chief Complaint   Patient presents with    Nausea    Abdominal Pain         HISTORY OF PRESENT ILLNESS   (Location/Symptom, Timing/Onset, Context/Setting, Quality, Duration, Modifying Factors, Severity)  Note limiting factors.   The patient is a 74-year-old female prior history of chronic back pain, RA, PMR, hypertension and hyperlipidemia presenting today with nausea and vomiting.  This started 2 to 3 days ago.  States that she vomits anytime she tries to eat.  Nonbloody/nonbilious emesis.  No diarrhea.  Has mild generalized abdominal pain.  Has not tried anything for her symptoms at home.  Has not been able to keep down her home medications due to nausea and vomiting.  No ill contacts.  Feels similar to when in the past she had colitis.            Review of External Medical Records:     Nursing Notes were reviewed.    REVIEW OF SYSTEMS    (2-9 systems for level 4, 10 or more for level 5)     Review of Systems   Gastrointestinal:  Positive for abdominal pain, nausea and vomiting.       Except as noted above the remainder of the review of systems was reviewed and negative.       PAST MEDICAL HISTORY     Past Medical History:   Diagnosis Date    Abnormal pulmonary function test 9/28/2016    AR (allergic rhinitis)     Asthma     Atrial thrombus 1994    left    Chronic bronchitis (HCC) 9/28/2016    Chronic pain     back/both hips/neuropathy    DDD (degenerative disc disease), lumbar     Diabetes (HCC)     DJD (degenerative joint disease) of hip     right    Empty sella (HCC) 2000    by MRI    Former smoker 10/24/2016    GERD (gastroesophageal reflux disease)     Hoarseness of voice     Hypercholesterolemia     Hypertension     Ill-defined condition     scar tissue - heart    Lesion of vocal cord     bx

## 2024-10-30 NOTE — ED TRIAGE NOTES
Pt arrives via EMS.     CC 3 X Days of vomiting came in nauseous    Tried taking a zofran this am but vomited the zofran.

## 2024-10-31 ASSESSMENT — ENCOUNTER SYMPTOMS
ABDOMINAL PAIN: 1
VOMITING: 1
NAUSEA: 1

## 2024-11-13 RX ORDER — ALLOPURINOL 300 MG/1
300 TABLET ORAL NIGHTLY
Qty: 90 TABLET | Refills: 3 | Status: SHIPPED | OUTPATIENT
Start: 2024-11-13

## 2024-11-29 ENCOUNTER — TELEPHONE (OUTPATIENT)
Age: 74
End: 2024-11-29

## 2024-11-29 NOTE — TELEPHONE ENCOUNTER
Patient called requesting refill on her hydrocodone-acetaminophen  mg    Best call back #[phone]

## 2024-12-02 ENCOUNTER — PHARMACY VISIT (OUTPATIENT)
Age: 74
End: 2024-12-02

## 2024-12-02 VITALS — WEIGHT: 196.4 LBS | BODY MASS INDEX: 33.71 KG/M2

## 2024-12-02 DIAGNOSIS — E11.42 TYPE 2 DIABETES MELLITUS WITH DIABETIC POLYNEUROPATHY, WITHOUT LONG-TERM CURRENT USE OF INSULIN (HCC): Primary | ICD-10-CM

## 2024-12-02 NOTE — PATIENT INSTRUCTIONS
Your A1c was 6.7% which was controlled.    Today we applied for Rx assistance with [employer].  You will receive 4 months supply of medication at a time to your home.    - Trulicity 3 mg    Today we applied for Rx assistance with  C[employer].  You will receive 3 months supply of medication at a time to your home.  - Synjardy  mg 2 tablets by mouth daily (REPLACES separate Jardiance and Metformin)  - Spiriva Respimat        Blood Sugar Goal  Fastin-130 mg/dL  1-2 after meals: Less than 180 mg/dL    Carbohydrate Goals  Meal: 30-45 grams   Snacks: 15 grams    Pharmacist Contact Information:  Shelia Gibbs, PharmD, BCGP, BCACP  Work Phone: [phone] (option #2)

## 2024-12-02 NOTE — PROGRESS NOTES
19 10/30/2024 12:22 PM     Lab Results   Component Value Date/Time    CHOL 246 09/23/2024 01:47 PM    HDL 52 09/23/2024 01:47 PM     09/23/2024 01:47 PM    LDL 38.8 08/24/2022 12:01 PM    VLDL 53 09/23/2024 01:47 PM     Lab Results   Component Value Date/Time    WBC 11.7 10/30/2024 12:22 PM    HGB 16.6 10/30/2024 12:22 PM    HCT 49.7 10/30/2024 12:22 PM     10/30/2024 12:22 PM    MCV 84.0 10/30/2024 12:22 PM       Lab Results   Component Value Date/Time    MICROALBUR 0.56 08/24/2022 12:01 PM       Lab Results   Component Value Date    MALBCR 6 08/24/2022    MALBCR 15 01/29/2021     No components found for: \"MALBCREARAT\"     Lab Results   Component Value Date    LABA1C 6.7 (H) 09/23/2024    LABA1C 6.7 (H) 11/20/2023    LABA1C 8.1 (H) 08/24/2022     Hemoglobin A1C, POC   Date Value Ref Range Status   12/29/2023 6.7 % Final           Estimated Creatinine Clearance: 43 mL/min (A) (based on SCr of 1.19 mg/dL (H)).      A/P:    1) T2DM: Per ADA guidelines, Pt's A1c is at goal of < 7%.  Current SMBG(s)/CGM trend indicates well controlled rdgs.  Will continue current regimen; however, will apply for Synjardy XR in order to reduce pill burden of Jardiance + Metformin.  - STOP Jardiance  - STOP Metformin  - START Synjardy XR 12.5-1000 mg 2 tabs daily  - Continue Trulicity 3 mg weekly  - Rx assistance application completed via paper for Trulicity 3 mg with [employer]  - Rx assistance application completed via paper for Synjardy XR 12.5-1000 mg with  Cares    2) Asthma: Pt requested Rx assistance re enrollment be completed for Spiriva as well.  She states she has only been doing 1 puff daily instead of prescribed 2 puffs daily  - Adherence: Continue Spiriva Respimat 2.5 mcg 2 puffs daily  - Rx assistance application completed via paper for Spiriva Respimat 2.5 mcg 2 puffs daily with  James      Medication reconciliation was completed during the visit.    Medications Discontinued During This Encounter

## 2024-12-17 RX ORDER — ONDANSETRON 4 MG/1
4 TABLET, ORALLY DISINTEGRATING ORAL 3 TIMES DAILY PRN
Qty: 21 TABLET | Refills: 0 | Status: SHIPPED | OUTPATIENT
Start: 2024-12-17

## 2024-12-17 NOTE — TELEPHONE ENCOUNTER
Last appointment: 9/23/24 Marcelino  Next appointment: 12/27/24 Marcelino  Previous refill encounter(s): 10/30/24 21 (by ER physician)    Requested Prescriptions     Pending Prescriptions Disp Refills    ondansetron (ZOFRAN-ODT) 4 MG disintegrating tablet 21 tablet 0     Sig: Take 1 tablet by mouth 3 times daily as needed for Nausea or Vomiting     For Pharmacy Admin Tracking Only    Program: Medication Refill  CPA in place:    Recommendation Provided To:   Intervention Detail: New Rx: 1, reason: Patient Preference  Intervention Accepted By:   Gap Closed?:    Time Spent (min): 5

## 2024-12-27 ENCOUNTER — TELEPHONE (OUTPATIENT)
Age: 74
End: 2024-12-27

## 2024-12-27 NOTE — TELEPHONE ENCOUNTER
PCP: Kristen Benson APRN - NP    Last appt: 12/2/2024     Future Appointments   Date Time Provider Department Center   12/27/2024  1:45 PM Kristen Bensno APRN - NP [facility]   2/14/2025 11:30 AM Shell Perez APRN - NP AOCR BS AMB       Requested Prescriptions     Pending Prescriptions Disp Refills    metFORMIN (GLUCOPHAGE) 500 MG tablet [Pharmacy Med Name: metFORMIN HCl Oral Tablet 500 MG] 360 tablet 3     Sig: TAKE 2 TABLETS TWICE A DAY WITH MEALS       Prior labs and Blood pressures:  BP Readings from Last 3 Encounters:   10/30/24 (!) 162/97   10/14/24 135/83   09/23/24 125/73     Lab Results   Component Value Date/Time     10/30/2024 12:22 PM    K 4.0 10/30/2024 12:22 PM     10/30/2024 12:22 PM    CO2 25 10/30/2024 12:22 PM    BUN 25 10/30/2024 12:22 PM    GFRAA 52 08/24/2022 12:01 PM     Lab Results   Component Value Date/Time    INK0CXUV 6.7 12/29/2023 02:12 PM     Lab Results   Component Value Date/Time    CHOL 246 09/23/2024 01:47 PM    HDL 52 09/23/2024 01:47 PM     09/23/2024 01:47 PM    LDL 38.8 08/24/2022 12:01 PM    VLDL 53 09/23/2024 01:47 PM     No results found for: \"VITD3\"    Lab Results   Component Value Date/Time    TSH 1.15 10/06/2020 05:43 PM

## 2024-12-27 NOTE — TELEPHONE ENCOUNTER
Pt came to office 12.27.24 requesting a refill on the following medication:    Potassium chloride - pt is completely out/pt request refill be submitted to [facility]    Hydrocodone - pt will out of medication by Sunday    Pt has a scheduled appointment with provider for 01.21.25    Pt can be reached at [phone]

## 2024-12-28 DIAGNOSIS — M51.369 DDD (DEGENERATIVE DISC DISEASE), LUMBAR: ICD-10-CM

## 2024-12-28 DIAGNOSIS — G89.4 CHRONIC PAIN SYNDROME: ICD-10-CM

## 2024-12-28 DIAGNOSIS — G62.9 PERIPHERAL POLYNEUROPATHY: ICD-10-CM

## 2024-12-30 DIAGNOSIS — M51.369 DDD (DEGENERATIVE DISC DISEASE), LUMBAR: ICD-10-CM

## 2024-12-30 DIAGNOSIS — G89.4 CHRONIC PAIN SYNDROME: ICD-10-CM

## 2024-12-30 RX ORDER — HYDROCODONE BITARTRATE AND ACETAMINOPHEN 10; 325 MG/1; MG/1
1 TABLET ORAL EVERY 8 HOURS PRN
Qty: 90 TABLET | Refills: 0 | Status: SHIPPED | OUTPATIENT
Start: 2024-12-30 | End: 2025-01-29

## 2024-12-30 RX ORDER — POTASSIUM CHLORIDE 750 MG/1
10 TABLET, EXTENDED RELEASE ORAL DAILY
Qty: 90 TABLET | Refills: 3 | Status: SHIPPED | OUTPATIENT
Start: 2024-12-30

## 2024-12-30 RX ORDER — PREGABALIN 200 MG/1
200 CAPSULE ORAL 3 TIMES DAILY
Qty: 90 CAPSULE | Refills: 2 | Status: SHIPPED | OUTPATIENT
Start: 2024-12-30 | End: 2025-03-30

## 2024-12-30 NOTE — TELEPHONE ENCOUNTER
Meds Have been sent -       Patient came to office 12.27.24 requesting a refill on the following medication:     Potassium chloride - pt is completely out/pt request refill be submitted to [facility]     Hydrocodone - pt will out of medication by Sunday     Pt has a scheduled appointment with provider for 01.21.25     Pt can be reached at [phone]

## 2025-01-21 ENCOUNTER — OFFICE VISIT (OUTPATIENT)
Age: 75
End: 2025-01-21
Payer: MEDICARE

## 2025-01-21 VITALS
BODY MASS INDEX: 35 KG/M2 | WEIGHT: 205 LBS | HEART RATE: 71 BPM | RESPIRATION RATE: 18 BRPM | HEIGHT: 64 IN | OXYGEN SATURATION: 93 % | TEMPERATURE: 97.9 F | DIASTOLIC BLOOD PRESSURE: 75 MMHG | SYSTOLIC BLOOD PRESSURE: 125 MMHG

## 2025-01-21 DIAGNOSIS — Z00.00 MEDICARE ANNUAL WELLNESS VISIT, SUBSEQUENT: Primary | ICD-10-CM

## 2025-01-21 DIAGNOSIS — N18.31 CHRONIC KIDNEY DISEASE, STAGE 3A (HCC): ICD-10-CM

## 2025-01-21 DIAGNOSIS — Z91.81 AT HIGH RISK FOR FALLS: ICD-10-CM

## 2025-01-21 DIAGNOSIS — M51.360 DEGENERATION OF INTERVERTEBRAL DISC OF LUMBAR REGION WITH DISCOGENIC BACK PAIN: ICD-10-CM

## 2025-01-21 DIAGNOSIS — E11.42 TYPE 2 DIABETES MELLITUS WITH DIABETIC POLYNEUROPATHY, WITHOUT LONG-TERM CURRENT USE OF INSULIN (HCC): ICD-10-CM

## 2025-01-21 DIAGNOSIS — G89.4 CHRONIC PAIN SYNDROME: ICD-10-CM

## 2025-01-21 DIAGNOSIS — J43.8 OTHER EMPHYSEMA (HCC): ICD-10-CM

## 2025-01-21 DIAGNOSIS — M06.09 SERONEGATIVE RHEUMATOID ARTHRITIS OF MULTIPLE SITES (HCC): ICD-10-CM

## 2025-01-21 PROCEDURE — 99213 OFFICE O/P EST LOW 20 MIN: CPT | Performed by: NURSE PRACTITIONER

## 2025-01-21 PROCEDURE — 4004F PT TOBACCO SCREEN RCVD TLK: CPT | Performed by: NURSE PRACTITIONER

## 2025-01-21 SDOH — ECONOMIC STABILITY: FOOD INSECURITY: WITHIN THE PAST 12 MONTHS, THE FOOD YOU BOUGHT JUST DIDN'T LAST AND YOU DIDN'T HAVE MONEY TO GET MORE.: NEVER TRUE

## 2025-01-21 SDOH — ECONOMIC STABILITY: FOOD INSECURITY: WITHIN THE PAST 12 MONTHS, YOU WORRIED THAT YOUR FOOD WOULD RUN OUT BEFORE YOU GOT MONEY TO BUY MORE.: NEVER TRUE

## 2025-01-21 ASSESSMENT — PATIENT HEALTH QUESTIONNAIRE - PHQ9
SUM OF ALL RESPONSES TO PHQ QUESTIONS 1-9: 0
SUM OF ALL RESPONSES TO PHQ9 QUESTIONS 1 & 2: 0
SUM OF ALL RESPONSES TO PHQ QUESTIONS 1-9: 0
SUM OF ALL RESPONSES TO PHQ QUESTIONS 1-9: 0
2. FEELING DOWN, DEPRESSED OR HOPELESS: NOT AT ALL
1. LITTLE INTEREST OR PLEASURE IN DOING THINGS: NOT AT ALL
SUM OF ALL RESPONSES TO PHQ QUESTIONS 1-9: 0

## 2025-01-21 NOTE — PROGRESS NOTES
Chief Complaint   Patient presents with    Medicare AWV         \"Have you been to the ER, urgent care clinic since your last visit?  Hospitalized since your last visit?\"    NO    “Have you seen or consulted any other health care providers outside of [facility] since your last visit?”    YES - When: approximately 1 months ago.  Where and Why: ortho shoulder pain.            Click Here for Release of Records Request           1/21/2025     2:31 PM   PHQ-9    Little interest or pleasure in doing things 0   Feeling down, depressed, or hopeless 0   PHQ-2 Score 0   PHQ-9 Total Score 0           Financial Resource Strain: Low Risk  (9/23/2024)    Overall Financial Resource Strain (CARDIA)     Difficulty of Paying Living Expenses: Not hard at all      Food Insecurity: No Food Insecurity (1/21/2025)    Hunger Vital Sign     Worried About Running Out of Food in the Last Year: Never true     Ran Out of Food in the Last Year: Never true          Health Maintenance Due   Topic Date Due    Hepatitis A vaccine (1 of 2 - Risk 2-dose series) Never done    Shingles vaccine (1 of 2) Never done    Hepatitis B vaccine (1 of 3 - Risk 3-dose series) Never done    Respiratory Syncytial Virus (RSV) Pregnant or age 60 yrs+ (1 - Risk 60-74 years 1-dose series) Never done    Diabetic retinal exam  04/16/2020    Pneumococcal 65+ years Vaccine (3 of 3 - PPSV23 or PCV20) 09/11/2020    DTaP/Tdap/Td vaccine (2 - Td or Tdap) 05/13/2023    Diabetic Alb to Cr ratio (uACR) test  08/24/2023    COVID-19 Vaccine (2 - 2023-24 season) 09/01/2024    Annual Wellness Visit (Medicare Advantage)  01/01/2025       
Automatic Reconciliation, Ar   Cyanocobalamin 5000 MCG CAPS Take 5,000 mcg by mouth at bedtime Yes Automatic Reconciliation, Ar   esomeprazole (NEXIUM) 40 MG delayed release capsule Take 1 capsule by mouth at bedtime Yes Automatic Reconciliation, Ar       CareTeam (Including outside providers/suppliers regularly involved in providing care):   Patient Care Team:  Kristen Benson APRN - NP as PCP - General  [facility]risten Benson APRN - NP [facility]CP [facility]  Erna Orta RN as Ambulatory Care Manager  Levi Rosen MD as Physician  Ailyn Combs MD as Physician  Shelia Gibbs [facility] (Pharmacist)     Recommendations for Preventive Services Due: see orders and patient instructions/AVS.  Recommended screening schedule for the next 5-10 years is provided to the patient in written form: see Patient Instructions/AVS.     Reviewed and updated this visit:  Tobacco  Allergies  Meds  Med Hx  Surg Hx  Soc Hx  Fam Hx            On the basis of positive falls risk screening, assessment and plan is as follows: home safety tips provided.

## 2025-01-22 DIAGNOSIS — M51.369 DDD (DEGENERATIVE DISC DISEASE), LUMBAR: ICD-10-CM

## 2025-01-22 DIAGNOSIS — G89.4 CHRONIC PAIN SYNDROME: ICD-10-CM

## 2025-01-22 RX ORDER — HYDROCODONE BITARTRATE AND ACETAMINOPHEN 10; 325 MG/1; MG/1
1 TABLET ORAL EVERY 8 HOURS PRN
Qty: 90 TABLET | Refills: 0 | Status: CANCELLED | OUTPATIENT
Start: 2025-01-22 | End: 2025-02-21

## 2025-01-22 NOTE — TELEPHONE ENCOUNTER
PCP: Kristen Benson APRN - NP    Last appt: 1/21/2025     Future Appointments   Date Time Provider Department Center   2/7/2025  1:00 PM Kendrick Roberts DO [facility] BS AMB   2/14/2025 11:30 AM Shell Perez APRN - NP [facility] BS AMB       Requested Prescriptions     Pending Prescriptions Disp Refills    HYDROcodone-acetaminophen (NORCO)  MG per tablet 90 tablet 0     Sig: Take 1 tablet by mouth every 8 hours as needed for Pain for up to 30 days. Intended supply: 30 days Max Daily Amount: 3 tablets       Prior labs and Blood pressures:  BP Readings from Last 3 Encounters:   01/21/25 125/75   10/30/24 (!) 162/97   10/14/24 135/83     Lab Results   Component Value Date/Time     10/30/2024 12:22 PM    K 4.0 10/30/2024 12:22 PM     10/30/2024 12:22 PM    CO2 25 10/30/2024 12:22 PM    BUN 25 10/30/2024 12:22 PM    GFRAA 52 08/24/2022 12:01 PM     Lab Results   Component Value Date/Time    UGS0SSVV 6.7 12/29/2023 02:12 PM     Lab Results   Component Value Date/Time    CHOL 246 09/23/2024 01:47 PM    HDL 52 09/23/2024 01:47 PM     09/23/2024 01:47 PM    LDL 38.8 08/24/2022 12:01 PM    VLDL 53 09/23/2024 01:47 PM     No results found for: \"VITD3\"    Lab Results   Component Value Date/Time    TSH 1.15 10/06/2020 05:43 PM

## 2025-01-23 PROBLEM — F11.20 OPIOID DEPENDENCE WITH CURRENT USE (HCC): Status: RESOLVED | Noted: 2023-06-01 | Resolved: 2025-01-23

## 2025-01-23 RX ORDER — HYDROCODONE BITARTRATE AND ACETAMINOPHEN 10; 325 MG/1; MG/1
1 TABLET ORAL EVERY 8 HOURS PRN
Qty: 90 TABLET | Refills: 0 | Status: SHIPPED | OUTPATIENT
Start: 2025-02-21 | End: 2025-01-23 | Stop reason: SDUPTHER

## 2025-01-23 RX ORDER — HYDROCODONE BITARTRATE AND ACETAMINOPHEN 10; 325 MG/1; MG/1
1 TABLET ORAL EVERY 8 HOURS PRN
Qty: 90 TABLET | Refills: 0 | Status: SHIPPED | OUTPATIENT
Start: 2025-03-19 | End: 2025-04-18

## 2025-01-23 RX ORDER — HYDROCODONE BITARTRATE AND ACETAMINOPHEN 10; 325 MG/1; MG/1
1 TABLET ORAL EVERY 8 HOURS PRN
Qty: 90 TABLET | Refills: 0 | Status: SHIPPED | OUTPATIENT
Start: 2025-01-23 | End: 2025-01-23 | Stop reason: SDUPTHER

## 2025-01-27 ENCOUNTER — TELEPHONE (OUTPATIENT)
Age: 75
End: 2025-01-27

## 2025-01-27 DIAGNOSIS — Z72.0 TOBACCO ABUSE: Primary | ICD-10-CM

## 2025-01-27 DIAGNOSIS — Z12.2 ENCOUNTER FOR SCREENING FOR LUNG CANCER: ICD-10-CM

## 2025-01-27 NOTE — TELEPHONE ENCOUNTER
Patient called stated that provider wants her to have a CT Scan of her lungs, but the imaging has not received it.    Requesting a call back    Best call back # [phone]

## 2025-03-19 RX ORDER — ONDANSETRON 4 MG/1
4 TABLET, ORALLY DISINTEGRATING ORAL 3 TIMES DAILY PRN
Qty: 30 TABLET | Refills: 0 | Status: SHIPPED | OUTPATIENT
Start: 2025-03-19

## 2025-03-19 NOTE — TELEPHONE ENCOUNTER
Last appointment: 1/21/25 Marcelino  Next appointment: 4/7/25 Marcelino  Previous refill encounter(s): 7/31/24 30  (12/17/24 21 noted when reordering)    Requested Prescriptions     Pending Prescriptions Disp Refills    ondansetron (ZOFRAN-ODT) 4 MG disintegrating tablet 30 tablet 0     Sig: Take 1 tablet by mouth 3 times daily as needed for Nausea or Vomiting     For Pharmacy Admin Tracking Only    Program: Medication Refill  CPA in place:    Recommendation Provided To:   Intervention Detail: New Rx: 1, reason: Patient Preference  Intervention Accepted By:   Gap Closed?:    Time Spent (min): 5

## 2025-03-25 RX ORDER — POTASSIUM CHLORIDE 750 MG/1
10 TABLET, EXTENDED RELEASE ORAL DAILY
Qty: 90 TABLET | Refills: 2 | Status: SHIPPED | OUTPATIENT
Start: 2025-03-25

## 2025-03-25 RX ORDER — DIPHENHYDRAMINE HCL 25 MG
TABLET ORAL
Qty: 1 KIT | Refills: 0 | Status: SHIPPED | OUTPATIENT
Start: 2025-03-25

## 2025-03-25 RX ORDER — CALCIUM CITRATE/VITAMIN D3 200MG-6.25
TABLET ORAL
Qty: 300 EACH | Refills: 1 | Status: SHIPPED | OUTPATIENT
Start: 2025-03-25

## 2025-03-25 NOTE — TELEPHONE ENCOUNTER
Change in pharmacy to [facility]- Also requesting True Metrix Meter, and strips.      Entered as requested for Three times daily testing if appropriate.  Nilam Marquez      Last appointment: 1/21/25 Marcelino  Next appointment: 4/7/25 Marcelino  Previous refill encounter(s):   Potassium chloride 12/30/24 90 + 3 ([facility])  True Metrix meter- New Rx  True Metrix strips - New Rx    Requested Prescriptions     Pending Prescriptions Disp Refills    potassium chloride (KLOR-CON M) 10 MEQ extended release tablet 90 tablet 2     Sig: Take 1 tablet by mouth daily    Blood Glucose Monitoring Suppl (TRUE METRIX AIR GLUCOSE METER) w/Device KIT 1 kit 0     Sig: Use to check blood glucose three times daily as directed. Dx: E11.42    blood glucose test strips (TRUE METRIX BLOOD GLUCOSE TEST) strip 300 each 1     Sig: Use to check blood glucose three times daily as directed. Dx: E11.42     For Pharmacy Admin Tracking Only    Program: Medication Refill  CPA in place:    Recommendation Provided To:   Intervention Detail: New Rx: 3, reason: Patient Preference  Intervention Accepted By:   Gap Closed?:    Time Spent (min): 5

## 2025-03-26 RX ORDER — GLUCOSAM/CHON-MSM1/C/MANG/BOSW 500-416.6
TABLET ORAL
Qty: 100 EACH | Refills: 3 | Status: SHIPPED | OUTPATIENT
Start: 2025-03-26

## 2025-03-26 RX ORDER — BLOOD-GLUCOSE CONTROL, LOW
EACH MISCELLANEOUS
Qty: 1 EACH | Refills: 0 | Status: SHIPPED | OUTPATIENT
Start: 2025-03-26

## 2025-03-26 RX ORDER — ISOPROPYL ALCOHOL 0.75 G/1
SWAB TOPICAL
Qty: 100 EACH | Refills: 3 | Status: SHIPPED | OUTPATIENT
Start: 2025-03-26

## 2025-03-26 NOTE — TELEPHONE ENCOUNTER
PCP: Kristen Benson APRN - NP    Last appt: 1/21/2025     Future Appointments   Date Time Provider Department Center   4/7/2025  1:00 PM Kristen Benson APRN - NP [facility]       Requested Prescriptions     Pending Prescriptions Disp Refills    TRUEplus Lancets 33G MISC [Pharmacy Med Name: TRUEPLUS 33G LANCETS]  0    Alcohol Swabs (B-D SINGLE USE SWABS REGULAR) PADS [Pharmacy Med Name: BD ALCOHOL SWAB PAD]  0    Blood Glucose Calibration (TRUE METRIX LEVEL 1) Low SOLN [Pharmacy Med Name: TRUE METRIX CONTROL SOL LEVEL 1]  0       Prior labs and Blood pressures:  BP Readings from Last 3 Encounters:   01/21/25 125/75   10/30/24 (!) 162/97   10/14/24 135/83     Lab Results   Component Value Date/Time     10/30/2024 12:22 PM    K 4.0 10/30/2024 12:22 PM     10/30/2024 12:22 PM    CO2 25 10/30/2024 12:22 PM    BUN 25 10/30/2024 12:22 PM    GFRAA 52 08/24/2022 12:01 PM     Lab Results   Component Value Date/Time    CCH0LQPW 6.7 12/29/2023 02:12 PM     Lab Results   Component Value Date/Time    CHOL 246 09/23/2024 01:47 PM    HDL 52 09/23/2024 01:47 PM     09/23/2024 01:47 PM    LDL 38.8 08/24/2022 12:01 PM    VLDL 53 09/23/2024 01:47 PM     No results found for: \"VITD3\"    Lab Results   Component Value Date/Time    TSH 1.15 10/06/2020 05:43 PM

## 2025-04-07 ENCOUNTER — OFFICE VISIT (OUTPATIENT)
Age: 75
End: 2025-04-07
Payer: MEDICARE

## 2025-04-07 VITALS
HEART RATE: 70 BPM | RESPIRATION RATE: 18 BRPM | OXYGEN SATURATION: 93 % | SYSTOLIC BLOOD PRESSURE: 108 MMHG | DIASTOLIC BLOOD PRESSURE: 69 MMHG | TEMPERATURE: 97.2 F | BODY MASS INDEX: 33.84 KG/M2 | WEIGHT: 198.2 LBS | HEIGHT: 64 IN

## 2025-04-07 DIAGNOSIS — G89.4 CHRONIC PAIN SYNDROME: ICD-10-CM

## 2025-04-07 DIAGNOSIS — R11.0 NAUSEA: ICD-10-CM

## 2025-04-07 DIAGNOSIS — M51.360 DEGENERATION OF INTERVERTEBRAL DISC OF LUMBAR REGION WITH DISCOGENIC BACK PAIN: Primary | ICD-10-CM

## 2025-04-07 PROCEDURE — 99214 OFFICE O/P EST MOD 30 MIN: CPT | Performed by: NURSE PRACTITIONER

## 2025-04-07 RX ORDER — ONDANSETRON 4 MG/1
4 TABLET, FILM COATED ORAL EVERY 8 HOURS PRN
Qty: 30 TABLET | Refills: 0 | Status: SHIPPED | OUTPATIENT
Start: 2025-04-07

## 2025-04-07 ASSESSMENT — ENCOUNTER SYMPTOMS
BACK PAIN: 1
SHORTNESS OF BREATH: 0

## 2025-04-07 NOTE — PROGRESS NOTES
Assessment/Plan:     1. Degeneration of intervertebral disc of lumbar region with discogenic back pain  -     Compliance Drug Analysis, Urine; Future  -     HYDROcodone-acetaminophen (NORCO)  MG per tablet; Take 1 tablet by mouth every 8 hours as needed for Pain for up to 30 days. Intended supply: 30 days Max Daily Amount: 3 tablets, Disp-90 tablet, R-0Normal  Stable on current therapy which was refilled for 3 months today.  Controlled substance contract up to date as of 09/2024. UDS due today.   reviewed and appropriate.  Return in three months or sooner as needed.   2. Nausea  -     ondansetron (ZOFRAN) 4 MG tablet; Take 1 tablet by mouth every 8 hours as needed for Nausea or Vomiting, Disp-30 tablet, R-0Normal  [facility]. Refilled today. Continue current therapy.     3. Chronic pain syndrome  Overview:  bilateral hip arthritis, chronic low back pain      Orders:  -     HYDROcodone-acetaminophen (NORCO)  MG per tablet; Take 1 tablet by mouth every 8 hours as needed for Pain for up to 30 days. Intended supply: 30 days Max Daily Amount: 3 tablets, Disp-90 tablet, R-0Normal       Return in about 3 months (around 7/7/2025) for Follow Up.     Discussed expected course/resolution/complications of diagnosis in detail with patient.    Medication risks/benefits/costs/interactions/alternatives discussed with patient.    Pt was given after visit summary which includes diagnoses, current medications & vitals.   Pt expressed understanding with the diagnosis and plan          Subjective:      Leatha Wilson is a 74 y.o. female who presents for had concerns including Hypertension (Follow up ) and Diabetes (Follow up ).     Chronic Pain  Leatha Wilson RTC today to follow up on chronic pain diagnosis.  We discussed her osteoarthritis that is affecting her bilateral hips, left shoulder and lumbar back.  We discussed her seronegative rheumatoid arthritis affecting her bilateral hands.  Significant

## 2025-04-07 NOTE — PROGRESS NOTES
Chief Complaint   Patient presents with    Hypertension     Follow up     Diabetes     Follow up          \"Have you been to the ER, urgent care clinic since your last visit?  Hospitalized since your last visit?\"    NO    “Have you seen or consulted any other health care providers outside of [facility] since your last visit?”    Yes [facility]  1 month ago     Have you had a mammogram?”   NO    Date of last Mammogram: 3/27/2023             Click Here for Release of Records Request           1/21/2025     2:31 PM   PHQ-9    Little interest or pleasure in doing things 0   Feeling down, depressed, or hopeless 0   PHQ-2 Score 0   PHQ-9 Total Score 0           Financial Resource Strain: Low Risk  (9/23/2024)    Overall Financial Resource Strain (CARDIA)     Difficulty of Paying Living Expenses: Not hard at all      Food Insecurity: No Food Insecurity (1/21/2025)    Hunger Vital Sign     Worried About Running Out of Food in the Last Year: Never true     Ran Out of Food in the Last Year: Never true          Health Maintenance Due   Topic Date Due    Diabetic retinal exam  04/16/2020    Diabetic Alb to Cr ratio (uACR) test  08/24/2023    Breast cancer screen  03/27/2025

## 2025-04-10 LAB — DRUGS UR: NORMAL

## 2025-04-11 RX ORDER — HYDROCODONE BITARTRATE AND ACETAMINOPHEN 10; 325 MG/1; MG/1
1 TABLET ORAL EVERY 8 HOURS PRN
Qty: 90 TABLET | Refills: 0 | Status: SHIPPED | OUTPATIENT
Start: 2025-05-09 | End: 2025-04-11 | Stop reason: SDUPTHER

## 2025-04-11 RX ORDER — HYDROCODONE BITARTRATE AND ACETAMINOPHEN 10; 325 MG/1; MG/1
1 TABLET ORAL EVERY 8 HOURS PRN
Qty: 90 TABLET | Refills: 0 | Status: SHIPPED | OUTPATIENT
Start: 2025-04-11 | End: 2025-04-11 | Stop reason: SDUPTHER

## 2025-04-11 RX ORDER — HYDROCODONE BITARTRATE AND ACETAMINOPHEN 10; 325 MG/1; MG/1
1 TABLET ORAL EVERY 8 HOURS PRN
Qty: 90 TABLET | Refills: 0 | Status: SHIPPED | OUTPATIENT
Start: 2025-06-07 | End: 2025-07-07

## 2025-04-18 DIAGNOSIS — G62.9 PERIPHERAL POLYNEUROPATHY: ICD-10-CM

## 2025-04-18 NOTE — TELEPHONE ENCOUNTER
Patient call for refill pregabalin.  Check .  Alma, Nilam    Last appointment: 4/7/25 Marcelino  Next appointment: 7/7/25 Marcelino  Previous refill encounter(s): 12/30/24 90 + 2    Requested Prescriptions     Pending Prescriptions Disp Refills    pregabalin (LYRICA) 200 MG capsule 90 capsule 2     Sig: Take 1 capsule by mouth in the morning, at noon, and at bedtime for 90 days. Max Daily Amount: 600 mg     For Pharmacy Admin Tracking Only    Program: Medication Refill  CPA in place:    Recommendation Provided To:   Intervention Detail: New Rx: 1, reason: Patient Preference  Intervention Accepted By:   Gap Closed?:    Time Spent (min): 5

## 2025-04-19 ENCOUNTER — HOSPITAL ENCOUNTER (EMERGENCY)
Facility: HOSPITAL | Age: 75
Discharge: HOME | End: 2025-04-19
Attending: EMERGENCY MEDICINE
Payer: MEDICARE

## 2025-04-19 ENCOUNTER — APPOINTMENT (OUTPATIENT)
Facility: HOSPITAL | Age: 75
End: 2025-04-19
Payer: MEDICARE

## 2025-04-19 VITALS
BODY MASS INDEX: 32.56 KG/M2 | SYSTOLIC BLOOD PRESSURE: 162 MMHG | DIASTOLIC BLOOD PRESSURE: 80 MMHG | OXYGEN SATURATION: 93 % | HEART RATE: 79 BPM | HEIGHT: 64 IN | WEIGHT: 190.7 LBS | TEMPERATURE: 98.2 F | RESPIRATION RATE: 18 BRPM

## 2025-04-19 DIAGNOSIS — K52.9 COLITIS: Primary | ICD-10-CM

## 2025-04-19 DIAGNOSIS — K76.0 NAFLD (NONALCOHOLIC FATTY LIVER DISEASE): ICD-10-CM

## 2025-04-19 DIAGNOSIS — E87.6 HYPOKALEMIA: ICD-10-CM

## 2025-04-19 LAB
ALBUMIN SERPL-MCNC: 3.5 G/DL (ref 3.5–5)
ALBUMIN/GLOB SERPL: 0.8 (ref 1.1–2.2)
ALP SERPL-CCNC: 97 U/L (ref 45–117)
ALT SERPL-CCNC: 17 U/L (ref 12–78)
ANION GAP SERPL CALC-SCNC: 16 MMOL/L (ref 2–12)
AST SERPL-CCNC: 21 U/L (ref 15–37)
BASOPHILS # BLD: 0.04 K/UL (ref 0–0.1)
BASOPHILS NFR BLD: 0.5 % (ref 0–1)
BILIRUB SERPL-MCNC: 0.7 MG/DL (ref 0.2–1)
BUN SERPL-MCNC: 18 MG/DL (ref 6–20)
BUN/CREAT SERPL: 18 (ref 12–20)
CALCIUM SERPL-MCNC: 9.4 MG/DL (ref 8.5–10.1)
CHLORIDE SERPL-SCNC: 103 MMOL/L (ref 97–108)
CO2 SERPL-SCNC: 24 MMOL/L (ref 21–32)
CREAT SERPL-MCNC: 0.98 MG/DL (ref 0.55–1.02)
DIFFERENTIAL METHOD BLD: ABNORMAL
EOSINOPHIL # BLD: 0.23 K/UL (ref 0–0.4)
EOSINOPHIL NFR BLD: 2.7 % (ref 0–7)
ERYTHROCYTE [DISTWIDTH] IN BLOOD BY AUTOMATED COUNT: 15 % (ref 11.5–14.5)
FLUAV RNA SPEC QL NAA+PROBE: NOT DETECTED
FLUBV RNA SPEC QL NAA+PROBE: NOT DETECTED
GLOBULIN SER CALC-MCNC: 4.5 G/DL (ref 2–4)
GLUCOSE SERPL-MCNC: 127 MG/DL (ref 65–100)
HCT VFR BLD AUTO: 49.4 % (ref 35–47)
HGB BLD-MCNC: 16.1 G/DL (ref 11.5–16)
IMM GRANULOCYTES # BLD AUTO: 0.03 K/UL (ref 0–0.04)
IMM GRANULOCYTES NFR BLD AUTO: 0.4 % (ref 0–0.5)
LIPASE SERPL-CCNC: 30 U/L (ref 13–75)
LYMPHOCYTES # BLD: 0.95 K/UL (ref 0.8–3.5)
LYMPHOCYTES NFR BLD: 11.2 % (ref 12–49)
MCH RBC QN AUTO: 29.7 PG (ref 26–34)
MCHC RBC AUTO-ENTMCNC: 32.6 G/DL (ref 30–36.5)
MCV RBC AUTO: 91 FL (ref 80–99)
MONOCYTES # BLD: 0.79 K/UL (ref 0–1)
MONOCYTES NFR BLD: 9.3 % (ref 5–13)
NEUTS SEG # BLD: 6.42 K/UL (ref 1.8–8)
NEUTS SEG NFR BLD: 75.9 % (ref 32–75)
NRBC # BLD: 0 K/UL (ref 0–0.01)
NRBC BLD-RTO: 0 PER 100 WBC
PLATELET # BLD AUTO: 179 K/UL (ref 150–400)
PMV BLD AUTO: 12.6 FL (ref 8.9–12.9)
POTASSIUM SERPL-SCNC: 3.3 MMOL/L (ref 3.5–5.1)
PROT SERPL-MCNC: 8 G/DL (ref 6.4–8.2)
RBC # BLD AUTO: 5.43 M/UL (ref 3.8–5.2)
SARS-COV-2 RNA RESP QL NAA+PROBE: NOT DETECTED
SODIUM SERPL-SCNC: 143 MMOL/L (ref 136–145)
SOURCE: NORMAL
WBC # BLD AUTO: 8.5 K/UL (ref 3.6–11)

## 2025-04-19 PROCEDURE — 96361 HYDRATE IV INFUSION ADD-ON: CPT

## 2025-04-19 PROCEDURE — 85025 COMPLETE CBC W/AUTO DIFF WBC: CPT

## 2025-04-19 PROCEDURE — 6360000004 HC RX CONTRAST MEDICATION: Performed by: PHYSICIAN ASSISTANT

## 2025-04-19 PROCEDURE — 6370000000 HC RX 637 (ALT 250 FOR IP): Performed by: PHYSICIAN ASSISTANT

## 2025-04-19 PROCEDURE — 87636 SARSCOV2 & INF A&B AMP PRB: CPT

## 2025-04-19 PROCEDURE — 83690 ASSAY OF LIPASE: CPT

## 2025-04-19 PROCEDURE — 96375 TX/PRO/DX INJ NEW DRUG ADDON: CPT

## 2025-04-19 PROCEDURE — 6360000002 HC RX W HCPCS: Performed by: PHYSICIAN ASSISTANT

## 2025-04-19 PROCEDURE — 80053 COMPREHEN METABOLIC PANEL: CPT

## 2025-04-19 PROCEDURE — 96374 THER/PROPH/DIAG INJ IV PUSH: CPT

## 2025-04-19 PROCEDURE — 99285 EMERGENCY DEPT VISIT HI MDM: CPT

## 2025-04-19 PROCEDURE — 2580000003 HC RX 258: Performed by: PHYSICIAN ASSISTANT

## 2025-04-19 PROCEDURE — 36415 COLL VENOUS BLD VENIPUNCTURE: CPT

## 2025-04-19 PROCEDURE — 74177 CT ABD & PELVIS W/CONTRAST: CPT

## 2025-04-19 RX ORDER — POLYETHYLENE GLYCOL 3350 17 G/17G
17 POWDER, FOR SOLUTION ORAL DAILY PRN
Qty: 116 G | Refills: 0 | Status: SHIPPED | OUTPATIENT
Start: 2025-04-19 | End: 2025-05-19

## 2025-04-19 RX ORDER — 0.9 % SODIUM CHLORIDE 0.9 %
500 INTRAVENOUS SOLUTION INTRAVENOUS ONCE
Status: COMPLETED | OUTPATIENT
Start: 2025-04-19 | End: 2025-04-19

## 2025-04-19 RX ORDER — IOPAMIDOL 755 MG/ML
100 INJECTION, SOLUTION INTRAVASCULAR
Status: COMPLETED | OUTPATIENT
Start: 2025-04-19 | End: 2025-04-19

## 2025-04-19 RX ORDER — DICYCLOMINE HYDROCHLORIDE 10 MG/1
10 CAPSULE ORAL
Qty: 20 CAPSULE | Refills: 0 | Status: SHIPPED | OUTPATIENT
Start: 2025-04-19 | End: 2025-04-24

## 2025-04-19 RX ORDER — DIPHENHYDRAMINE HYDROCHLORIDE 50 MG/ML
12.5 INJECTION, SOLUTION INTRAMUSCULAR; INTRAVENOUS
Status: COMPLETED | OUTPATIENT
Start: 2025-04-19 | End: 2025-04-19

## 2025-04-19 RX ORDER — METOCLOPRAMIDE HYDROCHLORIDE 5 MG/ML
10 INJECTION INTRAMUSCULAR; INTRAVENOUS ONCE
Status: COMPLETED | OUTPATIENT
Start: 2025-04-19 | End: 2025-04-19

## 2025-04-19 RX ORDER — POTASSIUM CHLORIDE 750 MG/1
40 TABLET, EXTENDED RELEASE ORAL ONCE
Status: COMPLETED | OUTPATIENT
Start: 2025-04-19 | End: 2025-04-19

## 2025-04-19 RX ORDER — HYDROCODONE BITARTRATE AND ACETAMINOPHEN 5; 325 MG/1; MG/1
1 TABLET ORAL
Refills: 0 | Status: COMPLETED | OUTPATIENT
Start: 2025-04-19 | End: 2025-04-19

## 2025-04-19 RX ORDER — METOCLOPRAMIDE 10 MG/1
10 TABLET ORAL 3 TIMES DAILY PRN
Qty: 15 TABLET | Refills: 0 | Status: SHIPPED | OUTPATIENT
Start: 2025-04-19 | End: 2025-04-24

## 2025-04-19 RX ORDER — HYDROCODONE BITARTRATE AND ACETAMINOPHEN 5; 325 MG/1; MG/1
1 TABLET ORAL EVERY 8 HOURS PRN
Qty: 9 TABLET | Refills: 0 | Status: SHIPPED | OUTPATIENT
Start: 2025-04-19 | End: 2025-04-22

## 2025-04-19 RX ADMIN — POTASSIUM CHLORIDE 40 MEQ: 750 TABLET, FILM COATED, EXTENDED RELEASE ORAL at 13:39

## 2025-04-19 RX ADMIN — DIPHENHYDRAMINE HYDROCHLORIDE 12.5 MG: 50 INJECTION INTRAMUSCULAR; INTRAVENOUS at 11:51

## 2025-04-19 RX ADMIN — SODIUM CHLORIDE 500 ML: 0.9 INJECTION, SOLUTION INTRAVENOUS at 13:40

## 2025-04-19 RX ADMIN — IOPAMIDOL 100 ML: 755 INJECTION, SOLUTION INTRAVENOUS at 13:21

## 2025-04-19 RX ADMIN — METOCLOPRAMIDE 10 MG: 5 INJECTION, SOLUTION INTRAMUSCULAR; INTRAVENOUS at 11:51

## 2025-04-19 RX ADMIN — HYDROCODONE BITARTRATE AND ACETAMINOPHEN 1 TABLET: 5; 325 TABLET ORAL at 14:46

## 2025-04-19 ASSESSMENT — PAIN SCALES - GENERAL
PAINLEVEL_OUTOF10: 5
PAINLEVEL_OUTOF10: 8
PAINLEVEL_OUTOF10: 3

## 2025-04-19 ASSESSMENT — PAIN DESCRIPTION - DESCRIPTORS: DESCRIPTORS: DISCOMFORT

## 2025-04-19 ASSESSMENT — PAIN DESCRIPTION - LOCATION: LOCATION: ABDOMEN

## 2025-04-19 NOTE — ED PROVIDER NOTES
[facility]  EMERGENCY DEPARTMENT ENCOUNTER      Pt Name: Leatha Wilson  MRN: [id number]  Birthdate 1950  Date of evaluation: 4/19/2025  Provider: Lavon Damico PA-C    CHIEF COMPLAINT       Chief Complaint   Patient presents with    Abdominal Pain    Diarrhea    Dizziness         HISTORY OF PRESENT ILLNESS   (Location/Symptom, Timing/Onset, Context/Setting, Quality, Duration, Modifying Factors, Severity)  Note limiting factors.   Leatha Wilson is a 74 y.o. female PMHx colitis, DM, NAFLD, HTN, HLD, CKD, presenting to the department with nausea and vomiting, diarrhea, and abdominal pain for the past 5 days.  Reports mucus in stool.  Pt reports having dizziness.  Elaborates that dizziness is more of a lightheadedness, like a light headache.  No visual changes. No stroke like symptoms.  Reports similar episodes in the past due to colitis.  Patient denies abnormal foods outside of typical diet.  No known sick contacts.  Patient denies fever, urinary symptoms, dysuria, hematuria, hematochezia, melena. She has been taking zofran at home without relief.             Review of External Medical Records:     Nursing Notes were reviewed.    REVIEW OF SYSTEMS    (2-9 systems for level 4, 10 or more for level 5)     Review of Systems    Except as noted above the remainder of the review of systems was reviewed and negative.       PAST MEDICAL HISTORY     Past Medical History:   Diagnosis Date    Abnormal pulmonary function test 9/28/2016    AR (allergic rhinitis)     Asthma     Atrial thrombus 1994    left    Chronic bronchitis (HCC) 9/28/2016    Chronic pain     back/both hips/neuropathy    DDD (degenerative disc disease), lumbar     Diabetes (HCC)     DJD (degenerative joint disease) of hip     right    Empty sella 2000    by MRI    Former smoker 10/24/2016    GERD (gastroesophageal reflux disease)     Hoarseness of voice     Hypercholesterolemia     Hypertension     Ill-defined condition

## 2025-04-19 NOTE — ED TRIAGE NOTES
Patient arrives with daughter with c/o nausea, vomiting, diarrhea, abdominal pain, mucus in stool since Monday. Reports not being able to keep anything down. Denies chills, fever, blood in stool, or ill exposure. Patient has been taking zofran without relief.

## 2025-04-19 NOTE — DISCHARGE INSTRUCTIONS
Call gastroenterologist and primary care provider within 48 hours for close outpatient follow-up.  Metoclopramide is a medication for nausea and vomiting.  Bentyl is a medication for abdominal spasms.  Hydrocodone is a medication that can help with severe pain.  Do not take hydrocodone before you are doing something that requires your full focus such as taking a bath or driving a car because it can cause dizziness, drowsiness, feeling off balance.  If you have any adverse side effects to these medications discontinue it. Take medication as prescribed.  Return immediately if any new or worsening symptoms.  Thank you for allowing us to be a part of your care.

## 2025-04-21 ENCOUNTER — TELEPHONE (OUTPATIENT)
Age: 75
End: 2025-04-21

## 2025-04-21 RX ORDER — PREGABALIN 200 MG/1
200 CAPSULE ORAL 3 TIMES DAILY
Qty: 90 CAPSULE | Refills: 2 | Status: SHIPPED | OUTPATIENT
Start: 2025-04-21 | End: 2025-07-20

## 2025-04-21 NOTE — TELEPHONE ENCOUNTER
PT is asking for a pregabalin (LYRICA) 200 MG capsule refill, PT is also asking for a Call back @[phone]

## 2025-05-05 ENCOUNTER — HOSPITAL ENCOUNTER (OUTPATIENT)
Facility: HOSPITAL | Age: 75
Discharge: HOME | End: 2025-05-08
Payer: MEDICARE

## 2025-05-05 ENCOUNTER — RESULTS FOLLOW-UP (OUTPATIENT)
Age: 75
End: 2025-05-05

## 2025-05-05 DIAGNOSIS — Z72.0 TOBACCO ABUSE: ICD-10-CM

## 2025-05-05 DIAGNOSIS — Z12.2 ENCOUNTER FOR SCREENING FOR LUNG CANCER: ICD-10-CM

## 2025-05-05 PROCEDURE — 71271 CT THORAX LUNG CANCER SCR C-: CPT

## 2025-05-29 RX ORDER — DILTIAZEM HYDROCHLORIDE 420 MG/1
420 CAPSULE, EXTENDED RELEASE ORAL DAILY
Qty: 90 CAPSULE | Refills: 3 | Status: SHIPPED | OUTPATIENT
Start: 2025-05-29

## 2025-05-29 RX ORDER — ISOPROPYL ALCOHOL 70 ML/100ML
SWAB TOPICAL
Qty: 400 EACH | Refills: 3 | Status: SHIPPED | OUTPATIENT
Start: 2025-05-29

## 2025-05-29 RX ORDER — GLUCOSAM/CHON-MSM1/C/MANG/BOSW 500-416.6
TABLET ORAL
Qty: 400 EACH | Refills: 3 | Status: SHIPPED | OUTPATIENT
Start: 2025-05-29

## 2025-05-29 NOTE — TELEPHONE ENCOUNTER
PCP: Kristen Benson APRN - NP    Last appt: 4/7/2025     Future Appointments   Date Time Provider Department Center   7/7/2025  1:15 PM Kristen Benson APRN - NP [facility] DEP       Requested Prescriptions     Pending Prescriptions Disp Refills    TRUEplus Lancets 33G MISC [Pharmacy Med Name: TRUEplus Lancets 33G Miscellaneous] 400 each 3     Sig: USE FOUR TIMES DAILY AS NEEDED BLOOD SUGAR CHECKS    Alcohol Swabs (DROPSAFE ALCOHOL PREP) 70 % PADS [Pharmacy Med Name: DropSafe Alcohol Prep Pad 70 %]  3     Sig: USE FOUR TIMES DAILY FOR BLOOD SUGAR CHECKS.    TIADYLT  MG extended release capsule [Pharmacy Med Name: Tiadylt ER Oral Capsule Extended Release 24 Hour 420 MG] 90 capsule 3     Sig: TAKE 1 CAPSULE EVERY DAY       Prior labs and Blood pressures:  BP Readings from Last 3 Encounters:   04/19/25 (!) 162/80   04/07/25 108/69   01/21/25 125/75     Lab Results   Component Value Date/Time     04/19/2025 11:25 AM    K 3.3 04/19/2025 11:25 AM     04/19/2025 11:25 AM    CO2 24 04/19/2025 11:25 AM    BUN 18 04/19/2025 11:25 AM    GFRAA 52 08/24/2022 12:01 PM     Lab Results   Component Value Date/Time    USR4LLXC 6.7 12/29/2023 02:12 PM     Lab Results   Component Value Date/Time    CHOL 246 09/23/2024 01:47 PM    HDL 52 09/23/2024 01:47 PM     09/23/2024 01:47 PM    LDL 38.8 08/24/2022 12:01 PM    VLDL 53 09/23/2024 01:47 PM     No results found for: \"VITD3\"    Lab Results   Component Value Date/Time    TSH 1.15 10/06/2020 05:43 PM

## 2025-06-16 DIAGNOSIS — M51.360 DEGENERATION OF INTERVERTEBRAL DISC OF LUMBAR REGION WITH DISCOGENIC BACK PAIN: Primary | ICD-10-CM

## 2025-06-16 RX ORDER — HYDROCODONE BITARTRATE AND ACETAMINOPHEN 10; 325 MG/1; MG/1
1 TABLET ORAL EVERY 8 HOURS PRN
Qty: 90 TABLET | Refills: 0 | Status: SHIPPED | OUTPATIENT
Start: 2025-06-16 | End: 2025-07-16

## 2025-06-16 NOTE — TELEPHONE ENCOUNTER
NP Marcelino,    Patient call to refill line as well as call per below.      Noted the \"future\" RX dated 6/7/25 sent by Marcelino was discontinued by derek Javier during a hospital encounter on 4/19/25.  (Then ordered 5-325mg for #9 tablets)    RE-entered as previously prescribed for 10-325mg, if appropriate.  Thanks, Nilam    Last appointment: 4/7/25 Marcelino  Next appointment: 7/14/25 Marcelino  Previous refill encounter(s): Check  (last rx dated 5/9/25 90)    Requested Prescriptions     Pending Prescriptions Disp Refills    HYDROcodone-acetaminophen (NORCO)  MG per tablet 90 tablet 0     Sig: Take 1 tablet by mouth every 8 hours as needed for Pain for up to 30 days. Max Daily Amount: 3 tablets     For Pharmacy Admin Tracking Only    Program: Medication Refill  CPA in place:    Recommendation Provided To:   Intervention Detail: New Rx: 1, reason: Patient Preference  Intervention Accepted By:   Gap Closed?:    Time Spent (min): 15

## 2025-06-16 NOTE — TELEPHONE ENCOUNTER
PT called requesting HYDROcodone-acetaminophen (NORCO) refill sent to [facility] [id number] - [facility] [address] [place]

## 2025-07-12 DIAGNOSIS — M51.360 DEGENERATION OF INTERVERTEBRAL DISC OF LUMBAR REGION WITH DISCOGENIC BACK PAIN: ICD-10-CM

## 2025-07-14 RX ORDER — HYDROCODONE BITARTRATE AND ACETAMINOPHEN 10; 325 MG/1; MG/1
1 TABLET ORAL EVERY 8 HOURS PRN
Qty: 90 TABLET | Refills: 0 | Status: SHIPPED | OUTPATIENT
Start: 2025-07-14 | End: 2025-08-13

## 2025-07-14 NOTE — TELEPHONE ENCOUNTER
PCP: Kristen Benson APRN - NP    Last appt: 4/7/2025   Future Appointments   Date Time Provider Department Center   8/11/2025 10:15 AM [facility]       Requested Prescriptions     Pending Prescriptions Disp Refills    HYDROcodone-acetaminophen (NORCO)  MG per tablet 90 tablet 0     Sig: Take 1 tablet by mouth every 8 hours as needed for Pain for up to 30 days. Max Daily Amount: 3 tablets

## 2025-08-01 DIAGNOSIS — J42 CHRONIC BRONCHITIS, UNSPECIFIED CHRONIC BRONCHITIS TYPE (HCC): ICD-10-CM

## 2025-08-01 NOTE — TELEPHONE ENCOUNTER
PCP: Kristen Benson APRN - NP    Last appt: 4/7/2025   Future Appointments   Date Time Provider Department Center   8/11/2025 10:15 AM [facility]       Requested Prescriptions     Pending Prescriptions Disp Refills    SYNJARDY XR  MG TB24 [Pharmacy Med Name: SYNJARDY XR 25-1000mg TAB ER] 180 tablet 2     Sig: TAKE TWO TABLETS BY MOUTH DAILY    SPIRIVA RESPIMAT 2.5 MCG/ACT AERS inhaler [Pharmacy Med Name: SPIRIVA RESPIMAT 2.5mcg/actuatio SPRY]  2     Sig: INHALE 2 PUFFS BY MOUTH DAILY

## 2025-08-02 RX ORDER — TIOTROPIUM BROMIDE INHALATION SPRAY 3.12 UG/1
SPRAY, METERED RESPIRATORY (INHALATION)
Qty: 12 EACH | Refills: 2 | Status: SHIPPED | OUTPATIENT
Start: 2025-08-02

## 2025-08-02 RX ORDER — EMPAGLIFLOZIN, METFORMIN HYDROCHLORIDE 25; 1000 MG/1; MG/1
2 TABLET, EXTENDED RELEASE ORAL DAILY
Qty: 180 TABLET | Refills: 2 | Status: SHIPPED | OUTPATIENT
Start: 2025-08-02

## 2025-08-05 ENCOUNTER — HOSPITAL ENCOUNTER (OUTPATIENT)
Facility: HOSPITAL | Age: 75
Setting detail: OUTPATIENT SURGERY
Discharge: HOME | End: 2025-08-05
Attending: INTERNAL MEDICINE | Admitting: INTERNAL MEDICINE
Payer: MEDICARE

## 2025-08-05 ENCOUNTER — ANESTHESIA (OUTPATIENT)
Facility: HOSPITAL | Age: 75
End: 2025-08-05
Payer: MEDICARE

## 2025-08-05 ENCOUNTER — ANESTHESIA EVENT (OUTPATIENT)
Facility: HOSPITAL | Age: 75
End: 2025-08-05
Payer: MEDICARE

## 2025-08-05 VITALS
RESPIRATION RATE: 12 BRPM | BODY MASS INDEX: 33.79 KG/M2 | TEMPERATURE: 97.1 F | HEART RATE: 71 BPM | OXYGEN SATURATION: 96 % | SYSTOLIC BLOOD PRESSURE: 148 MMHG | DIASTOLIC BLOOD PRESSURE: 59 MMHG | HEIGHT: 64 IN | WEIGHT: 197.9 LBS

## 2025-08-05 PROCEDURE — 88305 TISSUE EXAM BY PATHOLOGIST: CPT

## 2025-08-05 PROCEDURE — 2580000003 HC RX 258: Performed by: INTERNAL MEDICINE

## 2025-08-05 PROCEDURE — 3700000001 HC ADD 15 MINUTES (ANESTHESIA): Performed by: INTERNAL MEDICINE

## 2025-08-05 PROCEDURE — 2709999900 HC NON-CHARGEABLE SUPPLY: Performed by: INTERNAL MEDICINE

## 2025-08-05 PROCEDURE — 2580000003 HC RX 258

## 2025-08-05 PROCEDURE — 3600007502: Performed by: INTERNAL MEDICINE

## 2025-08-05 PROCEDURE — 7100000010 HC PHASE II RECOVERY - FIRST 15 MIN: Performed by: INTERNAL MEDICINE

## 2025-08-05 PROCEDURE — 3700000000 HC ANESTHESIA ATTENDED CARE: Performed by: INTERNAL MEDICINE

## 2025-08-05 PROCEDURE — 6360000002 HC RX W HCPCS

## 2025-08-05 PROCEDURE — 3600007512: Performed by: INTERNAL MEDICINE

## 2025-08-05 PROCEDURE — 7100000011 HC PHASE II RECOVERY - ADDTL 15 MIN: Performed by: INTERNAL MEDICINE

## 2025-08-05 RX ORDER — SIMETHICONE 40MG/0.6ML
40 SUSPENSION, DROPS(FINAL DOSAGE FORM)(ML) ORAL AS NEEDED
Status: DISCONTINUED | OUTPATIENT
Start: 2025-08-05 | End: 2025-08-05 | Stop reason: HOSPADM

## 2025-08-05 RX ORDER — LIDOCAINE HYDROCHLORIDE 20 MG/ML
INJECTION, SOLUTION EPIDURAL; INFILTRATION; INTRACAUDAL; PERINEURAL
Status: DISCONTINUED | OUTPATIENT
Start: 2025-08-05 | End: 2025-08-05 | Stop reason: SDUPTHER

## 2025-08-05 RX ORDER — SODIUM CHLORIDE 9 MG/ML
INJECTION, SOLUTION INTRAVENOUS CONTINUOUS
Status: DISCONTINUED | OUTPATIENT
Start: 2025-08-05 | End: 2025-08-05 | Stop reason: HOSPADM

## 2025-08-05 RX ORDER — SODIUM CHLORIDE 9 MG/ML
INJECTION, SOLUTION INTRAVENOUS
Status: DISCONTINUED | OUTPATIENT
Start: 2025-08-05 | End: 2025-08-05 | Stop reason: SDUPTHER

## 2025-08-05 RX ORDER — SODIUM CHLORIDE 0.9 % (FLUSH) 0.9 %
5-40 SYRINGE (ML) INJECTION PRN
Status: DISCONTINUED | OUTPATIENT
Start: 2025-08-05 | End: 2025-08-05 | Stop reason: HOSPADM

## 2025-08-05 RX ADMIN — PROPOFOL 20 MG: 10 INJECTION, EMULSION INTRAVENOUS at 08:57

## 2025-08-05 RX ADMIN — PROPOFOL 20 MG: 10 INJECTION, EMULSION INTRAVENOUS at 08:23

## 2025-08-05 RX ADMIN — LIDOCAINE HYDROCHLORIDE 4 ML: 20 INJECTION, SOLUTION EPIDURAL; INFILTRATION; INTRACAUDAL; PERINEURAL at 08:18

## 2025-08-05 RX ADMIN — SODIUM CHLORIDE: 9 INJECTION, SOLUTION INTRAVENOUS at 08:16

## 2025-08-05 RX ADMIN — PROPOFOL 30 MG: 10 INJECTION, EMULSION INTRAVENOUS at 08:54

## 2025-08-05 RX ADMIN — PROPOFOL 30 MG: 10 INJECTION, EMULSION INTRAVENOUS at 08:42

## 2025-08-05 RX ADMIN — PROPOFOL 20 MG: 10 INJECTION, EMULSION INTRAVENOUS at 09:03

## 2025-08-05 RX ADMIN — PROPOFOL 30 MG: 10 INJECTION, EMULSION INTRAVENOUS at 08:47

## 2025-08-05 RX ADMIN — PROPOFOL 40 MG: 10 INJECTION, EMULSION INTRAVENOUS at 08:37

## 2025-08-05 RX ADMIN — PROPOFOL 20 MG: 10 INJECTION, EMULSION INTRAVENOUS at 08:50

## 2025-08-05 RX ADMIN — PROPOFOL 30 MG: 10 INJECTION, EMULSION INTRAVENOUS at 08:32

## 2025-08-05 RX ADMIN — SODIUM CHLORIDE: 0.9 INJECTION, SOLUTION INTRAVENOUS at 08:06

## 2025-08-05 RX ADMIN — PROPOFOL 20 MG: 10 INJECTION, EMULSION INTRAVENOUS at 09:01

## 2025-08-05 RX ADMIN — PROPOFOL 80 MG: 10 INJECTION, EMULSION INTRAVENOUS at 08:18

## 2025-08-05 ASSESSMENT — PAIN - FUNCTIONAL ASSESSMENT: PAIN_FUNCTIONAL_ASSESSMENT: 0-10

## 2025-08-05 ASSESSMENT — LIFESTYLE VARIABLES: SMOKING_STATUS: 1

## 2025-08-11 ENCOUNTER — OFFICE VISIT (OUTPATIENT)
Age: 75
End: 2025-08-11
Payer: MEDICARE

## 2025-08-11 VITALS
WEIGHT: 198 LBS | SYSTOLIC BLOOD PRESSURE: 182 MMHG | RESPIRATION RATE: 18 BRPM | OXYGEN SATURATION: 97 % | HEIGHT: 64 IN | TEMPERATURE: 97.7 F | HEART RATE: 88 BPM | BODY MASS INDEX: 33.8 KG/M2 | DIASTOLIC BLOOD PRESSURE: 70 MMHG

## 2025-08-11 DIAGNOSIS — R11.0 NAUSEA: ICD-10-CM

## 2025-08-11 DIAGNOSIS — M51.360 DEGENERATION OF INTERVERTEBRAL DISC OF LUMBAR REGION WITH DISCOGENIC BACK PAIN: ICD-10-CM

## 2025-08-11 DIAGNOSIS — I10 ESSENTIAL HYPERTENSION: Primary | ICD-10-CM

## 2025-08-11 DIAGNOSIS — G62.9 PERIPHERAL POLYNEUROPATHY: ICD-10-CM

## 2025-08-11 PROCEDURE — G8417 CALC BMI ABV UP PARAM F/U: HCPCS | Performed by: NURSE PRACTITIONER

## 2025-08-11 PROCEDURE — 1090F PRES/ABSN URINE INCON ASSESS: CPT | Performed by: NURSE PRACTITIONER

## 2025-08-11 PROCEDURE — 3077F SYST BP >= 140 MM HG: CPT | Performed by: NURSE PRACTITIONER

## 2025-08-11 PROCEDURE — 99214 OFFICE O/P EST MOD 30 MIN: CPT | Performed by: NURSE PRACTITIONER

## 2025-08-11 PROCEDURE — 3017F COLORECTAL CA SCREEN DOC REV: CPT | Performed by: NURSE PRACTITIONER

## 2025-08-11 PROCEDURE — 3078F DIAST BP <80 MM HG: CPT | Performed by: NURSE PRACTITIONER

## 2025-08-11 PROCEDURE — 1159F MED LIST DOCD IN RCRD: CPT | Performed by: NURSE PRACTITIONER

## 2025-08-11 PROCEDURE — G8427 DOCREV CUR MEDS BY ELIG CLIN: HCPCS | Performed by: NURSE PRACTITIONER

## 2025-08-11 PROCEDURE — G2211 COMPLEX E/M VISIT ADD ON: HCPCS | Performed by: NURSE PRACTITIONER

## 2025-08-11 PROCEDURE — G8399 PT W/DXA RESULTS DOCUMENT: HCPCS | Performed by: NURSE PRACTITIONER

## 2025-08-11 PROCEDURE — 1123F ACP DISCUSS/DSCN MKR DOCD: CPT | Performed by: NURSE PRACTITIONER

## 2025-08-11 PROCEDURE — 1036F TOBACCO NON-USER: CPT | Performed by: NURSE PRACTITIONER

## 2025-08-11 PROCEDURE — 1125F AMNT PAIN NOTED PAIN PRSNT: CPT | Performed by: NURSE PRACTITIONER

## 2025-08-11 RX ORDER — ONDANSETRON 4 MG/1
4 TABLET, FILM COATED ORAL EVERY 8 HOURS PRN
Qty: 30 TABLET | Refills: 3 | Status: SHIPPED | OUTPATIENT
Start: 2025-08-11

## 2025-08-11 RX ORDER — OMEPRAZOLE 40 MG/1
40 CAPSULE, DELAYED RELEASE ORAL
Qty: 90 CAPSULE | Refills: 1 | Status: SHIPPED | OUTPATIENT
Start: 2025-08-11

## 2025-08-11 ASSESSMENT — PATIENT HEALTH QUESTIONNAIRE - PHQ9
2. FEELING DOWN, DEPRESSED OR HOPELESS: NOT AT ALL
SUM OF ALL RESPONSES TO PHQ QUESTIONS 1-9: 0
SUM OF ALL RESPONSES TO PHQ QUESTIONS 1-9: 0
1. LITTLE INTEREST OR PLEASURE IN DOING THINGS: NOT AT ALL
SUM OF ALL RESPONSES TO PHQ QUESTIONS 1-9: 0
SUM OF ALL RESPONSES TO PHQ QUESTIONS 1-9: 0

## 2025-08-12 RX ORDER — HYDROCODONE BITARTRATE AND ACETAMINOPHEN 10; 325 MG/1; MG/1
1 TABLET ORAL EVERY 8 HOURS PRN
Qty: 90 TABLET | Refills: 0 | Status: SHIPPED | OUTPATIENT
Start: 2025-08-12 | End: 2025-08-12 | Stop reason: SDUPTHER

## 2025-08-12 RX ORDER — HYDROCODONE BITARTRATE AND ACETAMINOPHEN 10; 325 MG/1; MG/1
1 TABLET ORAL EVERY 8 HOURS PRN
Qty: 90 TABLET | Refills: 0 | Status: SHIPPED | OUTPATIENT
Start: 2025-09-10 | End: 2025-08-12 | Stop reason: SDUPTHER

## 2025-08-12 RX ORDER — PREGABALIN 200 MG/1
200 CAPSULE ORAL 3 TIMES DAILY
Qty: 90 CAPSULE | Refills: 2 | Status: SHIPPED | OUTPATIENT
Start: 2025-08-12 | End: 2025-08-13

## 2025-08-12 RX ORDER — HYDROCODONE BITARTRATE AND ACETAMINOPHEN 10; 325 MG/1; MG/1
1 TABLET ORAL EVERY 8 HOURS PRN
Qty: 90 TABLET | Refills: 0 | Status: SHIPPED | OUTPATIENT
Start: 2025-10-08 | End: 2025-11-07

## 2025-08-12 ASSESSMENT — ENCOUNTER SYMPTOMS: SHORTNESS OF BREATH: 0

## 2025-08-13 DIAGNOSIS — G62.9 PERIPHERAL POLYNEUROPATHY: ICD-10-CM

## 2025-08-13 RX ORDER — ATORVASTATIN CALCIUM 40 MG/1
40 TABLET, FILM COATED ORAL DAILY
Qty: 90 TABLET | Refills: 3 | Status: SHIPPED | OUTPATIENT
Start: 2025-08-13

## 2025-08-13 RX ORDER — PREGABALIN 200 MG/1
200 CAPSULE ORAL 3 TIMES DAILY
Qty: 270 CAPSULE | Refills: 0 | Status: SHIPPED | OUTPATIENT
Start: 2025-08-13 | End: 2025-11-11

## 2025-08-20 ENCOUNTER — TRANSCRIBE ORDERS (OUTPATIENT)
Facility: HOSPITAL | Age: 75
End: 2025-08-20

## 2025-08-20 DIAGNOSIS — J37.0 CHRONIC LARYNGITIS: ICD-10-CM

## 2025-08-20 DIAGNOSIS — J38.1 POLYP OF LARYNX: Primary | ICD-10-CM

## 2025-08-22 RX ORDER — LOSARTAN POTASSIUM AND HYDROCHLOROTHIAZIDE 25; 100 MG/1; MG/1
1 TABLET ORAL DAILY
Qty: 90 TABLET | Refills: 0 | Status: SHIPPED | OUTPATIENT
Start: 2025-08-22

## 2025-09-03 RX ORDER — ALLOPURINOL 300 MG/1
300 TABLET ORAL NIGHTLY
Qty: 90 TABLET | Refills: 0 | Status: SHIPPED | OUTPATIENT
Start: 2025-09-03

## 2025-09-05 ENCOUNTER — HOSPITAL ENCOUNTER (OUTPATIENT)
Facility: HOSPITAL | Age: 75
Discharge: HOME | End: 2025-09-05
Attending: OTOLARYNGOLOGY
Payer: MEDICARE

## 2025-09-05 DIAGNOSIS — J38.1 POLYP OF LARYNX: ICD-10-CM

## 2025-09-05 DIAGNOSIS — J37.0 CHRONIC LARYNGITIS: ICD-10-CM

## 2025-09-05 LAB — CREAT BLD-MCNC: 1.1 MG/DL (ref 0.6–1.3)

## 2025-09-05 PROCEDURE — 70491 CT SOFT TISSUE NECK W/DYE: CPT

## 2025-09-05 PROCEDURE — 6360000004 HC RX CONTRAST MEDICATION: Performed by: OTOLARYNGOLOGY

## 2025-09-05 PROCEDURE — 82565 ASSAY OF CREATININE: CPT

## 2025-09-05 RX ORDER — IOPAMIDOL 755 MG/ML
100 INJECTION, SOLUTION INTRAVASCULAR
Status: COMPLETED | OUTPATIENT
Start: 2025-09-05 | End: 2025-09-05

## 2025-09-05 RX ADMIN — IOPAMIDOL 100 ML: 755 INJECTION, SOLUTION INTRAVENOUS at 14:57

## (undated) DEVICE — PATIENT PROTECTIVE PAD FOR IMP UNIVERSAL LATERAL HIP POSITIONER (ULP) (6/CASE): Brand: PATIENT PROTECTIVE PAD

## (undated) DEVICE — MAX-CORE® DISPOSABLE CORE BIOPSY INSTRUMENT, 16G X 16CM: Brand: MAX-CORE

## (undated) DEVICE — SOLUTION SURG PREP 26 CC PURPREP

## (undated) DEVICE — GLOVE SURG SZ 65 L12IN FNGR THK79MIL GRN LTX FREE

## (undated) DEVICE — COVER,MAYO STAND,STERILE: Brand: MEDLINE

## (undated) DEVICE — SET ADMIN 16ML TBNG L100IN 2 Y INJ SITE IV PIGGY BK DISP

## (undated) DEVICE — BASIN EMSIS 16OZ GRAPHITE PLAS KID SHP MOLD GRAD FOR ORAL

## (undated) DEVICE — DECANTER BAG 9": Brand: MEDLINE INDUSTRIES, INC.

## (undated) DEVICE — SYRINGE 20ML LL S/C 50

## (undated) DEVICE — KENDALL RADIOLUCENT FOAM MONITORING ELECTRODE RECTANGULAR SHAPE: Brand: KENDALL

## (undated) DEVICE — NEEDLE HYPO 18GA L1.5IN PNK S STL HUB POLYPR SHLD REG BVL

## (undated) DEVICE — SYR 3ML LL TIP 1/10ML GRAD --

## (undated) DEVICE — SYR 10ML LUER LOK 1/5ML GRAD --

## (undated) DEVICE — CONTAINER SPEC 20 ML LID NEUT BUFF FORMALIN 10 % POLYPR STS

## (undated) DEVICE — ELECTRODE,RADIOTRANSLUCENT,FOAM,5PK: Brand: MEDLINE

## (undated) DEVICE — SUTURE STRATAFIX SPRL SZ 1 L14IN ABSRB VLT L48CM CTX 1/2 SXPD2B405

## (undated) DEVICE — LIQUIBAND RAPID ADHESIVE 36/CS 0.8ML: Brand: MEDLINE

## (undated) DEVICE — YANKAUER,OPEN TIP,W/O VENT,STERILE: Brand: MEDLINE INDUSTRIES, INC.

## (undated) DEVICE — Z DISCONTINUED PER MEDLINE LINE GAS SAMPLING O2/CO2 LNG AD 13 FT NSL W/ TBNG FILTERLINE

## (undated) DEVICE — HANDPIECE SET WITH BONE CLEANING TIP AND SUCTION TUBE: Brand: INTERPULSE

## (undated) DEVICE — NEONATAL-ADULT SPO2 SENSOR: Brand: NELLCOR

## (undated) DEVICE — ZIPPERED TOGA, LARGE: Brand: FLYTE

## (undated) DEVICE — DRAPE,U/ SHT,SPLIT,PLAS,STERIL: Brand: MEDLINE

## (undated) DEVICE — 1200 GUARD II KIT W/5MM TUBE W/O VAC TUBE: Brand: GUARDIAN

## (undated) DEVICE — TRAP ENDOSCP POLYP 2 CHMBR DRAWER TYP

## (undated) DEVICE — BLOCK BITE ENDOSCP AD 21 MM W/ DIL BLU LF DISP

## (undated) DEVICE — ALCOHOL RUBBING ISO 16OZ 70%

## (undated) DEVICE — Device

## (undated) DEVICE — SUTURE VCRL SZ 2 L54IN ABSRB UD L65MM TP-1 1/2 CIR J880T

## (undated) DEVICE — SCRUB DRY SURG EZ SCRUB BRUSH PREOPERATIVE GRN

## (undated) DEVICE — SUTURE VCRL SZ 2-0 L36IN ABSRB UD L40MM CT 1/2 CIR J957H

## (undated) DEVICE — FORCEPS BX L160CM DIA8MM GRSP DISECT CUP TIP NONLOCKING ROT

## (undated) DEVICE — CATH IV AUTOGRD BC PNK 20GA 25 -- INSYTE

## (undated) DEVICE — BIT DRL L5IN DIA2MM STD ST S STL TWST BUSA

## (undated) DEVICE — TIP SUCT CRV REG REDI

## (undated) DEVICE — SUTURE MCRYL SZ 3-0 L27IN ABSRB UD L24MM PS-1 3/8 CIR PRIM Y936H

## (undated) DEVICE — HEWSON SUTURE RETRIEVER: Brand: HEWSON SUTURE RETRIEVER

## (undated) DEVICE — TRAY BX SFT TISS W/ RUL ALC PREP PD FEN DRP TWL LUERLOCK

## (undated) DEVICE — ELECTRODE BLDE L4IN NONINSULATED EDGE

## (undated) DEVICE — IV START KIT WITH SECUREMENT DEVICES

## (undated) DEVICE — ERASECAUTI INTERMIT TRAY: Brand: MEDLINE INDUSTRIES, INC.

## (undated) DEVICE — CATH IV AUTOGRD BC BLU 22GA 25 -- INSYTE

## (undated) DEVICE — ELECTRODE PT RET AD L9FT HI MOIST COND ADH HYDRGEL CORDED

## (undated) DEVICE — TOWEL 4 PLY TISS 19X30 SUE WHT

## (undated) DEVICE — FORCEPS BX 240CM 2.4MM L NDL RAD JAW 4 M00513334

## (undated) DEVICE — 3M™ STERI-DRAPE™ CARDIOVASCULAR SHEET WITH IOBAN™ 2 INCISE FILM 6677: Brand: STERI-DRAPE™ IOBAN™ 2

## (undated) DEVICE — APPLICATOR MEDICATED 26 CC SOLUTION HI LT ORNG CHLORAPREP

## (undated) DEVICE — SOLIDIFIER FLD 2OZ 1500CC N DISINF IN BTL DISP SAFESORB

## (undated) DEVICE — SOLUTION IRRIG 3000ML 0.9% SOD CHL USP UROMATIC PLAS CONT

## (undated) DEVICE — ENDOSCOPIC KIT COMPLIANCE ENDOKIT

## (undated) DEVICE — TOTAL JOINT - SMH: Brand: MEDLINE INDUSTRIES, INC.

## (undated) DEVICE — SYSTEM REPROC CBL 3 LD DISPOSABLE

## (undated) DEVICE — SOLIDIFIER MEDC 1200ML -- CONVERT TO 356117

## (undated) DEVICE — SUTURE VCRL SZ 1 L36IN ABSRB UD L36MM CT-1 1/2 CIR J947H

## (undated) DEVICE — ZIPPERED TOGA, 2X LARGE: Brand: FLYTE, SURGICOOL

## (undated) DEVICE — YANKAUER,TAPERED BULBOUS TIP,W/O VENT: Brand: MEDLINE

## (undated) DEVICE — STERILE POLYISOPRENE POWDER-FREE SURGICAL GLOVES: Brand: PROTEXIS

## (undated) DEVICE — CATHETER IV 22GA L1IN OD0.8382-0.9144MM ID0.6096-0.6858MM 382523

## (undated) DEVICE — 3M™ STERI-DRAPE™ 2 INCISE DRAPE 2050: Brand: STERI-DRAPE™

## (undated) DEVICE — DRESSING HYDROCOLLOID BORDER 35X10 IN ALUM PRIMASEAL

## (undated) DEVICE — SUTURE ETHBND EXCEL SZ 2 L30IN NONABSORBABLE GRN L40MM V-37 MX69G

## (undated) DEVICE — BAG SPEC BIOHZRD 10 X 10 IN --

## (undated) DEVICE — BLADE SAW W098XL354IN THK0047IN CUT THK0047IN SAG

## (undated) DEVICE — PAD BD MATTRESS 73X32 IN STD CONVOLUTED FOAM LTX FREE

## (undated) DEVICE — STERILE POLYISOPRENE POWDER-FREE SURGICAL GLOVES WITH EMOLLIENT COATING: Brand: PROTEXIS

## (undated) DEVICE — CONTAINER,SPECIMEN,3OZ,OR STRL: Brand: MEDLINE

## (undated) DEVICE — SPONGE GZ W4XL4IN COT 12 PLY TYP VII WVN C FLD DSGN STERILE

## (undated) DEVICE — SUTURE VCRL SZ 0 L36IN ABSRB VLT L40MM CT 1/2 CIR J358H

## (undated) DEVICE — GLOVE SURG SZ 65 L12IN FNGR THK94MIL STD WHT LTX FREE

## (undated) DEVICE — CUFF BLD PRSS AD CLTH SGL TB W/ BAYNT CONN ROUNDED CORNER

## (undated) DEVICE — TIP SUCT TRNSPAR RIB SURF STD BLB RIG NVENT W/ 5IN1 CONN DYND50138] MEDLINE INDUSTRIES INC]

## (undated) DEVICE — HYPODERMIC SAFETY NEEDLE: Brand: MAGELLAN

## (undated) DEVICE — HOOD: Brand: FLYTE